# Patient Record
Sex: FEMALE | Race: WHITE
[De-identification: names, ages, dates, MRNs, and addresses within clinical notes are randomized per-mention and may not be internally consistent; named-entity substitution may affect disease eponyms.]

---

## 2017-01-03 NOTE — UC
Dizzy HPI


HPI Summary: 





The patient comes in today for:





1.  Dizziness (vertigo and near syncope):  





Onset:2 hours ago.


Palliative/provocative:  Head movements makes it worse. 


Quality: Vertigo and near-syncope (x 3).


Region/radiation: CNS.


Severity: Present at this time 5/10


Time: Comes and goes.


Associated symptoms:


   Chest pain: None.


   Dyspnea: None.


   Nausea:  Present.


   Numbness:  She had this run down her left arm for about 10 seconds. 


   Fevers: None.


   CAD risk: Just out of ICU due to infection in her heart and lungs.  2 

cardiac arrests and 3 blocked arteries. HTN: (+), DM: (+), smoker: (+).





* 





- History Of Current Complaint


Chief Complaint: UCDizziness


Stated Complaint: DIZZINESS


Time Seen by Provider: 01/03/17 22:01


Hx Obtained From: Patient, Family/Caretaker


Hx Last Menstrual Period: 11/30/16


Pregnant?: No





- Allergies/Home Medications


Allergies/Adverse Reactions: 


 Allergies











Allergy/AdvReac Type Severity Reaction Status Date / Time


 


Adhesive Tape Allergy  Blisters Verified 12/01/16 20:41


 


Codeine Allergy  Hives Verified 12/01/16 20:41


 


Ropinirole [From Requip] Allergy  Hives Verified 12/01/16 20:41


 


Erythromycin AdvReac  "DOESN'T Verified 12/01/16 20:41





   WORK"  


 


Niacin AdvReac  Flushing Verified 12/01/16 20:41














PMH/Surg Hx/FS Hx/Imm Hx


Previously Healthy: No - Peripheral neuropathy. Malignant melanoma.


Endocrine History Of: Reports: Diabetes - TYPE 2 WITH INSULIN CONTROL, Thyroid 

Disease - THYROID NODULE, Dyslipidemia


   Denies: Hyperthyroidism, Hypothyroidism


Cardiovascular History Of: Reports: Cardiac Disorders - 3 BLOCKED ARTERIES, 

Cardiac arrest x 2., Hypertension - CONTROL WITH MED, Myocardial Infarction


   Denies: Pacemaker/ICD, Congestive Heart Failure, Atrial Fibrillation, Deep 

Vein Thrombosis, Bleeding Disorders


Respiratory History Of: Reports: COPD - smoker, Asthma - NO INHALER BUT HAS A 

NEBULIZER


   Denies: Bronchitis, Pneumonia, Pulmonary Embolism


GI/ History Of: Reports: Gastroesophageal Reflux, Renal Disease - ESRD on 

home peritoneal dialysis 


   Denies: Ulcer, Gastrointestinal Bleed


Neurological History Of: 


   Denies: TIA


Psychological History Of: Reports: Anxiety - ON MEDICATION FOR, Depression - ON 

MEDICATION FOR, Bipolar Disorder


Cancer History Of: 


   Denies: Lung Cancer, Colorectal Cancer, Breast Cancer, Prostate Cancer, 

Cervical Cancer


Other History Of: Anticoagulant Therapy - Aspirin.


   Negative For: HIV, Hepatitis B, Hepatitis C





- Surgical History


Surgical History: Yes


Surgery Procedure, Year, and Place: LT EYE REMOVED 2012-HAS PROSTHETIC EYE(

ORBITS DONE 5/2015 OK'D BY DR QUEVEDO);.  CARDIAC CATH 2014-NO STENTS; [facility].  

2014 LT WRIST FISTULA PLACEMENT; [facility].  2014  HEMODIALYSIS CATH RT CHEST WALL;.  

PERITONEAL DIALYSIS CATH 2/2015; REMOVED 7/2016 AT [facility] [place].  2013 MELANOMA REMOVED 

FROM VULVA, X4, 2 AT [facility] AND 2 AT [facility].  4/2016 RT GREAT TOE AMPUTATION, 

[facility].  8/27/2016 PLACEMENT RIGHT JUGULAR TESIO HEMODIALYSIS CATHETER, [facility]





- Family History


Known Family History: Positive: Cardiac Disease - father MI at 41 y/o, 

Hypertension





- Social History


Occupation: Unemployed


Alcohol Use: None


Substance Use Type: None


Substance Use Comment - Amount & Last Used: prescriptions


Smoking Status (MU): Current Every Day Smoker


Type: Cigarettes


Amount Used/How Often: 1/2 PPD


Length of Time of Smoking/Using Tobacco: 17 YEARS


Have You Smoked in the Last Year: Yes


Household Exposure Type: Cigarettes





- Immunization History


Most Recent Influenza Vaccination: Fall 2016


Most Recent Tetanus Shot: 2014


Most Recent Pneumonia Vaccination: Fall 2016





Review of Systems


Constitutional: Negative


Skin: Negative


Eyes: Negative


ENT: Negative


Respiratory: Negative


Cardiovascular: Negative


Gastrointestinal: Negative


Genitourinary: Negative


Motor: Negative


All Other Systems Reviewed And Are Negative: Yes





Physical Exam


Triage Information Reviewed: Yes


Appearance: Well-Appearing, No Pain Distress, Obese


Vital Signs: 


 Initial Vital Signs











Temp  97.9 F   01/03/17 21:49


 


Pulse  106   01/03/17 21:49


 


Resp  16   01/03/17 21:49


 


BP  90/59   01/03/17 21:49


 


Pulse Ox  93   01/03/17 21:49











Vital Signs Reviewed: Yes


Eyes: Positive: Conjunctiva Clear.  Negative: Discharge


ENT: Positive: Hearing grossly normal.  Negative: Pharyngeal erythema, Nasal 

congestion, Nasal drainage, TM bulging, TM dull, TM red, Tonsillar swelling, 

Tonsillar exudate


Dental: Negative: Gross Decay/Caries @, Dental Fracture @


Neck: Positive: Supple, Nontender, No Lymphadenopathy.  Negative: Nuchal 

Rigidity


Respiratory: Positive: Lungs clear, No respiratory distress, No accessory 

muscle use.  Negative: Rhonchi, Wheezing


Cardiovascular: Positive: RRR, No Murmur


Abdomen Description: Positive: Nontender, No Organomegaly, Soft.  Negative: 

Distended, Guarding


Musculoskeletal: Negative: Strength Intact, ROM Intact


Neurological: Positive: Alert.  Negative: Muscle Tone Normal


Psychological: Negative: Normal Response To Family, Age Appropriate Behavior


Skin: Negative: rashes, breakdown





Dizzy Course/Dx





- Course


Course Of Treatment: The patient was told that with her medical history and her 

having three near-syncopal episodes, that my recommendation was for her to go 

to the ER. She agreed, but would not take an ambulance.





- Differential Dx/Diagnosis


Provider Diagnoses: Near syncope.  Vertigo.  Hx of DM, smoker, CAD (2 cardiac 

arrests),





Discharge





- Discharge Plan


Condition: Stable


Disposition: AGAINST MEDICAL ADVICE


Additional Instructions: 


Go directly to the ER. Patient was advised to go to ER via ambulance, but 

declined.

## 2017-01-04 NOTE — ED
Mary RICARDO Erika, scribed for Varghese Bal MD on 01/04/17 at 0040 .





Dizziness





- HPI Summary


HPI Summary: 


Patient is a 36-year-old female presenting to the ED with a CC of dizziness. Pt 

reports that at 20:00 tonight, she became dizzy and lightheaded, and "went cross

-eyed." At 21:00, pt became nauseated, and reports multiple near-syncopal 

episodes. Pt checked her blood sugar, which she found to be 204. She ate food, 

which did not alleviate her symptoms. Pt was seen at [facility], and 

states her BP was 80/50 there. Now, pt denies dizziness and nausea, but states 

she has a frontal headache.





- History Of Current Complaint


Chief Complaint: EDChestPainROMI


Stated Complaint: DIZZY,LOW BLOOD PRESSURE,NAUSEA/CONV CARE


Time Seen by Provider: 01/04/17 00:27


Hx Obtained From: Patient


Timing: Minutes


Severity Initially: Moderate


Severity Currently: Mild


Character: Lightheaded, Dizzy


Associated Signs And Symptoms: Positive: Nausea





- Allergies/Home Medications


Allergies/Adverse Reactions: 


 Allergies











Allergy/AdvReac Type Severity Reaction Status Date / Time


 


Adhesive Tape Allergy  Blisters Verified 12/01/16 20:41


 


Codeine Allergy  Hives Verified 12/01/16 20:41


 


Ropinirole [From Requip] Allergy  Hives Verified 12/01/16 20:41


 


Erythromycin AdvReac  "DOESN'T Verified 12/01/16 20:41





   WORK"  


 


Niacin AdvReac  Flushing Verified 12/01/16 20:41














PMH/Surg Hx/FS Hx/Imm Hx


Endocrine/Hematology History: Reports: Hx Anticoagulant Therapy - Aspirin., Hx 

Blood Transfusions, Hx Diabetes - TYPE 2 WITH INSULIN CONTROL, Hx Thyroid 

Disease - THYROID NODULE, Hx Anemia - HISTORY OF -NO PROBLEMS CURRENTLY- BLOOD 

TRANSFUSION IN 2014


Cardiovascular History: Reports: Hx Angina, Hx Cardiac Arrest - in Dec. 2014 

with CPR, Hx Coronary Artery Disease - STATES HAS 3 BLOCKED ARTERIES, Hx 

Hypercholesterolemia, Hx Hypertension - CONTROL WITH MED, Hx Myocardial 

Infarction, Other Cardiovascular Problems/Disorders - [facility]- CARDIOLOGY AT 

[place]/CARDIAC ARREST 2014-DURING HEMODIALYSIS


   Denies: Hx Congestive Heart Failure, Hx Deep Vein Thrombosis, Hx Pacemaker/

ICD, Hx Valvular Heart Disease


Respiratory History: Reports: Hx Asthma - NO INHALER BUT HAS A NEBULIZER, Hx 

Chronic Obstructive Pulmonary Disease (COPD) - smoker


   Denies: Hx Lung Cancer, Hx Pneumonia, Hx Pulmonary Embolism, Hx Sleep Apnea 

- NOT TESTED


GI History: Reports: Hx Gastroesophageal Reflux Disease, Other GI Disorders - 

GASTROPARESIS - TAKING OMEPRAZOLE


   Denies: Hx Gastrointestinal Bleed, Hx Ulcer


 History: Reports: Hx Chronic Renal Failure, Hx Dialysis - PERITONEAL BUT 

DOES HAVE FISTULA ON LEFT ARM, Hx Renal Disease - ESRD on home peritoneal 

dialysis , Other  Problems/Disorders - ESRD- URINATES A SMALL AMOUNT


Musculoskeletal History: Reports: Hx Arthritis - BACK, HIPS, Hx Back Problems - 

Sciatica and Herniated L3 disk


Sensory History: Reports: Hx Eye Prosthesis - Left eye, Hx Legally Blind - Left 

eye; only 30% vision in right eye, Hx Vision Problem


   Denies: Hx Contacts or Glasses, Hx Hearing Aid


Opthamlomology History: Reports: Hx Eye Prosthesis - Left eye, Hx Legally Blind 

- Left eye; only 30% vision in right eye, Hx Vision Problem


   Denies: Hx Contacts or Glasses


Neurological History: Reports: Other Neuro Impairments/Disorders - Neuropathy 

BOTH LEGS


   Denies: Hx Transient Ischemic Attacks (TIA)


Psychiatric History: Reports: Hx Anxiety - ON MEDICATION FOR, Hx Depression - 

ON MEDICATION FOR, Hx Bipolar Disorder


   Denies: Hx Panic Disorder





- Cancer History


Cancer Type, Location and Year: MALIGNANT MELANOMA- VULVA AND LEFT EYE


Hx Chemotherapy: No


Hx Radiation Therapy: No





- Surgical History


Surgery Procedure, Year, and Place: LT EYE REMOVED 2012-HAS PROSTHETIC EYE(

ORBITS DONE 5/2015 OK'D BY DR QUEVEDO);.  CARDIAC CATH 2014-NO STENTS; [facility].  

2014 LT WRIST FISTULA PLACEMENT; [facility].  2014  HEMODIALYSIS CATH RT CHEST WALL;.  

PERITONEAL DIALYSIS C[place] 2/2015[place] [facility] [place] REMOVED 

FROM [place]ULVA, X4, 2 AT [facility] AND 2 AT [facility].  4/2016 RT GREAT TOE AMPUTATION[place] 

[facility]  8/27/2016 PLACEMENT RIGHT JUGULAR TESIO HEMODIALYSIS CATHETER, [facility] Anesthesia Reactions: Yes - Nausea, WAKES UP VOMITING





- Immunization History


Date of Tetanus Vaccine: UTD


Date of Influenza Vaccine: 8/15/16


Infectious Disease History: Yes


Infectious Disease History: 


   Denies: Hx Hepatitis, Hx Human Immunodeficiency Virus (HIV), Hx Shingles, Hx 

Tuberculosis, History Other Infectious Disease, Traveled Outside the [place] in Last 

30 Days





- Family History


Known Family History: Positive: Cardiac Disease - father MI at 43 y/o, 

Hypertension





- Social History


Alcohol Use: None


Hx Substance Use: No


Substance Use Type: Reports: None


Substance Use Comment - Amount & Last Used: prescriptions


Hx Tobacco Use: Yes


Smoking Status (MU): Current Every Day Smoker


Type: Cigarettes


Amount Used/How Often: 1/2 PPD


Length of Time of Smoking/Using Tobacco: 17 YEARS


Have You Smoked in the Last Year: Yes





Review of Systems


Constitutional: Other - Low BP


Positive: Nausea


Neurological: Other - Dizzy, lightheaded, near-syncope


Positive: Headache


All Other Systems Reviewed And Are Negative: Yes





Physical Exam


Triage Information Reviewed: Yes


Vital Signs On Initial Exam: 


 Initial Vitals











Temp Pulse Resp BP Pulse Ox


 


 97.9 F   96   20   97/53   92 


 


 01/03/17 22:55  01/03/17 22:55  01/03/17 22:55  01/03/17 22:55  01/03/17 22:55











Vital Signs Reviewed: Yes


Appearance: Positive: Well-Appearing, No Pain Distress, Obese


Skin: Positive: Warm


Eyes: Positive: EOMI, VIOLETTA


ENT: Positive: Hearing grossly normal


Neck: Positive: Supple


Respiratory/Lung Sounds: Positive: Clear to Auscultation, Breath Sounds Present


Cardiovascular: Positive: Normal


Abdomen Description: Positive: Nontender, Soft


Bowel Sounds: Positive: Present


Neurological: Positive: Sensory/Motor Intact, Alert, Oriented to Person Place, 

Time, Normal Gait


Psychiatric: Positive: Normal





Diagnostics





- Vital Signs


 Vital Signs











  Temp Pulse Resp BP Pulse Ox


 


 01/03/17 22:55  97.9 F  96  20  97/53  92














- Laboratory


Lab Statement: Any lab studies that have been ordered have been reviewed, and 

results considered in the medical decision making process.





- EKG


  ** 23:00


Cardiac Rate: NL - at 99 bpm


EKG Rhythm: Sinus Rhythm


ST Segment: Normal, Non-Specific


EKG Interpretation: Non-specific T abnormalities





Re-Evaluation





- Re-Evaluation


  ** First Eval


Re-Evaluation Time: 01:05


Change: Improved


Comment: Pt requests discharge, declines bloodwork symptoms resolved





Dizzy Course/Dx





- Course


Assessment/Plan: A 37 y/o F presents to the ED with a CC of dizziness. EKG 

shows NSR with non-specific T changes. Pt declines bloodwork in the ED. Pt 

reports she feels improved and requests discharge, so will be discharged with 

follow up from her PCP.





- Diagnoses


Provider Diagnoses: 


 dizziness, resolved








Discharge





- Discharge Plan


Condition: Improved


Disposition: HOME


Patient Education Materials:  Dizziness (ED)


Referrals: 


Yeni Shanks MD [Primary Care Provider] - 





The documentation as recorded by the Mary cardenas Erika accurately reflects 

the service I personally performed and the decisions made by me, Varghese Bal MD.

## 2017-03-23 NOTE — ED
Lower Extremity





- HPI Summary


HPI Summary: 





The patient is a 36 year old female presenting to the ED for complaint of right 

2nd toe pain x1 mo worse in past 3 days. Started as black spot on toe 

increasing in size. Admits to redness of right foot. Describes constant burning 

with intermittent sharp pain worse with touch, movement, or weight bearing. 

Admits to associated chills, nausea, vomiting, cough with intermittent clear 

sputum, wheezing. Denies fever, nasal symptoms, sore throat, shortness of breath

, chest pain, abdominal pain. History of DM on Lantus 80 U BID and Novalog 

sliding scale, HTN, HLP, CAD with cardiac cath 1 week ago without stent, ESRF 

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 

gastroparesis, malignant melanoma, MRSA osteomyelitis s/p right great toe 

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 FH father HTN, DM, CAD; mother and all siblings with DM. 

PCP [facility]. Nephrologist Dr. Baker. Ortho Dr. Zaragoza. Gen Surg Dr. Hogan. 





SH: Current smoker. No recreational drug use. 





- History of Current Complaint


Chief Complaint: EDExtremityLower


Stated Complaint: BLACK TOE/FOOT PAIN


Time Seen by Provider: 03/23/17 16:37


Hx Last Menstrual Period: 11/30/16


Pain Intensity: 8





- Allergies/Home Medications


Allergies/Adverse Reactions: 


 Allergies











Allergy/AdvReac Type Severity Reaction Status Date / Time


 


Adhesive Tape Allergy  Blisters Verified 12/01/16 20:41


 


Codeine Allergy  Hives Verified 12/01/16 20:41


 


Ropinirole [From Requip] Allergy  Hives Verified 12/01/16 20:41


 


Erythromycin AdvReac  "DOESN'T Verified 12/01/16 20:41





   WORK"  


 


Niacin AdvReac  Flushing Verified 12/01/16 20:41














PMH/Surg Hx/FS Hx/Imm Hx


Endocrine/Hematology History: Reports: Hx Anticoagulant Therapy - Aspirin., Hx 

Blood Transfusions, Hx Diabetes - TYPE 2 WITH INSULIN CONTROL, Hx Thyroid 

Disease - THYROID NODULE, Hx Anemia - HISTORY OF -NO PROBLEMS CURRENTLY- BLOOD 

TRANSFUSION IN 2014


Cardiovascular History: Reports: Hx Angina, Hx Cardiac Arrest - in Dec. 2014 

with CPR, Hx Coronary Artery Disease - STATES HAS 3 BLOCKED ARTERIES, Hx 

Hypercholesterolemia, Hx Hypertension - CONTROL WITH MED, Hx Myocardial 

Infarction, Other Cardiovascular Problems/Disorders - [facility]- CARDIOLOGY AT 

[place]/CARDIAC ARREST 2014-DURING HEMODIALYSIS


   Denies: Hx Congestive Heart Failure, Hx Deep Vein Thrombosis, Hx Pacemaker/

ICD, Hx Valvular Heart Disease


Respiratory History: Reports: Hx Asthma - NO INHALER BUT HAS A NEBULIZER, Hx 

Chronic Obstructive Pulmonary Disease (COPD) - smoker


   Denies: Hx Lung Cancer, Hx Pneumonia, Hx Pulmonary Embolism, Hx Sleep Apnea 

- NOT TESTED


GI History: Reports: Hx Gastroesophageal Reflux Disease, Other GI Disorders - 

GASTROPARESIS - TAKING OMEPRAZOLE


   Denies: Hx Gastrointestinal Bleed, Hx Ulcer


 History: Reports: Hx Chronic Renal Failure, Hx Dialysis - PERITONEAL BUT 

DOES HAVE FISTULA ON LEFT ARM, Hx Renal Disease - ESRD on home peritoneal 

dialysis , Other  Problems/Disorders - ESRD- URINATES A SMALL AMOUNT


Musculoskeletal History: Reports: Hx Arthritis - BACK, HIPS, Hx Back Problems - 

Sciatica and Herniated L3 disk


Sensory History: Reports: Hx Eye Prosthesis - Left eye, Hx Legally Blind - Left 

eye; only 30% vision in right eye, Hx Vision Problem


   Denies: Hx Contacts or Glasses


Opthamlomology History: Reports: Hx Eye Prosthesis - Left eye, Hx Legally Blind 

- Left eye; only 30% vision in right eye, Hx Vision Problem


   Denies: Hx Contacts or Glasses


Neurological History: Reports: Other Neuro Impairments/Disorders - Neuropathy 

BOTH LEGS


   Denies: Hx Transient Ischemic Attacks (TIA)


Psychiatric History: Reports: Hx Anxiety - ON MEDICATION FOR, Hx Depression - 

ON MEDICATION FOR, Hx Bipolar Disorder


   Denies: Hx Panic Disorder





- Cancer History


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


Hx Chemotherapy: No


Hx Radiation Therapy: No





- Surgical History


Surgery Procedure, Year, and Place: LT EYE REMOVED 2012-HAS PROSTHETIC EYE(

ORBITS DONE 5/2015 OK'D BY DR QUEVEDO);.  CARDIAC CATH 2014-NO STENTS; [facility].  

2014 LT WRIST FISTULA PLACEMENT; [facility].  2014  HEMODIALYSIS CATH RT CHEST WALL;.  

PERITONEAL DIALYSIS CATH 2/2015; REMOVED 7/2016 AT [facility] [place].  2013 MELANOMA REMOVED 

FROM VULVA, X4, 2 AT [facility] AND 2 AT [place].  4/2016 RT GREAT TOE AMPUTATION, 

[facility] [place].  8/27/2016 PLACEMENT RIGHT JUGULAR TESIO HEMODIALYSIS CATHETER, [facility] [place]


Hx Anesthesia Reactions: Yes - Nausea, WAKES UP VOMITING





- Immunization History


Date of Tetanus Vaccine: UTD


Date of Influenza Vaccine: 8/15/16


Infectious Disease History: Yes


Infectious Disease History: 


   Denies: Hx Hepatitis, Hx Human Immunodeficiency Virus (HIV), Hx Shingles, Hx 

Tuberculosis, History Other Infectious Disease, Traveled Outside the [place] in Last 

30 Days





- Family History


Known Family History: Positive: Cardiac Disease - father MI at 43 y/o, 

Hypertension





- Social History


Alcohol Use: None


Hx Substance Use: No


Substance Use Type: Reports: None


Substance Use Comment - Amount & Last Used: prescriptions


Hx Tobacco Use: Yes


Smoking Status (MU): Current Every Day Smoker


Type: Cigarettes


Amount Used/How Often: 1/2 PPD


Length of Time of Smoking/Using Tobacco: 17 YEARS


Have You Smoked in the Last Year: Yes





Review of Systems


Positive: Chills.  Negative: Fever


Cardiovascular: Negative


Negative: Palpitations, Chest Pain


Positive: Cough.  Negative: Shortness Of Breath


Positive: Vomiting, Nausea.  Negative: Abdominal Pain


Positive: Arthralgia, Edema


Positive: Rash


Positive: Paresthesia, Numbness


Psychological: Normal


All Other Systems Reviewed And Are Negative: Yes





Physical Exam


Triage Information Reviewed: Yes


Vital Signs On Initial Exam: 


 Initial Vitals











Temp Pulse Resp BP Pulse Ox


 


 97.3 F   97   20   131/92   99 


 


 03/23/17 16:21  03/23/17 16:21  03/23/17 16:21  03/23/17 16:21  03/23/17 16:21











Vital Signs Reviewed: Yes


Appearance: Positive: Well-Appearing, Pain Distress - moderate pain, Obese


Skin: Positive: Warm, Skin Color Reflects Adequate Perfusion, Dry, Lymphangitis 

- right foot, Other - right great toe necrotic ulceration without purulence


Head/Face: Positive: Normal Head/Face Inspection


ENT: Positive: Hearing grossly normal, Other - oral mucosa moist


Neck: Positive: Supple, Nontender, No Lymphadenopathy


Respiratory/Lung Sounds: Positive: Breath Sounds Present, Wheezes - mild 

expiratory bilaterally.  Negative: Rales, Rhonchi


Cardiovascular: Positive: Normal, RRR, S1, S2.  Negative: Murmur, Rub, 

Tachycardia, Leg Edema Left, Leg Edema Right


Abdomen Description: Positive: Nontender, No Organomegaly, Soft.  Negative: 

Distended, Guarding, Peritoneal Signs


Bowel Sounds: Positive: Present


Musculoskeletal: Positive: Pain @ - right 2nd toe and 1-3rd MT


Neurological: Positive: Alert, Oriented to Person Place, Time, Reflexes Intact 

- achilles 2+, Facial Symmetry, Speech Normal, Other - diminished sensation 

bilateral toes and feet


Psychiatric: Positive: Normal


AVPU Assessment: Alert





- Dalia Coma Scale


Coma Scale Total: 15





Diagnostics





- Vital Signs


 Vital Signs











  Temp Pulse Resp BP Pulse Ox


 


 03/23/17 17:00   90   148/94  94


 


 03/23/17 16:38   95    98


 


 03/23/17 16:35     142/98 


 


 03/23/17 16:21  97.3 F  97  20  131/92  99














- Laboratory


Lab Results: 


 Lab Results











  03/23/17 03/23/17 Range/Units





  16:50 16:50 


 


WBC  14.9 H   (3.5-10.8)  10^3/ul


 


RBC  4.64   (4.0-5.4)  10^6/ul


 


Hgb  13.9   (12.0-16.0)  g/dl


 


Hct  43   (35-47)  %


 


MCV  92   (80-97)  fL


 


MCH  30   (27-31)  pg


 


MCHC  33   (31-36)  g/dl


 


RDW  15   (10.5-15)  %


 


Plt Count  266   (150-450)  10^3/ul


 


MPV  10   (7.4-10.4)  um3


 


Neut % (Auto)  84.4 H   (38-83)  %


 


Lymph % (Auto)  9.1 L   (25-47)  %


 


Mono % (Auto)  3.9   (1-9)  %


 


Eos % (Auto)  2.0   (0-6)  %


 


Baso % (Auto)  0.6   (0-2)  %


 


Absolute Neuts (auto)  12.6 H   (1.5-7.7)  10^3/ul


 


Absolute Lymphs (auto)  1.4   (1.0-4.8)  10^3/ul


 


Absolute Monos (auto)  0.6   (0-0.8)  10^3/ul


 


Absolute Eos (auto)  0.3   (0-0.6)  10^3/ul


 


Absolute Basos (auto)  0.1   (0-0.2)  10^3/ul


 


Absolute Nucleated RBC  0.01   10^3/ul


 


Nucleated RBC %  0.1   


 


ESR  Pending   


 


Sodium   129 L  (133-145)  mmol/L


 


Potassium   3.6  (3.5-5.0)  mmol/L


 


Chloride   92 L  (101-111)  mmol/L


 


Carbon Dioxide   25  (22-32)  mmol/L


 


Anion Gap   12 H  (2-11)  mmol/L


 


BUN   31 H  (6-24)  mg/dL


 


Creatinine   3.99 H  (0.51-0.95)  mg/dL


 


Est GFR ( Amer)   16.3  (>60)  


 


Est GFR (Non-Af Amer)   12.7  (>60)  


 


BUN/Creatinine Ratio   7.8 L  (8-20)  


 


Glucose   613 H*  ()  mg/dL


 


Calcium   9.4  (8.6-10.3)  mg/dL


 


C-Reactive Protein   36.53 H  (< 5.00)  mg/L











Result Diagrams: 


 03/23/17 16:50





 03/23/17 16:50


Lab Statement: Any lab studies that have been ordered have been reviewed, and 

results considered in the medical decision making process.





Lower Extremity Course/Dx





- Course


Assessment/Plan: Patient 36 female presenting for right 2nd toe pain. Afebrile 

not meeting septic critera therefore no blood cultures obtained. Labs 

demonstrate WBC 14.9, CRP 36.53,  (likey due to elevated BG), CL 92, BUN 

31, Cr 3.99, glucose 613 AG 12. Compared with prior labs, renal function 

appears improved. X-ray of right foot pending. Suspicion of right 2nd toe 

osteomyelitis with lymphangitis, uncontrolled DM II, ESRF on PD. Initiated 

Vancomycin, Zosyn, Lantus, Regular Insulin. Admission accepted by Dr. Coombs.





- Diagnoses


Provider Diagnoses: 


 Lymphangitis, Osteomyelitis of toe of right foot, Hyperglycemia due to type 2 

diabetes mellitus, Acidosis, ESRD (end stage renal disease) on dialysis








Discharge





- Discharge Plan


Condition: Fair


Disposition: ADMITTED TO [facility]

## 2017-03-23 NOTE — RAD
Indication: Cough.



Single frontal view of the chest performed at 1946 hours was reviewed.



Comparison is made with previous exam dated December 15, 2016.



No mediastinal shift is noted. Heart is of normal size and configuration. Lung fields

appear clear. This is improved since previous exam of December 15, 2016.



IMPRESSION: NO ACTIVE CARDIOPULMONARY DISEASE IS NOTED.

## 2017-03-23 NOTE — RAD
Indication: Gangrenous second toe.



Ankle-brachial index on the right is 0.56. Ankle-brachial index of the right dorsalis

pedis is 0.27. Ankle-brachial index on the left is 0.56. This is from the dorsalis pedis

artery on the left. Waveforms on the right lower extremity in the posterior tibial and

dorsalis pedis artery are monophasic. Waveforms in the left posterior tibial and dorsalis

pedis are biphasic.



IMPRESSION: Bilateral ankle brachial indexes 0.56 although waveforms of the right

posterior tibial and dorsalis pedis arteries is monophasic.

## 2017-03-23 NOTE — RAD
Indication: Second toe osteomyelitis.



3 views of the right foot demonstrates amputation of the great toe. No fracture is noted.

No evidence of lucency is noted to suggest osteomyelitis.



IMPRESSION: Amputation of the first digit. No evidence of endosteal lysis is noted to

suggest osteomyelitis.

## 2017-03-23 NOTE — RAD
Indication: Second toe osteomyelitis.



Image sequences: Coronal T1, STIR, axial T1, STIR, sagittal T1, STIR images of the foot

were obtained.



Special attention was paid to the second digit.



There is no evidence of bone marrow edema to suggest osteomyelitis. The patient is status

post amputation of the first digit. No other bone or joint abnormality is noted.



IMPRESSION: No evidence of bone marrow edema is noted to suggest osteomyelitis.

## 2017-03-23 NOTE — HP
HISTORY AND PHYSICAL:

 

DATE OF ADMISSION:  03/23/17

 

PRIMARY CARE PROVIDER:  Dr. Shanks.

 

ATTENDING PHYSICIAN WHILE IN THE HOSPITAL:  Dr. Ivan Qureshi* (report 
dictated by Joanna Goldberg NP).

 

CONSULTING NEPHROLOGIST:  Dr. Baker.

 

CONSULTING ORTHOPEDIST:  Dr. Bell.

 

CONSULTING ID SPECIALIST:  Dr. Felton.

 

CHIEF COMPLAINT:  Right foot pain.

 

HISTORY OF PRESENT ILLNESS:  Ms. Masters is a 36-year-old female patient with 
multiple medical problems who comes in to the ER today stating that really for 
the last 4 weeks, she has had progressive worsening pain in her right second toe
, much worse over the last 3 days.  She has been having chills as well.  In 
addition to this, she noted that the tip of her toe was becoming black and she 
was worried because the pain was not getting any better.  So, she came in to 
the ED.  She states that the toe had a chronic wound since around January; but 
over the last 4 weeks, has gotten progressively worse and in the last 3 days, 
it was much worse and then she was having chills and she has noticed that tip 
of the toe was becoming black and there was some redness streaking up the foot.
  She was concerned and came in to the hospital and she has noticed that she 
has been having some bloody discharge off and on from this toe as well.  She 
also states that she has had a cough off and on since December, but it is no 
worse, but she denies having any shortness of breath.  She denies any chest 
pain.  She says actually she had a recent cardiac catheterization a couple of 
weeks ago over at [facility] as well.  She tried to get in with her wound specialist
, Dr. Hogan, today, but unfortunately, this was unable to happen.  So, she came 
to the ER, was evaluated, ultimately found to have a high white count, found to 
have a high ESR.  There was concern for an infection in this toe.  Hospitalist 
service was asked to evaluate for admission.

 

PAST MEDICAL HISTORY:  Significant for:

1.  Melanoma.

2.  End-stage renal disease, on peritoneal dialysis.

3.  Diabetes.

4.  Neuropathy.

5.  CAD.

6.  Hypertension.

7.  Hyperlipidemia.

8.  Peripheral vascular disease.

9.  Asthma.

10.  GERD.

11.  Depression.

12.  Anxiety.

 

PAST SURGICAL HISTORY:

1.  She had a heart catheterization on the 9th.  No intervention according to 
the patient.

2.  She has had a PD catheter placement and removal.

3.  She has had skin excisions.

4.  She has had a right great toe amputation.

5.  She had a left eye prosthesis.

 

HOME MEDICATIONS:

1.  Aspirin 81 mg daily.

2.  Lantus 80 units subcu b.i.d.

3.  Insulin aspart sliding scale.

4.  Zebeta 10 mg p.o. daily.

5.  Amlodipine 10 mg daily.

6.  Ventolin 1 to 2 puffs inhaled every 6 hours as needed.

7.  Prilosec 20 mg daily.

8.  Imdur 60 mg daily.

9.  Ventolin 2.5 mg inhaled every 6 hours as needed.

10.  Lipitor 80 mg daily.

11.  Cozaar 100 mg p.o. daily.

12.  Neurontin 300 mg p.o. b.i.d.

 

ALLERGIES TO MEDICATIONS:  Include TAPE, CODEINE, REQUIP, ERYTHROMYCIN, and 
NIACIN.

 

FAMILY HISTORY:  Her mother and father are both diabetics.

 

SOCIAL HISTORY:  She is a pack a day smoker for about 20 years.  She does not 
drink alcohol.  She has a fiance, who is also surrogate decision maker.

 

REVIEW OF SYSTEMS:  There is no documented fever.  She denied any significant 
weight change.  There was no double vision.  There is no ear discharge.  She 
denies having any rhinorrhea or sore throat.  She denied any shortness of 
breath or any orthopnea.  No weight change.  She denies having any abdominal 
pain or any nausea or vomiting.  She did have some dry heaves at times over the 
last couple weeks, but those are now subsided and she denies any abdominal pain 
and denies having any loss of consciousness, skin ulcerations, or any pruritus.
  Review of 14 systems completed, all others negative.

 

                               PHYSICAL EXAMINATION

 

GENERAL:  At this time, Ms. Masters is a 36-year-old female patient.  She is 
sitting in the ER stretcher.  She does not appear to be in any acute distress.

 

VITAL SIGNS:  Blood pressure 151/87, pulse 84, respirations 18, O2 sat 98%, 
temperature 97.3.

 

HEENT:  Head is atraumatic and normocephalic.  Eyes:  EOMs are intact.  Sclerae 
anicteric and not pale.  Throat:  Oral mucosa appeared to be dry.  No 
oropharyngeal erythema.

 

NECK:  Supple.

 

LUNGS:  Clear to auscultation bilaterally.  No wheezes, rales, or rhonchi.  
They were diminished.

 

HEART:  Sounds S1, S2.  Regular rate and rhythm.  No murmurs, rubs, or gallops.

 

ABDOMEN:  Soft, flat, and nontender.  She did have a peritoneal dialysis 
catheter which the site appeared to be clean, dry, and intact.  No erythema.

 

EXTREMITIES:  Pulses were diminished in the lower extremities.  She did have 
sensation intact, but it was decreased.  She does have a slight footdrop to the 
left lower extremity, which is not new for her.  Upper extremities, she has 5/5 
strength.

 

NEUROLOGIC:  She is awake, alert, and oriented x3.  Tongue midline.   were 
equal.  She had no gross focal deficits.

 

SKIN:  Intact with the exception she has got an area on the second right toe 
that does have what appears to be necrotic tissue just to the tip of it.  I do 
not see any obvious ulcer or discharge from this.

 

 LABORATORY DATA AND DIAGNOSTIC STUDIES:  Today revealed a WBC of 14.9, RBC of 
4.64, hemoglobin 13.9, hematocrit of 43, platelet count of 266.  Sodium 129, 
potassium 3.6, chloride of 92, bicarb of 25, BUN 31, creatinine of 3.99, 
glucose 613, CRP of 36.  She had a foot x-ray, which showed amputation of the 
first digit, no evidence of osteolysis is noted to suggest osteomyelitis.  Old 
medical records were reviewed.

 

ASSESSMENT AND PLAN:  Ms. Masters is a 36-year-old female patient with multiple 
medical problems coming in to the ER today with complaints of right foot pain 
and worsening right second toe ulcer.  She was evaluated here in the ER.  We 
were asked to evaluate for admission.  She will be admitted under inpatient 
status for:

 

1.  Right second toe wound with necrosis:  I suspect this is probably 
osteomyelitis here.  Her last A1c was 17.6.  She is a very poorly controlled 
diabetic and she has multiple complications related to her diabetes.  I do 
think though she deserves an MRI, blood cultures.  We will go ahead and put her 
on vanco and cefepime.  I am going to get an EKG and a chest x-ray as she may 
require amputation of this digit or I and D's and we will hydrate her overnight 
with normal saline at 100 an hour and we will continue to follow her closely.

2.  Diabetes:  Multiple complications and hyperglycemia.  Her sugar was 613.  
She received her Lantus today and IV insulin.  We will go ahead and check her 
sugars a.c. and h.s. and we will continue her Lantus and we will titrate the 
Lantus to effect and her sliding scale to effect.  She does not appear to be in 
diabetic ketoacidosis.  I will repeat her BNP later tonight.

3.  Hyponatremia:  This is probably pseudohyponatremia related to the blood 
sugar. We will follow.

4.  Melanoma:  Follow with her primary.

5.  End-stage renal disease:  I did touch base with Dr. Baker, who will be 
evaluating the patient.  We will skip dialysis tonight as she does appear to be 
on the dry side.

6.  Neuropathy:  We will continue her current medical regimen.

7.  Coronary artery disease:  She is on aspirin, statin, and a beta-blocker.  
We will continue.  I am going to try to get the cath report from [facility].

8.  Hypertension:  Continue meds as prescribed.

9.  Hyperlipidemia:  Continue meds as prescribed.

10.  History of peripheral vascular disease:  I will go ahead and get BECKY and 
we will continue the aspirin, statin therapy.

11.  Asthma:  We will continue p.r.n. albuterol.

12.  Gastroesophageal reflux disease:  Continue PPI therapy.

13.  Depression and anxiety:  Continue meds as prescribed.

14.  DVT prophylaxis:  She is high risk.  She will be placed on heparin subcu.

15.  Code status:  Full code.

16.  Fluids, electrolytes, and nutrition:  She can have a consistent 
carbohydrate diet.

 

TIME SPENT:  Time spent on the admission was approximately 80 minutes; greater 
than half the time was spent face-to-face with the patient obtaining my history 
and physical, other half the time spent going over the plan of care with the 
patient and implementing plan of care.  I did discuss the plan of care with my 
attending, Dr. Qureshi; he is in agreement.

 

 ____________________________________ JOANNA GOLDBERG NP



CC:  Dr. Shanks; Dr. Baker; Dr. Bell; Dr. Felton *

 

[id number]/[facility] #: [id number]

[facility]

## 2017-03-24 NOTE — PN
Subjective


Date of Service: 03/24/17


Interval History: 





Seen and examined. Reports pain in right foot but no so much her toe. Appetite 

good. Requesting dilaudid 





Objective


Active Medications: 








Acetaminophen (Tylenol Tab*)  650 mg PO Q4H PRN


   PRN Reason: FEVER/PAIN


Albuterol (Ventolin 2.5 Mg/3 Ml Neb.Sol*)  2.5 mg INH Q6H PRN


   PRN Reason: SHORTNESS OF BREATH


Amlodipine Besylate (Norvasc Tab*)  10 mg PO DAILY [facility]


   Last Admin: 03/24/17 10:46 Dose:  10 mg


Aspirin (Aspirin Low Dose Tab*)  81 mg PO QAM [facility]


   Last Admin: 03/24/17 10:46 Dose:  81 mg


Atorvastatin Calcium (Lipitor*)  80 mg PO 1700 [facility]


Bisoprolol Fumarate (Zebeta Tab*)  10 mg PO DAILY [facility]


   Last Admin: 03/24/17 10:46 Dose:  10 mg


Dextrose (D50w Syringe 50 Ml*)  12.5 gm IV PUSH .FOR FS < 60 - SS PRN


   PRN Reason: FS < 60


Gabapentin (Neurontin Cap(*))  300 mg PO BID [facility]


   Last Admin: 03/24/17 10:45 Dose:  300 mg


Heparin Sodium (Porcine) (Heparin Vial(*))  5,000 units SUBCUT Q8HR [facility]


   Last Admin: 03/24/17 14:07 Dose:  5,000 units


Hydromorphone HCl (Dilaudid Iv*)  1 mg IV SLOW PU Q4H PRN


   PRN Reason: PAIN


   Last Admin: 03/24/17 14:07 Dose:  1 mg


Cefepime HCl 2 gm/ Sodium (Chloride)  50 mls @ 100 mls/hr IVPB Q48H [facility]


   Last Admin: 03/24/17 00:30 Dose:  100 mls/hr


Sodium Chloride (Ns 0.9% 1000 Ml*)  1,000 mls @ 100 mls/hr IV PER RATE [facility]


   Last Admin: 03/24/17 11:02 Dose:  100 mls/hr


Insulin Glargine (Lantus(*))  80 units SUBCUT BID [facility]


   Last Admin: 03/24/17 10:42 Dose:  80 unit


Insulin Human Lispro (Humalog*)  0 units SUBCUT 0730,1130,1630 [facility]


   PRN Reason: Protocol


Insulin Human Lispro (Humalog*)  0 units SUBCUT 0730,1130,1630,2100 [facility]


   PRN Reason: Protocol


Isosorbide Mononitrate (Imdur Er Tab*)  60 mg PO DAILY [facility]


   Last Admin: 03/24/17 10:46 Dose:  60 mg


Losartan Potassium (Cozaar Tab*)  100 mg PO DAILY [facility]


   Last Admin: 03/24/17 10:46 Dose:  100 mg


Mupirocin (Bactroban 2 % Oint*)  1 applic TOPICAL DAILY [facility]


Omeprazole (Prilosec Cap*)  20 mg PO QPM [facility]


   Last Admin: 03/23/17 22:49 Dose:  20 mg


Ondansetron HCl (Zofran Inj*)  4 mg IV Q6H PRN


   PRN Reason: NAUSEA


   Last Admin: 03/24/17 09:33 Dose:  4 mg


Oxycodone/Acetaminophen (Percocet 5/325 Tab*)  2 tab PO Q4H PRN


   PRN Reason: PAIN


   Last Admin: 03/23/17 22:48 Dose:  2 tab


Pharmacy Consult (Vancomycin Per Pharmacy*)  1 note FOLLOW UP . PRN


   PRN Reason: PER PROTOCOL


Pharmacy Consult (Vancomycin Random Level*)  1 note FOLLOW UP ONCE ONE


   Stop: 03/25/17 06:01








 Vital Signs











  03/23/17 03/23/17 03/23/17





  19:48 20:00 20:10


 


Temperature 97.5 F  


 


Pulse Rate 78  


 


Respiratory 16 14 16





Rate   


 


Blood Pressure 134/90  





(mmHg)   


 


O2 Sat by Pulse 98  





Oximetry   














  03/23/17 03/23/17 03/23/17





  20:20 21:10 21:20


 


Temperature   


 


Pulse Rate   


 


Respiratory 14 16 16





Rate   


 


Blood Pressure   





(mmHg)   


 


O2 Sat by Pulse   





Oximetry   














  03/23/17 03/23/17 03/23/17





  22:02 22:10 22:25


 


Temperature 97.5 F  


 


Pulse Rate 78  


 


Respiratory 16 16 16





Rate   


 


Blood Pressure 134/90  





(mmHg)   


 


O2 Sat by Pulse 98  





Oximetry   














  03/23/17 03/24/17 03/24/17





  22:48 00:25 00:32


 


Temperature   


 


Pulse Rate   


 


Respiratory 16 16 16





Rate   


 


Blood Pressure   





(mmHg)   


 


O2 Sat by Pulse   





Oximetry   














  03/24/17 03/24/17 03/24/17





  00:48 01:32 03:14


 


Temperature   97.5 F


 


Pulse Rate   85


 


Respiratory 16 16 14





Rate   


 


Blood Pressure   128/50





(mmHg)   


 


O2 Sat by Pulse   88





Oximetry   














  03/24/17 03/24/17 03/24/17





  03:16 07:25 09:37


 


Temperature  97.4 F 


 


Pulse Rate  91 


 


Respiratory  18 





Rate   


 


Blood Pressure  135/69 





(mmHg)   


 


O2 Sat by Pulse 96 97 92





Oximetry   














  03/24/17 03/24/17 03/24/17





  10:45 11:15 12:27


 


Temperature  97.6 F 98.2 F


 


Pulse Rate   


 


Respiratory 16  





Rate   


 


Blood Pressure   





(mmHg)   


 


O2 Sat by Pulse   





Oximetry   














  03/24/17 03/24/17





  12:45 14:07


 


Temperature  


 


Pulse Rate  


 


Respiratory 16 16





Rate  


 


Blood Pressure  





(mmHg)  


 


O2 Sat by Pulse  





Oximetry  











Oxygen Devices in Use Now: None


Appearance: obese, NAD


Eyes: No Scleral Icterus, PERRLA


Ears/Nose/Mouth/Throat: - - poor dentition


Respiratory: Symmetrical Chest Expansion and Respiratory Effort, Clear to 

Auscultation


Cardiovascular: NL Sounds; No Murmurs; No JVD, RRR


Abdominal: NL Sounds; No Tenderness; No Distention, No Hepatosplenomegaly, - - 

PD cath c/d/i


Lymphatic: No Cervical Adenopathy


Extremities: No Edema


Skin: - - end of right second toe with dry ulcer, small area of erythema 

extending proximal on dorsal surface of foot


Neurological: Alert and Oriented x 3


Result Diagrams: 


 03/24/17 06:30





 03/24/17 06:30


Additional Lab and Data: 


 Lab Results











  03/23/17 03/23/17 Range/Units





  16:50 16:50 


 


WBC  14.9 H   (3.5-10.8)  10^3/ul


 


RBC  4.64   (4.0-5.4)  10^6/ul


 


Hgb  13.9   (12.0-16.0)  g/dl


 


Hct  43   (35-47)  %


 


MCV  92   (80-97)  fL


 


MCH  30   (27-31)  pg


 


MCHC  33   (31-36)  g/dl


 


RDW  15   (10.5-15)  %


 


Plt Count  266   (150-450)  10^3/ul


 


MPV  10   (7.4-10.4)  um3


 


Neut % (Auto)  84.4 H   (38-83)  %


 


Lymph % (Auto)  9.1 L   (25-47)  %


 


Mono % (Auto)  3.9   (1-9)  %


 


Eos % (Auto)  2.0   (0-6)  %


 


Baso % (Auto)  0.6   (0-2)  %


 


Absolute Neuts (auto)  12.6 H   (1.5-7.7)  10^3/ul


 


Absolute Lymphs (auto)  1.4   (1.0-4.8)  10^3/ul


 


Absolute Monos (auto)  0.6   (0-0.8)  10^3/ul


 


Absolute Eos (auto)  0.3   (0-0.6)  10^3/ul


 


Absolute Basos (auto)  0.1   (0-0.2)  10^3/ul


 


Absolute Nucleated RBC  0.01   10^3/ul


 


Nucleated RBC %  0.1   


 


ESR  Pending   


 


Sodium   129 L  (133-145)  mmol/L


 


Potassium   3.6  (3.5-5.0)  mmol/L


 


Chloride   92 L  (101-111)  mmol/L


 


Carbon Dioxide   25  (22-32)  mmol/L


 


Anion Gap   12 H  (2-11)  mmol/L


 


BUN   31 H  (6-24)  mg/dL


 


Creatinine   3.99 H  (0.51-0.95)  mg/dL


 


Est GFR ( Amer)   16.3  (>60)  


 


Est GFR (Non-Af Amer)   12.7  (>60)  


 


BUN/Creatinine Ratio   7.8 L  (8-20)  


 


Glucose   613 H*  ()  mg/dL


 


Calcium   9.4  (8.6-10.3)  mg/dL


 


C-Reactive Protein   36.53 H  (< 5.00)  mg/L











Microbiology and Other Data: 


 Microbiology











 03/23/17 22:37 Nasal Screen MRSA (PCR)(JESSICA) - Final





 Nasal    Mrsa Negative














Assess/Plan/Problems-Billing


Assessment: 





35 yo F h/o ESRD on PD, PVD, uncontrolled DM2, HTN p/w right foot pain found 

with second toe infection





- Patient Problems


(1) Dry gangrene


Comment: Vanco, cefepime although this is more for area of erythema extending 

up foot


May benefit from CTA runoff to  eval for interventions in setting of suspected 

critical ischemia


Appreciate surgery consultation in this case   





(2) Diabetes


Comment: HbA1c 15.5%


Lantus 80 BID


Lispro high dose SS and 1:12 carb counting coverage 


Pt with large [employer] soda in room


Cites budget as contributing to difficulty controlling DM2 with inadequate 

access to healthy food   





(3) HTN (hypertension)


Comment: norvasc 10


losartan 100   





(4) ESRD (end stage renal disease)


Comment: peritoneal dialysis    





(5) DVT prophylaxis


Comment: [facility]

## 2017-03-24 NOTE — PN
Progress Note





- Progress Note


SOAP: 


Subjective:


[35 y/o female admitted with possible osetomyelitis R 2nd toe, uncontrolled DM 

II, ESRD 3/23/2017.  Patient denies pain, resting comfortably, VSS overnight.  ]








Objective:


[General-  Well appearing, NAD, sitting comfortably. 


MSK-   R foot with 1st toe surgically absent, no LAD, lymphatic streaking, 

erythema noted on foot.  + black DIP R 2nd toe with full ROM, minimal erythema 

surrounding eschar with no spread, no tenderness to touch throughout foot, PT 2+

, DP 1+ R foot, + DF/PF.  


 Vital Signs











Temp  98.2 F   03/24/17 12:27


 


Pulse  91   03/24/17 07:25


 


Resp  16   03/24/17 10:45


 


BP  135/69   03/24/17 07:25


 


Pulse Ox  92   03/24/17 09:37








 Intake & Output











 03/23/17 03/24/17 03/24/17





 18:59 06:59 18:59


 


Intake Total 100 555 432


 


Balance 100 555 432


 


Weight 238 lb 216 lb 12.8 oz 


 


Intake:   


 


  IV Fluids 100 505 432


 


    NS (0.9%)  505 432


 


  IVPB  50 


 


    ABX - CEFEPIME  50 


 


  Oral  0 


 


Other:   


 


  # Bowel Movements  0 


 


  # Voids  0 








 Laboratory Results - last 24 hr











  03/23/17 03/23/17 03/23/17





  16:50 16:50 16:50


 


WBC  14.9 H  


 


RBC  4.64  


 


Hgb  13.9  


 


Hct  43  


 


MCV  92  


 


MCH  30  


 


MCHC  33  


 


RDW  15  


 


Plt Count  266  


 


MPV  10  


 


Neut % (Auto)  84.4 H  


 


Lymph % (Auto)  9.1 L  


 


Mono % (Auto)  3.9  


 


Eos % (Auto)  2.0  


 


Baso % (Auto)  0.6  


 


Absolute Neuts (auto)  12.6 H  


 


Absolute Lymphs (auto)  1.4  


 


Absolute Monos (auto)  0.6  


 


Absolute Eos (auto)  0.3  


 


Absolute Basos (auto)  0.1  


 


Absolute Nucleated RBC  0.01  


 


Nucleated RBC %  0.1  


 


ESR  72 H  


 


INR (Anticoag Therapy)   


 


Sodium   129 L 


 


Potassium   3.6 


 


Chloride   92 L 


 


Carbon Dioxide   25 


 


Anion Gap   12 H 


 


BUN   31 H 


 


Creatinine   3.99 H 


 


Est GFR ( Amer)   16.3 


 


Est GFR (Non-Af Amer)   12.7 


 


BUN/Creatinine Ratio   7.8 L 


 


Glucose   613 H* 


 


POC Glucose (mg/dL)   


 


Hemoglobin A1c    15.5 H


 


Calcium   9.4 


 


C-Reactive Protein   36.53 H 


 


Random Vancomycin   














  03/23/17 03/24/17 03/24/17





  22:08 04:07 06:30


 


WBC    14.2 H


 


RBC    4.70


 


Hgb    14.4


 


Hct    44


 


MCV    93


 


MCH    31


 


MCHC    33


 


RDW    16 H


 


Plt Count    278


 


MPV    9


 


Neut % (Auto)    84.8 H


 


Lymph % (Auto)    8.0 L


 


Mono % (Auto)    3.9


 


Eos % (Auto)    2.3


 


Baso % (Auto)    1.0


 


Absolute Neuts (auto)    12.0 H


 


Absolute Lymphs (auto)    1.1


 


Absolute Monos (auto)    0.6


 


Absolute Eos (auto)    0.3


 


Absolute Basos (auto)    0.1


 


Absolute Nucleated RBC    0


 


Nucleated RBC %    0


 


ESR   


 


INR (Anticoag Therapy)   


 


Sodium  131 L  


 


Potassium  3.3 L  


 


Chloride  93 L  


 


Carbon Dioxide  26  


 


Anion Gap  12 H  


 


BUN  31 H  


 


Creatinine  4.14 H  


 


Est GFR ( Amer)  15.7  


 


Est GFR (Non-Af Amer)  12.2  


 


BUN/Creatinine Ratio  7.5 L  


 


Glucose  522 H*  


 


POC Glucose (mg/dL)   292 H 


 


Hemoglobin A1c   


 


Calcium  9.1  


 


C-Reactive Protein   


 


Random Vancomycin   














  03/24/17 03/24/17 03/24/17





  06:30 06:30 08:03


 


WBC   


 


RBC   


 


Hgb   


 


Hct   


 


MCV   


 


MCH   


 


MCHC   


 


RDW   


 


Plt Count   


 


MPV   


 


Neut % (Auto)   


 


Lymph % (Auto)   


 


Mono % (Auto)   


 


Eos % (Auto)   


 


Baso % (Auto)   


 


Absolute Neuts (auto)   


 


Absolute Lymphs (auto)   


 


Absolute Monos (auto)   


 


Absolute Eos (auto)   


 


Absolute Basos (auto)   


 


Absolute Nucleated RBC   


 


Nucleated RBC %   


 


ESR   


 


INR (Anticoag Therapy)  0.90  


 


Sodium   135 


 


Potassium   3.7 


 


Chloride   97 L 


 


Carbon Dioxide   28 


 


Anion Gap   10 


 


BUN   31 H 


 


Creatinine   4.30 H 


 


Est GFR ( Amer)   15.0 


 


Est GFR (Non-Af Amer)   11.7 


 


BUN/Creatinine Ratio   7.2 L 


 


Glucose   267 H 


 


POC Glucose (mg/dL)    263 H


 


Hemoglobin A1c   


 


Calcium   8.9 


 


C-Reactive Protein   


 


Random Vancomycin   28.9 








 ]


MRI- negative for osteo 





Assessment:


[- RIGHT 2nd toe chronic wound 


- Poorly control DM 


- ESRD ]








Plan:


[- ID consult- likely non-operative, continue to follow with ABX - cefepime 


- Follow up with Dr. Zaragoza as outpatient 


- DM management 


- Ortho to contine to follow 





 Active Medications











Generic Name Dose Route Start Last Admin





  Trade Name Freq  PRN Reason Stop Dose Admin


 


Acetaminophen  650 mg  03/23/17 19:08  





  Tylenol Tab*  PO   





  Q4H PRN   





  FEVER/PAIN   


 


Albuterol  2.5 mg  03/23/17 19:13  





  Ventolin 2.5 Mg/3 Ml Neb.Sol*  INH   





  Q6H PRN   





  SHORTNESS OF BREATH   


 


Amlodipine Besylate  10 mg  03/24/17 09:00  03/24/17 10:46





  Norvasc Tab*  PO   10 mg





  DAILY TRISTAN   Administration


 


Aspirin  81 mg  03/24/17 09:00  03/24/17 10:46





  Aspirin Low Dose Tab*  PO   81 mg





  QAM TRISTAN   Administration


 


Atorvastatin Calcium  80 mg  03/24/17 17:00  





  Lipitor*  PO   





  1700 TRISTAN   


 


Bisoprolol Fumarate  10 mg  03/24/17 09:00  03/24/17 10:46





  Zebeta Tab*  PO   10 mg





  DAILY TRISTAN   Administration


 


Dextrose  12.5 gm  03/24/17 09:22  





  D50w Syringe 50 Ml*  IV PUSH   





  .FOR FS < 60 - SS PRN   





  FS < 60   


 


Gabapentin  300 mg  03/23/17 21:00  03/24/17 10:45





  Neurontin Cap(*)  PO   300 mg





  BID TRISTAN   Administration


 


Heparin Sodium (Porcine)  5,000 units  03/23/17 22:00  03/24/17 06:03





  Heparin Vial(*)  SUBCUT   5,000 units





  Q8HR TRISTAN   Administration


 


Hydromorphone HCl  1 mg  03/23/17 19:08  03/24/17 00:32





  Dilaudid Iv*  IV SLOW PU   1 mg





  Q4H PRN   Administration





  PAIN   


 


Cefepime HCl 2 gm/ Sodium  50 mls @ 100 mls/hr  03/23/17 21:30  03/24/17 00:30





  Chloride  IVPB   100 mls/hr





  Q48H TRISTAN   Administration


 


Sodium Chloride  1,000 mls @ 100 mls/hr  03/23/17 19:15  03/24/17 11:02





  Ns 0.9% 1000 Ml*  IV   100 mls/hr





  PER RATE TRISTAN   Administration


 


Insulin Glargine  80 units  03/23/17 21:00  03/24/17 10:42





  Lantus(*)  SUBCUT   80 unit





  BID TRISTAN   Administration


 


Insulin Human Lispro  0 units  03/24/17 07:30  03/24/17 10:43





  Humalog*  SUBCUT   9 units





  AC TRISTAN   Administration





  Protocol   


 


Insulin Human Lispro  0 units  03/24/17 11:30  03/24/17 10:44





  Humalog*  SUBCUT   1 units





  ACHS TRISTAN   Administration





  Protocol   


 


Isosorbide Mononitrate  60 mg  03/24/17 09:00  03/24/17 10:46





  Imdur Er Tab*  PO   60 mg





  DAILY TRISTAN   Administration


 


Losartan Potassium  100 mg  03/24/17 09:00  03/24/17 10:46





  Cozaar Tab*  PO   100 mg





  DAILY TRISTAN   Administration


 


Mupirocin  1 applic  03/24/17 12:30  





  Bactroban 2 % Oint*  TOPICAL   





  DAILY TRISTAN   


 


Omeprazole  20 mg  03/23/17 23:00  03/23/17 22:49





  Prilosec Cap*  PO   20 mg





  QPM TRISTAN   Administration


 


Ondansetron HCl  4 mg  03/23/17 19:08  03/24/17 09:33





  Zofran Inj*  IV   4 mg





  Q6H PRN   Administration





  NAUSEA   


 


Oxycodone/Acetaminophen  2 tab  03/23/17 19:36  03/23/17 22:48





  Percocet 5/325 Tab*  PO   2 tab





  Q4H PRN   Administration





  PAIN   


 


Pharmacy Consult  1 note  03/23/17 19:25  





  Vancomycin Per Pharmacy*  FOLLOW UP   





  . PRN   





  PER PROTOCOL   


 


Pharmacy Consult  1 note  03/25/17 06:00  





  Vancomycin Random Level*  FOLLOW UP  03/25/17 06:01  





  ONCE ONE   








]








<Lizzie Brewer - Last Filed: 03/24/17 12:25>





- Progress Note


SOAP: 


Subjective:


No fevers, sweats, chills.  Patient denies having noticed any erythema that ED 

staff and [facility] noted.





Objective:


R 2nd toe: necrotic tip distal to DIP joint.  No surrounding skin erythema, no 

discharge





No lymphangitic streaking, no tender RLE lymph nodes





Imaging- X-ray no bony changes, MRI no bony edema





Assessment:


1.  Necrotic R 2nd toe.  


2.  Multiple med probs including poorly controlled DM with neuropathy


3.  No current symptom/sign of infection





Plan:


1.  No current indication of infection, so I am okay with the stoppage of 

antibiotics.  Though I did not examine prior to abx start.


2.  ID consult as inpatient or outpatient if [facility] desires


3.  Follow up as outpt with Marquise Zaragoza and/or Samira regarding necrotic toe.  

Or patient could be added to schedule next week for partial toe amputation by 

Dr. Zaragoza if patient interested and is seen by Dr. Zaragoza.  














<uQan Bell - Last Filed: 03/24/17 14:44>

## 2017-03-24 NOTE — CONS
CONSULTATION REPORT:

 

DATE OF CONSULTATION:  03/24/17

 

REQUESTING PROVIDER:  Estuardo Goldberg NP

 

CONSULTING SERVICE:  Infectious Disease.

 

REASON FOR CONSULTATION:

1.  Right second toe infection and necrosis with dry gangrene.

2.  Peripheral vascular disease, BECKY here.  Monophasic waveform to the right lower extremity arterie
s and BECKY 0.5.

3.  End-stage renal disease, on peritoneal dialysis.

4.  Diabetes, uncontrolled.

 

RECOMMENDATIONS:

1.  Continue vancomycin.  Goal trough 10 to 15.  We will stop the gram-negative coverage and if she 
continues to improve, we will change to doxycycline 100 mg by mouth twice daily.

2.  Ongoing vascular evaluation, see about reperfusion of the foot.

 

HISTORY OF PRESENT ILLNESS:  A 36-year-old woman with end-stage renal disease and peripheral vascula
r disease admitted with right foot infection and pain.  The pain has been there about 3 or 4 days th
at is worsening, worse with weightbearing. Then, she noticed over the second toe, there is some area
 of swelling and eschar in the tip with some red streaks.  She came to the hospital yesterday.  MRI 
was done, showed no osteomyelitis.  White count was 15,000.  She was afebrile.  She was on vancomyci
n and cefepime.  The redness has resolved.  Pain is a little bit better. She has had no fevers, chil
ls, or sweats.

 

PAST MEDICAL HISTORY:

1.  End-stage renal disease, on peritoneal dialysis.

2.  Peripheral vascular disease.

3.  Melanoma.

4.  Diabetes.

5.  Neuropathy.

6.  Coronary artery disease.

7.  Hypertension.

8.  Hyperlipidemia.

9.  Asthma.

10.  Gastroesophageal reflux disease.

11.  Depression.

12.  Anxiety.

13.  Status post right great toe amputation.

14.  Status post left eye enucleation.

 

ALLERGIES:  TAPE, CODEINE, REQUIP, ERYTHROMYCIN, NIACIN.

 

MEDICATIONS:

1.  Tylenol.

2.  Albuterol.

3.  Lipitor.

4.  Aspirin.

5.  Bisoprolol.

6.  Dilaudid.

7.  Heparin subcutaneous injection.

8.  Insulin glargine.

9.  Insulin lispro.

10.  Imdur.

11.  Losartan.

12.  Omeprazole.

13.  Vancomycin.

 

SOCIAL HISTORY:  Lives outside [place].  No travel.  No sick contacts.

 

FAMILY HISTORY:  Diabetes.  No recurrent infections.

 

REVIEW OF SYSTEMS:  All negative to 12-point review of systems except as noted above.

 

PHYSICAL EXAM:  Vital Signs:  Temperature 37, heart rate is 90, respiratory rate 16, blood pressure 
135/69, O2 sat 92% on room air.  In general, she is awake not in distress.  Neurologically, oriented
 x3.  Follows all commands.  HEENT:  There is no conjunctival hemorrhage.  Oropharynx without lesion
s.  Neck is supple without nuchal rigidity.  Lymph Nodes:  There is no cervical, supraclavicular, in
guinal, axillary, or epitrochlear lymphadenopathy.  Heart has regular rate and rhythm without murmur
s, rubs, or gallops.  Lungs are clear to auscultation bilaterally. Abdomen:  Soft, nontender, nondis
tended with bowel sounds present.  Skin:  There is no rash or splinter hemorrhages.  Musculoskeletal
:  Right second toe, the tip, there is no erythema.  There is a scar and a small area of dry gangren
e.

 

LABORATORY DATA:  White blood cell count 14, hemoglobin 14, platelets 278, creatinine 4, potassium 3
.7.  CRP 35. Please see impressions and recommendations as outlined above, which I have discussed wi
th Dr. Qureshi.

 

Thanks for asking me to see Ms. Masters in consultation.

 

 [id number]/CPS #: [id number]

## 2017-03-25 NOTE — RAD
INDICATION:  Gangrenous right second toe ulcer on a patient with end-stage renal disease

receiving peritoneal dialysis.



COMPARISON:  BECKY dated March 23, 2017 that demonstrates values consistent with rest pain

bilaterally and grossly deranged arterial waveforms measured in the right worse than left

ankles.



TECHNIQUE: A CT angiogram of the abdomen, pelvis and lower extremities was performed

before and following arterial phase intravenous contrast enhancement with  125 mL

Visipaque 320.  Delayed images of the lower legs were acquired as well. Contiguous axial

sections were obtained and reconstructed in the sagittal, coronal planes and a maximum

intensity projection 3-D was created.



FINDINGS:



Non-arterial findings:



The lung bases appear clear without significant infiltrate.  There is mild groundglass

opacification at the bilateral lung bases. There are no large pleural effusions.



The liver, spleen, pancreas and adrenal glands appear to be within normal limits. The

gallbladder is normal.



The kidneys are atrophic bilaterally and exhibit little to no arterial phase cortical

enhancement.



A peritoneal dialysis catheter is noted in the midline lower abdomen. Trace ascites is

noted.



There are diverticula seen throughout the length of the entire colon but none exhibit

acute inflammatory change.



Multilevel degenerative changes of the lower thoracic and lumbar spine includes loss of

intervertebral disc height.There are no sinister appearing bone lesions. 



Arterial findings:



Abdomen \T\ Pelvis:



The abdominal aorta exhibits increasingly severe mixed attenuation atherosclerosis

inferiorly. The origins of the celiac trunk, superior mesenteric artery and inferior

mesenteric artery appear adequately patent. Bilaterally the renal arteries appear to be

adequately patent. There is a "beaded" appearance to the bilateral renal arteries that can

be seen in the setting of fibromuscular dysplasia.



Bilaterally the iliac arteries are patent.



Right leg:



There is mostly noncalcified eccentric atherosclerosis of the right common femoral artery

providing in-line flow into the proximal femoral profundus and superficial femoral artery.

The right superficial femoral artery exhibits increasingly severe mixed attenuation

atherosclerosis up to its entry point into Tomas's canal where there is likely occlusion

(image 246) that extends approximately 6-7 cm. The distal SFA fills by reconstituted flow,

exhibits another focus of high-grade stenosis before transitioning into the popliteal

artery. There appears to be in-line flow into the tibioperoneal trunk and infrapopliteal

arteries with diminutive 2 vessel runoff provided by the right RENATE and PTA.



Left leg:



The left common femoral artery is patent providing in-line flow into the superficial

femoral artery. There is a short segment of high-grade stenosis versus out right occlusion

of the left femoral profundus before the intramuscular branches fill by reconstituted flow

more distally. The left superficial femoral artery exhibits increasingly severe mixed

attenuation atherosclerosis. At Tomas's canal there is a short segment of high-grade

stenosis versus occlusion (image 281). The distal SFA fills by reconstituted flow, narrows

briefly in its distal portion providing narrowed but in-line flow into the popliteal

artery. The popliteal artery appears patent, but there is coarse calcification the left

tibioperoneal trunk causing high-grade stenosis (image 363). Beyond this the proximal

portions of the infrapopliteal arteries appear to be adequately patent proximally. There

is coarse calcification becoming more severe in the inferior portions of the posterior

tibial artery that may occlude the lumen. Only the anterior tibial artery appears to

provide arterial flow beyond the level of the left ankle. 



IMPRESSION: 



1. Mixed attenuation atherosclerosis advanced for the patient's age. The more severe

and/or clinically relevant findings are as follows:

*  Bilaterally the renal arteries exhibit a "beaded" appearance that can be seen with

fibromuscular dysplasia.

*  Medium length occlusion of the distal right superficial femoral artery measuring

approximately 6 - 7 cm in length.

*  There appears to be diminutive 2 vessel runoff in the right lower extremity provided by

the RENATE and PTA.

*  Focal occlusion versus high-grade stenosis in the proximal left femoral profundus.

*  Focal occlusion versus short segment high-grade stenosis in the left superficial

femoral artery at its entry point into Tomas's canal

*  Likely calcified occlusion of the distal left posterior tibial artery with only the

left RENATE providing single-vessel runoff to the left foot.

2. Additional chronic, degenerative and iatrogenic findings described in the body of the

report.



Vascular findings were discussed with Dr. Qureshi over the telephone on March 25, 2017.

## 2017-03-26 NOTE — DS
LEAVING AGAINST MEDICAL ADVICE DISCHARGE SUMMARY:

 

DATE OF ADMISSION:  03/23/17

 

DATE OF LEAVING AGAINST MEDICAL ADVICE:  03/25/17

 

PRIMARY CARE PROVIDER:  Dr. Yeni Shanks.

 

PRIMARY DIAGNOSIS:  Dry gangrene of right second toe.

 

SECONDARY DIAGNOSES:  Include:

1.  Uncontrolled insulin-dependent diabetes; hemoglobin A1c of 15.5.

2.  End-stage renal disease, on peritoneal dialysis.

3.  Persistent leukocytosis.

4.  Severe peripheral vascular disease.

5.  Neuropathy.

6.  Coronary artery disease.

7.  Hypertension.

8.  Hyperlipidemia.

9.  Depression.

10.  Anxiety.

 

HISTORY OF PRESENT ILLNESS AND HOSPITAL COURSE:  This is a 36-year-old female, 
complicated past medical history, last hospital stay left against medical advice
, returning with increased pain in her right foot, found with suspected 
infection in her right second toe, possibly dry gangrene with areas of erythema 
extending up her foot.  She was placed on vancomycin and did not indicate any 
complaints with her care at [facility]; however, abruptly, this 
afternoon announced she will be leaving the hospital shortly.  I discussed the 
patient's decision with her and her boyfriend.  She indicated she had received 
vancomycin in the past and did not see the reason for staying any longer.  I 
did indicate that revascularization may help in the healing of her foot and she 
was losing her toe, possibly foot, potentially her leg without proper therapy 
to which she responded she had been requesting her entire leg be amputated 
previously.  I believe she is making these reports in responses to the chronic 
pain she has in her lower extremities from her severe neuropathy.  I indicated 
that further risks in detail with the patient including worsening infection, 
loss of limb, loss of life, chest pain, myocardial infarction, seizure, loss of 
consciousness, and death.  She acknowledges understanding and wishes to __accept
___ the risk.  Of note, the patient's CTA with runoff is not formally dictated 
yet; however, I did discuss with Dr. Hinton that she would benefit from 
revascularization.  However, this conversation occurred after the patient has 
already left the building as she returned from imaging and left the hospital.  
The manner which the patient left the hospital including an abrupt change in 
her demeanor and acceptance of plan of care would indicate some underlying 
mental illness and/or concern for alcohol and/or drug abuse.

 

Prescriptions for doxycycline were sent to pharmacy of her choosing; otherwise, 
all home medications have been continued.  However, the patient did not receive 
completed medication reconciliation on discharge.  I did inform her that I 
would be sending doxycycline to the pharmacy of her choosing.

 

TIME SPENT:  Greater than 60 minutes was spent on the discharge of the patient, 
greater than half was spent face-to-face with the patient and her boyfriend.



CC:  Dr. Yeni Shanks* 



 [id number] #: [id number]

[email or web address]

## 2017-05-03 NOTE — ED
Katerine RICARDO Claudia, scribed for Humphrey Zamarripa MD on 04/30/17 at 1431 .





Lower Extremity





- HPI Summary


HPI Summary: 





36 year old female presents to the ED with right leg pain(from the thigh to the 

foot). Pt notes PMHx of DM and a stent was placed due to poor circulation on 

Monday. Pt notes constant sever pain since the surgery. The pt notes that the 

pain is 10/10 and she is unable to ambulate due to the significant pain. She 

states no associated Sx of fever or chills and not alleviating or aggravating 

factors. Pt describes the pain as an ahce/numbness.  





- History of Current Complaint


Chief Complaint: EDExtremityLower


Stated Complaint: PAIN IN RIGHT LEG


Time Seen by Provider: 04/30/17 14:23


Hx Obtained From: Patient


Hx Last Menstrual Period: 11/30/16


Pain Intensity: 10


Pain Scale Used: 0-10 Numeric


Timing: Constant


Character Of Pain: Aching


Aggravating Factor(s): Standing, Ambulation, Movement


Able to Bear Weight: Yes





- Allergies/Home Medications


Allergies/Adverse Reactions: 


 Allergies











Allergy/AdvReac Type Severity Reaction Status Date / Time


 


Adhesive Tape Allergy  Blisters Verified 12/01/16 20:41


 


Codeine Allergy  Hives Verified 12/01/16 20:41


 


Ropinirole [From Requip] Allergy  Hives Verified 12/01/16 20:41


 


Erythromycin AdvReac  "DOESN'T Verified 12/01/16 20:41





   WORK"  


 


Niacin AdvReac  Flushing Verified 12/01/16 20:41














PMH/Surg Hx/FS Hx/Imm Hx


Previously Healthy: Yes


Endocrine/Hematology History: Reports: Hx Anticoagulant Therapy - Aspirin., Hx 

Blood Transfusions, Hx Diabetes - TYPE 2 WITH INSULIN CONTROL, Hx Thyroid 

Disease - THYROID NODULE, Hx Anemia - HISTORY OF -NO PROBLEMS CURRENTLY- BLOOD 

TRANSFUSION IN 2014


Cardiovascular History: Reports: Hx Angina, Hx Cardiac Arrest - in Dec. 2014 

with CPR, Hx Coronary Artery Disease - STATES HAS 3 BLOCKED ARTERIES, Hx 

Hypercholesterolemia, Hx Hypertension - CONTROL WITH MED, Hx Myocardial 

Infarction, Other Cardiovascular Problems/Disorders - [facility]- CARDIOLOGY AT 

[place]/CARDIAC ARREST 2014-DURING HEMODIALYSIS


   Denies: Hx Congestive Heart Failure, Hx Deep Vein Thrombosis, Hx Pacemaker/

ICD, Hx Valvular Heart Disease


Respiratory History: Reports: Hx Asthma - NO INHALER BUT HAS A NEBULIZER, Hx 

Chronic Obstructive Pulmonary Disease (COPD) - smoker


   Denies: Hx Lung Cancer, Hx Pneumonia, Hx Pulmonary Embolism, Hx Sleep Apnea 

- NOT TESTED


GI History: Reports: Hx Gastroesophageal Reflux Disease, Other GI Disorders - 

GASTROPARESIS - TAKING OMEPRAZOLE


   Denies: Hx Gastrointestinal Bleed, Hx Ulcer


 History: Reports: Hx Chronic Renal Failure, Hx Dialysis - PERITONEAL BUT 

DOES HAVE FISTULA ON LEFT ARM, Hx Renal Disease - ESRD on home peritoneal 

dialysis , Other  Problems/Disorders - ESRD- URINATES A SMALL AMOUNT


Musculoskeletal History: Reports: Hx Arthritis - BACK, HIPS, Hx Back Problems - 

Sciatica and Herniated L3 disk


Sensory History: Reports: Hx Eye Prosthesis - left, Hx Legally Blind, Hx Vision 

Problem


   Denies: Hx Contacts or Glasses, Hx Hearing Aid


Opthamlomology History: Reports: Hx Eye Prosthesis - left, Hx Legally Blind, Hx 

Vision Problem


   Denies: Hx Contacts or Glasses


Neurological History: Reports: Other Neuro Impairments/Disorders - Neuropathy 

BOTH LEGS


   Denies: Hx Transient Ischemic Attacks (TIA)


Psychiatric History: Reports: Hx Anxiety - ON MEDICATION FOR, Hx Depression - 

ON MEDICATION FOR, Hx Bipolar Disorder


   Denies: Hx Panic Disorder





- Cancer History


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


Hx Chemotherapy: No


Hx Radiation Therapy: No





- Surgical History


Surgery Procedure, Year, and Place: LEFT EYE REMOVED 2012 - HAS PROSTETHETIC 

EYE (ORBITS DONE 5/2015 OK'D BY DR SAE COLE TO SCAN IN MRI) ;.  MELANOMA 

REMOVED FROM [facility] 2013 (PROCEDURE DONE 4X);.  CARDIAC CATH 2014 - NO STENTS;.  

KEFT WRIST FISTULA PALCEMENT ([facility]) 2014;.  HEMODIALYSIS CATH RIGHT CHEST WALL 

2014;.  PERITONEAL DIALYSIS CATH 2/2015;.  [place] [facility] [place]  [place] 

[place] [facility] [place].  [place]ACEMENT RIGHT JUGULAR TESIO HEMODIALYSIS 

[place] [facility] [place].  CARDIAC CATH - NO STENTS- 2017 ;


Hx Anesthesia Reactions: Yes - Nausea, WAKES UP VOMITING





- Immunization History


Date of Tetanus Vaccine: UTD


Date of Influenza Vaccine: 8/15/16


Infectious Disease History: No


Infectious Disease History: Reports: Hx of Known/Suspected MRSA - MRSA R 1st toe


   Denies: Hx Hepatitis, Hx Human Immunodeficiency Virus (HIV), Hx Shingles, Hx 

Tuberculosis, History Other Infectious Disease, Traveled Outside the [place] in Last 

30 Days





- Family History


Known Family History: Positive: Cardiac Disease - father MI at 43 y/o, 

Hypertension





- Social History


Alcohol Use: None


Hx Substance Use: No


Substance Use Type: Reports: None


Substance Use Comment - Amount & Last Used: prescriptions


Hx Tobacco Use: Yes


Smoking Status (MU): Current Every Day Smoker


Type: Cigarettes


Amount Used/How Often: 1/2 PPD


Length of Time of Smoking/Using Tobacco: 17 YEARS


Have You Smoked in the Last Year: Yes





Review of Systems


Constitutional: Negative


Negative: Fever, Chills


Eyes: Negative


Negative: Erythema


ENT: Negative


Negative: Sore Throat


Cardiovascular: Negative


Negative: Chest Pain


Respiratory: Negative


Negative: Shortness Of Breath, Cough


Gastrointestinal: Negative


Negative: Abdominal Pain, Vomiting, Nausea


Genitourinary: Negative


Negative: dysuria, hematuria


Musculoskeletal: Other - RIGHT LEG PAIN (FROM THIGH TO FOOT) 


Negative: Myalgia


Skin: Negative


Negative: Rash


Neurological: Negative


Psychological: Normal


All Other Systems Reviewed And Are Negative: Yes





Physical Exam





- Summary


Physical Exam Summary: 





Constitutional: Well-developed, Well-nourished, Alert. (-) Distressed


Skin: Warm, Dry


HENT: Normocephalic; Atraumatic


Eyes: Conjunctiva normal


Neck: Musculoskeletal ROM normal neck. (-) JVD, (-) Stridor, (-) Tracheal 

deviation


Cardio: Rhythm regular, rate normal, Heart sounds normal; Intact distal pulses; 

The pedal pulses are 2+ and symmetric. Radial pulses are 2+ and symmetric. (-) 

Murmur


Pulmonary/Chest wall: Effort normal. (-) Respiratory distress, (-) Wheezes, (-) 

Rales


Abd: Soft, (-) Tenderness, (-) Distension, (-) Guarding, (-) Rebound


Musculoskeletal: (-) Edema RIGHT SECOND TOE IS NECROTIC TIP, NML COLORING AND 

TEMPERATURE OF THE FOOT, DORSAL PEDAL PULSE AND POSTERIOR TIBIALIS PULSE NOT 

OBTAINABLE- DOPPLER PULSES PENDING 


Lymph: (-) Cervical adenopathy


Neuro: Alert, Oriented x3


Psych: Mood and affect Normal








Triage Information Reviewed: Yes


Vital Signs On Initial Exam: 


 Initial Vitals











Temp Pulse Resp BP Pulse Ox


 


 97.0 F   130   20   113/68   94 


 


 04/30/17 13:50  04/30/17 13:50  04/30/17 13:50  04/30/17 13:50  04/30/17 13:50











Vital Signs Reviewed: Yes





Diagnostics





- Vital Signs


 Vital Signs











  Temp Pulse Resp BP Pulse Ox


 


 04/30/17 13:54  97.4 F  130  20  113/68  94


 


 04/30/17 13:53  97.4 F  130  20  113/68  94


 


 04/30/17 13:50  97.0 F  130  20  113/68  94














- Laboratory


Lab Results: 


 Lab Results











  04/30/17 04/30/17 04/30/17 Range/Units





  14:35 14:35 14:35 


 


WBC  15.9 H    (3.5-10.8)  10^3/ul


 


RBC  4.26    (4.0-5.4)  10^6/ul


 


Hgb  12.7    (12.0-16.0)  g/dl


 


Hct  40    (35-47)  %


 


MCV  95    (80-97)  fL


 


MCH  30    (27-31)  pg


 


MCHC  32    (31-36)  g/dl


 


RDW  15    (10.5-15)  %


 


Plt Count  373    (150-450)  10^3/ul


 


MPV  9    (7.4-10.4)  um3


 


INR (Anticoag Therapy)   1.07   (0.89-1.11)  


 


Sodium    127 L  (133-145)  mmol/L


 


Potassium    TNP  


 


Chloride    91 L  (101-111)  mmol/L


 


Carbon Dioxide    21 L  (22-32)  mmol/L


 


Anion Gap    TNP  


 


BUN    48 H  (6-24)  mg/dL


 


Creatinine    5.44 H  (0.51-0.95)  mg/dL


 


Est GFR ( Amer)    11.4  (>60)  


 


Est GFR (Non-Af Amer)    8.9  (>60)  


 


BUN/Creatinine Ratio    8.8  (8-20)  


 


Glucose    530 H*  ()  mg/dL


 


Calcium    9.3  (8.6-10.3)  mg/dL











Result Diagrams: 


 04/30/17 14:35





 04/30/17 14:35


Lab Statement: Any lab studies that have been ordered have been reviewed, and 

results considered in the medical decision making process.





- EKG


  ** 1401


Cardiac Rate: NL


EKG Rhythm: Sinus Tachycardia - 130 beats/min





Lower Extremity Course/Dx





- Course


Assessment/Plan: The pt will be transfered to [facility] 

to Dr. Fernandez whom will admit her to the floor. I discussed care of pt with 

Dr. Fernandez at [facility] whom stated that the pt left AMA 

from the hopsital on wednesday after they expressed their plan to hospitalize 

the pt longer due to heparin induced thrombocytopenia. Pt is transferred stable 

with no urgent Rx.





- Diagnoses


Provider Diagnoses: 


 Peripheral vascular disease, Lower extremity pain, Post-operative pain








- Physician Notifications


Discussed Care of Patient With: Discussed care of pt with Dr. Fernandez at 

[facility] whom stated that the pt left AMA from the 

hopsital on wednesday after they expressed their plan to hospitalize the pt 

longer due to heparin induced thrombocytopenia. They recommend tranfer to [place] 

where she will be amditted to the floor for further care.





Discharge





- Discharge Plan


Condition: Fair


Disposition: TRANS HIGHER LVL OF CARE FAC


Referrals: 


Yeni Shanks MD [Primary Care Provider] - 





The documentation as recorded by the Katerine cardenas Claudia accurately 

reflects the service I personally performed and the decisions made by me, Humphrey Zamarripa MD.

## 2017-05-17 NOTE — UC
Cardiac HPI





- HPI Summary


HPI Summary: 





The patient comes in today for:





1.  Hypotension, near-syncope, chest tightness:





Onset: 3 weeks.


Palliative/provocative: Nothing helps her symptoms.


Quality: Tightness


Region: Chest


Severity: 1/10


Time: Comes and goes.


Associated symptoms:


   Event:  The patient states that she was seeing Dr. Zaragoza (orthopedics), 

today who states that she needed to come here for a work up. 


   Blood sugars: running in the 500's--my blood sugars are all over the place.


   Kidney failure:  She does peritoneal solution.


   CAD:  She had cardiac arrest 3 years ago.  She states that she has 3 blocked 

arteries.  Cath done 3 months ago which showed the blocked arteries.  No stents 

were done.





*





- History of Current Complaint


Chief Complaint: UCDizziness


Stated Complaint: DIZZY/LIGHT HEADED/LOW BLOOD PRESSURE


Time Seen by Provider: 05/17/17 15:35


Hx Obtained From: Patient





- Allergy/Home Medications


Allergies/Adverse Reactions: 


 Allergies











Allergy/AdvReac Type Severity Reaction Status Date / Time


 


Adhesive Tape Allergy  Blisters Verified 05/17/17 15:08


 


Heparin Allergy  See Comment Verified 05/17/17 15:08


 


Ropinirole [From Requip] Allergy  Hives Verified 05/17/17 15:08


 


Erythromycin AdvReac  "DOESN'T Verified 05/17/17 15:08





   WORK"  


 


Niacin AdvReac  Flushing Verified 05/17/17 15:08











Home Medications: 


 Home Medications





Apixaban* [Eliquis*] 5 mg PO BID 05/17/17 [History Confirmed 05/17/17]


Varenicline 0.5 mg Tab(Nf) [Chantix 0.5 MG TAB(NF)] 0.5 mg PO DAILY 05/17/17 [

History Confirmed 05/17/17]











PMH/Surg Hx/FS Hx/Imm Hx


Previously Healthy: No - PVD--2 stents in legs, Hx "blood clots" all through 

body--no hx pe, will ne


Endocrine History Of: Reports: Diabetes - TYPE 2 WITH INSULIN CONTROL, Thyroid 

Disease - THYROID NODULE--biopsy "fine.", Dyslipidemia


   Denies: Hyperthyroidism, Hypothyroidism


Cardiovascular History Of: Reports: Cardiac Disorders - 3 BLOCKED ARTERIES, 

Cardiac arrest x 1.  Cath done 3 months ago--no stents, Hypertension - CONTROL 

WITH MED, Myocardial Infarction


   Denies: Pacemaker/ICD, Congestive Heart Failure, Atrial Fibrillation, Deep 

Vein Thrombosis, Bleeding Disorders


Respiratory History Of: Reports: COPD - smoker, Asthma - NO INHALER BUT HAS A 

NEBULIZER


   Denies: Bronchitis, Pneumonia, Pulmonary Embolism


GI/ History Of: Reports: Gastroesophageal Reflux, Renal Disease - ESRD on 

home peritoneal dialysis 


   Denies: Ulcer, Gastrointestinal Bleed, Gall Bladder Disease, Kidney Stones, 

Diverticulitis, Urosepsis


Neurological History Of: 


   Denies: TIA


Psychological History Of: Reports: Anxiety - ON MEDICATION FOR, Depression - ON 

MEDICATION FOR, Bipolar Disorder


   Denies: Schizophrenia, Post Traumatic Stress Disorder


Cancer History Of: 


   Denies: Lung Cancer, Colorectal Cancer, Breast Cancer, Prostate Cancer, 

Cervical Cancer


Other History Of: Anticoagulant Therapy - Aspirin.


   Negative For: HIV, Hepatitis B, Hepatitis C





- Surgical History


Surgical History: Yes


Surgery Procedure, Year, and Place: LEFT EYE REMOVED 2012 - HAS PROSTETHETIC 

EYE (ORBITS DONE 5/2015 OK'D BY DR SAE COLE TO SCAN IN MRI) ;.  MELANOMA 

REMOVED FROM [facility] 2013 (PROCEDURE DONE 4X);.  CARDIAC CATH 2014 - NO STENTS;.  

LEFT WRIST FISTULA PALCEMENT ([facility]) 2014;.  HEMODIALYSIS CATH RIGHT CHEST WALL 

2014;.  PERITONEAL DIALYSIS CATH 2/2015;.  CATH REMOV[place] 7/2016 [facility] [place].  RIGHT 

GREAT TOE AMPUTATION, [facility] [place] 4/2016;.  PLACEMENT RIGHT JUGULAR TESIO HEMODIALYSIS 

CATHETER [facility] [place] 8/27/2016;.  CARDIAC CATH - stentsx2 right leg





- Family History


Known Family History: Positive: Cardiac Disease - father MI at 43 y/o, 

Hypertension





- Social History


Occupation: Unemployed


Alcohol Use: None


Substance Use Type: None


Substance Use Comment - Amount & Last Used: prescriptions


Smoking Status (MU): Current Every Day Smoker


Type: Cigarettes


Amount Used/How Often: 5 ciagrettes


Length of Time of Smoking/Using Tobacco: 17 YEARS


Have You Smoked in the Last Year: Yes


Household Exposure Type: Cigarettes





- Immunization History


Most Recent Influenza Vaccination: Fall 2016


Most Recent Tetanus Shot: 2014


Most Recent Pneumonia Vaccination: per pt: sometime in 2014





Review of Systems


Constitutional: Negative


Skin: Negative


Eyes: Negative


ENT: Negative


Respiratory: Negative, Cough


Cardiovascular: Chest Pain


Gastrointestinal: Negative


Genitourinary: Negative


All Other Systems Reviewed And Are Negative: Yes





Physical Exam


Triage Information Reviewed: Yes


Appearance: Well-Appearing, No Pain Distress, Well-Nourished


Vital Signs: 


 Initial Vital Signs











Temp  97.4 F   05/17/17 15:11


 


Pulse  114   05/17/17 15:11


 


Resp  16   05/17/17 15:11


 


BP  110/55   05/17/17 15:11


 


Pulse Ox  99   05/17/17 15:11











Vital Signs Reviewed: Yes


Eyes: Positive: Conjunctiva Clear.  Negative: Discharge


ENT: Positive: Hearing grossly normal.  Negative: Pharyngeal erythema, Nasal 

congestion, Nasal drainage, TM bulging, TM dull, Tonsillar swelling, Tonsillar 

exudate


Dental: Negative: Gross Decay/Caries @, Dental Fracture @


Neck: Positive: Supple, Nontender, No Lymphadenopathy.  Negative: Nuchal 

Rigidity


Respiratory: Positive: Lungs clear, No respiratory distress, No accessory 

muscle use.  Negative: Rhonchi, Wheezing


Cardiovascular: Positive: RRR, No Murmur


Abdomen Description: Positive: Nontender, No Organomegaly, Soft.  Negative: 

Distended, Guarding


Musculoskeletal: Positive: Strength Intact, ROM Intact


Psychological: Positive: Normal Response To Family, Age Appropriate Behavior, 

Consolable


Skin: Negative: rashes, breakdown





Diagnostics





- Laboratory


Diagnostic Studies Completed/Ordered: EKG:  Rate: 107.  Rhythm: Sinus 

tachycardia.  Ectopy: None.  Acute changes: No ST elevations.





- Assessment/Plan


Course Of Treatment: Patient was told that I recommend that she go to the 

hospital ER for further evaluation and treatment.  However, she declines going 

by ambulance.





- Clinical Impression


Provider Diagnoses: Chest pain.  CAD (hx cardiac arrest).  Renal failure on 

peritoneal dialysis.  DM.  Smoker.  HTN





- Physician Notifications


Discussed Patient Care With: Discussed case with Lynda Ochoa (charge nurse) since 

no provider was available.  Left my cell phone number for them to call me when 

free.





Discharge





- Discharge Plan


Condition: Stable


Disposition: AGAINST MEDICAL ADVICE


Additional Instructions: 


Patient going by private car to [facility] [place] ER by her request.  





She did not take any aspirin today.

## 2017-05-19 NOTE — HP
ADMISSION HISTORY AND PHYSICAL:

 

DATE OF ADMISSION:  05/19/17

 

ADMISSION DIAGNOSES:  Right forefoot osteomyelitis, diabetes.

 

REASON FOR ADMISSION:  The patient with increasing osteomyelitis, right foot 
and multisystem disease.

 

Adri now, age 37, has insulin-dependent diabetes and obesity.  She also has 
peripheral and central vascular disease.  She also is on peritoneal dialysis.  
She previously had a cardiac arrest during dialysis in 2014 and then a year ago
, she underwent cardiac catheterization for some chest pain and was noted to 
have some a constriction of the LAD, no stent was placed.  She is currently on 
Eliquis.  A year ago, we amputated her right great toe and this healed well; 
however, now over the last couple of months, she has been having gangrene and 
wet cellulitis around the base of her second and partial third toe.  She has 
been seen in the wound clinic and recently in the [facility] by Dr. Zaragoza.  The surgical plan for her at this point is transmetatarsal amputation, 
but she will need medical clearance prior to the surgery.

 

She is on peritoneal dialysis, which is every 6 hours, home dialysis unit.

 

PAST MEDICAL HISTORY:  Adri has multisystem disease.  She has poor dentition. 
She has some shortness of breath with exertion.  She has history of cardiac 
ischemic disease.  She is obese.  She has chronic renal failure.  She has 
diabetes and peripheral neuropathy now with some right forefoot osteomyelitis.

 

CURRENT MEDICATIONS:  Include:

1.  NovoLog.

2.  Sertraline 50 mg per day.

3.  Omeprazole 20 mg per day.

4.  Promethazine 6.25 mg as needed.

5.  Lantus 100 units at night.

6.  Amlodipine 10 mg per day.

7.  Bisoprolol 10 mg per day.

8.  Torsemide 20 mg per day.

9.  Atorvastatin 80 mg per day.

10.  Losartan 100 mg per day.

11.  Ciprofloxacin and clindamycin orally.

 

She also takes gabapentin and Percocet as needed for pain.  She is also on 
Eliquis 5 mg twice a day.

 

ALLERGIES:  She claims an allergy to MORPHINE, CODEINE, AUGMENTIN, TAPE, and 
BACTRIM.

 

                               PHYSICAL EXAMINATION

 

GENERAL:  She is 37 years of age, but appears older than her stated age.  She 
has appropriate mood and affect and is in no acute distress.  She has poor 
dentition, missing multiple teeth.

 

VITAL SIGNS:  Show her to be afebrile, 97.8, and blood pressure 114/80.

 

NECK:  Supple.

 

CHEST:  Exam shows clear lung fields to auscultation.  I do not hear any 
wheezing or rales.

 

CARDIAC:  Exam shows a rapid regular rate.  No extra sounds noted.

 

ABDOMEN:  Soft, quite doughy, distended, and nontender.

 

EXTREMITY:  Exam shows her to have thready dorsal pulse and warm right hindfoot 
with gross ischemic changes in the forefoot with a necrotic second toe and 
partial base third toe.  Previous first MTP joint disarticulation site well 
healed.  She has full range of motion of the hindfoot.

 

 IMPRESSION:  The patient with multisystemic diabetes, peripheral vascular 
disease, and right foot osteomyelitis, also chronic renal failure.

 

PLAN:  Plan is to admit to the hospital for some IV antibiotics to control the 
osteomyelitis, cellulitis of the right forefoot.  I have spoken to her renal 
nurse and the admitting hospitalist for medical optimization over the weekend 
prior to the surgery.



[id number]/CPS #: [id number]

TASHAD

## 2017-05-20 NOTE — PN
Progress Note





- Progress Note


SOAP: 


Subjective:


[Pt was seen today laying in bed. Pt states that her right foot is in pain. She 

states that the pain has been ongoing since she arrived. She states she was 

able to sleep last night though.  ]








Objective:


[General: Pt is awake, alert and oriented. NAD with appropriate mood and affect 


RLE: Pts right forefoot is wrapped. Pt is able to df and pf. Pulse is 2+ ]





 Vital Signs











Temp  97.7 F   05/20/17 07:37


 


Pulse  115   05/20/17 07:37


 


Resp  18   05/20/17 07:48


 


BP  148/75   05/20/17 07:37


 


Pulse Ox  100   05/20/17 07:37








 Intake & Output











 05/19/17 05/20/17 05/20/17





 18:59 06:59 18:59


 


Intake Total  864 


 


Output Total  0 


 


Balance  864 


 


Weight  208 lb 


 


Intake:   


 


  IV Fluids  504 


 


    NS (0.9%)  504 


 


  Oral  360 


 


Output:   


 


  Urine  0 











Assessment:


[Right forefoot osteomylitis ]








Plan:


[Continue current care, Pt will have amputation with Dr. Zaragoza on Monday. ]

## 2017-05-20 NOTE — CONSULT
Subjective


Date of Service: 05/20/17


Interval History: 





38 yo F with hx of HTN, HLD, PVD s/p recent RLE stenting, CAD, ESRD on PD, 

neuropathy presents as direct admit from Dr. Zaragoza for amputation due to 

worsening R foot wound. Patient reports the wound has been increasing and there 

has been increasing malodorous discharge. She saw Dr. Zaragoza on 5/18 and came 

to the hospital on 5/19 as direct admit. 


She states she had recent RLE stents placed at [facility] (R popliteal and R SFA) 

within the past month, also reports having a cardiac cath done in the past 2-3 

months at [facility] that showed no significant disease, no stents placed. 


She denies recent fever, chills. 


Family History: Findings - F - DM, M - DM


Social History: Findings - PPD smoker x 20 years, no EtOH


Past Medical History: Findings - HTN, HLD, CAD, ESRD on PD, neuropathy





Review of Systems





- Measurements


Intake and Output: 


Intake and Output Last 24 Hours











 05/18/17 05/19/17 05/20/17 05/21/17





 06:59 06:59 06:59 06:59


 


Intake Total   864 


 


Output Total   0 


 


Balance   864 


 


Weight   94.347 kg 


 


Intake:    


 


  IV Fluids   504 


 


    NS (0.9%)   504 


 


  Oral   360 


 


Output:    


 


  Urine   0 














- Review of Systems


Constitutional Symptoms: 


   Negative: Fever


Dermatology: Positive: Normal


HEENT: Positive: Normal


Eyes: Positive: Normal


Thyroid: Positive: Normal


Pulmonary: 


   Negative: Normal, Shortness of Breath


Cardiology: 


   Negative: Chest Pain, Shortness of Breath


Gastroenterology: Positive: Nausea, Vomiting


Genital - Urinary: Positive: Normal


Endocrinology: Positive: Normal


Neurology: Positive: Normal





Objective


Active Medications: 








Acetaminophen (Tylenol Tab*)  650 mg PO Q4H PRN


Albuterol (Ventolin 2.5 Mg/3 Ml Neb.Sol*)  2.5 mg INH Q6H PRN


Albuterol (Ventolin Hfa Inhaler*)  2 puff INH Q6H PRN


Amlodipine Besylate (Norvasc Tab*)  10 mg PO QPM [facility]


Apixaban (Eliquis*)  5 mg PO BID [facility]STAN


Aspirin (Aspirin Low Dose Tab*)  81 mg PO QAM [facility]


Atorvastatin Calcium (Lipitor*)  80 mg PO QPM [facility]


Diphenhydramine HCl (Benadryl Iv*)  25 mg IV Q6H PRN


Docusate Sodium (Colace Cap*)  100 mg PO BID [facility]


Gabapentin (Neurontin Cap(*))  300 mg PO TID [facility]AN


Sodium Chloride (Ns 0.9% 1000 Ml*)  1,000 mls @ 75 mls/hr IV PER RATE [facility]STAN


Piperacillin Sod/Tazobactam Sod (Zosyn 3.375 Gm In Ns Premix*)  3.375 gm in 100 

mls @ 25 mls/hr IVPB 0300,1500 [facility]


Insulin Glargine (Lantus(*))  40 units SUBCUT Q12H [facility]


Insulin Human Lispro (Humalog*)  0 units SUBCUT ACHS [facility]


Isosorbide Mononitrate (Imdur Er Tab*)  60 mg PO QPM [facility]


Losartan Potassium (Cozaar Tab*)  100 mg PO QPM [facility]


Metoclopramide HCl (Reglan Tab*)  10 mg PO Q6H PRN


Morphine Sulfate (Morphine Inj (Syringe)*)  2 mg IV Q2H PRN


Non-Formulary Medication (Bisoprolol 10(Nf))  10 mg PO QPM [facility]


Omeprazole (Prilosec Cap*)  20 mg PO QPM [facility]


Ondansetron HCl (Zofran Inj*)  4 mg IV Q6H PRN


Oxycodone HCl (Roxycodone Tab*)  5 mg PO Q4H PRN


Oxycodone HCl (Roxycodone Tab*)  10 mg PO Q4H PRN


Trazodone HCl (Desyrel Tab*)  25 mg PO BEDTIME PRN








 Vital Signs











  05/19/17 05/19/17 05/19/17





  19:40 19:56 22:24


 


Temperature 98.0 F 98.0 F 


 


Pulse Rate 103 103 


 


Respiratory 17 17 18





Rate   


 


Blood Pressure 138/81 138/81 





(mmHg)   


 


O2 Sat by Pulse 97 97 





Oximetry   














  05/20/17 05/20/17 05/20/17





  04:13 04:15 05:59


 


Temperature 97.6 F  


 


Pulse Rate 107  


 


Respiratory 18 16 18





Rate   


 


Blood Pressure 122/67  





(mmHg)   


 


O2 Sat by Pulse 97  





Oximetry   














  05/20/17 05/20/17 05/20/17





  06:59 07:37 07:48


 


Temperature  97.7 F 


 


Pulse Rate  115 


 


Respiratory 18 18 18





Rate   


 


Blood Pressure  148/75 





(mmHg)   


 


O2 Sat by Pulse  100 





Oximetry   











Oxygen Devices in Use Now: None


Appearance: Young,  F, laying in bed in NAD


Eyes: No Scleral Icterus


Ears/Nose/Mouth/Throat: Mucous Membranes Moist


Neck: NL Appearance and Movements; NL JVP


Respiratory: Symmetrical Chest Expansion and Respiratory Effort, - - Some mild 

scattered wheezes diffusely, good air movement


Cardiovascular: NL Sounds; No Murmurs; No JVD, RRR


Abdominal: - - Soft, NTND, RLQ PD cath, c/d/i


Lymphatic: No Cervical Adenopathy


Extremities: No Edema


Skin: - - R foot with dressing in place, did not unwrap


Neurological: Alert and Oriented x 3


Result Diagrams: 


 05/19/17 22:41





 05/20/17 04:56





Assessment/Plan - Billing





Worsening RLE wound, cellulitis, osteomyelitis admitted for planned TMA by Dr. Zaragoza in a 38 yo F with hx of HTN, HLD, CAD, PVD s/p recent LE stenting on 

Eliquis, neuropathy





1) RLE cellulitis, osteomyelitis


- continue IV Abx as per Ortho


- plan for TMA on 5/22


- get EKG


- will obtain recent records from [facility] for RLE stenting and cardiac cath, if 

cath results are as she says she shouldn't need any additional testing prior to 

surgery


- analgesia as per Ortho





2) CAD, HTN


- continue ASA, Norvasc, Bisoprolol, Imdur, Losartan, Atorvastatin





3) DM


- started on Lantus 40 units BID with HISS, will continue for now, may need 

additional Lantus


- BGs qACHS





4) PVD s/p recent RLE stenting


- continue ASA and Eliquis for now, obtaining records





5) ESRD on PD


- continue PD while inpatient, monitor lytes





6) Asthma


- continue albuterol





7) DVT ppx


- Eliquis





Thank for you this consult, will continue to follow

## 2017-05-21 NOTE — PN
Subjective


Date of Service: 05/21/17


Interval History: 








Patient seen and examined at bedside. Denies fever/chills, CP, SOB, abd pain, n/

v. Reports persistent pain and some moderate drainage to right foot. 








Family History: Findings - F - DM, M - DM


Social History: Findings - PPD smoker x 20 years, no EtOH


Past Medical History: Findings - HTN, HLD, CAD, ESRD on PD, neuropathy





Objective


Active Medications: 








Acetaminophen (Tylenol Tab*)  650 mg PO Q4H PRN


   PRN Reason: PAIN OR TEMPERATURE


Albuterol (Ventolin 2.5 Mg/3 Ml Neb.Sol*)  2.5 mg INH Q6H PRN


   PRN Reason: SHORTNESS OF BREATH


Albuterol (Ventolin Hfa Inhaler*)  2 puff INH Q6H PRN


   PRN Reason: WHEEZING


Amlodipine Besylate (Norvasc Tab*)  10 mg PO QPM [facility]


   Last Admin: 05/20/17 17:04 Dose:  10 mg


Apixaban (Eliquis*)  5 mg PO BID [facility]


   Last Admin: 05/21/17 09:30 Dose:  5 mg


Aspirin (Aspirin Low Dose Tab*)  81 mg PO QAM [facility]


   Last Admin: 05/21/17 09:29 Dose:  81 mg


Atorvastatin Calcium (Lipitor*)  80 mg PO QPM [facility]


   Last Admin: 05/20/17 17:03 Dose:  80 mg


Bisoprolol Fumarate (Zebeta Tab*)  10 mg PO QAM [facility]


   Last Admin: 05/21/17 09:30 Dose:  10 mg


Dextrose (D50w Syringe 50 Ml*)  12.5 gm IV PUSH .FOR FS < 60 - SS PRN


   PRN Reason: FS < 60


Diphenhydramine HCl (Benadryl Iv*)  25 mg IV Q6H PRN


   PRN Reason: itching


Docusate Sodium (Colace Cap*)  100 mg PO BID [facility]


   Last Admin: 05/21/17 09:30 Dose:  100 mg


Gabapentin (Neurontin Cap(*))  300 mg PO TID [facility]


   Last Admin: 05/21/17 09:30 Dose:  300 mg


Sodium Chloride (Ns 0.9% 1000 Ml*)  1,000 mls @ 75 mls/hr IV PER RATE [facility]


   Last Admin: 05/21/17 02:39 Dose:  75 mls/hr


Piperacillin Sod/Tazobactam Sod (Zosyn 3.375 Gm In Ns Premix*)  3.375 gm in 100 

mls @ 25 mls/hr IVPB 0300,1500 [facility]


   Last Admin: 05/21/17 03:12 Dose:  25 mls/hr


Insulin Glargine (Lantus(*))  60 units SUBCUT Q12H TRISTAN


Insulin Human Lispro (Humalog*)  0 units SUBCUT ACHS [facility]


   PRN Reason: Protocol


   Last Admin: 05/21/17 07:59 Dose:  12 units


Insulin Human Lispro (Humalog*)  0 units SUBCUT AC [facility]


   PRN Reason: Protocol


Isosorbide Mononitrate (Imdur Er Tab*)  60 mg PO QPM [facility]


   Last Admin: 05/20/17 17:04 Dose:  60 mg


Losartan Potassium (Cozaar Tab*)  100 mg PO QPM [facility]


   Last Admin: 05/20/17 17:03 Dose:  100 mg


Metoclopramide HCl (Reglan Tab*)  5 mg PO Q6H PRN


   PRN Reason: NAUSEA


   Last Admin: 05/20/17 18:59 Dose:  5 mg


Morphine Sulfate (Morphine Inj (Syringe)*)  2 mg IV Q2H PRN


   PRN Reason: PAIN


   Last Admin: 05/21/17 09:58 Dose:  2 mg


Mupirocin (Bactroban 2 % Oint*)  1 applic TOPICAL DAILY [facility]


   Last Admin: 05/21/17 10:42 Dose:  Not Given


Omeprazole (Prilosec Cap*)  20 mg PO 1630 [facility]


   Last Admin: 05/20/17 16:48 Dose:  20 mg


Ondansetron HCl (Zofran Inj*)  4 mg IV Q6H PRN


   PRN Reason: nausea


   Last Admin: 05/20/17 16:59 Dose:  4 mg


Oxycodone HCl (Roxycodone Tab*)  5 mg PO Q4H PRN


   PRN Reason: PAIN


Oxycodone HCl (Roxycodone Tab*)  10 mg PO Q4H PRN


   PRN Reason: PAIN


   Last Admin: 05/19/17 22:24 Dose:  10 mg


Trazodone HCl (Desyrel Tab*)  25 mg PO BEDTIME PRN


   PRN Reason: insomnia








 Vital Signs











  05/20/17 05/20/17 05/20/17





  11:55 12:50 13:50


 


Temperature 97.8 F  


 


Pulse Rate 100  


 


Respiratory 18 16 16





Rate   


 


Blood Pressure 150/80  





(mmHg)   


 


O2 Sat by Pulse 98  





Oximetry   














  05/20/17 05/20/17 05/20/17





  14:30 14:35 15:35


 


Temperature   


 


Pulse Rate   


 


Respiratory 16 16 16





Rate   


 


Blood Pressure   





(mmHg)   


 


O2 Sat by Pulse   





Oximetry   














  05/20/17 05/20/17 05/20/17





  16:00 16:10 16:20


 


Temperature  98.6 F 


 


Pulse Rate  101 


 


Respiratory  16 





Rate   


 


Blood Pressure  141/89 





(mmHg)   


 


O2 Sat by Pulse 98 99 98





Oximetry   














  05/20/17 05/20/17 05/20/17





  16:30 16:49 17:49


 


Temperature   


 


Pulse Rate   


 


Respiratory 16 16 16





Rate   


 


Blood Pressure   





(mmHg)   


 


O2 Sat by Pulse   





Oximetry   














  05/20/17 05/20/17 05/20/17





  18:54 19:21 19:53


 


Temperature  98.1 F 


 


Pulse Rate  88 


 


Respiratory 16 18 





Rate   


 


Blood Pressure  120/70 





(mmHg)   


 


O2 Sat by Pulse   94





Oximetry   














  05/20/17 05/20/17 05/20/17





  19:54 20:00 21:26


 


Temperature   


 


Pulse Rate   


 


Respiratory 18 20 20





Rate   


 


Blood Pressure   





(mmHg)   


 


O2 Sat by Pulse   





Oximetry   














  05/20/17 05/20/17 05/20/17





  21:27 22:27 23:26


 


Temperature   


 


Pulse Rate   


 


Respiratory 20 18 18





Rate   


 


Blood Pressure   





(mmHg)   


 


O2 Sat by Pulse   





Oximetry   














  05/21/17 05/21/17 05/21/17





  03:26 03:33 04:33


 


Temperature 98.0 F  


 


Pulse Rate 81  


 


Respiratory 16 20 20





Rate   


 


Blood Pressure 114/52  





(mmHg)   


 


O2 Sat by Pulse 95  





Oximetry   














  05/21/17 05/21/17 05/21/17





  05:34 06:34 07:27


 


Temperature   98.2 F


 


Pulse Rate   83


 


Respiratory 20 20 11





Rate   


 


Blood Pressure   117/56





(mmHg)   


 


O2 Sat by Pulse   





Oximetry   














  05/21/17 05/21/17 05/21/17





  07:41 07:59 08:00


 


Temperature   


 


Pulse Rate   


 


Respiratory 18  18





Rate   


 


Blood Pressure   





(mmHg)   


 


O2 Sat by Pulse  95 95





Oximetry   














  05/21/17 05/21/17 05/21/17





  08:41 09:30 09:58


 


Temperature   


 


Pulse Rate   


 


Respiratory 18 18 18





Rate   


 


Blood Pressure   





(mmHg)   


 


O2 Sat by Pulse   





Oximetry   











Oxygen Devices in Use Now: None


Appearance: Young female, lying in bed, NAD


Eyes: PERRLA


Ears/Nose/Mouth/Throat: Mucous Membranes Moist


Neck: NL Appearance and Movements; NL JVP


Respiratory: Symmetrical Chest Expansion and Respiratory Effort, Clear to 

Auscultation - scattered wheezes


Cardiovascular: NL Sounds; No Murmurs; No JVD, RRR


Abdominal: NL Sounds; No Tenderness; No Distention, - - RLQ peritoneal dialysis 

catheter - c/d/i


Extremities: - - right foot dressing c/d/i


Neurological: Alert and Oriented x 3


Lines/Tubes/Other Access: Clean, Dry and Intact Peripheral IV


Nutrition: Taking PO's


Result Diagrams: 


 05/21/17 05:03





 05/21/17 05:03





Assess/Plan/Problems-Billing





Worsening RLE wound, cellulitis, osteomyelitis admitted for planned TMA by Dr. Zaragoza in a 38 yo F with hx of HTN, HLD, CAD, PVD s/p recent LE stenting on 

Eliquis, neuropathy








- Patient Problems


(1) Cellulitis of right lower extremity


Code(s): L03.115 - CELLULITIS OF RIGHT LOWER LIMB   Comment: 


With osteomyelitis


Continue Zosyn per Ortho.


Plan for transmetatarsal amputation on 5/22


Continue analgesia per Ortho





Cardiac catheterization report from [facility] shows small caliber vessel 

disease of the distal LCA and RCA and no significant LAD disease; no indication 

for bypass surgery. Continue medical management.





EKG shows NSR with no ischemic changes.





Ms. Masters has an RCRI score of 2, carrying a 6.6% risk of major cardiac event 

during surgery. No further cardiac testing is indicated at this time. 








   





(2) Diabetes


Code(s): E11.9 - TYPE 2 DIABETES MELLITUS WITHOUT COMPLICATIONS   Comment: 


HgbA1c 14.3


Continue Lantus, increase to 60 BID


Continue Lispro high dose SS and 1:10 carb counting coverage 


Consistent carbohydrate diet   





(3) HTN (hypertension)


Code(s): I10 - ESSENTIAL (PRIMARY) HYPERTENSION   Comment: 


Mostly normotensive


Continue amlodipine, bisoprolol, losartan.   





(4) CAD (coronary artery disease)


Code(s): I25.10 - ATHSCL HEART DISEASE OF NATIVE CORONARY ARTERY W/O ANG PCTRS 

  Comment: 


Continue ASA, isosorbide, atorvastatin.   





(5) PVD (peripheral vascular disease)


Code(s): I73.9 - PERIPHERAL VASCULAR DISEASE, UNSPECIFIED   Comment: 


S/p recent RLE stenting


Continue ASA and Eliquis but will hold in anticipation of surgery.


Records requested and pending.   





(6) ESRD (end stage renal disease)


Code(s): N18.6 - END STAGE RENAL DISEASE   Comment: 


Continue peritoneal dialysis


Monitor electrolytes   





(7) History of asthma


Code(s): Z87.09 - PERSONAL HISTORY OF OTHER DISEASES OF THE RESPIRATORY SYSTEM 

  Comment: 


Continue albuterol.   





(8) DVT prophylaxis


Code(s): [id number] -    Comment: 


Continue Eliquis


Hold tonight's dose in anticipation of surgery.   


Status and Disposition: 





Inpatient. Dispo per ortho. [facility] medicine following for co-medical 

management.

## 2017-05-22 NOTE — PN
Subjective


Date of Service: 05/22/17


Interval History: 





Patient seen and examined at bedside. No new complaints. Patient continues to 

endorse right foot pain with drainage. Denies fever/chills, CP, SOB. 








Family History: Findings - F - DM, M - DM


Social History: Findings - PPD smoker x 20 years, no EtOH


Past Medical History: Findings - HTN, HLD, CAD, ESRD on PD, neuropathy





Objective


Active Medications: 








Acetaminophen (Tylenol Tab*)  650 mg PO Q4H PRN


   PRN Reason: PAIN OR TEMPERATURE


Albuterol (Ventolin 2.5 Mg/3 Ml Neb.Sol*)  2.5 mg INH Q6H PRN


   PRN Reason: SHORTNESS OF BREATH


Albuterol (Ventolin Hfa Inhaler*)  2 puff INH Q6H PRN


   PRN Reason: WHEEZING


Amlodipine Besylate (Norvasc Tab*)  10 mg PO QPM [facility]


   Last Admin: 05/21/17 18:47 Dose:  10 mg


Atorvastatin Calcium (Lipitor*)  80 mg PO QPM [facility]


   Last Admin: 05/21/17 18:47 Dose:  80 mg


Bisoprolol Fumarate (Zebeta Tab*)  10 mg PO QAM [facility]


   Last Admin: 05/22/17 07:41 Dose:  Not Given


Dextrose (D50w Syringe 50 Ml*)  12.5 gm IV PUSH .FOR FS < 60 - SS PRN


   PRN Reason: FS < 60


Diphenhydramine HCl (Benadryl Iv*)  25 mg IV Q6H PRN


   PRN Reason: itching


Docusate Sodium (Colace Cap*)  100 mg PO BID [facility]


   Last Admin: 05/22/17 07:16 Dose:  Not Given


Gabapentin (Neurontin Cap(*))  300 mg PO TID [facility]


   Last Admin: 05/22/17 07:41 Dose:  300 mg


Sodium Chloride (Ns 0.9% 1000 Ml*)  1,000 mls @ 75 mls/hr IV PER RATE [facility]


   Last Admin: 05/22/17 04:51 Dose:  75 mls/hr


Piperacillin Sod/Tazobactam Sod (Zosyn 3.375 Gm In Ns Premix*)  3.375 gm in 100 

mls @ 25 mls/hr IVPB 0300,1500 [facility]


   Last Admin: 05/22/17 03:30 Dose:  25 mls/hr


Insulin Glargine (Lantus(*))  60 units SUBCUT Q12H [facility]


   Last Admin: 05/22/17 11:51 Dose:  60 units


Insulin Human Lispro (Humalog*)  0 units SUBCUT ACHS [facility]


   PRN Reason: Protocol


   Last Admin: 05/22/17 09:21 Dose:  3 units


Insulin Human Lispro (Humalog*)  0 units SUBCUT AC [facility]


   PRN Reason: Protocol


   Last Admin: 05/22/17 11:51 Dose:  Not Given


Isosorbide Mononitrate (Imdur Er Tab*)  60 mg PO QPM [facility]


   Last Admin: 05/21/17 18:47 Dose:  60 mg


Losartan Potassium (Cozaar Tab*)  100 mg PO QPM [facility]


   Last Admin: 05/21/17 18:47 Dose:  100 mg


Metoclopramide HCl (Reglan Tab*)  5 mg PO Q6H PRN


   PRN Reason: NAUSEA


   Last Admin: 05/20/17 18:59 Dose:  5 mg


Morphine Sulfate (Morphine Inj (Syringe)*)  2 mg IV Q2H PRN


   PRN Reason: PAIN


   Last Admin: 05/22/17 11:46 Dose:  2 mg


Mupirocin (Bactroban 2 % Oint*)  1 applic TOPICAL DAILY [facility]


   Last Admin: 05/22/17 09:21 Dose:  1 applic


Omeprazole (Prilosec Cap*)  20 mg PO 1630 [facility]


   Last Admin: 05/21/17 16:24 Dose:  20 mg


Ondansetron HCl (Zofran Inj*)  4 mg IV Q6H PRN


   PRN Reason: nausea


   Last Admin: 05/22/17 00:19 Dose:  4 mg


Oxycodone HCl (Roxycodone Tab*)  5 mg PO Q4H PRN


   PRN Reason: PAIN


Oxycodone HCl (Roxycodone Tab*)  10 mg PO Q4H PRN


   PRN Reason: PAIN


   Last Admin: 05/19/17 22:24 Dose:  10 mg


Trazodone HCl (Desyrel Tab*)  25 mg PO BEDTIME PRN


   PRN Reason: insomnia








 Vital Signs











  05/21/17 05/21/17 05/21/17





  12:06 13:06 14:18


 


Temperature   


 


Pulse Rate   


 


Respiratory 18 18 18





Rate   


 


Blood Pressure   





(mmHg)   


 


O2 Sat by Pulse   





Oximetry   














  05/21/17 05/21/17 05/21/17





  14:20 15:16 15:20


 


Temperature  99.1 F 


 


Pulse Rate  89 


 


Respiratory 18 16 20





Rate   


 


Blood Pressure  131/68 





(mmHg)   


 


O2 Sat by Pulse  100 





Oximetry   














  05/21/17 05/21/17 05/21/17





  16:00 16:18 16:24


 


Temperature   


 


Pulse Rate   


 


Respiratory  20 18





Rate   


 


Blood Pressure   





(mmHg)   


 


O2 Sat by Pulse 100  





Oximetry   














  05/21/17 05/21/17 05/21/17





  16:31 17:24 19:15


 


Temperature   


 


Pulse Rate   


 


Respiratory  18 18





Rate   


 


Blood Pressure   





(mmHg)   


 


O2 Sat by Pulse 100  





Oximetry   














  05/21/17 05/21/17 05/21/17





  19:57 20:15 21:31


 


Temperature 98.7 F  


 


Pulse Rate 85  


 


Respiratory 20 18 18





Rate   


 


Blood Pressure 110/53  





(mmHg)   


 


O2 Sat by Pulse 99  





Oximetry   














  05/21/17 05/21/17 05/21/17





  21:34 21:35 22:31


 


Temperature   


 


Pulse Rate   


 


Respiratory 18 20 20





Rate   


 


Blood Pressure   





(mmHg)   


 


O2 Sat by Pulse   





Oximetry   














  05/21/17 05/21/17 05/22/17





  23:34 23:53 00:21


 


Temperature   98.2 F


 


Pulse Rate   89


 


Respiratory 18 18 16





Rate   


 


Blood Pressure   105/57





(mmHg)   


 


O2 Sat by Pulse   93





Oximetry   














  05/22/17 05/22/17 05/22/17





  00:53 03:32 03:51


 


Temperature   98.1 F


 


Pulse Rate   86


 


Respiratory 18 18 18





Rate   


 


Blood Pressure   113/52





(mmHg)   


 


O2 Sat by Pulse   99





Oximetry   














  05/22/17 05/22/17 05/22/17





  04:32 05:32 06:32


 


Temperature   


 


Pulse Rate   


 


Respiratory 18 18 18





Rate   


 


Blood Pressure   





(mmHg)   


 


O2 Sat by Pulse   





Oximetry   














  05/22/17 05/22/17 05/22/17





  07:30 07:41 08:41


 


Temperature 98.1 F  


 


Pulse Rate 88  


 


Respiratory 16 18 18





Rate   


 


Blood Pressure 103/65  





(mmHg)   


 


O2 Sat by Pulse 98  





Oximetry   














  05/22/17 05/22/17 05/22/17





  09:41 09:48 10:37


 


Temperature   


 


Pulse Rate   


 


Respiratory 18 18 18





Rate   


 


Blood Pressure   





(mmHg)   


 


O2 Sat by Pulse   





Oximetry   














  05/22/17 05/22/17





  11:38 11:46


 


Temperature 98.1 F 


 


Pulse Rate 84 


 


Respiratory 20 16





Rate  


 


Blood Pressure 102/40 





(mmHg)  


 


O2 Sat by Pulse 96 





Oximetry  











Oxygen Devices in Use Now: None


Appearance: Young female, lying in bed, NAD


Eyes: PERRLA


Ears/Nose/Mouth/Throat: Mucous Membranes Moist


Neck: NL Appearance and Movements; NL JVP


Respiratory: Symmetrical Chest Expansion and Respiratory Effort, Clear to 

Auscultation - occasional wheezes


Cardiovascular: NL Sounds; No Murmurs; No JVD, RRR


Abdominal: NL Sounds; No Tenderness; No Distention, - - RLQ peritoneal dialysis 

cath c/d/i


Extremities: No Edema, - - right foot dressing c/d/i - did not unwrap


Neurological: Alert and Oriented x 3


Lines/Tubes/Other Access: Clean, Dry and Intact Peripheral IV


Result Diagrams: 


 05/21/17 05:03





 05/22/17 05:31





Assess/Plan/Problems-Billing





Worsening RLE wound, cellulitis, osteomyelitis admitted for planned TMA by Dr. Zaragoza in a 38 yo F with hx of HTN, HLD, CAD, PVD s/p recent LE stenting on 

Eliquis, neuropathy








- Patient Problems


(1) Cellulitis of right lower extremity


Code(s): L03.115 - CELLULITIS OF RIGHT LOWER LIMB   Comment: 


With osteomyelitis


Continue Zosyn per Ortho.


Plan for transmetatarsal amputation today


Continue analgesia per Ortho





Cardiac catheterization report from [facility] shows small caliber vessel 

disease of the distal LCA and RCA and no significant LAD disease; no indication 

for bypass surgery. Continue medical management.





EKG shows NSR with no ischemic changes.





Ms. Masters has an RCRI score of 2, carrying a 6.6% risk of major cardiac event 

during surgery. No further cardiac testing is indicated at this time.    





(2) Diabetes


Code(s): E11.9 - TYPE 2 DIABETES MELLITUS WITHOUT COMPLICATIONS   Comment: 


HgbA1c 14.3


Patient uncontrolled diabetic, as evidenced by BG values here and persistently 

high A1c values.


Continue Lantus, increase to 80 BID (which is home dosing)


Continue Lispro high dose SS and 1:10 carb counting coverage 


Consistent carbohydrate diet   





(3) HTN (hypertension)


Code(s): I10 - ESSENTIAL (PRIMARY) HYPERTENSION   Comment: 


Normotensive


Continue amlodipine, bisoprolol, losartan.   





(4) CAD (coronary artery disease)


Code(s): I25.10 - ATHSCL HEART DISEASE OF NATIVE CORONARY ARTERY W/O ANG PCTRS 

  Comment: 


Continue isosorbide, atorvastatin.


Resume ASA when okay with ortho.   





(5) PVD (peripheral vascular disease)


Code(s): I73.9 - PERIPHERAL VASCULAR DISEASE, UNSPECIFIED   Comment: 


S/p recent RLE stenting


Resume ASA and Eliquis when okay with ortho.


Records requested and pending.   





(6) ESRD (end stage renal disease)


Code(s): N18.6 - END STAGE RENAL DISEASE   Comment: 


Continue peritoneal dialysis


Monitor electrolytes   





(7) History of asthma


Code(s): Z87.09 - PERSONAL HISTORY OF OTHER DISEASES OF THE RESPIRATORY SYSTEM 

  Comment: 


Continue albuterol.   





(8) DVT prophylaxis


Code(s): [id number] -    Comment: 


Eliquis on hold. Resume per ortho.   


Status and Disposition: 





Inpatient. Dispo per ortho. Hospital medicine following for co-medical 

management.

## 2017-05-23 NOTE — PN
Subjective


Date of Service: 05/23/17


Interval History: 





Patient seen and examined at bedside. She denies any chest pain, SOB, fever/

chills but does endorse right foot pain. She is very agitated over being in the 

hospital and feels like no one is telling her anything. We discussed her 

concerns. Patient verbalized understanding and stated thank you. No other acute 

concerns.








Family History: Findings - F - DM, M - DM


Social History: Findings - PPD smoker x 20 years, no EtOH


Past Medical History: Findings - HTN, HLD, CAD, ESRD on PD, neuropathy





Objective


Active Medications: 








Acetaminophen (Tylenol Tab*)  975 mg PO Q8H PRN


   PRN Reason: PAIN


Albuterol (Ventolin 2.5 Mg/3 Ml Neb.Sol*)  2.5 mg INH Q6H PRN


   PRN Reason: SHORTNESS OF BREATH


Albuterol (Ventolin Hfa Inhaler*)  2 puff INH Q6H PRN


   PRN Reason: WHEEZING


Amlodipine Besylate (Norvasc Tab*)  10 mg PO QPM [facility]


   Last Admin: 05/22/17 17:51 Dose:  Not Given


Atorvastatin Calcium (Lipitor*)  80 mg PO QPM [facility]


   Last Admin: 05/22/17 17:51 Dose:  80 mg


Bisoprolol Fumarate (Zebeta Tab*)  10 mg PO QAM [facility]


   Last Admin: 05/23/17 09:30 Dose:  10 mg


Dextrose (D50w Syringe 50 Ml*)  12.5 gm IV PUSH .FOR FS < 60 - SS PRN


   PRN Reason: FS < 60


Diphenhydramine HCl (Benadryl Iv*)  25 mg IV Q6H PRN


   PRN Reason: itching


Docusate Sodium (Colace Cap*)  100 mg PO BID [facility]


   Last Admin: 05/23/17 09:19 Dose:  Not Given


Gabapentin (Neurontin Cap(*))  300 mg PO TID [facility]


   Last Admin: 05/23/17 09:21 Dose:  300 mg


Hydromorphone HCl (Dilaudid Iv*)  0.5 mg IV Q2H PRN


   PRN Reason: PAIN


   Last Admin: 05/23/17 11:51 Dose:  0.5 mg


Sodium Chloride (Ns 0.9% 1000 Ml*)  1,000 mls @ 75 mls/hr IV PER RATE [facility]


   Last Admin: 05/22/17 22:56 Dose:  75 mls/hr


Piperacillin Sod/Tazobactam Sod (Zosyn 3.375 Gm In Ns Premix*)  3.375 gm in 100 

mls @ 25 mls/hr IVPB 0500,1700 [facility]


   Last Admin: 05/23/17 05:12 Dose:  25 mls/hr


Insulin Glargine (Lantus(*))  60 units SUBCUT Q12H [facility]


   Last Admin: 05/23/17 11:52 Dose:  60 units


Insulin Human Lispro (Humalog*)  0 units SUBCUT ACHS [facility]


   PRN Reason: Protocol


   Last Admin: 05/23/17 11:57 Dose:  Not Given


Insulin Human Lispro (Humalog*)  0 units SUBCUT AC [facility]


   PRN Reason: Protocol


   Last Admin: 05/23/17 09:34 Dose:  2 units


Isosorbide Mononitrate (Imdur Er Tab*)  60 mg PO QPM [facility]


   Last Admin: 05/22/17 17:52 Dose:  60 mg


Losartan Potassium (Cozaar Tab*)  100 mg PO QPM [facility]


   Last Admin: 05/22/17 17:52 Dose:  Not Given


Metoclopramide HCl (Reglan Tab*)  5 mg PO Q6H PRN


   PRN Reason: NAUSEA


   Last Admin: 05/20/17 18:59 Dose:  5 mg


Mupirocin (Bactroban 2 % Oint*)  1 applic TOPICAL DAILY [facility]


   Last Admin: 05/22/17 09:21 Dose:  1 applic


Omeprazole (Prilosec Cap*)  20 mg PO 1630 [facility]


   Last Admin: 05/22/17 17:51 Dose:  20 mg


Ondansetron HCl (Zofran Inj*)  4 mg IV Q6H PRN


   PRN Reason: nausea


   Last Admin: 05/23/17 12:24 Dose:  4 mg


Oxycodone HCl (Roxycodone Tab*)  5 mg PO Q3H PRN


   PRN Reason: PAIN - MILD


Oxycodone HCl (Roxycodone Tab*)  10 mg PO Q3H PRN


   PRN Reason: PAIN - MODERATE


Oxycodone HCl (Roxycodone Tab*)  15 mg PO Q3H PRN


   PRN Reason: PAIN - SEVERE


   Last Admin: 05/23/17 00:21 Dose:  15 mg


Trazodone HCl (Desyrel Tab*)  25 mg PO BEDTIME PRN


   PRN Reason: insomnia


   Last Admin: 05/22/17 23:24 Dose:  25 mg








 Vital Signs











  05/22/17 05/22/17 05/22/17





  15:02 15:14 15:15


 


Temperature 98.1 F  


 


Pulse Rate 88  79


 


Respiratory 16 16 16





Rate   


 


Blood Pressure 172/98  164/88





(mmHg)   


 


O2 Sat by Pulse 99  98





Oximetry   














  05/22/17 05/22/17 05/22/17





  15:23 15:30 15:37


 


Temperature   


 


Pulse Rate  79 


 


Respiratory 16 16 14





Rate   


 


Blood Pressure  132/85 





(mmHg)   


 


O2 Sat by Pulse  96 





Oximetry   














  05/22/17 05/22/17 05/22/17





  15:45 16:00 16:05


 


Temperature   97.9 F


 


Pulse Rate 74  83


 


Respiratory 16  18





Rate   


 


Blood Pressure 126/93  132/63





(mmHg)   


 


O2 Sat by Pulse 97 100 99





Oximetry   














  05/22/17 05/22/17 05/22/17





  16:14 16:21 16:22


 


Temperature   97.9 F


 


Pulse Rate   83


 


Respiratory 16 16 18





Rate   


 


Blood Pressure   132/63





(mmHg)   


 


O2 Sat by Pulse   99





Oximetry   














  05/22/17 05/22/17 05/22/17





  16:30 16:37 16:59


 


Temperature   


 


Pulse Rate   


 


Respiratory 16 18 18





Rate   


 


Blood Pressure   





(mmHg)   


 


O2 Sat by Pulse   





Oximetry   














  05/22/17 05/22/17 05/22/17





  17:05 17:59 18:20


 


Temperature 97.9 F  98.0 F


 


Pulse Rate 81  88


 


Respiratory 16 18 16





Rate   


 


Blood Pressure 112/63  133/102





(mmHg)   


 


O2 Sat by Pulse 100  100





Oximetry   














  05/22/17 05/22/17 05/22/17





  19:45 19:55 20:31


 


Temperature  98.1 F 


 


Pulse Rate  89 


 


Respiratory 16 16 16





Rate   


 


Blood Pressure  109/72 





(mmHg)   


 


O2 Sat by Pulse  100 





Oximetry   














  05/22/17 05/22/17 05/22/17





  21:14 21:31 22:03


 


Temperature   97.8 F


 


Pulse Rate   87


 


Respiratory 16 16 16





Rate   


 


Blood Pressure   116/43





(mmHg)   


 


O2 Sat by Pulse   99





Oximetry   














  05/22/17 05/22/17 05/22/17





  22:42 23:14 23:19


 


Temperature   


 


Pulse Rate   


 


Respiratory 20 16 16





Rate   


 


Blood Pressure   





(mmHg)   


 


O2 Sat by Pulse   





Oximetry   














  05/22/17 05/22/17 05/23/17





  23:26 23:42 00:19


 


Temperature 98.0 F  


 


Pulse Rate 91  


 


Respiratory 16 16 16





Rate   


 


Blood Pressure 118/61  





(mmHg)   


 


O2 Sat by Pulse 95  





Oximetry   














  05/23/17 05/23/17 05/23/17





  00:21 01:45 02:21


 


Temperature   


 


Pulse Rate   


 


Respiratory 16 16 16





Rate   


 


Blood Pressure   





(mmHg)   


 


O2 Sat by Pulse   





Oximetry   














  05/23/17 05/23/17 05/23/17





  02:45 03:51 04:05


 


Temperature  98.1 F 


 


Pulse Rate  92 


 


Respiratory 16 16 16





Rate   


 


Blood Pressure  110/51 





(mmHg)   


 


O2 Sat by Pulse  92 





Oximetry   














  05/23/17 05/23/17 05/23/17





  05:05 06:21 07:21


 


Temperature   


 


Pulse Rate   


 


Respiratory 16 16 15





Rate   


 


Blood Pressure   





(mmHg)   


 


O2 Sat by Pulse   





Oximetry   














  05/23/17 05/23/17 05/23/17





  07:45 09:14 09:21


 


Temperature 98.0 F  


 


Pulse Rate 104  


 


Respiratory 16 16 16





Rate   


 


Blood Pressure 117/73  





(mmHg)   


 


O2 Sat by Pulse 98 98 





Oximetry   














  05/23/17 05/23/17 05/23/17





  09:30 10:30 11:21


 


Temperature   99.7 F


 


Pulse Rate   96


 


Respiratory 16 18 16





Rate   


 


Blood Pressure   138/31





(mmHg)   


 


O2 Sat by Pulse   98





Oximetry   














  05/23/17





  11:51


 


Temperature 


 


Pulse Rate 


 


Respiratory 16





Rate 


 


Blood Pressure 





(mmHg) 


 


O2 Sat by Pulse 





Oximetry 











Oxygen Devices in Use Now: None


Appearance: Female patient, lying in bed, in NAD


Eyes: PERRLA


Ears/Nose/Mouth/Throat: Mucous Membranes Moist


Neck: NL Appearance and Movements; NL JVP


Respiratory: Symmetrical Chest Expansion and Respiratory Effort, Clear to 

Auscultation - scattered wheezes


Cardiovascular: NL Sounds; No Murmurs; No JVD, RRR


Abdominal: NL Sounds; No Tenderness; No Distention, - - RLQ PD cath, c/d/i


Extremities: - - right foot cast/dressing - did not take down, is c/d/i


Neurological: Alert and Oriented x 3


Lines/Tubes/Other Access: Clean, Dry and Intact Peripheral IV


Nutrition: Taking PO's


Result Diagrams: 


 05/23/17 10:00





 05/23/17 10:00


Microbiology and Other Data: 


 Microbiology











 05/22/17 14:30 Anaerobic Culture - Preliminary





 Wound - Right    No Growth Day 1


 


 05/22/17 14:30 Gram Stain - Final





 Foot Right Wound Culture - Preliminary





    No Growth Day 1














Assess/Plan/Problems-Billing





Worsening RLE wound, cellulitis, osteomyelitis admitted for planned TMA by Dr. Zaragoza in a 38 yo F with hx of HTN, HLD, CAD, PVD s/p recent LE stenting on 

Eliquis, neuropathy








- Patient Problems


(1) Cellulitis of right lower extremity


Code(s): L03.115 - CELLULITIS OF RIGHT LOWER LIMB   Comment: 


With osteomyelitis


POD #1 s/p right TMA


Continue Zosyn per Ortho.


Appreciate ID consult.


Continue analgesia per Ortho





Cardiac catheterization report from [facility] shows small caliber vessel 

disease of the distal LCA and RCA and no significant LAD disease; no indication 

for bypass surgery. Continue medical management.





EKG shows NSR with no ischemic changes.





Ms. Masters has an RCRI score of 2, carrying a 6.6% risk of major cardiac event 

during surgery. No further cardiac testing is indicated at this time.    





(2) Diabetes


Code(s): E11.9 - TYPE 2 DIABETES MELLITUS WITHOUT COMPLICATIONS   Comment: 


HgbA1c 14.3


Patient uncontrolled diabetic, as evidenced by BG values here and persistently 

high A1c values.


Continue Lantus.


Continue Lispro high dose SS and 1:10 carb counting coverage 


Consistent carbohydrate diet   





(3) HTN (hypertension)


Code(s): I10 - ESSENTIAL (PRIMARY) HYPERTENSION   Comment: 


Normotensive


Continue amlodipine, bisoprolol, losartan.   





(4) CAD (coronary artery disease)


Code(s): I25.10 - ATHSCL HEART DISEASE OF NATIVE CORONARY ARTERY W/O ANG PCTRS 

  Comment: 


Continue isosorbide, atorvastatin.


Resume ASA when okay with ortho.   





(5) PVD (peripheral vascular disease)


Code(s): I73.9 - PERIPHERAL VASCULAR DISEASE, UNSPECIFIED   Comment: 


S/p recent RLE stenting


Resume ASA and Eliquis when okay with ortho.


Records requested and pending.   





(6) ESRD (end stage renal disease)


Code(s): N18.6 - END STAGE RENAL DISEASE   Comment: 


Continue peritoneal dialysis


Monitor electrolytes   





(7) History of asthma


Code(s): Z87.09 - PERSONAL HISTORY OF OTHER DISEASES OF THE RESPIRATORY SYSTEM 

  Comment: 


Continue albuterol.   





(8) DVT prophylaxis


Code(s): QGS2719 -    Comment: 


Eliquis on hold. Resume per ortho.   


Status and Disposition: 





Inpatient. Dispo per ortho. Hospital medicine following for co-medical 

management.

## 2017-05-23 NOTE — OP
OPERATIVE REPORT:

 

DATE OF OPERATION:  05/22/17 - SDS

 

DATE OF BIRTH:  [birth year]

 

SURGEON:  Ivan Zaragoza MD.

 

ASSISTANT:  Aubree Ferrell PA-C.



ANESTHESIOLOGIST:  Dr. Varghese Heaton.



ANESTHESIA:  Local MAC.

 

PRE-OP DIAGNOSIS:  Necrotic ulcerated and infected right forefoot.

 

POST-OP DIAGNOSIS:  Necrotic ulcerated and infected right forefoot.

 

OPERATIVE PROCEDURE:  Right transmetatarsal amputation and tendo-Achilles 
release.

 

DESCRIPTION OF PROCEDURE:  The patient was taken to the operating room where an 
ankle Esmarch was applied.  We made a fish-mouth incision over the distal 
metatarsal arch reflecting proximally to the midmetatarsal dorsally, which were 
divided one through five metatarsals with a microsagittal saw.  We took care to 
prevent any spurring on the plantar aspect.  We then divided the plantar flap 
and sent the forefoot to pathology.

 

We dropped the tourniquet, took local cultures and irrigated this area 
thoroughly. We then closed dorsal to plantar with 0 Vicryl sutures, 2-0 
subcutaneous and 2-0 Surgipro.  At the end of the case, we also through 0.5 cm 
incision, did a percutaneous release of the Achilles at the distal portion, 
closing this with a Surgipro as well.  Compression dressing and plaster splint 
applied.

 

 [id number]/CPS #: [id number]

MTDD

## 2017-05-23 NOTE — PN
Progress Note





- Progress Note


SOAP: 


Subjective:


[]Patient seen sitting up in bed with eyes closed this am.  Initially would not 

answer my questions, reportedly being "difficult" with staff and non-compliant 

with her weight bearing status with PT.  Pain appears to be in adequate 

control.  








Objective:


[]


 Vital Signs











Temp  98.0 F   05/23/17 07:45


 


Pulse  104   05/23/17 07:45


 


Resp  16   05/23/17 09:14


 


BP  117/73   05/23/17 07:45


 


Pulse Ox  98   05/23/17 09:14








 Intake & Output











 05/22/17 05/23/17 05/23/17





 18:59 06:59 18:59


 


Intake Total 1235 2769 


 


Output Total 150 0 


 


Balance 1085 2769 


 


Weight  224 lb 9.6 oz 


 


Intake:   


 


  IV Fluids 350 1029 


 


    0.45   


 


    D5W 200  


 


    NS (0.9%)  909 


 


    Zosyn  120 


 


  IVPB 645  


 


    NS (0.9%) 512  


 


    Zosyn 133  


 


  Oral 240 1740 


 


Output:   


 


  Urine 150 0 


 


Other:   


 


  Estimated Void Small Small 


 


  Date of Last Bowel 5/22/17  





  Movement   


 


  # Bowel Movements 1 1 


 


  Estimated Stool Amount Small Small 


 


  # Voids 1 2 








 Laboratory Results - last 24 hr











  05/22/17 05/22/17 05/22/17





  05:31 12:20 12:21


 


POC Glucose (mg/dL)   80  79


 


Beta HCG, Quant  0.76  














  05/22/17 05/22/17 05/22/17





  15:03 15:38 17:30


 


POC Glucose (mg/dL)  56 L  78  119 H


 


Beta HCG, Quant   














  05/22/17





  21:18


 


POC Glucose (mg/dL)  138 H


 


Beta HCG, Quant 








 Microbiology











 05/22/17 14:30 Gram Stain - Preliminary





 Foot Right 








Right foot splint is dry and intact








Assessment:


[]s/p transmetatarsal amputation for osteo/ cellulitis right foot POD #1


   multiple medical co morbildities








Plan:


[]PT/OT NWB right LE-  I stressed the importance of remaining NWB to avoid 

breakdown of splint and the possibility of causing wound dehiscence.


  Continue IV Zosyn and current pain medications

## 2017-05-23 NOTE — CONS
CONSULTATION REPORT:

 

DATE OF CONSULT:  05/23/17

 

REQUESTING PROVIDER:  Emily Diaz NP.

 

CONSULTING SERVICE:  Infectious Disease.

 

REASON FOR CONSULTATION:  Right foot infection.

 

IMPRESSION:

1.  Right foot chronic osteomyelitis complicated by cellulitis and gangrene, now status post transme
tatarsal amputation.  Surgical margins are pending.  A year ago, culture of the right foot grew grou
p B strep and staph aureus; however, recent cultures have been negative.  I suspect this is polymicr
obial including strep, staph, gram negative.  She has not grown MRSA before.  PCR's have been negati
ve. Anaerobes are a consideration as well.

2.  Diarrhea since starting Zosyn.  I think most likely antibiotic associated.

3.  End-stage renal disease, on peritoneal dialysis.

4.  Peripheral vascular disease.

5.  Peripheral neuropathy.

 

RECOMMENDATION:  Stop Zosyn.  We will follow her diarrhea symptoms after stopping that.  We will jazlyn
nge it to cefepime 1 g every 48 hours dose for peritoneal dialysis, as well as Flagyl 500 mg by mout
h 3 times a day.  We should plan on probably 2 weeks more of IV antibiotics to sterilize remaining b
one.  She can have a PICC line but because cefepime can be given every other day, maybe we can come 
up with an outpatient infusion scenario for her.

 

HISTORY OF PRESENT ILLNESS:  This is a 37-year-old woman with end-stage renal disease, peritoneal di
alysis, and chronic right foot infection including admission in March for gangrene of the right dist
al foot and toes, treated with antibiotics, is admitted now with return of cellulitis and had plan f
or transmetatarsal amputation which was done yesterday.  Gram stain from that procedure showed no or
ganisms, no polys.  Cultures negative at 24 hours as is an anaerobic culture. She has been on Zosyn 
since the 20th.  Yesterday, she started to have 2 or 3 loose stools a day without abdominal pain, oc
casional cramps.  No fevers, chills, or sweats.  Her appetite has been okay.  She does not have sign
ificant urgency with the bowel movement.

 

PAST MEDICAL HISTORY:

1.  End-stage renal disease, on peritoneal dialysis.

2.  Peripheral vascular disease.

3.  Obesity.

4.  Peripheral neuropathy.

5.  History of right great toe amputation.

6.  Status post right lower extremity angioplasty and stent.

7.  Coronary artery disease.

 

MEDICATIONS:

1.  Tylenol.

2.  Albuterol.

3.  Bisoprolol.

4.  Gabapentin.

5.  Insulin glargine.

6.  Insulin lispro.

7.  Losartan.

8.  Reglan.

9.  Amlodipine.

10.  Oxycodone.

11.  Trazodone.

12.  Zosyn.

 

ALLERGIES:  HEPARIN, ROPINIROLE, ERYTHROMYCIN, NIACIN.

 

FAMILY HISTORY:  Diabetes, no recurrent infections.

 

SOCIAL HISTORY:  She lives with her boyfriend outside of [place].  She is a smoker. No travel or sick
 contacts.

 

REVIEW OF SYSTEMS:  All negative except as noted above.

 

PHYSICAL EXAM:  Vital Signs:  Temperature is 37, heart rate is 96, respiratory rate 16, blood pressu
re 130/31, and O2 sat 98% on room air.  General:  She is awake, not in distress, appears comfortable
.  Neurologic:  She is oriented x3.  Follows all commands.  She has no sensation to light touch in t
he bilateral lower extremities. HEENT:  There is no conjunctival hemorrhage.  Oropharynx without les
ions.  Poor dentition.  Neck is supple without nuchal rigidity.  Lymph Nodes:  There is no cervical,
 supraclavicular, inguinal, axillary or epitrochlear lymphadenopathy. Heart is regular and tachycard
ic without murmurs.  Lungs are clear to auscultation bilaterally.  Abdomen:  Soft, nontender, nondis
tended.  There is a peritoneal dialysis catheter without surrounding tenderness or erythema.  Skin: 
 There is no rash or splinter hemorrhages.  Musculoskeletal:  The right foot is casted.

 

DIAGNOSTIC STUDIES/LAB DATA:  White blood cell count 8 down from 11, hemoglobin 10, platelets 235.  
Creatinine 4, potassium 3.6, CRP was 26 on admission.

 

Please see impressions and recommendations outlined above.

 

Thank you for asking me to see Ms. Masters in consultation.

 

 [id number]/CPS #: [id number]

## 2017-05-24 NOTE — PN
Progress Note





- Progress Note


SOAP: 


Subjective:


[]Patient seen at bedside, refusing further IV antibiotic therapy today and 

states she will be going home regardless.  She has been non compliant with her 

non weight bearing status and continues to place full weight on the hindfoot 

section of her splint despite repeated cues and reminders.  Dr. Felton has 

instituted a PO antibiotic regimen as she is refusing every other day IV 

antibiotic infusion as she is unable to have PICC line placement due to 

possible need for dialysis fistula in the arm in the future.  Failed fistula is 

noted X1. 








Objective:


[]


 Vital Signs











Temp  99.1 F   05/24/17 07:15


 


Pulse  99   05/24/17 07:15


 


Resp  18   05/24/17 11:08


 


BP  145/51   05/24/17 07:15


 


Pulse Ox  100   05/24/17 07:48








 Intake & Output











 05/23/17 05/24/17 05/24/17





 18:59 06:59 18:59


 


Intake Total 860 2359 740


 


Output Total  500 0


 


Balance 860 1859 740


 


Intake:   


 


  IV Fluids  1519 


 


    NS (0.9%)  1464 


 


    zofran  55 


 


  Oral 860 840 740


 


Output:   


 


  Urine  0 0


 


  Emesis  500 


 


Other:   


 


  Estimated Void  Medium 


 


  # Bowel Movements 1  


 


  Estimated Stool Amount Medium  


 


  # Voids  2 








 Laboratory Results - last 24 hr











  05/23/17 05/23/17 05/23/17





  11:54 17:19 21:16


 


POC Glucose (mg/dL)  111 H  71 L  150 H














  05/24/17





  07:42


 


POC Glucose (mg/dL)  119 H








 Microbiology











 05/22/17 14:30 Anaerobic Culture - Preliminary





 Wound - Right    No Growth Day 2


 


 05/22/17 14:30 Gram Stain - Final





 Foot Right Wound Culture - Final





    No Growth Day 2








splint remains intact at this point despite her non compliance in weight bearing








Assessment:


[]s/p TMA Right foot for gangrene/osteo POD #2








Plan:


[]Levaquin 500mg every other day and Flagyl TID for 21 days


  Follow up with Dr. Zaragoza next week in office

## 2017-05-24 NOTE — PN
Subjective


Date of Service: 05/24/17


Interval History: 





Patient states she is leaving today.


Does not want anyone else examining her but states "I'm fine." 


Seen earlier by ortho and ID. 


Patient reportedly non-compliant with weight bearing status.





Family History: Findings - F - DM, M - DM


Social History: Findings - PPD smoker x 20 years, no EtOH


Past Medical History: Findings - HTN, HLD, CAD, ESRD on PD, neuropathy





Objective


Active Medications: 








Acetaminophen (Tylenol Tab*)  975 mg PO Q8H PRN


   PRN Reason: PAIN


Albuterol (Ventolin 2.5 Mg/3 Ml Neb.Sol*)  2.5 mg INH Q6H PRN


   PRN Reason: SHORTNESS OF BREATH


Albuterol (Ventolin Hfa Inhaler*)  2 puff INH Q6H PRN


   PRN Reason: WHEEZING


Amlodipine Besylate (Norvasc Tab*)  10 mg PO QPM [facility]


   Last Admin: 05/23/17 17:07 Dose:  Not Given


Apixaban (Eliquis)  2.5 mg PO BID [facility]


   Last Admin: 05/24/17 10:09 Dose:  2.5 mg


Aspirin (Aspirin Low Dose Tab*)  81 mg PO DAILY [facility]


   Last Admin: 05/24/17 10:10 Dose:  81 mg


Atorvastatin Calcium (Lipitor*)  80 mg PO QPM [facility]


   Last Admin: 05/23/17 17:08 Dose:  80 mg


Bisoprolol Fumarate (Zebeta Tab*)  10 mg PO QAM [facility]


   Last Admin: 05/24/17 10:10 Dose:  10 mg


Dextrose (D50w Syringe 50 Ml*)  12.5 gm IV PUSH .FOR FS < 60 - SS PRN


   PRN Reason: FS < 60


Diphenhydramine HCl (Benadryl Iv*)  25 mg IV Q6H PRN


   PRN Reason: itching


Docusate Sodium (Colace Cap*)  100 mg PO BID [facility]


   Last Admin: 05/24/17 10:05 Dose:  Not Given


Gabapentin (Neurontin Cap(*))  300 mg PO TID [facility]


   Last Admin: 05/24/17 10:09 Dose:  300 mg


Hydromorphone HCl (Dilaudid Iv*)  0.5 mg IV Q2H PRN


   PRN Reason: PAIN


   Last Admin: 05/24/17 11:08 Dose:  0.5 mg


Sodium Chloride (Ns 0.9% 1000 Ml*)  1,000 mls @ 75 mls/hr IV PER RATE [facility]


   Last Admin: 05/24/17 01:56 Dose:  75 mls/hr


Insulin Glargine (Lantus(*))  60 units SUBCUT Q12H [facility]


   Last Admin: 05/24/17 11:55 Dose:  Not Given


Insulin Human Lispro (Humalog*)  0 units SUBCUT ACHS [facility]


   PRN Reason: Protocol


   Last Admin: 05/24/17 11:52 Dose:  Not Given


Insulin Human Lispro (Humalog*)  0 units SUBCUT AC TRISTAN


   PRN Reason: Protocol


   Last Admin: 05/24/17 11:52 Dose:  Not Given


Isosorbide Mononitrate (Imdur Er Tab*)  60 mg PO QPM [facility]


   Last Admin: 05/23/17 17:08 Dose:  60 mg


Levofloxacin (Levaquin Tab*)  500 mg PO Q48H [facility]


   Last Admin: 05/24/17 11:47 Dose:  500 mg


Losartan Potassium (Cozaar Tab*)  100 mg PO QPM [facility]


   Last Admin: 05/23/17 17:07 Dose:  Not Given


Metoclopramide HCl (Reglan Tab*)  5 mg PO Q6H PRN


   PRN Reason: NAUSEA


   Last Admin: 05/20/17 18:59 Dose:  5 mg


Metronidazole (Flagyl Tab*)  500 mg PO TID [facility]


   Last Admin: 05/24/17 10:09 Dose:  500 mg


Mupirocin (Bactroban 2 % Oint*)  1 applic TOPICAL DAILY [facility]


   Last Admin: 05/24/17 10:05 Dose:  Not Given


Omeprazole (Prilosec Cap*)  20 mg PO 1630 [facility]


   Last Admin: 05/23/17 17:14 Dose:  20 mg


Ondansetron HCl (Zofran Inj*)  4 mg IV Q6H PRN


   PRN Reason: nausea


   Last Admin: 05/24/17 00:58 Dose:  4 mg


Oxycodone HCl (Roxycodone Tab*)  5 mg PO Q3H PRN


   PRN Reason: PAIN - MILD


Oxycodone HCl (Roxycodone Tab*)  10 mg PO Q3H PRN


   PRN Reason: PAIN - MODERATE


Oxycodone HCl (Roxycodone Tab*)  15 mg PO Q3H PRN


   PRN Reason: PAIN - SEVERE


   Last Admin: 05/23/17 17:08 Dose:  15 mg


Prochlorperazine Edisylate (Compazine Inj*)  10 mg IV Q6H PRN


   PRN Reason: NAUSEA


   Last Admin: 05/24/17 03:27 Dose:  10 mg








 Vital Signs











  05/23/17 05/23/17 05/23/17





  12:51 13:02 14:01


 


Temperature   


 


Pulse Rate   


 


Respiratory 16 16 16





Rate   


 


Blood Pressure   





(mmHg)   


 


O2 Sat by Pulse   





Oximetry   














  05/23/17 05/23/17 05/23/17





  15:00 15:01 15:33


 


Temperature   


 


Pulse Rate   


 


Respiratory 16 16 





Rate   


 


Blood Pressure   





(mmHg)   


 


O2 Sat by Pulse   98





Oximetry   














  05/23/17 05/23/17 05/23/17





  15:59 16:01 16:21


 


Temperature 98.7 F  


 


Pulse Rate 103  


 


Respiratory 16 16 16





Rate   


 


Blood Pressure 133/64  





(mmHg)   


 


O2 Sat by Pulse 94  





Oximetry   














  05/23/17 05/23/17 05/23/17





  17:08 17:21 18:37


 


Temperature   


 


Pulse Rate   


 


Respiratory 18 16 16





Rate   


 


Blood Pressure   





(mmHg)   


 


O2 Sat by Pulse   





Oximetry   














  05/23/17 05/23/17 05/23/17





  19:08 19:34 19:37


 


Temperature  98.2 F 


 


Pulse Rate  97 


 


Respiratory 16 16 16





Rate   


 


Blood Pressure  98/42 





(mmHg)   


 


O2 Sat by Pulse  94 





Oximetry   














  05/23/17 05/23/17 05/23/17





  19:40 21:26 21:31


 


Temperature   


 


Pulse Rate   


 


Respiratory 16 16 16





Rate   


 


Blood Pressure   





(mmHg)   


 


O2 Sat by Pulse   





Oximetry   














  05/23/17 05/23/17 05/24/17





  22:31 23:26 00:00


 


Temperature   


 


Pulse Rate   


 


Respiratory 16 16 





Rate   


 


Blood Pressure   





(mmHg)   


 


O2 Sat by Pulse   100





Oximetry   














  05/24/17 05/24/17 05/24/17





  00:11 00:20 01:20


 


Temperature 98.2 F  


 


Pulse Rate 110  


 


Respiratory 16 16 16





Rate   


 


Blood Pressure 128/83  





(mmHg)   


 


O2 Sat by Pulse 100  





Oximetry   














  05/24/17 05/24/17 05/24/17





  07:15 07:48 10:09


 


Temperature 99.1 F  


 


Pulse Rate 99  


 


Respiratory 17 16 16





Rate   


 


Blood Pressure 145/51  





(mmHg)   


 


O2 Sat by Pulse 94 100 





Oximetry   














  05/24/17 05/24/17 05/24/17





  11:08 11:15 12:04


 


Temperature  98.3 F 


 


Pulse Rate  94 


 


Respiratory 18 16 16





Rate   


 


Blood Pressure  142/40 





(mmHg)   


 


O2 Sat by Pulse  94 





Oximetry   











Oxygen Devices in Use Now: None


Appearance: Limited assessment, patient ready for discharge, declines full 

examination


Ears/Nose/Mouth/Throat: Mucous Membranes Moist


Respiratory: Symmetrical Chest Expansion and Respiratory Effort


Extremities: - - RLE splint c/d/i


Neurological: Alert and Oriented x 3


Nutrition: Taking PO's


Result Diagrams: 


 05/23/17 10:00





 05/23/17 10:00


Microbiology and Other Data: 


 Microbiology











 05/22/17 14:30 Anaerobic Culture - Preliminary





 Wound - Right    No Growth Day 1


 


 05/22/17 14:30 Gram Stain - Final





 Foot Right Wound Culture - Preliminary





    No Growth Day 1














Assess/Plan/Problems-Billing





Worsening RLE wound, cellulitis, osteomyelitis admitted for planned TMA by Dr. Zaragoza in a 36 yo F with hx of HTN, HLD, CAD, PVD s/p recent LE stenting on 

Eliquis, neuropathy








- Patient Problems


(1) Cellulitis of right lower extremity


Code(s): L03.115 - CELLULITIS OF RIGHT LOWER LIMB   Comment: 


With osteomyelitis


POD #2 s/p right TMA


Patient declines any further IV antibiotics and refuses to come as outpatient. 


Continue Levaquin and Flagyl PO per ID.


Appreciate ID consult.


Continue analgesia per Ortho





Cardiac catheterization report from [facility] shows small caliber vessel 

disease of the distal LCA and RCA and no significant LAD disease; no indication 

for bypass surgery. Continue medical management.





EKG shows NSR with no ischemic changes.





Ms. Masters has an RCRI score of 2, carrying a 6.6% risk of major cardiac event 

during surgery. No further cardiac testing is indicated at this time.    





(2) Diabetes


Code(s): E11.9 - TYPE 2 DIABETES MELLITUS WITHOUT COMPLICATIONS   Comment: 


HgbA1c 14.3


Patient uncontrolled diabetic, as evidenced by BG values here and persistently 

high A1c values.


Resume home Lantus and Novolog.


Referral to [facility] for diabetes and nutritonal counseling.   





(3) HTN (hypertension)


Code(s): I10 - ESSENTIAL (PRIMARY) HYPERTENSION   Comment: 


Normotensive


Continue amlodipine, bisoprolol, losartan.   





(4) CAD (coronary artery disease)


Code(s): I25.10 - ATHSCL HEART DISEASE OF NATIVE CORONARY ARTERY W/O ANG PCTRS 

  Comment: 


Continue ASA, isosorbide, atorvastatin.   





(5) PVD (peripheral vascular disease)


Code(s): I73.9 - PERIPHERAL VASCULAR DISEASE, UNSPECIFIED   Comment: 


S/p recent RLE stenting


Continue ASA and Eliquis


Eliquis renally dosed per pharmacy. Continue reduced dose, given ESRD.   





(6) ESRD (end stage renal disease)


Code(s): N18.6 - END STAGE RENAL DISEASE   Comment: 


Continue peritoneal dialysis


Monitor electrolytes   





(7) History of asthma


Code(s): Z87.09 - PERSONAL HISTORY OF OTHER DISEASES OF THE RESPIRATORY SYSTEM 

  Comment: 


Continue albuterol.   





(8) DVT prophylaxis


Code(s): ICN3275 -    Comment: 


Eliquis   


Status and Disposition: 





Inpatient. Dispo per ortho. Hospital medicine following for co-medical 

management.

## 2017-05-24 NOTE — PN
Progress Note





- Progress Note


SOAP: 


Subjective:


DOS: 5/24/17


CC: osteomyelitis


HPI: 37 year old diabetic with right forefoot gangrene and cellulitis s/p TMA.  

No pain, fever, rash, diarrhea.  She is sick of being in hospital and notes she 

is leaving today.  








Objective:


[]


 Vital Signs











Temp  37.3 C   05/24/17 07:15


 


Pulse  99   05/24/17 07:15


 


Resp  18   05/24/17 11:08


 


BP  145/51   05/24/17 07:15


 


Pulse Ox  100   05/24/17 07:48








 Intake & Output











 05/23/17 05/24/17 05/24/17





 18:59 06:59 18:59


 


Intake Total 860 2359 740


 


Output Total  500 0


 


Balance 860 1859 740


 


Intake:   


 


  IV Fluids  1519 


 


    NS (0.9%)  1464 


 


    zofran  55 


 


  Oral 860 840 740


 


Output:   


 


  Urine  0 0


 


  Emesis  500 


 


Other:   


 


  Estimated Void  Medium 


 


  # Bowel Movements 1  


 


  Estimated Stool Amount Medium  


 


  # Voids  2 








Gen:awake, no distress


HEENT:PERRL, MMM


Neck:Supple


Heart:RRR no murmur


Abd:non tender non distended


Skin: no rash


MSK: R foot casted


 Laboratory Results - last 24 hr











  05/23/17 05/23/17 05/23/17





  11:54 17:19 21:16


 


POC Glucose (mg/dL)  111 H  71 L  150 H














  05/24/17





  07:42


 


POC Glucose (mg/dL)  119 H








 Microbiology











 05/22/17 14:30 Anaerobic Culture - Preliminary





 Wound - Right    No Growth Day 2


 


 05/22/17 14:30 Gram Stain - Final





 Foot Right Wound Culture - Final





    No Growth Day 2

















Assessment:


1. right forefoot chronic osteomyelitis, likely polymicrobial s/p TMA


2. diabetes with neuropathy


3. ESRD/PD


4. PAD











Plan:


1. I recommended she stay for longer course of IV antibiotics to prevent 

further loss of limb or life, she understands that concern but is adamant that 

she will leave today as she is sick of being here.  No good outpatient IV 

options for her as she can't have a PICC due to ESRD, she declines every other 

day peripheral IV placement at infusion center.  I do not feel a central 

catheter in IJ or SC is safe for outpatient treatment.  Will use levaquin 500 

mg po every other day and flagyl 500 mg po tid x21 days.

## 2017-05-25 NOTE — DS
DISCHARGE SUMMARY:

 

DATE OF ADMISSION:  05/19/17

 

DATE OF DISCHARGE:  05/24/17

 

ATTENDING PHYSICIAN:  Dr. Ivan Zaragoza.

 

ADMISSION DIAGNOSIS:  Right forefoot osteomyelitis.

 

DISCHARGE DIAGNOSIS:  Right forefoot osteomyelitis.

 

SURGERY PERFORMED:  Transmetatarsal amputation of the right foot.

 

HOSPITAL COURSE:  The patient is a 37-year-old female known to Dr. Zaragoza with 
a complicated past medical history of hypertension, uncontrolled diabetes 
mellitus, peripheral vascular disease, end-stage renal disease, asthma, 
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  
The patient was seen by Dr. Zaragoza in the office on the 17th of May, it was 
felt that she would need direct admit to the hospital with planning of further 
amputation at the transmetatarsal level once she was medically optimized for 
surgery.  She was admitted on the 19th of May, and started on Zosyn IV.  She 
was unable to tolerate this medication secondary to diarrhea.  She was seen and 
evaluated by Dr. Felton, who changed her IV regimen to cefepime and Flagyl.  
The patient was fairly non-compliant during her hospital stay with her 
weightbearing status despite a non-weightbearing order by Dr. Zaragoza.  The 
patient continued to ambulate on the plantar aspect of her right lower 
extremity splint despite cues and reminders of the non-weightbearing status. 
The patient was told that she could jeopardize the operative incision site by 
breaking down the splint and possibly causing a wound dehiscence.  The patient 
did not heed this warning.  She also stated that she would sign out AMA, on 05/
24/17, regardless of having antibiotic regimen in place.  She was seen by Dr. Felton again today, and due to the fact that she is refusing every other day 
cefepime IV antibiotic therapy and cannot have a PICC line due to her end-stage 
renal disease and possibility of needing peripheral access on the other arm, IV 
antibiotics were not an option.  Dr. Felton felt that p.o. Levaquin 500 mg 
every other day for 3 weeks and Flagyl 500 mg 3 times daily for 3 weeks could 
be sufficient, although IV antibiotic therapy is the first recommendation.  The 
patient wants to be discharged on p.o. antibiotic regimen.

 

CONDITION ON DISCHARGE:  Her white count came down to 8.5 on 21st.  H and H 
stable. Her vital signs are stable.  She is afebrile.  Her splint remains in 
good repair despite her bearing weight through the plantar aspect.  There is no 
noted drainage on her right lower extremity splint.  Her pain is satisfactorily 
controlled.

 

PLAN:  Discharge to home, 05/24/17.  Recommend to follow up with Dr. Zaragoza in 
7 to 10 days.  Again, I reiterated the importance of remaining non-
weightbearing on the right lower extremity.  Prescriptions of Flagyl 500 mg 
t.i.d. x21 days, Levaquin 500 mg p.o. every other day x21 days, and Dilaudid 2 
mg p.o. q.4 hours p.r.n. severe pain have been sent into the [facility] 
for her to  today.

 

____________________________________ 

SHELLEY LOUIS

 

[id number]/[facility] #: [id number]

MTDD

## 2017-05-25 NOTE — DS
DISCHARGE SUMMARY:

 

DATE OF ADMISSION:  05/19/17

 

DATE OF DISCHARGE:  05/24/17

 

ADMISSION DIAGNOSIS:  Right forefoot osteomyelitis.

 

DISCHARGE DIAGNOSIS:  Right forefoot osteomyelitis.

 

SURGERY PERFORMED:  Right transmetatarsal amputation.

 

HISTORY OF PRESENT ILLNESS:  The patient is a 37-year-old female, who has been followed by Dr. Hyacinth oglesby.  She is an insulin-dependent obese diabetic with significant peripheral and central vascular dise
ase, peritoneal dialysis, hypertension, recent right lower extremity stenting, and neuropathy that s
aw Dr. Zaragoza in followup on 05/18/17 and was found to have full-thickness ulceration of the central
 toes of the right forefoot with mummification and wet gangrene close to the MTP joint.  It was felt
 that she would benefit from a transmetatarsal amputation and [place] [facility] [place].

 

DICTATION ENDS ABRUPTLY HERE

 

____________________________________ SHELLEY LOUIS

 

[id number]/[facility] #: [id number]

## 2017-05-26 NOTE — ED
Lower Extremity





- HPI Summary


HPI Summary: 





37 female presents with complaints of her cast on her right lower leg being too 

tight and painful. Patient had a partial amputation of her right toes/foot due 

to gangrene from uncontrolled diabetes on Monday 5/22/17 here at [facility] [place] by [place] Zaragoza. She was seen here yesterday 5/25/17 for similar complaints of burning, 

pain, swelling and thought it was infected. She had a full work up, imaging and 

labs obtained with everything coming back in normal limits. Patient was d/c. 

Returns today with complaints that the swelling and pain has increased and 

worsened and she would like the cast off. Denies any fever/chills. Admits to 

some nausea however relates that to the dilaudid that she is taking for pain. 

Last dose was at 6pm PTA. No other complaints at this time. Does have some 

numbness of her right lower extremity that is her norm.





- History of Current Complaint


Chief Complaint: EDExtremityLower


Stated Complaint: RT FOOT SWOLLEN


Time Seen by Provider: 05/26/17 20:20


Hx Obtained From: Patient


Hx Last Menstrual Period: 5/12/17


Onset of Pain: Days


Onset/Duration: Days


Severity Initially: Mild


Severity Currently: Moderate


Pain Intensity: 7


Pain Scale Used: 0-10 Numeric


Timing: Constant


Location: Is Discrete @ - lower right leg


Character Of Pain: Aching, Throbbing, Burning


Associated Signs And Symptoms: Positive: Swelling


Aggravating Factor(s): Movement


Alleviating Factor(s): Rest, Nothing


Able to Bear Weight: No - is restricted to due recent surgery 





- Allergies/Home Medications


Allergies/Adverse Reactions: 


 Allergies











Allergy/AdvReac Type Severity Reaction Status Date / Time


 


Heparin Allergy  See Comment Verified 05/17/17 15:08


 


Ropinirole [From Requip] Allergy  Hives Verified 05/17/17 15:08


 


Erythromycin AdvReac  "DOESN'T Verified 05/17/17 15:08





   WORK"  


 


Niacin AdvReac  Hives Verified 05/19/17 09:34


 


plastic tape Allergy  Blisters Uncoded 05/19/17 09:34














PMH/Surg Hx/FS Hx/Imm Hx


Endocrine/Hematology History: Reports: Hx Anticoagulant Therapy - Aspirin., Hx 

Blood Transfusions, Hx Diabetes - insulin control, Hx Thyroid Disease - nodule 

biopsied, normal, Hx Anemia - hx of - reports had tranfusion in 2014 after mi


Cardiovascular History: Reports: Hx Angina, Hx Cardiac Arrest - in Dec. 2014 

with CPR, Hx Coronary Artery Disease, Hx Hypercholesterolemia, Hx Hypertension 

- on med, Hx Myocardial Infarction, Other Cardiovascular Problems/Disorders - 

hyperlipidemia


   Denies: Hx Congestive Heart Failure, Hx Deep Vein Thrombosis, Hx Pacemaker/

ICD, Hx Valvular Heart Disease


Respiratory History: Reports: Hx Asthma - inhaler, Hx Chronic Obstructive 

Pulmonary Disease (COPD) - smoker, Hx Sleep Apnea - pt reports md thinks, but 

no official testing done yet, Other Respiratory Problems/Disorders - acute 

respipatory failure with hypoxia - dec 2016


   Denies: Hx Lung Cancer, Hx Pneumonia, Hx Pulmonary Embolism


GI History: Reports: Hx Gastroesophageal Reflux Disease - on med, Other GI 

Disorders - GASTROPARESIS - TAKING OMEPRAZOLE


   Denies: Hx Gall Bladder Disease, Hx Gastrointestinal Bleed, Hx Ulcer, Hx 

Urosepsis


 History: Reports: Hx Chronic Renal Failure, Hx Dialysis - PERITONEAL BUT 

DOES HAVE FISTULA ON LEFT ARM, Hx Kidney Stones - in past, Hx Renal Disease - 

ESRD on home peritoneal dialysis , Other  Problems/Disorders - ESRD- URINATES 

A SMALL AMOUNT


Musculoskeletal History: Reports: Hx Arthritis - back, hips, Hx Back Problems - 

Sciatica and Herniated L3 disk


Sensory History: Reports: Hx Eye Prosthesis - left, Hx Legally Blind, Hx Vision 

Problem


   Denies: Hx Contacts or Glasses, Hx Hearing Aid


Opthamlomology History: Reports: Hx Eye Prosthesis - left, Hx Legally Blind, Hx 

Vision Problem


   Denies: Hx Contacts or Glasses


Neurological History: Reports: Other Neuro Impairments/Disorders - neuropathy


   Denies: Hx Transient Ischemic Attacks (TIA)


Psychiatric History: Reports: Hx Anxiety - on med, Hx Depression - on med, Hx 

Bipolar Disorder


   Denies: Hx Panic Disorder, Hx Schizophrenia





- Cancer History


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


Hx Chemotherapy: No


Hx Radiation Therapy: No





- Surgical History


Surgery Procedure, Year, and Place: LEFT EYE REMOVED 2012 - HAS PROSTHETIC EYE (

ORBITS DONE 5/2015 OK'D BY DR SAE COLE TO SCAN IN MRI) ;.  MELANOMA REMOVED 

FROM [facility] 2013 (PROCEDURE DONE 4X);.  CARDIAC CATH 2014 - NO STENTS;.  LEFT 

WRIST FISTULA PLACEMENT ([facility]) 2014;.  HEMODIALYSIS CATH RIGHT CHEST WALL 2014;.

  PERITONEAL DIALYSIS CATH 2/2015;.  CHEST WALL CATH REMOVAL 7/2016 [facility] [place];.  

RIGHT GREAT TOE AMPUTATION, [facility] [place] 4/2016;.  PLACEMENT RIGHT JUGULAR TESIO 

HEMODIALYSIS CATHETER [facility] [place] 8/27/2016;.  REMOVAL OF JUGULAR CATH - SEPT 2016.  

CARDIAC CATH - stentsx2 right leg


Hx Anesthesia Reactions: No





- Immunization History


Date of Tetanus Vaccine: UTD


Date of Influenza Vaccine: 8/15/16


Infectious Disease History: No


Infectious Disease History: Reports: Hx of Known/Suspected MRSA - MRSA R 1st toe


   Denies: Hx Hepatitis, Hx Human Immunodeficiency Virus (HIV), Hx Shingles, Hx 

Tuberculosis, History Other Infectious Disease, Traveled Outside the [place] in Last 

30 Days





- Family History


Known Family History: Positive: Cardiac Disease - father MI at 43 y/o, 

Hypertension





- Social History


Alcohol Use: None


Hx Substance Use: No


Substance Use Type: Reports: None


Substance Use Comment - Amount & Last Used: using chantix to quit smoking


Hx Tobacco Use: Yes


Smoking Status (MU): Current Every Day Smoker


Type: Cigarettes


Amount Used/How Often: 5 ciagrettes/day


Length of Time of Smoking/Using Tobacco: 17 YEARS


Have You Smoked in the Last Year: Yes





Review of Systems


Constitutional: Negative


Cardiovascular: Negative


Respiratory: Negative


Positive: Arthralgia, Myalgia - right lower extremity


Neurological: Negative


All Other Systems Reviewed And Are Negative: Yes





Physical Exam


Triage Information Reviewed: Yes


Vital Signs On Initial Exam: 


 Initial Vitals











Temp Pulse Resp BP Pulse Ox


 


 97.8 F   98   20   109/95   97 


 


 05/26/17 18:41  05/26/17 18:41  05/26/17 18:41  05/26/17 18:41  05/26/17 18:41








afebrile


Vital Signs Reviewed: Yes


Appearance: Positive: Well-Appearing, Pain Distress - mild tossing and turning 

in bed, trying to remove post op cast


Skin: Positive: Warm, Skin Color Reflects Adequate Perfusion, Dry, Other - 

unable to view right lower extremity due to cast


Head/Face: Positive: Normal Head/Face Inspection


Eyes: Positive: Normal, Conjunctiva Clear


ENT: Positive: Normal ENT inspection, Hearing grossly normal


Neck: Positive: Supple, Nontender


Respiratory/Lung Sounds: Positive: Clear to Auscultation.  Negative: Rales, 

Rhonchi, Wheezes


Cardiovascular: Positive: Normal, RRR, Pulses are Symmetrical in both Upper and 

Lower Extremities - 2+ unable to palpate right lower pedal due to cast placement


Musculoskeletal: Positive: Normal, Limited @ - right lower extremity, foot, 

Pain @ - right lower ankle and distal, entire foot, Edema Right


Neurological: Positive: Normal, Sensory/Motor Intact - her norm, Alert, 

Oriented to Person Place, Time, Reflexes Intact


Psychiatric: Positive: Normal





Procedures





- Splinting


Location: right lower extremity/foot


Hand-Made Type: plaster


Splint: posterior walking


Pre-Proc Neuro Vasc Exam: normal


Post-Proc Neuro Vasc Exam: normal, unchanged from pre-exam





Diagnostics





- Vital Signs


 Vital Signs











  Temp Pulse Resp BP Pulse Ox


 


 05/26/17 18:45  97.8 F  96  20  109/95  97


 


 05/26/17 18:41  97.8 F  98  20  109/95  97














- Laboratory


Lab Statement: Any lab studies that have been ordered have been reviewed, and 

results considered in the medical decision making process.





Lower Extremity Course/Dx





- Course


Course Of Treatment: Given zofran for nausea. Spoke with Dr Kwong who also 

spoke with Dr Zaragoza at 9:54pm states no further imaging or work up needed. 

Remove cast and apply posterior walking splint with lots of padding. New splint 

was applied. Patient's pain reduced some. No concern of any post-op infection 

or compartment syndrome at this time. Appears to be experiencing post-op 

phantom limb and pain due to surgery. Continue pain medication at home. Aware 

of worsening signs and symptoms. Follow up with Dr Zaragoza Tuesday 5/30.





- Diagnoses


Differential Diagnosis/HQI/PQRI: Positive: Cellulitis, Compartment Syndrome, 

Infection, Osteomyelitis, Other - post-op complication, infection


Provider Diagnoses: 


 Lower extremity pain, right, Post-operative complication








- Physician Notifications


Discussed Care of Patient With: Dr Kwong


Time Discussed With Above Provider: 21:45


Instructed by Provider To: Have Pt Call For Appt. - remove post-op cast and 

apply posterior walking splint, do not bear weight





Discharge





- Discharge Plan


Condition: Stable


Disposition: HOME


Patient Education Materials:  Leg Pain (ED)


Referrals: 


Ivan Zaragoza MD [Medical Doctor] - 


Yeni Shanks MD [Primary Care Provider] - 


Additional Instructions: 


Follow up with Dr Zaragoza on Tuesday 5/30/17. 


Continue taking pain mediation.


Rest and elevate leg.


Do not get splint wet. 


If symptoms worsen or do not improve such as increasing pain or fever/chills 

please return.

## 2017-05-26 NOTE — RAD
HISTORY: Postop fever, pain after foot amputation



COMPARISONS: May 19, 2017



VIEWS: 3, Frontal, lateral, and oblique views of the right foot. Evaluation is limited by

the presence of a cast sutures fine bone detail.



FINDINGS:



BONE DENSITY: Normal.

BONES: There is been interval amputation of the forefoot at the level of the metatarsal

diaphyses.    

JOINTS: There is no arthropathy.    

ALIGNMENT: There is no dislocation. 

SOFT TISSUES: Unremarkable.



OTHER FINDINGS: None.



IMPRESSION: 

STATUS POST AMPUTATION OF THE FOREFOOT

## 2017-05-26 NOTE — ED
Chu RICARDO Benjamin, scribed for Js Milian MD on 05/25/17 at 2357 .





HPI Febrile Illness





- HPI Summary


HPI Summary: 





38yo female with s/p right foot amputation on Monday. Pt states having fever, 

chills, cold sweats, increasing swelling and pain in right ankle. Pt also 

reports cough and congestion.  





- History of Current Complaint


Chief Complaint: EDFever


Time Seen by Provider: 05/25/17 23:48


Hx Obtained From: Patient


Onset/Duration: Started Days Ago, Still Present


Timing: Constant


Initial Severity: Mild


Current Severity: Moderate


Pain Intensity: 7


Pain Scale Used: 0-10 Numeric


Aggravating Factors: Nothing


Alleviating Factors: Nothing


Associated Signs and Symptoms: Chills, Cough, Leg Swelling - right, Night Sweats





- Additional Pertinent History


Primary Care Physician: LINDA





- Allergy/Home Medications


Allergies/Adverse Reactions: 


 Allergies











Allergy/AdvReac Type Severity Reaction Status Date / Time


 


Heparin Allergy  See Comment Verified 05/17/17 15:08


 


Ropinirole [From Requip] Allergy  Hives Verified 05/17/17 15:08


 


Erythromycin AdvReac  "DOESN'T Verified 05/17/17 15:08





   WORK"  


 


Niacin AdvReac  Hives Verified 05/19/17 09:34


 


plastic tape Allergy  Blisters Uncoded 05/19/17 09:34














PMH/Surg Hx/FS Hx/Imm Hx


Endocrine/Hematology History: Reports: Hx Anticoagulant Therapy - Aspirin., Hx 

Blood Transfusions, Hx Diabetes - insulin control, Hx Thyroid Disease - nodule 

biopsied, normal, Hx Anemia - hx of - reports had tranfusion in 2014 after mi


Cardiovascular History: Reports: Hx Angina, Hx Cardiac Arrest - in Dec. 2014 

with CPR, Hx Coronary Artery Disease, Hx Hypercholesterolemia, Hx Hypertension 

- on med, Hx Myocardial Infarction, Other Cardiovascular Problems/Disorders - 

hyperlipidemia


   Denies: Hx Congestive Heart Failure, Hx Deep Vein Thrombosis, Hx Pacemaker/

ICD, Hx Valvular Heart Disease


Respiratory History: Reports: Hx Asthma - inhaler, Hx Chronic Obstructive 

Pulmonary Disease (COPD) - smoker, Hx Sleep Apnea - pt reports md thinks, but 

no official testing done yet, Other Respiratory Problems/Disorders - acute 

respipatory failure with hypoxia - dec 2016


   Denies: Hx Lung Cancer, Hx Pneumonia, Hx Pulmonary Embolism


GI History: Reports: Hx Gastroesophageal Reflux Disease - on med, Other GI 

Disorders - GASTROPARESIS - TAKING OMEPRAZOLE


   Denies: Hx Gall Bladder Disease, Hx Gastrointestinal Bleed, Hx Ulcer, Hx 

Urosepsis


 History: Reports: Hx Chronic Renal Failure, Hx Dialysis - PERITONEAL BUT 

DOES HAVE FISTULA ON LEFT ARM, Hx Kidney Stones - in past, Hx Renal Disease - 

ESRD on home peritoneal dialysis , Other  Problems/Disorders - ESRD- URINATES 

A SMALL AMOUNT


Musculoskeletal History: Reports: Hx Arthritis - back, hips, Hx Back Problems - 

Sciatica and Herniated L3 disk


Sensory History: Reports: Hx Eye Prosthesis - left, Hx Legally Blind, Hx Vision 

Problem


   Denies: Hx Contacts or Glasses, Hx Hearing Aid


Opthamlomology History: Reports: Hx Eye Prosthesis - left, Hx Legally Blind, Hx 

Vision Problem


   Denies: Hx Contacts or Glasses


Neurological History: Reports: Other Neuro Impairments/Disorders - neuropathy


   Denies: Hx Transient Ischemic Attacks (TIA)


Psychiatric History: Reports: Hx Anxiety - on med, Hx Depression - on med, Hx 

Bipolar Disorder


   Denies: Hx Panic Disorder, Hx Schizophrenia





- Cancer History


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


Hx Chemotherapy: No


Hx Radiation Therapy: No





- Surgical History


Surgery Procedure, Year, and Place: LEFT EYE REMOVED 2012 - HAS PROSTHETIC EYE (

ORBITS DONE 5/2015 OK'D BY DR SAE COLE TO SCAN IN MRI) ;.  MELANOMA REMOVED 

FROM [facility] 2013 (PROCEDURE DONE 4X);.  CARDIAC CATH 2014 - NO STENTS;.  LEFT 

WRIST FISTULA PLACEMENT ([facility]) 2014;.  HEMODIALYSIS CATH RIGHT CHEST WALL 2014;.

  PERITONEAL DIALYSIS CATH 2/2015;.  CHEST WALL CATH REMOVAL 7/2016 [facility] [place];.  

RIGHT GREAT TOE AMPUTATION, [facility] [place] 4/2016;.  PLACEMENT RIGHT JUGULAR TESIO 

HEMODIALYSIS CATHETER [facility] [place] 8/27/2016;.  REMOVAL OF JUGULAR CATH - SEPT 2016.  

CARDIAC CATH - stentsx2 right leg


Hx Anesthesia Reactions: No





- Immunization History


Date of Tetanus Vaccine: UTD


Date of Influenza Vaccine: 8/15/16


Infectious Disease History: Reports: Hx of Known/Suspected MRSA - MRSA R 1st toe


   Denies: Hx Hepatitis, Hx Human Immunodeficiency Virus (HIV), Hx Shingles, Hx 

Tuberculosis, History Other Infectious Disease, Traveled Outside the [place] in Last 

30 Days





- Family History


Known Family History: Positive: Cardiac Disease - father MI at 41 y/o, 

Hypertension





- Social History


Lives: With Family


Alcohol Use: None


Hx Substance Use: No


Substance Use Type: Reports: None


Substance Use Comment - Amount & Last Used: using chantix to quit smoking


Hx Tobacco Use: Yes


Smoking Status (MU): Current Every Day Smoker


Type: Cigarettes


Amount Used/How Often: 5 ciagrettes/day


Length of Time of Smoking/Using Tobacco: 17 YEARS


Have You Smoked in the Last Year: Yes





Review of Systems


Positive: Fever, Chills, Skin Diaphoresis


Eyes: Negative


ENT: Negative


Cardiovascular: Negative


Positive: Cough


Gastrointestinal: Negative


Genitourinary: Negative


Positive: Edema - RLE


Skin: Negative


Neurological: Negative


Psychological: Normal


All Other Systems Reviewed And Are Negative: Yes





Physical Exam


Triage Information Reviewed: Yes


Vital Signs On Initial Exam: 


 Initial Vitals











Temp Pulse Resp BP Pulse Ox


 


 98.1 F   99   16   167/84   99 


 


 05/25/17 22:55  05/25/17 22:55  05/25/17 22:55  05/25/17 22:55  05/25/17 22:55











Vital Signs Reviewed: Yes


Appearance: Positive: Well-Appearing, Pain Distress - mild


Skin: Positive: Warm, Skin Color Reflects Adequate Perfusion, Dry


Head/Face: Positive: Normal Head/Face Inspection


Eyes: Positive: EOMI, VIOLETTA


ENT: Positive: Normal ENT inspection


Neck: Positive: Supple, Nontender


Respiratory/Lung Sounds: Positive: Clear to Auscultation, Breath Sounds Present


Cardiovascular: Positive: RRR


Abdomen Description: Positive: Nontender, No Organomegaly, Soft


Bowel Sounds: Positive: Present


Musculoskeletal: Positive: Normal, Strength/ROM Intact, Other - RLE in cast; No 

tenderness in the popliteal area.


Neurological: Positive: Normal, Sensory/Motor Intact, Alert, Oriented to Person 

Place, Time


Psychiatric: Positive: Affect/Mood Appropriate





Diagnostics





- Vital Signs


 Vital Signs











  Temp Pulse Resp BP Pulse Ox


 


 05/25/17 22:55  98.1 F  99  16  167/84  99














- Laboratory


Lab Results: 


 Lab Results











  05/26/17 05/26/17 05/26/17 Range/Units





  00:06 00:06 00:06 


 


WBC  10.7    (3.5-10.8)  10^3/ul


 


RBC  3.71 L    (4.0-5.4)  10^6/ul


 


Hgb  11.0 L    (12.0-16.0)  g/dl


 


Hct  34 L    (35-47)  %


 


MCV  92    (80-97)  fL


 


MCH  30    (27-31)  pg


 


MCHC  32    (31-36)  g/dl


 


RDW  15    (10.5-15)  %


 


Plt Count  342    (150-450)  10^3/ul


 


MPV  8    (7.4-10.4)  um3


 


Neut % (Auto)  77.1    (38-83)  %


 


Lymph % (Auto)  13.5 L    (25-47)  %


 


Mono % (Auto)  6.0    (1-9)  %


 


Eos % (Auto)  2.8    (0-6)  %


 


Baso % (Auto)  0.6    (0-2)  %


 


Absolute Neuts (auto)  8.3 H    (1.5-7.7)  10^3/ul


 


Absolute Lymphs (auto)  1.4    (1.0-4.8)  10^3/ul


 


Absolute Monos (auto)  0.6    (0-0.8)  10^3/ul


 


Absolute Eos (auto)  0.3    (0-0.6)  10^3/ul


 


Absolute Basos (auto)  0.1    (0-0.2)  10^3/ul


 


Absolute Nucleated RBC  0.01    10^3/ul


 


Nucleated RBC %  0.1    


 


INR (Anticoag Therapy)   1.21 H   (0.89-1.11)  


 


APTT   30.5   (26.0-36.3)  seconds


 


Sodium    138  (133-145)  mmol/L


 


Potassium    3.0 L  (3.5-5.0)  mmol/L


 


Chloride    104  (101-111)  mmol/L


 


Carbon Dioxide    22  (22-32)  mmol/L


 


Anion Gap    12 H  (2-11)  mmol/L


 


BUN    22  (6-24)  mg/dL


 


Creatinine    3.14 H  (0.51-0.95)  mg/dL


 


Est GFR ( Amer)    21.4  (>60)  


 


Est GFR (Non-Af Amer)    16.7  (>60)  


 


BUN/Creatinine Ratio    7.0 L  (8-20)  


 


Glucose    311 H  ()  mg/dL


 


Lactic Acid     (0.5-2.0)  mmol/L


 


Calcium    8.6  (8.6-10.3)  mg/dL


 


Total Bilirubin    0.20  (0.2-1.0)  mg/dL


 


AST    7 L  (13-39)  U/L


 


ALT    5 L  (7-52)  U/L


 


Alkaline Phosphatase    68  ()  U/L


 


C-Reactive Protein    85.75 H  (< 5.00)  mg/L


 


Total Protein    5.9 L  (6.4-8.9)  g/dL


 


Albumin    2.6 L  (3.2-5.2)  g/dL


 


Globulin    3.3  (2-4)  g/dL


 


Albumin/Globulin Ratio    0.8 L  (1-3)  


 


Lipase    24  (11.0-82.0)  U/L














  05/26/17 Range/Units





  00:06 


 


WBC   (3.5-10.8)  10^3/ul


 


RBC   (4.0-5.4)  10^6/ul


 


Hgb   (12.0-16.0)  g/dl


 


Hct   (35-47)  %


 


MCV   (80-97)  fL


 


MCH   (27-31)  pg


 


MCHC   (31-36)  g/dl


 


RDW   (10.5-15)  %


 


Plt Count   (150-450)  10^3/ul


 


MPV   (7.4-10.4)  um3


 


Neut % (Auto)   (38-83)  %


 


Lymph % (Auto)   (25-47)  %


 


Mono % (Auto)   (1-9)  %


 


Eos % (Auto)   (0-6)  %


 


Baso % (Auto)   (0-2)  %


 


Absolute Neuts (auto)   (1.5-7.7)  10^3/ul


 


Absolute Lymphs (auto)   (1.0-4.8)  10^3/ul


 


Absolute Monos (auto)   (0-0.8)  10^3/ul


 


Absolute Eos (auto)   (0-0.6)  10^3/ul


 


Absolute Basos (auto)   (0-0.2)  10^3/ul


 


Absolute Nucleated RBC   10^3/ul


 


Nucleated RBC %   


 


INR (Anticoag Therapy)   (0.89-1.11)  


 


APTT   (26.0-36.3)  seconds


 


Sodium   (133-145)  mmol/L


 


Potassium   (3.5-5.0)  mmol/L


 


Chloride   (101-111)  mmol/L


 


Carbon Dioxide   (22-32)  mmol/L


 


Anion Gap   (2-11)  mmol/L


 


BUN   (6-24)  mg/dL


 


Creatinine   (0.51-0.95)  mg/dL


 


Est GFR ( Amer)   (>60)  


 


Est GFR (Non-Af Amer)   (>60)  


 


BUN/Creatinine Ratio   (8-20)  


 


Glucose   ()  mg/dL


 


Lactic Acid  2.1 H*  (0.5-2.0)  mmol/L


 


Calcium   (8.6-10.3)  mg/dL


 


Total Bilirubin   (0.2-1.0)  mg/dL


 


AST   (13-39)  U/L


 


ALT   (7-52)  U/L


 


Alkaline Phosphatase   ()  U/L


 


C-Reactive Protein   (< 5.00)  mg/L


 


Total Protein   (6.4-8.9)  g/dL


 


Albumin   (3.2-5.2)  g/dL


 


Globulin   (2-4)  g/dL


 


Albumin/Globulin Ratio   (1-3)  


 


Lipase   (11.0-82.0)  U/L











Result Diagrams: 


 05/26/17 00:06





 05/26/17 00:06


Lab Statement: Any lab studies that have been ordered have been reviewed, and 

results considered in the medical decision making process.





- Radiology


  ** CXR


Xray Interpretation: No Acute Changes


Radiology Interpretation Completed By: ED Physician





  ** Foot XR


Xray Interpretation: No Acute Changes


Radiology Interpretation Completed By: ED Physician





Course/Dx





- Course


Course Of Treatment: NO CRITICAL CARE TIME


Assessment/Plan: DISCUSSED RESULTS WITH PATIENT/FAMILY. AT THIS TIME, NO FEVER 

DOCUMENTED IN ED. WBC WNL. THE PLAN IS TO HAVE PATIENT F/U WITH ORTHOPEDICS 

TODAY. WE DISCUSSED RETURNING TO THE ED FOR A FEVER, IF SHE FEELS ILL, PAIN OR 

ANY CONCERNS. DISCHARGE HOME STABLE.





- Diagnoses


Provider Diagnoses: 


 Foot pain








- Provider Notifications


Discussed Care Of Patient With: Dr. Frankenberg (Hospitalist) @[id number].





Discharge





- Discharge Plan


Condition: Stable


Disposition: HOME


Referrals: 


Yeni Shanks MD [Primary Care Provider] - 


Additional Instructions: 


FOLLOW UP WITH ORTHOPEDICS TODAY FOR YOUR FOOT PAIN.


RETURN TO THE EMERGENCY DEPARTMENT FOR ANY WORSENING OF YOUR CONDITION; FEVER, 

YOU FEEL ILL, PAIN OR QUESTIONS OR CONCERNS.





The documentation as recorded by the Chu cardenas Benjamin accurately reflects 

the service I personally performed and the decisions made by me, Js Milian MD.

## 2017-05-26 NOTE — RAD
HISTORY: Fever



COMPARISONS: December 15, 2016



VIEWS:1: Single frontal portable view of the chest at 12:35 AM



FINDINGS:

LINES AND TUBES: None.

CARDIOMEDIASTINAL SILHOUETTE: The cardiomediastinal silhouette is normal for portable

technique.

PLEURA: The costophrenic angles are sharp. No pleural abnormalities are noted.

LUNG PARENCHYMA: There is minimal patchy alveolar opacification of the right lower lung

near the costophrenic angle.

ABDOMEN: The upper abdomen is clear. There is no subphrenic gas.

BONES AND SOFT TISSUES: No bone or soft tissue abnormalities are noted.



IMPRESSION: MINIMAL RIGHT BASILAR ATELECTASIS VERSUS CONSOLIDATION. RECOMMEND FOLLOW-UP

UNTIL RESOLUTION TO EXCLUDE UNDERLYING PULMONARY PARENCHYMAL PATHOLOGY

## 2017-06-05 NOTE — UC
Lower Extremity/Ankle HPI





- HPI Summary


HPI Summary: 





The patient comes in today for:





1.   Right leg pain and swelling:





Onset: Swelling started today.  The pain has been constant since the 22nd but 

it is worse today.  


Palliative/provocative: Nothing makes her symptoms better or worse.  


Quality: Ache


Region: Right ankle pressure


Severity: 7/10 at ankle but 5/10 back of the leg


Time: Constant.


Associated symptoms:


   Fevers: None


   Nausea and vomiting: Present.


   Dyspnea: None.


   Blood clotting problems: None.








*





- History of Current Complaint


Chief Complaint: UCLowerExtremity


Stated Complaint: LEG PAIN,SWELLING,VOMITING


Time Seen by Provider: 06/05/17 21:25


Hx Obtained From: Patient


Hx Last Menstrual Period: 5/12/17





- Allergies/Home Medications


Allergies/Adverse Reactions: 


 Allergies











Allergy/AdvReac Type Severity Reaction Status Date / Time


 


Heparin Allergy  See Comment Verified 06/05/17 20:30


 


Ropinirole [From Requip] Allergy  Hives Verified 06/05/17 20:30


 


Erythromycin AdvReac  "DOESN'T Verified 06/05/17 20:30





   WORK"  


 


Niacin AdvReac  Hives Verified 06/05/17 20:30


 


plastic tape Allergy  Blisters Uncoded 05/19/17 09:34














PMH/Surg Hx/FS Hx/Imm Hx


Previously Healthy: No - Peripheral vascular disease, peripheral neuropathy.  


Endocrine History: Diabetes, Dyslipidemia


Cardiovascular History: Cardiac Disease, Hypertension, Congestive Heart Failure


Respiratory History: Asthma


GI/ History: Gastroesophageal Reflux, Renal Disease - She is on peritoneal 

diaylsis.


Other History Of: Anticoagulant Therapy - Aspirin.


   Negative For: HIV, Hepatitis B, Hepatitis C





- Surgical History


Surgical History: Yes


Surgery Procedure, Year, and Place: LEFT EYE REMOVED 2012 - HAS PROSTHETIC EYE (

ORBITS DONE 5/2015 OK'D BY DR SAE COLE TO SCAN IN MRI) ;.  MELANOMA REMOVED 

FROM VULVA 2013 (PROCEDURE DONE 4X);.  CARDIAC CATH 2014 - NO STENTS;.  LEFT 

WRIST FISTULA PLACEMENT ([facility]) 2014;.  HEMODIALYSIS CATH RIGHT CHEST WALL 2014;.

  PERITONEAL DIALYSIS CATH 2/2015;.  CHEST WALL CATH REMOVAL 7/2016 [facility] [place];.  

RIGHT GREAT TOE AMPUTATION, [facility] 4/2016;.  PLACEMENT RIGHT JUGULAR TESIO 

HEMODIALYSIS CATHETER [facility] 8/27/2016;.  REMOVAL OF JUGULAR CATH - SEPT 2016.  

CARDIAC CATH - stentsx2 right leg





- Family History


Known Family History: Positive: Cardiac Disease - father MI at 43 y/o, 

Hypertension





- Social History


Occupation: Unemployed


Alcohol Use: None


Substance Use Type: None


Substance Use Comment - Amount & Last Used: using chantix to quit smoking


Smoking Status (MU): Current Every Day Smoker


Type: Cigarettes


Amount Used/How Often: 5 ciagrettes/day


Length of Time of Smoking/Using Tobacco: 17 YEARS


Have You Smoked in the Last Year: Yes


Household Exposure Type: Cigarettes





- Immunization History


Most Recent Influenza Vaccination: Fall 2016


Most Recent Tetanus Shot: 2014


Most Recent Pneumonia Vaccination: per pt: sometime in 2014





Review of Systems


Constitutional: Negative


Skin: Negative


Eyes: Negative


ENT: Negative


Respiratory: Negative, Cough


Cardiovascular: Negative


Gastrointestinal: Vomiting


Genitourinary: Negative


All Other Systems Reviewed And Are Negative: Yes





Physical Exam


Triage Information Reviewed: Yes


Appearance: No Pain Distress, Obese


Vital Signs: 


 Initial Vital Signs











Temp  98.3 F   06/05/17 20:29


 


Pulse  119   06/05/17 20:29


 


Resp  20   06/05/17 20:29


 


BP  146/77   06/05/17 20:29


 


Pulse Ox  93   06/05/17 20:29











Vital Signs Reviewed: Yes


Eyes: Positive: Conjunctiva Clear.  Negative: Discharge


ENT: Negative: Pharyngeal erythema, Nasal congestion, Nasal drainage, TM bulging

, TM dull, TM red, Tonsillar swelling, Tonsillar exudate


Dental: Negative: Gross Decay/Caries @, Dental Fracture @


Neck: Positive: Supple, Nontender, No Lymphadenopathy.  Negative: Nuchal 

Rigidity


Respiratory: Positive: Chest non-tender, Lungs clear, No respiratory distress, 

No accessory muscle use.  Negative: Crackles, Wheezing


Cardiovascular: Positive: RRR, No Murmur


Abdomen Description: Positive: Nontender, No Organomegaly, Soft.  Negative: 

Distended, Guarding


Musculoskeletal: Positive: Edema @, Other: - Right lower leg: from below the 

knee to the distal end of the foot, there is a cast. There is some overhand of 

the soft


Neurological: Positive: Alert


Psychological: Positive: Normal Response To Family, Age Appropriate Behavior, 

Consolable


Skin: Positive: Other - The patient's case was cut off.  The patient stated 

that her ankle and forefoot was swollen.  There was some discomfort of the calf 

area.  She stated that the case coming off helped the pain, but no ulcers were 

seen.  Color appeared good with no marked erythema or discharge.  The incision 

site was dry.





Lower Extremity Course/Dx





- Course


Course Of Treatment: Dr. Turner was called about the patient who told me that it 

was OK to cut off the cast.  She said to put on a splint, but the patient 

refused stating that the splint was more painful than the cast.  She only 

agreed to a dressing and ACE wrapping.  The patient was told to go to the ER to 

rule out DVT, but she refused. She was told that I want to put on the splint, 

but she refused.





- Differential Dx/Diagnosis


Provider Diagnoses: Right lower leg pain (rule out DVT)





Discharge





- Discharge Plan


Condition: Stable


Disposition: AGAINST MEDICAL ADVICE


Additional Instructions: 


If you are not going to the ER now, please at least reconsider if you get 

worse.  If you don't go to the ER later tonight, please call DR. Zaragoza's 

office in the AM tomorrow for an appointment.

## 2017-06-08 NOTE — ED
Stevie RICARDO Salem, scribed for Js Milian MD on 06/07/17 at 2124 .





Complex/Multi-Sys Presentation





- HPI Summary


HPI Summary: 





Patient is a 36 y/o F who presents to the ED with nausea and vomiting for the 

last few days. Pt has a hx of DM and states that her blood sugar level has been 

elevated for the last 2 days (above what her meter can read). She c/o CP, abd 

pain, chest congestion, cough, and shaking, but denies diarrhea or dysuria. 

PMHx of asthma. 





- History Of Current Complaint


Chief Complaint: EDNauseaVomitDiarrh


Time Seen by Provider: 06/07/17 21:01


Hx Obtained From: Patient


Onset/Duration: Gradual Onset, Lasting Days, Still Present


Timing: Constant


Severity Currently: Moderate


Severity Initially: Moderate


Location: Pain At: - Abd, chest, and foot.


Aggravating Factor(s): Nothing.


Alleviating Factor(s): Nothing.


Associated Signs And Symptoms: Positive: Nausea, Vomiting, Abdominal Pain.  

Negative: Diarrhea, Dysuria





- Allergies/Home Medications


Allergies/Adverse Reactions: 


 Allergies











Allergy/AdvReac Type Severity Reaction Status Date / Time


 


Heparin Allergy  See Comment Verified 06/05/17 20:30


 


Ropinirole [From Requip] Allergy  Hives Verified 06/05/17 20:30


 


Erythromycin AdvReac  "DOESN'T Verified 06/05/17 20:30





   WORK"  


 


Niacin AdvReac  Hives Verified 06/05/17 20:30


 


plastic tape Allergy  Blisters Uncoded 05/19/17 09:34














PMH/Surg Hx/FS Hx/Imm Hx


Endocrine/Hematology History: Reports: Hx Anticoagulant Therapy - Aspirin., Hx 

Blood Transfusions, Hx Diabetes - insulin control, Hx Thyroid Disease - nodule 

biopsied, normal, Hx Anemia - hx of - reports had tranfusion in 2014 after mi


Cardiovascular History: Reports: Hx Angina, Hx Cardiac Arrest - in Dec. 2014 

with CPR, Hx Coronary Artery Disease, Hx Hypercholesterolemia, Hx Hypertension 

- on med, Hx Myocardial Infarction, Other Cardiovascular Problems/Disorders - 

hyperlipidemia


   Denies: Hx Congestive Heart Failure, Hx Deep Vein Thrombosis, Hx Pacemaker/

ICD, Hx Valvular Heart Disease


Respiratory History: Reports: Hx Asthma - inhaler, Hx Chronic Obstructive 

Pulmonary Disease (COPD) - smoker, Hx Sleep Apnea - pt reports md thinks, but 

no official testing done yet, Other Respiratory Problems/Disorders - acute 

respipatory failure with hypoxia - dec 2016


   Denies: Hx Lung Cancer, Hx Pneumonia, Hx Pulmonary Embolism


GI History: Reports: Hx Gastroesophageal Reflux Disease - on med, Other GI 

Disorders - GASTROPARESIS - TAKING OMEPRAZOLE


   Denies: Hx Gall Bladder Disease, Hx Gastrointestinal Bleed, Hx Ulcer, Hx 

Urosepsis


 History: Reports: Hx Chronic Renal Failure, Hx Dialysis - PERITONEAL BUT 

DOES HAVE FISTULA ON LEFT ARM, Hx Kidney Stones - in past, Hx Renal Disease - 

ESRD on home peritoneal dialysis , Other  Problems/Disorders - ESRD- URINATES 

A SMALL AMOUNT


Musculoskeletal History: Reports: Hx Arthritis - back, hips, Hx Back Problems - 

Sciatica and Herniated L3 disk


Sensory History: Reports: Hx Eye Prosthesis - left, Hx Legally Blind, Hx Vision 

Problem


   Denies: Hx Contacts or Glasses, Hx Hearing Aid


Opthamlomology History: Reports: Hx Eye Prosthesis - left, Hx Legally Blind, Hx 

Vision Problem


   Denies: Hx Contacts or Glasses


Neurological History: Reports: Other Neuro Impairments/Disorders - neuropathy


   Denies: Hx Transient Ischemic Attacks (TIA)


Psychiatric History: Reports: Hx Anxiety - on med, Hx Depression - on med, Hx 

Bipolar Disorder


   Denies: Hx Panic Disorder, Hx Schizophrenia





- Cancer History


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


Hx Chemotherapy: No


Hx Radiation Therapy: No





- Surgical History


Surgery Procedure, Year, and Place: LEFT EYE REMOVED 2012 - HAS PROSTHETIC EYE (

ORBITS DONE 5/2015 OK'D BY DR SAE COLE TO SCAN IN MRI) ;.  MELANOMA REMOVED 

FROM V[facility]VA 2013 (PROCEDURE DONE 4X);.  CARDIAC CATH 2014 - NO STENTS;.  LEFT 

WRIST FISTULA PLACEMENT ([facility]) 2014;.  HEMODIALYSIS CATH RIGHT CHEST WALL 2014;.

  PERITONEAL DIALYSIS CATH 2/2015;.  [place] [facility] [place]  

[place] [facility] [place].  PLACEMENT RIGHT JUGULAR TESIO 

[place]IS [place] [facility] [place];.  REMOVAL OF JUGULAR CATH - SEPT 2016.  

CARDIAC CATH - stentsx2 right leg


Hx Anesthesia Reactions: No





- Immunization History


Date of Tetanus Vaccine: UTD


Date of Influenza Vaccine: 8/15/16


Infectious Disease History: No


Infectious Disease History: Reports: Hx of Known/Suspected MRSA - MRSA R 1st toe


   Denies: Hx Clostridium Difficile, Hx Hepatitis, Hx Human Immunodeficiency 

Virus (HIV), Hx Shingles, Hx Tuberculosis, History Other Infectious Disease, 

Traveled Outside the [place] in Last 30 Days





- Family History


Known Family History: Positive: Cardiac Disease - father MI at 43 y/o, 

Hypertension





- Social History


Alcohol Use: None


Hx Substance Use: No


Substance Use Type: Reports: None


Substance Use Comment - Amount & Last Used: using chantix to quit smoking


Hx Tobacco Use: Yes


Smoking Status (MU): Current Every Day Smoker


Type: Cigarettes


Amount Used/How Often: 5 ciagrettes/day


Length of Time of Smoking/Using Tobacco: 17 YEARS


Have You Smoked in the Last Year: Yes





Review of Systems


Positive: Other - Shaking. 


Positive: Chest Pain, Other - Elevated blood sugar. 


Positive: Cough, Other - Chest congestion. 


Positive: Abdominal Pain, Vomiting, Nausea.  Negative: Diarrhea


Negative: dysuria


Positive: Other - Foot pain, right - s/p partial amputation. 


All Other Systems Reviewed And Are Negative: Yes





Physical Exam


Triage Information Reviewed: Yes


Vital Signs On Initial Exam: 


 Initial Vitals











Temp Pulse Resp BP Pulse Ox


 


 98.4 F   115   20   149/82   97 


 


 06/07/17 20:22  06/07/17 20:22  06/07/17 20:22  06/07/17 20:22  06/07/17 20:22











Vital Signs Reviewed: Yes


Appearance: Positive: No Pain Distress, Ill-Appearing - Mild., Obese


Skin: Positive: Warm, Skin Color Reflects Adequate Perfusion, Dry


Head/Face: Positive: Normal Head/Face Inspection


Eyes: Positive: EOMI, VIOLETTA


ENT: Positive: Other - DMM.


Neck: Positive: Supple, Nontender


Respiratory/Lung Sounds: Positive: Breath Sounds Present, Rhonchi - Bilat., 

Wheezes - Bilat.


Cardiovascular: Positive: RRR


Abdomen Description: Positive: Nontender, Soft


Bowel Sounds: Positive: Present


Musculoskeletal: Positive: Strength/ROM Intact, Other - Partial right foot 

amputation in dressing.


Neurological: Positive: Normal, Sensory/Motor Intact, Alert, Oriented to Person 

Place, Time


Psychiatric: Positive: Affect/Mood Appropriate





- [place] Coma Scale


Coma Scale Total: 15





Diagnostics





- Vital Signs


 Vital Signs











  Temp Pulse Resp BP Pulse Ox


 


 06/07/17 20:51  98.1 F  107  16  168/92  92


 


 06/07/17 20:22  98.4 F  115  20  149/82  97














- Laboratory


Lab Results: 


 Lab Results











  06/07/17 06/07/17 06/07/17 Range/Units





  22:00 22:00 22:00 


 


WBC   11.6 H   (3.5-10.8)  10^3/ul


 


RBC   4.18   (4.0-5.4)  10^6/ul


 


Hgb   12.0   (12.0-16.0)  g/dl


 


Hct   37   (35-47)  %


 


MCV   89   (80-97)  fL


 


MCH   29   (27-31)  pg


 


MCHC   32   (31-36)  g/dl


 


RDW   15   (10.5-15)  %


 


Plt Count   340   (150-450)  10^3/ul


 


MPV   9   (7.4-10.4)  um3


 


Neut % (Auto)   80.4   (38-83)  %


 


Lymph % (Auto)   12.4 L   (25-47)  %


 


Mono % (Auto)   4.7   (1-9)  %


 


Eos % (Auto)   1.8   (0-6)  %


 


Baso % (Auto)   0.7   (0-2)  %


 


Absolute Neuts (auto)   9.4 H   (1.5-7.7)  10^3/ul


 


Absolute Lymphs (auto)   1.4   (1.0-4.8)  10^3/ul


 


Absolute Monos (auto)   0.5   (0-0.8)  10^3/ul


 


Absolute Eos (auto)   0.2   (0-0.6)  10^3/ul


 


Absolute Basos (auto)   0.1   (0-0.2)  10^3/ul


 


Absolute Nucleated RBC   0   10^3/ul


 


Nucleated RBC %   0   


 


INR (Anticoag Therapy)  1.11    (0.89-1.11)  


 


APTT  36.5 H    (26.0-36.3)  seconds


 


Sodium    129 L  (133-145)  mmol/L


 


Potassium    3.6  (3.5-5.0)  mmol/L


 


Chloride    91 L  (101-111)  mmol/L


 


Carbon Dioxide    26  (22-32)  mmol/L


 


Anion Gap    12 H  (2-11)  mmol/L


 


BUN    26 H  (6-24)  mg/dL


 


Creatinine    4.66 H  (0.51-0.95)  mg/dL


 


Est GFR ( Amer)    13.6  (>60)  


 


Est GFR (Non-Af Amer)    10.6  (>60)  


 


BUN/Creatinine Ratio    5.6 L  (8-20)  


 


Glucose    566 H*  ()  mg/dL


 


Lactic Acid     (0.5-2.0)  mmol/L


 


Calcium    9.5  (8.6-10.3)  mg/dL


 


Magnesium    1.4 L  (1.9-2.7)  mg/dL


 


Total Bilirubin    0.30  (0.2-1.0)  mg/dL


 


AST    7 L  (13-39)  U/L


 


ALT    9  (7-52)  U/L


 


Alkaline Phosphatase    90  ()  U/L


 


Total Creatine Kinase    148  ()  U/L


 


CK-MB (CK-2)    7.7 H  (0.6-6.3)  ng/mL


 


Troponin I    0.03  (<0.04)  ng/mL


 


C-Reactive Protein    27.88 H  (< 5.00)  mg/L


 


Total Protein    6.7  (6.4-8.9)  g/dL


 


Albumin    3.3  (3.2-5.2)  g/dL


 


Globulin    3.4  (2-4)  g/dL


 


Albumin/Globulin Ratio    1.0  (1-3)  


 


Lipase    38  (11.0-82.0)  U/L


 


TSH    0.68  (0.34-5.60)  mcIU/mL


 


Beta HCG, Quant    0.77  mIU/mL














  06/07/17 Range/Units





  22:00 


 


WBC   (3.5-10.8)  10^3/ul


 


RBC   (4.0-5.4)  10^6/ul


 


Hgb   (12.0-16.0)  g/dl


 


Hct   (35-47)  %


 


MCV   (80-97)  fL


 


MCH   (27-31)  pg


 


MCHC   (31-36)  g/dl


 


RDW   (10.5-15)  %


 


Plt Count   (150-450)  10^3/ul


 


MPV   (7.4-10.4)  um3


 


Neut % (Auto)   (38-83)  %


 


Lymph % (Auto)   (25-47)  %


 


Mono % (Auto)   (1-9)  %


 


Eos % (Auto)   (0-6)  %


 


Baso % (Auto)   (0-2)  %


 


Absolute Neuts (auto)   (1.5-7.7)  10^3/ul


 


Absolute Lymphs (auto)   (1.0-4.8)  10^3/ul


 


Absolute Monos (auto)   (0-0.8)  10^3/ul


 


Absolute Eos (auto)   (0-0.6)  10^3/ul


 


Absolute Basos (auto)   (0-0.2)  10^3/ul


 


Absolute Nucleated RBC   10^3/ul


 


Nucleated RBC %   


 


INR (Anticoag Therapy)   (0.89-1.11)  


 


APTT   (26.0-36.3)  seconds


 


Sodium   (133-145)  mmol/L


 


Potassium   (3.5-5.0)  mmol/L


 


Chloride   (101-111)  mmol/L


 


Carbon Dioxide   (22-32)  mmol/L


 


Anion Gap   (2-11)  mmol/L


 


BUN   (6-24)  mg/dL


 


Creatinine   (0.51-0.95)  mg/dL


 


Est GFR ( Amer)   (>60)  


 


Est GFR (Non-Af Amer)   (>60)  


 


BUN/Creatinine Ratio   (8-20)  


 


Glucose   ()  mg/dL


 


Lactic Acid  2.6 H*  (0.5-2.0)  mmol/L


 


Calcium   (8.6-10.3)  mg/dL


 


Magnesium   (1.9-2.7)  mg/dL


 


Total Bilirubin   (0.2-1.0)  mg/dL


 


AST   (13-39)  U/L


 


ALT   (7-52)  U/L


 


Alkaline Phosphatase   ()  U/L


 


Total Creatine Kinase   ()  U/L


 


CK-MB (CK-2)   (0.6-6.3)  ng/mL


 


Troponin I   (<0.04)  ng/mL


 


C-Reactive Protein   (< 5.00)  mg/L


 


Total Protein   (6.4-8.9)  g/dL


 


Albumin   (3.2-5.2)  g/dL


 


Globulin   (2-4)  g/dL


 


Albumin/Globulin Ratio   (1-3)  


 


Lipase   (11.0-82.0)  U/L


 


TSH   (0.34-5.60)  mcIU/mL


 


Beta HCG, Quant   mIU/mL











Result Diagrams: 


 06/07/17 22:00





 06/07/17 22:00


Lab Statement: Any lab studies that have been ordered have been reviewed, and 

results considered in the medical decision making process.





- Radiology


  ** CXR


Radiology Interpretation Completed By: Radiologist - IMPRESSION:  NO EVIDENCE 

FOR ACUTE DISEASE.





- EKG


  ** 2151


EKG Rhythm: Sinus Tachycardia - @ 106bpm


Ectopy: None


EKG Interpretation: Early repolarization. No significant changes from EKG on 05/ 12/17.





- Additional Comments


Diagnostic Additional Comments: 





Glucose: 566


Lactic acid: 2.6





Complex Multi-Symp Course/Dx


Course Of Treatment: NO CRITICAL CARE TIME.  ADMIT HOSPITALIST STABLE.





- Diagnoses


Provider Diagnoses: 


 Intractable nausea and vomiting








- Physician Notifications


Discussed Care Of Patient With: Eugene Faye


Time Discussed With Above Provider: 23:07


Instructed by Provider To: Admit As Inpatient


Admit/Transition Orders Completed By ED Provider: Yes





Discharge





- Discharge Plan


Condition: Stable


Disposition: ADMITTED TO [facility] documentation as recorded by the Stevie cardenas [place] accurately reflects 

the service I personally performed and the decisions made by me, Js Milian MD.

## 2017-06-08 NOTE — HP
CC:  Dr. Shanks *

 

HISTORY AND PHYSICAL:

 

DATE OF ADMISSION:  06/08/17

 

PRIMARY CARE PHYSICIAN:  Yeni Shanks MD

 

CHIEF COMPLAINT:  Intractable nausea and vomiting.

 

HISTORY OF PRESENT ILLNESS:  The patient is a 37-year-old woman who says for 
the last couple of days she started having nausea.  It came out of nowhere.  
She started vomiting as well.  She could not keep anything down.  She then 
decided to hold her pain meds recently because we thought it might be related 
to that, but it did not get any better.  She notes that a couple of weeks ago, 
she just had part of right foot amputated and was placed on 2 antibiotics and 
is almost finished with them.  Yesterday she had her foot checked and some 
sutures removed and there was no evidence of infection at that time.  She has 
had abdominal pain, but she says this is related to the nausea and vomiting.  
The abdominal pain is diffuse and is a 6/10 in severity.  She has had no 
diarrhea.  She had chills, but no fever.

 

PAST MEDICAL HISTORY:  Significant for end-stage renal disease requiring 
peritoneal dialysis, melanoma, diabetes, neuropathy, coronary artery disease, 
hypertension, hyperlipidemia,  peripheral vascular disease, asthma, GERD, 
depression, and anxiety.

 

PAST SURGICAL HISTORY:  Significant for heart catheterization, peritoneal 
dialysis catheter placement, skin excisions, right great toe amputation, right 
partial foot amputation, and left eye prosthesis.

 

CURRENT MEDICATIONS:  Are as follows:

1.  Levaquin 500 mg every other day.

2.  ______ mg every 6 hours as needed.

3.  Gabapentin 300 mg 3 times a day.

4.  Bisoprolol 10 mg in the evening.

5.  Lipitor 80 mg in the evening.

6.  Aspirin 81 mg daily.

7.  Eliquis 5 mg twice daily.

8.  Albuterol inhaler 2 puffs every 6 hours as needed.

9.  Omeprazole 20 mg in the evening.

10.  Metoclopramide 10 mg every 6 hours as needed.

11.  Losartan 100 mg in the evening.

12.  Levaquin 500 mg every 48 hours.

13.  Imdur 60 mg in the evening.

14.  Lantus insulin 80 units subcu twice daily.

15.  Aspartate insulin as directed.

16.  Metronidazole 500 mg 3 times a day.

17.  Amlodipine 10 mg in the evening.

 

ALLERGIES:  Allergies to medications include TAPE, CODEINE, REQUIP, ERYTHROMYCIN
, and NIACIN.

 

FAMILY HISTORY:  Mother and father both diabetic.

 

SOCIAL HISTORY:  One pack a day smoker for 20 years.  No alcohol.  She has a 
fiance, who is also her surrogate decision maker.

 

REVIEW OF SYSTEMS:  A 14-point review of systems was completed with the 
patient. All pertinent positives and negatives are in the history of present 
illness, otherwise it is negative.

 

                               PHYSICAL EXAMINATION

 

GENERAL:  A pleasant woman lying in bed, in no acute distress.

 

VITAL SIGNS:  Temperature 98.1 degrees, heart rate 107 beats per minute, 
respiratory rate 16 breaths per minute, pulse ox 92%, and blood pressure 168/92.

 

HEENT:  Normocephalic and atraumatic.  Pupils are equal, round and reactive to 
light.  Dry mucous membranes.

 

NECK:  Supple.  No JVD, bruits, palpable thyroid, or lymphadenopathy.

 

CHEST:  Clear to auscultation and percussion.

 

CARDIOVASCULAR:  S1, S2 appreciated.

 

ABDOMEN:  Positive bowel sounds in all 4 quadrants.  Soft.

 

EXTREMITIES:  No cyanosis, clubbing, or edema; +2 peripheral pulses 
bilaterally. She has got a dressing on her right foot that looks clean.

 

NEUROLOGIC:  Alert and oriented x3.  Moves all extremities.

 

SKIN:  No distinct rashes or abnormalities.

 

 DIAGNOSTIC STUDIES/LAB DATA:  White count 11.6, hemoglobin 12.0, hematocrit 37
, and platelets 340.  Sodium 129, potassium 3.6, chloride 91, CO2 of 26, BUN 26
, creatinine 4.6, and glucose is 566.  Lactic acid is 2.6.  INR is 1.11.

 

Chest x-ray, no evidence for acute disease.

 

EKG shows sinus tachycardia 106 beats per minute, normal axis, LVH, and no 
acute ST- T wave changes.

 

ASSESSMENT AND PLAN:

1. Intractable nausea and vomiting:  I will carefully rehydrate the patient 
with 75 cc of normal saline an hour, especially she is on peritoneal dialysis.  
I will give her Zofran p.r.n. for nausea.  I will give her Dilaudid for her 
pain.  I am not quite clear why she has nausea and vomiting, but I suspect it 
could be from her antibiotics.  I held them for now, she has almost finished 
with her treatment and the wound appears clean, dry, and intact.

2.  Diabetes mellitus:  Continue Lantus, placed on fingersticks with sliding 
scale insulin.

3.  Deep vein thrombosis:  Continue Eliquis.

4.  Hypertension.  I will hold her losartan with her acute kidney injury and 
rehydrating her as above.  Check basic metabolic profile in the morning.

5.  Gastroesophageal reflux disease:  Continue PPI.

6.  Hyperlipidemia:  Stable.  Continue Lipitor.

7.  FEN:  Consistent carb diet.

8.  DVT prophylaxis:  She is on Eliquis.

9.  Code status:  The patient is a full code.

 

TIME SPENT:  Over 80 minutes was spent on this H and P; more than 45 minutes of 
which was spent in direct face-to-face contact with the patient, evaluation, 
physical exam, counseling, and coordination of care.

 

[id number]/CPS #: [id number]

MTDD

## 2017-06-09 NOTE — DS
CC:  Yeni Shanks MD; Dr. Zaragoza *

 

DISCHARGE SUMMARY:

 

DATE OF ADMISSION:  17

 

DATE OF DISCHARGE:  17

 

PRIMARY CARE PROVIDER:  Yeni Shanks MD

 

ORTHOPEDIC SURGEON:  Dr. Zaragoza.

 

CONSULTING INFECTIOUS DISEASE SPECIALIST:  Dr. Felton.

 

DISCHARGING PROVIDER:  SHELLEY Bertrand

 

SUPERVISING PHYSICIAN:  Karsten Tan MD * (DICTATED BY SHELLEY BERTRAND)

 

PRIMARY DISCHARGE DIAGNOSES:

1.  Intractable nausea and vomiting likely secondary to Flagyl use.

2.  Hyperglycemia in an insulin-dependent diabetic with a hyperosmolar and 
hypovolemic state, but without acidosis.

 

SECONDARY DISCHARGE DIAGNOSES:

1.  Poorly controlled insulin-dependent diabetes.

2.  History of deep vein thrombosis, anticoagulated with Eliquis.

3.  End-stage renal disease, on peritoneal dialysis.

4.  Peripheral neuropathy.

5.  Coronary artery disease.

6.  Hypertension.

7.  Hyperlipidemia.

8.  Peripheral vascular disease.

9.  Asthma.

10.  Gastroesophageal reflux disease.

11.  Depression and anxiety.

 

DISCHARGE MEDICATIONS:

1.  Dilaudid 2 mg p.o. q.6 hours as needed for pain.

2.  Levaquin 500 mg p.o. q.48 hours for 1 additional week with instructions to 
continue through  as previously prescribed.

3.  Eliquis 5 mg p.o. twice daily.

4.  Albuterol inhaler 2 puffs inhaled q.6 hours as needed for shortness of 
breath.

5.  Aspirin 81 mg p.o. daily.

6.  Atorvastatin 80 mg p.o. at bedtime.

7.  Bisoprolol 10 mg p.o. at bedtime.

8.  Gabapentin 300 mg p.o. t.i.d.

9.  NovoLog on a sliding scale at meal time.

10.  Lantus 80 units twice daily.

11.  Isosorbide 60 mg p.o. daily.

12.  Losartan 100 mg p.o. daily.

13.  Reglan 10 mg p.o. q.6 hours before meals.

14.  Omeprazole 20 mg p.o. daily.

15.  Zofran 4 mg p.o. q.6 hours as needed.

16.  Amlodipine 10 mg p.o. at bedtime.

 

MEDICATION CHANGES:

1.  Stop Flagyl.

2.  Start p.r.n. Zofran.

 

HOSPITAL IMAGIN.  Chest x-ray shows no acute process.

2.  EKG shows normal sinus rhythm.

 

HOSPITAL COURSE:  This is a 37-year-old female with poorly controlled insulin- 
dependent diabetes and multiple complications as a result of this including end
- stage renal disease requiring peritoneal dialysis and recent transmetatarsal 
amputation by Dr. Zaragoza.  The patient was discharged from the hospital just 
about 2 weeks ago on the 24th of May following her amputation with instructions 
to take 21 days of Flagyl and Levaquin.  The patient was initially casted and 
seen by Dr. Zaragoza on Tuesday of this week who took off the cast and thought 
that the incision sites looked well and applied a dry dressing at that time.  
The patient is now using a boot for pressure release.

 

The patient has been intermittently nauseated since leaving the hospital, but 
symptoms have been manageable up until yesterday when she had intractable 
nausea and vomiting, which required her to be seen in the emergency department 
and was subsequently admitted for this.  When she reached the emergency 
department, glucose was 566, sodium of 129, lactic acid of 2.6.  CRP was 
moderately elevated at 27. She had a white blood cell count of 11,600.  The 
patient states that she has been weary of taking her NovoLog as she has been 
feeling too nauseated to eat a significant amount and because of this she has 
been seeing frequent high glucose numbers.  The patient was not acidotic; 
however, serum bicarb at the time of admission was 26.

 

The patient was subsequently hydrated with normal saline and supportive 
measures given for her GI complaints.  She had no tenderness to palpation on 
abdominal exam and no associated diarrhea.  Her antibiotics were initially held.

 

Discussed the antibiotic use with orthopaedic surgeon, Dr. Zaragoza as well as 
infectious disease specialist, Dr. Felton.  The incision site was examined, 
which looks clean and free of infection.  Dr. Felton agreed to discontinue 
the Flagyl and recommended continuing the Levaquin for an additional week as 
previously prescribed.  The patient was agreeable with this plan.

 

At the time of discharge, she was having some occasional abdominal cramping, 
but tolerating a full diet well and her nausea and vomiting had subsided.

 

DISPOSITION AND FOLLOWUP PLAN:  The patient has been discharged to home.

 

RECOMMENDATIONS:  Recommendations from Dr. Zaragoza are to apply a clean dry 
dressing on a daily basis.  She is to keep her next follow up appointment with 
Dr. Zaragoza as previously scheduled.  Her diabetes continues to be poorly 
controlled, recommend that continue to use her NovoLog at meal times and her 
full dose of Lantus as prescribed and follow up closely with her primary care 
provider.  As mentioned above, Flagyl has been discontinued and Levaquin 
continued for an additional week.

 

____________________________________ SHELLEY BERTRAND

 

[id number] #: [id number]

SAEED

## 2017-07-06 NOTE — ED
Gabriele RICARDO Rebecca, scribed for Varghese Bal MD on 07/06/17 at 2200 .





Lower Extremity





- HPI Summary


HPI Summary: 


Pt is a 38 y/o F who presents to ED c/o R foot pain. Pain began yesterday s/p 

Dr. Zaragoza cleaning wound from partial amputation on 5/22 and has been constant 

since onset. Pain is currently severe, ranked 9/10 and characterized as burning 

and stabbing. Sx unchanged by Tramadol and Percocet. PMHx DM. 





- History of Current Complaint


Chief Complaint: EDGeneral


Stated Complaint: RIGHT FOOT PAIN/ CHEST CONGESTION


Time Seen by Provider: 07/06/17 21:54


Hx Obtained From: Patient


Hx Last Menstrual Period: 5/12/17


Onset of Pain: Days - Yesterday


Onset/Duration: Still Present


Severity Currently: Severe


Pain Intensity: 9


Pain Scale Used: 0-10 Numeric


Timing: Constant


Location: Is Discrete @ - R foot


Character Of Pain: Sharp, Burning


Associated Signs And Symptoms: Positive: Negative





- Allergies/Home Medications


Allergies/Adverse Reactions: 


 Allergies











Allergy/AdvReac Type Severity Reaction Status Date / Time


 


Heparin Allergy  See Comment Verified 07/06/17 20:28


 


Ropinirole [From Requip] Allergy  Hives Verified 07/06/17 20:28


 


Erythromycin AdvReac  "DOESN'T Verified 07/06/17 20:28





   WORK"  


 


Niacin AdvReac  Hives Verified 07/06/17 20:28


 


plastic tape Allergy  Blisters Uncoded 07/06/17 20:28














PMH/Surg Hx/FS Hx/Imm Hx


Endocrine/Hematology History: Reports: Hx Anticoagulant Therapy - Aspirin., Hx 

Blood Transfusions, Hx Diabetes - insulin control, Hx Thyroid Disease - nodule 

biopsied, normal, Hx Anemia - hx of - reports had tranfusion in 2014 after mi


Cardiovascular History: Reports: Hx Angina, Hx Cardiac Arrest - in Dec. 2014 

with CPR, Hx Cardiomegaly, Hx Coronary Artery Disease, Hx Hypercholesterolemia, 

Hx Hypertension - on med, Hx Myocardial Infarction, Other Cardiovascular 

Problems/Disorders - hyperlipidemia


   Denies: Hx Congestive Heart Failure, Hx Deep Vein Thrombosis, Hx Pacemaker/

ICD, Hx Valvular Heart Disease


Respiratory History: Reports: Hx Asthma - inhaler, Hx Chronic Obstructive 

Pulmonary Disease (COPD) - smoker, Hx Sleep Apnea - pt reports md thinks, but 

no official testing done yet, Other Respiratory Problems/Disorders - acute 

respipatory failure with hypoxia - dec 2016


   Denies: Hx Lung Cancer, Hx Pneumonia, Hx Pulmonary Embolism


GI History: Reports: Hx Gastroesophageal Reflux Disease - on med, Other GI 

Disorders - GASTROPARESIS - TAKING OMEPRAZOLE


   Denies: Hx Gall Bladder Disease, Hx Gastrointestinal Bleed, Hx Ulcer, Hx 

Urosepsis


 History: Reports: Hx Acute Renal Failure, Hx Chronic Renal Failure, Hx 

Dialysis - PERITONEAL BUT DOES HAVE FISTULA ON LEFT ARM, Hx Kidney Stones - in 

past, Hx Renal Disease - ESRD on home peritoneal dialysis , Other  Problems/

Disorders - ESRD- URINATES A SMALL AMOUNT


Musculoskeletal History: Reports: Hx Arthritis - back, hips, Hx Back Problems - 

Sciatica and Herniated L3 disk


Sensory History: Reports: Hx Eye Prosthesis - left, Hx Legally Blind, Hx Vision 

Problem


   Denies: Hx Contacts or Glasses, Hx Hearing Aid


Opthamlomology History: Reports: Hx Eye Prosthesis - left, Hx Legally Blind, Hx 

Vision Problem


   Denies: Hx Contacts or Glasses


Neurological History: Reports: Other Neuro Impairments/Disorders - neuropathy


   Denies: Hx Transient Ischemic Attacks (TIA)


Psychiatric History: Reports: Hx Anxiety - on med, Hx Depression - on med, Hx 

Bipolar Disorder


   Denies: Hx Panic Disorder, Hx Schizophrenia





- Cancer History


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


Hx Chemotherapy: No


Hx Radiation Therapy: No





- Surgical History


Surgery Procedure, Year, and Place: LEFT EYE REMOVED 2012 - HAS PROSTHETIC EYE (

ORBITS DONE 5/2015 OK'D BY DR SAE COLE TO SCAN IN MRI) ;.  MELANOMA REMOVED 

FROM [facility] 2013 (PROCEDURE DONE 4X);.  CARDIAC CATH 2014 - NO STENTS;.  LEFT 

WRIST FISTULA PLACEMENT ([facility]) 2014;.  HEMODIALYSIS CATH RIGHT CHEST WALL 2014;.

  PERITONEAL DIALYSIS CATH 2/2015;.  CHEST WALL CATH REMOVAL 7/2016 [facility] [place];.  

RIGHT GREAT TOE AMPUTATION, [facility] [place] 4/2016;.  PLACEMENT RIGHT JUGULAR TESIO 

HEMODIALYSIS CATHETER [facility] [place] 8/27/2016;.  REMOVAL OF JUGULAR CATH - SEPT 2016.  

CARDIAC CATH - stentsx2 right leg


Hx Anesthesia Reactions: No





- Immunization History


Date of Tetanus Vaccine: UTD


Date of Influenza Vaccine: 8/15/16


Infectious Disease History: No


Infectious Disease History: Reports: Hx of Known/Suspected MRSA - MRSA R 1st toe


   Denies: Hx Clostridium Difficile, Hx Hepatitis, Hx Human Immunodeficiency 

Virus (HIV), Hx Shingles, Hx Tuberculosis, History Other Infectious Disease, 

Traveled Outside the [place] in Last 30 Days





- Family History


Known Family History: Positive: Cardiac Disease - father MI at 41 y/o, 

Hypertension





- Social History


Alcohol Use: None


Hx Substance Use: No


Substance Use Type: Reports: None


Substance Use Comment - Amount & Last Used: using chantix to quit smoking


Hx Tobacco Use: Yes


Smoking Status (MU): Current Every Day Smoker


Type: Cigarettes


Amount Used/How Often: 5 ciagrettes/day


Length of Time of Smoking/Using Tobacco: 17 YEARS


Have You Smoked in the Last Year: Yes





Review of Systems


Negative: Fever


Positive: Arthralgia - R foot pain


All Other Systems Reviewed And Are Negative: Yes





Physical Exam


Triage Information Reviewed: Yes


Vital Signs On Initial Exam: 


 Initial Vitals











Temp Pulse Resp BP Pulse Ox


 


 97.5 F   104   20   162/106   98 


 


 07/06/17 20:28  07/06/17 20:28  07/06/17 20:28  07/06/17 20:28  07/06/17 20:28











Vital Signs Reviewed: Yes


Appearance: Positive: Well-Appearing, Pain Distress - mild discomfort


Skin: Positive: Warm


Head/Face: Positive: Normal Head/Face Inspection


Eyes: Positive: VIOLETTA


ENT: Positive: Hearing grossly normal


Neck: Positive: Supple


Respiratory/Lung Sounds: Positive: Breath Sounds Present


Cardiovascular: Positive: RRR


Psychiatric: Positive: Anxious





- Dalia Coma Scale


Coma Scale Total: 15





Diagnostics





- Vital Signs


 Vital Signs











  Temp Pulse Resp BP Pulse Ox


 


 07/06/17 20:28  97.5 F  104  20  162/106  98














- Laboratory


Lab Results: 


 Lab Results











  07/06/17 07/06/17 Range/Units





  22:12 22:12 


 


WBC  10.3   (3.5-10.8)  10^3/ul


 


RBC  4.70   (4.0-5.4)  10^6/ul


 


Hgb  14.4   (12.0-16.0)  g/dl


 


Hct  44   (35-47)  %


 


MCV  94   (80-97)  fL


 


MCH  31   (27-31)  pg


 


MCHC  33   (31-36)  g/dl


 


RDW  16 H   (10.5-15)  %


 


Plt Count  282   (150-450)  10^3/ul


 


MPV  9   (7.4-10.4)  um3


 


Neut % (Auto)  79.2   (38-83)  %


 


Lymph % (Auto)  13.3 L   (25-47)  %


 


Mono % (Auto)  4.6   (1-9)  %


 


Eos % (Auto)  2.2   (0-6)  %


 


Baso % (Auto)  0.7   (0-2)  %


 


Absolute Neuts (auto)  8.2 H   (1.5-7.7)  10^3/ul


 


Absolute Lymphs (auto)  1.4   (1.0-4.8)  10^3/ul


 


Absolute Monos (auto)  0.5   (0-0.8)  10^3/ul


 


Absolute Eos (auto)  0.2   (0-0.6)  10^3/ul


 


Absolute Basos (auto)  0.1   (0-0.2)  10^3/ul


 


Absolute Nucleated RBC  0.01   10^3/ul


 


Nucleated RBC %  0.1   


 


Sodium   126 L  (133-145)  mmol/L


 


Potassium   4.8  (3.5-5.0)  mmol/L


 


Chloride   92 L  (101-111)  mmol/L


 


Carbon Dioxide   26  (22-32)  mmol/L


 


Anion Gap   8  (2-11)  mmol/L


 


BUN   22  (6-24)  mg/dL


 


Creatinine   3.36 H  (0.51-0.95)  mg/dL


 


Est GFR ( Amer)   19.8  (>60)  


 


Est GFR (Non-Af Amer)   15.4  (>60)  


 


BUN/Creatinine Ratio   6.5 L  (8-20)  


 


Glucose   631 H*  ()  mg/dL


 


Calcium   8.9  (8.6-10.3)  mg/dL


 


Total Bilirubin   0.30  (0.2-1.0)  mg/dL


 


AST   13  (13-39)  U/L


 


ALT   7  (7-52)  U/L


 


Alkaline Phosphatase   192 H  ()  U/L


 


Total Protein   6.4  (6.4-8.9)  g/dL


 


Albumin   3.2  (3.2-5.2)  g/dL


 


Globulin   3.2  (2-4)  g/dL


 


Albumin/Globulin Ratio   1.0  (1-3)  











Result Diagrams: 


 07/06/17 22:12





 07/06/17 22:12


Lab Statement: Any lab studies that have been ordered have been reviewed, and 

results considered in the medical decision making process.





- Radiology


  ** R Foot XR


Xray Interpretation: No Acute Changes - Status post amputation. No obvious 

acute abnormalities.


Radiology Interpretation Completed By: ED Physician





Re-Evaluation





- Re-Evaluation


  ** First Eval


Comment: gown found on bed,  pt presumerd to have walked out of ed prior to 

completion of evaluation





Lower Extremity Course/Dx





- Course


Assessment/Plan: Pt is a 38 y/o F who presents to ED c/o severe burning, 

stabbing R foot pain since yesterday. Pain began yesterday s/p Dr. Zaragoza 

cleaning wound from partial amputation on 5/22. Sx unchanged by Tramadol and 

Percocet. R foot XR reveals s/p amputation with no acute abnormalities. Pt left 

AMA.





- Diagnoses


Provider Diagnoses: 


 Diabetes








Discharge





- Discharge Plan


Condition: Fair


Disposition: AGAINST MEDICAL ADVICE


Referrals: 


Yeni Shanks MD [Primary Care Provider] - 





The documentation as recorded by the Gabriele cardenas Rebecca accurately 

reflects the service I personally performed and the decisions made by me, 

Varghese Bal MD.

## 2017-07-07 NOTE — RAD
INDICATION:  Right foot pain.



COMPARISON: Comparison is made with a prior x-ray study of the right foot from May 25,

2017.



TECHNIQUE: 2 views of the right foot were obtained.



FINDINGS:  The patient is status post transmetatarsal amputation at the level of the mid

metatarsals. There is diffuse soft tissue swelling which is most prominent adjacent to the

amputation site. There is a soft tissue defect also in that region. No erosive change or

periosteal reaction is seen.



Note is made of vascular calcifications.



IMPRESSION:  POSTSURGICAL CHANGES, SOFT TISSUE ULCER AND SWELLING, NO SPECIFIC EVIDENCE

FOR OSTEOMYELITIS.

## 2017-07-12 NOTE — ED
Lower Extremity





- HPI Summary


HPI Summary: 





Patient presents with request for pain medication.  She is s/p surgery to her 

right foot for osteomyelitis, surgery by Dr. Zaragoza.  This is one of many 

visits to the ED with requests for pain medication.  She is requesting IV 

dilaudid and wants to be admitted for pain control.  She is combative and upset 

on arrival during triage and stating she wants to "slit their throats" when 

talking about the providers.  She was dispensed lyrica last week and dilauded 3 

weeks ago. She has been encouraged many times to return to Dr. Fairbanks office 

for further evaluation and/or pain control if needed.  She stated last evening 

she had an appt today, and today stated she forgot her appt which is why she is 

seen in the ED.  Family states she is not attempting to go see her doctor and 

instead will continue to come back to ED in attempt to receive pain control 

through IV. It was explained to patient she would not be staying in the ED as 

she seems comfortable and the foot is without erythema, swelling or other signs 

of complications from the surgery.  There is gauze wrapped around the foot and 

scant amount of white discharge from the area.  She states she will see Dr. Zaragoza tomorrow.  Provider has given her 1 dose of Tramadol during this visit.  

She is upset upon discharge.  Denies other complaints but it not cooperative on 

examination.





- History of Current Complaint


Chief Complaint: EDExtremityLower


Stated Complaint: RIGHT FOOT PAIN


Time Seen by Provider: 07/12/17 22:40


Hx Last Menstrual Period: 5/12/17


Pain Intensity: 9





- Risk Factors


Gout Risk Factors: Age Over 40, Diabetes, Hypertension, Renal Disease, 

Hyperlipidemia, Obesity, Peripherial Vascular Disease


DVT Risk Factors: Smoking, Recent Surgery


Septic Arthritis Risk Factor: Negative





- Allergies/Home Medications


Allergies/Adverse Reactions: 


 Allergies











Allergy/AdvReac Type Severity Reaction Status Date / Time


 


Heparin Allergy  See Comment Verified 07/10/17 22:56


 


Ropinirole [From Requip] Allergy  Hives Verified 07/10/17 22:56


 


Erythromycin AdvReac  "DOESN'T Verified 07/10/17 22:56





   WORK"  


 


Niacin AdvReac  Hives Verified 07/10/17 22:56


 


plastic tape Allergy  Blisters Uncoded 07/10/17 22:56














PMH/Surg Hx/FS Hx/Imm Hx


Previously Healthy: No - see below


Endocrine/Hematology History: Reports: Hx Anticoagulant Therapy - Aspirin., Hx 

Blood Transfusions, Hx Diabetes - insulin control, Hx Thyroid Disease - nodule 

biopsied, normal, Hx Anemia - hx of - reports had tranfusion in 2014 after mi


Cardiovascular History: Reports: Hx Angina, Hx Cardiac Arrest - in Dec. 2014 

with CPR, Hx Cardiomegaly, Hx Coronary Artery Disease, Hx Hypercholesterolemia, 

Hx Hypertension - on med, Hx Myocardial Infarction, Other Cardiovascular 

Problems/Disorders - hyperlipidemia


   Denies: Hx Congestive Heart Failure, Hx Deep Vein Thrombosis, Hx Pacemaker/

ICD, Hx Valvular Heart Disease


Respiratory History: Reports: Hx Asthma - inhaler, Hx Chronic Obstructive 

Pulmonary Disease (COPD) - smoker, Hx Sleep Apnea - pt reports md thinks, but 

no official testing done yet, Other Respiratory Problems/Disorders - acute 

respipatory failure with hypoxia - dec 2016


   Denies: Hx Lung Cancer, Hx Pneumonia, Hx Pulmonary Embolism


GI History: Reports: Hx Gastroesophageal Reflux Disease - on med, Other GI 

Disorders - GASTROPARESIS - TAKING OMEPRAZOLE


   Denies: Hx Gall Bladder Disease, Hx Gastrointestinal Bleed, Hx Ulcer, Hx 

Urosepsis


 History: Reports: Hx Acute Renal Failure, Hx Chronic Renal Failure, Hx 

Dialysis - PERITONEAL BUT DOES HAVE FISTULA ON LEFT ARM, Hx Kidney Stones - in 

past, Hx Renal Disease - ESRD on home peritoneal dialysis , Other  Problems/

Disorders - ESRD- URINATES A SMALL AMOUNT


Musculoskeletal History: Reports: Hx Arthritis - back, hips, Hx Back Problems - 

Sciatica and Herniated L3 disk


Sensory History: Reports: Hx Eye Prosthesis - left, Hx Legally Blind, Hx Vision 

Problem


   Denies: Hx Contacts or Glasses, Hx Hearing Aid


Opthamlomology History: Reports: Hx Eye Prosthesis - left, Hx Legally Blind, Hx 

Vision Problem


   Denies: Hx Contacts or Glasses


Neurological History: Reports: Other Neuro Impairments/Disorders - neuropathy


   Denies: Hx Transient Ischemic Attacks (TIA)


Psychiatric History: Reports: Hx Anxiety - on med, Hx Depression - on med, Hx 

Bipolar Disorder


   Denies: Hx Panic Disorder, Hx Schizophrenia





- Cancer History


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


Hx Chemotherapy: No


Hx Radiation Therapy: No





- Surgical History


Surgery Procedure, Year, and Place: LEFT EYE REMOVED 2012 - HAS PROSTHETIC EYE (

ORBITS DONE 5/2015 OK'D BY DR SAE COLE TO SCAN IN MRI) ;.  MELANOMA REMOVED 

FROM [facility] 2013 (PROCEDURE DONE 4X);.  CARDIAC CATH 2014 - NO STENTS;.  LEFT 

WRIST FISTULA PLACEMENT ([facility]) 2014;.  HEMODIALYSIS CATH RIGHT CHEST WALL 2014;.

  PERITONEAL DIALYSIS CATH 2/2015;.  CHEST WALL CATH REMOVAL 7/2016 [facility] [place];.  

RIGHT GREAT TOE AMPUTATION, [facility] [place] 4/2016;.  PLACEMENT RIGHT JUGULAR TESIO 

HEMODIALYSIS CATHETER [facility] [place] 8/27/2016;.  REMOVAL OF JUGULAR CATH - SEPT 2016.  

CARDIAC CATH - stentsx2 right leg


Hx Anesthesia Reactions: No





- Immunization History


Date of Tetanus Vaccine: UTD


Date of Influenza Vaccine: 8/15/16


Hx Pertussis Vaccination: No


Immunizations Up to Date: Unable to Obtain/Confirm


Infectious Disease History: No


Infectious Disease History: Reports: Hx of Known/Suspected MRSA - MRSA R 1st toe


   Denies: Hx Clostridium Difficile, Hx Hepatitis, Hx Human Immunodeficiency 

Virus (HIV), Hx Shingles, Hx Tuberculosis, History Other Infectious Disease, 

Traveled Outside the  in Last 30 Days





- Family History


Known Family History: Positive: Cardiac Disease - father MI at 41 y/o, 

Hypertension





- Social History


Occupation: Disabled


Lives: With Family


Alcohol Use: None


Hx Substance Use: No


Substance Use Type: Reports: None


Substance Use Comment - Amount & Last Used: using chantix to quit smoking


Hx Tobacco Use: Yes


Smoking Status (MU): Current Every Day Smoker


Type: Cigarettes


Amount Used/How Often: 5 ciagrettes/day


Length of Time of Smoking/Using Tobacco: 17 YEARS


Have You Smoked in the Last Year: Yes





Review of Systems


Constitutional: Negative


Positive: Fatigue


Eyes: Negative


Cardiovascular: Negative


Positive: Shortness Of Breath


Positive: no symptoms reported, see HPI


Positive: Arthralgia, Myalgia


Positive: Other


Neurological: Negative


Psychological: Normal


All Other Systems Reviewed And Are Negative: Yes





Physical Exam


Triage Information Reviewed: Yes


Vital Signs On Initial Exam: 


 Initial Vitals











Temp Pulse Resp BP Pulse Ox


 


 98.0 F   108   20   128/72   95 


 


 07/12/17 22:30  07/12/17 22:30  07/12/17 22:30  07/12/17 22:30  07/12/17 22:30











Vital Signs Reviewed: Yes


Appearance: Positive: Well-Appearing, Well-Nourished


Skin: Positive: Warm, Skin Color Reflects Adequate Perfusion, Other - open 

wound with scant amount of white discharge from the area, packed with gauze  -

without erythema, warmth


Head/Face: Positive: Normal Head/Face Inspection


Eyes: Positive: VIOLETTA


Neck: Positive: Supple, No Lymphadenopathy


Respiratory/Lung Sounds: Positive: Clear to Auscultation, Breath Sounds Present


Cardiovascular: Positive: Normal, RRR, Pulses are Symmetrical in both Upper and 

Lower Extremities


Musculoskeletal: Positive: Strength/ROM Intact, Pain @ - right foot


Neurological: Positive: Speech Normal


Psychiatric: Positive: Normal


AVPU Assessment: Alert





- Arthur Coma Scale


Best Eye Response: 4 - Spontaneous


Best Motor Response: 6 - Obeys Commands


Best Verbal Response: 5 - Oriented


Coma Scale Total: 15





Diagnostics





- Vital Signs


 Vital Signs











  Temp Pulse Resp BP Pulse Ox


 


 07/12/17 23:01  98.2 F  102  18  150/85  94


 


 07/12/17 22:30  98.0 F  108  20  128/72  95














- Laboratory


Lab Statement: Any lab studies that have been ordered have been reviewed, and 

results considered in the medical decision making process.





Lower Extremity Course/Dx





- Course


Course Of Treatment: see HPI.  Patient discharged home with Tramadol.  Rx 

cancelled after patient stated the medication did not work for her. This was 

communicated to her upon discharge.  She is to follow up with Dr. Zaragoza 

tomorrow.





- Diagnoses


Differential Diagnosis/HQI/PQRI: Positive: Infection, Osteomyelitis, Septic 

Arthritis


Provider Diagnoses: 


 Post-op pain








Discharge





- Discharge Plan


Condition: Stable


Disposition: HOME


Prescriptions: 


traMADol TAB* [Ultram*] 50 mg PO Q12H PRN #6 tab MDD 2


 PRN Reason: Pain


Referrals: 


Yeni Shanks MD [Primary Care Provider] - 


Additional Instructions: 


Follow up with Dr. Wilson


Call tomorrow for appt


Ibuprofen 600mg three times daily for inflammation

## 2017-07-12 NOTE — ED
gregg RICARDO Timothy, scribed for Varghese Bal MD on 07/12/17 at 0328 .





Lower Extremity





- HPI Summary


HPI Summary: 





Adri Masters is a 36 yo female presenting to [facility] with 10/10 RLE foot pain 

for the past 2 days. She has an appointment with ortho later today which she 

plans on attending, but is presenting now due to the pain. Her MHx includes 

left eye prosthesis, CAD, MI, CABG, HLD, HTN, neuropathy, COPD, asthma, cough, 

sleep apnea, acute respiratoryy failure, anorexia nervosa, GERD, obesity, renal 

disease and failure, kidney stones, ESRD, arthritis, DM, anemia, bipolar 

disorder, depression, anxiety, tobacco use.








- History of Current Complaint


Stated Complaint: RT FOOT PAIN


Time Seen by Provider: 07/12/17 03:28


Hx Obtained From: Patient


Hx Last Menstrual Period: 5/12/17


Mechanism Of Injury: Unknown


Onset/Duration: Hours


Severity Initially: Moderate


Severity Currently: Moderate


Pain Intensity: 10


Pain Scale Used: 0-10 Numeric


Timing: Constant


Location: Is Discrete @ - RLE foot





- Allergies/Home Medications


Allergies/Adverse Reactions: 


 Allergies











Allergy/AdvReac Type Severity Reaction Status Date / Time


 


Heparin Allergy  See Comment Verified 07/10/17 22:56


 


Ropinirole [From Requip] Allergy  Hives Verified 07/10/17 22:56


 


Erythromycin AdvReac  "DOESN'T Verified 07/10/17 22:56





   WORK"  


 


Niacin AdvReac  Hives Verified 07/10/17 22:56


 


plastic tape Allergy  Blisters Uncoded 07/10/17 22:56














PMH/Surg Hx/FS Hx/Imm Hx


Endocrine/Hematology History: Reports: Hx Anticoagulant Therapy - Aspirin., Hx 

Blood Transfusions, Hx Diabetes - insulin control, Hx Thyroid Disease - nodule 

biopsied, normal, Hx Anemia - hx of - reports had tranfusion in 2014 after mi


Cardiovascular History: Reports: Hx Angina, Hx Cardiac Arrest - in Dec. 2014 

with CPR, Hx Cardiomegaly, Hx Coronary Artery Disease, Hx Hypercholesterolemia, 

Hx Hypertension - on med, Hx Myocardial Infarction, Other Cardiovascular 

Problems/Disorders - hyperlipidemia


   Denies: Hx Congestive Heart Failure, Hx Deep Vein Thrombosis, Hx Pacemaker/

ICD, Hx Valvular Heart Disease


Respiratory History: Reports: Hx Asthma - inhaler, Hx Chronic Obstructive 

Pulmonary Disease (COPD) - smoker, Hx Sleep Apnea - pt reports md thinks, but 

no official testing done yet, Other Respiratory Problems/Disorders - acute 

respipatory failure with hypoxia - dec 2016


   Denies: Hx Lung Cancer, Hx Pneumonia, Hx Pulmonary Embolism


GI History: Reports: Hx Gastroesophageal Reflux Disease - on med, Other GI 

Disorders - GASTROPARESIS - TAKING OMEPRAZOLE


   Denies: Hx Gall Bladder Disease, Hx Gastrointestinal Bleed, Hx Ulcer, Hx 

Urosepsis


 History: Reports: Hx Acute Renal Failure, Hx Chronic Renal Failure, Hx 

Dialysis - PERITONEAL BUT DOES HAVE FISTULA ON LEFT ARM, Hx Kidney Stones - in 

past, Hx Renal Disease - ESRD on home peritoneal dialysis , Other  Problems/

Disorders - ESRD- URINATES A SMALL AMOUNT


Musculoskeletal History: Reports: Hx Arthritis - back, hips, Hx Back Problems - 

Sciatica and Herniated L3 disk


Sensory History: Reports: Hx Eye Prosthesis - left, Hx Legally Blind, Hx Vision 

Problem


   Denies: Hx Contacts or Glasses, Hx Hearing Aid


Opthamlomology History: Reports: Hx Eye Prosthesis - left, Hx Legally Blind, Hx 

Vision Problem


   Denies: Hx Contacts or Glasses


Neurological History: Reports: Other Neuro Impairments/Disorders - neuropathy


   Denies: Hx Transient Ischemic Attacks (TIA)


Psychiatric History: Reports: Hx Anxiety - on med, Hx Depression - on med, Hx 

Bipolar Disorder


   Denies: Hx Panic Disorder, Hx Schizophrenia





- Cancer History


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


Hx Chemotherapy: No


Hx Radiation Therapy: No





- Surgical History


Surgery Procedure, Year, and Place: LEFT EYE REMOVED 2012 - HAS PROSTHETIC EYE (

ORBITS DONE 5/2015 OK'D BY DR SAE COLE TO SCAN IN MRI) ;.  MELANOMA REMOVED 

FROM [facility] 2013 (PROCEDURE DONE 4X);.  CARDIAC CATH 2014 - NO STENTS;.  LEFT 

WRIST FISTULA PLACEMENT (R[facility]) 2014;.  HEMODIALYSIS CATH RIGHT CHEST WALL 2014;.

  PERITONEAL DIALYSIS CATH 2/2015;.  CHEST WALL CATH REMOVAL 7/2016 [facility] [place];.  

RIGHT GREAT TOE AMPUTATION, [facility] [place] 4/2016;.  PLACEMENT RIGHT JUGULAR TESIO 

HEMODIALYSIS CATHETER [facility] [place] 8/27/2016;.  REMOVAL OF JUGULAR CATH - SEPT 2016.  

CARDIAC CATH - stentsx2 right leg


Hx Anesthesia Reactions: No





- Immunization History


Date of Tetanus Vaccine: UTD


Date of Influenza Vaccine: 8/15/16


Infectious Disease History: Reports: Hx of Known/Suspected MRSA - MRSA R 1st toe


   Denies: Hx Clostridium Difficile, Hx Hepatitis, Hx Human Immunodeficiency 

Virus (HIV), Hx Shingles, Hx Tuberculosis, History Other Infectious Disease, 

Traveled Outside the [place] in Last 30 Days





- Family History


Known Family History: Positive: Cardiac Disease - father MI at 43 y/o, 

Hypertension





- Social History


Alcohol Use: None


Hx Substance Use: No


Substance Use Type: Reports: None


Substance Use Comment - Amount & Last Used: using chantix to quit smoking


Hx Tobacco Use: Yes


Smoking Status (MU): Current Every Day Smoker


Type: Cigarettes


Amount Used/How Often: 5 ciagrettes/day


Length of Time of Smoking/Using Tobacco: 17 YEARS


Have You Smoked in the Last Year: Yes





Review of Systems


Constitutional: Negative


Eyes: Negative


ENT: Negative


Cardiovascular: Negative


Respiratory: Negative


Gastrointestinal: Negative


Genitourinary: Negative


Musculoskeletal: Other - RLE foot pain


Skin: Negative


Neurological: Negative


Psychological: Normal


All Other Systems Reviewed And Are Negative: Yes





Physical Exam


Triage Information Reviewed: Yes


Vital Signs On Initial Exam: 


 Initial Vitals











Temp Pulse Resp BP Pulse Ox


 


 97.3 F   110   18   140/80   97 


 


 07/12/17 03:20  07/12/17 03:20  07/12/17 03:20  07/12/17 03:20  07/12/17 03:20











Vital Signs Reviewed: Yes


Appearance: Positive: Well-Appearing, Pain Distress - mild discomfort


Skin: Positive: Warm


Head/Face: Positive: Normal Head/Face Inspection


Eyes: Positive: VIOLETTA


ENT: Positive: Hearing grossly normal


Neck: Positive: Supple


Respiratory/Lung Sounds: Positive: Breath Sounds Present


Cardiovascular: Positive: RRR


Abdomen Description: Positive: Nontender, Soft


Bowel Sounds: Positive: Present


Musculoskeletal: Positive: Other - s/p transmetatarsal amputation,  no 

purulence noted





Diagnostics





- Vital Signs


 Vital Signs











  Temp Pulse Resp BP Pulse Ox


 


 07/12/17 03:20  97.3 F  110  18  140/80  97














- Laboratory


Lab Results: 


 Lab Results











  07/12/17 07/12/17 Range/Units





  03:40 03:40 


 


WBC  10.1   (3.5-10.8)  10^3/ul


 


RBC  4.59   (4.0-5.4)  10^6/ul


 


Hgb  14.0   (12.0-16.0)  g/dl


 


Hct  44   (35-47)  %


 


MCV  96   (80-97)  fL


 


MCH  31   (27-31)  pg


 


MCHC  32   (31-36)  g/dl


 


RDW  17 H   (10.5-15)  %


 


Plt Count  274   (150-450)  10^3/ul


 


MPV  9   (7.4-10.4)  um3


 


Neut % (Auto)  84.0 H   (38-83)  %


 


Lymph % (Auto)  8.5 L   (25-47)  %


 


Mono % (Auto)  3.1   (1-9)  %


 


Eos % (Auto)  2.6   (0-6)  %


 


Baso % (Auto)  1.8   (0-2)  %


 


Absolute Neuts (auto)  8.5 H   (1.5-7.7)  10^3/ul


 


Absolute Lymphs (auto)  0.9 L   (1.0-4.8)  10^3/ul


 


Absolute Monos (auto)  0.3   (0-0.8)  10^3/ul


 


Absolute Eos (auto)  0.3   (0-0.6)  10^3/ul


 


Absolute Basos (auto)  0.2   (0-0.2)  10^3/ul


 


Absolute Nucleated RBC  0.02   10^3/ul


 


Nucleated RBC %  0.1   


 


Sodium   128 L  (133-145)  mmol/L


 


Potassium   4.0  (3.5-5.0)  mmol/L


 


Chloride   98 L  (101-111)  mmol/L


 


Carbon Dioxide   23  (22-32)  mmol/L


 


Anion Gap   7  (2-11)  mmol/L


 


BUN   27 H  (6-24)  mg/dL


 


Creatinine   3.56 H  (0.51-0.95)  mg/dL


 


Est GFR ( Amer)   18.5  (>60)  


 


Est GFR (Non-Af Amer)   14.4  (>60)  


 


BUN/Creatinine Ratio   7.6 L  (8-20)  


 


Glucose   612 H*  ()  mg/dL


 


Calcium   8.6  (8.6-10.3)  mg/dL











Result Diagrams: 


 07/12/17 03:40





 07/12/17 03:40


Lab Statement: Any lab studies that have been ordered have been reviewed, and 

results considered in the medical decision making process.





Re-Evaluation





- Re-Evaluation


  ** First Eval


Comment: pt not found in room,  pt seen walking out of ed prior to completion 

of evaluation





Lower Extremity Course/Dx





- Course


Assessment/Plan: Adri Masters is a 36 yo female presenting to [facility] with 10/

10 pain in her RLE for the past 2 days. Pt medication list reviewed this visit. 

In the ED course she received toradol for pain management. Pt declined offered 

toradol. Pt left [facility] without completion of evaluation, lab tests, or signing 

AMA paperwork.





- Diagnoses


Provider Diagnoses: 


 Diabetes








Discharge





- Discharge Plan


Condition: Stable


Disposition: OTHER


Discharge Disposition Comment: Pt left [facility] without signing AMA paperwork


Referrals: 


Yeni Shanks MD [Primary Care Provider] - 





The documentation as recorded by the gregg cardenas Timothy accurately 

reflects the service I personally performed and the decisions made by me, 

Varghese Bal MD.

## 2017-07-13 NOTE — HP
HISTORY AND PHYSICAL:

 

DATE OF ADMISSION:  07/13/17

 

HISTORY OF PRESENT ILLNESS:  Adri is a 37-year-old obese female with diabetes
, some peripheral vascular disease who is 7 weeks out from transmetatarsal 
amputation, right foot, this was performed for chronic osteomyelitis.  She has 
had poor wound healing laterally, failure to stop smoking and with some wound 
dehiscence currently and increasing pain pattern.  She is admitted for IV 
antibiotics, wound care, possible revision of amputation.

 

CURRENT MEDICATIONS:  Include:

1.  Gabapentin 300 mg 3 times a day.

2.  Albuterol 2.5 mg/3 mL 0.83% 4 times a day as needed.

3.  NovoLog 70/30 at 100 units/mL sliding scale.

4.  Omeprazole 20 mg per day.

5.  Lantus 100 units at night.

6.  Amlodipine 10 mg every day.

7.  Bisoprolol fumarate 10 mg per day.

8.  Torsemide 20 mg twice a day.

9.  Atorvastatin 80 mg per day.

10.  Losartan 100 mg per day.

11.  Baby aspirin per day.

12.  Occasional Percocet for pain.

 

ALLERGIES:

1.  MORPHINE.

2.  CODEINE.

3.  AUGMENTIN.

4.  BACTRIM.

5.  HEPARIN.

6.  FLAGYL.

 

PROBLEM LIST:  Includes morbid obesity, chronic tobacco abuse, some sleep apnea
, some asthma symptoms, insulin dependent diabetes with polyneuropathy and 
lumbosacral spondylosis.

 

                               PHYSICAL EXAMINATION

 

GENERAL:  Adri is obese, no acute distress.  Appropriate mood and affect.  
The right foot shows, warm foot with a thready dorsal pulse.  She has a 
transverse fishmouth incision at the transmetatarsal level, lateral 50% of the 
wound dehisced, tracking deep down to bone, mild serious drainage.  No foul 
smelling.

 

HEENT:  Her oropharynx shows poor dentition, edentulous.

 

NECK:  Supple.

 

CHEST EXAM:  Shows some wheezing in all lung fields.

 

CARDIAC EXAM:  Shows prominent heart sounds, regular rate.  No extra sounds 
noted.

 

ABDOMEN:  Large, distended, soft and nontender.

 

 ADMISSION RADIOGRAPHS:  Three views of the right foot shows transmetatarsal 
amputation.  No evidence of osteomyelitis.

 

ASSESSMENT AND PLAN:  The patient with obesity, chronic smoking, and diabetes 
with poor wound healing, right transmetatarsal amputation.  The patient will be 
admitted for IV antibiotic, pain control, local wound care, and perfusion 
evaluation as well.  She will need medical consultation.

 

[id number]/CPS #: [id number]

SAEED

## 2017-07-13 NOTE — HP
CC:  Dr. Shanks; Dr. Felton; Dr. Zaragoza *

 

HISTORY AND PHYSICAL:

 

DATE OF ADMISSION:  07/13/17

 

PRIMARY CARE PROVIDER:  Dr. Shanks.

 

CONSULTING ID SPECIALIST:  Dr. Felton.

 

CONSULTING ORTHOPEDIST:  Dr. Zaragoza.

 

ATTENDING PHYSICIAN:  Eugene Faye MD * (DICTATED BY JOANNA ANN NP)



CHIEF COMPLAINT:

1.  Right lower extremity wound at transmetatarsal site.

2.  Not feeling well.

 

HISTORY OF PRESENT ILLNESS:  Ms. Masters is a 37-year-old female patient with 
multiple medical problems.  She has a history of melanoma; end-stage renal 
disease, on peritoneal dialysis; she has diabetic neuropathy; CAD; she has 
peripheral vascular disease; she is asthmatic; she has history of GERD; 
hypertension; hyperlipidemia; depression; anxiety, in addition to this, is also 
still smoking. She comes in today after she was in the ER last night.  She says 
last few she just has not been feeling well.  She has been aching all over.  
She has been feeling chills.  She thinks she may have had a fever, she is not 
sure.  She states she has been coughing, not feeling short of breath, and she 
has noticed that she has been wheezing.  She also stated that she noticed that 
at her transmetatarsal amputation site, she started having a small wound a 
couple of weeks ago, then in the last few days, it has gotten bigger.  There 
has been some purulent discharge and she said that there is some redness 
surrounding it.  She was concerned, she went to the ER last night.  She was 
then discharged last night from the emergency department and today she had 
followup already scheduled with Dr. Zaragoza, who evaluated the wound. There was 
concern for an underlying infection that might require IV antibiotics, so he 
sent her to the hospital to be evaluated for IV antibiotics.  The patient now 
says that she is not having any chest pain, no shortness of breath.  She denies 
abdominal pain.  She states she has been feeling nauseated at times and she did 
have an episode of diarrhea, but her biggest complaint is that the wound on her 
foot is very painful, _____ red.  She has also been having significant 
discharge and some chills off and on.  She was evaluated by Dr. Zaragoza, who has 
felt she needs IV antibiotics, and because of her medical complexity, we were 
asked to evaluate for the admission.

 

PAST MEDICAL HISTORY:  Significant for:

1.  Melanoma.

2.  End-stage renal disease, on peritoneal dialysis.

3.  Diabetes.

4.  Neuropathy.

5.  Coronary artery disease.

6.  Hypertension.

7.  Hyperlipidemia.

8.  Peripheral vascular disease.

9.  Asthma.

10.  GERD.

11.  Depression.

12.  Anxiety.

 

PAST SURGICAL HISTORY:  She has had:

1.  Heart catheterization.

2.  PD catheter insertion and removal.

3.  Skin excision.

4.  Right great toe amputation.

5.  Right transmetatarsal amputation which was done in May.

6.  Right lower extremity stent.

7.  Left eye prosthesis.

 

MEDICATIONS:  Home meds according to her recall include:

1.  Zebeta 10 mg p.o. q.p.m.

2.  Aspirin 81 mg daily.

3.  Albuterol 2 puffs inhaled every 6 hours as needed.

4.  Lantus 80 units subcu b.i.d.

5.  Insulin sliding scale.

6.  Neurontin 300 mg p.o. t.i.d.

7.  Lipitor 80 mg daily.

8.  Amlodipine 10 mg daily.

9.  Zofran 4 mg every 6 hours as needed.

10.  Prilosec 20 mg daily.

11.  Reglan 10 mg every 6 hours as needed.

12.  Losartan 100 p.o. q.p.m.

13.  Levaquin 500 mg p.o. every other day.

14.  Imdur 60 mg p.o. q.p.m.

 

ALLERGIES TO MEDICATIONS:  Include AUGMENTIN, CODEINE, HEPARIN, FLAGYL, MORPHINE
, REQUIP, BACTRIM, ERYTHROMYCIN, NIACIN, and PLASTIC TAPE.

 

FAMILY HISTORY:  Both her parents were diabetics.

 

SOCIAL HISTORY:  She is a pack-a-day smoker.  She has been smoking for about 20 
years.  She does not drink alcohol.  Surrogate decision maker is her fiance.

 

REVIEW OF SYSTEMS:  There is no documented fever here, but she does admit to 
having chills.  She denied having any significant weight change.  There was no 
ear discharge.  There was no rhinorrhea.  No sore throat.  No thyroid 
enlargement.  She denied having any chest pain.  There was no orthopnea, no 
nocturnal dyspnea.  There was no abdominal pain.  There was episode of nausea, 
but no dysuria, no frequency. No seizure and no loss of consciousness.  No 
pruritus and there is skin ulceration from my HPI.  Review of 14 systems 
completed, all others negative.

 

                               PHYSICAL EXAMINATION

 

GENERAL:  At this time, Ms. Masters is a 37-year-old female patient.  She is 
chronically ill appearing.  She is sitting in the hospital bed.  She does not 
appear to be in any acute distress.

 

VITAL SIGNS:  Reveal blood pressure 152/84 with a pulse of 105, respirations 18
, O2 sat 95%, temperature 97.7.

 

HEENT:  Head atraumatic.  Eyes:  EOMs are intact.  Sclerae anicteric and not 
pale. Throat:  Oral mucosa appears to be dry.  No oropharyngeal erythema.

 

NECK:  Supple.

 

LUNGS:  She had wheezing throughout.  No rhonchi or rales.

 

HEART:  Sounds S1 and S2.  She is tachycardic.  Regular rate and rhythm.  No 
murmurs, rubs, or gallops.

 

ABDOMEN:  Soft, flat, nontender.  Bowel sounds present.

 

EXTREMITIES:  She is able to move her extremities with 5/5 strength and pulses 
were diminished in the lower extremities.  They are 2+ in the upper 
extremities.  She has 5/5 strength.

 

NEUROLOGIC:  She is awake, alert, and oriented x3.  Speech clear.  Tongue 
midline. No gross focal deficits.

 

SKIN:  Intact with the exception she does have a wound noted in the right lower 
extremity to the transmetatarsal amputation site.  She is covered with a 
dressing currently.

 

 DIAGNOSTIC STUDIES/LAB DATA:  Pending for today.  I do have the labs from 
yesterday which revealed, WBC 10.1, RBC of 4.59, hemoglobin 14.0, hematocrit of 
44.  Her sodium was 128, potassium of 4.0, chloride 98, bicarb 23, BUN 27, 
creatinine of 3.56, her glucose was 612, a repeat glucose is pending.

 

She had a foot x-ray done today.  She is status post transmetatarsal amputation 
in position without erosion or periosteal reaction suggestive of osteomyelitis.
  Old medical records were reviewed.

 

ASSESSMENT AND PLAN:  Ms. Masters is a 37-year-old female patient coming from 
Dr. Zaragoza's office today for concern of transmetatarsal wound infection.  We 
are asked to evaluate for admission.  She will be admitted under inpatient 
status for:

 

1.  Right transmetatarsal wound infection.  At this point, we will go ahead and 
put her on vanco and Zosyn.  I will touch base with Dr. Felton and Dr. Zaragoza.  I am going to try to get a culture of that wound base.  In addition to 
this, I am going to ESR and CRP.  If those are elevated, we probably will have 
a low threshold of doing an MRI of her foot to make sure she does not have an 
osteo and we will again continue to follow and will do localize wound care.

2.  Melanoma.  Follow with primary.

3.  End-stage renal disease, on peritoneal dialysis.  I did touch base with the 
dialysis nurse and we will go ahead and continue with this.

4.  Diabetes.  Continue lispro sliding scale and Lantus.

5.  Neuropathy.  Continue gabapentin.

6.  Coronary artery disease.  She is on aspirin, statin, beta blocker.  We will 
continue.

7.  Hypertension.  Continue meds as prescribed.  She is a little tachycardic 
over 105, but she did tell the nurse that she has not been taking her Zebeta, 
which I suspect the tachycardia is probably from rebound tachycardia, not 
because she was not taking the Zebeta.  So, we will monitor this.

8.  Hyperlipidemia.  Continue statin therapy.

9.  Peripheral vascular disease.  She is on aspirin and statin.  We will 
continue.

10.  Asthma.  She is wheezing pretty significantly on exam.  I am going to put 
her on standing nebs and Dulera and put her on incentive spirometry and check a 
chest x- ray and also incentive spirometry as well.

11.  Depression and anxiety.  Continue supportive care.

12.  Gastroesophageal reflux disease.  Continue PPI therapy.

13.  DVT prophylaxis.  She is allergic to HEPARIN and Lovenox is not an option 
because of her kidney disease.  So, we will put her on SCDs.

14.  Code status.  Full code.

15.  Fluids, electrolytes, nutrition.  She can have a consistent carb diet.

 

TIME SPENT:  On the admission was approximately 60 minutes, greater than half 
the time was spent face-to-face with the patient obtaining my history and 
physical, other half of the time was spent going over the plan of care with the 
patient and implementing the plan of care.

 

I did discuss the plan of care with my attending, Dr. Faye; he is in 
agreement.

 

 ____________________________________ JOANNA ANN, KISHA

 

[id number] #: [id number]

SAEED

## 2017-07-13 NOTE — RAD
HISTORY: Wheezing



COMPARISONS: June 07, 2017



VIEWS:1: Single frontal portable view of the chest at 3:10 PM



FINDINGS:

LINES AND TUBES: None.

CARDIOMEDIASTINAL SILHOUETTE: The cardiomediastinal silhouette is normal for portable

technique.

PLEURA: The costophrenic angles are sharp. No pleural abnormalities are noted.

LUNG PARENCHYMA: The lungs are clear.

ABDOMEN: The upper abdomen is clear. There is no subphrenic gas.

BONES AND SOFT TISSUES: No bone or soft tissue abnormalities are noted.



IMPRESSION: NO ACTIVE CARDIOPULMONARY DISEASE.

## 2017-07-14 NOTE — PN
Progress Note





- Progress Note


Date of Service: 07/14/17


SOAP: 


Subjective:


38 y/o female s/p transmeta amuptation 5/24/2017 by Dr. Zaragoza, admitted 7/13 

for wound dehiscence, poor healing;  was found to have increase glucose to >500

, transferred to ICU for tight management.   Patient BG levels improving.  

Patient   








Objective:


General- resting in bed comfortably, NAD 


MSK- dressing removed, area of dehiscence at lateral aspect of incision, 

approximately 2cm, bone visible, no drainage noted, + eschar.  minimal erythema 

at incision, no lymphangtic spread.   redressed with xeroform gauze, kerlex, 

ace.   PT 1+, foot warm.  





 Vital Signs











Temp  98.8 F   07/14/17 07:32


 


Pulse  83   07/14/17 11:00


 


Resp  16   07/14/17 11:00


 


BP  100/45   07/14/17 10:49


 


Pulse Ox  90   07/14/17 11:00








 Intake & Output











 07/13/17 07/14/17 07/14/17





 18:59 06:59 18:59


 


Intake Total 720 250 240


 


Output Total 600 100 


 


Balance 120 150 240


 


Weight 213 lb 213 lb 13.574 oz 


 


Intake:   


 


  IVPB  100 


 


    NS to Maintain IV Patency  100 


 


  Oral 720 150 240


 


Output:   


 


  Urine 600 100 


 


Other:   


 


  Date of Last Bowel  07/14/2017 





  Movement   


 


  # Bowel Movements  1 


 


  Estimated Stool Amount  Medium 

















Assessment:


Stable 38 y/o female s/p transmeta amuptation 5/24/2017 by Dr. Zaragoza, admitted 

7/13 for wound dehiscence, poor healing








Plan:


- vascular consult placed 


- Franky Matute currently for IV ABX consult placed for ricardo C&S pending 


- Dressing changed- wound care consulted 


- Current plan to evaluate vasc prior to any surgical intervention 








 Active Medications











Generic Name Dose Route Start Last Admin





  Trade Name Freq  PRN Reason Stop Dose Admin


 


Acetaminophen  650 mg  07/13/17 14:58  





  Tylenol Tab*  PO   





  Q4H PRN   





  FEVER/PAIN   


 


Albuterol  2.5 mg  07/13/17 15:03  





  Ventolin 2.5 Mg/3 Ml Neb.Sol*  INH   





  Q2H PRN   





  SOB/WHEEZING   


 


Albuterol/Ipratropium  1 neb  07/13/17 19:00  07/14/17 07:36





  Duoneb (Albuterol 2.5 Mg/Ipratropium 0.5 Mg)  INH   1 neb





  [id number] AWAKE [facility]STAN   Administration


 


Amlodipine Besylate  10 mg  07/13/17 18:00  07/13/17 17:57





  Norvasc Tab*  PO   10 mg





  QPM TRI[facility]   Administration


 


Aspirin  81 mg  07/14/17 09:00  07/14/17 09:01





  Aspirin Low Dose Tab*  PO   81 mg





  DAILY TRISTAN   Administration


 


Atorvastatin Calcium  80 mg  07/13/17 18:00  07/13/17 17:58





  Lipitor*  PO   80 mg





  QPM [facility]   Administration


 


Bisoprolol Fumarate  10 mg  07/13/17 18:00  07/13/17 17:58





  Zebeta Tab*  PO   10 mg





  QPM [facility]   Administration


 


Dextrose  12.5 gm  07/14/17 01:24  





  D50w Syringe 50 Ml*  IV PUSH   





  .FOR FS < 60 - SS PRN   





  FS < 60   


 


Gabapentin  300 mg  07/13/17 21:00  07/14/17 09:01





  Neurontin Cap(*)  PO   300 mg





  TID [facility]ST[facility]   Administration


 


Hydromorphone HCl  2 mg  07/13/17 15:13  07/14/17 10:27





  Dilaudid Iv*  IV   2 mg





  Q4H PRN   Administration





  PAIN MODERATE   


 


Piperacillin Sod/Tazobactam  100 mls @ 25 mls/hr  07/13/17 17:30  07/14/17 05:54





  Sod 3.375 gm/ Sodium Chloride  IVPB   25 mls/hr





  Q12H TRISTAN   Administration


 


Insulin Glargine  80 units  07/14/17 09:00  07/14/17 09:01





  Lantus(*)  SUBCUT   80 units





  BID [facility]   Administration


 


Insulin Human Lispro  0 units  07/14/17 02:00  07/14/17 10:43





  Humalog*  SUBCUT   3 units





  FS Q2 ICU [facility]   Administration





  Protocol   


 


Isosorbide Mononitrate  60 mg  07/13/17 18:00  07/13/17 17:58





  Imdur Er Tab*  PO   60 mg





  QPM [facility]   Administration


 


Losartan Potassium  100 mg  07/13/17 18:00  07/13/17 17:58





  Cozaar Tab*  PO   100 mg





  QPM [facility]   Administration


 


Mometasone Furoate/Formoterol Fumar  2 puff  07/13/17 21:00  07/14/17 07:36





  Dulera 200/5 Mdi*  INH   2 puff





  BID TRSITAN   Administration


 


Mupirocin  1 applic  07/14/17 09:00  07/14/17 08:05





  Bactroban 2 % Oint*  TOPICAL   1 apply





  DAILY [facility]ST[facility]   Administration


 


Nicotine  1 patch  07/13/17 16:00  07/14/17 09:01





  Nicotine Patch 21 Mg/24 Hr*  TRANSDERM   1 patch





  DAILY TRISTAN   Administration


 


Omeprazole  20 mg  07/13/17 18:00  07/13/17 17:58





  Prilosec Cap*  PO   20 mg





  QPM [facility]   Administration


 


Ondansetron HCl  4 mg  07/13/17 15:00  07/14/17 10:27





  Zofran Inj*  IV   4 mg





  Q4H TRIST[facility]   Administration


 


Oxycodone HCl  5 mg  07/13/17 10:46  07/14/17 09:01





  Roxycodone Tab*  PO   5 mg





  Q4H PRN   Administration





  PAIN MILD   


 


Pharmacy Consult  1 note  07/13/17 15:00  





  Zosyn Per Pharmacy*  FOLLOW UP   





  .ZOSYN PER PHARMACY [facility]   


 


Pharmacy Consult  1 note  07/13/17 15:15  





  Vancomycin Per Pharmacy*  FOLLOW UP   





  . PRN   





  PER PROTOCOL   


 


Pharmacy Profile Note  1 note  07/13/17 21:00  07/13/17 22:53





  Nicotine Patch Removal Note*  FOLLOW UP   1 note





  2100 [facility]STAN   Administration

## 2017-07-14 NOTE — PN
Progress Note





- Progress Note


Date of Service: 07/14/17


SOAP: 


Subjective:


[]








Objective:


[]








Assessment:


[]








Plan:


[]

## 2017-07-14 NOTE — PN
Hospitalist Progress Note





Questioned patient regarding her listed heparin allergy and status of 

anticoagulation.  She states that when she underwent revascularization at 

[facility] in May she was told that heparin was causing her to clot and she was 

placed on Eliquis after the stenting procedure.  ?Etiology, maybe HIT or 

arterial clot discovered? She states she should still be on Eliquis but her 

insurance stopped covering it, so she was started Coumadin 3 days ago.  She 

states that she was started on a dose of 2.5 mg.  INR was 0.8 at admission.  

Will plan to restart Coumadin at 5mg daily and monitor Coumadin daily.

## 2017-07-14 NOTE — PN
Subjective


Date of Service: 07/14/17


Interval History: 





This is a 38 yo female with poorly controlled IDDM, ESRD on PD and h/o multiple 

amputations due to osteomyelitis who was referred to the hospital for treatment 

of a wound infection at the site of her rather recent transmetarsal amputation.

  Hospitalist group was consulted for management of her glucose as she was 

severely hyperglycemic upon arrival.  She was transferred to the ICU for closer 

monitoring and more frequent glucose checks.





Overnight, glucose remained significantly elevated without evidence of 

acidosis.  She received her usual 80U Lantus in the evening, plus an additional 

30U given throughout the night.  She received 80U of SQ Lispro and an 

additional 20U IV regular insulin.  Despite these large doses there was no 

improvement in glucose until this am.  This timing of glucose improvement 

corresponds with her completing her PD treatment.  Patient has noted a 

significant decline in her glycemic control since initiating PD.





Additionally, patient had at least one episode of liquid stool overnight.  No c/

o abd pain, n/v.  Her foot feels slightly better, but still sore.  Afebrile 

overnight.  She has been started on Zosyn.





Objective


Active Medications: 








Acetaminophen (Tylenol Tab*)  650 mg PO Q4H PRN


   PRN Reason: FEVER/PAIN


Albuterol (Ventolin 2.5 Mg/3 Ml Neb.Sol*)  2.5 mg INH Q2H PRN


   PRN Reason: SOB/WHEEZING


Albuterol/Ipratropium (Duoneb (Albuterol 2.5 Mg/Ipratropium 0.5 Mg))  1 neb INH 

[id number] AWAKE [facility]


   Last Admin: 07/14/17 07:36 Dose:  1 neb


Amlodipine Besylate (Norvasc Tab*)  10 mg PO QPM [facility]


   Last Admin: 07/13/17 17:57 Dose:  10 mg


Aspirin (Aspirin Low Dose Tab*)  81 mg PO DAILY [facility]


Atorvastatin Calcium (Lipitor*)  80 mg PO QPM [facility]


   Last Admin: 07/13/17 17:58 Dose:  80 mg


Bisoprolol Fumarate (Zebeta Tab*)  10 mg PO QPM [facility]


   Last Admin: 07/13/17 17:58 Dose:  10 mg


Dextrose (D50w Syringe 50 Ml*)  12.5 gm IV PUSH .FOR FS < 60 - SS PRN


   PRN Reason: FS < 60


Gabapentin (Neurontin Cap(*))  300 mg PO TID [facility]


   Last Admin: 07/13/17 21:00 Dose:  Not Given


Hydromorphone HCl (Dilaudid Iv*)  2 mg IV Q4H PRN


   PRN Reason: PAIN MODERATE


   Last Admin: 07/14/17 05:52 Dose:  2 mg


Piperacillin Sod/Tazobactam (Sod 3.375 gm/ Sodium Chloride)  100 mls @ 25 mls/

hr IVPB Q12H [facility]


   Last Admin: 07/14/17 05:54 Dose:  25 mls/hr


Insulin Glargine (Lantus(*))  80 units SUBCUT BID [facility]


Insulin Human Lispro (Humalog*)  0 units SUBCUT FS Q2 ICU [facility]


   PRN Reason: Protocol


   Last Admin: 07/14/17 08:03 Dose:  Not Given


Isosorbide Mononitrate (Imdur Er Tab*)  60 mg PO QPM [facility]


   Last Admin: 07/13/17 17:58 Dose:  60 mg


Losartan Potassium (Cozaar Tab*)  100 mg PO QPM [facility]


   Last Admin: 07/13/17 17:58 Dose:  100 mg


Mometasone Furoate/Formoterol Fumar (Dulera 200/5 Mdi*)  2 puff INH BID [facility]


   Last Admin: 07/14/17 07:36 Dose:  2 puff


Mupirocin (Bactroban 2 % Oint*)  1 applic TOPICAL DAILY [facility]


   Last Admin: 07/14/17 08:05 Dose:  1 apply


Nicotine (Nicotine Patch 21 Mg/24 Hr*)  1 patch TRANSDERM DAILY [facility]


   Last Admin: 07/13/17 15:43 Dose:  1 patch


Omeprazole (Prilosec Cap*)  20 mg PO QPM [facility]


   Last Admin: 07/13/17 17:58 Dose:  20 mg


Ondansetron HCl (Zofran Inj*)  4 mg IV Q4H [facility]


   Last Admin: 07/14/17 06:42 Dose:  4 mg


Oxycodone HCl (Roxycodone Tab*)  5 mg PO Q4H PRN


   PRN Reason: PAIN MILD


   Last Admin: 07/13/17 12:23 Dose:  5 mg


Pharmacy Consult (Zosyn Per Pharmacy*)  1 note FOLLOW UP .ZOSYN PER PHARMACY [facility]


Pharmacy Consult (Vancomycin Per Pharmacy*)  1 note FOLLOW UP . PRN


   PRN Reason: PER PROTOCOL


Pharmacy Profile Note (Nicotine Patch Removal Note*)  1 note FOLLOW UP 2100 [facility]


   Last Admin: 07/13/17 22:53 Dose:  1 note











Vital Signs:











Temp Pulse Resp BP Pulse Ox


 


 98.8 F   86   11   122/54   90 


 


 07/14/17 07:32  07/14/17 08:00  07/14/17 08:00  07/14/17 08:00  07/14/17 08:00











Appearance: Obese female who appears younger than stated age in NAD


Respiratory: Symmetrical Chest Expansion and Respiratory Effort, Clear to 

Auscultation


Cardiovascular: NL Sounds; No Murmurs; No JVD, RRR


Abdominal: NL Sounds; No Tenderness; No Distention


Extremities: No Edema, - - limited exam of her R foot shows no erythema or 

significant edema, wound was not examined.


Neurological: Alert and Oriented x 3


Result Diagrams: 


 07/14/17 06:07





 07/14/17 06:07


Additional Lab and Data: 





 Laboratory Tests











  07/13/17 07/13/17





  18:15 18:15


 


ESR   48 H


 


C-Reactive Protein  8.61 H 











Microbiology and Other Data: 


 Microbiology











 07/13/17 15:34 Skin and Soft Tissue MRSA/MSSA (PCR - Final





 Foot Right    Mrsa Negative





    S.aureus Negative





 Gram Stain - Final














Assess/Plan/Problems-Billing


Assessment: 





This is a 38 yo female with poorly controlled IDDM, ESRD on PD followed by Dr Baker, CAD, PVD, asthma, GERD, HTN, HLD, depression and anxiety and prior 

amputations related to osteomyelitis who was admitted for treatment of a wound 

infection on her R foot by Dr Zaragoza.  Hospitalist group has been asked to 

consult for glucose management.





- Patient Problems


(1) Wound infection


Comment: 


Cont Zosyn


Improved foot edema and erythema


CRP and ESR only minimally elevated


Requested wound care consultation


Further management per Dr Zaragoza   





(2) IDDM (insulin dependent diabetes mellitus)


Comment: 


Poorly controlled, last HgbA1c 14.3%


Patient reports compliance with insulin administration and dietary 

recommendations


Glucose monitoring overnight demonstrated severe hyperglycemia, resistant to 

the nearly ~130 units of additional insulin given until PD was completed, 

drawing in to question whether her PD solution is contributing to her poor 

glucose control


Contacted her nephrologist, Dr Baker, to discuss further


Will continue close monitoring with home Lantus doses and SS Humalog meal time 

boluses   





(3) Diarrhea


Comment: 


Liquid stool overnight


No additional GI complaints


Screen for Cdiff colitis


   





(4) ESRD (end stage renal disease)


Comment: 


Continue peritoneal dialysis


Followed by Dr Baker


Will follow electrolytes   





(5) HTN (hypertension)


Comment: 


Normotensive


Continue amlodipine, bisoprolol, losartan   





(6) PVD (peripheral vascular disease)


Comment: 


Relatively recent RLE stenting


Cont ASA


Consider repeat vascular studies if there is trouble with wound healing   





(7) Hx of coronary artery disease


Comment: 


Asx


Cont med management   





(8) DVT prophylaxis


Comment: 


SCDs   


Status and Disposition: 





Inpatient.  Dispo per ortho.  Hospitalists will continue to follow along

## 2017-07-14 NOTE — CONS
CONSULTATION REPORT:

 

DATE OF CONSULT:  07/14/17

 

REQUESTING PROVIDER:  Estuardo Goldberg NP

 

CONSULTING SERVICE:  Infectious Disease.

 

REASON FOR CONSULT:  Right foot transmetatarsal amputation, slight dehiscence, 
and wound infection.

 

IMPRESSION:

1.  Status post right foot transmetatarsal amputation on 05/22/17.  Culture at 
that time grew Propionibacterium acnes, had been on antibiotics proceeding the 
surgery, has had dehiscence particularly in the lateral part of the wound with 
increasing foot pain, no cellulitis.  There is wound infection, potentially 
small abscess.  A Gram stain was taken that shows 2+ neutrophils, 3+ gram 
positive bacilli, 1+ gram positive cocci.  She does not have a history of 
methicillin-resistant Staphylococcus aureus infection.

2.  Diabetes with peripheral neuropathy.

3.  Peripheral vascular disease.

4.  End-stage renal disease, on peritoneal dialysis.

 

RECOMMENDATIONS:  Agree with broad-spectrum antibiotics.  I would start the 
vancomycin for now, should be therapeutic in another 2 or 3 days with the dose 
that she has had while awaiting the culture.  She will have close attention to 
glucose control, keeping the leg elevated here.  If she is not making progress, 
a revision would be a consideration which Dr. Zaragoza is following her for.

 

HISTORY OF PRESENT ILLNESS:  This is a 37-year-old diabetic woman, who in May 
had a right foot transmetatarsal amputation for chronic osteomyelitis, had been 
doing fairly well, but recently has had increasing pain in the right lateral 
foot and dehiscence of the wound, was seen by Dr. Zaragoza and recommended she 
come to the hospital.  A culture was taken and the results are above.  She had 
a white count of 10,000 on admission, a C-reactive protein of 9, started on 
vanco and Zosyn.  Her pain is about the same today, maybe a little bit better.  
She has no pain elsewhere.

 

PAST MEDICAL HISTORY:

1.  Insulin-dependent diabetes complicated by neuropathy and end-stage renal 
disease, on peritoneal dialysis.

2.  Melanoma.

3.  Neuropathy.

4.  Coronary artery disease.

5.  Hypertension.

6.  Hyperlipidemia.

7.  Peripheral vascular disease.

8.  Asthma.

9.  Gastroesophageal reflux disease.

10.  Depression.

11.  Anxiety.

12.  Status post PD catheter placement.

13.  Status post right great toe amputation and right transmetatarsal 
amputation.

14.  Right lower extremity stent.

15.  Left eye prosthesis.

 

MEDICATIONS:

1.  Tylenol.

2.  Aspirin.

3.  Lipitor.

4.  Bisoprolol.

5.  Gabapentin.

6.  Insulin glargine.

7.  Imdur.

8.  Losartan.

9.  Nicotine patch.

10.  Omeprazole.

11.  Zosyn 3.375 mg every 12 hours by extended infusion.

 

ALLERGIES:  AUGMENTIN, CODEINE, HEPARIN, FLAGYL, MORPHINE, REQUIP, BACTRIM, 
ERYTHROMYCIN, NIACIN, and PLASTIC TAPE.

 

FAMILY HISTORY:  Diabetes.  No tuberculosis.

 

SOCIAL HISTORY:  She lives outside of [place].  No travel.  No injection drugs.

 

REVIEW OF SYSTEMS:  All negative to full review of systems except as noted 
above.

 

PHYSICAL EXAM: Vital Signs: Temperature is 37, heart rate 70, respiratory rate 
15, blood pressure 103/42, O2 sat 96% on room air.  General:  She is awake, not 
in distress.  Neurological:  She is oriented x3.  Follows all commands.  Moves 
all extremities.  Sensation is decreased to light touch in the feet 
bilaterally. HEENT:  There is no conjunctival hemorrhage.  Oropharynx without 
lesions.  Neck is supple without nuchal rigidity.  Lymph Nodes:  There are no 
palpable inguinal lymph nodes.  Heart:  Regular rate and rhythm without rubs, 
murmurs, or gallops.  Lungs: Clear to auscultation bilaterally.  Abdomen:  Soft
, nontender, nondistended.  There is a peritoneal dialysis catheter.  Skin:  
There is no rash or splinter hemorrhages.  Musculoskeletal:  There is no spine 
tenderness to palpation.  In the right foot transmetatarsal amputation site, 
there is scant erythema.  There is no fluctuance or crepitus.  Lateral aspect 
of the wound open about 1 cm with underlying dry eschar.

 

LABORATORY DATA:  Potassium 3.8, creatinine 3.7.  White blood cell count 12, 
hemoglobin 12, and platelets 300.

 

Please see impressions and recommendations as outlined above, which I have 
discussed with _____.

 

Thank you for asking me to see Ms. Masters in consultation.

 

 [id number]/[id number] #: [id number]

SAEED

## 2017-07-15 NOTE — PN
Progress Note





- Progress Note


Date of Service: 07/15/17


SOAP: 


Subjective:


Pt states she is doing well. Has some pain in her foot. controlled with 

medications.  Denies CP, SOB, F/C.








Objective:


WDWN F, NAD, A&Ox3


RLE- dressing c/d/i, able f/e toes, able PF/DF ankle, calf soft NT, NVI





 Vital Signs











Temp Pulse Resp BP Pulse Ox


 


 97.7 F   84   16   115/53   90 


 


 07/15/17 07:50  07/15/17 07:50  07/15/17 09:55  07/15/17 07:50  07/15/17 07:50








 Laboratory Results - last 24 hr











  07/14/17 07/14/17 07/14/17





  12:02 15:59 20:32


 


INR (Anticoag Therapy)   


 


POC Glucose (mg/dL)  128 H  204 H  431 H*


 


Random Vancomycin   














  07/15/17 07/15/17 07/15/17





  00:13 04:04 07:02


 


INR (Anticoag Therapy)    0.87 L


 


POC Glucose (mg/dL)  > 444 H*  249 H 


 


Random Vancomycin   














  07/15/17 07/15/17





  07:03 08:21


 


INR (Anticoag Therapy)  


 


POC Glucose (mg/dL)   131 H


 


Random Vancomycin  19.9 

















Assessment:


Stable 36 y/o female s/p right transmeta amuptation 5/24/2017 by Dr. Zaragoza, 

admitted 7/13 for wound dehiscence, poor healing








Plan:


- vascular consult placed 


- IV Vanco per Dr. Tapia, C&S pending 


- cont wet to dry betadine dressings bid


- awaiting vascular evaluation prior to considering revision


- Pt is on coumadin, if revision is required medicine will be contacted in 

advance to manage anticoagulation

## 2017-07-15 NOTE — PN
Subjective


Date of Service: 07/15/17


Interval History: 





Patient was quite hyperglycemic overnight again last night during her PD 

infusion.  She is anxious to get home.  Reports that her pain is not adequately 

managed and requesting more frequent Dilaudid.  She denies cough, SOB, abd pain

, n/v.





Objective


Active Medications: 








Acetaminophen (Tylenol Tab*)  650 mg PO Q4H PRN


   PRN Reason: FEVER/PAIN


Albuterol (Ventolin 2.5 Mg/3 Ml Neb.Sol*)  2.5 mg INH Q2H PRN


   PRN Reason: SOB/WHEEZING


Albuterol/Ipratropium (Duoneb (Albuterol 2.5 Mg/Ipratropium 0.5 Mg))  1 neb INH 

[id number] AWAKE [facility]


   Last Admin: 07/15/17 07:50 Dose:  Not Given


Amlodipine Besylate (Norvasc Tab*)  10 mg PO QPM [facility]


   Last Admin: 07/14/17 17:26 Dose:  10 mg


Aspirin (Aspirin Low Dose Tab*)  81 mg PO DAILY [facility]


   Last Admin: 07/15/17 08:13 Dose:  81 mg


Atorvastatin Calcium (Lipitor*)  80 mg PO QPM [facility]


   Last Admin: 07/14/17 17:26 Dose:  80 mg


Bisoprolol Fumarate (Zebeta Tab*)  10 mg PO QPM [facility]


   Last Admin: 07/14/17 17:26 Dose:  10 mg


Dextrose (D50w Syringe 50 Ml*)  12.5 gm IV PUSH .FOR FS < 60 - SS PRN


   PRN Reason: FS < 60


Gabapentin (Neurontin Cap(*))  300 mg PO TID [facility]


   Last Admin: 07/15/17 08:13 Dose:  300 mg


Hydromorphone HCl (Dilaudid Iv*)  1 mg IV Q4H PRN


   PRN Reason: PAIN MODERATE


   Last Admin: 07/15/17 09:55 Dose:  1 mg


Piperacillin Sod/Tazobactam (Sod 3.375 gm/ Sodium Chloride)  100 mls @ 25 mls/

hr IVPB Q12H [facility]


   Last Admin: 07/15/17 05:16 Dose:  25 mls/hr


Insulin Glargine (Lantus(*))  80 units SUBCUT DAILY [facility]


   Last Admin: 07/15/17 09:56 Dose:  80 unit


Insulin Glargine (Lantus(*))  100 units SUBCUT 2100 [facility]


Insulin Human Lispro (Humalog*)  0 units SUBCUT Q4H [facility]


   PRN Reason: Protocol


   Last Admin: 07/15/17 09:56 Dose:  2 units


Isosorbide Mononitrate (Imdur Er Tab*)  60 mg PO QPM [facility]


   Last Admin: 07/14/17 17:30 Dose:  60 mg


Losartan Potassium (Cozaar Tab*)  100 mg PO QPM [facility]


   Last Admin: 07/14/17 17:26 Dose:  100 mg


Mometasone Furoate/Formoterol Fumar (Dulera 200/5 Mdi*)  2 puff INH BID [facility]


   Last Admin: 07/15/17 07:50 Dose:  Not Given


Mupirocin (Bactroban 2 % Oint*)  1 applic TOPICAL DAILY [facility]


   Last Admin: 07/15/17 10:00 Dose:  1 apply


Nicotine (Nicotine Patch 21 Mg/24 Hr*)  1 patch TRANSDERM DAILY [facility]


   Last Admin: 07/15/17 08:26 Dose:  1 patch


Omeprazole (Prilosec Cap*)  20 mg PO QPM [facility]


   Last Admin: 07/14/17 17:26 Dose:  20 mg


Ondansetron HCl (Zofran Inj*)  4 mg IV Q4H [facility]


   Last Admin: 07/15/17 08:17 Dose:  4 mg


Oxycodone HCl (Roxycodone Tab*)  10 mg PO Q4H PRN


   PRN Reason: PAIN


Pharmacy Consult (Zosyn Per Pharmacy*)  1 note FOLLOW UP .ZOSYN PER PHARMACY [facility]


Pharmacy Profile Note (Nicotine Patch Removal Note*)  1 note FOLLOW UP 2100 [facility]


   Last Admin: 07/14/17 22:53 Dose:  Not Given


Pharmacy Profile Note (Coumadin Daily Reminder*)  0 note FOLLOW UP 1700 [facility]


   Last Admin: 07/14/17 17:06 Dose:  1 note


Warfarin Sodium (Coumadin Tab(*))  5 mg PO DAILY@1700 [facility]


   PRN Reason: Protocol


   Last Admin: 07/14/17 17:26 Dose:  5 mg











Vital Signs:











Temp Pulse Resp BP Pulse Ox


 


 97.7 F   84   16   115/53   90 


 


 07/15/17 07:50  07/15/17 07:50  07/15/17 09:55  07/15/17 07:50  07/15/17 07:50











Appearance: Middle aged female who appears older than stated age in NAD


Respiratory: Symmetrical Chest Expansion and Respiratory Effort, - - few 

wheezes appreciated on lung exam


Cardiovascular: NL Sounds; No Murmurs; No JVD, RRR


Extremities: No Edema, - - R foot in clean dressing, wound not examined


Neurological: Alert and Oriented x 3


Result Diagrams: 


 07/14/17 06:07





 07/14/17 06:07


Additional Lab and Data: 





 Laboratory Tests











  07/13/17 07/13/17





  18:15 18:15


 


ESR   48 H


 


C-Reactive Protein  8.61 H 











Microbiology and Other Data: 


 Microbiology











 07/13/17 15:34 Skin and Soft Tissue MRSA/MSSA (PCR - Final





 Foot Right    Mrsa Negative





    S.aureus Negative





 Gram Stain - Final














Assess/Plan/Problems-Billing


Assessment: 





This is a 36 yo female with poorly controlled IDDM, ESRD on PD followed by Dr Baker, CAD, PVD, asthma, GERD, HTN, HLD, depression and anxiety and prior 

amputations related to osteomyelitis who was admitted for treatment of a wound 

infection on her R foot by Dr Zaragoza.  Hospitalist group has been asked to 

consult for glucose management.





- Patient Problems


(1) Wound infection


Comment: 


Cont Zosyn, Vanco d/c'd by ID


Improved foot edema and erythema


CRP and ESR only minimally elevated


Vascular consultation requested by ortho to help plan surgical revision if 

necessary


Further management per Dr Zaragoza   





(2) IDDM (insulin dependent diabetes mellitus)


Comment: 


Poorly controlled, last HgbA1c 14.3%


Patient reports compliance with insulin administration and dietary 

recommendations


Patient demonstrates a pattern of severe hyperglycemia overnight, during PD 

infusion and good control during daytime hours.  Lower dextrose solution will 

be trialed this evening


Will also increase evening Lantus to 100U and continue mealtime boluses and q4h 

glucose checks


Patient would likely benefit from outpatient endocrinology support and consider 

a pump that could accomodate higher nighttime insulin needs   





(3) Diarrhea


Comment: 


Liquid stools


No additional GI complaints


Cdiff neg


Likely antibiotic related


Start probiotic


   





(4) ESRD (end stage renal disease)


Comment: 


Continue peritoneal dialysis


Followed by Dr Baker


Will follow electrolytes   





(5) HTN (hypertension)


Comment: 


Normotensive


Continue amlodipine, bisoprolol, losartan   





(6) PVD (peripheral vascular disease)


Comment: 


Relatively recent RLE stenting and Cruz


Cont ASA and Coumadin


Patient reports heparin allergy per interventionalist at [facility] per patient 

report and anticoagulation was recommended after vascular intervention in May 

2017


Vascular consult pending   





(7) Hx of coronary artery disease


Comment: 


Asx


Cont med management   





(8) DVT prophylaxis


Comment: 


SCDs


Coumadin, currently subtherapeutic.  Reported heparin allergy   


Status and Disposition: 





Inpatient.  Dispo per ortho.  Hospitalists will continue to follow along

## 2017-07-16 NOTE — PN
Progress Note





- Progress Note


Date of Service: 17


SOAP: 


Subjective:


Pt states that she has pain in her foot.  Dilaudid helps  the pain.  She 

denies CP, SOB, F/C or calf pain.








Objective:


38 y/o WDWN F NAD A&O x 3


RLE- dressing changed,  wound dehiscence over lateral aspect of incision, 

minimal erythema, +DF/PF, calf soft NT, +1 DP pulse, sensation intact





 Vital Signs











Temp Pulse Resp BP Pulse Ox


 


 100.0 F   93   18   121/49   93 


 


 17 08:10  17 08:10  17 09:49  17 08:10  17 08:10








 Laboratory Results - last 24 hr











  07/15/17 07/15/17 07/15/17





  11:52 17:23 20:58


 


WBC   


 


RBC   


 


Hgb   


 


Hct   


 


MCV   


 


MCH   


 


MCHC   


 


RDW   


 


Plt Count   


 


MPV   


 


Neut % (Auto)   


 


Lymph % (Auto)   


 


Mono % (Auto)   


 


Eos % (Auto)   


 


Baso % (Auto)   


 


Absolute Neuts (auto)   


 


Absolute Lymphs (auto)   


 


Absolute Monos (auto)   


 


Absolute Eos (auto)   


 


Absolute Basos (auto)   


 


Absolute Nucleated RBC   


 


Nucleated RBC %   


 


INR (Anticoag Therapy)   


 


Sodium   


 


Potassium   


 


Chloride   


 


Carbon Dioxide   


 


Anion Gap   


 


BUN   


 


Creatinine   


 


Est GFR ( Amer)   


 


Est GFR (Non-Af Amer)   


 


BUN/Creatinine Ratio   


 


Glucose   


 


POC Glucose (mg/dL)  143 H  205 H  321 H


 


Calcium   


 


C-Reactive Protein   


 


Random Vancomycin   














  17





  00:56 04:55 07:22


 


WBC   


 


RBC   


 


Hgb   


 


Hct   


 


MCV   


 


MCH   


 


MCHC   


 


RDW   


 


Plt Count   


 


MPV   


 


Neut % (Auto)   


 


Lymph % (Auto)   


 


Mono % (Auto)   


 


Eos % (Auto)   


 


Baso % (Auto)   


 


Absolute Neuts (auto)   


 


Absolute Lymphs (auto)   


 


Absolute Monos (auto)   


 


Absolute Eos (auto)   


 


Absolute Basos (auto)   


 


Absolute Nucleated RBC   


 


Nucleated RBC %   


 


INR (Anticoag Therapy)    0.91


 


Sodium   


 


Potassium   


 


Chloride   


 


Carbon Dioxide   


 


Anion Gap   


 


BUN   


 


Creatinine   


 


Est GFR ( Amer)   


 


Est GFR (Non-Af Amer)   


 


BUN/Creatinine Ratio   


 


Glucose   


 


POC Glucose (mg/dL)  404 H*  438 H* 


 


Calcium   


 


C-Reactive Protein   


 


Random Vancomycin   














  17





  07:22 07:22


 


WBC   10.3


 


RBC   3.96 L


 


Hgb   11.9 L


 


Hct   38


 


MCV   96


 


MCH   30


 


MCHC   31


 


RDW   17 H


 


Plt Count   272


 


MPV   9


 


Neut % (Auto)   80.4


 


Lymph % (Auto)   12.0 L


 


Mono % (Auto)   4.2


 


Eos % (Auto)   2.4


 


Baso % (Auto)   1.0


 


Absolute Neuts (auto)   8.3 H


 


Absolute Lymphs (auto)   1.2


 


Absolute Monos (auto)   0.4


 


Absolute Eos (auto)   0.2


 


Absolute Basos (auto)   0.1


 


Absolute Nucleated RBC   0.03


 


Nucleated RBC %   0.3


 


INR (Anticoag Therapy)  


 


Sodium  136 


 


Potassium  4.3 


 


Chloride  103 


 


Carbon Dioxide  25 


 


Anion Gap  8 


 


BUN  44 H 


 


Creatinine  5.18 H 


 


Est GFR ( Amer)  12.0 


 


Est GFR (Non-Af Amer)  9.3 


 


BUN/Creatinine Ratio  8.5 


 


Glucose  412 H 


 


POC Glucose (mg/dL)  


 


Calcium  8.2 L 


 


C-Reactive Protein  20.06 H 


 


Random Vancomycin  16.5 

















Assessment:


38 y/o female s/p right transmetatarsal amuptation 2017 by Dr. Zaragoza, 

admitted  for wound dehiscence, poor healing








Plan:


- NWB RLE


- IV Zosyn per ID, C&S pending 


- cont wet to dry betadine dressings bid


- Dr. Nguyen saw patient and is [facility] 

[facility] and NPO after midnight for possible TMA revision 

on  with Dr. Zaragoza

## 2017-07-16 NOTE — CONS
CC:  [facility] in [place] *

 

INTERVENTIONAL CARDIOLOGY CONSULT:

 

DATE OF CONSULT:  07/16/17

 

PRIMARY CARE PHYSICIAN:  Dr. Shanks.

 

FOOT SURGEON:  Dr. Zaragoza.

 

HISTORY OF PRESENT ILLNESS:  A 37-year-old type 1 diabetic with 2 vessel 
coronary disease by prior cath being treated medically, and PAD.  She had 
vascular studies in March with a right BECKY of 0.56 with monophasic signals at 
the ankle, left of 0.56.  She had a remote right big toe amputation, 2016.  She 
has had CLI of the right forefoot.  In March of this year, she had a CTA, which 
reported possible fibromuscular dysplasia of both renal arteries, a right SFA 6 
to 7 cm  followed by a stenosis and 2-vessel runoff, a left profunda 
occlusion and distal SFA stenosis, and occlusion of the posterior tibial.  She 
developed gangrene of the second right and third toes, on 05/22/17, underwent 
transmetatarsal amputation by Dr. Zaragoza for right forefoot osteomyelitis.  She 
is now readmitted because of failure to heal of the incision site.

 

She reports nighttime pain as well as neuropathic pain.  Vascular risk factors 
include diabetes type 1, continued smoking, hypertension and hyperlipidemia.

 

By history, she had an angiogram at [facility] in May, and had a left common 
femoral retrograde access crossover procedure with stent placement in the right 
lower extremity, by description one stent in the popliteal, one in the SFA.  We 
do not have records of that procedure.

 

PAST MEDICAL HISTORY:

1.  CKD stage 5, on peritoneal dialysis.

2.  Diabetes type 2 with gastroparesis.

3.  Morbid obesity.

4.  Hypertension.

5.  Hyperlipidemia.

6.  Coronary artery disease.  She had a catheterization in [place], 03/09/17, by 
report in system revealed codominant RCA, which was occluded and collateralized
, and distal circumflex, which was diseased, but too small for intervention. 
Coronary artery disease is being treated medically.

 

SOCIAL HISTORY:  She is a smoker.

 

FAMILY HISTORY:  Positive for diabetes.

 

MEDICATIONS:  Home meds:

1.  Zebeta.

2.  Aspirin 81 daily.

3.  Bronchodilators.

4.  Insulin.

5.  Neurontin.

6.  Lipitor 80 daily.

7.  Norvasc 10 daily.

8.  Zofran.

9.  Prilosec.

10.  Reglan.

11.  Losartan.

12.  Levaquin.

13.  Imdur.

 

ALLERGIES:  AUGMENTIN, CODEINE, HEPARIN, FLAGYL, MORPHINE, REQUIP, BACTRIM, 
ERYTHROMYCIN, NIACIN, and PLASTIC TAPE.

 

PHYSICAL EXAM:  She is morbidly obese, currently having peritoneal dialysis.  
Her most recent /49, heart rate in the 90s, she is obese.  She has no 
abdominal bruit, her femoral pulses are palpable, but very faint.  I cannot 
feel popliteal or pedal pulses.  Her right foot is dressed, I did not unwrap 
it.  She does not have edema.

 

DIAGNOSTIC STUDIES/LAB DATA: Her white count yesterday was normal, hemoglobin 
was 11.9. Sed rate is high at 48.  Chemistry panel on the 16th included 
creatinine of 5.18, BUN 44, blood sugar of 412.  Her CRP is 20.  Her Accu-Cheks 
have ranged from 100 to 400.

 

IMPRESSION: 

 1.  Critical limb ischemia, right transmetatarsal amputation site.  She is 
failing to heal.  Per history, she has had revascularization of the right SFA 
and popliteal, which were described occluded on the CTA in in March.  That CTA 
suggested 2-vessel runoff.  I will request CD of her angiogram from [facility] and 
reports to sort out what her below knee anatomy is and whether there is any 
need for further below-knee revascularization to facilitate healing of the 
transmetatarsal amputation site.  She is already on high dose statin, aspirin, 
antibiotics.  I note she is also on Coumadin.

2.  "Heparin allergy", is a potential complicating issue if true, evaluation by 
Hematology would be help[ful.

 

I will follow with you as needed.  Thank you for the consultation.

 

 [id number]/CPS #: [id number]

SAEED

## 2017-07-16 NOTE — PN
Hospitalist Progress Note





Glycemic control somewhat improved overnight last night.  Reviewed overnight 

vitals and labs, no significant changes noted.  IV access was lost earlier 

today and despite multiple attempts to gain access, none were successful. This 

prevented the patient from receiving IV opiates.  Despite strategies to utilize 

oral equivalents, patient was became quite inflamed and eventually left the 

unit AMA.





Vital Signs:











Temp Pulse Resp BP Pulse Ox


 


 98.0 F   95   16   135/74   95 


 


 07/16/17 11:43  07/16/17 11:43  07/16/17 13:22  07/16/17 11:43  07/16/17 11:43








 Laboratory Results - last 24 hr











  07/15/17 07/15/17 07/16/17





  17:23 20:58 00:56


 


WBC   


 


RBC   


 


Hgb   


 


Hct   


 


MCV   


 


MCH   


 


MCHC   


 


RDW   


 


Plt Count   


 


MPV   


 


Neut % (Auto)   


 


Lymph % (Auto)   


 


Mono % (Auto)   


 


Eos % (Auto)   


 


Baso % (Auto)   


 


Absolute Neuts (auto)   


 


Absolute Lymphs (auto)   


 


Absolute Monos (auto)   


 


Absolute Eos (auto)   


 


Absolute Basos (auto)   


 


Absolute Nucleated RBC   


 


Nucleated RBC %   


 


INR (Anticoag Therapy)   


 


Sodium   


 


Potassium   


 


Chloride   


 


Carbon Dioxide   


 


Anion Gap   


 


BUN   


 


Creatinine   


 


Est GFR ( Amer)   


 


Est GFR (Non-Af Amer)   


 


BUN/Creatinine Ratio   


 


Glucose   


 


POC Glucose (mg/dL)  205 H  321 H  404 H*


 


Calcium   


 


C-Reactive Protein   


 


Random Vancomycin   














  07/16/17 07/16/17 07/16/17





  04:55 07:22 07:22


 


WBC   


 


RBC   


 


Hgb   


 


Hct   


 


MCV   


 


MCH   


 


MCHC   


 


RDW   


 


Plt Count   


 


MPV   


 


Neut % (Auto)   


 


Lymph % (Auto)   


 


Mono % (Auto)   


 


Eos % (Auto)   


 


Baso % (Auto)   


 


Absolute Neuts (auto)   


 


Absolute Lymphs (auto)   


 


Absolute Monos (auto)   


 


Absolute Eos (auto)   


 


Absolute Basos (auto)   


 


Absolute Nucleated RBC   


 


Nucleated RBC %   


 


INR (Anticoag Therapy)   0.91 


 


Sodium    136


 


Potassium    4.3


 


Chloride    103


 


Carbon Dioxide    25


 


Anion Gap    8


 


BUN    44 H


 


Creatinine    5.18 H


 


Est GFR ( Amer)    12.0


 


Est GFR (Non-Af Amer)    9.3


 


BUN/Creatinine Ratio    8.5


 


Glucose    412 H


 


POC Glucose (mg/dL)  438 H*  


 


Calcium    8.2 L


 


C-Reactive Protein    20.06 H


 


Random Vancomycin    16.5














  07/16/17 07/16/17





  07:22 11:56


 


WBC  10.3 


 


RBC  3.96 L 


 


Hgb  11.9 L 


 


Hct  38 


 


MCV  96 


 


MCH  30 


 


MCHC  31 


 


RDW  17 H 


 


Plt Count  272 


 


MPV  9 


 


Neut % (Auto)  80.4 


 


Lymph % (Auto)  12.0 L 


 


Mono % (Auto)  4.2 


 


Eos % (Auto)  2.4 


 


Baso % (Auto)  1.0 


 


Absolute Neuts (auto)  8.3 H 


 


Absolute Lymphs (auto)  1.2 


 


Absolute Monos (auto)  0.4 


 


Absolute Eos (auto)  0.2 


 


Absolute Basos (auto)  0.1 


 


Absolute Nucleated RBC  0.03 


 


Nucleated RBC %  0.3 


 


INR (Anticoag Therapy)  


 


Sodium  


 


Potassium  


 


Chloride  


 


Carbon Dioxide  


 


Anion Gap  


 


BUN  


 


Creatinine  


 


Est GFR ( Amer)  


 


Est GFR (Non-Af Amer)  


 


BUN/Creatinine Ratio  


 


Glucose  


 


POC Glucose (mg/dL)   275 H


 


Calcium  


 


C-Reactive Protein  


 


Random Vancomycin

## 2017-07-18 NOTE — HP
Amended report to enter co-signer on report.



HISTORY AND PHYSICAL:

 

DATE OF ADMISSION:  07/16/17

 

ATTENDING SURGEON:  Dr. Ivan Zaragoza* (dictated by SHELLEY Cruz).

 

Date the patient left AMA was 07/16/17.

 

HISTORY OF PRESENT ILLNESS:  Ms. Masters is a 37-year-old female who presented 
to the ER for right foot transmetatarsal amputation wound dehiscence.  She was 
admitted to the floor on 07/13/17.  On 07/16/17, she left AMA due to lack of IV 
access.  She was readmitted on 07/16/17 and then 2 hours later after 
readmission she left AMA.

 

Again, there are no changes from the prior history and physical performed on 07/
13/17.

 

Medications, allergies, past medical history, review of systems and physical 
exam remained the same.

 

ASSESSMENT AND PLAN:  Ms. Rush is a 37-year-old female who presents to the 
clinical for a right transmetatarsal amputation wound dehiscence.  She left 
AMA. Therefore no further care was able to be provided.

 

 ____________________________________ 

SHELLEY CRUZ

 

[id number]/CPS #: [id number]

MTDALIE

## 2017-07-23 NOTE — ED
I, Oh,Soohbrianun, scribed for Reynaldo Nunez MD on 07/23/17 at 1754 .





Psychiatric Complaint





- HPI Summary


HPI Summary: 


This 38 y/o female presents to ED for acute on chronic depression and SI since 

today. Pt is also reported to have requested IV Dilaudid and threatened to kill 

ED staffs when refused. Pt does not report any plan. PMHx includes known DM and 

depression that is not controlled with any medication or counseling. Positive 

sleep disturbance and decreased appetite. Negative auditory hallucination. FHx 

is positive for bipolar and depression. Completed suicide to sister. 








- History Of Current Complaint


Chief Complaint: EDMentalHealth


Time Seen by Provider: 07/23/17 17:42


Hx Obtained From: Patient, Medical Records


Hx Last Menstrual Period: NOW


Onset/Duration: Still Present


Timing: Constant


Character: Depressed


Aggravating Factor(s): Nothing


Alleviating Factor(s): Nothing


Associated Signs And Symptoms: Positive: Hostile, Sleep Disturbance, Appetite 

Change.  Negative: Hallucinating


Related History: Positive For: Prior Psychiatric Issues


Has Suicidal: Reports: Thoughts





- Allergies/Home Medications


Allergies/Adverse Reactions: 


 Allergies











Allergy/AdvReac Type Severity Reaction Status Date / Time


 


Amoxicillin [From Augmentin] Allergy  See Comment Verified 07/23/17 16:13


 


Clavulanic Acid Allergy  See Comment Verified 07/23/17 16:13





[From Augmentin]     


 


Heparin Allergy  See Comment Verified 07/23/17 16:13


 


Metronidazole [From Flagyl] Allergy  Nausea And Verified 07/23/17 16:13





   Vomiting  


 


Morphine Allergy  See Comment Verified 07/23/17 16:13


 


Ropinirole [From Requip] Allergy  Hives Verified 07/23/17 16:13


 


Sulfamethoxazole Allergy  Nausea And Verified 07/23/17 16:13





w/Trimethoprim   Vomiting  





[From Bactrim]     


 


Codeine AdvReac  Nausea And Verified 07/23/17 16:13





   Vomiting  


 


Erythromycin AdvReac  "DOESN'T Verified 07/23/17 16:13





   WORK"  


 


Niacin AdvReac  Hives Verified 07/23/17 16:13


 


plastic tape Allergy  Blisters Uncoded 07/16/17 17:10














PMH/Surg Hx/FS Hx/Imm Hx


Endocrine/Hematology History: Reports: Hx Anticoagulant Therapy - Aspirin., Hx 

Blood Transfusions, Hx Diabetes, Hx Thyroid Disease - nodule biopsied, normal, 

Hx Anemia - hx of - reports had tranfusion in 2014 after mi


Cardiovascular History: Reports: Hx Angina, Hx Cardiac Arrest - in Dec. 2014 

with CPR, Hx Cardiomegaly, Hx Coronary Artery Disease, Hx Hypercholesterolemia, 

Hx Hypertension, Hx Myocardial Infarction, Other Cardiovascular Problems/

Disorders - hyperlipidemia, CAD


   Denies: Hx Congestive Heart Failure, Hx Deep Vein Thrombosis, Hx Pacemaker/

ICD, Hx Valvular Heart Disease


Respiratory History: Reports: Hx Asthma, Hx Chronic Obstructive Pulmonary 

Disease (COPD) - smoker, Hx Sleep Apnea - pt reports md thinks, but no official 

testing done yet, Other Respiratory Problems/Disorders - acute respipatory 

failure with hypoxia - dec 2016


   Denies: Hx Lung Cancer, Hx Pneumonia, Hx Pulmonary Embolism


GI History: Reports: Hx Gastroesophageal Reflux Disease, Other GI Disorders - 

GASTROPARESIS - TAKING OMEPRAZOLE


   Denies: Hx Gall Bladder Disease, Hx Gastrointestinal Bleed, Hx Ulcer, Hx 

Urosepsis


 History: Reports: Hx Acute Renal Failure, Hx Chronic Renal Failure, Hx 

Dialysis - PERITONEAL, Hx Kidney Stones - in past, Hx Renal Disease - ESRD on 

home peritoneal dialysis , Other  Problems/Disorders - ESRD- URINATES A SMALL 

AMOUNT


Musculoskeletal History: Reports: Hx Arthritis - back, hips, Hx Back Problems - 

Sciatica and Herniated L3 disk


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 

of R eye, Hx Vision Problem


   Denies: Hx Contacts or Glasses, Hx Hearing Aid


Opthamlomology History: Reports: Hx Eye Prosthesis - left, Hx Legally Blind - 30

% use of R eye, Hx Vision Problem


   Denies: Hx Contacts or Glasses


Neurological History: Reports: Other Neuro Impairments/Disorders - neuropathy


   Denies: Hx Transient Ischemic Attacks (TIA)


Psychiatric History: Reports: Hx Anxiety, Hx Depression, Hx Bipolar Disorder


   Denies: Hx Panic Disorder, Hx Schizophrenia





- Cancer History


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


Hx Chemotherapy: No


Hx Radiation Therapy: No





- Surgical History


Surgery Procedure, Year, and Place: LEFT EYE REMOVED 2012 - HAS PROSTHETIC EYE (

ORBITS DONE 5/2015 OK'D BY DR SAE COLE TO SCAN IN MRI) ;.  MELANOMA REMOVED 

FROM [facility] 2013 (PROCEDURE DONE 4X);.  CARDIAC CATH 2014 - NO STENTS;.  LEFT 

WRIST FISTULA PLACEMENT ([facility]) 2014;.  HEMODIALYSIS CATH RIGHT CHEST WALL 2014;.

  PERITONEAL DIALYSIS CATH 2/2015;.  CHEST WALL CATH REMOVAL 7/2016 [facility] [place];.  

RIGHT GREAT TOE AMPUTATION, [facility] [place] 4/2016;.  PLACEMENT RIGHT JUGULAR TESIO 

HEMODIALYSIS CATHETER [facility] [place] 8/27/2016;.  REMOVAL OF JUGULAR CATH - SEPT 2016.  

CARDIAC CATH - stentsx2 right leg.  PARTIAL AMPUTATION RIGHT TOES 5/2017


Hx Anesthesia Reactions: No





- Immunization History


Date of Tetanus Vaccine: UTD


Date of Influenza Vaccine: 8/15/16


Infectious Disease History: No


Infectious Disease History: Reports: Hx of Known/Suspected MRSA - MRSA R 1st toe


   Denies: Hx Clostridium Difficile, Hx Hepatitis, Hx Human Immunodeficiency 

Virus (HIV), Hx Shingles, Hx Tuberculosis, History Other Infectious Disease, 

Traveled Outside the [place] in Last 30 Days





- Family History


Known Family History: Positive: Cardiac Disease - father MI at 43 y/o, 

Hypertension, Other - Positive for bipolar and depression. Completed suicide to 

sister.  





- Social History


Alcohol Use: None


Hx Substance Use: No


Substance Use Type: Reports: None


Substance Use Comment - Amount & Last Used: using chantix to quit smoking


Hx Tobacco Use: Yes


Smoking Status (MU): Current Every Day Smoker


Type: Cigarettes


Amount Used/How Often: 1 PPD


Length of Time of Smoking/Using Tobacco: 20 YEARS


Have You Smoked in the Last Year: Yes





Review of Systems


Negative: Fever


Positive: Depressed, Other - Positive for SI


All Other Systems Reviewed And Are Negative: Yes





Physical Exam





- Summary


Physical Exam Summary: 








The patient is well-nourished in no acute distress and in no acute pain.





The skin is warm and dry and skin color reflects adequate perfusion.





HEENT:  The head is normocephalic and atraumatic. Right pupil reactive. Left 

eye is artificial and not reactive to light. The conjunctivae are clear and 

without drainage.  Nares are patent and without drainage.  Mouth reveals moist 

mucous membranes and the throat is without erythema and exudate.  The external 

ears are intact. The ear canals are patent and without drainage. The tympanic 

membranes are intact.





Neck is supple with full range of motion and non-tender. There are no carotid 

bruits.  There is no neck vein distension.





Respiratory: Chest is non-tender.  Lungs are clear to auscultation and breath 

sounds are symmetrical and equal.





Cardiovascular: Hear is regular rate and rhythm.  There is no murmur or rub 

auscultated.   There is no peripheral edema and pulses are symmetrical and 

equal.





Abdomen: The abdomen is soft and non-tender.  There are normal bowel sounds 

heard in all four quadrants and there is no organomegaly palpated.





Musculoskeletal: There is no back pain noted.  Extremities are non-tender with 

full range of motion.  There is good capillary refill.  There is no peripheral 

edema or calf tenderness elicited.





Neurological: Patient is alert and oriented to person, place and time.  The 

patient has symmetrical motor strength in all four extremities.  Cranial nerves 

are grossly intact. Deep tendon reflexes are symmetrical and equal in all four 

extremities.





Psychiatric: The patient is tearful 


Triage Information Reviewed: Yes


Vital Signs On Initial Exam: 


 Initial Vitals











Temp Pulse Resp BP Pulse Ox


 


 98.1 F   95   16   176/106   96 


 


 07/23/17 17:39  07/23/17 17:39  07/23/17 17:39  07/23/17 17:39  07/23/17 17:39











Vital Signs Reviewed: Yes





Diagnostics





- Vital Signs


 Vital Signs











  Temp Pulse Resp BP Pulse Ox


 


 07/23/17 17:39  98.1 F  95  16  176/106  96














- Laboratory


Lab Results: 


 Lab Results











  07/23/17 07/23/17 07/23/17 Range/Units





  18:30 18:30 18:30 


 


WBC  8.6    (3.5-10.8)  10^3/ul


 


RBC  4.51    (4.0-5.4)  10^6/ul


 


Hgb  13.7    (12.0-16.0)  g/dl


 


Hct  42    (35-47)  %


 


MCV  93    (80-97)  fL


 


MCH  30    (27-31)  pg


 


MCHC  33    (31-36)  g/dl


 


RDW  17 H    (10.5-15)  %


 


Plt Count  280    (150-450)  10^3/ul


 


MPV  9    (7.4-10.4)  um3


 


Neut % (Auto)  79.0    (38-83)  %


 


Lymph % (Auto)  12.2 L    (25-47)  %


 


Mono % (Auto)  4.9    (1-9)  %


 


Eos % (Auto)  2.6    (0-6)  %


 


Baso % (Auto)  1.3    (0-2)  %


 


Absolute Neuts (auto)  6.8    (1.5-7.7)  10^3/ul


 


Absolute Lymphs (auto)  1.0    (1.0-4.8)  10^3/ul


 


Absolute Monos (auto)  0.4    (0-0.8)  10^3/ul


 


Absolute Eos (auto)  0.2    (0-0.6)  10^3/ul


 


Absolute Basos (auto)  0.1    (0-0.2)  10^3/ul


 


Absolute Nucleated RBC  0.01    10^3/ul


 


Nucleated RBC %  0.1    


 


INR (Anticoag Therapy)    0.97  (0.89-1.11)  


 


Sodium   130 L   (133-145)  mmol/L


 


Potassium   5.3 H   (3.5-5.0)  mmol/L


 


Chloride   95 L   (101-111)  mmol/L


 


Carbon Dioxide   24   (22-32)  mmol/L


 


Anion Gap   11   (2-11)  mmol/L


 


BUN   46 H   (6-24)  mg/dL


 


Creatinine   4.80 H   (0.51-0.95)  mg/dL


 


Est GFR ( Amer)   13.1   (>60)  


 


Est GFR (Non-Af Amer)   10.2   (>60)  


 


BUN/Creatinine Ratio   9.6   (8-20)  


 


Glucose   433 H   ()  mg/dL


 


POC Glucose (mg/dL)     ()  mg/dL


 


Calcium   9.0   (8.6-10.3)  mg/dL


 


Total Bilirubin   0.30   (0.2-1.0)  mg/dL


 


AST   7 L   (13-39)  U/L


 


ALT   8   (7-52)  U/L


 


Alkaline Phosphatase   95   ()  U/L


 


Total Creatine Kinase   168   ()  U/L


 


Total Protein   6.5   (6.4-8.9)  g/dL


 


Albumin   3.2   (3.2-5.2)  g/dL


 


Globulin   3.3   (2-4)  g/dL


 


Albumin/Globulin Ratio   1.0   (1-3)  


 


TSH   1.25   (0.34-5.60)  mcIU/mL


 


Beta HCG, Quant   1.07   mIU/mL


 


Urine Color     


 


Urine Appearance     


 


Urine pH     (5-9)  


 


Ur Specific Gravity     (1.010-1.030)  


 


Urine Protein     (Negative)  


 


Urine Ketones     (Negative)  


 


Urine Blood     (Negative)  


 


Urine Nitrate     (Negative)  


 


Urine Bilirubin     (Negative)  


 


Urine Urobilinogen     (Negative)  


 


Ur Leukocyte Esterase     (Negative)  


 


Urine WBC (Auto)     (Absent)  


 


Urine RBC (Auto)     (Absent)  


 


Ur Squamous Epith Cells     (Absent)  


 


Urine Bacteria     (Absent)  


 


Urine Glucose     (Negative)  


 


Salicylates   < 2.50   (<30)  mg/dL


 


Urine Opiates Screen     (None Detect)  


 


Acetaminophen   < 15   mcg/mL


 


Ur Barbiturates Screen     (None Detect)  


 


Ur Phencyclidine Scrn     (None Detect)  


 


Ur Amphetamines Screen     (None Detect)  


 


U Benzodiazepines Scrn     (None Detect)  


 


Urine Cocaine Screen     (None Detect)  


 


U Cannabinoids Screen     (None Detect)  


 


Serum Alcohol   < 10   (<10)  mg/dL














  07/23/17 07/23/17 07/23/17 Range/Units





  20:34 20:34 21:19 


 


WBC     (3.5-10.8)  10^3/ul


 


RBC     (4.0-5.4)  10^6/ul


 


Hgb     (12.0-16.0)  g/dl


 


Hct     (35-47)  %


 


MCV     (80-97)  fL


 


MCH     (27-31)  pg


 


MCHC     (31-36)  g/dl


 


RDW     (10.5-15)  %


 


Plt Count     (150-450)  10^3/ul


 


MPV     (7.4-10.4)  um3


 


Neut % (Auto)     (38-83)  %


 


Lymph % (Auto)     (25-47)  %


 


Mono % (Auto)     (1-9)  %


 


Eos % (Auto)     (0-6)  %


 


Baso % (Auto)     (0-2)  %


 


Absolute Neuts (auto)     (1.5-7.7)  10^3/ul


 


Absolute Lymphs (auto)     (1.0-4.8)  10^3/ul


 


Absolute Monos (auto)     (0-0.8)  10^3/ul


 


Absolute Eos (auto)     (0-0.6)  10^3/ul


 


Absolute Basos (auto)     (0-0.2)  10^3/ul


 


Absolute Nucleated RBC     10^3/ul


 


Nucleated RBC %     


 


INR (Anticoag Therapy)     (0.89-1.11)  


 


Sodium     (133-145)  mmol/L


 


Potassium     (3.5-5.0)  mmol/L


 


Chloride     (101-111)  mmol/L


 


Carbon Dioxide     (22-32)  mmol/L


 


Anion Gap     (2-11)  mmol/L


 


BUN     (6-24)  mg/dL


 


Creatinine     (0.51-0.95)  mg/dL


 


Est GFR ( Amer)     (>60)  


 


Est GFR (Non-Af Amer)     (>60)  


 


BUN/Creatinine Ratio     (8-20)  


 


Glucose     ()  mg/dL


 


POC Glucose (mg/dL)    245 H  ()  mg/dL


 


Calcium     (8.6-10.3)  mg/dL


 


Total Bilirubin     (0.2-1.0)  mg/dL


 


AST     (13-39)  U/L


 


ALT     (7-52)  U/L


 


Alkaline Phosphatase     ()  U/L


 


Total Creatine Kinase     ()  U/L


 


Total Protein     (6.4-8.9)  g/dL


 


Albumin     (3.2-5.2)  g/dL


 


Globulin     (2-4)  g/dL


 


Albumin/Globulin Ratio     (1-3)  


 


TSH     (0.34-5.60)  mcIU/mL


 


Beta HCG, Quant     mIU/mL


 


Urine Color  Yellow    


 


Urine Appearance  Cloudy    


 


Urine pH  6.0    (5-9)  


 


Ur Specific Gravity  1.018    (1.010-1.030)  


 


Urine Protein  2+(100 mg/dl) H    (Negative)  


 


Urine Ketones  Negative    (Negative)  


 


Urine Blood  1+ H    (Negative)  


 


Urine Nitrate  Negative    (Negative)  


 


Urine Bilirubin  Negative    (Negative)  


 


Urine Urobilinogen  Negative    (Negative)  


 


Ur Leukocyte Esterase  Negative    (Negative)  


 


Urine WBC (Auto)  Trace(0-5/hpf)    (Absent)  


 


Urine RBC (Auto)  2+(6-10/hpf) H    (Absent)  


 


Ur Squamous Epith Cells  Present H    (Absent)  


 


Urine Bacteria  Absent    (Absent)  


 


Urine Glucose  3+(>=500 mg/dl) H    (Negative)  


 


Salicylates     (<30)  mg/dL


 


Urine Opiates Screen   Presumptive positive H   (None Detect)  


 


Acetaminophen     mcg/mL


 


Ur Barbiturates Screen   None detected   (None Detect)  


 


Ur Phencyclidine Scrn   None detected   (None Detect)  


 


Ur Amphetamines Screen   None detected   (None Detect)  


 


U Benzodiazepines Scrn   None detected   (None Detect)  


 


Urine Cocaine Screen   None detected   (None Detect)  


 


U Cannabinoids Screen   None detected   (None Detect)  


 


Serum Alcohol     (<10)  mg/dL











Result Diagrams: 


 07/23/17 18:30





 07/23/17 18:30


Lab Statement: Any lab studies that have been ordered have been reviewed, and 

results considered in the medical decision making process.





Re-Evaluation





- Re-Evaluation


  ** First Eval


Re-Evaluation Time: 19:42


Comment: MD in room to re-evaluate pt. Her right foot is re-checked. Whole toes 

have been amputated. Wound with 3 cm length and 1 cm deep open wound over #2-4 

metatarsal is noted without any sign of infection. Another wound with black 

edge is noted over #4-5, without any purulent discharge or malodorous smell.





Course/Dx





- Course


Assessment/Plan: This 38 y/o female presents to ED for depression and SI. Pt 

denies any plan. PMHx includes depression, HLD, DM, and renal failure. She is 

reported to have threatened to kill ED staffs when she requested IV Dilaudid 

and got refused. Tearful at time of initial evaluation. Pt refused EKG. Blood 

work indicates elevated blood glucose of 433, creatinine of 4.8, and BUN of 46. 

However, with known history of renal failure, hyperglycemia, and previous lab 

results, these lab results are deemed at her baseline. Pt is medically cleared 

and was given 10 units of insulin to symptomatically treated elevated blood 

glucose. Currently pending [facility]HE.





- Differential Dx/Clinical Impression


Differential Diagnosis/HQI/PQRI: Positive: Depression, Suicidal Ideation, Other 

- infected right foot, renal failure, hyperglycemia


Provider Diagnosis: 


 Diabetes mellitus, Chronic renal failure, chronic wound of right foot, 

Depression, Suicidal ideation








- Physician Notifications


Patient Is Medically Stable For: Psych Evaluation - 1924 PM





Discharge





- Discharge Plan


Condition: Stable


Disposition: OTHER


Discharge Disposition Comment: Pending [facility] at this moment. Signed out at shift 

change. 


Referrals: 


Yeni Shanks MD [Primary Care Provider] - 





The documentation as recorded by the Jamie cardenas Soohyun accurately reflects the 

service I personally performed and the decisions made by me, Reynaldo Nunez MD.
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
## 2017-07-25 NOTE — ED
Lower Extremity





- HPI Summary


HPI Summary: 





Patient presents to ED with CC of right foot pain which has been present x 6 

months and is not improving.  She is sent here by Dr. Thompson's office for admit 

to Dr. Zaragoza's service.  She has had surgery by DR. zaragoza and now complains 

of 10/10 pain is c/o nobody giving her pain medicine.  Denies other pain or 

symptoms.  She has had pain medications at home and states they are not helping 

her sxs improve. 





- History of Current Complaint


Chief Complaint: EDExtremityLower


Stated Complaint: RT FOOT PAIN


Time Seen by Provider: 07/25/17 10:44


Hx Obtained From: Patient


Hx Last Menstrual Period: NOW


Onset of Pain: Immediate


Onset/Duration: Still Present


Severity Initially: Severe


Severity Currently: Severe


Pain Intensity: 10


Pain Scale Used: 0-10 Numeric


Timing: Constant


Location: Is Discrete @ - right foot


Character Of Pain: Aching, Throbbing


Aggravating Factor(s): Standing, Ambulation


Alleviating Factor(s): Nothing


Able to Bear Weight: No





- Risk Factors


Gout Risk Factors: Negative


DVT Risk Factors: Smoking





- Allergies/Home Medications


Allergies/Adverse Reactions: 


 Allergies











Allergy/AdvReac Type Severity Reaction Status Date / Time


 


Amoxicillin [From Augmentin] Allergy  See Comment Verified 07/23/17 16:13


 


Clavulanic Acid Allergy  See Comment Verified 07/23/17 16:13





[From Augmentin]     


 


Heparin Allergy  See Comment Verified 07/23/17 16:13


 


Metronidazole [From Flagyl] Allergy  Nausea And Verified 07/23/17 16:13





   Vomiting  


 


Morphine Allergy  See Comment Verified 07/23/17 16:13


 


Ropinirole [From Requip] Allergy  Hives Verified 07/23/17 16:13


 


Sulfamethoxazole Allergy  Nausea And Verified 07/23/17 16:13





w/Trimethoprim   Vomiting  





[From Bactrim]     


 


Codeine AdvReac  Nausea And Verified 07/23/17 16:13





   Vomiting  


 


Erythromycin AdvReac  "DOESN'T Verified 07/23/17 16:13





   WORK"  


 


Niacin AdvReac  Hives Verified 07/23/17 16:13


 


plastic tape Allergy  Blisters Uncoded 07/16/17 17:10














PMH/Surg Hx/FS Hx/Imm Hx


Previously Healthy: No


Endocrine/Hematology History: Reports: Hx Anticoagulant Therapy - Aspirin., Hx 

Blood Transfusions, Hx Diabetes, Hx Thyroid Disease - nodule biopsied, normal, 

Hx Anemia - hx of - reports had tranfusion in 2014 after mi


Cardiovascular History: Reports: Hx Angina, Hx Cardiac Arrest - in Dec. 2014 

with CPR, Hx Cardiomegaly, Hx Coronary Artery Disease, Hx Hypercholesterolemia, 

Hx Hypertension, Hx Myocardial Infarction, Other Cardiovascular Problems/

Disorders - hyperlipidemia, CAD


   Denies: Hx Congestive Heart Failure, Hx Deep Vein Thrombosis, Hx Pacemaker/

ICD, Hx Valvular Heart Disease


Respiratory History: Reports: Hx Asthma, Hx Chronic Obstructive Pulmonary 

Disease (COPD) - smoker, Hx Sleep Apnea - pt reports md thinks, but no official 

testing done yet, Other Respiratory Problems/Disorders - acute respipatory 

failure with hypoxia - dec 2016


   Denies: Hx Lung Cancer, Hx Pneumonia, Hx Pulmonary Embolism


GI History: Reports: Hx Gastroesophageal Reflux Disease, Other GI Disorders - 

GASTROPARESIS - TAKING OMEPRAZOLE


   Denies: Hx Gall Bladder Disease, Hx Gastrointestinal Bleed, Hx Ulcer, Hx 

Urosepsis


 History: Reports: Hx Acute Renal Failure, Hx Chronic Renal Failure, Hx 

Dialysis - PERITONEAL, Hx Kidney Stones - in past, Hx Renal Disease - ESRD on 

home peritoneal dialysis , Other  Problems/Disorders - ESRD- URINATES A SMALL 

AMOUNT


Musculoskeletal History: Reports: Hx Arthritis - back, hips, Hx Back Problems - 

Sciatica and Herniated L3 disk


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 

of R eye, Hx Vision Problem


   Denies: Hx Contacts or Glasses, Hx Hearing Aid


Opthamlomology History: Reports: Hx Eye Prosthesis - left, Hx Legally Blind - 30

% use of R eye, Hx Vision Problem


   Denies: Hx Contacts or Glasses


Neurological History: Reports: Other Neuro Impairments/Disorders - neuropathy


   Denies: Hx Transient Ischemic Attacks (TIA)


Psychiatric History: Reports: Hx Anxiety, Hx Depression, Hx Bipolar Disorder


   Denies: Hx Eating Disorder, Hx Panic Disorder, Hx Schizophrenia, Hx of 

Violent Episodes Against Others





- Cancer History


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


Hx Chemotherapy: No


Hx Radiation Therapy: No





- Surgical History


Surgery Procedure, Year, and Place: LEFT EYE REMOVED 2012 - HAS PROSTHETIC EYE (

ORBITS DONE 5/2015 OK'D BY DR SAE COLE TO SCAN IN MRI) ;.  MELANOMA REMOVED 

FROM [facility] 2013 (PROCEDURE DONE 4X);.  CARDIAC CATH 2014 - NO STENTS;.  LEFT 

WRIST FISTULA PLACEMENT ([facility]) 2014;.  HEMODIALYSIS CATH RIGHT CHEST WALL 2014;.

  PERITONEAL DIALYSIS CATH 2/2015;.  CHEST WALL CATH REMOVAL 7/2016 [facility] [place];.  

RIGHT GREAT TOE AMPUTATION, [facility] 4/2016;.  PLACEMENT RIGHT JUGULAR TESIO 

HEMODIALYSIS CATHETER [facility] 8/27/2016;.  REMOVAL OF JUGULAR CATH - SEPT 2016.  

CARDIAC CATH - stentsx2 right leg.  PARTIAL AMPUTATION RIGHT TOES 5/2017


Hx Anesthesia Reactions: No





- Immunization History


Date of Tetanus Vaccine: UTD


Date of Influenza Vaccine: 8/15/16


Hx Pertussis Vaccination: No


Immunizations Up to Date: Unable to Obtain/Confirm


Infectious Disease History: Reports: Hx of Known/Suspected MRSA - MRSA R 1st toe


   Denies: Hx Clostridium Difficile, Hx Hepatitis, Hx Human Immunodeficiency 

Virus (HIV), Hx Shingles, Hx Tuberculosis, History Other Infectious Disease, 

Traveled Outside the [place] in Last 30 Days





- Family History


Known Family History: Positive: Cardiac Disease - father MI at 43 y/o, 

Hypertension, Other - Positive for bipolar and depression. Completed suicide to 

sister.  





- Social History


Occupation: Employed Full-time


Alcohol Use: None


Hx Substance Use: No


Substance Use Type: Reports: None


Substance Use Comment - Amount & Last Used: using chantix to quit smoking


Hx Tobacco Use: Yes


Smoking Status (MU): Current Every Day Smoker


Type: Cigarettes


Amount Used/How Often: 1 PPD


Length of Time of Smoking/Using Tobacco: 20 YEARS


Have You Smoked in the Last Year: Yes





Review of Systems


Constitutional: Negative


Eyes: Negative


Cardiovascular: Negative


Positive: Shortness Of Breath, Cough


Gastrointestinal: Negative


Positive: no symptoms reported, see HPI


Positive: Arthralgia, Myalgia


Skin: Negative


Neurological: Negative


All Other Systems Reviewed And Are Negative: Yes





Physical Exam


Triage Information Reviewed: Yes


Vital Signs On Initial Exam: 


 Initial Vitals











Temp Pulse Resp BP Pulse Ox


 


 97.9 F   110   18   162/104   93 


 


 07/25/17 10:35  07/25/17 10:35  07/25/17 10:35  07/25/17 10:35  07/25/17 10:35











Vital Signs Reviewed: Yes


Appearance: Positive: Well-Appearing, Well-Nourished


Skin: Positive: Warm, Skin Color Reflects Adequate Perfusion


Neck: Positive: Supple, No Lymphadenopathy


Respiratory/Lung Sounds: Positive: Clear to Auscultation, Breath Sounds Present


Cardiovascular: Positive: Normal


Musculoskeletal: Positive: Pain @ - right foot


Psychiatric: Positive: Normal


AVPU Assessment: Alert





Diagnostics





- Vital Signs


 Vital Signs











  Temp Pulse Resp BP Pulse Ox


 


 07/25/17 10:35  97.9 F  110  18  162/104  93














- Laboratory


Lab Statement: Any lab studies that have been ordered have been reviewed, and 

results considered in the medical decision making process.





Lower Extremity Course/Dx





- Course


Course Of Treatment: Patient sent to ED by Dr. Thompson for admit to Dr. Zaragoza's 

service. Called Dr. Zaragoza.  Will not admit for pain control or angiogram d/t 

many AMA.  Will schedule as an out patient.  She is to call Dr. Zaragoza's office 

to schedule follow up ppt.  Dr. Zaragoza will call Dr. Thompson.  Patient has 

continuing pain despite pain medicine.  Will give Dilaudid PO per Dr. Zaragoza's 

orders.  Patient upset upon dicharge.  It was explained to her that she will 

not be admitted d/t her AMA and non-compliance, but she denies every doing 

those.





- Diagnoses


Differential Diagnosis/HQI/PQRI: Positive: Other - pain, neuropathy, vascular 

compromise


Provider Diagnoses: 


 Neuropathy








Discharge





- Discharge Plan


Condition: Stable


Disposition: HOME


Prescriptions: 


HYDROmorphone TAB* [Dilaudid TAB*] 4 mg PO Q6H PRN #20 tab MDD 4


 PRN Reason: Pain


Referrals: 


Yeni Shanks MD [Primary Care Provider] - 


Additional Instructions: 


Follow up with DR. Zaragoza


Take ibuprofen for pain control


For breakthrough pain, you may take the dilaudid


Call Dr. Zaragoza's office today.

## 2017-07-25 NOTE — PN
Hospitalist Progress Note





Records from [facility] arrived and were reviewed.  It appears that patient 

underwent RLE angiogram 4/24/17 and stenting to the SFA, AK-popliteal and 

balloon angioplasty of the SFA, popliteal and peroneal with tPA thrombolysis to 

the R peroneal artery.  Shortly after the procedure patient lost her pulse in 

the RLE, she had occluded her SFA stent and another was deployed.  HIT was 

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  Initial screening was positive for HIT, but 

confirmatory testing was negative.  These test results were not available until 

after discharge.





Based on this information, it appears that HIT was not an accurate diagnosis 

and there is no reason to continue to avoid heparin products, but test results 

should be reviewed with hematology before decision is made to use heparin based 

products.





Also of note it appears that patient has been admitted on multiple occasions to 

[facility] with the same complaint of R foot pain which have resulted in patient 

leaving A and specifically requesting Dilaudid.

## 2017-07-25 NOTE — RAD
Indication: Post amputation RIGHT great toe. Poor RIGHT lower extremity wound healing.

Post stent placement in May 2017.



Comparison: March 23, 2017



Technique: Ankle and brachial blood pressure measurement. Calculated ankle-brachial

indices.

 

REPORT:  



The right ankle brachial index could not be calculated due to noncompressible vessels. Low

echo to biphasic posterior tibial and dorsalis pedis waveforms documented. Ankle pulse

volume recordings are are mostly devoid of reflected waves. 



The left ankle brachial index is 0.58 consistent with multisegment disease or long segment

occlusion without significant interval change. Biphasic posterior tibial and dorsalis

pedis waveforms. Arterial flow documented at the LEFT great toe. Grossly unremarkable LEFT

ankle pulse volume recording.



IMPRESSION: 

1. The RIGHT ankle brachial index could not be calculated due to noncompressible vessels.

Interval improvement in dorsalis pedis and posterior tibial waveforms compared with the

March 23, 2017 exam.

2. Claudication range LEFT ankle brachial index without significant interval change.

## 2017-07-25 NOTE — CONS
CONSULTATION NOTE:

 

DATE OF CONSULT:  07/25/17

 

HISTORY OF PRESENT ILLNESS:  Adri Moreno is seen in the emergency room.  
She is referred in by Dr. Baker, her nephrologist.  She is a 37-year-old 
diabetic lady who has had chronic wound problems of the right mid foot.

 

She had a transmetatarsal amputation previously with poor wound healing.  The 
wound healing problem has become chronic.  Recent radiographs in my office have 
displayed clean transmetatarsal amputation level without osteomyelitis, but her 
wounds in the lateral 4th metatarsal area does probe down to bone.  Adri has 
continued to smoke heavily throughout this recovery process.  Two weeks ago, I 
admitted her to my service for medical consultation and vascular consultation 
and infectious disease consultation with the hope of possibly revising the 
amputation.  During that hospital stay in a course of the 72-hour, she went AMA 
twice and so we were unable to accomplish any of the above.

 

Today, Dr. Baker saw her in his office with an overwhelming request and 
complaint of ongoing pain and poor pain management.  He has referred her to the 
hospitalist service today for medical evaluation, pain management evaluation, 
and Orthopedics consultation.

 

Adri appears to be in no acute distress, although she still complains of her 
baseline high level pain.  She did see a vascular doctor at [place] last week and 
we do not have records from that doctor, but evidently that doctor was 
interested in scheduling her for angiography or CT angiography of the limb.  
This was scheduled a month out.

 

Adri's wound at this time is chronically stable and probably 3 to 4 cm wide 
and a couple of cm deep down to bone with some clear serous drainage, not 
particularly foul smelling.  Overall wound margin is somewhat ischemic.  She 
does have a warm foot, but I am not able to palpate pulses.

 

The patient with multi-system problems:

1.  Pain management will be an issue.  I think the methadone may be a good idea.

2.  She has a nonhealing wound in the phase of smoking and poor compliance with 
medical regimen including 2 AMA departures from her recent hospitalization.  
She may have poor vascular supply of the area and it would be helpful if this 
could be evaluated by her vascular team, although the patient may or may not 
stay in the hospital to allow this to occur.  Also, the patient has poor 
progression to healing and may be at risk for osteomyelitis and spread of 
infection.

 

At this point, I would recommend Betadine packing to the right forefoot, not 
iodoform gauze, nonweightbearing status, ID consult, and vascular consult.

 

 [id number]/CPS #: [id number]

[facility]

## 2017-07-25 NOTE — HP
CC:  Yeni Shanks MD; Dr. Baker; Dr. Hinton; Dr. Felton; Dr. Zaragoza *

 

ADMISSION HISTORY AND PHYSICAL:

 

DATE OF ADMISSION:  07/25/17

 

PRIMARY CARE PROVIDER:  Yeni Shanks MD

 

NEPHROLOGIST:  Dr. Baker.

 

ORTHOPEDIC SURGEON:  Dr. Zaragoza.

 

CONSULTING INFECTIOUS DISEASE SPECIALIST:  Dr. Felton.

 

CONSULTING INTERVENTIONAL RADIOLOGIST:  Dr. Hinton.

 

ADMITTING PROVIDER:  SHELLEY Bertrand

 

SUPERVISING PHYSICIAN:  Sandoval Odom MD * (DICTATED BY SHELLEY BERTRAND)

 

CHIEF COMPLAINT:  Right foot pain.

 

HISTORY OF PRESENT ILLNESS:  This is a 37-year-old female with multiple medical 
problems including poorly controlled insulin-dependent diabetes as well as end- 
stage renal disease, managed by Dr. Baker with peritoneal dialysis as well as 
peripheral vascular disease, status post arterial stenting procedure completed 
at [facility] in May of 2017 as well as a relatively recent 
transmetatarsal right foot amputation completed by Dr. Zaragoza.  The patient was 
admitted to our hospital on 07/13/17 through 07/16/17, which resulted in her 
leaving against medical advice actually on 2 occasions within a 24-hour period 
due to her frustration with pain control and smoking policies.

 

The patient reports that she is continuing to have severe right foot pain and 
presented to her nephrologist, Dr. Baker's office earlier today with these 
complaints.  Dr. Baker recommended that she return to the emergency department 
for readmission.  The patient states that she has been afebrile at home.  She 
has been using mostly naproxen for pain control.  She has not been on any 
recent antibiotic therapy.  She denies any other recent illness including cough
, shortness of breath, abdominal pain, and nausea or vomiting.  Her only 
complaint at this time is right foot pain.  She describes the pain is a sharp, 
needle prick sensation even to light touch.

 

During the patient's last hospital stay, she had been started on broad-spectrum 
antibiotic therapy and focus was on improving glycemic control and vascular 
consultation and considering revision of the prior amputation site.  The 
patient did undergo a vascular stenting procedure at [facility] in May, 
which proceeded the transmetatarsal amputation that Dr. Zaragoza performed.  
Following her hospital stay at [facility], she was instructed not to continue to 
use any heparin products including heparin flushes for her peritoneal dialysis 
catheter and was told that she was allergic to this product.  She was also 
started on anticoagulation in the form of Eliquis at that time.  She was unable 
to afford her Eliquis co-pays and had subsequently been switched to Coumadin 
just prior to her last admission, but was subtherapeutic at that time.  Records 
were requested from her [facility] stay but not received prior to her leaving the 
hospital.

 

The patient has been largely noncompliant with recommendations for 
nonweightbearing or limited weightbearing on her right foot and continues to 
smoke quite heavily. Her glucose was extremely difficult to control and 
complicated by her peritoneal dialysis therapy, which requires a dextrose based 
solution.

 

The patient has been referred back to the hospital with the goals of achieving 
improved glycemic control and obtaining vascular consultation with plans to 
likely revise her prior amputation site.  Dr. Baker would like to trial 
methadone for pain control rather than alternate shorter-acting opioid.

 

PAST MEDICAL HISTORY:

1.  Poorly controlled insulin-dependent diabetes.

2.  End-stage renal disease, managed on peritoneal dialysis with Dr. Baker.

3.  Peripheral neuropathy.

4.  Coronary artery disease.

5.  Hypertension.

6.  Hyperlipidemia.

7.  Peripheral vascular disease, status post vascular stenting procedure at 
[facility] in May of 2017.

8.  Asthma.

9.  GERD.

10.  Depression.

11.  Anxiety.

 

PAST SURGICAL HISTORY:

1.  Coronary catheterization.

2.  PD catheter insertion.

3.  Great toe amputation.

4.  Right transmetatarsal amputation.

5.  Right lower extremity arterial stent.

6.  Left eye prosthesis.

 

HOME MEDICATIONS:

1.  Albuterol inhaler 2 puffs inhaled q.6 hours p.r.n. shortness of breath.

2.  Aspirin 81 mg p.o. daily.

3.  Atorvastatin 80 mg p.o. daily.

4.  Neurontin 100 mg p.o. t.i.d.

5.  NovoLog on a sliding scale with mealtime.

6.  Lantus 80 units b.i.d.

7.  Isosorbide mononitrate 60 mg p.o. each evening.

8.  Losartan 100 mg p.o. each evening.

9.  Reglan 10 mg p.o. q.6 hours as needed for nausea.

10.  Naproxen 220 mg p.o. q.6 hours as needed for pain.

11.  Omeprazole 20 mg p.o. daily.

12.  Coumadin 2.5 mg p.o. daily.

13.  Amlodipine 10 mg p.o. daily.

 

SOCIAL HISTORY:  The patient lives at home with her fiance.  She has about a 20
- pack year smoking history and continues to smoke approximately a pack of 
cigarettes daily.  She does not consume alcohol on a regular basis.

 

REVIEW OF SYSTEMS:  As noted above in the HPI.  All other systems reviewed and 
considered negative.

 

                               PHYSICAL EXAMINATION

 

GENERAL:  This is a 37-year-old female, who appears much older than stated age 
and is quite lethargic.  She opens her eyes for interaction and states that she 
is tired because she has not been sleeping well at night due to her right foot 
pain.

 

VITAL SIGNS:  Initial vitals, temperature 98.9 degrees Fahrenheit, pulse 100 
beats per minute, respiratory rate 17 per minute, oxygen saturation 94% on room 
air, and blood pressure 168/97 mmHg.

 

HEENT:  Head is normocephalic, atraumatic.  The patient is blind and has 
multiple dental caries.

 

NECK:  Supple and free of lymphadenopathy.

 

RESPIRATORY:  Lungs are clear to auscultation without wheezes, crackles, or 
rhonchi.

 

CARDIOVASCULAR:  Heart has a regular rate and rhythm without murmurs, rubs, or 
gallops.

 

ABDOMEN:  Soft and nontender to palpation.  The patient is currently hooked to 
a catheter via her peritoneal dialysis port.

 

EXTREMITIES:  Right lower extremity demonstrates a transmetatarsal amputation 
site. There are 2 open areas.  Both open areas are along prior incision site.  
The more medial is fairly shallow without surrounding erythema.  The more 
lateral site is approximately 1.5 x 1 cm and approximately 7 to 10 mm in depth.
  There is no active drainage or significant odor associated with it.  No 
significant surrounding erythema.  The patient is extremely tender to palpation 
around her entire foot reporting a pins and needle sensation and sharp stabbing 
pain with any palpation.

 

PSYCH:  The patient is alert and appropriately oriented.

 

 LABORATORY EVALUATION:  CBC shows a white blood cell count of 12,000, 
hemoglobin of 13.3 g/dL, and a platelet count of 267,000.  INR of 0.9.  PTT of 
30.  Comprehensive metabolic panel shows a sodium of 128, potassium 5.1, carbon 
dioxide 22, BUN 42, creatinine 4.69, random glucose of 432.  Transaminases and 
total bilirubin within normal limits.  CRP of 6.1.

 

IMAGING:  Most recent x-ray of her right foot completed 07/13/17 demonstrates 
transmetatarsal amputation without evidence of erosion or periosteal reaction 
to suggest osteomyelitis.

 

ASSESSMENT AND PLAN:  This is a 37-year-old female with multiple medical 
problems including poorly controlled insulin-dependent diabetes and end-stage 
renal disease as well as peripheral vascular disease and continued tobacco 
dependence who presents with a nonhealing right foot ulcer for further 
management.

 

1.  Nonhealing right foot ulcer - there are multiple factors contributing to 
poor wound healing including her poorly controlled diabetes, her smoking status
, and likely microvascular compromise.  Dr. Zaragoza has been involved in her 
care.  We will plan at this time to consult Dr. Felton from an infectious 
disease standpoint in regards to advice for antibiotics and empirically start 
Zosyn at this time.  We will also request services of Dr. Hinton to assess 
vascular status in that right lower leg to properly assess probability for 
wound healing to aid in further surgical planning.  Dr. Hinton has requested 
that we obtain records from her prior vascular procedure at [facility] as well as 
ABIs.  She had an BECKY completed prior to her stent completed in May that would 
be helpful to compare now that she has had vascular stenting procedure.  In 
terms of dressing changes, Dr. Zaragoza has recommended wet-to-dry dressings with 
Betadine changed daily.

2.  Right foot pain.  The patient reports that her pain has been worse since 
the time of her transmetatarsal amputation.  She describes the pain as a sharp 
stabbing needle prick sensation even with light palpation.  She is currently 
managed with gabapentin and has been using naproxen at home.  Dilaudid has been 
used in the past for more acute pain management.  Dr. Baker has suggested the 
use of methadone, which will be initiated at a dose of 5 to 10 mg p.o. q.6 
hours as needed for pain. Dr. Baker reports that she can continue p.r.n. 
naproxen despite her renal status.

3.  Insulin-dependent diabetes - her glucose control is extremely poor.  Last 
hemoglobin A1c from May of this year was 14.1%.  She is currently managed with 
Lantus 80 units twice daily at home with additional NovoLog at mealtime per 
sliding scale.  She has clear spikes in glucose with her peritoneal infusion, 
which is dextrose based.  Discussed this with Dr. Baker who stated that there 
is not an alternative for her dialysate and compensatory measures with her 
insulin will be necessary.  At this time, we will plan to increase her Lantus 
from 80 b.i.d. to 80 in the morning and 100 in the evening and give an 
additional bolus of regular insulin in the evenings at the time of her 
dialysate infusion.  We would continue with q.4 fingersticks to assess glycemic 
control especially in the overnight hours.

4.  End-stage renal disease - managed by Dr. Baker and we will continue with 
peritoneal dialysis.

5.  Tobacco dependence - the patient is unable or unwilling to consider 
cessation of tobacco products.  She will be prescribed a nicotine patch while 
she is here and use of a nicotine inhaler on an as needed basis.

6.  Peripheral vascular disease - please see discussion above.  She has 
undergone vascular stenting procedure at [facility] in May of this year.  Records 
from this are pending at this time.  She does give a history of being told that 
she was allergic to heparin products at that time and we will start her on the 
anticoagulation.  She is currently on Coumadin but with an INR of 0.9, which 
begs to question her compliance with anticoagulation.  Requested consultation 
from Dr. Viet Hinton, interventional radiologist to help assess vascular 
status of the right lower extremity.

7.  Asthma - this is more likely COPD.  No evidence of acute exacerbation at 
this time, but we will plan to continue her p.r.n. albuterol during her 
hospital stay.

8.  Code status:  The patient is full code.

9.  DVT prophylaxis:  The patient will be continued on Coumadin.

10.  Healthcare proxy is her fiance.

 

DISPOSITION:  The patient is being admitted to inpatient status with 
anticipated length of stay to be greater than 2 midnights.

 

 ____________________________________ SHELLEY BERTRAND

 

[id number]/CPS #: [id number]

SAEED

## 2017-07-26 NOTE — PN
Progress Note





- Progress Note


Date of Service: 07/26/17


SOAP: 


Subjective:


[]Patient seen at bedside, sleeping upon entering room.  She had consultation 

with Dr. Hinton who is scheduling her for a CTA later today.  She states her 

pain is better managed with the methadone.  She had her betadine wet -dry 

dressing change done this am.  She is hoping she may be able to go home 

tomorrow.








Objective:


[]


 Vital Signs











Temp  97.6 F   07/26/17 07:33


 


Pulse  93   07/26/17 07:33


 


Resp  17   07/26/17 07:46


 


BP  131/92   07/26/17 07:33


 


Pulse Ox  95   07/26/17 07:33








 Intake & Output











 07/25/17 07/26/17 07/26/17





 18:59 06:59 18:59


 


Intake Total  560 420


 


Balance  560 420


 


Weight 225 lb 220 lb 


 


Intake:   


 


  IV Fluids  20 


 


    NS (0.9%)  20 


 


  IVPB  100 


 


    ABX - ZOSYN  100 


 


  Oral  440 420


 


Other:   


 


  # Voids  1 








 Laboratory Results - last 24 hr











  07/25/17 07/25/17 07/25/17





  13:30 13:30 13:30


 


WBC  12.0 H  


 


RBC  4.44  


 


Hgb  13.3  


 


Hct  41  


 


MCV  92  


 


MCH  30  


 


MCHC  32  


 


RDW  17 H  


 


Plt Count  267  


 


MPV  9  


 


Neut % (Auto)  80.4  


 


Lymph % (Auto)  10.0 L  


 


Mono % (Auto)  5.3  


 


Eos % (Auto)  3.1  


 


Baso % (Auto)  1.2  


 


Absolute Neuts (auto)  9.7 H  


 


Absolute Lymphs (auto)  1.2  


 


Absolute Monos (auto)  0.6  


 


Absolute Eos (auto)  0.4  


 


Absolute Basos (auto)  0.1  


 


Absolute Nucleated RBC  0  


 


Nucleated RBC %  0  


 


INR (Anticoag Therapy)   0.90 


 


APTT   30.6 


 


Sodium    128 L


 


Potassium    5.1 H


 


Chloride    97 L


 


Carbon Dioxide    22


 


Anion Gap    9


 


BUN    42 H


 


Creatinine    4.69 H


 


Est GFR ( Amer)    13.5


 


Est GFR (Non-Af Amer)    10.5


 


BUN/Creatinine Ratio    9.0


 


Glucose    432 H


 


POC Glucose (mg/dL)   


 


Lactic Acid   


 


Calcium    8.8


 


Total Bilirubin    0.40


 


AST    9 L


 


ALT    9


 


Alkaline Phosphatase    102


 


C-Reactive Protein    6.19 H


 


Total Protein    6.4


 


Albumin    3.2


 


Globulin    3.2


 


Albumin/Globulin Ratio    1.0














  07/25/17 07/25/17 07/25/17





  15:15 15:58 16:12


 


WBC   


 


RBC   


 


Hgb   


 


Hct   


 


MCV   


 


MCH   


 


MCHC   


 


RDW   


 


Plt Count   


 


MPV   


 


Neut % (Auto)   


 


Lymph % (Auto)   


 


Mono % (Auto)   


 


Eos % (Auto)   


 


Baso % (Auto)   


 


Absolute Neuts (auto)   


 


Absolute Lymphs (auto)   


 


Absolute Monos (auto)   


 


Absolute Eos (auto)   


 


Absolute Basos (auto)   


 


Absolute Nucleated RBC   


 


Nucleated RBC %   


 


INR (Anticoag Therapy)   


 


APTT   


 


Sodium   


 


Potassium   


 


Chloride   


 


Carbon Dioxide   


 


Anion Gap   


 


BUN   


 


Creatinine   


 


Est GFR ( Amer)   


 


Est GFR (Non-Af Amer)   


 


BUN/Creatinine Ratio   


 


Glucose    399 H


 


POC Glucose (mg/dL)   404 H* 


 


Lactic Acid  1.5  


 


Calcium   


 


Total Bilirubin   


 


AST   


 


ALT   


 


Alkaline Phosphatase   


 


C-Reactive Protein   


 


Total Protein   


 


Albumin   


 


Globulin   


 


Albumin/Globulin Ratio   














  07/25/17 07/26/17 07/26/17





  19:50 00:23 04:43


 


WBC   


 


RBC   


 


Hgb   


 


Hct   


 


MCV   


 


MCH   


 


MCHC   


 


RDW   


 


Plt Count   


 


MPV   


 


Neut % (Auto)   


 


Lymph % (Auto)   


 


Mono % (Auto)   


 


Eos % (Auto)   


 


Baso % (Auto)   


 


Absolute Neuts (auto)   


 


Absolute Lymphs (auto)   


 


Absolute Monos (auto)   


 


Absolute Eos (auto)   


 


Absolute Basos (auto)   


 


Absolute Nucleated RBC   


 


Nucleated RBC %   


 


INR (Anticoag Therapy)   


 


APTT   


 


Sodium   


 


Potassium   


 


Chloride   


 


Carbon Dioxide   


 


Anion Gap   


 


BUN   


 


Creatinine   


 


Est GFR ( Amer)   


 


Est GFR (Non-Af Amer)   


 


BUN/Creatinine Ratio   


 


Glucose   


 


POC Glucose (mg/dL)  75  181 H  157 H


 


Lactic Acid   


 


Calcium   


 


Total Bilirubin   


 


AST   


 


ALT   


 


Alkaline Phosphatase   


 


C-Reactive Protein   


 


Total Protein   


 


Albumin   


 


Globulin   


 


Albumin/Globulin Ratio   














  07/26/17 07/26/17 07/26/17





  05:30 05:30 07:51


 


WBC  9.3  


 


RBC  4.17  


 


Hgb  12.7  


 


Hct  39  


 


MCV  93  


 


MCH  30  


 


MCHC  33  


 


RDW  17 H  


 


Plt Count  270  


 


MPV  9  


 


Neut % (Auto)  69.7  


 


Lymph % (Auto)  17.6 L  


 


Mono % (Auto)  8.0  


 


Eos % (Auto)  3.6  


 


Baso % (Auto)  1.1  


 


Absolute Neuts (auto)  6.5  


 


Absolute Lymphs (auto)  1.6  


 


Absolute Monos (auto)  0.7  


 


Absolute Eos (auto)  0.3  


 


Absolute Basos (auto)  0.1  


 


Absolute Nucleated RBC  0.01  


 


Nucleated RBC %  0.1  


 


INR (Anticoag Therapy)   


 


APTT   


 


Sodium   134 


 


Potassium   4.9 


 


Chloride   100 L 


 


Carbon Dioxide   23 


 


Anion Gap   11 


 


BUN   47 H 


 


Creatinine   5.03 H 


 


Est GFR ( Amer)   12.4 


 


Est GFR (Non-Af Amer)   9.7 


 


BUN/Creatinine Ratio   9.3 


 


Glucose   155 H 


 


POC Glucose (mg/dL)    233 H


 


Lactic Acid   


 


Calcium   8.6 


 


Total Bilirubin   


 


AST   


 


ALT   


 


Alkaline Phosphatase   


 


C-Reactive Protein   


 


Total Protein   


 


Albumin   


 


Globulin   


 


Albumin/Globulin Ratio   








Left foot dressings were not taken down as just changed








Assessment:


[]Poorly healing TMA incision left foot


  Pain management








Plan:


[]Currently on Zosyn and Doxycycline per Dr. Felton


   Continue BID betadine dressing changes


   Await Dr. Hinton's final recommendations with upcoming vascular study

## 2017-07-26 NOTE — CONS
MEDICAL ONCOLOGY/HEMATOLOGY CONSULTATION NOTE:

 

DATE OF CONSULTATION:  07/26/17

 

REASON FOR CONSULTATION:  Question of heparin-induced thrombocytopenia syndrome
, earlier this year at [facility].

 

HISTORY OF PRESENT ILLNESS:  This is a focused consult and history related to 
her previous vascular issues and question of prior HIT.  Adri Masters is a 37-
year- old female with a longstanding history of poorly controlled insulin-
dependent diabetes mellitus along with end-stage renal disease.  She is on 
peritoneal dialysis for approximately last 3 to 4 years.  She is status post 
arterial stenting procedure at [facility] in April 2017.

 

She is also status post recent transmetatarsal right foot amputation by Dr. Zaragoza. She has had multiple hospitalizations both here and at [facility] with multiple times of signing out AMA due to concern about pain 
control.

 

She is admitted at this time with right foot pain.  She had previously been 
receiving broad spectrum antibiotics and there is a question of revision of the 
prior amputation site.  On April 24, 2017, the patient was taken to operating 
room for right lower extremity angiogram and received a right superficial 
femoral artery bare metal stent and right AK popliteal bare metal stent.  Right 
superficial femoral artery and right popliteal and right peroneal balloon 
angioplasty with TPA thrombolysis into the right peroneal artery.  The patient 
returned to the operating room after she was in the recovery unit when she lost 
the pulse in her leg, which was confirmed with ultrasound studies.  Another 
stent was placed in the right superior femoral superficial femoral artery 
followed by balloon angioplasty of the right superficial femoral artery and 
right peroneal artery.  She was found to have an occluded right superficial 
femoral artery stent.  She received clopidogrel 300 mg between the two 
operations and heparin during the operation.

 

She developed a quick thrombosis in the newly placed stent in the right 
superficial femoral artery and heparin-induced thrombocytopenia syndrome was 
suspected.  The screening test was positive, but confirmatory serotonin test 
came back negative after the patient left the hospital AMA.  The patient had 
received bivalirudin and had been started on apixaban for suspected HIT.  The 
patient was not approved for apixaban due to insurance issues and subsequently 
approved for rivaroxaban and Coumadin. She did take Eliquis for a period of time
, but then had insurance not approve this medication.  She reports being off 
any of these anticoagulants for a period of several weeks and she reports being 
only started by Dr. Shanks on Coumadin approximately 2 to 3 days ago.  She has 
continued to use peritoneal dialysis since the time of this episode in April, 
but has not used any heparin or any other anticoagulants with her peritoneal 
dialysis and reports having had significant problems with fibrin clogging the 
peritoneal dialysis on multiple occasions.

 

Laboratory studies on arrival here had included an INR of 0.9 on 07/25/17, but 
she reports this was only 2 or 3 days after starting Coumadin at a dose of 2.5 
mg daily.

 

At the present time, we do not have the records from the prior hospitalization 
in terms of labs to know if she truly had HIT or not.  This will be extremely 
important as, if the patient is going to have any further stenting procedures, 
a decision will need to be made on what anticoagulants could be used at that 
time. If it truly looks as though she did have HIT, then recommendation would 
be to use argatroban assuming we have the proper laboratory studies available 
to monitor in that situation.  As opposed to the typical use of activated PTT 
to monitor dose of argatroban in the setting of acute stenting, recommendations 
are to use activated clotting times.  We will have to check with our lab here 
to see if this lab is available or if we need to find out a more definitive way 
of deciding on the proper dose of argatroban for her if this were to be a 
decision going forward.

 

It is unclear to me whether she truly did have HIT or not, she had received 
heparin intravenously for several days approximately 1 month prior to the 
episode of reported HIT per her recollection in an attempt to save vascular 
supply to the leg. In addition, she has been using heparin on a daily basis 
with her peritoneal dialysis for several years.  It is reported that she had an 
initial positive test for HIT and then a negative confirmatory test with 
serotonin.  While serotonin has often been considered to be the gold standard 
test, it in and of itself is also not 100% accurate.

 

It reportedly has a sensitivity and specificity of over 95% when used in an 
experienced lab, but in other labs has much less accuracy than that.  Given this
, it may be necessary to consider any positive test to be a true positive if we 
actually get those reports back.  At the present time, we will need to await 
laboratory results arriving from [facility] before making any 
further recommendations.  The patient's understanding of the above and further 
recommendations will follow once this laboratory test is available.

 

 [id number]/[facility] #: [id number]

[facility], [place]

## 2017-07-26 NOTE — CONS
CONSULTATION REPORT:

 

DATE OF CONSULT:  07/26/17

 

PROVIDER:  SHELLEY Shaffer.

 

CONSULTING SERVICE:  Infectious Disease.

 

REASON FOR CONSULT:  Right foot pain.

 

IMPRESSION:

1.  Status post right transmetatarsal amputation with recent wound breakdown 
but significant improvement in healing since last admission and without soft 
tissue evidence of infection and no systemic symptoms of infection.

2.  Right foot mild edema and pain.  The pain is worse at night as the edema of 
the foot is worse.  I wonder if edema is at least a component of her symptoms.

3.  Endstage renal disease, on peritoneal dialysis.

4.  Diabetes, insulin dependent.

5.  Peripheral vascular disease, history of right lower extremity intervention.

 

RECOMMENDATION:

1.  We will stop the Zosyn and we will change her over to doxycycline to cover 
these gram-positives and some gram-negatives associated, if there is a 
component of soft tissue infection still.

2.  Ace wrap or mild compression device to prevent fluid accumulation of right 
foot.

 

HISTORY OF PRESENT ILLNESS:  A 37-year-old with end-stage renal disease 
admitted with right foot pain.  She has had a right transmetatarsal amputation 
for osteomyelitis and ischemic digits with gangrene in May 2017, that had been 
healing okay, she had developed some pain and breakdown of the wound and was 
admitted to the hospital in mid July, she left AMA, came back, left again.  She 
was on Zosyn while she was here.  A culture was taken.  No organisms detected.  
An x-ray done July 13th showed no changes related to osteomyelitis.  Pathology 
from the surgical procedure showed an ulcer in ulcer bed, soft tissue margins 
are viable, chronic osteomyelitis.

 

She has had off and on severe right foot pain, it got much worse the last 
couple days, she does note that it swells throughout the day and is worse at 
night as is the pain.  In the morning, it is a little bit improved.  She has 
not tried any compression devices.  She has had no trouble with peritoneal 
dialysis recently.

 

PAST MEDICAL HISTORY:

1.  Endstage renal disease, on peritoneal dialysis.

2.  Diabetes, complicated by neuropathy and nephropathy.

3.  Coronary artery disease.

4.  Hypertension.

5.  Hyperlipidemia.

6.  Peripheral vascular disease, status post right lower extremity stent, May 
2017.

7.  Asthma.

8.  GERD.

9.  Depression.

10.  Anxiety.

11.  Diabetic retinopathy.

12.  Cardiac catheterization.

13.  Status post right great toe amputation and right transmetatarsal 
amputation.

14.  Status post left eye enucleation and prosthesis placement.

 

ALLERGIES:  AMOXICILLIN/CLAVULANIC ACID, HEPARIN, FLAGYL, MORPHINE.

 

MEDICATIONS:

1.  Tylenol.

2.  Albuterol.

3.  Lipitor.

4.  Insulin glargine.

5.  Losartan.

6.  Methadone.

7.  Nicotine inhaler.

8.  Nicotine patch.

9.  Zosyn 3.375 g every 12 hours.

10.  Warfarin 2.5 mg a day.

11.  Amlodipine.

 

SOCIAL HISTORY:  She is a smoker.  She lives outside [place] with her fiancee.  
No injection drugs.

 

FAMILY HISTORY:  No tuberculosis.

 

REVIEW OF SYSTEMS:  All negative to full review of systems except as noted 
above.

 

PHYSICAL EXAM:  Vital Signs:  Temperature is 36, heart rate 70, respiratory 
rate 16, blood pressure 114/52, O2 sat 92% on room air.  General:  She is awake
, not in distress.  HEENT:  There is no conjunctival hemorrhage.  Oropharynx 
without lesions.  Neck:  Neck is supple.  Lymph Nodes:  There is no palpable or 
visible regional lymphadenopathy.  Heart has regular rate and rhythm without 
murmurs, rubs, or gallops.  Lungs are clear to auscultation bilaterally.  
Abdomen:  Soft, obese, nontender.  There is a peritoneal dialysis catheter in 
the right lower quadrant which is without surrounding erythema.  She is hooked 
up to the PD bag.  Skin: There is no rashes or splinter hemorrhage.  
Musculoskeletal:  Right transmetatarsal amputation site is about half intact.  
There are a couple of other areas of 2 or 3 mm opening along the incision 
without fluctuance or erythema or serous drainage.

 

LABORATORY DATA:  Creatinine 5, potassium 4.9, white blood cell 9, hemoglobin 12
, platelets 270.

 

Please see impressions and recommendations outlined above, which I have 
discussed with Dr. Kurtz.

 

Thanks for asking me to see Ms. Masters in consultation.

 

 [id number]/[facility] #: [id number]

SAEED

## 2017-07-26 NOTE — PN
Subjective


Date of Service: 07/26/17


Interval History: 


HOSPITALIST PROGRESS NOTE


Patient seen and examined at bedside.


She feels better today. Her pain is better controlled with Methadone. Denies CP

, palpitations, or dyspnea.


Family History: Unchanged from Admission


Social History: Unchanged from Admission


Past Medical History: Unchanged from Admission





Objective


Active Medications: 








Acetaminophen (Tylenol Tab*)  650 mg PO Q4H PRN


   PRN Reason: FEVER/PAIN


Albuterol (Ventolin Hfa Inhaler*)  2 puff INH Q6H PRN


   PRN Reason: WHEEZING


Albuterol (Ventolin 2.5 Mg/3 Ml Neb.Sol*)  2.5 mg INH Q6H PRN


   PRN Reason: SOB/WHEEZING


Amlodipine Besylate (Norvasc Tab*)  10 mg PO QPM [facility]


   Last Admin: 07/25/17 17:12 Dose:  10 mg


Aspirin (Aspirin Low Dose Tab*)  81 mg PO DAILY [facility]


   Last Admin: 07/26/17 07:46 Dose:  81 mg


Atorvastatin Calcium (Lipitor*)  80 mg PO QPM [facility]


   Last Admin: 07/25/17 17:12 Dose:  80 mg


Device (Nicotine Mouth Piece*)  1 each INH .USE WITH NICOTROL PRN


   PRN Reason: CRAVING


   Last Admin: 07/25/17 19:26 Dose:  1 each


Dextrose (D50w Syringe 50 Ml*)  12.5 gm IV PUSH .FOR FS < 60 - SS PRN


   PRN Reason: FS < 60


Doxycycline Hyclate (Vibramycin Cap(*))  100 mg PO BID [facility]


Gabapentin (Neurontin Cap(*))  100 mg PO TID [facility]


   Last Admin: 07/26/17 07:46 Dose:  100 mg


Piperacillin Sod/Tazobactam (Sod 3.375 gm/ Sodium Chloride)  100 mls @ 25 mls/

hr IVPB Q12H [facility]


   Last Admin: 07/26/17 06:04 Dose:  25 mls/hr


Insulin Glargine (Lantus(*))  80 units SUBCUT DAILY [facility]


   Last Admin: 07/26/17 08:03 Dose:  80 units


Insulin Glargine (Lantus(*))  100 units SUBCUT 2100 [facility]


   Last Admin: 07/25/17 21:29 Dose:  100 units


Insulin Human Lispro (Humalog*)  0 - 15 units SUBCUT Q4H [facility]


   PRN Reason: Protocol


   Last Admin: 07/26/17 12:33 Dose:  Not Given


Insulin Human Regular (Insulin Regular(*))  20 units SUBCUT 1930 [facility]


   Last Admin: 07/26/17 09:03 Dose:  20 units


Isosorbide Mononitrate (Imdur Er Tab*)  60 mg PO QPM [facility]


   Last Admin: 07/25/17 17:12 Dose:  60 mg


Losartan Potassium (Cozaar Tab*)  100 mg PO QPM [facility]


   Last Admin: 07/25/17 17:11 Dose:  100 mg


Methadone HCl (Dolophine Tab*)  5 mg PO Q6H PRN


   PRN Reason: PAIN


Methadone HCl (Dolophine Tab*)  10 mg PO Q6H PRN


   PRN Reason: PAIN


   Last Admin: 07/26/17 07:46 Dose:  10 mg


Metoclopramide HCl (Reglan Tab*)  10 mg PO Q6H PRN


   PRN Reason: NAUSEA


   Last Admin: 07/26/17 06:09 Dose:  10 mg


Mupirocin (Bactroban 2 % Oint*)  1 applic TOPICAL DAILY [facility]


   Last Admin: 07/26/17 09:53 Dose:  Not Given


Naproxen (Naprosyn Tab*)  250 mg PO Q6H PRN


   PRN Reason: PAIN


Nicotine (Nicotine Inhaler*)  10 mg INH Q2H PRN


   PRN Reason: CRAVING


   Last Admin: 07/26/17 08:02 Dose:  10 mg


Nicotine (Nicotine Patch 21 Mg/24 Hr*)  1 patch TRANSDERM DAILY@0800 [facility]


   Last Admin: 07/26/17 07:47 Dose:  Not Given


Omeprazole (Prilosec Cap*)  20 mg PO QPM [facility]


   Last Admin: 07/25/17 17:12 Dose:  20 mg


Pharmacy Consult (Zosyn Per Pharmacy*)  1 note FOLLOW UP .ZOSYN PER PHARMACY [facility]


Pharmacy Profile Note (Nicotine Patch Removal Note*)  1 note FOLLOW UP 2100 [facility]


   Last Admin: 07/25/17 21:40 Dose:  1 note


Pharmacy Profile Note (Coumadin Daily Reminder*)  1 note FOLLOW UP 1700 [facility]


   Last Admin: 07/25/17 17:10 Dose:  1 note


Pregabalin (Lyrica Cap(*))  75 mg PO DAILY [facility]


   Last Admin: 07/26/17 07:46 Dose:  75 mg


Warfarin Sodium (Coumadin Tab(*))  2.5 mg PO DAILY@1700 [facility]


   PRN Reason: Protocol








Vital Signs











  07/26/17 07/26/17 07/26/17





  03:46 07:33 07:46


 


Temperature 97.6 F 97.6 F 


 


Pulse Rate 77 93 


 


Respiratory 16 16 17





Rate   


 


Blood Pressure 114/52 131/92 





(mmHg)   


 


O2 Sat by Pulse 92 95 





Oximetry   











Oxygen Devices in Use Now: None


Appearance: Overweight lady, appears older than stated age, lying in bed in 

NAD. Has an open back of potato chips at her bedside.


Eyes: No Scleral Icterus


Ears/Nose/Mouth/Throat: Mucous Membranes Moist


Neck: Trachea Midline


Respiratory: Symmetrical Chest Expansion and Respiratory Effort, Clear to 

Auscultation


Cardiovascular: RRR - Normal S1 and S2


Extremities: - - CDI to right foot


Neurological: Alert and Oriented x 3, NL Muscle Strength and Tone


Lines/Tubes/Other Access: Clean, Dry and Intact Peripheral IV


Nutrition: Taking PO's


Result Diagrams: 


 07/26/17 05:30





 07/26/17 05:30





Assess/Plan/Problems-Billing


Assessment: 


Mrs. Masters is a 38yo F with PMH of poorly controlled insulin dependent DM, 

ESRD on PD, diabetic neuropathy, CAD,  HTN, HLD, PVD s/p, asthma, GERD, 

depression, anxiety, non compliance, who presented to ED with c/o right foot 

pain, admitted for pain control and further wound management.





- Patient Problems


(1) Diabetic foot ulcer


Comment: - Multiple factors precluding healing at this time, but non compliance 

is the most important one.


- Ortho input appreciated - recommended vascular team evaluation.


- Seen by Dr. Hinton - recommended RLE arterial US to determine patency and 

exact location of stents.


- Will consult Hematology re: heparin use.


- ID input appreciated - continue Zosyn and doxycycline.   





(2) Foot pain, right


Comment: - Reports significant pain relief with Methadone.


- Will request Pain Management consult.   





(3) Diabetes


Comment: - Last HgbA1c was 14.3 in May.


- Despite repeated education patient remains non compliant - as per RN, patient 

had mac and cheese last night. Today she has a large bag of potato chips by her 

bed.


- Continue Lantus and lispro SS.   





(4) ESRD (end stage renal disease)


Comment: - Continue peritoneal dialysis.   





(5) PVD (peripheral vascular disease)


Comment: - S/p right SFA/pop stenting and Cruz.


- Not compliant with Warfarin as evidenced by her INR.


- Continue ASA and Warfarin.


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 

requested.   





(6) Tobacco abuse


Comment: - Advised to quit smoking.


- Continue Nicotine supplementation.    





(7) DVT prophylaxis


Comment: - Heparin contraindicated until h/o possible HIT is clarified. 

Fondaparinux contraindicated due to ESRD.


- SCDs.


- Continue Warfarin, but not yet therapeutic.   





(8) Full code status





Status and Disposition: 


Inpatient.

## 2017-07-26 NOTE — CONSULT
Consult


Consult: 





Date of Service: 17





Reason for Consultation: Evaluation for potential endovascular 

revascularization of the RLE





HPI: 37 YOF with advanced vasculopathy in the presence of chronic renal failure 

due to diabetes. The patient underwent stenting of the right SFA/pop at [place] 

in 2017. Abrupt post procedural occlusion of the stent and infrapopliteal 

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 

negative. Since then the patient has undergone TMA of the right foot and is 

showing signs of poor wound healing. 





PE: 


 Selected Entries











  17





  03:46 07:46


 


Temperature 97.6 F 


 


Temperature Oral 





Source  


 


Pulse Rate 77 


 


Respiratory  17





Rate  


 


Blood Pressure 114/52 





(mmHg)  


 


Blood Pressure 62 





Mean  


 


O2 Sat by Pulse 92 





Oximetry  


 


Patient on Room Yes 





Air  











NAD, AAO x 3


RRR, S1/S2


CTAB


Abdomen soft and nontender. +BS


Peritoneal dialysis catheter site is covered with C/D/I dressing


1+ B/L CFA pulses


Questionably palpable left PTA pulse


Cannot palpate either pop or left DPA


Dressing to distal right foot intact with small amount of dried blood (dressing 

was not removed to examine wound)








Objective Data:





 Laboratory Tests











  17





  13:30 05:30 05:30


 


WBC   9.3 


 


RBC   4.17 


 


Hgb   12.7 


 


Hct   39 


 


INR (Anticoag Therapy)  0.90  


 


APTT  30.6  


 


Potassium    4.9


 


BUN    47 H


 


Creatinine    5.03 H


 


Est GFR (Non-Af Amer)    9.7


 


POC Glucose (mg/dL)   














  17





  07:51


 


WBC 


 


RBC 


 


Hgb 


 


Hct 


 


INR (Anticoag Therapy) 


 


APTT 


 


Potassium 


 


BUN 


 


Creatinine 


 


Est GFR (Non-Af Amer) 


 


POC Glucose (mg/dL)  233 H








BECKY, 17: Improved waveforms from the pre-endovascular surgery procedure in 

2017, but persistently attenuated with noncompressibility of the IP 

arteries. 





Patient Name:                    MAKAYLA RUBIN                              

                                                                            

Medical Record#: [id number]


Ordering Physician: Ruben ROSA                                          

                                                                            

Acct.#: [id number]


:         1980                    Age: 37   Sex: F                    

                                                                            

Location: [address] [place] [address] MEDICAL


Exam Date: 17 1348                                                       

                                                                            ADM 

Status: ADM IN


Order Information:                                               VL ANK/

BRACHIAL INDICES


Accession Number:                                                [id number]


CPT:                                                             31029








Indication: Post amputation RIGHT great toe. Poor RIGHT lower extremity wound 

healing.


Post stent placement in May 2017.





Comparison: 2017





Technique: Ankle and brachial blood pressure measurement. Calculated ankle-

brachial


indices.


 


REPORT:  





The right ankle brachial index could not be calculated due to noncompressible 

vessels. Low


echo to biphasic posterior tibial and dorsalis pedis waveforms documented. 

Ankle pulse


volume recordings are are mostly devoid of reflected waves. 





The left ankle brachial index is 0.58 consistent with multisegment disease or 

long segment


occlusion without significant interval change. Biphasic posterior tibial and 

dorsalis


pedis waveforms. Arterial flow documented at the LEFT great toe. Grossly 

unremarkable LEFT


ankle pulse volume recording.





IMPRESSION: 


1. The RIGHT ankle brachial index could not be calculated due to 

noncompressible vessels.


Interval improvement in dorsalis pedis and posterior tibial waveforms compared 

with the


2017 exam.


2. Claudication range LEFT ankle brachial index without significant interval 

change.





____________________________________________________________


<Electronically signed by Kunal Proctor MD in OV>  17 1514


Dictated By: Kunal Proctor MD


Dictated Date/Time: 17 1514


Transcribed Date/Time: 17 1501











Summary: 





37 YOF, poorly controlled diabetic with vasculopathy advanced for her age, 

presenting with persistent RLE arterial insufficiency and subsequent poor TMA 

wound healing. 





I suspect either her right SFA stents have occluded or she has persistent/

worsening infrapopliteal arterial stenosis/occlusion which would render SFA 

stenting of minimal benefit to the foot. 





Plan & Recommendations:


1. Concur with plan to consult hematology to determine for certain if the 

patient has an allergy to Heparin as Heparin is a crucial component of safe 

endovascular technique. 


2. RLE arterial ultrasound specifically to determine patency and exact location 

of stents.


3. TBD if patient will benefit from further endovascular therapy or will 

require vascular surgical consultation.

## 2017-07-27 NOTE — PN
Progress Note





- Progress Note


Date of Service: 07/27/17


SOAP: 


Subjective:


CC: foot pain


HPI: 38 yo woman with R foot pain after TMA; pain now resolved with methadone 

and elevation.  No drainage from incision.  No fever, rash, or diarrhea.  








Objective:


[]


 Vital Signs











Temp  36.7 C   07/27/17 07:23


 


Pulse  104   07/27/17 07:23


 


Resp  18   07/27/17 08:58


 


BP  130/111   07/27/17 07:23


 


Pulse Ox  94   07/27/17 07:23








 Intake & Output











 07/26/17 07/27/17 07/27/17





 18:59 06:59 18:59


 


Intake Total 760 690 700


 


Output Total  200 


 


Balance 760 490 700


 


Weight  222 lb 


 


Intake:   


 


  IV Fluids 20 30 


 


    NS (0.9%) 20 30 


 


  IVPB 110 100 


 


    ABX - ZOSYN 110 100 


 


  Oral 630 560 700


 


Output:   


 


  Urine  200 


 


Other:   


 


  Estimated Void   Small


 


  # Bowel Movements  3 2


 


  Estimated Stool Amount  Small Medium


 


  # Voids 0 1 1








Gen:awake, no distress


HEENT:PERRL, MMM


Neck:Supple


Heart:RRR no murmur


Lungs:CTA BL


Abd:+BS NTND soft


Skin: no rash


MSK: R TMA site intact no erythema or drainage; trace edema


 Laboratory Results - last 24 hr











  07/26/17 07/26/17 07/26/17





  12:11 16:00 20:28


 


INR (Anticoag Therapy)   


 


POC Glucose (mg/dL)  98  70 L  408 H*














  07/26/17 07/27/17 07/27/17





  23:58 04:20 05:22


 


INR (Anticoag Therapy)    0.89


 


POC Glucose (mg/dL)  141 H  134 H 














  07/27/17





  07:42


 


INR (Anticoag Therapy) 


 


POC Glucose (mg/dL)  142 H

















Assessment:


1. R foot pain; due to recent infection, suspect partially due to edema as well


2. R foot osteomyelitis s/p TMA


3. ESRD/PD


4. DM with neuropathy








Plan:


1. continue doxycycline 100 mg po bid for 2 weeks


Discussed with Dr Jolley

## 2017-07-27 NOTE — PN
Progress Note





- Progress Note


Date of Service: 07/27/17


SOAP: 


Subjective:


Patient seen at bedside, she is sitting up eating breakfast. She states her 

pain is gone today and is better managed with the methadone. Dressing was 

changed today.  She states that she is feeling "antsy" today and is thinking of 

going home AMA. 








Objective:


Left foot dressing changed today. 2 wound are apparent. Lateral wound appears 

dry and crusted, No discharge is apparent from lateral wound. Medial wound has 

packing removed. Edges of lateral wound appear slightly necrotic. Yellowish 

discharge is apparent within the wound. 





Assessment:


[]Poorly healing TMA incision left foot


  Pain management








Plan:


[]Currently on Zosyn and Doxycycline per Dr. Felton


   Continue BID betadine dressing changes


   Awaiting for vascular consultation results.

## 2017-07-27 NOTE — RAD
INDICATION: Poorly healing right foot transmetatarsal amputation site.



COMPARISON: BECKY dated July 25, 2017



TECHNIQUE:  Gray scale, color Doppler, and spectral analysis utilized to image the right

lower extremity arteries. Flow velocities were determined at each visualized artery.



REPORT: 



The right common femoral artery, proximal femoral profundus, superficial femoral artery

and popliteal artery exhibited patency. There are stents in the proximal SFA and more

distally in the distal SFA extending into the superior portion of the popliteal artery

that exhibit patency.



There is patency in the distal popliteal artery and tibioperoneal trunk. The proximal

infrapopliteal arteries exhibit adequate patency with increased velocity measured at the

proximal peroneal artery of 149 cm/s. There is increasingly diminutive and low velocity

flow at the mid-level and more inferior infrapopliteal arteries.



IMPRESSION:





1. There are 2 right lower extremity stents, one in the proximal SFA and a second stent in

the distal SFA extending into the popliteal artery that appear to be patent.

2. There is increasingly diminutive and low velocity flow in the mid-level and distal

infrapopliteal arteries consistent with infrapopliteal/small vessel arterial

insufficiency.

## 2017-07-28 NOTE — DS
DISCHARGE SUMMARY:

 

DATE OF ADMISSION:  07/25/17

 

DATE OF DISCHARGE:  07/27/17

 

NOTE:  Please note the patient was not officially discharged.  She left against 
medical advice.

 

CONSULTANTS INVOLVED IN PATIENT'S CARE:  Included:

1.  Dr. Felton, Infectious Diseases.

2.  Dr. Zaragoza, Orthopedic Surgery.

3.  Dr. Akbar, Oncology.

4.  Dr. Hinton, Interventional Radiology.

 

Consults pending at the time of the patient leaving against medical advice 
included Pain Management consult from Dr. Rivera.

 

MEDICATIONS AT DISCHARGE:  Unchanged from admission.  The patient refused to 
have doxycycline prescribed at discharge as recommended by Dr. Felton.  She 
is planning to continue her previous medications as prescribed at home 
previously.

 

HOSPITALIZATION COURSE:  Ms. Masters is a 37-year-old female with history of 
peripheral vascular disease with PTCA and stenting performed at [facility] earlier this year on her right lower extremity.  Subsequently, the 
patient had right transmetatarsal full amputation by Dr. Zaragoza.  She has poor 
wound healing and she came in to the hospital complaining of right foot pain.  
Dr. Zaragoza saw the patient in surgical consultation and noted that the patient 
has a non-healing right foot ulcer with multiple factors contributing, 
including uncontrolled diabetes with hemoglobin A1c of 14 as well as vascular 
insufficiency.

 

Dr. Hinton was consulted from Interventional Radiology.  It was noted by Dr. Hinton that the patient has history of peripheral vascular disease that was 
intervened on the right foot by vascular specialist at [facility].  
At some point, the patient was diagnosed with questionable heparin-induced 
thrombocytopenia.  It was recommended for the patient to follow up with her 
vascular surgeons in [facility] for further management of her 
vascular insufficiency, it was deemed not to be emergent by Dr. Hinton.

 

In regards to a possibility of foot infection, Dr. Felton saw the patient 
from Infectious Diseases and noted chronic wound infection and recommended 2 
weeks of outpatient doxycycline treatment, which the patient refused to have 
prescribed at discharge.

 

In regards to the patient's possibility of heparin-induced thrombocytopenia, we 
are still in the process of obtaining the values from [facility], which 
included antiplatelet antibodies and a serotonin assay.  At this point, Dr. Akbar saw the patient in consultation and I discussed the case with Dr. Aguilar today.  The recommendation is to continue Coumadin and consideration 
for other anticoagulants may be necessary once the patient undergoes another 
revascularization.  At this point, the patient is signing out against medical 
advice and she is going to be continuing Coumadin and plans to see Dr. Shanks 
tomorrow in the office.

 

In regards to pain management, the patient was placed on methadone as per 
recommendation of Dr. Baker and did very well.  She stated that the pain was 
controlled.  The patient was awaiting Dr. Rivera's consult today, but she got 
too impatient and she cannot wait anymore.  I discussed with the patient that 
as per my discussion with Dr. Rivera, the patient will need to wait for 3 
months to be evaluated as outpatient in the pain clinic.  The patient is aware 
of that, but she is not willing to wait another couple of hours for Dr. Rivera
's consult and she is leaving against medical advice.

 

The patient stated that she is planning to follow up with Dr. Shanks tomorrow 
for a followup.

 

Please note that this is a short summary of the patient's hospital stay.  The 
patient is leaving against medical advice right now.

 

TIME SPENT:  Approximately 32 minutes was spent on discharge of this patient.

 

 [id number]/[facility] #: [id number]

[facility]

## 2017-08-08 NOTE — RAD
INDICATION:  Hypotension and poor wound healing status post right transmetatarsal

amputation



COMPARISON: Similar radiograph dated July 06, 2017



TECHNIQUE: 2 views of the right foot were obtained.



FINDINGS: The patient is status post right foot transmetatarsal amputation. The visualized

bones are otherwise intact and appropriately aligned. Advanced calcified atherosclerosis

is seen of the distal infrapopliteal and pedal arteries.



IMPRESSION:  POSTOPERATIVE FINDINGS AND ADVANCED CALCIFIED VASCULOPATHY HAS DESCRIBED

ABOVE.

## 2017-08-08 NOTE — RAD
INDICATION: Cough and hypotension



COMPARISON: Most recent comparison chest x-rays July 13, 2017

 

TECHNIQUE: Single AP portable view of the chest was obtained.



FINDINGS: 



Image quality is compromised due to the relative inferiority of a portable chest x-ray.



The heart and mediastinum exhibit normal size and contour.



The lungs are grossly clear. There is no evidence of a large pleural effusion.



Visualized bones are normal for the patient's age.



IMPRESSION:  No radiographic evidence for acute cardiopulmonary abnormality on this

portable chest x-ray.

## 2017-08-09 NOTE — ED
Akila RICARDO Alfonso, scribed for Kunal Canchola MD on 08/08/17 at 1628 .





Complex/Multi-Sys Presentation





- HPI Summary


HPI Summary: 


This patient is a 37 year old F presenting to [facility] accompanied by boyfriend 

with a chief complaint of weakness since earlier today. She states while at 

rest my blood pressure was really low and I passed out. The patient rates the 

aching pain 2/10 in severity. Symptoms aggravated and alleviated by nothing. 

Patient reports CP (for a few minutes when her blood pressure was low), syncope

, headache, tiredness, dizziness, lightheadedness, nausea, diarrhea (soft stools

), and low blood pressure (60/33 per what her boyfriend checked at home). 

Patient denies fever, chills, vomiting, and SOB. She reports blood pressure 

medication noncompliance for two months. She had a partial foot amputation in 

May. PSHx of partial amputation of right toes in 5/2017. PMHx DM, CAD, HLD, HTN

, COPD, GERD, sleep apnea, renal failure, arthritis, vision loss, and anxiety.





- History Of Current Complaint


Chief Complaint: EDWeakness


Time Seen by Provider: 08/08/17 16:21


Hx Obtained From: Patient, Family/Caretaker - Boyfriend


Onset/Duration: Sudden Onset, Lasting Hours - Earlier today, Still Present


Timing: Constant


Severity Currently: Moderate


Severity Initially: Mild


Character: Dull - Aching


Aggravating Factor(s): Nothing.


Alleviating Factor(s): Nothing.


Associated Signs And Symptoms: Positive: Other - Patient reports CP (for a few 

minutes when her blood pressure was low), syncope, headache, tiredness, 

dizziness, lightheadedness, nausea, diarrhea (soft stools), and low blood 

pressure (60/33 per what her boyfriend checked at home). Patient denies fever, 

chills, vomiting, and SOB.





- Allergies/Home Medications


Allergies/Adverse Reactions: 


 Allergies











Allergy/AdvReac Type Severity Reaction Status Date / Time


 


Amoxicillin [From Augmentin] Allergy  See Comment Verified 08/08/17 15:39


 


Clavulanic Acid Allergy  See Comment Verified 08/08/17 15:39





[From Augmentin]     


 


Heparin Allergy  See Comment Verified 08/08/17 15:39


 


Metronidazole [From Flagyl] Allergy  Nausea And Verified 08/08/17 15:39





   Vomiting  


 


Morphine Allergy  See Comment Verified 08/08/17 15:39


 


Ropinirole [From Requip] Allergy  Hives Verified 08/08/17 15:39


 


Sulfamethoxazole Allergy  Nausea And Verified 08/08/17 15:39





w/Trimethoprim   Vomiting  





[From Bactrim]     


 


Codeine AdvReac  Nausea And Verified 08/08/17 15:39





   Vomiting  


 


Erythromycin AdvReac  "DOESN'T Verified 08/08/17 15:39





   WORK"  


 


Niacin AdvReac  Hives Verified 08/08/17 15:39


 


plastic tape Allergy  Blisters Uncoded 08/08/17 15:39











Home Medications: 


 Home Medications





Methadone TAB* [Dolophine TAB*] 10 mg PO Q6H PRN 08/08/17 [History Confirmed 08/ 08/17]











PMH/Surg Hx/FS Hx/Imm Hx


Endocrine/Hematology History: Reports: Hx Anticoagulant Therapy - Aspirin., Hx 

Blood Transfusions, Hx Diabetes, Hx Thyroid Disease - nodule biopsied, normal, 

Hx Anemia - hx of - reports had tranfusion in 2014 after mi


Cardiovascular History: Reports: Hx Angina, Hx Cardiac Arrest - in Dec. 2014 

with CPR, Hx Cardiomegaly, Hx Coronary Artery Disease, Hx Hypercholesterolemia, 

Hx Hypertension, Hx Myocardial Infarction, Other Cardiovascular Problems/

Disorders - hyperlipidemia, CAD


   Denies: Hx Congestive Heart Failure, Hx Deep Vein Thrombosis, Hx Pacemaker/

ICD, Hx Valvular Heart Disease


Respiratory History: Reports: Hx Asthma, Hx Chronic Obstructive Pulmonary 

Disease (COPD) - smoker, Hx Sleep Apnea - pt reports md thinks, but no official 

testing done yet, Other Respiratory Problems/Disorders - acute respipatory 

failure with hypoxia - dec 2016


   Denies: Hx Lung Cancer, Hx Pneumonia, Hx Pulmonary Embolism


GI History: Reports: Hx Gastroesophageal Reflux Disease, Other GI Disorders - 

GASTROPARESIS - TAKING OMEPRAZOLE


   Denies: Hx Gall Bladder Disease, Hx Gastrointestinal Bleed, Hx Ulcer, Hx 

Urosepsis


 History: Reports: Hx Acute Renal Failure, Hx Chronic Renal Failure, Hx 

Dialysis - PERITONEAL, Hx Kidney Stones - in past, Hx Renal Disease - ESRD on 

home peritoneal dialysis , Other  Problems/Disorders - ESRD- URINATES A SMALL 

AMOUNT


Musculoskeletal History: Reports: Hx Arthritis - back, hips, Hx Back Problems - 

Sciatica and Herniated L3 disk


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 

of R eye, Hx Vision Problem


   Denies: Hx Contacts or Glasses, Hx Hearing Aid


Opthamlomology History: Reports: Hx Eye Prosthesis - left, Hx Legally Blind - 30

% use of R eye, Hx Vision Problem


   Denies: Hx Contacts or Glasses


Neurological History: Reports: Other Neuro Impairments/Disorders - neuropathy


   Denies: Hx Transient Ischemic Attacks (TIA)


Psychiatric History: Reports: Hx Anxiety, Hx Depression, Hx Bipolar Disorder


   Denies: Hx Eating Disorder, Hx Panic Disorder, Hx Schizophrenia, Hx of 

Violent Episodes Against Others





- Cancer History


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


Hx Chemotherapy: No


Hx Radiation Therapy: No





- Surgical History


Surgery Procedure, Year, and Place: LEFT EYE REMOVED 2012 - HAS PROSTHETIC EYE (

ORBITS DONE 5/2015 OK'D BY DR SAE COLE TO SCAN IN MRI) ;.  MELANOMA REMOVED 

FROM [facility] 2013 (PROCEDURE DONE 4X);.  CARDIAC CATH 2014 - NO STENTS;.  LEFT 

WRIST FISTULA PLACEMENT ([facility]) 2014;.  HEMODIALYSIS CATH RIGHT CHEST WALL 2014;.

  PERITONEAL DIALYSIS CATH 2/2015;.  [place] W[place] [facility]  

RIGHT GREAT [place]E [place] [facility] [place];.  PLACEMENT RIGHT JUGULAR TESIO 

HEMODIALYSIS CATHETER [facility] [place];.  REMOVAL OF JUGULAR CATH - SEPT 2016.  

CARDIAC CATH - stentsx2 right leg.  PARTIAL AMPUTATION RIGHT TOES 5/2017


Hx Anesthesia Reactions: No





- Immunization History


Date of Tetanus Vaccine: UTD


Date of Influenza Vaccine: 8/15/16


Infectious Disease History: No


Infectious Disease History: Reports: Hx of Known/Suspected MRSA - MRSA R 1st toe


   Denies: Hx Clostridium Difficile, Hx Hepatitis, Hx Human Immunodeficiency 

Virus (HIV), Hx Shingles, Hx Tuberculosis, History Other Infectious Disease, 

Traveled Outside the [place] in Last 30 Days





- Family History


Known Family History: Positive: Cardiac Disease - father MI at 41 y/o, 

Hypertension, Other - Positive for bipolar and depression. Completed suicide to 

sister.  





- Social History


Alcohol Use: None


Hx Substance Use: No


Substance Use Type: Reports: None


Substance Use Comment - Amount & Last Used: using chantix to quit smoking


Hx Tobacco Use: Yes


Smoking Status (MU): Current Every Day Smoker


Type: Cigarettes


Amount Used/How Often: 1 PPD


Length of Time of Smoking/Using Tobacco: 20 YEARS


Have You Smoked in the Last Year: Yes





Review of Systems


Positive: Other - Positive low blood pressure (60/33 per what her boyfriend 

checked at home).  Negative: Fever, Chills


Positive: Chest Pain - for a few minutes when her blood pressure was low


Negative: Shortness Of Breath


Positive: Diarrhea - Soft stools, Nausea.  Negative: Vomiting


Neurological: Other - Positive weakness, syncope, headache, tiredness, dizziness

, lightheadedness.


All Other Systems Reviewed And Are Negative: Yes





Physical Exam





- Summary


Physical Exam Summary: 





VITAL SIGNS: Reviewed.


GENERAL:  Patient is an obese female who is lying comfortable in the stretcher.

  Patient is not in any acute respiratory distress.


HEAD AND FACE: No signs of trauma.  No ecchymosis, hematomas or skull 

depressions. No sinus tenderness.


EYES: PERRLA, EOMI x 2, No injected conjunctiva, no nystagmus.


EARS: Hearing grossly intact. Ear canals and tympanic membranes are within 

normal limits.


MOUTH: Oropharynx within normal limits.


NECK: Supple, trachea is midline, no adenopathy, no JVD, no carotid bruit, no c-

spine tenderness, neck with full ROM.


CHEST: Symmetric, no tenderness at palpation


LUNGS: Clear to auscultation bilaterally. No wheezing or crackles.


CVS: Regular rate and rhythm, S1 and S2 present, no murmurs or gallops 

appreciated.


ABDOMEN: Soft, non-tender. No signs of distention. No rebound no guarding, and 

no masses palpated. Bowel sounds are normal.


EXTREMITIES: Right foot toe amputations with packing in the wound. There is no 

discharge. However, there is increase warmth in the right foot.


NEURO: Alert and oriented x 3. No acute neurological deficits. Speech is normal 

and follows commands.


SKIN: Dry and warm





Triage Information Reviewed: Yes


Vital Signs On Initial Exam: 


 Initial Vitals











Temp Pulse Resp BP Pulse Ox


 


 97.8 F   102   20   106/55   92 


 


 08/08/17 15:39  08/08/17 15:39  08/08/17 15:39  08/08/17 15:39  08/08/17 15:39











Vital Signs Reviewed: Yes





- Dalia Coma Scale


Coma Scale Total: 15





Diagnostics





- Vital Signs


 Vital Signs











  Temp Pulse Resp BP Pulse Ox


 


 08/08/17 16:02  97.4 F  94  14  108/58  91


 


 08/08/17 15:39  97.8 F  102  20  106/55  92














- Laboratory


Lab Results: 


 Lab Results











  08/08/17 08/08/17 08/08/17 Range/Units





  16:55 16:55 16:55 


 


WBC  11.0 H    (3.5-10.8)  10^3/ul


 


RBC  4.56    (4.0-5.4)  10^6/ul


 


Hgb  14.2    (12.0-16.0)  g/dl


 


Hct  43    (35-47)  %


 


MCV  93    (80-97)  fL


 


MCH  31    (27-31)  pg


 


MCHC  33    (31-36)  g/dl


 


RDW  17 H    (10.5-15)  %


 


Plt Count  277    (150-450)  10^3/ul


 


MPV  9    (7.4-10.4)  um3


 


Neut % (Auto)  79.2    (38-83)  %


 


Lymph % (Auto)  12.9 L    (25-47)  %


 


Mono % (Auto)  4.9    (1-9)  %


 


Eos % (Auto)  2.1    (0-6)  %


 


Baso % (Auto)  0.9    (0-2)  %


 


Absolute Neuts (auto)  8.7 H    (1.5-7.7)  10^3/ul


 


Absolute Lymphs (auto)  1.4    (1.0-4.8)  10^3/ul


 


Absolute Monos (auto)  0.5    (0-0.8)  10^3/ul


 


Absolute Eos (auto)  0.2    (0-0.6)  10^3/ul


 


Absolute Basos (auto)  0.1    (0-0.2)  10^3/ul


 


Absolute Nucleated RBC  0.01    10^3/ul


 


Nucleated RBC %  0    


 


ESR  35 H    (0-14)  mm/Hr


 


INR (Anticoag Therapy)   0.90   (0.89-1.11)  


 


APTT   31.6   (26.0-36.3)  seconds


 


Sodium    129 L  (133-145)  mmol/L


 


Potassium    3.9  (3.5-5.0)  mmol/L


 


Chloride    91 L  (101-111)  mmol/L


 


Carbon Dioxide    28  (22-32)  mmol/L


 


Anion Gap    10  (2-11)  mmol/L


 


BUN    36 H  (6-24)  mg/dL


 


Creatinine    6.12 H  (0.51-0.95)  mg/dL


 


Est GFR ( Amer)    9.9  (>60)  


 


Est GFR (Non-Af Amer)    7.7  (>60)  


 


BUN/Creatinine Ratio    5.9 L  (8-20)  


 


Glucose    433 H  ()  mg/dL


 


Lactic Acid     (0.5-2.0)  mmol/L


 


Calcium    8.8  (8.6-10.3)  mg/dL


 


Total Bilirubin    0.40  (0.2-1.0)  mg/dL


 


AST    5 L  (13-39)  U/L


 


ALT    6 L  (7-52)  U/L


 


Alkaline Phosphatase    107 H  ()  U/L


 


Total Creatine Kinase    77  ()  U/L


 


Troponin I    0.08 H*  (<0.04)  ng/mL


 


C-Reactive Protein    22.07 H  (< 5.00)  mg/L


 


B-Natriuretic Peptide    ( - 100) pg/mL


 


Total Protein    6.4  (6.4-8.9)  g/dL


 


Albumin    3.3  (3.2-5.2)  g/dL


 


Globulin    3.1  (2-4)  g/dL


 


Albumin/Globulin Ratio    1.1  (1-3)  














  08/08/17 08/08/17 Range/Units





  16:55 16:55 


 


WBC    (3.5-10.8)  10^3/ul


 


RBC    (4.0-5.4)  10^6/ul


 


Hgb    (12.0-16.0)  g/dl


 


Hct    (35-47)  %


 


MCV    (80-97)  fL


 


MCH    (27-31)  pg


 


MCHC    (31-36)  g/dl


 


RDW    (10.5-15)  %


 


Plt Count    (150-450)  10^3/ul


 


MPV    (7.4-10.4)  um3


 


Neut % (Auto)    (38-83)  %


 


Lymph % (Auto)    (25-47)  %


 


Mono % (Auto)    (1-9)  %


 


Eos % (Auto)    (0-6)  %


 


Baso % (Auto)    (0-2)  %


 


Absolute Neuts (auto)    (1.5-7.7)  10^3/ul


 


Absolute Lymphs (auto)    (1.0-4.8)  10^3/ul


 


Absolute Monos (auto)    (0-0.8)  10^3/ul


 


Absolute Eos (auto)    (0-0.6)  10^3/ul


 


Absolute Basos (auto)    (0-0.2)  10^3/ul


 


Absolute Nucleated RBC    10^3/ul


 


Nucleated RBC %    


 


ESR    (0-14)  mm/Hr


 


INR (Anticoag Therapy)    (0.89-1.11)  


 


APTT    (26.0-36.3)  seconds


 


Sodium    (133-145)  mmol/L


 


Potassium    (3.5-5.0)  mmol/L


 


Chloride    (101-111)  mmol/L


 


Carbon Dioxide    (22-32)  mmol/L


 


Anion Gap    (2-11)  mmol/L


 


BUN    (6-24)  mg/dL


 


Creatinine    (0.51-0.95)  mg/dL


 


Est GFR ( Amer)    (>60)  


 


Est GFR (Non-Af Amer)    (>60)  


 


BUN/Creatinine Ratio    (8-20)  


 


Glucose    ()  mg/dL


 


Lactic Acid  2.1 H*   (0.5-2.0)  mmol/L


 


Calcium    (8.6-10.3)  mg/dL


 


Total Bilirubin    (0.2-1.0)  mg/dL


 


AST    (13-39)  U/L


 


ALT    (7-52)  U/L


 


Alkaline Phosphatase    ()  U/L


 


Total Creatine Kinase    ()  U/L


 


Troponin I    (<0.04)  ng/mL


 


C-Reactive Protein    (< 5.00)  mg/L


 


B-Natriuretic Peptide   153 H ( - 100) pg/mL


 


Total Protein    (6.4-8.9)  g/dL


 


Albumin    (3.2-5.2)  g/dL


 


Globulin    (2-4)  g/dL


 


Albumin/Globulin Ratio    (1-3)  











Result Diagrams: 


 08/08/17 16:55





 08/08/17 16:55


Lab Statement: Any lab studies that have been ordered have been reviewed, and 

results considered in the medical decision making process.





- Radiology


  ** CXR


Radiology Interpretation Completed By: Radiologist - No radiographic evidence 

for acute cardiopulmonary abnormality on this portable chest x-ray.





  ** Foot X-Ray


Radiology Interpretation Completed By: Radiologist - Pending offical 

interpretation at this time.





- EKG


  ** 1550


Cardiac Rate: NL - BPM 95


EKG Rhythm: Sinus Rhythm


ST Segment: Normal





Re-Evaluation





- Re-Evaluation


  ** First Eval


Re-Evaluation Time: 17:40


Comment: Patient is refusing IV access.





Complex Multi-Symp Course/Dx


Course Of Treatment: This patient is a 37 year old F presenting to [facility] 

accompanied by boyfriend with a chief complaint of weakness since earlier 

today. She states while at rest my blood pressure was really low and I passed 

out. The patient rates the aching pain 2/10 in severity. Symptoms aggravated 

and alleviated by nothing. Patient reports CP (for a few minutes when her blood 

pressure was low), syncope, headache, tiredness, dizziness, lightheadedness, 

nausea, diarrhea (soft stools), and low blood pressure (60/33 per what her 

boyfriend checked at home). Patient denies fever, chills, vomiting, and SOB. 

She reports blood pressure medication noncompliance for two months. She had a 

partial foot amputation in May. PSHx of partial amputation of right toes in 5/ 2017. PMHx DM, CAD, HLD, HTN, COPD, GERD, sleep apnea, renal failure, arthritis

, vision loss, and anxiety.  In the ED course an IV access was obtained. 

Patient was placed in a cardiac monitor.  Patient was started with IV fluids 

since patient is hypotensive and slightly tachycardic. She seems to be 

dehydrated and since she reports fevers at home she was started in broad 

spectrum antibiotics. She was started in Cefepime.  Labs within normal limits 

except for WBC 11, Na 129, BUN 36, creatinine 6.12, glucose 433, lactic acid 

2.1 troponin 0.8 and CRP 22.  Troponin #1:      and Troponin # 2 (4 hours later)

:  EKG shows a NSR at    w/o ST elevations.  CXR  IMPRESSION: No radiographic 

evidence for acute cardiopulmonary abnormality on this portable chest x-ray.  

Foot x-ray IMPRESSION: POSTOPERATIVE FINDINGS AND ADVANCED CALCIFIC 

VASCULOPATHY HAS DESCRIBED ABOVE.  She was also given Insulin for her 

hyperglycemia.  I discussed the case with Dr. Rose who accepted the patient 

for admission.  Patient is A+OX 3.





- Diagnoses


Provider Diagnoses: 


 Hypotension, Hyperglycemia, Dehydration, Increase troponin r/o ACS, Acute on 

chronic renal failure








- Physician Notifications


Discussed Care Of Patient With: David Baker


Time Discussed With Above Provider: 17:48


Instructed by Provider To: Other - Consulted Dr. Baker (nephrologist) who 

recommends that since the patient refuses IV acess, she should follow up with a 

peritoneal nurse tomorrow and drink chicken broth. Consulted Dr. Coombs (

hospitalist) who agrees to admit.





Discharge





- Discharge Plan


Condition: Stable


Disposition: ADMITTED TO [facility]


Referrals: 


Yeni Shanks MD [Primary Care Provider] - 3 Days





The documentation as recorded by the Akila cardenas Alfonso accurately reflects 

the service I personally performed and the decisions made by me, Kunal Canchola MD.

## 2017-08-26 NOTE — RAD
HISTORY: Arterial occlusion, left foot pain



COMPARISONS: None relevant



TECHNIQUE: Multiple transverse and longitudinal ultrasound images were obtained of the

arterial system of the left lower extremity using grayscale, color Doppler, and spectral

Doppler imaging.





FINDINGS: 

COMMON FEMORAL ARTERY: There is monophasic flow of the left common femoral artery. The

peak systolic velocity ranges from 52 to 120 cm/s. Atheromatous plaque is noted

SUPERFICIAL FEMORAL ARTERY: The proximal portion of the superficial femoral artery is

occluded. The peak systolic velocities of the distal and mid portions ranges from 23-28

cm/s. There is monophasic flow.

POPLITEAL ARTERY: There is monophasic flow. There is atheromatous plaque. The peak

systolic velocity ranges from 22-28 cm/s.

POSTERIOR TIBIAL ARTERY: The flow is monophasic. The peak systolic velocity is 14 cm/s.

PERONEAL ARTERY: No flow is noted within the peroneal artery.

ANTERIOR TIBIAL ARTERY: The flow is nonphasic. The peak systolic velocity is 4 cm/s.



IMPRESSION: 

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 THERE IS

DAMPENING OF THE DISTAL WAVEFORMS WITH DECREASED PEAK SYSTOLIC VELOCITIES DISTALLY.

## 2017-08-26 NOTE — ED
Rani RICARDO Edward, scribed for Mateo Horta on 08/26/17 at 1212 .





Lower Extremity





- HPI Summary


HPI Summary: 





36 y/o female presents to the ED c/o a pain in the back of the L calf radiating 

to her toes and an ulcer at the base of her big toe. The pain started 4-5 days 

ago. Pt states the pain is a "freezing, aching" pain. PMHx DM, blood clots. 

Associated sx: chills, N/V. 





- History of Current Complaint


Chief Complaint: EDExtremityLower


Stated Complaint: LT LEG/FOOT PAIN


Time Seen by Provider: 08/26/17 12:11


Hx Obtained From: Patient


Hx Last Menstrual Period: May 2017


Onset of Pain: Days


Onset/Duration: Still Present


Severity Currently: Severe


Pain Intensity: 8


Pain Scale Used: 0-10 Numeric


Associated Signs And Symptoms: Positive: Other - N/V, chills





- Allergies/Home Medications


Allergies/Adverse Reactions: 


 Allergies











Allergy/AdvReac Type Severity Reaction Status Date / Time


 


Amoxicillin [From Augmentin] Allergy  See Comment Verified 08/25/17 15:16


 


Clavulanic Acid Allergy  See Comment Verified 08/25/17 15:16





[From Augmentin]     


 


Heparin Allergy  See Comment Verified 08/25/17 15:16


 


Metronidazole [From Flagyl] Allergy  Nausea And Verified 08/25/17 15:16





   Vomiting  


 


Morphine Allergy  See Comment Verified 08/25/17 15:16


 


Ropinirole [From Requip] Allergy  Hives Verified 08/25/17 15:16


 


Sulfamethoxazole Allergy  Nausea And Verified 08/25/17 15:16





w/Trimethoprim   Vomiting  





[From Bactrim]     


 


Codeine AdvReac  Nausea And Verified 08/25/17 15:16





   Vomiting  


 


Erythromycin AdvReac  "DOESN'T Verified 08/25/17 15:16





   WORK"  


 


Niacin AdvReac  Hives Verified 08/25/17 15:16


 


plastic tape Allergy  Blisters Uncoded 08/25/17 15:16














PMH/Surg Hx/FS Hx/Imm Hx


Previously Healthy: No


Endocrine/Hematology History: Reports: Hx Anticoagulant Therapy - Aspirin., Hx 

Blood Transfusions, Hx Diabetes, Hx Thyroid Disease - nodule biopsied, normal, 

Hx Anemia - hx of - reports had tranfusion in 2014 after mi


Cardiovascular History: Reports: Hx Angina, Hx Cardiac Arrest - in Dec. 2014 

with CPR, Hx Cardiomegaly, Hx Coronary Artery Disease, Hx Hypercholesterolemia, 

Hx Hypertension, Hx Myocardial Infarction, Other Cardiovascular Problems/

Disorders - hyperlipidemia, CAD


   Denies: Hx Congestive Heart Failure, Hx Deep Vein Thrombosis, Hx Pacemaker/

ICD, Hx Valvular Heart Disease


Respiratory History: Reports: Hx Asthma, Hx Chronic Obstructive Pulmonary 

Disease (COPD) - smoker, Hx Sleep Apnea - pt reports md thinks, but no official 

testing done yet, Other Respiratory Problems/Disorders - acute respipatory 

failure with hypoxia - dec 2016


   Denies: Hx Lung Cancer, Hx Pneumonia, Hx Pulmonary Embolism


GI History: Reports: Hx Gastroesophageal Reflux Disease, Other GI Disorders - 

GASTROPARESIS - TAKING OMEPRAZOLE


   Denies: Hx Gall Bladder Disease, Hx Gastrointestinal Bleed, Hx Ulcer, Hx 

Urosepsis


 History: Reports: Hx Acute Renal Failure, Hx Chronic Renal Failure, Hx 

Dialysis - PERITONEAL, Hx Kidney Stones - in past, Hx Renal Disease - ESRD on 

home peritoneal dialysis , Other  Problems/Disorders - ESRD- URINATES A SMALL 

AMOUNT


Musculoskeletal History: Reports: Hx Arthritis - back, hips, Hx Back Problems - 

Sciatica and Herniated L3 disk


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 

of R eye, Hx Vision Problem


   Denies: Hx Contacts or Glasses, Hx Hearing Aid


Opthamlomology History: Reports: Hx Eye Prosthesis - left, Hx Legally Blind - 30

% use of R eye, Hx Vision Problem


   Denies: Hx Contacts or Glasses


Neurological History: Reports: Other Neuro Impairments/Disorders - neuropathy


   Denies: Hx Transient Ischemic Attacks (TIA)


Psychiatric History: Reports: Hx Anxiety, Hx Depression, Hx Bipolar Disorder


   Denies: Hx Eating Disorder, Hx Panic Disorder, Hx Schizophrenia, Hx of 

Violent Episodes Against Others





- Cancer History


Cancer Type, Location and Year: MALIGNANT MELANOMA- [facility].  Stage 5 Renal 

failure/dialysis


Hx Chemotherapy: No


Hx Radiation Therapy: No





- Surgical History


Surgery Procedure, Year, and Place: LEFT EYE REMOVED 2012 - HAS PROSTHETIC EYE (

ORBITS DONE 5/2015 OK'D BY DR SAE COLE TO SCAN IN MRI) ;.  MELANOMA REMOVED 

FROM [facility] 2013 (PROCEDURE DONE 4X);.  CARDIAC CATH 2014 - NO STENTS;.  LEFT 

WRIST FISTULA PLACEMENT ([facility]) 2014;.  HEMODIALYSIS CATH RIGHT CHEST WALL 2014;.

  PERITONEAL DIALYSIS CATH 2/2015;.  [place] [facility]  

[place] [facility] [place];.  PLACEMENT RIGHT JUGULAR TESIO 

HEMODIALYSIS CATHE[place] [facility] [place];.  REMOVAL OF JUGULAR CATH - SEPT 2016.  

CARDIAC CATH - stentsx2 right leg.  PARTIAL AMPUTATION RIGHT TOES 5/2017


Hx Anesthesia Reactions: No





- Immunization History


Date of Tetanus Vaccine: UTD


Date of Influenza Vaccine: 8/15/16


Infectious Disease History: No


Infectious Disease History: Reports: Hx of Known/Suspected MRSA - MRSA R 1st toe


   Denies: Hx Clostridium Difficile, Hx Hepatitis, Hx Human Immunodeficiency 

Virus (HIV), Hx Shingles, Hx Tuberculosis, History Other Infectious Disease, 

Traveled Outside the [place] in Last 30 Days





- Family History


Known Family History: Positive: Cardiac Disease - father MI at 43 y/o, 

Hypertension, Other - Positive for bipolar and depression. Completed suicide to 

sister.  





- Social History


Alcohol Use: None


Hx Substance Use: No


Substance Use Type: Reports: Prescribed


Substance Use Comment - Amount & Last Used: METHADONE


Hx Tobacco Use: Yes


Smoking Status (MU): Current Every Day Smoker


Type: Cigarettes


Amount Used/How Often: 1 PPD


Length of Time of Smoking/Using Tobacco: 20 YEARS


Have You Smoked in the Last Year: Yes





Review of Systems


Positive: Chills


Eyes: Negative


ENT: Negative


Cardiovascular: Negative


Respiratory: Negative


Positive: Vomiting, Nausea


Genitourinary: Negative


Positive: Myalgia - Pain @ back of L calf


Skin: Negative


Neurological: Negative


Psychological: Normal


All Other Systems Reviewed And Are Negative: Yes





Physical Exam


Triage Information Reviewed: Yes


Vital Signs On Initial Exam: 


 Initial Vitals











Temp Pulse Resp BP Pulse Ox


 


 98.7 F   130   16   137/79   95 


 


 08/26/17 10:40  08/26/17 10:40  08/26/17 10:40  08/26/17 10:40  08/26/17 10:40











Vital Signs Reviewed: Yes


Appearance: Positive: Well-Appearing, No Pain Distress


Skin: Positive: Warm, Skin Color Reflects Adequate Perfusion, Dry


Head/Face: Positive: Normal Head/Face Inspection


Eyes: Positive: EOMI, VIOLETTA


ENT: Positive: Normal ENT inspection


Neck: Positive: Supple, Nontender


Respiratory/Lung Sounds: Positive: Clear to Auscultation, Breath Sounds Present


Cardiovascular: Positive: Pulses are Symmetrical in both Upper and Lower 

Extremities, IRR


Abdomen Description: Positive: Nontender, Soft, Other: - Healing scar wounds 

over the ABD


Bowel Sounds: Positive: Present


Musculoskeletal: Positive: Normal, Strength/ROM Intact, Other - Dressing @ R 

foot. L foot - ulcers, cold to touch. no pulse present,Tenderness over calf 

muscles.


Neurological: Positive: Normal, Sensory/Motor Intact, Alert, Oriented to Person 

Place, Time





Diagnostics





- Vital Signs


 Vital Signs











  Temp Pulse Resp BP Pulse Ox


 


 08/26/17 12:00   109   134/72  93


 


 08/26/17 11:49   111    90


 


 08/26/17 11:48  98.8 F  106  18  146/76  90


 


 08/26/17 11:47     146/76 


 


 08/26/17 10:40  98.7 F  130  16  137/79  95














- Laboratory


Lab Results: 


 Lab Results











  08/26/17 08/26/17 08/26/17 Range/Units





  13:15 13:15 13:15 


 


WBC  11.5 H    (3.5-10.8)  10^3/ul


 


RBC  5.42 H    (4.0-5.4)  10^6/ul


 


Hgb  16.4 H    (12.0-16.0)  g/dl


 


Hct  49 H    (35-47)  %


 


MCV  91    (80-97)  fL


 


MCH  30    (27-31)  pg


 


MCHC  33    (31-36)  g/dl


 


RDW  16 H    (10.5-15)  %


 


Plt Count  256    (150-450)  10^3/ul


 


MPV  9    (7.4-10.4)  um3


 


Neut % (Auto)  80.5    (38-83)  %


 


Lymph % (Auto)  10.9 L    (25-47)  %


 


Mono % (Auto)  5.2    (1-9)  %


 


Eos % (Auto)  2.6    (0-6)  %


 


Baso % (Auto)  0.8    (0-2)  %


 


Absolute Neuts (auto)  9.3 H    (1.5-7.7)  10^3/ul


 


Absolute Lymphs (auto)  1.3    (1.0-4.8)  10^3/ul


 


Absolute Monos (auto)  0.6    (0-0.8)  10^3/ul


 


Absolute Eos (auto)  0.3    (0-0.6)  10^3/ul


 


Absolute Basos (auto)  0.1    (0-0.2)  10^3/ul


 


Absolute Nucleated RBC  0.01    10^3/ul


 


Nucleated RBC %  0.1    


 


INR (Anticoag Therapy)   0.91   (0.89-1.11)  


 


APTT   30.4   (26.0-36.3)  seconds


 


Sodium    128 L  (133-145)  mmol/L


 


Potassium    3.5  (3.5-5.0)  mmol/L


 


Chloride    90 L  (101-111)  mmol/L


 


Carbon Dioxide    29  (22-32)  mmol/L


 


Anion Gap    9  (2-11)  mmol/L


 


BUN    21  (6-24)  mg/dL


 


Creatinine    4.34 H  (0.51-0.95)  mg/dL


 


Est GFR ( Amer)    14.7  (>60)  


 


Est GFR (Non-Af Amer)    11.5  (>60)  


 


BUN/Creatinine Ratio    4.8 L  (8-20)  


 


Glucose    527 H*  ()  mg/dL


 


Lactic Acid     (0.5-2.0)  mmol/L


 


Calcium    9.5  (8.6-10.3)  mg/dL


 


Total Bilirubin    0.40  (0.2-1.0)  mg/dL


 


AST    5 L  (13-39)  U/L


 


ALT    6 L  (7-52)  U/L


 


Alkaline Phosphatase    121 H  ()  U/L


 


Troponin I    0.03  (<0.04)  ng/mL


 


Total Protein    6.6  (6.4-8.9)  g/dL


 


Albumin    3.4  (3.2-5.2)  g/dL


 


Globulin    3.2  (2-4)  g/dL


 


Albumin/Globulin Ratio    1.1  (1-3)  














  08/26/17 Range/Units





  13:15 


 


WBC   (3.5-10.8)  10^3/ul


 


RBC   (4.0-5.4)  10^6/ul


 


Hgb   (12.0-16.0)  g/dl


 


Hct   (35-47)  %


 


MCV   (80-97)  fL


 


MCH   (27-31)  pg


 


MCHC   (31-36)  g/dl


 


RDW   (10.5-15)  %


 


Plt Count   (150-450)  10^3/ul


 


MPV   (7.4-10.4)  um3


 


Neut % (Auto)   (38-83)  %


 


Lymph % (Auto)   (25-47)  %


 


Mono % (Auto)   (1-9)  %


 


Eos % (Auto)   (0-6)  %


 


Baso % (Auto)   (0-2)  %


 


Absolute Neuts (auto)   (1.5-7.7)  10^3/ul


 


Absolute Lymphs (auto)   (1.0-4.8)  10^3/ul


 


Absolute Monos (auto)   (0-0.8)  10^3/ul


 


Absolute Eos (auto)   (0-0.6)  10^3/ul


 


Absolute Basos (auto)   (0-0.2)  10^3/ul


 


Absolute Nucleated RBC   10^3/ul


 


Nucleated RBC %   


 


INR (Anticoag Therapy)   (0.89-1.11)  


 


APTT   (26.0-36.3)  seconds


 


Sodium   (133-145)  mmol/L


 


Potassium   (3.5-5.0)  mmol/L


 


Chloride   (101-111)  mmol/L


 


Carbon Dioxide   (22-32)  mmol/L


 


Anion Gap   (2-11)  mmol/L


 


BUN   (6-24)  mg/dL


 


Creatinine   (0.51-0.95)  mg/dL


 


Est GFR ( Amer)   (>60)  


 


Est GFR (Non-Af Amer)   (>60)  


 


BUN/Creatinine Ratio   (8-20)  


 


Glucose   ()  mg/dL


 


Lactic Acid  2.1 H*  (0.5-2.0)  mmol/L


 


Calcium   (8.6-10.3)  mg/dL


 


Total Bilirubin   (0.2-1.0)  mg/dL


 


AST   (13-39)  U/L


 


ALT   (7-52)  U/L


 


Alkaline Phosphatase   ()  U/L


 


Troponin I   (<0.04)  ng/mL


 


Total Protein   (6.4-8.9)  g/dL


 


Albumin   (3.2-5.2)  g/dL


 


Globulin   (2-4)  g/dL


 


Albumin/Globulin Ratio   (1-3)  











Result Diagrams: 


 08/26/17 13:15





 08/26/17 13:15


Lab Statement: Any lab studies that have been ordered have been reviewed, and 

results considered in the medical decision making process.





- Radiology


  ** CXR


Xray Interpretation: No Acute Changes - NO ACTIVE CARDIOPULMONARY DISEASE


Radiology Interpretation Completed By: Radiologist





- Ultrasound


  ** No standard instances


Ultrasound Interpretation: Positive (See Comments) - DUPLEX SCAN LOWER 

EXTREMITY ARTERY - PERIPHERAL ARTERIAL DISEASE WITH OCCLUSION OF THE 

SUPERFICIAL FEMORAL ARTERY. THERE IS DAMPENING OF THE DISTAL WAVEFORMS WITH 

DECREASED PEAK SYSTOLIC VELOCITIES DISTALLY.


Ultrasound Interpretation Completed By: Radiologist





- EKG


  ** 1


EKG Interpretation: 12:56 - SINUS TACHYCARDIA @ 106 BPM.





Re-Evaluation





- Re-Evaluation


  ** 1


Re-Evaluation Time: 15:34


Change: Unchanged





Lower Extremity Course/Dx





- Course


Assessment/Plan: 36 y/o female presents to the ED c/o a pain in the back of the 

L calf radiating to her toes and an ulcer at the base of her big toe. The pain 

started 4-5 days ago. Pt states the pain is a "freezing, aching" pain. PMHx DM, 

blood clots. Associated sx: chills, N/V. EKG 12:56 - SINUS TACHYCARDIA @ 106 

BPM. CXR SHOWS NO ACTIVE CARDIOPULMONARY DISEASE. LOWER EXTREMITY DUPLEX SCAN 

SHOWS DUPLEX SCAN LOWER EXTREMITY ARTERY - PERIPHERAL ARTERIAL DISEASE WITH 

OCCLUSION OF THE SUPERFICIAL FEMORAL ARTERY. THERE IS DAMPENING OF THE DISTAL 

WAVEFORMS WITH DECREASED PEAK SYSTOLIC VELOCITIES DISTALLY. Pt has an arterial 

occlusion of the L leg. Recommended transfer to [facility], but the pt 

refused to go by ambulance. Pt will sign out AMA. Pt is advised to got [facility] immediately to the ER for further workup and management.





- Diagnoses


Provider Diagnoses: 


 Arterial occlusion of the left leg








Discharge





- Discharge Plan


Condition: Stable


Disposition: AGAINST MEDICAL ADVICE


Referrals: 


Yeni Shanks MD [Primary Care Provider] - 





The documentation as recorded by the Rani cardenas Edward accurately reflects the 

service I personally performed and the decisions made by Rula cornejo Emmanuel.

## 2017-08-26 NOTE — RAD
HISTORY: Peripheral vascular disease, atrial fibrillation



COMPARISONS: August 08, 2017



VIEWS:1: Single frontal portable view of the chest at 1:10 PM



FINDINGS:

LINES AND TUBES: None.

CARDIOMEDIASTINAL SILHOUETTE: The cardiomediastinal silhouette is normal for portable

technique.

PLEURA: The costophrenic angles are sharp. No pleural abnormalities are noted.

LUNG PARENCHYMA: The lungs are clear.

ABDOMEN: The upper abdomen is clear. There is no subphrenic gas.

BONES AND SOFT TISSUES: No bone or soft tissue abnormalities are noted.



IMPRESSION: NO ACTIVE CARDIOPULMONARY DISEASE.

## 2017-09-05 NOTE — ED
Gabriele RICARDO Rebecca, scribed for Varghese Bal MD on 09/05/17 at 0121 .





Complex/Multi-Sys Presentation





- HPI Summary


HPI Summary: 


Pt is a 36 y/o F who presents to ED c/o abd pain, N/V, decreased PO intake and 

chills. Sx have been present for the last 3 days, worsening today. Pt reports 

she has an "unsettling feeling" in her abdomen. She tried eating tonight and 

reports she was unable to. Additionally c/o insomnia. Reports she was seen in 

[place] for the same sx 3 days ago, but they are now worse. 





- History Of Current Complaint


Chief Complaint: EDGeneral


Time Seen by Provider: 09/05/17 01:13


Hx Obtained From: Patient


Onset/Duration: Lasting Days - 3 days, Still Present, Worse Since - Today


Severity Currently: Severe


Location: Pain At: - Abdomen


Aggravating Factor(s): Nothing


Alleviating Factor(s): Nothing


Associated Signs And Symptoms: Positive: Nausea, Vomiting, Abdominal Pain, 

Decreased Oral Intake, Other - Chills, insomnia


Related History: Similar Episode/Diagnosed As: - Was seen in [place] for 

similar sx 3 days ago





- Allergies/Home Medications


Allergies/Adverse Reactions: 


 Allergies











Allergy/AdvReac Type Severity Reaction Status Date / Time


 


Amoxicillin [From Augmentin] Allergy  See Comment Verified 09/05/17 01:05


 


Clavulanic Acid Allergy  See Comment Verified 09/05/17 01:05





[From Augmentin]     


 


Heparin Allergy  See Comment Verified 09/05/17 01:05


 


Metronidazole [From Flagyl] Allergy  Nausea And Verified 09/05/17 01:05





   Vomiting  


 


Morphine Allergy  See Comment Verified 09/05/17 01:05


 


Ropinirole [From Requip] Allergy  Hives Verified 09/05/17 01:05


 


Sulfamethoxazole Allergy  Nausea And Verified 09/05/17 01:05





w/Trimethoprim   Vomiting  





[From Bactrim]     


 


Codeine AdvReac  Nausea And Verified 09/05/17 01:05





   Vomiting  


 


Erythromycin AdvReac  "DOESN'T Verified 09/05/17 01:05





   WORK"  


 


Niacin AdvReac  Hives Verified 09/05/17 01:05


 


plastic tape Allergy  Blisters Uncoded 09/05/17 01:05














PMH/Surg Hx/FS Hx/Imm Hx


Endocrine/Hematology History: Reports: Hx Anticoagulant Therapy - Aspirin., Hx 

Blood Transfusions, Hx Diabetes, Hx Thyroid Disease - nodule biopsied, normal, 

Hx Anemia - hx of - reports had tranfusion in 2014 after mi


Cardiovascular History: Reports: Hx Angina, Hx Cardiac Arrest - in Dec. 2014 

with CPR, Hx Cardiomegaly, Hx Coronary Artery Disease, Hx Hypercholesterolemia, 

Hx Hypertension, Hx Myocardial Infarction, Other Cardiovascular Problems/

Disorders - hyperlipidemia, CAD


   Denies: Hx Congestive Heart Failure, Hx Deep Vein Thrombosis, Hx Pacemaker/

ICD, Hx Valvular Heart Disease


Respiratory History: Reports: Hx Asthma, Hx Chronic Obstructive Pulmonary 

Disease (COPD) - smoker, Hx Sleep Apnea - pt reports md thinks, but no official 

testing done yet, Other Respiratory Problems/Disorders - acute respipatory 

failure with hypoxia - dec 2016


   Denies: Hx Lung Cancer, Hx Pneumonia, Hx Pulmonary Embolism


GI History: Reports: Hx Gastroesophageal Reflux Disease, Other GI Disorders - 

GASTROPARESIS - TAKING OMEPRAZOLE


   Denies: Hx Gall Bladder Disease, Hx Gastrointestinal Bleed, Hx Ulcer, Hx 

Urosepsis


 History: Reports: Hx Acute Renal Failure, Hx Chronic Renal Failure, Hx 

Dialysis - PERITONEAL, Hx Kidney Stones - in past, Hx Renal Disease - ESRD on 

home peritoneal dialysis , Other  Problems/Disorders - ESRD- URINATES A SMALL 

AMOUNT


Musculoskeletal History: Reports: Hx Arthritis - back, hips, Hx Back Problems - 

Sciatica and Herniated L3 disk


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 

of R eye, Hx Vision Problem


   Denies: Hx Contacts or Glasses, Hx Hearing Aid


Opthamlomology History: Reports: Hx Eye Prosthesis - left, Hx Legally Blind - 30

% use of R eye, Hx Vision Problem


   Denies: Hx Contacts or Glasses


Neurological History: Reports: Other Neuro Impairments/Disorders - neuropathy


   Denies: Hx Transient Ischemic Attacks (TIA)


Psychiatric History: Reports: Hx Anxiety, Hx Depression, Hx Bipolar Disorder


   Denies: Hx Eating Disorder, Hx Panic Disorder, Hx Schizophrenia, Hx of 

Violent Episodes Against Others





- Cancer History


Cancer Type, Location and Year: MALIGNANT MELANOMA- [facility].  Stage 5 Renal 

failure/dialysis


Hx Chemotherapy: No


Hx Radiation Therapy: No





- Surgical History


Surgery Procedure, Year, and Place: LEFT EYE REMOVED 2012 - HAS PROSTHETIC EYE (

ORBITS DONE 5/2015 OK'D BY DR SAE COLE TO SCAN IN MRI) ;.  MELANOMA REMOVED 

FROM [facility] 2013 (PROCEDURE DONE 4X);.  CARDIAC CATH 2014 - NO STENTS;.  LEFT 

WRIST FISTULA PLACEMENT ([facility]) 2014;.  HEMODIALYSIS CATH RIGHT CHEST WALL 2014;.

  PERITONEAL DIALYSIS CATH 2/2015;.  CHEST WALL CATH REMOVAL 7/2016 [facility] [place].  

RIGHT GREAT TOE AMPUTATION, [facility] 4/2016;.  PLACEMENT RIGHT JUGULAR TESIO 

HEMODIALYSIS CATHETER [facility] 8/27/2016;.  REMOVAL OF JUGULAR CATH - SEPT 2016.  

CARDIAC CATH - stentsx2 right leg.  PARTIAL AMPUTATION RIGHT TOES 5/2017


Hx Anesthesia Reactions: No





- Immunization History


Date of Tetanus Vaccine: UTD


Date of Influenza Vaccine: 8/15/16


Infectious Disease History: No


Infectious Disease History: Reports: Hx of Known/Suspected MRSA - MRSA R 1st toe


   Denies: Hx Clostridium Difficile, Hx Hepatitis, Hx Human Immunodeficiency 

Virus (HIV), Hx Shingles, Hx Tuberculosis, History Other Infectious Disease, 

Traveled Outside the  in Last 30 Days





- Family History


Known Family History: Positive: Cardiac Disease - father MI at 43 y/o, 

Hypertension, Other - Positive for bipolar and depression. Completed suicide to 

sister.  





- Social History


Alcohol Use: None


Hx Substance Use: No


Substance Use Type: Reports: Prescribed


Substance Use Comment - Amount & Last Used: METHADONE


Hx Tobacco Use: Yes


Smoking Status (MU): Current Every Day Smoker


Type: Cigarettes


Amount Used/How Often: 1 PPD


Length of Time of Smoking/Using Tobacco: 20 YEARS


Have You Smoked in the Last Year: Yes





Review of Systems


Positive: Chills


Positive: Abdominal Pain, Vomiting, Nausea, Other - Decreased PO intake


Neurological: Other - Insomnia


All Other Systems Reviewed And Are Negative: Yes





Physical Exam


Triage Information Reviewed: Yes


Vital Signs On Initial Exam: 


 Initial Vitals











Temp Pulse Resp BP Pulse Ox


 


 97.6 F   112   18   177/105   95 


 


 09/05/17 01:08  09/05/17 01:08  09/05/17 01:08  09/05/17 01:08  09/05/17 01:08











Vital Signs Reviewed: Yes


Appearance: Positive: Well-Appearing, No Pain Distress


Skin: Positive: Warm


Head/Face: Positive: Normal Head/Face Inspection


Eyes: Positive: VIOLETTA


ENT: Positive: Hearing grossly normal


Neck: Positive: Supple


Respiratory/Lung Sounds: Positive: Clear to Auscultation, Breath Sounds Present


Cardiovascular: Positive: RRR


Abdomen Description: Positive: Nontender, Soft


Bowel Sounds: Positive: Present


Musculoskeletal: Positive: Strength/ROM Intact


Neurological: Positive: Alert, Oriented to Person Place, Time


Psychiatric: Positive: Affect/Mood Appropriate





Diagnostics





- Vital Signs


 Vital Signs











  Temp Pulse Resp BP Pulse Ox


 


 09/05/17 01:08  97.6 F  112  18  177/105  95














- Laboratory


Result Diagrams: 


 09/05/17 01:35





 09/05/17 02:50


Lab Statement: Any lab studies that have been ordered have been reviewed, and 

results considered in the medical decision making process.





- EKG


  ** 0120


Cardiac Rate: Tachycardia - 102 bpm


EKG Rhythm: Sinus Tachycardia





Complex Multi-Symp Course/Dx


Assessment/Plan: Pt is a 36 y/o F who presents to ED c/o abd pain, N/V, 

decreased PO intake and chills. Sx have been present for the last 3 days, 

worsening today. Pt reports she has an "unsettling feeling" in her abdomen. She 

tried eating tonight and reports she was unable to. Additionally c/o insomnia. 

Reports she was seen in [place] for the same sx 3 days ago, but they are now 

worse. EKG is sinus tachy with LVH. In the ED course, pt was given potassium 

chloride, reglan and fluids. She will be D/C to home with Dx of abodminal pain. 

She understands and agrees. Elevated BP noted and advised to f/u with PCP.





- Diagnoses


Provider Diagnoses: 


 Abdominal pain








Discharge





- Discharge Plan


Condition: Stable


Disposition: HOME


Patient Education Materials:  Acute Abdominal Pain (ED)


Referrals: 


Yeni Shanks MD [Primary Care Provider] - 3 Days





The documentation as recorded by the Gabriele cardenas Rebecca accurately 

reflects the service I personally performed and the decisions made by me, 

Varghese Bal MD.

## 2017-09-08 NOTE — ED
Gabriele RICARDO Rebecca, scribed for Reynaldo Nunez MD on 09/08/17 at 0141 .





Abdominal Pain/Female





- HPI Summary


HPI Summary: 


Pt is a 38 y/o F BIBA who presents to ED c/o diffuse abdominal pain 

characterized as cramping. Pain has been present for the last 6 days and is 

currently moderate, ranked 7/10, particularly worse on the left side. 

Additionally c/o N/V, chills, dizziness and "tacky"/"sticky" BM. Sx aggravated 

by PO intake, alleviated by nothing. Reports that she is unable to tolerate any 

PO intake. Has Zofran Rx which she cannot keep down. Is not currently on any 

blood thinners. Pt was seen in [place] in the ED and by Dr. Shanks 2 days ago and 

Dr. Thompson yesterday. 





- History of Current Complaint


Chief Complaint: EDAbdPain


Stated Complaint: CHEST PAIN


Time Seen by Provider: 09/08/17 01:18


Hx Obtained From: Patient


Hx Last Menstrual Period: May 2017


Onset/Duration: Lasting Days - 6 days, Still Present


Severity Currently: Moderate


Pain Intensity: 7


Pain Scale Used: 0-10 Numeric


Location: Diffuse


Character: Cramping


Aggravating Factor(s): Other: - PO intake


Alleviating Factor(s): Nothing


Associated Signs and Symptoms: Positive: Dizzy, Nausea, Vomiting


Allergies/Adverse Reactions: 


 Allergies











Allergy/AdvReac Type Severity Reaction Status Date / Time


 


Amoxicillin [From Augmentin] Allergy  See Comment Verified 09/06/17 00:59


 


Clavulanic Acid Allergy  See Comment Verified 09/06/17 00:59





[From Augmentin]     


 


Heparin Allergy  See Comment Verified 09/06/17 00:59


 


Metronidazole [From Flagyl] Allergy  Nausea And Verified 09/06/17 00:59





   Vomiting  


 


Morphine Allergy  See Comment Verified 09/06/17 00:59


 


Ropinirole [From Requip] Allergy  Hives Verified 09/06/17 00:59


 


Sulfamethoxazole Allergy  Nausea And Verified 09/06/17 00:59





w/Trimethoprim   Vomiting  





[From Bactrim]     


 


Codeine AdvReac  Nausea And Verified 09/06/17 00:59





   Vomiting  


 


Erythromycin AdvReac  "DOESN'T Verified 09/06/17 00:59





   WORK"  


 


Niacin AdvReac  Hives Verified 09/06/17 00:59


 


plastic tape Allergy  Blisters Uncoded 09/06/17 00:59














PMH/Surg Hx/FS Hx/Imm Hx


Endocrine/Hematology History: Reports: Hx Anticoagulant Therapy - Aspirin., Hx 

Blood Transfusions, Hx Diabetes, Hx Thyroid Disease - nodule biopsied, normal, 

Hx Anemia - hx of - reports had tranfusion in 2014 after mi


Cardiovascular History: Reports: Hx Angina, Hx Cardiac Arrest - in Dec. 2014 

with CPR, Hx Cardiomegaly, Hx Coronary Artery Disease, Hx Hypercholesterolemia, 

Hx Hypertension, Hx Myocardial Infarction, Other Cardiovascular Problems/

Disorders - hyperlipidemia, CAD


   Denies: Hx Congestive Heart Failure, Hx Deep Vein Thrombosis, Hx Pacemaker/

ICD, Hx Valvular Heart Disease


Respiratory History: Reports: Hx Asthma, Hx Chronic Obstructive Pulmonary 

Disease (COPD) - smoker, Hx Sleep Apnea - pt reports md thinks, but no official 

testing done yet, Other Respiratory Problems/Disorders - acute respipatory 

failure with hypoxia - dec 2016


   Denies: Hx Lung Cancer, Hx Pneumonia, Hx Pulmonary Embolism


GI History: Reports: Hx Gastroesophageal Reflux Disease, Other GI Disorders - 

GASTROPARESIS - TAKING OMEPRAZOLE


   Denies: Hx Gall Bladder Disease, Hx Gastrointestinal Bleed, Hx Ulcer, Hx 

Urosepsis


 History: Reports: Hx Acute Renal Failure, Hx Chronic Renal Failure, Hx 

Dialysis - PERITONEAL, Hx Kidney Stones - in past, Hx Renal Disease - ESRD on 

home peritoneal dialysis , Other  Problems/Disorders - ESRD- URINATES A SMALL 

AMOUNT


Musculoskeletal History: Reports: Hx Arthritis - back, hips, Hx Back Problems - 

Sciatica and Herniated L3 disk


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 

of R eye, Hx Vision Problem


   Denies: Hx Contacts or Glasses, Hx Hearing Aid


Opthamlomology History: Reports: Hx Eye Prosthesis - left, Hx Legally Blind - 30

% use of R eye, Hx Vision Problem


   Denies: Hx Contacts or Glasses


Neurological History: Reports: Other Neuro Impairments/Disorders - neuropathy


   Denies: Hx Transient Ischemic Attacks (TIA)


Psychiatric History: Reports: Hx Anxiety, Hx Depression, Hx Bipolar Disorder


   Denies: Hx Eating Disorder, Hx Panic Disorder, Hx Schizophrenia, Hx of 

Violent Episodes Against Others





- Cancer History


Cancer Type, Location and Year: MALIGNANT MELANOMA- [facility].  Stage 5 Renal 

failure/dialysis


Hx Chemotherapy: No


Hx Radiation Therapy: No





- Surgical History


Surgery Procedure, Year, and Place: LEFT EYE REMOVED 2012 - HAS PROSTHETIC EYE (

ORBITS DONE 5/2015 OK'D BY DR SAE COLE TO SCAN IN MRI) ;.  MELANOMA REMOVED 

FROM [facility] 2013 (PROCEDURE DONE 4X);.  CARDIAC CATH 2014 - NO STENTS;.  LEFT 

WRIST FISTULA PLACEMENT ([facility]) 2014;.  HEMODIALYSIS CATH RIGHT CHEST WALL 2014;.

  PERITONEAL DIALYSIS CATH 2/2015;.  CHEST WALL CATH REMOVAL 7/2016 [facility] [place];.  

RIGHT GREAT TOE AMPUTATION, [facility] [place] 4/2016;.  PLACEMENT RIGHT JUGULAR TESIO 

HEMODIALYSIS CATHETER [facility] [place] 8/27/2016;.  REMOVAL OF JUGULAR CATH - SEPT 2016.  

CARDIAC CATH - stentsx2 right leg.  PARTIAL AMPUTATION RIGHT TOES 5/2017


Hx Anesthesia Reactions: No





- Immunization History


Date of Tetanus Vaccine: UTD


Date of Influenza Vaccine: 8/15/16


Infectious Disease History: No


Infectious Disease History: Reports: Hx of Known/Suspected MRSA - MRSA R 1st toe


   Denies: Hx Clostridium Difficile, Hx Hepatitis, Hx Human Immunodeficiency 

Virus (HIV), Hx Shingles, Hx Tuberculosis, History Other Infectious Disease, 

Traveled Outside the [place] in Last 30 Days





- Family History


Known Family History: Positive: Cardiac Disease - father MI at 43 y/o, 

Hypertension, Other - Positive for bipolar and depression. Completed suicide to 

sister.  





- Social History


Alcohol Use: None


Hx Substance Use: No


Substance Use Type: Reports: Prescribed


Substance Use Comment - Amount & Last Used: METHADONE


Hx Tobacco Use: Yes


Smoking Status (MU): Current Every Day Smoker


Type: Cigarettes


Amount Used/How Often: 1 PPD


Length of Time of Smoking/Using Tobacco: 20 YEARS


Have You Smoked in the Last Year: Yes





Review of Systems


Positive: Chills


Positive: Abdominal Pain - Diffuse cramping, Vomiting, Nausea


Positive: other - "tacky"/"sticky" BM


Neurological: Other - Dizziness


All Other Systems Reviewed And Are Negative: Yes





Physical Exam





- Summary


Physical Exam Summary: 


The patient is disheveled. 





The skin is warm and dry and skin color reflects adequate perfusion. Decreased 

skin turgor. 





HEENT:  The head is normocephalic and atraumatic. The pupils are equal and 

reactive. The conjunctivae are clear and without drainage.  Nares are patent 

and without drainage.  Mouth reveals dry mucous membranes and the throat is 

without erythema and exudate.  The external ears are intact. The ear canals are 

patent and without drainage. The tympanic membranes are intact.





Neck is supple with full range of motion and non-tender. 





Respiratory: Chest is non-tender.  Breath sounds are symmetrical and equal with 

wheezing diffusely throughout. She meza snot appear to be short of breath. 





Cardiovascular: Hear is regular rate and rhythm.  There is no murmur or rub 

auscultated.   There is no peripheral edema and pulses are symmetrical and 

equal.





Abdomen: The abdomen is soft with epigastric, RUQ and LLQ pain. Dialysis 

catheter is in place in the RLQ. There is no guarding and no rebound.   There 

are normal bowel sounds heard in all four quadrants and there is no 

organomegaly palpated.





Musculoskeletal: There is no back pain noted.  Extremities are non-tender with 

full range of motion.  There is good capillary refill. Dorsal pedis pulses 

intact 





Neurological: Patient is alert and oriented to person, place and time.  The 

patient has symmetrical motor strength in all four extremities.  





Psychiatric: The patient has an appropriate affect and does not exhibit any 

anxiety or depression. 


Triage Information Reviewed: Yes


Vital Signs On Initial Exam: 


 Initial Vitals











Temp Pulse Resp BP Pulse Ox


 


 97.5 F   88   18   150/83   96 


 


 09/08/17 00:02  09/08/17 00:02  09/08/17 00:02  09/08/17 00:02  09/08/17 00:02











Vital Signs Reviewed: Yes





- Dalia Coma Scale


Coma Scale Total: 15





Diagnostics





- Vital Signs


 Vital Signs











  Temp Pulse Resp BP Pulse Ox


 


 09/08/17 00:02  97.5 F  88  18  150/83  96














- Laboratory


Lab Results: 


 Lab Results











  09/08/17 09/08/17 09/08/17 Range/Units





  03:10 03:10 03:10 


 


WBC   12.2 H   (3.5-10.8)  10^3/ul


 


RBC   4.61   (4.0-5.4)  10^6/ul


 


Hgb   13.2   (12.0-16.0)  g/dl


 


Hct   41   (35-47)  %


 


MCV   88   (80-97)  fL


 


MCH   29   (27-31)  pg


 


MCHC   33   (31-36)  g/dl


 


RDW   16 H   (10.5-15)  %


 


Plt Count   487 H D   (150-450)  10^3/ul


 


MPV   9   (7.4-10.4)  um3


 


Neut % (Auto)   72.4   (38-83)  %


 


Lymph % (Auto)   18.7 L   (25-47)  %


 


Mono % (Auto)   5.5   (1-9)  %


 


Eos % (Auto)   2.9   (0-6)  %


 


Baso % (Auto)   0.5   (0-2)  %


 


Absolute Neuts (auto)   8.9 H   (1.5-7.7)  10^3/ul


 


Absolute Lymphs (auto)   2.3   (1.0-4.8)  10^3/ul


 


Absolute Monos (auto)   0.7   (0-0.8)  10^3/ul


 


Absolute Eos (auto)   0.3   (0-0.6)  10^3/ul


 


Absolute Basos (auto)   0.1   (0-0.2)  10^3/ul


 


Absolute Nucleated RBC   0.01   10^3/ul


 


Nucleated RBC %   0.1   


 


INR (Anticoag Therapy)    0.92  (0.89-1.11)  


 


Sodium  135    (133-145)  mmol/L


 


Potassium  3.2 L    (3.5-5.0)  mmol/L


 


Chloride  100 L    (101-111)  mmol/L


 


Carbon Dioxide  28    (22-32)  mmol/L


 


Anion Gap  7    (2-11)  mmol/L


 


BUN  24    (6-24)  mg/dL


 


Creatinine  3.82 H    (0.51-0.95)  mg/dL


 


Est GFR ( Amer)  17.1    (>60)  


 


Est GFR (Non-Af Amer)  13.3    (>60)  


 


BUN/Creatinine Ratio  6.3 L    (8-20)  


 


Glucose  174 H    ()  mg/dL


 


Lactic Acid     (0.5-2.0)  mmol/L


 


Calcium  8.8    (8.6-10.3)  mg/dL


 


Total Bilirubin  0.30    (0.2-1.0)  mg/dL


 


AST  8 L    (13-39)  U/L


 


ALT  3 L    (7-52)  U/L


 


Alkaline Phosphatase  99    ()  U/L


 


C-Reactive Protein  12.37 H    (< 5.00)  mg/L


 


Total Protein  6.1 L    (6.4-8.9)  g/dL


 


Albumin  2.9 L    (3.2-5.2)  g/dL


 


Globulin  3.2    (2-4)  g/dL


 


Albumin/Globulin Ratio  0.9 L    (1-3)  


 


Amylase  < 10 L    ()  U/L


 


Lipase  26    (11.0-82.0)  U/L














  09/08/17 Range/Units





  03:10 


 


WBC   (3.5-10.8)  10^3/ul


 


RBC   (4.0-5.4)  10^6/ul


 


Hgb   (12.0-16.0)  g/dl


 


Hct   (35-47)  %


 


MCV   (80-97)  fL


 


MCH   (27-31)  pg


 


MCHC   (31-36)  g/dl


 


RDW   (10.5-15)  %


 


Plt Count   (150-450)  10^3/ul


 


MPV   (7.4-10.4)  um3


 


Neut % (Auto)   (38-83)  %


 


Lymph % (Auto)   (25-47)  %


 


Mono % (Auto)   (1-9)  %


 


Eos % (Auto)   (0-6)  %


 


Baso % (Auto)   (0-2)  %


 


Absolute Neuts (auto)   (1.5-7.7)  10^3/ul


 


Absolute Lymphs (auto)   (1.0-4.8)  10^3/ul


 


Absolute Monos (auto)   (0-0.8)  10^3/ul


 


Absolute Eos (auto)   (0-0.6)  10^3/ul


 


Absolute Basos (auto)   (0-0.2)  10^3/ul


 


Absolute Nucleated RBC   10^3/ul


 


Nucleated RBC %   


 


INR (Anticoag Therapy)   (0.89-1.11)  


 


Sodium   (133-145)  mmol/L


 


Potassium   (3.5-5.0)  mmol/L


 


Chloride   (101-111)  mmol/L


 


Carbon Dioxide   (22-32)  mmol/L


 


Anion Gap   (2-11)  mmol/L


 


BUN   (6-24)  mg/dL


 


Creatinine   (0.51-0.95)  mg/dL


 


Est GFR ( Amer)   (>60)  


 


Est GFR (Non-Af Amer)   (>60)  


 


BUN/Creatinine Ratio   (8-20)  


 


Glucose   ()  mg/dL


 


Lactic Acid  1.2  (0.5-2.0)  mmol/L


 


Calcium   (8.6-10.3)  mg/dL


 


Total Bilirubin   (0.2-1.0)  mg/dL


 


AST   (13-39)  U/L


 


ALT   (7-52)  U/L


 


Alkaline Phosphatase   ()  U/L


 


C-Reactive Protein   (< 5.00)  mg/L


 


Total Protein   (6.4-8.9)  g/dL


 


Albumin   (3.2-5.2)  g/dL


 


Globulin   (2-4)  g/dL


 


Albumin/Globulin Ratio   (1-3)  


 


Amylase   ()  U/L


 


Lipase   (11.0-82.0)  U/L











Result Diagrams: 


 09/08/17 03:10





 09/08/17 03:10


Lab Statement: Any lab studies that have been ordered have been reviewed, and 

results considered in the medical decision making process.





- CT


  ** CT Abd/Pel


CT Interpretation Completed By: Radiologist - No nephrolithiasis, 

ureterolithiasis or obstructive uropathy. No bladder calculi. Unremarkable 

pancreas and gallbladder. Borderline hepatomegaly, similar to 7/17/2016. No 

bowel obstruction, colitis or free air. Diverticulosis without acute 

diverticulitis. Appendix not seen. Right lower quadrant dialysis catheter and 

small to moderate amount of dialysate fluid. Small amount of fluid in abdominal 

wall near catheter entry site, seroma more likely than abscess. ED physician 

reviewed radiology report.





Re-Evaluation





- Re-Evaluation


  ** First Eval


Re-Evaluation Time: 06:04


Change: Unchanged


Comment: Discussed CT reuslts with the pt. She continues to be unable to 

tolerate PO intake.





Abdominal Pain Fem Course/Dx





- Course


Course Of Treatment: Pt is a 38 y/o F BIBA who presents to ED c/o diffuse 

abdominal pain characterized as cramping. Pain has been present for the last 6 

days and is currently moderate, ranked 7/10, particularly worse on the left 

side. Additionally c/o N/V, chills, dizziness and "tacky"/"sticky" BM. Sx 

aggravated by PO intake, alleviated by nothing. Reports that she is unable to 

tolerate any PO intake. Has Zofran Rx which she cannot keep down. Is not 

currently on any blood thinners. Pt was seen in [place] in the ED and by Dr. Shanks 2 days ago and Dr. Thompson yesterday. CT Abd/pel radiologist 

interpretation above. WBC of 12.2, amylase is <10, CRP of 12.37, creatinine of 

3.82. In the ED course, pt was given Dilaudid, Reglan and fluids. Discussed 

care of pt with Dr. Frankenberg who will give a hospitalist consult in the ED. 

Pt will be signed out, pending dispoition, awaiting consultation. She 

understands and agrees. Elevated BP noted.





- Diagnoses


Differential Diagnosis: Positive: Bowel Obstruction, Diverticulitis, 

Pancreatitis, Other - peritonitis, chronic renal failure, colitis, dehydration


Provider Diagnoses: 


 Abdominal pain, Vomiting, Chronic renal failure








- Provider Notifications


Discussed Care Of Patient With: Fred Frankenberg


Time Discussed With Above Provider: 06:21


Instructed by Provider To: Other - Will give a hospitalist consult in the ED.





Discharge





- Discharge Plan


Condition: Stable


Disposition: OTHER


Discharge Disposition Comment: Pt will be signed out, pending disposition, 

awaiting hospitalist consult


Referrals: 


Yeni Shanks MD [Primary Care Provider] - 





The documentation as recorded by the Gabriele cardenas Rebecca accurately 

reflects the service I personally performed and the decisions made by me, Reynaldo Nunez MD.

## 2017-09-08 NOTE — HP
CC:  Dr. Shanks; Dr. Baker *

 

HISTORY AND PHYSICAL:

 

DATE OF ADMISSION:  09/08/17

 

PRIMARY CARE PROVIDER:  Dr. Shanks.

 

NEPHROLOGIST:  Dr. Baker.

 

CHIEF COMPLAINT:  Abdominal pain, nausea, and vomiting.

 

HISTORY OF PRESENT ILLNESS:  Ms. Masters is a 37-year-old female, well known to 
the hospitalist service from multiple previous hospitalizations, who has a 
history of poorly controlled insulin dependent diabetes, end-stage renal 
disease on peritoneal dialysis, peripheral neuropathy, peripheral vascular 
disease, coronary artery disease, hypertension, hyperlipidemia, depression and 
anxiety, who presents to the emergency room with complaints of abdominal pain, 
nausea, and vomiting.  The patient was recently [place] [facility] [place]
17 through 07/27/17.  At that time, she ended up leaving the hospital against 
medical advice.  There were concerns for foot infection at that point.  The 
patient ultimately ended up presenting to the emergency room several more times 
since then.  She states, however, that she was admitted to [facility] 2 to 3 weeks ago and left just this past Thursday after having stents 
placed in her left leg.  The patient states that during that hospitalization in 
addition to having stenting procedure done to her left leg,  she had fairly 
persistent nausea and vomiting as well as abdominal discomfort.  The patient 
does not really recall what happened during the hospitalization.  She states 
that since she got home; however, she has had chills. She has had pressure in 
her abdomen, vomiting, constant nausea, as well as numerous other complaints.  
The patient states that in terms of her peritoneal dialysis, it has been going 
okay.  She denies seeing any cloudiness or fibrin in her dialysate. The patient 
denies any recent sick contacts.

 

PAST MEDICAL HISTORY:

1.  Poorly controlled insulin dependent diabetes.

2.  End-stage renal disease, on peritoneal dialysis.

3.  Peripheral neuropathy.

4.  Coronary artery disease.

5.  Hypertension.

6.  Hyperlipidemia.

7.  Peripheral vascular disease.

8.  Asthma.

9.  GERD.

10.  Depression.

11.  Anxiety.

 

PAST SURGICAL HISTORY:

1.  Peritoneal dialysis catheter insertion.

2.  Right transmetatarsal amputation.

3.  Right lower extremity arterial stenting.

4.  Left lower extremity arterial stenting.

5.  Left eye prosthesis.

 

MEDICATIONS:  The patient does not know her medications.  This is from prior 
hospitalization:

1.  Aspirin 81 mg p.o. daily.

2.  Albuterol 2 puffs inhaled q.6 hours p.r.n. shortness of breath.

3.  Amlodipine 10 mg p.o. daily.

4.  Omeprazole 20 mg p.o. q.h.s.

5.  Reglan 10 mg p.o. q.6 hours p.r.n. nausea.

6.  Methadone 10 mg p.o. q.6 hours p.r.n. pain.

7.  Cozaar 100 mg p.o. q.h.s.

8.  Imdur 60 mg p.o. q.h.s.

9.  Lantus 80 units subcutaneous twice daily.

10.  NovoLog sliding scale subcutaneous a.c.

11.  Gabapentin 100 mg p.o. t.i.d.

12.  Lipitor 80 mg p.o. q.h.s.

 

ALLERGIES:  AUGMENTIN, HEPARIN, FLAGYL, MORPHINE, REQUIP, BACTRIM, CODEINE, 
ERYTHROMYCIN, NIACIN, and PLASTIC TAPE.

 

FAMILY HISTORY:  The patient's parents are both diabetic.

 

SOCIAL HISTORY:  The patient is a pack per day smoker for at least 18 years.  
She does not drink alcohol.  She denies any recreational drug use.  She is 
disabled. She has a boyfriend.  She has no children.  The patient indicates 
that her boyfriend would be her healthcare proxy.

 

REVIEW OF SYSTEMS:  The patient admits to fevers as high as 100, chills, 
anorexia, chest pressure, this has been going on for the last couple of days 
that comes and goes.  Lower extremity edema that developed a couple of weeks ago
, she states this is now getting a little bit better.  She admits to cough, 
which is chronic, but shortness of breath, it has been associated with the 
chest pain.  She admits to nausea, vomiting, diarrhea that started on the day 
of presentation to the emergency room.  She thinks she has gone 6 times today.  
She admits to abdominal pain.  She does make urine.  She denies any dysuria.  
She complains of left-sided weakness that has been chronic for the last 6 
months.  No sudden changes in his visio.  No dysphagia.  No joint pains or 
muscle pains out of ordinary.  No rashes.  She does admit to anxiety and 
depression.

 

                               PHYSICAL EXAMINATION

 

GENERAL:  The patient is a well-developed, young, chronically ill appearing 
female, who also appears much older than her stated age, lying in a stretcher 
in no acute distress.

 

VITAL SIGNS:  Blood pressure 144/68, pulse 81, respirations 15, temp 97.5, O2 
sat 82% on room air.

 

HEENT:  The right pupil is round and reactive to light.  The right extraocular 
muscles are intact.  Left eye has a prosthesis.  The oropharynx is clear.  
There is no submandibular, cervical, or supraclavicular adenopathy.  Thyroid is 
not enlarged.  No thyroid nodules are noted.

 

PULMONARY:  Lungs are clear to auscultation bilaterally, though breath sounds 
are diminished in all lung fields.

 

CARDIAC:  Normal S1 and S2.  Regular rate and rhythm.  I do not appreciate any 
murmurs.  There is trace left lower extremity edema.

 

ABDOMEN:  Bowel sounds are present, but hypoactive.  There are number of scars 
on the patient's abdomen.  She does not complain of any tenderness upon 
palpation of the abdomen.

 

MUSCULOSKELETAL:  The patient is status post right transmetatarsal amputation. 
There is otherwise no clubbing or cyanosis of the digits.  There is full active 
range of motion.

 

SKIN:  Warm, it is dry.  I do not appreciate any rashes.  The patient does have 
almost quarter-sized wound to the incision of the right transmetatarsal 
amputation on the lateral aspect of the incision.  There is no surrounding 
erythema.  There is a mild amount of slough within the wound base.

 

NEURO:  Cranial nerves II through XII are grossly intact.  Sensation is intact 
to light touch.  Strength is 5/5 and symmetric in both upper and lower 
extremities bilaterally.

 

PSYCH:  The patient is alert.  She is oriented x3.  Affect appears appropriate.

 

 LABORATORY DATA/DIAGNOSTIC STUDIES:  WBC 12.2, hemoglobin 13.2, hematocrit 41, 
and platelets 487.  INR 0.92.  Sodium 135, potassium 4.2, chloride 100, CO2 28, 
BUN 24, creatinine 3.82, glucose 174, lactic acid 1.2, calcium 8.8, bilirubin 
0.3, AST 8, ALT 3, alk phos 99.  Troponin 0.03, repeated 0.02.  CRP 12.37.  
Albumin 2.9, amylase less than 10, lipase is 26.

 

EKG reveals normal sinus rhythm without any acute ST-T wave abnormalities.  CT 
abdomen and pelvis reveals no urolithiasis or acute findings.  Peritoneal 
dialysis fluid and catheter are noted.

 

ASSESSMENT AND PLAN:  Ms. Masters is a 37-year-old chronically ill female, who 
presents to the emergency room with complaints of abdominal pain, nausea, 
vomiting, chest pain and shortness of breath.

 

1.  Abdominal pain, nausea, and vomiting.  The etiology of this is not 
completely clear.  I am suspicious that she likely has gastroparesis with all 
of her other diabetic complications.  A gastric emptying study has been 
ordered.  The patient claims that she is unable to keep anything down; however, 
she does not look volume deplete on exam nor is her lactic acid level elevated.
  The patient will be given a clear liquid diet once her gastric emptying study 
is complete.  She does have Reglan already at home, but perhaps another agent 
may be necessary.

2.  Chest pain.  The patient states that this has been going on for the last 
couple of days.  Her first 2 troponins are negative.  The third is pending.  
Her EKG does not reveal any acute findings.  I do not feel that any further 
workup for this is needed at this point.  The patient did undergo stress 
testing in July of 2016, which revealed a small fixed defect of the distal 
septum and lateral wall.

3.  Shortness of breath.  The patient is requiring supplemental oxygen.  It is 
unclear if this has been necessary in her previous hospitalizations as well.  
Her lungs were clear.  I will go ahead and get a portal chest x-ray to evaluate 
for infiltrate or pulmonary edema.

4.  Peripheral vascular disease.  The patient states that she recently 
underwent stenting to the left lower extremity.  She previously had the right 
lower extremity stented.  I will try to get these records from [facility].

5.  End-stage renal disease.  The dialysis unit has been notified of the patient
's admission.  In addition to continuing on her peritoneal dialysis, I have 
asked that a sample of the peritoneal dialysis fluid be sent to the lab for 
testing for cell count, Gram stain and culture.

6.  Hypertension.  The patient's blood pressure at this point is moderately 
elevated.  She will be maintained on her usual home medication regimen.

7.  Insulin-dependent diabetes.  The patient states that she does not know if 
she is a type 1 or type 2 diabetic.  She states that she was at one point on 
pills for her diabetes.  She is essentially going to be n.p.o. or on clear 
liquid diet for now; therefore, I will cut her Lantus to 40 units subcutaneous 
twice daily for now and if her sugars become markedly elevated, this will be 
increased back up to her home dose.  Additionally, she will be placed on a 
lispro sliding scale.  The patient's last hemoglobin A1c in our system was 
obtained in May 2017 and is markedly elevated at 14.3%.  I will check a new 
hemoglobin A1c to see if she has had any improved control in her blood sugars.

8.  Hyperlipidemia.  The patient will be maintained on her usual dose of 
Lipitor.

9.  DVT prophylaxis.  According to the Adult Thrombosis Prophylaxis Risk Factor 
Assessment Guide, the patient has a total risk factor score of 1 making her low 
risk.  She states that she is allergic to HEPARIN and she is no longer on any 
anticoagulants.  SCDs will be utilized as DVT prophylaxis.

10.  Code status is full.

 

TIME SPENT:  65 minutes was spent admitting this patient.

 

[id number]/[facility] #: [id number]

SAEED

## 2017-09-09 NOTE — ED
Dnio RICARDO Nikita, scribed for Salomon Jackson MD on 09/09/17 at 1629 .





Complex/Multi-Sys Presentation





- HPI Summary


HPI Summary: 





This patient is a 37 year old F BIBA to ED with a chief complaint of N/V/D 

since 1 week ago. Pt was in the hospital yesterday where CT scans were done. Pt 

left AMA. The CC is described as constant and aching. The patient rates the 

pain 8/10 in severity. Symptoms aggravated by nothing. Symptoms alleviated by 

nothing. Patient reports intermittent fever, abdominal pain, and appetite 

changes (vomits all intake). PMHx of HTN, malignant melanoma, vulva CA, asthma, 

Type 2 DM, renal insufficiency, bipolar, anxiety, Hemodialysis started Oct 2014

, cardiac arrest Dec 2014 from complications of hemodialysis, depression, HLD, 

gastroparesis (takes Reglan, Zofran, Triptyline), neuropathy, and CAD (

unstentable).





- History Of Current Complaint


Chief Complaint: EDNauseaVomitDiarrh


Time Seen by Provider: 09/09/17 16:03


Hx Obtained From: Patient


Onset/Duration: Sudden Onset, Lasting Weeks - 1 week ago, Still Present


Timing: Constant


Severity Initially: Severe - 8/10


Location: Pain At: - Abdominal area


Character: Unable To Describe - aching


Aggravating Factor(s): nothing


Alleviating Factor(s): nothing


Associated Signs And Symptoms: Positive: Other - Patient reports intermittent 

fever, abdominal pain, and appetite changes (vomits all intake).





- Allergies/Home Medications


Allergies/Adverse Reactions: 


 Allergies











Allergy/AdvReac Type Severity Reaction Status Date / Time


 


Amoxicillin [From Augmentin] Allergy  See Comment Verified 09/06/17 00:59


 


Clavulanic Acid Allergy  See Comment Verified 09/06/17 00:59





[From Augmentin]     


 


Heparin Allergy  See Comment Verified 09/06/17 00:59


 


Metronidazole [From Flagyl] Allergy  Nausea And Verified 09/06/17 00:59





   Vomiting  


 


Morphine Allergy  See Comment Verified 09/06/17 00:59


 


Ropinirole [From Requip] Allergy  Hives Verified 09/06/17 00:59


 


Sulfamethoxazole Allergy  Nausea And Verified 09/06/17 00:59





w/Trimethoprim   Vomiting  





[From Bactrim]     


 


Codeine AdvReac  Nausea And Verified 09/06/17 00:59





   Vomiting  


 


Erythromycin AdvReac  "DOESN'T Verified 09/06/17 00:59





   WORK"  


 


Niacin AdvReac  Hives Verified 09/06/17 00:59


 


plastic tape Allergy  Blisters Uncoded 09/06/17 00:59














PMH/Surg Hx/FS Hx/Imm Hx


Endocrine/Hematology History: Reports: Hx Anticoagulant Therapy - Aspirin., Hx 

Blood Transfusions, Hx Diabetes, Hx Thyroid Disease - nodule biopsied, normal, 

Hx Anemia - hx of - reports had tranfusion in 2014 after mi


Cardiovascular History: Reports: Hx Angina, Hx Cardiac Arrest - in Dec. 2014 

with CPR, Hx Cardiomegaly, Hx Coronary Artery Disease, Hx Hypercholesterolemia, 

Hx Hypertension, Hx Myocardial Infarction, Other Cardiovascular Problems/

Disorders - hyperlipidemia, CAD


   Denies: Hx Congestive Heart Failure, Hx Deep Vein Thrombosis, Hx Pacemaker/

ICD, Hx Valvular Heart Disease


Respiratory History: Reports: Hx Asthma, Hx Chronic Obstructive Pulmonary 

Disease (COPD) - smoker, Hx Sleep Apnea - pt reports md thinks, but no official 

testing done yet, Other Respiratory Problems/Disorders - acute respipatory 

failure with hypoxia - dec 2016


   Denies: Hx Lung Cancer, Hx Pneumonia, Hx Pulmonary Embolism


GI History: Reports: Hx Gastroesophageal Reflux Disease, Other GI Disorders - 

GASTROPARESIS - TAKING OMEPRAZOLE


   Denies: Hx Gall Bladder Disease, Hx Gastrointestinal Bleed, Hx Ulcer, Hx 

Urosepsis


 History: Reports: Hx Acute Renal Failure, Hx Chronic Renal Failure, Hx 

Dialysis - PERITONEAL, Hx Kidney Stones - in past, Hx Renal Disease - ESRD on 

home peritoneal dialysis , Other  Problems/Disorders - ESRD- URINATES A SMALL 

AMOUNT


Musculoskeletal History: Reports: Hx Arthritis - back, hips, Hx Back Problems - 

Sciatica and Herniated L3 disk


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 

of R eye, Hx Vision Problem


   Denies: Hx Contacts or Glasses, Hx Hearing Aid


Opthamlomology History: Reports: Hx Eye Prosthesis - left, Hx Legally Blind - 30

% use of R eye, Hx Vision Problem


   Denies: Hx Contacts or Glasses


Neurological History: Reports: Other Neuro Impairments/Disorders - neuropathy


   Denies: Hx Transient Ischemic Attacks (TIA)


Psychiatric History: Reports: Hx Anxiety, Hx Depression, Hx Bipolar Disorder


   Denies: Hx Eating Disorder, Hx Panic Disorder, Hx Schizophrenia, Hx of 

Violent Episodes Against Others





- Cancer History


Cancer Type, Location and Year: MALIGNANT MELANOMA- VULVA.  Stage 5 Renal 

failure/dialysis


Hx Chemotherapy: No


Hx Radiation Therapy: No





- Surgical History


Surgery Procedure, Year, and Place: LEFT EYE REMOVED 2012 - HAS PROSTHETIC EYE (

ORBITS DONE 5/2015 OK'D BY DR SAE COLE TO SCAN IN MRI) ;.  MELANOMA REMOVED 

FROM VULVA 2013 (PROCEDURE DONE 4X);.  CARDIAC CATH 2014 - NO STENTS;.  LEFT 

WRIST FISTULA PLACEMENT ([facility]) 2014;.  HEMODIALYSIS CATH RIGHT CHEST WALL 2014;.

  PERITONEAL DIALYSIS CATH 2/2015;.  CHEST WALL CATH REMOVAL 7/2016 [facility] [place] [place];.  

RIGHT GREAT TOE AMPUTATION, [facility] [place] [place] 4/2016;.  PLACEMENT RIGHT JUGULAR TESIO 

HEMODIALYSIS CATHETER [facility] [place] [place] 8/27/2016;.  REMOVAL OF JUGULAR CATH - SEPT 2016.  

CARDIAC CATH - stentsx2 right leg.  PARTIAL AMPUTATION RIGHT TOES 5/2017


Hx Anesthesia Reactions: No





- Immunization History


Date of Tetanus Vaccine: UTD


Date of Influenza Vaccine: 8/15/16


Infectious Disease History: No


Infectious Disease History: Reports: Hx of Known/Suspected MRSA - MRSA R 1st toe


   Denies: Hx Clostridium Difficile, Hx Hepatitis, Hx Human Immunodeficiency 

Virus (HIV), Hx Shingles, Hx Tuberculosis, History Other Infectious Disease, 

Traveled Outside the  in Last 30 Days





- Family History


Known Family History: Positive: Cardiac Disease - father MI at 41 y/o, 

Hypertension, Other - Positive for bipolar and depression. Completed suicide to 

sister.  





- Social History


Alcohol Use: None


Hx Substance Use: No


Substance Use Type: Reports: Prescribed


Substance Use Comment - Amount & Last Used: METHADONE


Hx Tobacco Use: Yes


Smoking Status (MU): Heavy Every Day Tobacco Smoker


Type: Cigarettes


Amount Used/How Often: 1 PPD


Length of Time of Smoking/Using Tobacco: 20 YEARS


Have You Smoked in the Last Year: Yes





Review of Systems


Positive: Fever


Positive: Abdominal Pain - constant and aching, Vomiting, Diarrhea, Nausea, 

Other - appetite changes (vomits all intake)


All Other Systems Reviewed And Are Negative: Yes





Physical Exam


Triage Information Reviewed: Yes


Vital Signs On Initial Exam: 


 Initial Vitals











Temp Pulse Resp BP Pulse Ox


 


 97.9 F   102   18   170/100   97 


 


 09/09/17 15:31  09/09/17 15:31  09/09/17 15:31  09/09/17 15:31  09/09/17 15:31











Vital Signs Reviewed: Yes


Appearance: Positive: Well-Appearing, No Pain Distress


Skin: Positive: Warm, Skin Color Reflects Adequate Perfusion, Dry


Head/Face: Positive: Normal Head/Face Inspection


Eyes: Positive: Normal


ENT: Positive: Normal ENT inspection


Neck: Positive: Supple, Nontender


Respiratory/Lung Sounds: Positive: Clear to Auscultation, Breath Sounds Present


Cardiovascular: Positive: RRR


Abdomen Description: Positive: Soft, Other: - Tender to palpation


Bowel Sounds: Positive: Present


Musculoskeletal: Positive: Normal


Neurological: Positive: Normal, Sensory/Motor Intact, Alert, Oriented to Person 

Place, Time, CN Intact II-III


Psychiatric: Positive: Affect/Mood Appropriate





- [place] Coma Scale


Coma Scale Total: 15





Diagnostics





- Vital Signs


 Vital Signs











  Temp Pulse Resp BP Pulse Ox


 


 09/09/17 15:32    23  170/100 


 


 09/09/17 15:31  97.9 F  104  17  170/100  96














- Laboratory


Lab Results: 


 Lab Results











  09/09/17 09/09/17 09/09/17 Range/Units





  16:35 16:35 16:35 


 


WBC   12.1 H   (3.5-10.8)  10^3/ul


 


RBC   4.51   (4.0-5.4)  10^6/ul


 


Hgb   13.3   (12.0-16.0)  g/dl


 


Hct   40   (35-47)  %


 


MCV   88   (80-97)  fL


 


MCH   30   (27-31)  pg


 


MCHC   34   (31-36)  g/dl


 


RDW   16 H   (10.5-15)  %


 


Plt Count   466 H   (150-450)  10^3/ul


 


MPV   9   (7.4-10.4)  um3


 


Neut % (Auto)   81.9   (38-83)  %


 


Lymph % (Auto)   10.9 L   (25-47)  %


 


Mono % (Auto)   4.6   (1-9)  %


 


Eos % (Auto)   1.5   (0-6)  %


 


Baso % (Auto)   1.1   (0-2)  %


 


Absolute Neuts (auto)   9.9 H   (1.5-7.7)  10^3/ul


 


Absolute Lymphs (auto)   1.3   (1.0-4.8)  10^3/ul


 


Absolute Monos (auto)   0.6   (0-0.8)  10^3/ul


 


Absolute Eos (auto)   0.2   (0-0.6)  10^3/ul


 


Absolute Basos (auto)   0.1   (0-0.2)  10^3/ul


 


Absolute Nucleated RBC   0   10^3/ul


 


Nucleated RBC %   0   


 


Sodium  137    (133-145)  mmol/L


 


Potassium  TNP    


 


Chloride  102    (101-111)  mmol/L


 


Carbon Dioxide  29    (22-32)  mmol/L


 


Anion Gap  6    (2-11)  mmol/L


 


BUN  19    (6-24)  mg/dL


 


Creatinine  3.54 H    (0.51-0.95)  mg/dL


 


Est GFR ( Amer)  18.7    (>60)  


 


Est GFR (Non-Af Amer)  14.5    (>60)  


 


BUN/Creatinine Ratio  5.4 L    (8-20)  


 


Glucose  144 H    ()  mg/dL


 


Lactic Acid    0.8  (0.5-2.0)  mmol/L


 


Calcium  8.8    (8.6-10.3)  mg/dL


 


Total Bilirubin  0.40    (0.2-1.0)  mg/dL


 


AST  TNP    


 


ALT  3 L    (7-52)  U/L


 


Alkaline Phosphatase  95    ()  U/L


 


C-Reactive Protein  14.61 H    (< 5.00)  mg/L


 


Total Protein  6.0 L    (6.4-8.9)  g/dL


 


Albumin  2.7 L    (3.2-5.2)  g/dL


 


Globulin  3.3    (2-4)  g/dL


 


Albumin/Globulin Ratio  0.8 L    (1-3)  


 


Lipase  14    (11.0-82.0)  U/L


 


Beta HCG, Quant  < 0.60    mIU/mL














  09/09/17 Range/Units





  17:50 


 


WBC   (3.5-10.8)  10^3/ul


 


RBC   (4.0-5.4)  10^6/ul


 


Hgb   (12.0-16.0)  g/dl


 


Hct   (35-47)  %


 


MCV   (80-97)  fL


 


MCH   (27-31)  pg


 


MCHC   (31-36)  g/dl


 


RDW   (10.5-15)  %


 


Plt Count   (150-450)  10^3/ul


 


MPV   (7.4-10.4)  um3


 


Neut % (Auto)   (38-83)  %


 


Lymph % (Auto)   (25-47)  %


 


Mono % (Auto)   (1-9)  %


 


Eos % (Auto)   (0-6)  %


 


Baso % (Auto)   (0-2)  %


 


Absolute Neuts (auto)   (1.5-7.7)  10^3/ul


 


Absolute Lymphs (auto)   (1.0-4.8)  10^3/ul


 


Absolute Monos (auto)   (0-0.8)  10^3/ul


 


Absolute Eos (auto)   (0-0.6)  10^3/ul


 


Absolute Basos (auto)   (0-0.2)  10^3/ul


 


Absolute Nucleated RBC   10^3/ul


 


Nucleated RBC %   


 


Sodium   (133-145)  mmol/L


 


Potassium  TNP  


 


Chloride   (101-111)  mmol/L


 


Carbon Dioxide   (22-32)  mmol/L


 


Anion Gap   (2-11)  mmol/L


 


BUN   (6-24)  mg/dL


 


Creatinine   (0.51-0.95)  mg/dL


 


Est GFR ( Amer)   (>60)  


 


Est GFR (Non-Af Amer)   (>60)  


 


BUN/Creatinine Ratio   (8-20)  


 


Glucose   ()  mg/dL


 


Lactic Acid   (0.5-2.0)  mmol/L


 


Calcium   (8.6-10.3)  mg/dL


 


Total Bilirubin   (0.2-1.0)  mg/dL


 


AST  TNP  


 


ALT   (7-52)  U/L


 


Alkaline Phosphatase   ()  U/L


 


C-Reactive Protein   (< 5.00)  mg/L


 


Total Protein   (6.4-8.9)  g/dL


 


Albumin   (3.2-5.2)  g/dL


 


Globulin   (2-4)  g/dL


 


Albumin/Globulin Ratio   (1-3)  


 


Lipase   (11.0-82.0)  U/L


 


Beta HCG, Quant   mIU/mL











Result Diagrams: 


 09/09/17 16:35





 09/09/17 17:50


Lab Statement: Any lab studies that have been ordered have been reviewed, and 

results considered in the medical decision making process.





Complex Multi-Symp Course/Dx


Course Of Treatment: Ms. Masters returned today after signing out of the 

hospital 30 minutes after admission yesterday.  She C/O'd the same epigastric 

pain and vomiting.  Her labs were unchanged and she was D/C'd to F/U with her 

PMD. This seems like gastroparesis to me.





- Diagnoses


Provider Diagnoses: 


 Abdominal pain








- Physician Notifications


Discussed Care Of Patient With: Virginie Barbour


Time Discussed With Above Provider: 16:23


Instructed by Provider To: Other - Consulted Dr. Barbour about the pt who 

recommends to recheck the labs.





Discharge





- Discharge Plan


Condition: Stable


Disposition: HOME


Patient Education Materials:  Abdominal Pain (ED)


Referrals: 


Yeni Shanks MD [Primary Care Provider] - 3 Days





The documentation as recorded by the Dino cardenas Nikita accurately reflects the 

service I personally performed and the decisions made by me, Salomon Jackson MD.

## 2017-09-09 NOTE — DS
CC:  Dr. Shanks; Dr. Baker *

 

DISCHARGE SUMMARY:

 

DATE OF ADMISSION:  09/08/17

 

DATE OF DISCHARGE:  09/08/17

 

PRIMARY CARE PROVIDER:  Dr. Shanks.

 

NEPHROLOGIST:  Dr. Baker.

 

PRINCIPAL DIAGNOSES:

1.  Abdominal pain.

2.  Nausea.

3.  Vomiting.

 

DISCHARGE MEDICATIONS:  The patient is to resume home medication list, which 
was not clear on admission, but currently appears to be:

 

1.  Aspirin 81 mg p.o. daily.

2.  Albuterol 2 puffs inhaled q.6 hours p.r.n. shortness of breath.

3.  Amlodipine 10 mg p.o. q.h.s.

4.  Omeprazole 20 mg p.o. q.h.s.

5.  Reglan 10 mg p.o. q.6 hours p.r.n. nausea.

6.  Methadone 10 mg p.o. q.6 hours p.r.n. nausea.

7.  Losartan 100 mg p.o. q.h.s.

8.  Imdur 60 mg p.o. q.h.s.

9.  Lantus 80 units subcutaneous b.i.d.

10.  NovoLog sliding scale q.a.c.

11.  Gabapentin 100 mg p.o. t.i.d.

12.  Lipitor 80 mg p.o. q.h.s.

 

HOSPITAL COURSE:  Ms. Masters was admitted on the morning of 09/08/17 and 
approximately 30 minutes after she arrived to the floor, she decided that she 
wanted to leave against medical advice.  The patient states that she was able 
to take methadone with water and keep that down and, therefore, she wished to 
leave the hospital.  The patient left the hospital with uncontrolled blood 
pressure.  She would not wait to try to get this under control.  She did not 
wait for me to reevaluate her and to discuss leaving against medical advice and 
the issues that this may cause.  The patient refused a chest x-ray and was 
noncompliant with all care.  Again, the patient left without being seen by 
myself.

 

FOLLOWUP CONCERNS:  The patient should follow up with her primary care provider
, though again this was unable to be relayed to her.

 

ACTIVITY LEVEL:  As tolerated.

 

DIET:  As tolerated.

 

CONDITION ON DISCHARGE:  Stable, but essentially unevaluated for her complaints 
that she presented to the emergency room with earlier on the morning of 
admission.

 

TIME SPENT:  Ten minutes was spent on this discharge.

 

 [id number] #: [id number]

SAEED

## 2017-09-11 NOTE — ED
Abdominal Pain/Female





- HPI Summary


HPI Summary: 





Pt here w/ ab pain x 1 week - "all over". She's been seen multiple times w/o 

known cause of her pain, N/V other than gastroparesis. Her labs have been 

comparable each time and CT w/o acute findings. She has h/o yeast infection and 

has had d/c w/o itching. Would like pelvic exam to see if this is cause of pain/

discharge. She has known gastroparesis and takes reglan and zofran at home - 

hsa not been helping lately but does not want any through an IV here tonight 

either as she reports it doesn't help. Has not been using diabetic insulin 

medications as she reports N/V and no appetite from ab pain - hs not checked 

glucose level at home. Her previous visits do not indicate dehydration as a 

result of these complaints nor DKA. Glucose has been mildly elevated in 140's 

which is good for her as she lives on the 200's. She has ESRD and is on 

dialysis via peritoneal catheter - no new sx to report at this time since past 

few visit - feels same as she has all week. Reports PCP suggested endoscope. H/

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 H/o vulvar melanoma - 

routine f/u for this as well w/o concerns. Does still have her gallbladder. 





- History of Current Complaint


Chief Complaint: Temitope


Stated Complaint: ABD PAIN


Time Seen by Provider: 09/11/17 19:42


Hx Obtained From: Patient


Hx Last Menstrual Period: May 2017


Pain Intensity: 8


Allergies/Adverse Reactions: 


 Allergies











Allergy/AdvReac Type Severity Reaction Status Date / Time


 


Heparin Allergy Intermediate pt states Verified 09/12/17 20:58





   it gave  





   her a  





   blood clot  


 


Niacin Allergy Mild Hives Verified 09/12/17 20:58


 


Ropinirole [From Requip] Allergy Mild Hives Verified 09/12/17 20:58


 


Amoxicillin [From Augmentin] AdvReac Mild "Doesn't Verified 09/12/17 20:58





   work"  


 


Clavulanic Acid AdvReac Mild "Doesn't Verified 09/12/17 20:58





[From Augmentin]   work"  


 


Codeine AdvReac Mild Nausea And Verified 09/12/17 20:58





   Vomiting  


 


Erythromycin AdvReac Mild "DOESN'T Verified 09/12/17 20:58





   WORK"  


 


Metronidazole [From Flagyl] AdvReac Mild Nausea And Verified 09/12/17 20:58





   Vomiting  


 


Morphine AdvReac Mild "Doesn't Verified 09/12/17 20:58





   work"  


 


Sulfamethoxazole AdvReac Mild Nausea And Verified 09/12/17 20:58





w/Trimethoprim   Vomiting  





[From Bactrim]     


 


plastic tape Allergy Mild Blisters Uncoded 09/12/17 20:59














PMH/Surg Hx/FS Hx/Imm Hx


Previously Healthy: No - ESRD, chronic vomiting


Endocrine/Hematology History: Reports: Hx Anticoagulant Therapy - Aspirin., Hx 

Blood Transfusions, Hx Diabetes - insulin dependent, Hx Thyroid Disease - 

nodule biopsied, normal, Hx Anemia - hx of - reports had tranfusion in 2014 

after mi


Cardiovascular History: Reports: Hx Angina, Hx Cardiac Arrest - in Dec. 2014 

with CPR, Hx Cardiomegaly, Hx Coronary Artery Disease, Hx Hypercholesterolemia, 

Hx Hypertension, Hx Myocardial Infarction, Other Cardiovascular Problems/

Disorders - hyperlipidemia, CAD


   Denies: Hx Congestive Heart Failure, Hx Deep Vein Thrombosis, Hx Pacemaker/

ICD, Hx Valvular Heart Disease


Respiratory History: Reports: Hx Asthma, Hx Chronic Obstructive Pulmonary 

Disease (COPD) - smoker, Hx Sleep Apnea - pt reports md thinks, but no official 

testing done yet, Other Respiratory Problems/Disorders - acute respipatory 

failure with hypoxia - dec 2016


   Denies: Hx Lung Cancer, Hx Pneumonia, Hx Pulmonary Embolism


GI History: Reports: Hx Gastroesophageal Reflux Disease, Other GI Disorders - 

GASTROPARESIS - TAKING OMEPRAZOLE, reglan and zofran


   Denies: Hx Gall Bladder Disease, Hx Gastrointestinal Bleed, Hx Ulcer, Hx 

Urosepsis


 History: Reports: Hx Acute Renal Failure, Hx Chronic Renal Failure, Hx 

Dialysis - PERITONEAL, Hx Kidney Stones - in past, Hx Renal Disease - ESRD on 

home peritoneal dialysis , Other  Problems/Disorders - ESRD- URINATES A SMALL 

AMOUNT


Musculoskeletal History: Reports: Hx Arthritis - back, hips, Hx Back Problems - 

Sciatica and Herniated L3 disk, Other Musculoskeletal History - partial 

amputation of toes/foot


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 

of R eye, Hx Vision Problem


   Denies: Hx Contacts or Glasses, Hx Hearing Aid


Opthamlomology History: Reports: Hx Eye Prosthesis - left, Hx Legally Blind - 30

% use of R eye, Hx Vision Problem


   Denies: Hx Contacts or Glasses


Neurological History: Reports: Other Neuro Impairments/Disorders - neuropathy


   Denies: Hx Transient Ischemic Attacks (TIA)


Psychiatric History: Reports: Hx Anxiety, Hx Depression, Hx Bipolar Disorder


   Denies: Hx Eating Disorder, Hx Panic Disorder, Hx Schizophrenia, Hx of 

Violent Episodes Against Others





- Cancer History


Cancer Type, Location and Year: MALIGNANT MELANOMA- V[facility].  Stage 5 Renal 

failure/dialysis


Hx Chemotherapy: No


Hx Radiation Therapy: No





- Surgical History


Surgery Procedure, Year, and Place: LEFT EYE REMOVED 2012 - HAS PROSTHETIC EYE (

ORBITS DONE 5/2015 OK'D BY DR SAE COLE TO SCAN IN MRI) ;.  MELANOMA REMOVED 

FROM [facility]VA 2013 (PROCEDURE DONE 4X);.  CARDIAC CATH 2014 - NO STENTS;.  LEFT 

WRIST FISTULA PLACEMENT ([facility]) 2014;.  HEMODIALYSIS CATH RIGHT CHEST WALL 2014;.

  PERITONEAL DIALYSIS CATH 2/2015;.  [place] [facility] [place]  

[place]AT [place] [facility] [place].  PLACEMENT RIGHT JUGULAR TESIO 

[place] [facility] [place].  REMOVAL OF JUGULAR CATH - SEPT 2016.  

CARDIAC CATH - stentsx2 right leg.  PARTIAL AMPUTATION RIGHT TOES 5/2017


Hx Anesthesia Reactions: No





- Immunization History


Date of Tetanus Vaccine: UTD


Date of Influenza Vaccine: 8/15/16


Infectious Disease History: No


Infectious Disease History: Reports: Hx of Known/Suspected MRSA - MRSA R 1st toe


   Denies: Hx Clostridium Difficile, Hx Hepatitis, Hx Human Immunodeficiency 

Virus (HIV), Hx Shingles, Hx Tuberculosis, History Other Infectious Disease, 

Traveled Outside the [place] in Last 30 Days





- Family History


Known Family History: Positive: Cardiac Disease - father MI at 41 y/o, 

Hypertension, Other - Positive for bipolar and depression. Completed suicide to 

sister.  





- Social History


Lives: With Family - boyfriend


Alcohol Use: None


Hx Substance Use: No


Substance Use Type: Reports: Prescribed


Substance Use Comment - Amount & Last Used: METHADONE


Hx Tobacco Use: Yes


Smoking Status (MU): Heavy Every Day Tobacco Smoker


Type: Cigarettes


Amount Used/How Often: 1 PPD


Length of Time of Smoking/Using Tobacco: 20 YEARS


Have You Smoked in the Last Year: Yes





Review of Systems


Constitutional: Negative


Negative: Fever, Chills


Negative: Sore Throat


Cardiovascular: Negative


Negative: Chest Pain


Respiratory: Negative


Negative: Shortness Of Breath


Gastrointestinal: Other - see HPI


Positive: no symptoms reported


Musculoskeletal: Negative


Skin: Negative


Neurological: Negative


Psychological: Other - frustrated


All Other Systems Reviewed And Are Negative: Yes





Physical Exam


Triage Information Reviewed: Yes


Vital Signs On Initial Exam: 


 Initial Vitals











Temp Pulse Resp BP Pulse Ox


 


 98 F   93   16   177/91   96 


 


 09/11/17 19:55  09/11/17 19:55  09/11/17 19:55  09/11/17 19:55  09/11/17 19:55











Vital Signs Reviewed: Yes


Appearance: Positive: Obese - lying on side most of visit, does become tearful 

at end of vist


Skin: Positive: Warm, Dry


Eyes: Positive: Conjunctiva Clear - anicteric sclera, Other: - amblyopia


ENT: Positive: Hearing grossly normal


Dental: Positive: Other - edentulous


Respiratory/Lung Sounds: Positive: Breath Sounds Present


Cardiovascular: Positive: Normal, RRR


Abdomen Description: Positive: Nontender, No Organomegaly, Soft


Bowel Sounds: Positive: Present


Pelvic Exam: Positive: speculum exam normal - milky white d/c -no jason curdy d/

c, no lesions, no blood - cervix not identified as pt in poor position and 

reports pain w/ speculum exam.  Negative: external exam normal - missing 

pigmentation along inner mucosal border external labia - no jason lesions


Musculoskeletal: Positive: Normal, Strength/ROM Intact


Neurological: Positive: Alert, Oriented to Person Place, Time


Psychiatric: Positive: Other - upset, tearful but calm and coopertative





- Dalia Coma Scale


Coma Scale Total: 15





Diagnostics





- Vital Signs


 Vital Signs











  Temp Pulse Resp BP Pulse Ox


 


 09/11/17 19:55  98 F  93  16  177/91  96














- Laboratory


Lab Statement: Any lab studies that have been ordered have been reviewed, and 

results considered in the medical decision making process.





Abdominal Pain Fem Course/Dx





- Diagnoses


Provider Diagnoses: 


 Abdominal pain, Nausea and/or vomiting








Discharge





- Discharge Plan


Condition: Stable


Disposition: HOME


Patient Education Materials:  Chronic Abdominal Pain (ED), Acute Nausea and 

Vomiting (ED)


Referrals: 


Yeni Shanks MD [Primary Care Provider] - 


Additional Instructions: 


You have declined ED testing for abdominal symptoms tonight to recheck labs. We 

discussed possible need for an endoscope. A rectal exam was performed and will 

be resulted to your PCP if there is a possible bleed in your gastrointestinal 

tract. Your labs and vital signs are not indicating acute blood loss at this 

time. You were offered protonix however you reported this does not work well - 

you may continue your omeprazole at home as well as your nausea medication - 

again, you declined further nausea medications here tonight. It is suggested 

that you have your gallbladder and its function assessed as you do have 

diabetes which can place you at risk for pathology of this organ. Due to lack 

of active gallbladder symptoms here tonight and normal liver labs past few days

, this may be assessed through your PCP.





Also, if you vaginal cultures return with infection, we will call you to start 

medication which we can send to your pharmacy of choice.





Please follow-up with your PCP tomorrow to schedule tests discussed.


*If you have persistent vomiting, diarrhea, fever, chills, chest pain, 

shortness of breath, bloody cough, syncope, return to ED

## 2017-09-12 NOTE — HP
H&P (Free Text)


History and Physical: 





PCP: LESA Shanks MD





Date/Time of Evaluation: 2017





CC: N/V, abdominal cramps





Note: This is an interval H&P as Mrs Masters was admitted  and signed out 

AMA 30 minutes after arrival to the floor from ED. ODETTE Barbour DO's H&P of that 

date was reviewed, accurate, & is copied at the end.





HPI: Mrs Masters is a 38YO insulin requiring DM with extensive complications 

including gastroparesis, ESRD-PD, R transmetatarsal amputation, PAOD s/p LLE 

stent, peripheral polyneuropathy, & CAD who relates developing intractable N/V 

associated with abdominal cramping after the stenting of her LLE at [facility] 3 weeks 

ago. She reports subjective F/C 1 week ago, but none since. She claims to have 

had "dry heaves" ~10 times today as well as several episodes of diarrhea. She 

denies black or bloody content to the emesis or diarrhea. Additionally, she 

reports her L 5th toe has turned black and smells bad, but is not painful. She 

states she does not known when this happened. 





She has [place] [facility] [place] 7 times over the past 9 days. She was 

admitted  only to sign out AMA 30 minutes after arriving to the floor. She 

states this is not true, that she was discharged. However, documentation 

clearly contradicts. She states that she hasn't taken any of her medications 

for 2 days due to her N/V.





Ambulatory Orders


Nursing to reconcile.





Gabapentin CAP(*) [Neurontin 300 CAP(*)] 100 mg PO TID 16 


Insulin Aspart [Novolog] 0 - 100 units SUBCUT AC PRN 16 


Omeprazole CAP* [Prilosec CAP* 20 MG] 20 mg PO QPM 16 


Albuterol HFA INHALER* [Ventolin HFA Inhaler*] 2 puff INH Q6H PRN 17 


Insulin GLARGINE(*) [Lantus(*)] 80 units SUBCUT BID 17 


Isosorbide Mononitrate ER TAB* [Imdur ER TAB*] 60 mg PO QPM 17 


Losartan TAB* [Cozaar TAB*] 100 mg PO QPM 17 


amLODIPine TAB* [Norvasc 5 mg TAB*] 10 mg PO QPM 17 


Metoclopramide TAB* [Reglan TAB*] 10 mg PO Q6H PRN 17 


Aspirin Low Dose CHEW TAB* [Aspirin Low Dose TAB*] 81 mg PO DAILY  tab.chew  


Atorvastatin* [Lipitor 80 MG*] 80 mg PO QPM  tab 17 


Methadone TAB* [Dolophine TAB*] 10 mg PO Q6H PRN 17 





Allergies


Heparin Allergy (Intermediate, Verified 17 20:58)


 pt states it gave her a blood clot


Niacin Allergy (Mild, Verified 17 20:58)


 Hives


Ropinirole [From Requip] Allergy (Mild, Verified 17 20:58)


 Hives


Amoxicillin [From Augmentin] Adverse Reaction (Mild, Verified 17 20:58)


 "Doesn't work"


Clavulanic Acid [From Augmentin] Adverse Reaction (Mild, Verified 17 20:58

)


 "Doesn't work"


Codeine Adverse Reaction (Mild, Verified 17 20:58)


 Nausea And Vomiting


Erythromycin Adverse Reaction (Mild, Verified 17 20:58)


 "DOESN'T WORK"


Metronidazole [From Flagyl] Adverse Reaction (Mild, Verified 17 20:58)


 Nausea And Vomiting


Morphine Adverse Reaction (Mild, Verified 17 20:58)


 "Doesn't work"


Sulfamethoxazole w/Trimethoprim [From Bactrim] Adverse Reaction (Mild, Verified 

17 20:58)


 Nausea And Vomiting


plastic tape Allergy (Mild, Uncoded 17 20:59)


 Blisters





ROS: as above, otherwise reviewed and all were negative





vitals: 


 Vital Signs











Temp  36.6 C   17 19:15


 


Pulse  96   17 19:15


 


Resp  18   17 19:15


 


BP  169/109   17 19:15


 


Pulse Ox  94   17 19:15








 Intake & Output











 17





 23:59 11:59 23:59


 


Weight   92.986 kg








Constitutional: NAD, normally developed, obese white female


HEENM: atraumatic; hearing: clinically intact; oropharynx: clear, mucosa moist


Neck: soft tissue: non-tender; thyroid: normal


Pulmonary: clear to auscultation bilaterally, good aeration, no accessory 

muscle use


CV: RR/RR, normal S1S2, no carotid bruit, no jugular venous distention, 2+ B DP/

PT, no edema


Abdominal: soft, non-distended, non-tender, no rebound/guarding/rigidity, 

normoactive bowel sounds, no hepatosplenomegaly or masses, no costovertebral 

angle tenderness


Lymph: neck: ; axilla: ; groin: 


Musculoskeletal: general: R transmetatarsal amputation


Integumental: L 5th distal phalanx black/necrotic, remainder of the foot is warm

/pink


Psychiatric 


orientation: AA&O to PPS


affect: anxious/tearful


mood: cooperative


eye contact: poor


content: unreliable


responses: reflect poor understanding of her serious medical issues


insight: poor





Testing: 


 Lab Results











  17 Range/Units





  19:40 19:40 19:40 


 


WBC    10.8  (3.5-10.8)  10^3/ul


 


RBC    4.80  (4.0-5.4)  10^6/ul


 


Hgb    14.1  (12.0-16.0)  g/dl


 


Hct    43  (35-47)  %


 


MCV    89  (80-97)  fL


 


MCH    29  (27-31)  pg


 


MCHC    33  (31-36)  g/dl


 


RDW    16 H  (10.5-15)  %


 


Plt Count    418  (150-450)  10^3/ul


 


MPV    9  (7.4-10.4)  um3


 


Neut % (Auto)    76.6  (38-83)  %


 


Lymph % (Auto)    12.7 L  (25-47)  %


 


Mono % (Auto)    6.0  (1-9)  %


 


Eos % (Auto)    3.7  (0-6)  %


 


Baso % (Auto)    1.0  (0-2)  %


 


Absolute Neuts (auto)    8.2 H  (1.5-7.7)  10^3/ul


 


Absolute Lymphs (auto)    1.4  (1.0-4.8)  10^3/ul


 


Absolute Monos (auto)    0.7  (0-0.8)  10^3/ul


 


Absolute Eos (auto)    0.4  (0-0.6)  10^3/ul


 


Absolute Basos (auto)    0.1  (0-0.2)  10^3/ul


 


Absolute Nucleated RBC    0  10^3/ul


 


Nucleated RBC %    0  


 


INR (Anticoag Therapy)  0.95    (0.89-1.11)  


 


APTT  31.0    (26.0-36.3)  seconds


 


Sodium   138   (133-145)  mmol/L


 


Potassium   TNP   


 


Chloride   99 L   (101-111)  mmol/L


 


Carbon Dioxide   31   (22-32)  mmol/L


 


Anion Gap   8   (2-11)  mmol/L


 


BUN   21   (6-24)  mg/dL


 


Creatinine   5.04 H   (0.51-0.95)  mg/dL


 


Est GFR ( Amer)   12.4   (>60)  


 


Est GFR (Non-Af Amer)   9.6   (>60)  


 


BUN/Creatinine Ratio   4.2 L   (8-20)  


 


Glucose   151 H   ()  mg/dL


 


Lactic Acid     (0.5-2.0)  mmol/L


 


Calcium   8.7   (8.6-10.3)  mg/dL


 


Magnesium   1.5 L   (1.9-2.7)  mg/dL


 


Total Bilirubin   0.40   (0.2-1.0)  mg/dL


 


AST   TNP   


 


ALT   4 L   (7-52)  U/L


 


Alkaline Phosphatase   89   ()  U/L


 


C-Reactive Protein   7.79 H   (< 5.00)  mg/L


 


Total Protein   5.8 L   (6.4-8.9)  g/dL


 


Albumin   2.7 L   (3.2-5.2)  g/dL


 


Globulin   3.1   (2-4)  g/dL


 


Albumin/Globulin Ratio   0.9 L   (1-3)  


 


Lipase   < 10 L   (11.0-82.0)  U/L


 


Beta HCG, Quant   < 0.60   mIU/mL














  17 Range/Units





  19:40 20:38 


 


WBC    (3.5-10.8)  10^3/ul


 


RBC    (4.0-5.4)  10^6/ul


 


Hgb    (12.0-16.0)  g/dl


 


Hct    (35-47)  %


 


MCV    (80-97)  fL


 


MCH    (27-31)  pg


 


MCHC    (31-36)  g/dl


 


RDW    (10.5-15)  %


 


Plt Count    (150-450)  10^3/ul


 


MPV    (7.4-10.4)  um3


 


Neut % (Auto)    (38-83)  %


 


Lymph % (Auto)    (25-47)  %


 


Mono % (Auto)    (1-9)  %


 


Eos % (Auto)    (0-6)  %


 


Baso % (Auto)    (0-2)  %


 


Absolute Neuts (auto)    (1.5-7.7)  10^3/ul


 


Absolute Lymphs (auto)    (1.0-4.8)  10^3/ul


 


Absolute Monos (auto)    (0-0.8)  10^3/ul


 


Absolute Eos (auto)    (0-0.6)  10^3/ul


 


Absolute Basos (auto)    (0-0.2)  10^3/ul


 


Absolute Nucleated RBC    10^3/ul


 


Nucleated RBC %    


 


INR (Anticoag Therapy)    (0.89-1.11)  


 


APTT    (26.0-36.3)  seconds


 


Sodium    (133-145)  mmol/L


 


Potassium   3.1 L  


 


Chloride    (101-111)  mmol/L


 


Carbon Dioxide    (22-32)  mmol/L


 


Anion Gap    (2-11)  mmol/L


 


BUN    (6-24)  mg/dL


 


Creatinine    (0.51-0.95)  mg/dL


 


Est GFR ( Amer)    (>60)  


 


Est GFR (Non-Af Amer)    (>60)  


 


BUN/Creatinine Ratio    (8-20)  


 


Glucose    ()  mg/dL


 


Lactic Acid  1.0   (0.5-2.0)  mmol/L


 


Calcium    (8.6-10.3)  mg/dL


 


Magnesium    (1.9-2.7)  mg/dL


 


Total Bilirubin    (0.2-1.0)  mg/dL


 


AST   8 L  


 


ALT    (7-52)  U/L


 


Alkaline Phosphatase    ()  U/L


 


C-Reactive Protein    (< 5.00)  mg/L


 


Total Protein    (6.4-8.9)  g/dL


 


Albumin    (3.2-5.2)  g/dL


 


Globulin    (2-4)  g/dL


 


Albumin/Globulin Ratio    (1-3)  


 


Lipase    (11.0-82.0)  U/L


 


Beta HCG, Quant    mIU/mL








CT abd/pel (2017): IMPRESSION:  NO CT EVIDENCE OF UROLITHIASIS. PERONEAL 

DIALYSIS FLUID/DIALYSIS CATHETER. NO


ACUTE CT FINDINGS.





Impression: 37F presenting with a necrotic L 5th toe & exacerbation of her 

diabetic gastroparesis





DIAGNOSIS & PLAN


Primary 


necrotic L 5th toe, limb threatening


: HX PAOD s/p LLE stenting at [facility] ~3 weeks ago, records requested


: LLE arterial US


: IV meropenem & vancomycin


: IVFs


: wound care consult


: consider ID consult in AM


: supportive care





diabetic gastroparesis, exacerbation


: clinically euvolemic: baseline labs, HTN-mart vitals, moist mucous membranes, 

normal lactic, & no tachycardia


: gastric emptying study in AM, NPO until completed


: doubt SBP, but will send dialysate sample for gram stain & CX


: PO dicyclomine Q8H


: IV metoclopramide Q6H


: consider GI consult in AM pending above result


: A1c 2017 is 12.4


: basal/correctional insulin while NPO, will need bolus added when returning to 

PO


: diabetic education consult





Secondary 


diabetic nephropathy ESRD-PD


: continue Q6H peritoneal dialysis


: consider nephrology consult in AM





CAD


: review meds once reconciled





HTN


: review meds once reconciled





HLD


: review meds once reconciled





asthma


: albuterol nebs PRN





GERD


: omeprazole





depression/anxiety


: review meds once reconciled





Admission Rational: observation for exacerbation of diabetic gastroparesis & 

initiation of ABX for diabetic foot infection


DVTp: would avoid compressive devices 2nd PAOD w/ recent stenting, no heparin 

2nd HX adverse event


Code Status: full





________________________________________________________________________________

_____________________________





History & Physical


Patient:   MAKAYLA MASTERS                                                    

                                                      Account Number:  

[id number]


/Age: 1980   37                                                       

                                                              Medical Record#:  

[id number]


Admission Date:    17


Provider: Virginie Barbour DO


CC:  Dr. Shanks; Dr. Baker *


HISTORY AND PHYSICAL:


DATE OF ADMISSION:  17


 


PRIMARY CARE PROVIDER:  Dr. Shanks.


NEPHROLOGIST:  Dr. Baker.


 


CHIEF COMPLAINT:  Abdominal pain, nausea, and vomiting.


HISTORY OF PRESENT ILLNESS:  Ms. Masters is a 37-year-old female, well known to 

the hospitalist service from 


multiple previous hospitalizations, who has a history of poorly controlled 

insulin dependent diabetes, end-stage renal 


disease on peritoneal dialysis, peripheral neuropathy, peripheral vascular 

disease, coronary artery disease, 


hypertension, hyperlipidemia, depression and anxiety, who presents to the 

emergency room with complaints of 


abdominal pain, nausea, and vomiting.  The patient was [place] 

[facility] [place] .  At that time, she ended up leaving the hospital against medical advice.  

There were concerns for foot infection at 


that point.  The patient ultimately ended up presenting to the emergency room 

several more times since then.  She 


states, however, that she was admitted to [facility] 2 to 3 weeks 

ago and left just this past Thursday 


after having stents placed in her left leg.  The patient states that during 

that hospitalization in addition to having 


stenting procedure done to her left leg,  she had fairly persistent nausea and 

vomiting as well as abdominal 


discomfort.  The patient does not really recall what happened during the 

hospitalization.  She states that since she got 


home; however, she has had chills. She has had pressure in her abdomen, vomiting

, constant nausea, as well as 


numerous other complaints.  The patient states that in terms of her peritoneal 

dialysis, it has been going okay.  She 


denies seeing any cloudiness or fibrin in her dialysate. The patient denies any 

recent sick contacts.


PAST MEDICAL HISTORY:


1.  Poorly controlled insulin dependent diabetes.


2.  End-stage renal disease, on peritoneal dialysis.


3.  Peripheral neuropathy.


4.  Coronary artery disease.


5.  Hypertension.


6.  Hyperlipidemia.


7.  Peripheral vascular disease.


8.  Asthma.


9.  GERD.


10.  Depression.


11.  Anxiety.


PAST SURGICAL HISTORY:


1.  Peritoneal dialysis catheter insertion.


2.  Right transmetatarsal amputation.


3.  Right lower extremity arterial stenting.History & Physical


4.  Left lower extremity arterial stenting.


5.  Left eye prosthesis.


MEDICATIONS:  The patient does not know her medications.  This is from prior 

hospitalization:


1.  Aspirin 81 mg p.o. daily.


2.  Albuterol 2 puffs inhaled q.6 hours p.r.n. shortness of breath.


3.  Amlodipine 10 mg p.o. daily.


4.  Omeprazole 20 mg p.o. q.h.s.


5.  Reglan 10 mg p.o. q.6 hours p.r.n. nausea.


6.  Methadone 10 mg p.o. q.6 hours p.r.n. pain.


7.  Cozaar 100 mg p.o. q.h.s.


8.  Imdur 60 mg p.o. q.h.s.


9.  Lantus 80 units subcutaneous twice daily.


10.  NovoLog sliding scale subcutaneous a.c.


11.  Gabapentin 100 mg p.o. t.i.d.


12.  Lipitor 80 mg p.o. q.h.s.


ALLERGIES:  AUGMENTIN, HEPARIN, FLAGYL, MORPHINE, REQUIP, BACTRIM, CODEINE, 

ERYTHROMYCIN, 


NIACIN, and PLASTIC TAPE.


 


FAMILY HISTORY:  The patient's parents are both diabetic.


SOCIAL HISTORY:  The patient is a pack per day smoker for at least 18 years.  

She does not drink alcohol.  She 


denies any recreational drug use.  She is disabled. She has a boyfriend.  She 

has no children.  The patient indicates 


that her boyfriend would be her healthcare proxy.


REVIEW OF SYSTEMS:  The patient admits to fevers as high as 100, chills, 

anorexia, chest pressure, this has been 


going on for the last couple of days that comes and goes.  Lower extremity 

edema that developed a couple of weeks 


ago, she states this is now getting a little bit better.  She admits to cough, 

which is chronic, but shortness of breath, it 


has been associated with the chest pain.  She admits to nausea, vomiting, 

diarrhea that started on the day of 


presentation to the emergency room.  She thinks she has gone 6 times today.  

She admits to abdominal pain.  She 


does make urine.  She denies any dysuria.  She complains of left-sided weakness 

that has been chronic for the last 6 


months.  No sudden changes in his visio.  No dysphagia.  No joint pains or 

muscle pains out of ordinary.  No rashes.  


She does admit to anxiety and depression.


                               PHYSICAL EXAMINATION


GENERAL:  The patient is a well-developed, young, chronically ill appearing 

female, who also appears much older 


than her stated age, lying in a stretcher in no acute distress.


VITAL SIGNS:  Blood pressure 144/68, pulse 81, respirations 15, temp 97.5, O2 

sat 82% on room air.


HEENT:  The right pupil is round and reactive to light.  The right extraocular 

muscles are intact.  Left eye has a 


prosthesis.  The oropharynx is clear.  There is no submandibular, cervical, or 

supraclavicular adenopathy.  Thyroid is 


not enlarged.  No thyroid nodules are noted.


PULMONARY:  Lungs are clear to auscultation bilaterally, though breath sounds 

are diminished in all lung fields.


 


CARDIAC:  Normal S1 and S2.  Regular rate and rhythm.  I do not appreciate any 

murmurs.  There is trace left lower 


extremity edema.


ABDOMEN:  Bowel sounds are present, but hypoactive.  There are number of scars 

on the patient's abdomen.  She 


does not complain of any tenderness upon palpation of the abdomen.


MUSCULOSKELETAL:  The patient is status post right transmetatarsal amputation. 

There is otherwise no clubbing or 


cyanosis of the digits.  There is full active range of motion.History & Physical


SKIN:  Warm, it is dry.  I do not appreciate any rashes.  The patient does have 

almost quarter-sized wound to the 


incision of the right transmetatarsal amputation on the lateral aspect of the 

incision.  There is no surrounding 


erythema.  There is a mild amount of slough within the wound base.


NEURO:  Cranial nerves II through XII are grossly intact.  Sensation is intact 

to light touch.  Strength is 5/5 and 


symmetric in both upper and lower extremities bilaterally.


PSYCH:  The patient is alert.  She is oriented x3.  Affect appears appropriate.


 


 LABORATORY DATA/DIAGNOSTIC STUDIES:  WBC 12.2, hemoglobin 13.2, hematocrit 41, 

and platelets 487.  


INR 0.92.  Sodium 135, potassium 4.2, chloride 100, CO2 28, BUN 24, creatinine 

3.82, glucose 174, lactic acid 1.2, 


calcium 8.8, bilirubin 0.3, AST 8, ALT 3, alk phos 99.  Troponin 0.03, repeated 

0.02.  CRP 12.37.  Albumin 2.9, 


amylase less than 10, lipase is 26.


EKG reveals normal sinus rhythm without any acute ST-T wave abnormalities.  CT 

abdomen and pelvis reveals no 


urolithiasis or acute findings.  Peritoneal dialysis fluid and catheter are 

noted.


ASSESSMENT AND PLAN:  Ms. Masters is a 37-year-old chronically ill female, who 

presents to the emergency 


room with complaints of abdominal pain, nausea, vomiting, chest pain and 

shortness of breath.


 


1.  Abdominal pain, nausea, and vomiting.  The etiology of this is not 

completely clear.  I am suspicious that she 


likely has gastroparesis with all of her other diabetic complications.  A 

gastric emptying study has been ordered.  The 


patient claims that she is unable to keep anything down; however, she does not 

look volume deplete on exam nor is 


her lactic acid level elevated.  The patient will be given a clear liquid diet 

once her gastric emptying study is 


complete.  She does have Reglan already at home, but perhaps another agent may 

be necessary.


2.  Chest pain.  The patient states that this has been going on for the last 

couple of days.  Her first 2 troponins are 


negative.  The third is pending.  Her EKG does not reveal any acute findings.  

I do not feel that any further workup for 


this is needed at this point.  The patient did undergo stress testing in 2016, which revealed a small fixed 


defect of the distal septum and lateral wall.


3.  Shortness of breath.  The patient is requiring supplemental oxygen.  It is 

unclear if this has been necessary in her 


previous hospitalizations as well.  Her lungs were clear.  I will go ahead and 

get a portal chest x-ray to evaluate for 


infiltrate or pulmonary edema.


4.  Peripheral vascular disease.  The patient states that she recently 

underwent stenting to the left lower extremity.  


She previously had the right lower extremity stented.  I will try to get these 

records from [facility].


5.  End-stage renal disease.  The dialysis unit has been notified of the patient

's admission.  In addition to continuing 


on her peritoneal dialysis, I have asked that a sample of the peritoneal 

dialysis fluid be sent to the lab for testing for 


cell count, Gram stain and culture.


6.  Hypertension.  The patient's blood pressure at this point is moderately 

elevated.  She will be maintained on her 


usual home medication regimen.


7.  Insulin-dependent diabetes.  The patient states that she does not know if 

she is a type 1 or type 2 diabetic.  She 


states that she was at one point on pills for her diabetes.  She is essentially 

going to be n.p.o. or on clear liquid diet 


for now; therefore, I will cut her Lantus to 40 units subcutaneous twice daily 

for now and if her sugars become 


markedly elevated, this will be increased back up to her home dose.  

Additionally, she will be placed on a lispro 


sliding scale.  The patient's last hemoglobin A1c in our system was obtained in 

May 2017 and is markedly elevated at 


14.3%.  I will check a new hemoglobin A1c to see if she has had any improved 

control in her blood sugars.


8.  Hyperlipidemia.  The patient will be maintained on her usual dose of 

Lipitor.


9.  DVT prophylaxis.  According to the Adult Thrombosis Prophylaxis Risk Factor 

Assessment Guide, the patient has 


a total risk factor score of 1 making her low risk.  She states that she is 

allergic to HEPARIN and she is no longer on 


any anticoagulants.  SCDs will be utilized as DVT prophylaxis.


10.  Code status is full.


TIME SPENT:  65 minutes was spent admitting this patient.History & Physical


Virginie Barbour DO


Dictated Date/Time: 17 0957


Transcribed Date/Time 17 3755





Copy to: 





CC: Virginie Barbour DO

## 2017-09-12 NOTE — ED
Gabriele RICARDO Rebecca, scribed for Js Milian MD on 09/12/17 at 1949 .





Abdominal Pain/Female





- HPI Summary


HPI Summary: 


Pt is a 38 y/o F BIBA who presents to ED c/o diffuse abdominal pain with N/V. 

Sx began 3 weeks ago after abdominal surgery during which a stent was placed in 

her LLE. Reports that she has been experiencing symptoms since waking up from 

anesthesia. Pain is currently moderate, ranked 5/10. Sx aggravated by PO intake

, alleviated by nothing. Surgery was done at [facility]. She is on dialysis. 








- History of Current Complaint


Chief Complaint: EDAbdPain


Stated Complaint: ABD PAIN/V/N/D


Time Seen by Provider: 09/12/17 19:35


Hx Obtained From: Patient


Hx Last Menstrual Period: May 2017


Onset/Duration: Lasting Days, Still Present


Severity Currently: Moderate


Pain Intensity: 5


Pain Scale Used: 0-10 Numeric


Location: Diffuse


Aggravating Factor(s): Food, Other: - Water


Alleviating Factor(s): Nothing


Associated Signs and Symptoms: Positive: Nausea, Vomiting


Allergies/Adverse Reactions: 


 Allergies











Allergy/AdvReac Type Severity Reaction Status Date / Time


 


Amoxicillin [From Augmentin] Allergy  See Comment Verified 09/11/17 19:45


 


Clavulanic Acid Allergy  See Comment Verified 09/11/17 19:45





[From Augmentin]     


 


Heparin Allergy  See Comment Verified 09/11/17 19:45


 


Metronidazole [From Flagyl] Allergy  Nausea And Verified 09/11/17 19:45





   Vomiting  


 


Morphine Allergy  See Comment Verified 09/11/17 19:45


 


Ropinirole [From Requip] Allergy  Hives Verified 09/11/17 19:45


 


Sulfamethoxazole Allergy  Nausea And Verified 09/11/17 19:45





w/Trimethoprim   Vomiting  





[From Bactrim]     


 


Codeine AdvReac  Nausea And Verified 09/11/17 19:45





   Vomiting  


 


Erythromycin AdvReac  "DOESN'T Verified 09/11/17 19:45





   WORK"  


 


Niacin AdvReac  Hives Verified 09/11/17 19:45


 


plastic tape Allergy  Blisters Uncoded 09/11/17 19:45














PMH/Surg Hx/FS Hx/Imm Hx


Endocrine/Hematology History: Reports: Hx Anticoagulant Therapy - Aspirin., Hx 

Blood Transfusions, Hx Diabetes - insulin dependent, Hx Thyroid Disease - 

nodule biopsied, normal, Hx Anemia - hx of - reports had tranfusion in 2014 

after mi


Cardiovascular History: Reports: Hx Angina, Hx Cardiac Arrest - in Dec. 2014 

with CPR, Hx Cardiomegaly, Hx Coronary Artery Disease, Hx Hypercholesterolemia, 

Hx Hypertension, Hx Myocardial Infarction, Other Cardiovascular Problems/

Disorders - hyperlipidemia, CAD


   Denies: Hx Congestive Heart Failure, Hx Deep Vein Thrombosis, Hx Pacemaker/

ICD, Hx Valvular Heart Disease


Respiratory History: Reports: Hx Asthma, Hx Chronic Obstructive Pulmonary 

Disease (COPD) - smoker, Hx Sleep Apnea - pt reports md thinks, but no official 

testing done yet, Other Respiratory Problems/Disorders - acute respipatory 

failure with hypoxia - dec 2016


   Denies: Hx Lung Cancer, Hx Pneumonia, Hx Pulmonary Embolism


GI History: Reports: Hx Gastroesophageal Reflux Disease, Other GI Disorders - 

GASTROPARESIS - TAKING OMEPRAZOLE, reglan and zofran


   Denies: Hx Gall Bladder Disease, Hx Gastrointestinal Bleed, Hx Ulcer, Hx 

Urosepsis


 History: Reports: Hx Acute Renal Failure, Hx Chronic Renal Failure, Hx 

Dialysis - PERITONEAL, Hx Kidney Stones - in past, Hx Renal Disease - ESRD on 

home peritoneal dialysis , Other  Problems/Disorders - ESRD- URINATES A SMALL 

AMOUNT


Musculoskeletal History: Reports: Hx Arthritis - back, hips, Hx Back Problems - 

Sciatica and Herniated L3 disk, Other Musculoskeletal History - partial 

amputation of toes/foot


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 

of R eye, Hx Vision Problem


   Denies: Hx Contacts or Glasses, Hx Hearing Aid


Opthamlomology History: Reports: Hx Eye Prosthesis - left, Hx Legally Blind - 30

% use of R eye, Hx Vision Problem


   Denies: Hx Contacts or Glasses


Neurological History: Reports: Other Neuro Impairments/Disorders - neuropathy


   Denies: Hx Transient Ischemic Attacks (TIA)


Psychiatric History: Reports: Hx Anxiety, Hx Depression, Hx Bipolar Disorder


   Denies: Hx Eating Disorder, Hx Panic Disorder, Hx Schizophrenia, Hx of 

Violent Episodes Against Others





- Cancer History


Cancer Type, Location and Year: MALIGNANT MELANOMA- V[facility].  Stage 5 Renal 

failure/dialysis


Hx Chemotherapy: No


Hx Radiation Therapy: No





- Surgical History


Surgery Procedure, Year, and Place: LEFT EYE REMOVED 2012 - HAS PROSTHETIC EYE (

ORBITS DONE 5/2015 OK'D BY DR SAE COLE TO SCAN IN MRI) ;.  MELANOMA REMOVED 

FROM VULVA 2013 (PROCEDURE DONE 4X);.  CARDIAC CATH 2014 - NO STENTS;.  LEFT 

WRIST FISTULA PLACEMENT ([facility]) 2014;.  HEMODIALYSIS CATH RIGHT CHEST WALL 2014;.

  PERITONEAL DIALYSIS CATH 2/2015;.  CHEST WALL C[place] REMOV[place] 7[place]2016 [facility].  

RIGHT GREAT TOE AMPUTATION, [facility] [place];.  PLACEMENT RIGHT JUGULAR TESIO 

HEMODIALYSIS CATHETER [facility] 8/27/2016;.  REMOVAL OF JUGULAR CATH - SEPT 2016.  

CARDIAC CATH - stentsx2 right leg.  PARTIAL AMPUTATION RIGHT TOES 5/2017


Hx Anesthesia Reactions: No





- Immunization History


Date of Tetanus Vaccine: UTD


Date of Influenza Vaccine: 8/15/16


Infectious Disease History: Reports: Hx of Known/Suspected MRSA - MRSA R 1st toe


   Denies: Hx Clostridium Difficile, Hx Hepatitis, Hx Human Immunodeficiency 

Virus (HIV), Hx Shingles, Hx Tuberculosis, History Other Infectious Disease, 

Traveled Outside the [place] in Last 30 Days





- Family History


Known Family History: Positive: Cardiac Disease - father MI at 43 y/o, 

Hypertension, Other - Positive for bipolar and depression. Completed suicide to 

sister.  





- Social History


Alcohol Use: None


Hx Substance Use: No


Substance Use Type: Reports: Prescribed


Substance Use Comment - Amount & Last Used: METHADONE


Hx Tobacco Use: Yes


Smoking Status (MU): Heavy Every Day Tobacco Smoker


Type: Cigarettes


Amount Used/How Often: 1 PPD


Length of Time of Smoking/Using Tobacco: 20 YEARS


Have You Smoked in the Last Year: Yes





Review of Systems


Negative: Fever


Positive: Abdominal Pain, Vomiting, Nausea


All Other Systems Reviewed And Are Negative: Yes





Physical Exam





- Summary


Physical Exam Summary: 


General: the pt is tearful


Skin: warm, dry


Head: normal


Eyes: EOMI, VIOLETTA


ENT: normal


Neck: supple, nontender


Respiratory: CTA, breath sounds present


Cardiovascular: RRR


Abdomen: soft, nontender


Bowel: present


Musculoskeletal:strength/ROM intact, she is missing the distal portion of her 

right foot and the left fifth toe has necrosis.


Neurological: normal, sensory/motor intact, A&O x3


Psychological: affect/mood appropriate





Triage Information Reviewed: Yes


Vital Signs On Initial Exam: 


 Initial Vitals











Temp Pulse Resp BP Pulse Ox


 


 97.9 F   96   18   169/109   94 


 


 09/12/17 19:10  09/12/17 19:10  09/12/17 19:10  09/12/17 19:10  09/12/17 19:10











Vital Signs Reviewed: Yes





- [place] Coma Scale


Coma Scale Total: 15





Diagnostics





- Vital Signs


 Vital Signs











  Temp Pulse Resp BP Pulse Ox


 


 09/12/17 19:15  97.9 F  96  18  169/109  94


 


 09/12/17 19:10  97.9 F  96  18  169/109  94














- Laboratory


Lab Results: 


 Lab Results











  09/12/17 09/12/17 09/12/17 Range/Units





  19:40 19:40 19:40 


 


WBC    10.8  (3.5-10.8)  10^3/ul


 


RBC    4.80  (4.0-5.4)  10^6/ul


 


Hgb    14.1  (12.0-16.0)  g/dl


 


Hct    43  (35-47)  %


 


MCV    89  (80-97)  fL


 


MCH    29  (27-31)  pg


 


MCHC    33  (31-36)  g/dl


 


RDW    16 H  (10.5-15)  %


 


Plt Count    418  (150-450)  10^3/ul


 


MPV    9  (7.4-10.4)  um3


 


Neut % (Auto)    76.6  (38-83)  %


 


Lymph % (Auto)    12.7 L  (25-47)  %


 


Mono % (Auto)    6.0  (1-9)  %


 


Eos % (Auto)    3.7  (0-6)  %


 


Baso % (Auto)    1.0  (0-2)  %


 


Absolute Neuts (auto)    8.2 H  (1.5-7.7)  10^3/ul


 


Absolute Lymphs (auto)    1.4  (1.0-4.8)  10^3/ul


 


Absolute Monos (auto)    0.7  (0-0.8)  10^3/ul


 


Absolute Eos (auto)    0.4  (0-0.6)  10^3/ul


 


Absolute Basos (auto)    0.1  (0-0.2)  10^3/ul


 


Absolute Nucleated RBC    0  10^3/ul


 


Nucleated RBC %    0  


 


INR (Anticoag Therapy)  0.95    (0.89-1.11)  


 


APTT  31.0    (26.0-36.3)  seconds


 


Sodium   138   (133-145)  mmol/L


 


Potassium   TNP   


 


Chloride   99 L   (101-111)  mmol/L


 


Carbon Dioxide   31   (22-32)  mmol/L


 


Anion Gap   8   (2-11)  mmol/L


 


BUN   21   (6-24)  mg/dL


 


Creatinine   5.04 H   (0.51-0.95)  mg/dL


 


Est GFR ( Amer)   12.4   (>60)  


 


Est GFR (Non-Af Amer)   9.6   (>60)  


 


BUN/Creatinine Ratio   4.2 L   (8-20)  


 


Glucose   151 H   ()  mg/dL


 


Lactic Acid     (0.5-2.0)  mmol/L


 


Calcium   8.7   (8.6-10.3)  mg/dL


 


Magnesium   1.5 L   (1.9-2.7)  mg/dL


 


Total Bilirubin   0.40   (0.2-1.0)  mg/dL


 


AST   TNP   


 


ALT   4 L   (7-52)  U/L


 


Alkaline Phosphatase   89   ()  U/L


 


C-Reactive Protein   7.79 H   (< 5.00)  mg/L


 


Total Protein   5.8 L   (6.4-8.9)  g/dL


 


Albumin   2.7 L   (3.2-5.2)  g/dL


 


Globulin   3.1   (2-4)  g/dL


 


Albumin/Globulin Ratio   0.9 L   (1-3)  


 


Lipase   < 10 L   (11.0-82.0)  U/L


 


Beta HCG, Quant   < 0.60   mIU/mL














  09/12/17 Range/Units





  19:40 


 


WBC   (3.5-10.8)  10^3/ul


 


RBC   (4.0-5.4)  10^6/ul


 


Hgb   (12.0-16.0)  g/dl


 


Hct   (35-47)  %


 


MCV   (80-97)  fL


 


MCH   (27-31)  pg


 


MCHC   (31-36)  g/dl


 


RDW   (10.5-15)  %


 


Plt Count   (150-450)  10^3/ul


 


MPV   (7.4-10.4)  um3


 


Neut % (Auto)   (38-83)  %


 


Lymph % (Auto)   (25-47)  %


 


Mono % (Auto)   (1-9)  %


 


Eos % (Auto)   (0-6)  %


 


Baso % (Auto)   (0-2)  %


 


Absolute Neuts (auto)   (1.5-7.7)  10^3/ul


 


Absolute Lymphs (auto)   (1.0-4.8)  10^3/ul


 


Absolute Monos (auto)   (0-0.8)  10^3/ul


 


Absolute Eos (auto)   (0-0.6)  10^3/ul


 


Absolute Basos (auto)   (0-0.2)  10^3/ul


 


Absolute Nucleated RBC   10^3/ul


 


Nucleated RBC %   


 


INR (Anticoag Therapy)   (0.89-1.11)  


 


APTT   (26.0-36.3)  seconds


 


Sodium   (133-145)  mmol/L


 


Potassium   


 


Chloride   (101-111)  mmol/L


 


Carbon Dioxide   (22-32)  mmol/L


 


Anion Gap   (2-11)  mmol/L


 


BUN   (6-24)  mg/dL


 


Creatinine   (0.51-0.95)  mg/dL


 


Est GFR ( Amer)   (>60)  


 


Est GFR (Non-Af Amer)   (>60)  


 


BUN/Creatinine Ratio   (8-20)  


 


Glucose   ()  mg/dL


 


Lactic Acid  1.0  (0.5-2.0)  mmol/L


 


Calcium   (8.6-10.3)  mg/dL


 


Magnesium   (1.9-2.7)  mg/dL


 


Total Bilirubin   (0.2-1.0)  mg/dL


 


AST   


 


ALT   (7-52)  U/L


 


Alkaline Phosphatase   ()  U/L


 


C-Reactive Protein   (< 5.00)  mg/L


 


Total Protein   (6.4-8.9)  g/dL


 


Albumin   (3.2-5.2)  g/dL


 


Globulin   (2-4)  g/dL


 


Albumin/Globulin Ratio   (1-3)  


 


Lipase   (11.0-82.0)  U/L


 


Beta HCG, Quant   mIU/mL











Result Diagrams: 


 09/12/17 19:40





 09/12/17 19:40


Lab Statement: Any lab studies that have been ordered have been reviewed, and 

results considered in the medical decision making process.





Abdominal Pain Fem Course/Dx





- Course


Course Of Treatment: BP noted and advised to follow up with PCP. Medications 

reviewed this visit.  ADMIT HOSPITALIST.  NO CRITICAL CARE TIME.





- Diagnoses


Provider Diagnoses: 


 Intractable abdominal pain








- Provider Notifications


Discussed Care Of Patient With: Fred Frankenberg


Time Discussed With Above Provider: 20:22


Instructed by Provider To: Other - Accepts pt for admission





Discharge





- Discharge Plan


Condition: Stable


Disposition: ADMITTED TO [facility]


Referrals: 


Yeni Shanks MD [Primary Care Provider] - 





The documentation as recorded by the Gabriele cardenas Rebecca accurately 

reflects the service I personally performed and the decisions made by me, 

Js Milian MD.

## 2017-09-13 NOTE — RAD
INDICATION:  Status post left common femoral artery popliteal artery bypass graft, left

fifth toe necrosis.



COMPARISON:  Comparison is made with a prior study from August 26, 2017.



TECHNIQUE:  Multiple real-time, color flow and Doppler tracings of left lower extremity

were obtained.



FINDINGS: There appears to be a left common femoral to popliteal artery bypass graft

present. The peak systolic velocity in the common femoral artery was 134 cm/s and profunda

femoral artery 42 cm/s.



The peak systolic velocity in the graft at the level of the proximal superficial femoral

artery was 153 cm/s, graft at the mid superficial femoral artery 380 cm/s, graft at the

distal superficial femoral artery 132 cm/s, graft at the knee 79 cm/s. Popliteal artery 80

cm/s, posterior tibial artery 99 cm/s distal posterior tibial artery 139 cm/s, peroneal

artery 32 cm/s, distal peroneal artery 22 cm/s, anterior tibial artery 47 cm/s.  



IMPRESSION: FOCAL ELEVATED VELOCITY IN THE GRAFT AT THE LEVEL OF THE MID SUPERFICIAL

FEMORAL ARTERY SUGGESTIVE OF A STENOSIS. CONSIDER FURTHER EVALUATED WITH A CT ANGIOGRAM OF

THE AORTA AND LOWER EXTREMITIES.

## 2017-09-13 NOTE — PN
Progress Note





- Progress Note


Date of Service: 09/13/17


Note: 


Upon start of shift this evening, review of last night's admissions shows Ms Betts signing out against medical advice this AM prior to evaluation by the 

day physician. As such, she was not able to be informed of the abnormal 

arterial US finding suggestive of LLE in-stent stenosis. She was noted to have 

a necrotic L 5th toe, but could not say whether this occurred prior to or after 

placement of the LLE stent ~3-4 weeks ago. I contacted her PCP, LESA Shanks MD, 

informing her of the finding and need for follow up. Additionally, I contacted 

her vascular surgeon at [facility], Dr Wallace (sp?) who was able to 

inform me that her L 5th toe necrosis was present prior to stenting. He was 

made aware of her ongoing diabetic non-adherence, tobacco use, and abnormal 

arterial US finding. Fortunately, she is not yet complaining of LLE pain, but 

she is at high risk of progression to a limb threatening total occlusion which 

is of great concern to all.

## 2017-09-14 NOTE — DS
CC:  Primary Care Provider, Yeni Sahnks MD *

 

SHORT DISCHARGE NOTE:

 

DATE OF ADMISSION:  09/12/17

 

DATE OF DISCHARGE:  The patient signing against medical advice, 09/13/17.

 

Please note that Ms. Masters signed out against medical advice on 09/13/17 
prior to being able to be seen by a physician.  She refused to wait to see a 
physician to explain against medical advice.  Those were explained to her by a 
registered nurse. She was informed about uncontrolled hypertension, a toe 
infection, and ongoing problems with gastroparesis and dehydration that could 
all cause severe medical problems and possibly death if advanced illness or 
sepsis develops.  The patient wished to go against medical advice and she left.
  This is consistent with the patient's pattern of admissions within the past 
couple of months.

 

 [id number]/[facility] #: [id number]

MTDD

## 2017-10-10 NOTE — ED
Rod RICARDO SooYoung, scribed for Kunal Canchola MD on 10/10/17 at 0739 .





Abdominal Pain/Female





- HPI Summary


HPI Summary: 





A 38 y/o F presents to ED with c/o acute on chronic abd pain for past month. 

Radiates to R flank. Associated sx: n/v; constipation onset 3 days ago; chills; 

tremors. Denies fever, diarrhea. Pt saw a GI specialist at [place], [facility]

, and is scheduled for endoscopy in Dec; she states they have not found 

anything. PMHx: controlled DM; gastroparesis. She's had bloodwork and CT scan 

done a month ago, and last visit to [facility] was yesterday. 








- History of Current Complaint


Chief Complaint: EDAbdPain


Stated Complaint: N & V


Time Seen by Provider: 10/10/17 07:27


Hx Obtained From: Patient, Medical Records


Hx Last Menstrual Period: May 2017


Onset/Duration: Lasting Weeks, Still Present


Timing: Constant


Severity Currently: Severe


Pain Intensity: 9


Pain Scale Used: 0-10 Numeric


Associated Signs and Symptoms: Positive: Constipation - onset 3 days ago, Nausea

, Vomiting.  Negative: Fever, Diarrhea


Allergies/Adverse Reactions: 


 Allergies











Allergy/AdvReac Type Severity Reaction Status Date / Time


 


Heparin Allergy Intermediate pt states Verified 10/10/17 06:16





   it gave  





   her a  





   blood clot  


 


Niacin Allergy Mild Hives Verified 10/10/17 06:16


 


Ropinirole [From Requip] Allergy Mild Hives Verified 10/10/17 06:16


 


Amoxicillin [From Augmentin] AdvReac Mild "Doesn't Verified 10/10/17 06:16





   work"  


 


Clavulanic Acid AdvReac Mild "Doesn't Verified 10/10/17 06:16





[From Augmentin]   work"  


 


Codeine AdvReac Mild Nausea And Verified 10/10/17 06:16





   Vomiting  


 


Erythromycin AdvReac Mild "DOESN'T Verified 10/10/17 06:16





   WORK"  


 


Metronidazole [From Flagyl] AdvReac Mild Nausea And Verified 10/10/17 06:16





   Vomiting  


 


Morphine AdvReac Mild "Doesn't Verified 10/10/17 06:16





   work"  


 


Sulfamethoxazole AdvReac Mild Nausea And Verified 10/10/17 06:16





w/Trimethoprim   Vomiting  





[From Bactrim]     


 


plastic tape Allergy Mild Blisters Uncoded 10/10/17 06:16














PMH/Surg Hx/FS Hx/Imm Hx


Previously Healthy: No


Endocrine/Hematology History: Reports: Hx Anticoagulant Therapy - Aspirin., Hx 

Blood Transfusions, Hx Diabetes - insulin dependent, Hx Thyroid Disease - 

nodule biopsied, normal, Hx Anemia - hx of - reports had tranfusion in 2014 

after mi


Cardiovascular History: Reports: Hx Angina, Hx Cardiac Arrest - in Dec. 2014 

with CPR, Hx Cardiomegaly, Hx Coronary Artery Disease, Hx Hypercholesterolemia, 

Hx Hypertension, Hx Myocardial Infarction, Other Cardiovascular Problems/

Disorders - hyperlipidemia, CAD


   Denies: Hx Congestive Heart Failure, Hx Deep Vein Thrombosis, Hx Pacemaker/

ICD, Hx Valvular Heart Disease


Respiratory History: Reports: Hx Asthma, Hx Chronic Obstructive Pulmonary 

Disease (COPD) - smoker, Hx Sleep Apnea - pt reports md thinks, but no official 

testing done yet, Other Respiratory Problems/Disorders - acute respipatory 

failure with hypoxia - dec 2016


   Denies: Hx Lung Cancer, Hx Pneumonia, Hx Pulmonary Embolism


GI History: Reports: Hx Gastroesophageal Reflux Disease, Other GI Disorders - 

GASTROPARESIS - TAKING OMEPRAZOLE, reglan and zofran


   Denies: Hx Gall Bladder Disease, Hx Gastrointestinal Bleed, Hx Ulcer, Hx 

Urosepsis


 History: Reports: Hx Acute Renal Failure, Hx Chronic Renal Failure, Hx 

Dialysis - PERITONEAL, Hx Kidney Stones - in past, Hx Renal Disease - ESRD on 

home peritoneal dialysis , Other  Problems/Disorders - ESRD- URINATES A SMALL 

AMOUNT


Musculoskeletal History: Reports: Hx Arthritis - back, hips, Hx Back Problems - 

Sciatica and Herniated L3 disk, Other Musculoskeletal History - partial 

amputation of toes/foot


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 

of R eye, Hx Vision Problem


   Denies: Hx Contacts or Glasses, Hx Hearing Aid


Opthamlomology History: Reports: Hx Eye Prosthesis - left, Hx Legally Blind - 30

% use of R eye, Hx Vision Problem


   Denies: Hx Contacts or Glasses


Neurological History: Reports: Other Neuro Impairments/Disorders - neuropathy


   Denies: Hx Transient Ischemic Attacks (TIA)


Psychiatric History: Reports: Hx Anxiety, Hx Depression, Hx Bipolar Disorder


   Denies: Hx Eating Disorder, Hx Panic Disorder, Hx Schizophrenia, Hx of 

Violent Episodes Against Others





- Cancer History


Cancer Type, Location and Year: MALIGNANT MELANOMA- [facility].  Stage 5 Renal 

failure/dialysis


Hx Chemotherapy: No


Hx Radiation Therapy: No





- Surgical History


Surgery Procedure, Year, and Place: LEFT EYE REMOVED 2012 - HAS PROSTHETIC EYE (

ORBITS DONE 5/2015 OK'D BY DR SAE COLE TO SCAN IN MRI) ;.  MELANOMA REMOVED 

FROM [facility] 2013 (PROCEDURE DONE 4X);.  CARDIAC CATH 2014 - NO STENTS;.  LEFT 

WRIST FISTULA PLACEMENT ([facility]) 2014;.  HEMODIALYSIS CATH RIGHT CHEST WALL 2014;.

  PERITONEAL DIALYSIS CATH 2/2015;.  CHEST WALL CATH REMOVAL 7/2016 [facility] [place];.  

RIGHT GREAT TOE AMPUTATION, [facility] [place] 4/2016;.  PLACEMENT RIGHT JUGULAR TESIO 

HEMODIALYSIS CATHETER [facility] [place] 8/27/2016;.  REMOVAL OF JUGULAR CATH - SEPT 2016.  

CARDIAC CATH - stentsx2 right leg.  PARTIAL AMPUTATION RIGHT TOES 5/2017


Hx Anesthesia Reactions: No





- Immunization History


Date of Tetanus Vaccine: UTD


Date of Influenza Vaccine: 10/2017


Infectious Disease History: No


Infectious Disease History: Reports: Hx of Known/Suspected MRSA - MRSA R 1st toe


   Denies: Hx Clostridium Difficile, Hx Hepatitis, Hx Human Immunodeficiency 

Virus (HIV), Hx Shingles, Hx Tuberculosis, History Other Infectious Disease, 

Traveled Outside the [place] in Last 30 Days





- Family History


Known Family History: Positive: Cardiac Disease - father MI at 43 y/o, 

Hypertension, Other - Positive for bipolar and depression. Completed suicide to 

sister.  





- Social History


Occupation: Unemployed


Lives: Alone


Alcohol Use: None


Hx Substance Use: No


Substance Use Type: Reports: None


Substance Use Comment - Amount & Last Used: METHADONE


Hx Tobacco Use: Yes


Smoking Status (MU): Heavy Every Day Tobacco Smoker


Type: Cigarettes


Amount Used/How Often: 1 PPD


Length of Time of Smoking/Using Tobacco: 20 YEARS


Have You Smoked in the Last Year: Yes





Review of Systems


Negative: Fever


Positive: Abdominal Pain, Vomiting, Nausea, Other - pos: constipation.  Negative

: Diarrhea


All Other Systems Reviewed And Are Negative: Yes





Physical Exam





- Summary


Physical Exam Summary: 





VITAL SIGNS: Reviewed. 


GENERAL: Patient is a well-developed and nourished female who is lying 

comfortable in the stretcher.  Patient is not in any acute respiratory 

distress. 


HEAD AND FACE: Normocephalic and atraumatic. 


EYES: PERRLA, EOMI x 2, No injected conjunctiva.


EARS: Hearing grossly intact. Ear canals and tympanic membranes are WNL.


MOUTH: Oropharynx within normal limits. 


NECK: Supple, trachea is midline, no adenopathy, no JVD.


CHEST: Symmetric, no tenderness at palpation 


LUNGS: Slight wheezing.


CVS: RRR, S1 and S2 present, no murmurs or gallops appreciated. 


ABDOMEN: Soft, tender in LLQ. Peritoneal access on RLQ. No signs of distention. 

Positive bowel sounds. No rebound, no guarding, and no masses palpated. No 

abdominal bruit or pulsations. 


EXTREMITIES: FROM in all major joints, no edema, no cyanosis or clubbing. RLE 

has toe ampuations.


NEURO: Alert and oriented x 3. No acute neurological deficits. Speech is normal.


SKIN: Dry and warm








Triage Information Reviewed: Yes


Vital Signs On Initial Exam: 


 Initial Vitals











Temp Pulse Resp BP Pulse Ox


 


 97.7 F   115   20   137/82   92 


 


 10/10/17 05:46  10/10/17 05:46  10/10/17 05:46  10/10/17 05:46  10/10/17 05:46











Vital Signs Reviewed: Yes





Diagnostics





- Vital Signs


 Vital Signs











  Temp Pulse Resp BP Pulse Ox


 


 10/10/17 06:30   102   117/66  89


 


 10/10/17 06:13     114/63 


 


 10/10/17 06:12   103  12  112/73  90


 


 10/10/17 05:46  97.7 F  115  20  137/82  92














- Laboratory


Lab Results: 


 Lab Results











  10/10/17 10/10/17 10/10/17 Range/Units





  08:14 08:14 08:14 


 


WBC   12.0 H   (3.5-10.8)  10^3/ul


 


RBC   5.38   (4.0-5.4)  10^6/ul


 


Hgb   15.5   (12.0-16.0)  g/dl


 


Hct   46   (35-47)  %


 


MCV   86   (80-97)  fL


 


MCH   29   (27-31)  pg


 


MCHC   33   (31-36)  g/dl


 


RDW   17 H   (10.5-15)  %


 


Plt Count   344   (150-450)  10^3/ul


 


MPV   8   (7.4-10.4)  um3


 


Neut % (Auto)   80.3   (38-83)  %


 


Lymph % (Auto)   12.0 L   (25-47)  %


 


Mono % (Auto)   4.8   (1-9)  %


 


Eos % (Auto)   1.2   (0-6)  %


 


Baso % (Auto)   1.7   (0-2)  %


 


Absolute Neuts (auto)   9.6 H   (1.5-7.7)  10^3/ul


 


Absolute Lymphs (auto)   1.4   (1.0-4.8)  10^3/ul


 


Absolute Monos (auto)   0.6   (0-0.8)  10^3/ul


 


Absolute Eos (auto)   0.1   (0-0.6)  10^3/ul


 


Absolute Basos (auto)   0.2   (0-0.2)  10^3/ul


 


Absolute Nucleated RBC   0   10^3/ul


 


Nucleated RBC %   0   


 


Sodium  134    (133-145)  mmol/L


 


Potassium  3.8    (3.5-5.0)  mmol/L


 


Chloride  98 L    (101-111)  mmol/L


 


Carbon Dioxide  28    (22-32)  mmol/L


 


Anion Gap  8    (2-11)  mmol/L


 


BUN  25 H    (6-24)  mg/dL


 


Creatinine  4.67 H    (0.51-0.95)  mg/dL


 


Est GFR ( Amer)  13.6    (>60)  


 


Est GFR (Non-Af Amer)  10.5    (>60)  


 


BUN/Creatinine Ratio  5.4 L    (8-20)  


 


Glucose  258 H    ()  mg/dL


 


Lactic Acid    1.2  (0.5-2.0)  mmol/L


 


Calcium  9.0    (8.6-10.3)  mg/dL


 


Magnesium  1.7 L    (1.9-2.7)  mg/dL


 


Total Bilirubin  0.40    (0.2-1.0)  mg/dL


 


AST  6 L    (13-39)  U/L


 


ALT  5 L    (7-52)  U/L


 


Alkaline Phosphatase  104    ()  U/L


 


Troponin I  0.03    (<0.04)  ng/mL


 


C-Reactive Protein  6.88 H    (< 5.00)  mg/L


 


Total Protein  6.0 L    (6.4-8.9)  g/dL


 


Albumin  2.9 L    (3.2-5.2)  g/dL


 


Globulin  3.1    (2-4)  g/dL


 


Albumin/Globulin Ratio  0.9 L    (1-3)  


 


Amylase  < 10 L    ()  U/L


 


Lipase  20    (11.0-82.0)  U/L


 


Beta HCG, Quant  0.62    mIU/mL











Result Diagrams: 


 10/10/17 08:14





 10/10/17 08:14


Lab Statement: Any lab studies that have been ordered have been reviewed, and 

results considered in the medical decision making process.





Abdominal Pain Fem Course/Dx





- Course


Course Of Treatment: A 38 y/o F presents to ED with c/o acute on chronic abd 

pain for past month. Radiates to R flank. Associated sx: n/v; constipation 

onset 3 days ago; chills; tremors. Denies fever, diarrhea. Pt saw a GI 

specialist at [facility], and is scheduled for endoscopy in Dec; she 

states they have not found anything. PMHx: controlled DM; gastroparesis. She's 

had bloodwork and CT scan done a month ago, and last visit to [facility] was 

yesterday.  Pt has a BUN/C ratio 5.4 and creatinine 4.67 consistent with end 

stage renal dz. Glucose of 258. I ordered IV fluids, Zofran and Protonix, for 

which I think pt has gastroparesis. I also ordered A/P CT because pt is c/o LLQ 

pain.  A/P CT without contrast from 09/08/17 shows "IMPRESSION:  NO CT EVIDENCE 

OF UROLITHIASIS. PERONEAL DIALYSIS FLUID/DIALYSIS CATHETER. NO ACUTE CT 

FINDINGS.".  I was informed by nurse at approx 0850, pt walked out without our 

knowledge. Nurse called pt's cell phone and left a message to return to ED.





- Diagnoses


Differential Diagnosis: Positive: Bowel Obstruction, Constipation, 

Diverticulitis, Irritable Bowel Syndrome, Pancreatitis, Renal Colic, Urinary 

Tract Infection


Provider Diagnoses: 


 Abdominal pain








Discharge





- Discharge Plan


Condition: Stable


Disposition: AGAINST MEDICAL ADVICE


Referrals: 


Yeni Shanks MD [Primary Care Provider] - 





The documentation as recorded by the Rod cardenas SooYoung accurately 

reflects the service I personally performed and the decisions made by me, Kunal Canchola MD.

## 2017-10-10 NOTE — ED
Rani RICARDO Edward, scribed for Patricia Casillas MD on 10/09/17 at 1921 .





Abdominal Pain/Female





- HPI Summary


HPI Summary: 





38 y/o female presents to the ED c/o severe, diffuse ABD pain starting around 1 

month ago that became worse yesterday. The pain is rated 9/10. Pt states the 

pain feels like her "stomach is bruised inside". The pain radiates to the sides 

of the ABD. Associated sx: chills, diaphoresis, N/V - pt states "everything 

comes up" when she eats and drinks, constipation for the past 3 days. Vomit 

described as "phlegmy". Denies blood in vomit, blood in stool, SOB and CP. Pt's 

nephrologist is Dr. Thompson. Pt is on dialysis and states her exchanges are 

clear. Two weeks ago her exchanges were tested and showed no infection. No FHx 

kidney disease. PMHx kidney failure.





- History of Current Complaint


Chief Complaint: EDAbdPain


Stated Complaint: ABD PAIN/N/V


Time Seen by Provider: 10/09/17 19:17


Hx Obtained From: Patient


Hx Last Menstrual Period: May 2017


Onset/Duration: Lasting Weeks, Still Present, Worse Since - yesterday


Severity Currently: Severe


Pain Intensity: 9


Pain Scale Used: 0-10 Numeric


Location: Diffuse


Radiates: Yes


Radiates to: Other - sides of the ABD


Character: Other: - "stomach feels like it is bruised inside"


Aggravating Factor(s): Nothing


Alleviating Factor(s): Nothing


Associated Signs and Symptoms: Positive: Diaphoresis, Constipation, Nausea, 

Vomiting, Other: - chills


Allergies/Adverse Reactions: 


 Allergies











Allergy/AdvReac Type Severity Reaction Status Date / Time


 


Heparin Allergy Intermediate pt states Verified 10/09/17 18:48





   it gave  





   her a  





   blood clot  


 


Niacin Allergy Mild Hives Verified 10/09/17 18:48


 


Ropinirole [From Requip] Allergy Mild Hives Verified 10/09/17 18:48


 


Amoxicillin [From Augmentin] AdvReac Mild "Doesn't Verified 10/09/17 18:48





   work"  


 


Clavulanic Acid AdvReac Mild "Doesn't Verified 10/09/17 18:48





[From Augmentin]   work"  


 


Codeine AdvReac Mild Nausea And Verified 10/09/17 18:48





   Vomiting  


 


Erythromycin AdvReac Mild "DOESN'T Verified 10/09/17 18:48





   WORK"  


 


Metronidazole [From Flagyl] AdvReac Mild Nausea And Verified 10/09/17 18:48





   Vomiting  


 


Morphine AdvReac Mild "Doesn't Verified 10/09/17 18:48





   work"  


 


Sulfamethoxazole AdvReac Mild Nausea And Verified 10/09/17 18:48





w/Trimethoprim   Vomiting  





[From Bactrim]     


 


plastic tape Allergy Mild Blisters Uncoded 10/09/17 18:48














PMH/Surg Hx/FS Hx/Imm Hx


Previously Healthy: No


Endocrine/Hematology History: Reports: Hx Anticoagulant Therapy - Aspirin., Hx 

Blood Transfusions, Hx Diabetes - insulin dependent, Hx Thyroid Disease - 

nodule biopsied, normal, Hx Anemia - hx of - reports had tranfusion in 2014 

after mi


Cardiovascular History: Reports: Hx Angina, Hx Cardiac Arrest - in Dec. 2014 

with CPR, Hx Cardiomegaly, Hx Coronary Artery Disease, Hx Hypercholesterolemia, 

Hx Hypertension, Hx Myocardial Infarction, Other Cardiovascular Problems/

Disorders - hyperlipidemia, CAD


   Denies: Hx Congestive Heart Failure, Hx Deep Vein Thrombosis, Hx Pacemaker/

ICD, Hx Valvular Heart Disease


Respiratory History: Reports: Hx Asthma, Hx Chronic Obstructive Pulmonary 

Disease (COPD) - smoker, Hx Sleep Apnea - pt reports md thinks, but no official 

testing done yet, Other Respiratory Problems/Disorders - acute respipatory 

failure with hypoxia - dec 2016


   Denies: Hx Lung Cancer, Hx Pneumonia, Hx Pulmonary Embolism


GI History: Reports: Hx Gastroesophageal Reflux Disease, Other GI Disorders - 

GASTROPARESIS - TAKING OMEPRAZOLE, reglan and zofran


   Denies: Hx Gall Bladder Disease, Hx Gastrointestinal Bleed, Hx Ulcer, Hx 

Urosepsis


 History: Reports: Hx Acute Renal Failure, Hx Chronic Renal Failure, Hx 

Dialysis - PERITONEAL, Hx Kidney Stones - in past, Hx Renal Disease - ESRD on 

home peritoneal dialysis , Other  Problems/Disorders - ESRD- URINATES A SMALL 

AMOUNT


Musculoskeletal History: Reports: Hx Arthritis - back, hips, Hx Back Problems - 

Sciatica and Herniated L3 disk, Other Musculoskeletal History - partial 

amputation of toes/foot


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 

of R eye, Hx Vision Problem


   Denies: Hx Contacts or Glasses, Hx Hearing Aid


Opthamlomology History: Reports: Hx Eye Prosthesis - left, Hx Legally Blind - 30

% use of R eye, Hx Vision Problem


   Denies: Hx Contacts or Glasses


Neurological History: Reports: Other Neuro Impairments/Disorders - neuropathy


   Denies: Hx Transient Ischemic Attacks (TIA)


Psychiatric History: Reports: Hx Anxiety, Hx Depression, Hx Bipolar Disorder


   Denies: Hx Eating Disorder, Hx Panic Disorder, Hx Schizophrenia, Hx of 

Violent Episodes Against Others





- Cancer History


Cancer Type, Location and Year: MALIGNANT MELANOMA- [facility].  Stage 5 Renal 

failure/dialysis


Hx Chemotherapy: No


Hx Radiation Therapy: No





- Surgical History


Surgery Procedure, Year, and Place: LEFT EYE REMOVED 2012 - HAS PROSTHETIC EYE (

ORBITS DONE 5/2015 OK'D BY DR SAE COLE TO SCAN IN MRI) ;.  MELANOMA REMOVED 

FROM VULVA 2013 (PROCEDURE DONE 4X);.  CARDIAC CATH 2014 - NO STENTS;.  LEFT 

WRIST FISTULA PLACEMENT ([facility]) 2014;.  HEMODIALYSIS CATH RIGHT CHEST WALL 2014;.

  PERITONEAL DIALYSIS CATH 2/2015;.  CHEST WALL CATH REMOVAL 7/2016 [facility] [place];.  

RIGHT GREAT TOE AMPUTATION, [facility] [place] 4/2016;.  PLACEMENT RIGHT JUGULAR TESIO 

HEMODIALYSIS CATHETER [facility] [place] 8/27/2016;.  REMOVAL OF JUGULAR CATH - SEPT 2016.  

CARDIAC CATH - stentsx2 right leg.  PARTIAL AMPUTATION RIGHT TOES 5/2017


Hx Anesthesia Reactions: No





- Immunization History


Date of Tetanus Vaccine: UTD


Date of Influenza Vaccine: 10/2017


Infectious Disease History: No


Infectious Disease History: Reports: Hx of Known/Suspected MRSA - MRSA R 1st toe


   Denies: Hx Clostridium Difficile, Hx Hepatitis, Hx Human Immunodeficiency 

Virus (HIV), Hx Shingles, Hx Tuberculosis, History Other Infectious Disease, 

Traveled Outside the [place] in Last 30 Days





- Family History


Known Family History: Positive: Cardiac Disease - father MI at 41 y/o, 

Hypertension, Other - Positive for bipolar and depression. Completed suicide to 

sister.  





- Social History


Alcohol Use: None


Hx Substance Use: No


Substance Use Type: Reports: None


Substance Use Comment - Amount & Last Used: METHADONE


Hx Tobacco Use: Yes


Smoking Status (MU): Heavy Every Day Tobacco Smoker


Type: Cigarettes


Amount Used/How Often: 1 PPD


Length of Time of Smoking/Using Tobacco: 20 YEARS


Have You Smoked in the Last Year: Yes





Review of Systems


Positive: Chills, Skin Diaphoresis


Eyes: Negative


ENT: Negative


Cardiovascular: Negative


Respiratory: Negative


Positive: Abdominal Pain, Vomiting, Nausea, Other - constipation


Genitourinary: Negative


Musculoskeletal: Negative


Skin: Negative


Neurological: Negative


Psychological: Normal


All Other Systems Reviewed And Are Negative: Yes





Physical Exam





- Summary


Physical Exam Summary: 





Appearance: Tearful, Well-nourished


Skin: Warm, color reflects adequate perfusion


Head: Normal Head/Face


Eyes: Conjunctiva clear


ENT: Normal 


Neck: Supple


Respiratory: Lungs clear, Normal breath sounds, no respiratory distress


Cardio: RRR, No murmur, pulses normal, brisk capillary refill


Abdomen: soft and tender. Catheter insertion site at the RLQ is clean and dry 

with no drainage. Dressing changed at the time of the exam. 


Bowel sounds: present 


Musculoskeletal: Strength Intact/ ROM intact


Neuro: Alert, muscle tone normal, facial symmetry, speech normal, sensory/motor 

intact 


Triage Information Reviewed: Yes


Vital Signs On Initial Exam: 


 Initial Vitals











Temp Pulse Resp BP Pulse Ox


 


 97.4 F   106   18   134/84   89 


 


 10/09/17 18:46  10/09/17 18:46  10/09/17 18:46  10/09/17 18:46  10/09/17 18:46











Vital Signs Reviewed: Yes





- [place] Coma Scale


Coma Scale Total: 15





Diagnostics





- Vital Signs


 Vital Signs











  Temp Pulse Resp BP Pulse Ox


 


 10/09/17 19:00   110   147/83  93


 


 10/09/17 18:52   106    90


 


 10/09/17 18:51     137/91 


 


 10/09/17 18:46  97.4 F  106  18  134/84  89














- Laboratory


Lab Statement: Any lab studies that have been ordered have been reviewed, and 

results considered in the medical decision making process.





Re-Evaluation





- Re-Evaluation


  ** 1


Re-Evaluation Time: 19:45


Comment: Discuss plan of care. Pt will sign out AMA. Pt states "they're not 

gonna find anything".





Abdominal Pain Fem Course/Dx





- Course


Course Of Treatment: 38 y/o female presents to the ED c/o severe, diffuse ABD 

pain starting around 1 month ago that became worse yesterday. The pain is rated 

9/10. Pt states the pain feels like her "stomach is bruised inside". The pain 

radiates to the sides of the ABD. Associated sx: chills, diaphoresis, N/V - pt 

states "everything comes up" when she eats and drinks, constipation for the 

past 3 days. Vomit described as "phlegmy". Denies blood in vomit, blood in stool

, SOB and CP. Pt's nephrologist is Dr. Thompson. Pt is on dialysis and states her 

exchanges are clear. Two weeks ago her exchanges were tested and showed no 

infection. No FHx kidney disease. PMHx kidney failure.





- Diagnoses


Provider Diagnoses: 


 Left against medical advice








Discharge





- Discharge Plan


Condition: Stable


Disposition: AGAINST MEDICAL ADVICE


Forms:  *Work Release


Referrals: 


Yeni Shanks MD [Primary Care Provider] - 





The documentation as recorded by the Rani cardenas Edward accurately reflects the 

service I personally performed and the decisions made by Vinny cornejo Barbara J, MD.

## 2017-10-27 NOTE — ED
Gabriele RICARDO Rebecca, scribed for Abel Matta MD on 10/26/17 at 2229 .





Complex/Multi-Sys Presentation





- HPI Summary


HPI Summary: 


Pt is a 38 y/o F BIBA who presents to ED c/o diffuse abdominal pain with N/V. 

Pt has been experiencing similar episodes intermittently since the beginning of 

last month that typically last about 1 to 1.5 weeks. The most recent bout began 

yesterday and is currently severe, ranked 10/10. Additionally c/o myalgias, 

productive cough. Denies dysuria and diarrhea. Last BM yesterday. Is on Warfarin

, which she is compliant with. No PMHx DKA. Pt reports that prior doctors have 

suspected gastroparesis and was put on a new medication in September which does 

not seem to be improving symptoms. Last saw her GI 2 weeks ago who scheduled a 

scope for Dec. 1st. 








- History Of Current Complaint


Chief Complaint: EDGeneral


Hx Obtained From: Patient


Onset/Duration: Lasting Days - Started yesterday, Still Present


Severity Currently: Severe - 10/10


Location: Pain At: - Diffuse abdominal pain


Aggravating Factor(s): Nothing


Alleviating Factor(s): Nothing


Associated Signs And Symptoms: Positive: Cough, Nausea, Vomiting





- Allergies/Home Medications


Allergies/Adverse Reactions: 


 Allergies











Allergy/AdvReac Type Severity Reaction Status Date / Time


 


Heparin Allergy Intermediate pt states Verified 10/10/17 06:16





   it gave  





   her a  





   blood clot  


 


Niacin Allergy Mild Hives Verified 10/10/17 06:16


 


Ropinirole [From Requip] Allergy Mild Hives Verified 10/10/17 06:16


 


Amoxicillin [From Augmentin] AdvReac Mild "Doesn't Verified 10/10/17 06:16





   work"  


 


Clavulanic Acid AdvReac Mild "Doesn't Verified 10/10/17 06:16





[From Augmentin]   work"  


 


Codeine AdvReac Mild Nausea And Verified 10/10/17 06:16





   Vomiting  


 


Erythromycin AdvReac Mild "DOESN'T Verified 10/10/17 06:16





   WORK"  


 


Metronidazole [From Flagyl] AdvReac Mild Nausea And Verified 10/10/17 06:16





   Vomiting  


 


Morphine AdvReac Mild "Doesn't Verified 10/10/17 06:16





   work"  


 


Sulfamethoxazole AdvReac Mild Nausea And Verified 10/10/17 06:16





w/Trimethoprim   Vomiting  





[From Bactrim]     


 


plastic tape Allergy Mild Blisters Uncoded 10/10/17 06:16














PMH/Surg Hx/FS Hx/Imm Hx


Endocrine/Hematology History: Reports: Hx Anticoagulant Therapy - Aspirin., Hx 

Blood Transfusions, Hx Diabetes - insulin dependent, Hx Thyroid Disease - 

nodule biopsied, normal, Hx Anemia - hx of - reports had tranfusion in 2014 

after mi


Cardiovascular History: Reports: Hx Angina, Hx Cardiac Arrest - in Dec. 2014 

with CPR, Hx Cardiomegaly, Hx Coronary Artery Disease, Hx Hypercholesterolemia, 

Hx Hypertension, Hx Myocardial Infarction, Other Cardiovascular Problems/

Disorders - hyperlipidemia, CAD


   Denies: Hx Congestive Heart Failure, Hx Deep Vein Thrombosis, Hx Pacemaker/

ICD, Hx Valvular Heart Disease


Respiratory History: Reports: Hx Asthma, Hx Chronic Obstructive Pulmonary 

Disease (COPD) - smoker, Hx Sleep Apnea - pt reports md thinks, but no official 

testing done yet, Other Respiratory Problems/Disorders - acute respipatory 

failure with hypoxia - dec 2016


   Denies: Hx Lung Cancer, Hx Pneumonia, Hx Pulmonary Embolism


GI History: Reports: Hx Gastroesophageal Reflux Disease, Other GI Disorders - 

GASTROPARESIS - TAKING OMEPRAZOLE, reglan and zofran


   Denies: Hx Gall Bladder Disease, Hx Gastrointestinal Bleed, Hx Ulcer, Hx 

Urosepsis


 History: Reports: Hx Acute Renal Failure, Hx Chronic Renal Failure, Hx 

Dialysis - PERITONEAL, Hx Kidney Stones - in past, Hx Renal Disease - ESRD on 

home peritoneal dialysis , Other  Problems/Disorders - ESRD- URINATES A SMALL 

AMOUNT


Musculoskeletal History: Reports: Hx Arthritis - back, hips, Hx Back Problems - 

Sciatica and Herniated L3 disk, Other Musculoskeletal History - partial 

amputation of toes/foot


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 

of R eye, Hx Vision Problem


   Denies: Hx Contacts or Glasses, Hx Hearing Aid


Opthamlomology History: Reports: Hx Eye Prosthesis - left, Hx Legally Blind - 30

% use of R eye, Hx Vision Problem


   Denies: Hx Contacts or Glasses


Neurological History: Reports: Other Neuro Impairments/Disorders - neuropathy


   Denies: Hx Transient Ischemic Attacks (TIA)


Psychiatric History: Reports: Hx Anxiety, Hx Depression, Hx Bipolar Disorder


   Denies: Hx Eating Disorder, Hx Panic Disorder, Hx Schizophrenia, Hx of 

Violent Episodes Against Others





- Cancer History


Cancer Type, Location and Year: MALIGNANT MELANOMA- [facility].  Stage 5 Renal 

failure/dialysis


Hx Chemotherapy: No


Hx Radiation Therapy: No





- Surgical History


Surgery Procedure, Year, and Place: LEFT EYE REMOVED 2012 - HAS PROSTHETIC EYE (

ORBITS DONE 5/2015 OK'D BY DR SAE COLE TO SCAN IN MRI) ;.  MELANOMA REMOVED 

FROM [facility] 2013 (PROCEDURE DONE 4X);.  CARDIAC CATH 2014 - NO STENTS;.  LEFT 

WRIST FISTULA PLACEMENT ([facility]) 2014;.  HEMODIALYSIS CATH RIGHT CHEST WALL 2014;.

  PERITONEAL DIALYSIS CATH 2/2015;.  CHEST WALL CATH REMOVAL 7/2016 [facility] [place];.  

RIGHT GREAT TOE AMPUTATION, [facility] [place] 4/2016;.  PLACEMENT RIGHT JUGULAR TESIO 

HEMODIALYSIS CATHETER [facility] [place] 8/27/2016;.  REMOVAL OF JUGULAR CATH - SEPT 2016.  

CARDIAC CATH - stentsx2 right leg.  PARTIAL AMPUTATION RIGHT TOES 5/2017


Hx Anesthesia Reactions: No





- Immunization History


Date of Tetanus Vaccine: UTD


Date of Influenza Vaccine: 10/2017


Infectious Disease History: No


Infectious Disease History: Reports: Hx of Known/Suspected MRSA - MRSA R 1st toe


   Denies: Hx Clostridium Difficile, Hx Hepatitis, Hx Human Immunodeficiency 

Virus (HIV), Hx Shingles, Hx Tuberculosis, History Other Infectious Disease, 

Traveled Outside the  in Last 30 Days





- Family History


Known Family History: Positive: Cardiac Disease - father MI at 41 y/o, 

Hypertension, Other - Positive for bipolar and depression. Completed suicide to 

sister.  





- Social History


Alcohol Use: None


Hx Substance Use: No


Substance Use Type: Reports: None


Substance Use Comment - Amount & Last Used: METHADONE


Hx Tobacco Use: Yes


Smoking Status (MU): Heavy Every Day Tobacco Smoker


Type: Cigarettes


Amount Used/How Often: 1 PPD


Length of Time of Smoking/Using Tobacco: 20 YEARS


Have You Smoked in the Last Year: Yes





Review of Systems


Positive: Cough


Positive: Abdominal Pain, Vomiting, Nausea.  Negative: Diarrhea


Negative: dysuria


Positive: Myalgia


All Other Systems Reviewed And Are Negative: Yes





Physical Exam





- Summary


Physical Exam Summary: 





Appearance: Well-appearing, Well-nourished


Skin: Warm, dry


Eyes: Normal


ENT: Dry mucous membranes


Neck: Supple, nontender


Respiratory: Clear to auscultation


Cardiovascular: Normal


Abdomen: Soft, tenderness in the RUQ and RLQ without rebound or guarding


Bowel: Present


Musculoskeletal: Strength/ROM Intact, toes amputated on the left side which is 

bandaged


Neurological: Normal, A&Ox3


Psychiatric: Normal


Triage Information Reviewed: Yes


Vital Signs On Initial Exam: 


 Initial Vitals











Temp Pulse Resp BP Pulse Ox


 


 97.8 F   103   16   180/96   100 


 


 10/26/17 21:52  10/26/17 21:52  10/26/17 21:52  10/26/17 21:52  10/26/17 21:52











Vital Signs Reviewed: Yes





- [place] Coma Scale


Coma Scale Total: 15





Diagnostics





- Vital Signs


 Vital Signs











  Temp Pulse Resp BP Pulse Ox


 


 10/26/17 21:52  97.8 F  103  16  180/96  100














- Laboratory


Lab Results: 


 Lab Results











  10/26/17 10/26/17 10/26/17 Range/Units





  23:15 23:15 23:15 


 


WBC    11.2 H  (3.5-10.8)  10^3/ul


 


RBC    5.17  (4.0-5.4)  10^6/ul


 


Hgb    14.4  (12.0-16.0)  g/dl


 


Hct    44  (35-47)  %


 


MCV    85  (80-97)  fL


 


MCH    28  (27-31)  pg


 


MCHC    33  (31-36)  g/dl


 


RDW    16 H  (10.5-15)  %


 


Plt Count    295  (150-450)  10^3/ul


 


MPV    8  (7.4-10.4)  um3


 


Neut % (Auto)    80.8  (38-83)  %


 


Lymph % (Auto)    13.0 L  (25-47)  %


 


Mono % (Auto)    3.8  (1-9)  %


 


Eos % (Auto)    1.5  (0-6)  %


 


Baso % (Auto)    0.9  (0-2)  %


 


Absolute Neuts (auto)    9.0 H  (1.5-7.7)  10^3/ul


 


Absolute Lymphs (auto)    1.5  (1.0-4.8)  10^3/ul


 


Absolute Monos (auto)    0.4  (0-0.8)  10^3/ul


 


Absolute Eos (auto)    0.2  (0-0.6)  10^3/ul


 


Absolute Basos (auto)    0.1  (0-0.2)  10^3/ul


 


Absolute Nucleated RBC    0.01  10^3/ul


 


Nucleated RBC %    0.1  


 


INR (Anticoag Therapy)  2.61 H    (0.89-1.11)  


 


APTT  45.7 H    (26.0-36.3)  seconds


 


Sodium   133   (133-145)  mmol/L


 


Potassium   3.4 L   (3.5-5.0)  mmol/L


 


Chloride   97 L   (101-111)  mmol/L


 


Carbon Dioxide   28   (22-32)  mmol/L


 


Anion Gap   8   (2-11)  mmol/L


 


BUN   25 H   (6-24)  mg/dL


 


Creatinine   3.15 H   (0.51-0.95)  mg/dL


 


Est GFR ( Amer)   21.3   (>60)  


 


Est GFR (Non-Af Amer)   16.6   (>60)  


 


BUN/Creatinine Ratio   7.9 L   (8-20)  


 


Glucose   345 H   ()  mg/dL


 


Calcium   9.0   (8.6-10.3)  mg/dL


 


Total Bilirubin   0.30   (0.2-1.0)  mg/dL


 


AST   5 L   (13-39)  U/L


 


ALT   6 L   (7-52)  U/L


 


Alkaline Phosphatase   103   ()  U/L


 


C-Reactive Protein   18.07 H   (< 5.00)  mg/L


 


Total Protein   6.1 L   (6.4-8.9)  g/dL


 


Albumin   2.9 L   (3.2-5.2)  g/dL


 


Globulin   3.2   (2-4)  g/dL


 


Albumin/Globulin Ratio   0.9 L   (1-3)  


 


Amylase   < 10 L   ()  U/L


 


Lipase   27   (11.0-82.0)  U/L


 


Blood Type     


 


Antibody Screen     














  10/26/17 Range/Units





  23:15 


 


WBC   (3.5-10.8)  10^3/ul


 


RBC   (4.0-5.4)  10^6/ul


 


Hgb   (12.0-16.0)  g/dl


 


Hct   (35-47)  %


 


MCV   (80-97)  fL


 


MCH   (27-31)  pg


 


MCHC   (31-36)  g/dl


 


RDW   (10.5-15)  %


 


Plt Count   (150-450)  10^3/ul


 


MPV   (7.4-10.4)  um3


 


Neut % (Auto)   (38-83)  %


 


Lymph % (Auto)   (25-47)  %


 


Mono % (Auto)   (1-9)  %


 


Eos % (Auto)   (0-6)  %


 


Baso % (Auto)   (0-2)  %


 


Absolute Neuts (auto)   (1.5-7.7)  10^3/ul


 


Absolute Lymphs (auto)   (1.0-4.8)  10^3/ul


 


Absolute Monos (auto)   (0-0.8)  10^3/ul


 


Absolute Eos (auto)   (0-0.6)  10^3/ul


 


Absolute Basos (auto)   (0-0.2)  10^3/ul


 


Absolute Nucleated RBC   10^3/ul


 


Nucleated RBC %   


 


INR (Anticoag Therapy)   (0.89-1.11)  


 


APTT   (26.0-36.3)  seconds


 


Sodium   (133-145)  mmol/L


 


Potassium   (3.5-5.0)  mmol/L


 


Chloride   (101-111)  mmol/L


 


Carbon Dioxide   (22-32)  mmol/L


 


Anion Gap   (2-11)  mmol/L


 


BUN   (6-24)  mg/dL


 


Creatinine   (0.51-0.95)  mg/dL


 


Est GFR ( Amer)   (>60)  


 


Est GFR (Non-Af Amer)   (>60)  


 


BUN/Creatinine Ratio   (8-20)  


 


Glucose   ()  mg/dL


 


Calcium   (8.6-10.3)  mg/dL


 


Total Bilirubin   (0.2-1.0)  mg/dL


 


AST   (13-39)  U/L


 


ALT   (7-52)  U/L


 


Alkaline Phosphatase   ()  U/L


 


C-Reactive Protein   (< 5.00)  mg/L


 


Total Protein   (6.4-8.9)  g/dL


 


Albumin   (3.2-5.2)  g/dL


 


Globulin   (2-4)  g/dL


 


Albumin/Globulin Ratio   (1-3)  


 


Amylase   ()  U/L


 


Lipase   (11.0-82.0)  U/L


 


Blood Type  O Positive  


 


Antibody Screen  Negative  











Result Diagrams: 


 10/26/17 23:15





 10/26/17 23:15


Lab Statement: Any lab studies that have been ordered have been reviewed, and 

results considered in the medical decision making process.





Complex Multi-Symp Course/Dx


Course Of Treatment: pt eloped without notifying staff and before i was able to 

reevaluate





- Diagnoses


Provider Diagnoses: 


 Abdominal pain








Discharge





- Discharge Plan


Condition: Stable


Disposition: OTHER


Discharge Disposition Comment: pt eloped from ED


Referrals: 


Yeni Shanks MD [Primary Care Provider] - 





The documentation as recorded by the Gabriele cardenas Rebecca accurately 

reflects the service I personally performed and the decisions made by me, Abel Matta MD.

## 2017-10-27 NOTE — ED
Juan C RICARDO Abhishek, scribed for Js Milian MD on 10/27/17 at 2124 .





Abdominal Pain/Female





- HPI Summary


HPI Summary: 


This patient is a 37 [facility] with a 

chief complaint of abd pain since 2 months ago. The CC is described as worse 

since yesterday and diffusive. The patient rates the pain 8/10 in severity. 

Symptoms aggravated by nothing. Symptoms alleviated by nothing. Patient reports 

N/V, decreased appetite, intermittent confusion, and generalized body aches, SOB

, HTN and productive cough (described as foamy). Patient denies diarrhea. 

PMHx includes kidney disease and neuropathy.





- History of Current Complaint


Chief Complaint: EDNauseaVomitDiarrh


Stated Complaint: N/V


Time Seen by Provider: 10/27/17 20:27


Hx Obtained From: Patient, Family/Caretaker


Onset/Duration: Gradual Onset, Lasting Weeks - since two months, Worse Since - 

yesterday


Timing: Weeks - for 2 months


Severity Currently: Severe


Pain Intensity: 8


Pain Scale Used: 0-10 Numeric


Location: Diffuse


Aggravating Factor(s): Nothing


Alleviating Factor(s): Nothing


Associated Signs and Symptoms: Positive: Cough - productive cough ("foamy"), 

Decreased Appetite, Nausea, Vomiting, Other: - intermittent confusion, SOB, HTN 

and generalized body aches..  Negative: Diarrhea


Allergies/Adverse Reactions: 


 Allergies











Allergy/AdvReac Type Severity Reaction Status Date / Time


 


Heparin Allergy Intermediate pt states Verified 10/10/17 06:16





   it gave  





   her a  





   blood clot  


 


Niacin Allergy Mild Hives Verified 10/10/17 06:16


 


Ropinirole [From Requip] Allergy Mild Hives Verified 10/10/17 06:16


 


Amoxicillin [From Augmentin] AdvReac Mild "Doesn't Verified 10/10/17 06:16





   work"  


 


Clavulanic Acid AdvReac Mild "Doesn't Verified 10/10/17 06:16





[From Augmentin]   work"  


 


Codeine AdvReac Mild Nausea And Verified 10/10/17 06:16





   Vomiting  


 


Erythromycin AdvReac Mild "DOESN'T Verified 10/10/17 06:16





   WORK"  


 


Metronidazole [From Flagyl] AdvReac Mild Nausea And Verified 10/10/17 06:16





   Vomiting  


 


Morphine AdvReac Mild "Doesn't Verified 10/10/17 06:16





   work"  


 


Sulfamethoxazole AdvReac Mild Nausea And Verified 10/10/17 06:16





w/Trimethoprim   Vomiting  





[From Bactrim]     


 


plastic tape Allergy Mild Blisters Uncoded 10/10/17 06:16














PMH/Surg Hx/FS Hx/Imm Hx


Endocrine/Hematology History: Reports: Hx Anticoagulant Therapy - Aspirin., Hx 

Blood Transfusions, Hx Diabetes - insulin dependent, Hx Thyroid Disease - 

nodule biopsied, normal, Hx Anemia - hx of - reports had tranfusion in 2014 

after mi


Cardiovascular History: Reports: Hx Angina, Hx Cardiac Arrest - in Dec. 2014 

with CPR, Hx Cardiomegaly, Hx Coronary Artery Disease, Hx Hypercholesterolemia, 

Hx Hypertension, Hx Myocardial Infarction, Other Cardiovascular Problems/

Disorders - hyperlipidemia, CAD


   Denies: Hx Congestive Heart Failure, Hx Deep Vein Thrombosis, Hx Pacemaker/

ICD, Hx Valvular Heart Disease


Respiratory History: Reports: Hx Asthma, Hx Chronic Obstructive Pulmonary 

Disease (COPD) - smoker, Hx Sleep Apnea - pt reports md thinks, but no official 

testing done yet, Other Respiratory Problems/Disorders - acute respipatory 

failure with hypoxia - dec 2016


   Denies: Hx Lung Cancer, Hx Pneumonia, Hx Pulmonary Embolism


GI History: Reports: Hx Gastroesophageal Reflux Disease, Other GI Disorders - 

GASTROPARESIS - TAKING OMEPRAZOLE, reglan and zofran


   Denies: Hx Gall Bladder Disease, Hx Gastrointestinal Bleed, Hx Ulcer, Hx 

Urosepsis


 History: Reports: Hx Acute Renal Failure, Hx Chronic Renal Failure, Hx 

Dialysis - PERITONEAL, Hx Kidney Stones - in past, Hx Renal Disease - ESRD on 

home peritoneal dialysis , Other  Problems/Disorders - ESRD- URINATES A SMALL 

AMOUNT


Musculoskeletal History: Reports: Hx Arthritis - back, hips, Hx Back Problems - 

Sciatica and Herniated L3 disk, Other Musculoskeletal History - partial 

amputation of toes/foot


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 

of R eye, Hx Vision Problem


   Denies: Hx Contacts or Glasses, Hx Hearing Aid


Opthamlomology History: Reports: Hx Eye Prosthesis - left, Hx Legally Blind - 30

% use of R eye, Hx Vision Problem


   Denies: Hx Contacts or Glasses


Neurological History: Reports: Other Neuro Impairments/Disorders - neuropathy


   Denies: Hx Transient Ischemic Attacks (TIA)


Psychiatric History: Reports: Hx Anxiety, Hx Depression, Hx Bipolar Disorder


   Denies: Hx Eating Disorder, Hx Panic Disorder, Hx Schizophrenia, Hx of 

Violent Episodes Against Others





- Cancer History


Cancer Type, Location and Year: MALIGNANT MELANOMA- [facility].  Stage 5 Renal 

failure/dialysis


Hx Chemotherapy: No


Hx Radiation Therapy: No





- Surgical History


Surgery Procedure, Year, and Place: LEFT EYE REMOVED 2012 - HAS PROSTHETIC EYE (

ORBITS DONE 5/2015 OK'D BY DR SAE COLE TO SCAN IN MRI) ;.  MELANOMA REMOVED 

FROM [facility] 2013 (PROCEDURE DONE 4X);.  CARDIAC CATH 2014 - NO STENTS;.  LEFT 

WRIST FISTULA PLACEMENT ([facility]) 2014;.  HEMODIALYSIS CATH RIGHT CHEST WALL 2014;.

  PERITONEAL DIALYSIS CATH 2/2015;.  CHEST WALL C[place] REMOVAL 7[place]2016 [facility] [place].  

RIGHT GREAT TOE AMPUTATION, [facility] [place] 4[place]2016;.  PLACEMENT RIGHT JUGULAR TESIO 

HEMODIALYSIS CATHETER [facility] [place] 8/27/2016;.  REMOVAL OF JUGULAR CATH - SEPT 2016.  

CARDIAC CATH - stentsx2 right leg.  PARTIAL AMPUTATION RIGHT TOES 5/2017


Hx Anesthesia Reactions: No





- Immunization History


Date of Tetanus Vaccine: UTD


Date of Influenza Vaccine: 10/2017


Infectious Disease History: No


Infectious Disease History: Reports: Hx of Known/Suspected MRSA - MRSA R 1st toe


   Denies: Hx Clostridium Difficile, Hx Hepatitis, Hx Human Immunodeficiency 

Virus (HIV), Hx Shingles, Hx Tuberculosis, History Other Infectious Disease, 

Traveled Outside the [place] in Last 30 Days





- Family History


Known Family History: Positive: Cardiac Disease - father MI at 43 y/o, 

Hypertension, Other - Positive for bipolar and depression. Completed suicide to 

sister.  





- Social History


Alcohol Use: None


Hx Substance Use: No


Substance Use Type: Reports: None


Substance Use Comment - Amount & Last Used: METHADONE


Hx Tobacco Use: Yes


Smoking Status (MU): Heavy Every Day Tobacco Smoker


Type: Cigarettes


Amount Used/How Often: 1 PPD


Length of Time of Smoking/Using Tobacco: 20 YEARS


Have You Smoked in the Last Year: Yes





Review of Systems


Constitutional: Negative


Eyes: Negative


ENT: Negative


Positive: Other - HTN


Positive: Shortness Of Breath, Cough - productive ("foamy")


Positive: Abdominal Pain, Vomiting, Nausea - decreased appetite.  Negative: 

Diarrhea


Genitourinary: Negative


Positive: Other - generalized body aches


Skin: Negative


Neurological: Other - intermittent confusion


Psychological: Normal


All Other Systems Reviewed And Are Negative: Yes





Physical Exam





- Summary


Physical Exam Summary: 





General: well-appearing, no pain distress


Skin: warm, color reflects adequate perfusion, dry


Head: normal


Eyes: EOMI, VIOLETTA


ENT: normal


Neck: supple, nontender


Respiratory: CTA, breath sounds present, Lungs clear


Cardiovascular: RRR,  regular heart sounds


Abdomen: soft, non-tender to palpation,


Bowel:  hypoactive bowel sounds 


Musculoskeletal: normal, strength/ROM intact, 


 missing distal part of right foot


Neurological:  generalized weakness


Psychological: affect/mood appropriate





Triage Information Reviewed: Yes


Vital Signs On Initial Exam: 


 Initial Vitals











Temp Pulse Resp BP Pulse Ox


 


 97.5 F   93   18   173/119   93 


 


 10/27/17 19:59  10/27/17 19:59  10/27/17 19:59  10/27/17 19:59  10/27/17 19:59











Vital Signs Reviewed: Yes





- [place] Coma Scale


Coma Scale Total: 15





Diagnostics





- Vital Signs


 Vital Signs











  Temp Pulse Resp BP Pulse Ox


 


 10/27/17 19:59  97.5 F  93  18  173/119  93














- Laboratory


Lab Statement: Any lab studies that have been ordered have been reviewed, and 

results considered in the medical decision making process.





- CT


  ** CT A/P


CT Interpretation Completed By: Radiologist - CT A/P reveals NONCONTRAST 

IMAGING WAS SHOWN NO ACUTE CT FINDINGS. THERE IS NO MASS OR INFLAMMATORY 

CHANGE. THERE ARE SCATTERED DIVERTICULA THROUGHOUT THE COLON. THERE IS FREE 

FLUID CONSISTENT WITH PERITONEAL DIALYSIS.  ED physician has reviewed this 

radiology report and agrees.





Abdominal Pain Fem Course/Dx





- Course


Course Of Treatment: PATIENT LEFT AMA





- Diagnoses


Provider Diagnoses: 


 Left against medical advice








Discharge





- Discharge Plan


Condition: Stable


Disposition: AGAINST MEDICAL ADVICE


Referrals: 


Yeni Shanks MD [Primary Care Provider] - 





The documentation as recorded by the Juan C cardenas Abhishek accurately reflects 

the service I personally performed and the decisions made by me, Js Milian MD.

## 2017-10-27 NOTE — RAD
INDICATION: Abdominal pain     



COMPARISON: CTA aortogram and runoff March 25, 2017

 

TECHNIQUE: Noncontrast axial source images were obtained from the hemidiaphragms to the

symphysis pubis. This examination was ordered using a renal stone protocol which is

performed without oral or intravenous contrast and therefore has inherent limitations when

used to evaluate other intra-abdominal or intrapelvic pathology. Consider conventional

contrast enhanced imaging if clinically .



Lung bases: There are mild chronic interstitial changes in the lung bases.



Liver: The liver is normal in size. Noncontrast imaging shows no evidence of a hepatic

mass or ductal dilatation.



Gallbladder: There are no calcified gallstones. There is no evidence of wall thickening or

pericholecystic fluid..



Spleen: The spleen is normal in size. The noncontrast CT appearance is normal.



Pancreas: Noncontrast imaging shows no pancreatic mass or ductal dilitation.



Adrenal glands: No masses are identified.



Kidneys/Bladder: There is no evidence of nephrolithiasis or CT evidence of hydronephrosis.

There are renal vascular calcifications. Noncontrast imaging shows no evidence of a renal

mass.   The bladder is unremarkable..



Adenopathy: There is no evidence of intraperitoneal or retroperitoneal adenopathy.

Evaluation is limited without oral contrast.



Fluid collections: Moderate free fluid consistent with peritoneal dialysis..



Vessels: There are atherosclerotic changes of the aorta and iliac vessels. There is no

focal aneurysm. The IVC appears normal 



Pelvic organs: The uterus and adnexa appear normal



GI tract: Evaluation of the bowel is limited without oral contrast. The noncontrast CT

appearance the upper GI tract is unremarkable. There are scattered diverticula throughout

the colon. There is no obstruction.



Soft tissues: No soft tissue abnormalities of the extraperitoneal abdomen or pelvis are

identified.



Osseous structures: There are no acute osseous findings.



IMPRESSION:   NONCONTRAST IMAGING WAS SHOWN NO ACUTE CT FINDINGS. THERE IS NO MASS OR

INFLAMMATORY CHANGE. THERE ARE SCATTERED DIVERTICULA THROUGHOUT THE COLON. THERE IS FREE

FLUID CONSISTENT WITH PERITONEAL DIALYSIS.

## 2017-10-27 NOTE — RAD
INDICATION: Hypertension and abdominal pain



COMPARISON: None.

 

TECHNIQUE: Single AP portable view of the chest and 3 views of the abdomen was obtained.



FINDINGS: 



Image quality is compromised due to the relative inferiority of a portable chest x-ray.



The heart and mediastinum exhibit normal size and contour.



The lungs are grossly clear. There is no evidence of a large pleural effusion.



Visualized bones are normal for the patient's age.



The gas and stool pattern is unremarkable. A peritoneal dialysis catheter is noted

overlying the right lower quadrant. There is no evidence of free air.



IMPRESSION:  There is no radiographic evidence of acute cardiopulmonary or abdominal

abnormality.

## 2017-11-01 NOTE — ED
Chu RICARDO Benjamin, scribed for Patricia Casillas MD on 10/31/17 at 2317 .





Abdominal Pain/Female





- HPI Summary


HPI Summary: 





36yo female peritoneal dialysis pt c/o diffuse sharp abdominal pain that is 

also burning at times. Pt was seen at [facility] at 10/27/17 where she had upper 

endoscopy done. Pt had a biopsy done because her stomach lining looked red per 

pt. Pt was d/c from [facility] ED. Pt states they did not test her peritoneal 

fluid. Pt was also seen at [facility] 10/30 and states she had a CT abd/

pelvis that showed an ovarian cyst. Per pt she had an US of the cyst was 

unremarkable. Pt also reports N/V and temp of 100F last pm. Pt states the 

[facility] tested her peritoneal fluid last pm and told her she had 

peritonitis, but per pt "they weren't sure if they trusted it". Pt arrives by 

ambulance and is accompanied by her male SO.  States she did not bring fluid or 

supplies to do a dialysis exchange in the ED. Pt states that she still makes 

some urine and has been having decreased urine frequency and is unable to void 

on demand. Pt states she has not had peritonitis before. Pt has a chronic wound 

on right foot that is bandaged. 


In addition to pt's ED visit to [facility] on 10/27/17 and [facility] on 10/3017, pt has been seen at [facility] ED in just Oct 2017 on the 

following dates and left AMA each [facility] visit.


10/9/17 Vinny AMA


10/10/17 Sushant AMA CT abd/pelvis done


10/26/17 Sigrid AMA 


10/27/17 Maicol AMA CT abd/pelvis done.  


Pt's nephrologist for the PD per pt is Dr. Baker. 





- History of Current Complaint


Chief Complaint: EDAbdPain


Stated Complaint: ABD PAIN


Time Seen by Provider: 10/31/17 22:41


Hx Obtained From: Patient, Family/Caretaker - male SO


Pregnant?: No


Onset/Duration: Gradual Onset, Lasting Days, Still Present


Timing: Constant


Severity Initially: Moderate


Severity Currently: Moderate


Pain Intensity: 8


Pain Scale Used: 0-10 Numeric


Location: Diffuse


Radiates: No


Character: Sharp, Burning


Aggravating Factor(s): Nothing


Alleviating Factor(s): Nothing


Associated Signs and Symptoms: Positive: Fever, Urinary Symptoms - decreased 

urination, Nausea, Vomiting, Other: - states peritoneal dialysis exchanges are 

clear, that "you could read the newspaper through the fluid". Denies drainage 

at the PD catheter site..  Negative: Vaginal Bleeding


Allergies/Adverse Reactions: 


 Allergies











Allergy/AdvReac Type Severity Reaction Status Date / Time


 


Heparin Allergy Intermediate pt states Verified 10/10/17 06:16





   it gave  





   her a  





   blood clot  


 


Niacin Allergy Mild Hives Verified 10/10/17 06:16


 


Ropinirole [From Requip] Allergy Mild Hives Verified 10/10/17 06:16


 


Amoxicillin [From Augmentin] AdvReac Mild "Doesn't Verified 10/10/17 06:16





   work"  


 


Clavulanic Acid AdvReac Mild "Doesn't Verified 10/10/17 06:16





[From Augmentin]   work"  


 


Codeine AdvReac Mild Nausea And Verified 10/10/17 06:16





   Vomiting  


 


Erythromycin AdvReac Mild "DOESN'T Verified 10/10/17 06:16





   WORK"  


 


Metronidazole [From Flagyl] AdvReac Mild Nausea And Verified 10/10/17 06:16





   Vomiting  


 


Morphine AdvReac Mild "Doesn't Verified 10/10/17 06:16





   work"  


 


Sulfamethoxazole AdvReac Mild Nausea And Verified 10/10/17 06:16





w/Trimethoprim   Vomiting  





[From Bactrim]     


 


plastic tape Allergy Mild Blisters Uncoded 10/10/17 06:16














PMH/Surg Hx/FS Hx/Imm Hx


Previously Healthy: No - CKD on PD


Endocrine/Hematology History: Reports: Hx Anticoagulant Therapy - Aspirin., Hx 

Blood Transfusions, Hx Diabetes - insulin dependent, Hx Thyroid Disease - 

nodule biopsied, normal, Hx Anemia - hx of - reports had tranfusion in 2014 

after mi


Cardiovascular History: Reports: Hx Angina, Hx Cardiac Arrest - in Dec. 2014 

with CPR, Hx Cardiomegaly, Hx Coronary Artery Disease, Hx Hypercholesterolemia, 

Hx Hypertension, Hx Myocardial Infarction


   Denies: Hx Congestive Heart Failure, Hx Deep Vein Thrombosis, Hx Pacemaker/

ICD, Hx Valvular Heart Disease


Respiratory History: Reports: Hx Asthma, Hx Chronic Obstructive Pulmonary 

Disease (COPD) - smoker, Hx Sleep Apnea - pt reports md thinks, but no official 

testing done yet, Other Respiratory Problems/Disorders - acute respipatory 

failure with hypoxia - dec 2016


   Denies: Hx Lung Cancer, Hx Pneumonia, Hx Pulmonary Embolism


GI History: Reports: Hx Gastroesophageal Reflux Disease, Other GI Disorders - 

GASTROPARESIS - TAKING OMEPRAZOLE, reglan and zofran


   Denies: Hx Gall Bladder Disease, Hx Gastrointestinal Bleed, Hx Ulcer, Hx 

Urosepsis


 History: Reports: Hx Chronic Renal Failure - ESRD on PD, Hx Dialysis - 

PERITONEAL, Hx Kidney Stones, Other  Problems/Disorders


Musculoskeletal History: Reports: Hx Arthritis - back, hips, Hx Back Problems - 

Sciatica and Herniated L3 disk, Other Musculoskeletal History - partial 

amputation of toes/foot


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 

of R eye, Hx Vision Problem


   Denies: Hx Contacts or Glasses, Hx Hearing Aid


Opthamlomology History: Reports: Hx Eye Prosthesis - left, Hx Legally Blind - 30

% use of R eye, Hx Vision Problem


   Denies: Hx Contacts or Glasses


Neurological History: Reports: Other Neuro Impairments/Disorders - neuropathy


   Denies: Hx Transient Ischemic Attacks (TIA)


Psychiatric History: Reports: Hx Anxiety, Hx Depression, Hx Bipolar Disorder


   Denies: Hx Eating Disorder, Hx Panic Disorder, Hx Schizophrenia, Hx of 

Violent Episodes Against Others





- Cancer History


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


Hx Chemotherapy: No


Hx Radiation Therapy: No





- Surgical History


Surgery Procedure, Year, and Place: LEFT EYE REMOVED 2012 - HAS PROSTHETIC EYE (

ORBITS DONE 5/2015 OK'D BY DR SAE COLE TO SCAN IN MRI) ;.  MELANOMA REMOVED 

FROM V[facility]VA 2013 (PROCEDURE DONE 4X);.  CARDIAC CATH 2014 - NO STENTS;.  LEFT 

WRIST FISTULA PLACEMENT (RPH) 2014;.  HEMODIALYSIS CATH RIGHT CHEST WALL 2014;.

  PERITONEAL DIALYSIS CATH 2/2015;.  CHEST WALL CATH REMOVAL 7/2016 [facility];.  

RIGHT GREAT TOE AMPUTATION, [facility] 4/2016;.  PLACEMENT RIGHT JUGULAR TESIO 

HEMODIALYSIS CATHETER [facility] 8/27/2016;.  REMOVAL OF JUGULAR CATH - SEPT 2016.  

CARDIAC CATH - stentsx2 right leg.  PARTIAL AMPUTATION RIGHT TOES 5/2017


Hx Anesthesia Reactions: No





- Immunization History


Date of Tetanus Vaccine: UTD


Date of Influenza Vaccine: 10/2017


Infectious Disease History: Yes


Infectious Disease History: Reports: Hx of Known/Suspected MRSA - MRSA R 1st toe


   Denies: Hx Clostridium Difficile, Hx Hepatitis, Hx Human Immunodeficiency 

Virus (HIV), Hx Shingles, Hx Tuberculosis, History Other Infectious Disease, 

Traveled Outside the [place] in Last 30 Days





- Family History


Known Family History: Positive: Cardiac Disease - father MI at 41 y/o, 

Hypertension, Other - Positive for bipolar and depression. Completed suicide to 

sister.  





- Social History


Occupation: Disabled


Lives: With Family


Alcohol Use: None


Hx Substance Use: No


Substance Use Type: Reports: Other


Substance Use Comment - Amount & Last Used: METHADONE (noted in chart, not 

confirmed still prescribed in 10/2017)


Hx Tobacco Use: Yes


Smoking Status (MU): Heavy Every Day Tobacco Smoker


Type: Cigarettes


Amount Used/How Often: 1 PPD


Length of Time of Smoking/Using Tobacco: 20 YEARS


Have You Smoked in the Last Year: Yes





Review of Systems


Positive: Fever


Eyes: Negative


ENT: Negative


Cardiovascular: Negative


Respiratory: Negative


Positive: Abdominal Pain, Vomiting, Nausea.  Negative: Diarrhea


Positive: frequency - decreased 


Musculoskeletal: Negative


Skin: Negative


Neurological: Negative


Psychological: Normal


All Other Systems Reviewed And Are Negative: Yes





Physical Exam


Triage Information Reviewed: Yes


Vital Signs On Initial Exam: 


 Initial Vitals











Temp Pulse Resp BP Pulse Ox


 


 97.2 F   103   18   142/94   93 


 


 10/31/17 22:10  10/31/17 22:10  10/31/17 22:10  10/31/17 22:10  10/31/17 22:10











Vital Signs Reviewed: Yes


Appearance: Positive: Well-Nourished, Ill-Appearing - chronically, Pain Distress


Skin: Positive: Warm, Skin Color Reflects Adequate Perfusion, Dry


Head/Face: Positive: Normal Head/Face Inspection


Eyes: Positive: Other: - prosthetic eye


ENT: Positive: Normal ENT inspection


Neck: Positive: Supple


Respiratory/Lung Sounds: Positive: Clear to Auscultation, Breath Sounds Present


Cardiovascular: Positive: RRR, Pulses are Symmetrical in both Upper and Lower 

Extremities.  Negative: Murmur


Abdomen Description: Positive: Nontender, Soft, Other: - PD catheter in RLQ. PD 

catheter site is not tender. Site is bandaged. Bandage is dry and intact. No 

peritoneal signs, no guarding, no rebound, no CVA tenderness, no masses.  Abd 

distended with PD fluid.  Pt unable to do a dialysis exchange to test fluid, 

since she did not bring supplies to perform an exchange.


Bowel Sounds: Positive: Present


Musculoskeletal: Positive: Strength/ROM Intact, Other -  chronic wound on right 

foot with bandage. Bandage is dry and intact.


Neurological: Positive: Sensory/Motor Intact, Alert, Oriented to Person Place, 

Time, Facial Symmetry, Speech Normal


Psychiatric: Positive: Affect/Mood Appropriate





- [place] Coma Scale


Coma Scale Total: 15





Diagnostics





- Vital Signs


 Vital Signs











  Temp Pulse Resp BP Pulse Ox


 


 10/31/17 22:10  97.2 F  103  18  142/94  93














- Laboratory


Lab Results: 


 Lab Results











  10/31/17 10/31/17 10/31/17 Range/Units





  23:20 23:40 23:40 


 


WBC   17.7 H   (3.5-10.8)  10^3/ul


 


RBC   5.12   (4.0-5.4)  10^6/ul


 


Hgb   14.5   (12.0-16.0)  g/dl


 


Hct   44   (35-47)  %


 


MCV   85   (80-97)  fL


 


MCH   28   (27-31)  pg


 


MCHC   33   (31-36)  g/dl


 


RDW   17 H   (10.5-15)  %


 


Plt Count   340   (150-450)  10^3/ul


 


MPV   8   (7.4-10.4)  um3


 


Neut % (Auto)   85.4 H   (38-83)  %


 


Lymph % (Auto)   8.4 L   (25-47)  %


 


Mono % (Auto)   4.0   (1-9)  %


 


Eos % (Auto)   1.5   (0-6)  %


 


Baso % (Auto)   0.7   (0-2)  %


 


Absolute Neuts (auto)   15.1 H   (1.5-7.7)  10^3/ul


 


Absolute Lymphs (auto)   1.5   (1.0-4.8)  10^3/ul


 


Absolute Monos (auto)   0.7   (0-0.8)  10^3/ul


 


Absolute Eos (auto)   0.3   (0-0.6)  10^3/ul


 


Absolute Basos (auto)   0.1   (0-0.2)  10^3/ul


 


Absolute Nucleated RBC   0.03   10^3/ul


 


Nucleated RBC %   0.2   


 


INR (Anticoag Therapy)    1.02  (0.89-1.11)  


 


Sodium  Cancelled    


 


Potassium  Cancelled    


 


Chloride  Cancelled    


 


Carbon Dioxide  Cancelled    


 


Anion Gap  Cancelled    


 


BUN  Cancelled    


 


Creatinine  Cancelled    


 


Est GFR ( Amer)  Cancelled    


 


Est GFR (Non-Af Amer)  Cancelled    


 


BUN/Creatinine Ratio  Cancelled    


 


Glucose  Cancelled    


 


Lactic Acid     (0.5-2.0)  mmol/L


 


Calcium  Cancelled    


 


Magnesium  Cancelled    


 


Total Bilirubin  Cancelled    


 


AST  Cancelled    


 


ALT  Cancelled    


 


Alkaline Phosphatase  Cancelled    


 


Total Creatine Kinase  Cancelled    


 


Troponin I  0.03    (<0.04)  ng/mL


 


C-Reactive Protein  Cancelled    


 


Total Protein  Cancelled    


 


Albumin  Cancelled    


 


Globulin  Cancelled    


 


Albumin/Globulin Ratio  Cancelled    


 


Amylase  Cancelled    


 


Lipase  Cancelled    














  10/31/17 Range/Units





  23:40 


 


WBC   (3.5-10.8)  10^3/ul


 


RBC   (4.0-5.4)  10^6/ul


 


Hgb   (12.0-16.0)  g/dl


 


Hct   (35-47)  %


 


MCV   (80-97)  fL


 


MCH   (27-31)  pg


 


MCHC   (31-36)  g/dl


 


RDW   (10.5-15)  %


 


Plt Count   (150-450)  10^3/ul


 


MPV   (7.4-10.4)  um3


 


Neut % (Auto)   (38-83)  %


 


Lymph % (Auto)   (25-47)  %


 


Mono % (Auto)   (1-9)  %


 


Eos % (Auto)   (0-6)  %


 


Baso % (Auto)   (0-2)  %


 


Absolute Neuts (auto)   (1.5-7.7)  10^3/ul


 


Absolute Lymphs (auto)   (1.0-4.8)  10^3/ul


 


Absolute Monos (auto)   (0-0.8)  10^3/ul


 


Absolute Eos (auto)   (0-0.6)  10^3/ul


 


Absolute Basos (auto)   (0-0.2)  10^3/ul


 


Absolute Nucleated RBC   10^3/ul


 


Nucleated RBC %   


 


INR (Anticoag Therapy)   (0.89-1.11)  


 


Sodium   


 


Potassium   


 


Chloride   


 


Carbon Dioxide   


 


Anion Gap   


 


BUN   


 


Creatinine   


 


Est GFR ( Amer)   


 


Est GFR (Non-Af Amer)   


 


BUN/Creatinine Ratio   


 


Glucose   


 


Lactic Acid  0.8  (0.5-2.0)  mmol/L


 


Calcium   


 


Magnesium   


 


Total Bilirubin   


 


AST   


 


ALT   


 


Alkaline Phosphatase   


 


Total Creatine Kinase   


 


Troponin I   (<0.04)  ng/mL


 


C-Reactive Protein   


 


Total Protein   


 


Albumin   


 


Globulin   


 


Albumin/Globulin Ratio   


 


Amylase   


 


Lipase   











Result Diagrams: 


 10/31/17 23:40





 10/31/17 23:20


Lab Statement: Any lab studies that have been ordered have been reviewed, and 

results considered in the medical decision making process.





Abdominal Pain Fem Course/Dx





- Course


Course Of Treatment: Reviewed pts medication and allergy lists. High Blood 

pressure noted.  Pt left AMA, eloped without notifyig staff.





- Diagnoses


Provider Diagnoses: 


 Left against medical advice








Discharge





- Discharge Plan


Condition: Stable


Disposition: AGAINST MEDICAL ADVICE





The documentation as recorded by the Chu cardenas Benjamin accurately reflects 

the service I personally performed and the decisions made by Vinny cornejo Barbara J, MD.

## 2017-11-17 NOTE — RAD
INDICATION: Chest pain radiating to the back. Lower abdominal pain. Peritoneal dialysis.



COMPARISON: Chest radiograph of the same date and October 27, 2017 CT abdomen pelvis.

December 14, 2016 CT chest.



TECHNIQUE: Multidetector CT images were obtained from the lung apices to the acetabular

level with 100 mL Visipaque 320 IV contrast.  No oral contrast administered. Multiplanar

reformation including with maximum intensity projection and 3-D arterial volume rendering.



CHEST REPORT: Patchy regions of relative lucency within both lungs suspicious for air

trapping. Mild pleural parenchymal scarring at the LEFT apex. Mild subsegmental

atelectasis at the anteromedial basal segment of the LEFT lower lobe and inferior lingula.

No suspicious focal pulmonary lesions. Negative for pleural effusion or pneumothorax.



Negative for thoracic lymphadenopathy by short axis size criteria. Negative for

cardiomegaly or pericardial effusion. Coronary artery calcifications. The thoracic aorta

is normal in diameter without appreciable atherosclerotic plaque or dissection.



Small accessory ossicle at the tip of the RIGHT transverse process of T8 noted without

concern. No thoracic fracture or suspicious focal osseous lesion evident.



CHEST IMPRESSION: 

1. Suggestion of bilateral regions of air trapping; correlate for obstructive lung

disease.

2. Coronary artery calcifications.

3. Negative for aneurysm or dissection of the thoracic aorta.

4. Resolution of mediastinal lymphadenopathy compared with the December 14, 2016 CT chest

exam.



ABDOMEN PELVIS REPORT: Assessment of the abdominal and pelvic visceral limited due to

arterial phase only series.



Unremarkable liver, gallbladder, pancreas, spleen.



No CT abnormality of the unopacified upper GI, small bowel, medially extending appendix,

or incompletely visualized colon. The peritoneal dialysis catheter is coiled at the RIGHT

iliac fossa/RIGHT paracolic gutter. No suggestion of a loculated peritoneal fluid

collection. Negative for free air or hernias.



Normal adrenal glands. No focal renal lesions, conspicuous stones, or hydronephrosis.

Unremarkable partially visualized ureters. Unremarkable partially visualized anteverted

uterus and adnexal regions.



Negative for lymphadenopathy.



Mild atherosclerotic plaque of normal diameter abdominal aorta and iliac arteries.

Negative for aneurysm or arterial dissection. 



No hemodynamic significant aortoiliac stenosis evident. No evidence for stenosis of the

celiac axis, superior mesenteric artery, or solitary LEFT renal artery. Reference the

coronal reformatted series there is suggestion of approximate 60% short segment stenosis

at the proximal segment of the solitary RIGHT renal artery. The RIGHT greater than LEFT

renal artery demonstrates a mildly beaded appearance suspicious for potential

fibromuscular dysplasia. Normal opacification of the inferior mesenteric artery.



Negative for suspicious osseous lesions.



ABDOMEN PELVIS IMPRESSION: 

1. Negative for aneurysm or dissection of the abdominal aorta.

2. Suggestion of approximate 60% short segment stenosis at the proximal segment of the

solitary RIGHT renal artery. Additionally the RIGHT greater than LEFT renal artery

demonstrates a mildly beaded appearance suspicious for potential fibromuscular dysplasia.

## 2017-11-17 NOTE — RAD
HISTORY: Chest pain



COMPARISONS: October 26, 2017



VIEWS: 1: frontal portable view of the chest at 11:50 AM



FINDINGS:

LINES AND TUBES: None.

CARDIOMEDIASTINAL SILHOUETTE: The cardiomediastinal silhouette is normal for portable

technique.

PLEURA: The costophrenic angles are sharp. No pleural abnormalities are noted.

LUNG PARENCHYMA: The lungs are clear.

ABDOMEN: The upper abdomen is clear. There is no subphrenic gas.

BONES AND SOFT TISSUES: No bone or soft tissue abnormalities are noted.



IMPRESSION: NO ACTIVE CARDIOPULMONARY DISEASE.

## 2017-11-17 NOTE — ED
Scout RICARDO Angela, scribed for Humphrey Zamarripa MD on 11/17/17 at 1239 .





Abdominal Pain/Female





- HPI Summary


HPI Summary: 





This pt is a 38 y[facility] left sided 

abdominal pain that began since the end of August. Pt states associated 

symptoms of nausea, vomiting, dizziness, and light-headedness. She describes 

vomiting as "bright white." She denies any diarrhea. Additionally pt reports 

she has chest pain and SOB that began today at about 0800. Pt notes her pain 

goes across her chest from left to right. Her chest pain radiates to between 

her shoulder blades, which has been constant today. Pt denies back pain, arm 

pain. She notes she makes urine but denies any history of urine infection. Pt 

states she actually has a yeast infection now. Pt does the PD catheter every 

day. 


PCP is Dr. Shanks. Pt has had stents in her legs. 


PMHx includes diabetes and 2 cardiac catheterizations. The most recent cardiac 

cath was in February and notes she had 3 blocked arteries. She also reports she 

had an infection in her lungs and heart in December and was in the ICU for 1 

week. 





- History of Current Complaint


Chief Complaint: EDChestPainROMI


Stated Complaint: VOMITING, NAUSEA


Time Seen by Provider: 11/17/17 11:21


Hx Obtained From: Patient


Hx Last Menstrual Period: May 2017


Onset/Duration: Lasting Weeks, Still Present


Timing: Weeks


Severity Currently: Severe


Pain Intensity: 8


Pain Scale Used: 0-10 Numeric


Location: Other - left sided abd


Associated Signs and Symptoms: Positive: Chest Pain, Nausea, Vomiting, Other: - 

pain between shoulder blades, SOB..  Negative: Diarrhea


Allergies/Adverse Reactions: 


 Allergies











Allergy/AdvReac Type Severity Reaction Status Date / Time


 


Heparin Allergy Intermediate pt states Verified 10/10/17 06:16





   it gave  





   her a  





   blood clot  


 


Niacin Allergy Mild Hives Verified 10/10/17 06:16


 


Ropinirole [From Requip] Allergy Mild Hives Verified 10/10/17 06:16


 


Amoxicillin [From Augmentin] AdvReac Mild "Doesn't Verified 10/10/17 06:16





   work"  


 


Clavulanic Acid AdvReac Mild "Doesn't Verified 10/10/17 06:16





[From Augmentin]   work"  


 


Codeine AdvReac Mild Nausea And Verified 10/10/17 06:16





   Vomiting  


 


Erythromycin AdvReac Mild "DOESN'T Verified 10/10/17 06:16





   WORK"  


 


Metronidazole [From Flagyl] AdvReac Mild Nausea And Verified 10/10/17 06:16





   Vomiting  


 


Morphine AdvReac Mild "Doesn't Verified 10/10/17 06:16





   work"  


 


Sulfamethoxazole AdvReac Mild Nausea And Verified 10/10/17 06:16





w/Trimethoprim   Vomiting  





[From Bactrim]     


 


plastic tape Allergy Mild Blisters Uncoded 10/10/17 06:16











Home Medications: 


 Home Medications





Gabapentin CAP(*) [Neurontin 100 mg CAP(*)] 200 mg PO TID 11/17/17 [History 

Confirmed 11/17/17]


Omega-3 Fatty Acids (Nf) [Fish Oil (NF)] 4,000 mg PO DAILY 11/17/17 [History 

Confirmed 11/17/17]


Ondansetron ODT TAB* [Zofran 4 MG Odt TAB*] 4 mg PO ONCE PRN 11/17/17 [History 

Confirmed 11/17/17]


Promethazine TAB* [Phenergan TAB*] 25 mg PO Q8H PRN 11/17/17 [History Confirmed 

11/17/17]


Sertraline* [Zoloft*] 25 mg PO DAILY 11/17/17 [History Confirmed 11/17/17]


Warfarin TAB(*) [Coumadin TAB(*)] 7.5 mg PO .[id number] 11/17/17 [History 

Confirmed 11/17/17]


Warfarin TAB(*) [Coumadin TAB(*)] 10 mg PO .MOFR 11/17/17 [History Confirmed 11/ 17/17]


traZODone TAB* [Desyrel TAB*] 50 mg PO BEDTIME 11/17/17 [History Confirmed 11/17 /17]











PMH/Surg Hx/FS Hx/Imm Hx


Endocrine/Hematology History: Reports: Hx Anticoagulant Therapy - Aspirin., Hx 

Blood Transfusions, Hx Diabetes - insulin dependent, Hx Thyroid Disease - 

nodule biopsied, normal, Hx Anemia - hx of - reports had tranfusion in 2014 

after mi


Cardiovascular History: Reports: Hx Angina, Hx Cardiac Arrest - in Dec. 2014 

with CPR, Hx Cardiomegaly, Hx Coronary Artery Disease, Hx Hypercholesterolemia, 

Hx Hypertension, Hx Myocardial Infarction, Other Cardiovascular Problems/

Disorders - hyperlipidemia, CAD


   Denies: Hx Congestive Heart Failure, Hx Deep Vein Thrombosis, Hx Pacemaker/

ICD, Hx Valvular Heart Disease


Respiratory History: Reports: Hx Asthma, Hx Chronic Obstructive Pulmonary 

Disease (COPD) - smoker, Hx Sleep Apnea - pt reports md thinks, but no official 

testing done yet, Other Respiratory Problems/Disorders - acute respipatory 

failure with hypoxia - dec 2016


   Denies: Hx Lung Cancer, Hx Pneumonia, Hx Pulmonary Embolism


GI History: Reports: Hx Gastroesophageal Reflux Disease, Other GI Disorders - 

GASTROPARESIS - TAKING OMEPRAZOLE, reglan and zofran


   Denies: Hx Gall Bladder Disease, Hx Gastrointestinal Bleed, Hx Ulcer, Hx 

Urosepsis


 History: Reports: Hx Acute Renal Failure, Hx Chronic Renal Failure - ESRD on 

PD, Hx Dialysis - PERITONEAL, Hx Kidney Stones, Hx Renal Disease - ESRD on home 

peritoneal dialysis , Other  Problems/Disorders


Musculoskeletal History: Reports: Hx Arthritis - back, hips, Hx Back Problems - 

Sciatica and Herniated L3 disk, Other Musculoskeletal History - partial 

amputation of toes/foot


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 

of R eye, Hx Vision Problem


   Denies: Hx Contacts or Glasses, Hx Hearing Aid


Opthamlomology History: Reports: Hx Eye Prosthesis - left, Hx Legally Blind - 30

% use of R eye, Hx Vision Problem


   Denies: Hx Contacts or Glasses


Neurological History: Reports: Other Neuro Impairments/Disorders - neuropathy


   Denies: Hx Transient Ischemic Attacks (TIA)


Psychiatric History: Reports: Hx Anxiety, Hx Depression, Hx Bipolar Disorder


   Denies: Hx Eating Disorder, Hx Panic Disorder, Hx Schizophrenia, Hx of 

Violent Episodes Against Others





- Cancer History


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


Hx Chemotherapy: No


Hx Radiation Therapy: No





- Surgical History


Surgery Procedure, Year, and Place: LEFT EYE REMOVED 2012 - HAS PROSTHETIC EYE (

ORBITS DONE 5/2015 OK'D BY DR SAE COLE TO SCAN IN MRI) ;.  MELANOMA REMOVED 

FROM V[facility]VA 2013 (PROCEDURE DONE 4X);.  CARDIAC CATH 2014 - NO STENTS;.  LEFT 

WRIST FISTULA PLACEMENT ([facility]) 2014;.  HEMODIALYSIS CATH RIGHT CHEST WALL 2014;.

  PERITONEAL DIALYSIS CATH 2/2015;.  CHEST WALL CATH REMOVAL 7/2016 [facility];.  

RIGHT GREAT TOE AMPUTATION, [facility] 4/2016;.  PLACEMENT RIGHT JUGULAR TESIO 

HEMODIALYSIS CATHETER [facility] 8/27/2016;.  REMOVAL OF JUGULAR CATH - SEPT 2016.  

CARDIAC CATH - stentsx2 right leg.  PARTIAL AMPUTATION RIGHT TOES 5/2017


Hx Anesthesia Reactions: No





- Immunization History


Date of Tetanus Vaccine: UTD


Date of Influenza Vaccine: 10/2017


Infectious Disease History: No


Infectious Disease History: Reports: Hx of Known/Suspected MRSA - MRSA R 1st toe


   Denies: Hx Clostridium Difficile, Hx Hepatitis, Hx Human Immunodeficiency 

Virus (HIV), Hx Shingles, Hx Tuberculosis, History Other Infectious Disease, 

Traveled Outside the [place] in Last 30 Days





- Family History


Known Family History: Positive: Cardiac Disease - father MI at 43 y/o, 

Hypertension, Other - Positive for bipolar and depression. Completed suicide to 

sister.  





- Social History


Alcohol Use: None


Hx Substance Use: No


Substance Use Type: Reports: Other


Substance Use Comment - Amount & Last Used: METHADONE (noted in chart, not 

confirmed still prescribed in 10/2017)


Hx Tobacco Use: Yes


Smoking Status (MU): Heavy Every Day Tobacco Smoker


Type: Cigarettes


Amount Used/How Often: 1 PPD


Length of Time of Smoking/Using Tobacco: 20 YEARS


Have You Smoked in the Last Year: Yes





Review of Systems


Negative: Fever, Chills


Negative: Erythema


Negative: Sore Throat


Positive: Chest Pain


Positive: Shortness Of Breath.  Negative: Cough


Positive: Abdominal Pain, Vomiting, Nausea.  Negative: Diarrhea


Genitourinary: Other - yeast infection


Negative: dysuria, hematuria, other - urinary infection


Musculoskeletal: Other - pain between shoulder blades


Negative: Myalgia, Edema - leg swelling


Negative: Rash


Neurological: Other - dizziness, light-headedness


All Other Systems Reviewed And Are Negative: Yes





Physical Exam





- Summary


Physical Exam Summary: 





Constitutional: Well-developed, Well-nourished, Alert. (-) Distressed


Skin: Warm, Dry


HENT: Normocephalic; Atraumatic 


Eyes: Conjunctiva normal


Neck: Musculoskeletal ROM normal neck. (-) JVD, (-) Stridor, (-) Tracheal 

deviation


Cardio: Rhythm regular, rate normal, Heart sounds normal; Intact distal pulses; 

The pedal pulses are 2+ and symmetric. Radial pulses are 2+ and symmetric. (-) 

Murmur


Pulmonary/Chest wall: Effort normal. (-) Respiratory distress, (-) Wheezes, (-) 

Rales


Abd: Soft, (-) Distension, (-) Guarding, (-) Rebound. There is tenderness on 

the left side of the umbilicus. No palpable hernia. Obese abdomen. 


Musculoskeletal: (-) Edema


Lymph: (-) Cervical adenopathy


Neuro: Alert, Oriented x3


Psych: Mood and affect Normal


Triage Information Reviewed: Yes


Vital Signs On Initial Exam: 


 Initial Vitals











Temp Pulse Resp BP Pulse Ox


 


 97 F   112   17   150/112   94 


 


 11/17/17 10:17  11/17/17 10:17  11/17/17 10:17  11/17/17 10:17  11/17/17 10:17











Vital Signs Reviewed: Yes





- Dalia Coma Scale


Coma Scale Total: 15





Diagnostics





- Vital Signs


 Vital Signs











  Temp Pulse Resp BP Pulse Ox


 


 11/17/17 12:27    20  


 


 11/17/17 12:00   103    93


 


 11/17/17 11:40   103    91


 


 11/17/17 11:39     195/112 


 


 11/17/17 10:17  97 F  112  17  150/112  94














- Laboratory


Lab Results: 


 Lab Results











  11/17/17 11/17/17 11/17/17 Range/Units





  11:25 11:44 11:44 


 


WBC   14.4 H   (3.5-10.8)  10^3/ul


 


RBC   5.54 H   (4.0-5.4)  10^6/ul


 


Hgb   15.4   (12.0-16.0)  g/dl


 


Hct   46   (35-47)  %


 


MCV   84   (80-97)  fL


 


MCH   28   (27-31)  pg


 


MCHC   33   (31-36)  g/dl


 


RDW   17 H   (10.5-15)  %


 


Plt Count   316   (150-450)  10^3/ul


 


MPV   8   (7.4-10.4)  um3


 


Neut % (Auto)   86.2 H   (38-83)  %


 


Lymph % (Auto)   7.5 L   (25-47)  %


 


Mono % (Auto)   4.2   (1-9)  %


 


Eos % (Auto)   1.5   (0-6)  %


 


Baso % (Auto)   0.6   (0-2)  %


 


Absolute Neuts (auto)   12.4 H   (1.5-7.7)  10^3/ul


 


Absolute Lymphs (auto)   1.1   (1.0-4.8)  10^3/ul


 


Absolute Monos (auto)   0.6   (0-0.8)  10^3/ul


 


Absolute Eos (auto)   0.2   (0-0.6)  10^3/ul


 


Absolute Basos (auto)   0.1   (0-0.2)  10^3/ul


 


Absolute Nucleated RBC   0.04   10^3/ul


 


Nucleated RBC %   0.3   


 


D-Dimer, Quantitative     (Less Than 230)  ng/mL


 


Sodium    130 L  (133-145)  mmol/L


 


Potassium    Pending  


 


Chloride    93 L  (101-111)  mmol/L


 


Carbon Dioxide    28  (22-32)  mmol/L


 


Anion Gap    Pending  


 


BUN    21  (6-24)  mg/dL


 


Creatinine    3.90 H  (0.51-0.95)  mg/dL


 


Est GFR ( Amer)    16.7  (>60)  


 


Est GFR (Non-Af Amer)    13.0  (>60)  


 


BUN/Creatinine Ratio    5.4 L  (8-20)  


 


Glucose    383 H  ()  mg/dL


 


Lactic Acid     (0.5-2.0)  mmol/L


 


Calcium    8.9  (8.6-10.3)  mg/dL


 


Total Bilirubin    0.40  (0.2-1.0)  mg/dL


 


AST    8 L  (13-39)  U/L


 


ALT    7  (7-52)  U/L


 


Alkaline Phosphatase    106 H  ()  U/L


 


Troponin I    0.03  (<0.04)  ng/mL


 


Total Protein    6.5  (6.4-8.9)  g/dL


 


Albumin    3.3  (3.2-5.2)  g/dL


 


Globulin    3.2  (2-4)  g/dL


 


Albumin/Globulin Ratio    1.0  (1-3)  


 


Fluid Source  Peritonial  fluid    


 


Fluid Volume  Pending    


 


Fluid Color  Pending    


 


Fluid Appearance  Pending    


 


Fluid WBC  Pending    


 


Fluid RBC  Pending    


 


Fluid Tot Cell Count  Pending    


 


Fluid Neutrophils  Pending    


 


Fluid Cell Count Rvw By  Pending    














  11/17/17 11/17/17 Range/Units





  11:44 11:44 


 


WBC    (3.5-10.8)  10^3/ul


 


RBC    (4.0-5.4)  10^6/ul


 


Hgb    (12.0-16.0)  g/dl


 


Hct    (35-47)  %


 


MCV    (80-97)  fL


 


MCH    (27-31)  pg


 


MCHC    (31-36)  g/dl


 


RDW    (10.5-15)  %


 


Plt Count    (150-450)  10^3/ul


 


MPV    (7.4-10.4)  um3


 


Neut % (Auto)    (38-83)  %


 


Lymph % (Auto)    (25-47)  %


 


Mono % (Auto)    (1-9)  %


 


Eos % (Auto)    (0-6)  %


 


Baso % (Auto)    (0-2)  %


 


Absolute Neuts (auto)    (1.5-7.7)  10^3/ul


 


Absolute Lymphs (auto)    (1.0-4.8)  10^3/ul


 


Absolute Monos (auto)    (0-0.8)  10^3/ul


 


Absolute Eos (auto)    (0-0.6)  10^3/ul


 


Absolute Basos (auto)    (0-0.2)  10^3/ul


 


Absolute Nucleated RBC    10^3/ul


 


Nucleated RBC %    


 


D-Dimer, Quantitative   < 200  (Less Than 230)  ng/mL


 


Sodium    (133-145)  mmol/L


 


Potassium    


 


Chloride    (101-111)  mmol/L


 


Carbon Dioxide    (22-32)  mmol/L


 


Anion Gap    


 


BUN    (6-24)  mg/dL


 


Creatinine    (0.51-0.95)  mg/dL


 


Est GFR ( Amer)    (>60)  


 


Est GFR (Non-Af Amer)    (>60)  


 


BUN/Creatinine Ratio    (8-20)  


 


Glucose    ()  mg/dL


 


Lactic Acid  1.1   (0.5-2.0)  mmol/L


 


Calcium    (8.6-10.3)  mg/dL


 


Total Bilirubin    (0.2-1.0)  mg/dL


 


AST    (13-39)  U/L


 


ALT    (7-52)  U/L


 


Alkaline Phosphatase    ()  U/L


 


Troponin I    (<0.04)  ng/mL


 


Total Protein    (6.4-8.9)  g/dL


 


Albumin    (3.2-5.2)  g/dL


 


Globulin    (2-4)  g/dL


 


Albumin/Globulin Ratio    (1-3)  


 


Fluid Source    


 


Fluid Volume    


 


Fluid Color    


 


Fluid Appearance    


 


Fluid WBC    


 


Fluid RBC    


 


Fluid Tot Cell Count    


 


Fluid Neutrophils    


 


Fluid Cell Count Rvw By    











Result Diagrams: 


 11/17/17 11:44





 11/17/17 11:44


Lab Statement: Any lab studies that have been ordered have been reviewed, and 

results considered in the medical decision making process.





- Radiology


  ** Chest XR


Xray Interpretation: No Acute Changes - IMPRESSION: No active cardiopulmonary 

disease. ED physician has reviewed this radiology report and agrees.


Radiology Interpretation Completed By: Radiologist





- CT


  ** CTA chest/abdomen/pelvis


CT Interpretation: Positive (See Comments) - IMPRESSION: 1. Negative for 

aneurysm or dissection of the abdominal aorta. 2. Suggestion of approximate 60% 

short segment stenosis at the proximal segment of the solitary RIGHT renal 

artery. Additionally the RIGHT greater than LEFT renal artery demonstrates a 

mildly beaded appearance suspicious for potential fibromuscular dysplasia. ED 

physician has reviewed this radiology report and agrees.


CT Interpretation Completed By: Radiologist





- EKG


  ** 1202


Cardiac Rate: NL


EKG Rhythm: Sinus Rhythm - at 96 bpm


EKG Interpretation: No STEMI.





  ** 1353


Cardiac Rate: NL


EKG Rhythm: Sinus Rhythm - at 91 bpm


EKG Interpretation: No STEMI. Improved from prior EKG done today at 12:02





Re-Evaluation





- Re-Evaluation


  ** First Eval


Re-Evaluation Time: 15:36


Comment: Pt agrees with the plan, Dr. Odom will consult with the pt.





Abdominal Pain Fem Course/Dx





- Course


Course Of Treatment: This pt is a 38 y[facility] 

intermittent left sided abdominal pain that began since the end of August. Pt 

states associated symptoms of nausea, vomiting, dizziness, and light-

headedness. She describes vomiting as "bright white." She denies any diarrhea. 

Additionally pt reports she has chest pain and SOB that began today at about 

0800. Pt notes her pain goes across her chest from left to right. Her chest 

pain radiates to between her shoulder blades, which has been constant today. Pt 

denies back pain, arm pain. She notes she makes urine but denies any history of 

urine infection. Pt states she actually has a yeast infection now. Pt does the 

PD catheter every day.  PMHx includes diabetes.  Lab work, EKG, and CTA chest/

abdomen/pelvis were obtained. Labs show WBC of 14.4, sodium of 130, potassium 

of 2.5, creatinine of 3.90, glucose of 383, AST of 8. First troponin is 0.03 

and second troponin is 0.03. Chest XR is negative. First EKG shows NSR at 96 

bpm. Second EKG reveals an improved EKG from prior. CTA chest/abd/pelvis shows 

1. Negative for aneurysm or dissection of the abdominal aorta. 2. Suggestion of 

approximate 60% short segment stenosis at the proximal segment of the solitary 

RIGHT renal artery. Additionally the RIGHT greater than LEFT renal artery 

demonstrates a mildly beaded appearance suspicious for potential fibromuscular 

dysplasia.  In the ED course, the pt was given Aspirin, Dilaudid, nitroglycerin

, Zofran.  I spoke with Dr. Baker about the pts hypokalemia. He recommends to 

replace with oral potassium. Pt was given potassium chloride. I discussed the pt

's case with Dr. Odom, who will consult with the pt regarding her chest pain 

in the setting of coronary artery disease, diabetes, renal failure, dialysis.  

Per nurse's note, pt was requesting pain medication. Pt was offered methadone 

per orders. Pt stated she wants dilaudid and that she "can take methadone at 

home." Pt was made aware that this is the chosen pain medication by Dr Odom. 

Pt then stated "I'm signing out." Pt signed out AMA.





- Diagnoses


Provider Diagnoses: 


 Chest pain, unspecified, Left against medical advice








- Provider Notifications


Discussed Care Of Patient With: David Baker


Time Discussed With Above Provider: 13:14


Instructed by Provider To: Other - I discussed pt care and pt's hypokalemia 

with Dr. Baker. He recommends replacing with oral potassium. [15:25] I 

discussed pt care with Dr. Odom, who will see the pt in the ED.





Discharge





- Discharge Plan


Condition: Stable


Disposition: AGAINST MEDICAL ADVICE


Referrals: 


Yeni Shanks MD [Primary Care Provider] - 





The documentation as recorded by the Scout cardenas Angela accurately reflects 

the service I personally performed and the decisions made by me, Humphrey Zamarripa MD.

## 2017-12-07 NOTE — RAD
Indication: Cough, hypoxia.



2 views of the chest including dual energy PA views demonstrates no mediastinal shift.

Atelectasis in the right midlung field. There may BE early infiltrate in the left base.

When compared to previous exam of November 17, 2017 the findings are new.



IMPRESSION: Atelectasis right midlung field. Early pneumonia in the left lung base.

## 2017-12-07 NOTE — UC
Respiratory Complaint HPI





- HPI Summary


HPI Summary: 





PRESENTS WITH TWO DAYS OF FEVER, WHEEZING, COUGH, CONGESTION. PAST MEDICAL 

HISTORY SIGNIFICANT FOR: ESRD, DM, MI, OBESITY. PRESENTS TO URGENT CARE WITH 

TACHYPNEA, HYPOXIA (87 ON ROOM AIR), FEVER (99.6F) TACYCARDIA (116). 1PPD 

SMOKER. NO PULMONOGIST OR KNOWN RESPIRATORY DISEASE. 





- History of Current Complaint


Chief Complaint: UCRespiratory


Stated Complaint: RESP


Time Seen by Provider: 12/07/17 20:02


Hx Obtained From: Patient, Family/Caretaker


Hx Last Menstrual Period: NOW


Onset/Duration: Gradual Onset, Lasting Days, Still Present, Worse Since - TODAY


Timing: Intermittent Episodes


Severity Initially: Moderate


Severity Currently: Moderate


Character: Cough: Productive


Aggravating Factors: Exertion, Deep Breaths


Alleviating Factors: Bronchodilator


Associated Signs And Symptoms: Positive: Dyspnea, Fever, Wheezing, Nasal 

Congestion, Hoarseness, Sinus Discomfort





- Risk Factors


Pulmonary Embolism Risk Factors: Negative


Cardiac Risk Factors: Negative


Pseudomonas Risk Factors: Negative


Tuberculosis Risk Factors: Negative





- Allergies/Home Medications


Allergies/Adverse Reactions: 


 Allergies











Allergy/AdvReac Type Severity Reaction Status Date / Time


 


Heparin Allergy Intermediate pt states Verified 12/07/17 19:56





   it gave  





   her a  





   blood clot  


 


Niacin Allergy Mild Hives Verified 12/07/17 19:56


 


Ropinirole [From Requip] Allergy Mild Hives Verified 12/07/17 19:56


 


Amoxicillin [From Augmentin] AdvReac Mild "Doesn't Verified 12/07/17 19:56





   work"  


 


Clavulanic Acid AdvReac Mild "Doesn't Verified 12/07/17 19:56





[From Augmentin]   work"  


 


Codeine AdvReac Mild Nausea And Verified 12/07/17 19:56





   Vomiting  


 


Erythromycin AdvReac Mild "DOESN'T Verified 12/07/17 19:56





   WORK"  


 


Metronidazole [From Flagyl] AdvReac Mild Nausea And Verified 12/07/17 19:56





   Vomiting  


 


Morphine AdvReac Mild "Doesn't Verified 12/07/17 19:56





   work"  


 


Sulfamethoxazole AdvReac Mild Nausea And Verified 12/07/17 19:56





w/Trimethoprim   Vomiting  





[From Bactrim]     


 


plastic tape Allergy Mild Blisters Uncoded 10/10/17 06:16











Home Medications: 


 Home Medications





Acetaminophen [Tylenol] 650 mg PO PRN 12/07/17 [History]


Albuterol 2.5MG/3ML (0.083%)* [Ventolin 2.5 MG/3 ML NEB.SOL*] PRN 12/07/17 [

History Confirmed 12/07/17]


guaiFENesin ER TAB [Mucinex*] 600 mg PO BID 12/07/17 [History Confirmed 12/07/17

]











PMH/Surg Hx/FS Hx/Imm Hx


Previously Healthy: No - DM, ESRD, MI WITH CARDIAC CATH


Cardiovascular History: Cardiac Disease


Respiratory History: Asthma


Cancer History: Other - MELANOMA VULVA


Other Cancer History: VULVA MELANOMA


Other History Of: Anticoagulant Therapy - Aspirin.


   Negative For: HIV, Hepatitis B, Hepatitis C





- Surgical History


Surgical History: Yes


Surgery Procedure, Year, and Place: LEFT EYE REMOVED 2012 - HAS PROSTHETIC EYE (

ORBITS DONE 5/2015 OK'D BY DR SAE COLE TO SCAN IN MRI) ;.  MELANOMA REMOVED 

FROM VULVA 2013 (PROCEDURE DONE 4X);.  CARDIAC CATH 2014 - NO STENTS;.  LEFT 

WRIST FISTULA PLACEMENT ([facility]) 2014;.  HEMODIALYSIS CATH RIGHT CHEST WALL 2014;.

  PERITONEAL DIALYSIS CATH 2/2015;.  [place] [facility] [place]  

[place]AT [place] [facility] [place].  PLACEMENT RIGHT JUGULAR TESIO 

[place] CATHE[place] [facility] [place];.  REMOVAL OF JUGULAR CATH - SEPT 2016.  

CARDIAC CATH - stentsx2 right leg.  PARTIAL AMPUTATION RIGHT TOES 5/2017.  

RIGHT TOES ALL REMOVED





- Family History


Known Family History: Positive: Cardiac Disease - father MI at 41 y/o, 

Hypertension, Other - Positive for bipolar and depression. Completed suicide to 

sister.  





- Social History


Occupation: Disabled


Lives: With Family


Alcohol Use: None


Substance Use Type: None, Other


Substance Use Comment - Amount & Last Used: METHADONE (noted in chart, not 

confirmed still prescribed in 10/2017)


Smoking Status (MU): Current Every Day Smoker


Type: Cigarettes


Amount Used/How Often: 1 PPD


Length of Time of Smoking/Using Tobacco: 20 YEARS


Have You Smoked in the Last Year: Yes


Household Exposure Type: Cigarettes


Cessation Counseling: Patient Advised to Stop





- Immunization History


Most Recent Influenza Vaccination: Fall 2016


Most Recent Tetanus Shot: 2014


Most Recent Pneumonia Vaccination: per pt: sometime in 2014





Review of Systems


Constitutional: Fever, Chills, Fatigue


Skin: Negative


Eyes: Negative


ENT: Sinus Congestion


Respiratory: Cough


Cardiovascular: Negative


Gastrointestinal: Negative


Genitourinary: Negative


Motor: Negative


Neurovascular: Negative


Musculoskeletal: Negative


Neurological: Negative


Psychological: Negative


Is Patient Immunocompromised?: No


All Other Systems Reviewed And Are Negative: Yes





Physical Exam


Triage Information Reviewed: Yes


Appearance: No Pain Distress, Well-Nourished, Ill-Appearing, Obese


Vital Signs: 


 Initial Vital Signs











Temp  99.6 F   12/07/17 19:50


 


Pulse  116   12/07/17 19:50


 


Resp  18   12/07/17 19:50


 


BP  174/93   12/07/17 19:50


 


Pulse Ox  89   12/07/17 19:50











Vital Signs Reviewed: Yes


Eye Exam: Normal


ENT: Positive: Normal ENT inspection, Hearing grossly normal, Pharynx normal, 

Hoarse voice


Dental Exam: Normal


Neck exam: Normal


Neck: Positive: Supple, Nontender, No Lymphadenopathy


Respiratory Exam: Other - COUGH


Respiratory: Positive: Chest non-tender, Lungs clear, Normal breath sounds, No 

respiratory distress, No accessory muscle use, Rhonchi, Wheezing


Cardiovascular Exam: Normal


Cardiovascular: Positive: RRR, No Murmur, Pulses Normal, Brisk Capillary Refill


Abdominal Exam: Normal


Abdomen Description: Positive: Nontender, No Organomegaly


Musculoskeletal Exam: Normal


Musculoskeletal: Positive: Strength Intact, ROM Intact


Neurological Exam: Normal


Psychological Exam: Normal


Skin Exam: Normal





UC Diagnostic Evaluation





- Laboratory


O2 Sat by Pulse Oximetry: 87





- Radiology


Xray Interpretation: Positive (See Comments) - Interpreted by radiologist, 

reviewed by SIRI. Interpretation  : atelectasis right midlung; early left lower 

lobe pneumonia


Radiology Interpretation Completed By: Radiologist





Respiratory Course/Dx





- Course


Course Of Treatment: PATIENT ADVISED TO SEEK HIGHER LEVEL OF CARE AT EMERGENCY 

DEPARTMENT. PATIENT REFUSED THE OFFER OF AMBULANCE TRANSPORT AND ELECTED TO GO 

TO THE EMERGENCY DEPARTMENT BY PRIVATE CAR.





- Differential Dx/Diagnosis


Differential Diagnosis/HQI/PQRI: Exacerbation Of COPD


Provider Diagnoses: LEFT LOWER LOBE PNEUMONIA; HYPOXIA; TACHYPNEA; TACHYCARDIA; 

FEVER; RIGHT MID LUNG ATALECTASIS; ESRD; DIABETES; HISTORY OF MI; TOBACCO ABUSE

; ASTHMA; OBESITY





Discharge





- Discharge Plan


Condition: Stable


Disposition: OTHER


Discharge Disposition Comment: ADVISED TO GO TO ED, REFUSED AMBULANCE


Patient Education Materials:  Type 2 Diabetes in Adults (ED), Bacterial 

Pneumonia (ED), Hypoxia (ED), Tachycardia  (ED)


Referrals: 


Yeni Shanks MD [Primary Care Provider] - 


Additional Instructions: 


 YOU HAVE REFUSED THE OFFER OF AMBULANCE TRANSPORT AND HAVE ELECTED TO GO TO 

THE EMERGENCY DEPARTMENT BY PRIVATE CAR.


YOU ARE ADVISED TO PROCEED SAFELY, BUT DIRECTLY TO THE EMERGENCY DEPARTMENT FOR 

CONTINUED EVALUATION.

## 2017-12-08 NOTE — PN
Subjective


Date of Service: 12/08/17


Interval History: 





Pt is feeling poorly. She c/o pain in her chest and back and states the 

fentanyl does not help her pain much. She states the pain is when she is 

coughing. She is brining up a scant amount of sputum that is brownish in color. 

She has not had a BM since Wednesday. 





Objective


Active Medications: 








Acetaminophen (Tylenol Tab*)  650 mg PO Q6H PRN


   PRN Reason: FEVER/PAIN


Albuterol (Ventolin 2.5 Mg/3 Ml Neb.Sol*)  2.5 mg INH Q2H PRN


   PRN Reason: SOB/WHEEZING


Amlodipine Besylate (Norvasc Tab*)  10 mg PO QPM [facility]


   Last Admin: 12/08/17 02:43 Dose:  10 mg


Aspirin (Aspirin Low Dose Tab*)  81 mg PO DAILY [facility]


Docusate Sodium (Colace Cap*)  200 mg PO BID PRN


   PRN Reason: CONSTIPATION


Gabapentin (Neurontin Cap(*))  200 mg PO QPM [facility]


   Last Admin: 12/08/17 02:43 Dose:  200 mg


Guaifenesin (Mucinex*)  1,200 mg PO BID [facility]


Insulin Human Regular 100 (units/ Sodium Chloride)  100 mls @ 6 mls/hr IVPB 

.PER RATE [facility]


   PRN Reason: 6 UNITS/HR


   Last Admin: 12/08/17 00:43 Dose:  6 mls/hr


Levofloxacin/Dextrose (Levaquin 750 Mg Ivpremix(*))  750 mg in 150 mls @ 100 mls

/hr IVPB Q48H [facility]


Sodium Chloride (Ns 0.9% 1000 Ml*)  1,000 mls @ 125 mls/hr IV PER RATE [facility]


   Last Admin: 12/08/17 03:25 Dose:  125 mls/hr


Insulin Glargine (Lantus(*))  60 units SUBCUT BEDTIME [facility]


Melatonin (Melatonin (Nf))  3 mg PO BEDTIME PRN; Protocol


   PRN Reason: Sleep


Methadone HCl (Dolophine Tab*)  5 mg PO Q6H PRN


   PRN Reason: PAIN


   Last Admin: 12/08/17 02:43 Dose:  5 mg


Morphine Sulfate (Morphine Inj (Syringe)*)  4 mg IV Q4H PRN


   PRN Reason: PAIN


Nicotine (Nicotine Inhaler*)  10 mg INH Q2H PRN


   PRN Reason: CRAVING


Omeprazole (Prilosec Cap*)  40 mg PO QPM [facility]


Promethazine HCl (Phenergan Tab*)  25 mg PO Q8H PRN


   PRN Reason: NAUSEA


   Last Admin: 12/08/17 08:10 Dose:  25 mg


Sertraline HCl (Zoloft*)  25 mg PO DAILY [facility]


Warfarin Sodium (Coumadin Tab(*))  7.5 mg PO SuTuWeThSa@1700 TRISTAN


   PRN Reason: Protocol


Warfarin Sodium (Coumadin Tab(*))  10 mg PO MoFr@1700 TRISTAN


   PRN Reason: Protocol








 Vital Signs - 8 hr











  12/08/17 12/08/17 12/08/17





  01:00 01:01 01:10


 


Temperature   99.0 F


 


Pulse Rate  118 115


 


Respiratory   16





Rate   


 


Blood Pressure 170/87  170/87





(mmHg)   


 


O2 Sat by Pulse  90 95





Oximetry   














  12/08/17 12/08/17 12/08/17





  01:15 01:32 01:42


 


Temperature  98.9 F 


 


Pulse Rate 115 122 114


 


Respiratory 28 14 6





Rate   


 


Blood Pressure  174/94 174/94





(mmHg)   


 


O2 Sat by Pulse 87 88 89





Oximetry   














  12/08/17 12/08/17 12/08/17





  01:45 01:49 01:50


 


Temperature   


 


Pulse Rate 115  115


 


Respiratory 29  22





Rate   


 


Blood Pressure 188/93  





(mmHg)   


 


O2 Sat by Pulse 90 90 90





Oximetry   














  12/08/17 12/08/17 12/08/17





  02:00 02:01 02:16


 


Temperature   


 


Pulse Rate 111 112 114


 


Respiratory 30 30 29





Rate   


 


Blood Pressure 180/89  195/126





(mmHg)   


 


O2 Sat by Pulse 89 89 92





Oximetry   














  12/08/17 12/08/17 12/08/17





  02:17 02:43 02:48


 


Temperature   


 


Pulse Rate 113  111


 


Respiratory 31 29 27





Rate   


 


Blood Pressure 195/112  





(mmHg)   


 


O2 Sat by Pulse 93  91





Oximetry   














  12/08/17 12/08/17 12/08/17





  03:00 03:01 03:02


 


Temperature   


 


Pulse Rate 108 107 108


 


Respiratory 29 31 32





Rate   


 


Blood Pressure 177/106  





(mmHg)   


 


O2 Sat by Pulse 90 91 90





Oximetry   














  12/08/17 12/08/17 12/08/17





  03:03 03:04 03:22


 


Temperature   


 


Pulse Rate   


 


Respiratory 30 30 28





Rate   


 


Blood Pressure   





(mmHg)   


 


O2 Sat by Pulse   





Oximetry   














  12/08/17 12/08/17 12/08/17





  03:29 03:30 04:00


 


Temperature   97.5 F


 


Pulse Rate   109


 


Respiratory 28 30 24





Rate   


 


Blood Pressure   150/88





(mmHg)   


 


O2 Sat by Pulse   90





Oximetry   














  12/08/17 12/08/17 12/08/17





  04:01 04:35 05:00


 


Temperature   


 


Pulse Rate 109 109 109


 


Respiratory 24 21 22





Rate   


 


Blood Pressure   160/99





(mmHg)   


 


O2 Sat by Pulse 90 96 93





Oximetry   














  12/08/17 12/08/17 12/08/17





  05:01 05:19 06:00


 


Temperature   


 


Pulse Rate 109 107 103


 


Respiratory 23 21 22





Rate   


 


Blood Pressure   139/82





(mmHg)   


 


O2 Sat by Pulse 91 90 93





Oximetry   














  12/08/17 12/08/17 12/08/17





  06:01 06:39 06:40


 


Temperature   


 


Pulse Rate 103 107 


 


Respiratory 21 29 29





Rate   


 


Blood Pressure   





(mmHg)   


 


O2 Sat by Pulse 93 96 





Oximetry   














  12/08/17 12/08/17 12/08/17





  07:00 07:01 08:00


 


Temperature   97.5 F


 


Pulse Rate 104 103 111


 


Respiratory 20 26 17





Rate   


 


Blood Pressure 145/93  





(mmHg)   


 


O2 Sat by Pulse 95 95 93





Oximetry   














  12/08/17 12/08/17





  08:01 08:37


 


Temperature  


 


Pulse Rate 113 104


 


Respiratory 13 28





Rate  


 


Blood Pressure 182/104 





(mmHg)  


 


O2 Sat by Pulse 94 95





Oximetry  











Oxygen Devices in Use Now: High Flow Nasal Cannula - 10L


Appearance: Young female lying in bed, NAD


Eyes: No Scleral Icterus


Ears/Nose/Mouth/Throat: Mucous Membranes Moist


Respiratory: - - L lung clear, no crackles or other abnormal sounds heard, R 

lung markedly diminished in all lung fields


Cardiovascular: NL Sounds; No Murmurs; No JVD, No Edema, - - tachycardic but 

regular


Abdominal: NL Sounds; No Tenderness; No Distention


Extremities: No Clubbing, Cyanosis, - - s/p L transmetatarsal amputation, no 

drainage or surrounding erythema, wound looks dry


Skin: No Nodules or Sclerosis


Neurological: Alert and Oriented x 3


Result Diagrams: 


 12/08/17 06:38





 12/08/17 06:38


Microbiology and Other Data: 


 Microbiology











 12/08/17 02:00 Nasal Screen MRSA (PCR)(JESSICA) - Final





 Nasal    Mrsa Negative


 


 12/08/17 02:15 Streptococcus pneumoniae Ag Screen - Final





 Urine    Negative S. pneumo Antigen


 


 12/08/17 02:00 Influenza Types A,B Antigen (JESSICA) - Final





 Nasal    Specimen received for Influenza A/B Molecular testing














Assess/Plan/Problems-Billing


Ms Masters is a 36 yo F who has a h/o poorly controlled type II DM (insulin 

dependent) with diabetic neuropathy, nephropathy and retinopathy, ESRD on PD, 

CAD, HTN and HLD who presented to the ER with c/o SOB and was admitted for 

severe sepsis by sepsis 2 criteria. 





- Patient Problems


(1) Severe sepsis


Current Visit: Yes   Status: Acute   Code(s): A41.9 - SEPSIS, UNSPECIFIED 

ORGANISM; R65.20 - SEVERE SEPSIS WITHOUT SEPTIC SHOCK   SNOMED Code(s): 64703787


   Comment: The patient by sepsis 2 criteria is severely septic with tachycardia

, leukocytosis, pneumonia and end organ dysfunction (elevated troponin and 

acute hypoxic respiratory failure). By sepsis 3 criteria the patient's SOFA 

score is at most 1. The patient has received about 1.5L NS since admission 

which falls below the 30ml/kg bolus however given her ESRD the IVF needed to be 

given cautiously so as not to fluid overload the patient. She has been started 

on levaquin for a presumed LLL pneumonia. Will continue to give IVF hydration 

as she remains tachycardic. Check sputum culture, legionella and s.pneumoniae 

urinary antigens.    





(2) Acute respiratory failure with hypoxia


Current Visit: Yes   Status: Acute   Code(s): J96.01 - ACUTE RESPIRATORY 

FAILURE WITH HYPOXIA   SNOMED Code(s): 73213197


   Comment: The patient is in acute hypoxic respiratory failure secondary to 

LLL pneumonia. Will check repeat CXR as now her R lung sounds are markedly 

diminished. Titrate O2 as able.    





(3) Type II diabetes mellitus with complication, uncontrolled


Current Visit: Yes   Status: Acute   Code(s): E11.8 - TYPE 2 DIABETES MELLITUS 

WITH UNSPECIFIED COMPLICATIONS; E11.65 - TYPE 2 DIABETES MELLITUS WITH 

HYPERGLYCEMIA   SNOMED Code(s): 424488929


   Comment: The patient has uncontrolled type II DM. She is insulin dependent. 

Her most recent A1c in 9/2017 was 12.4%. Will recheck now. The patient 

presented with severe hyperglycemia and possibly HHS. She has been started on 

an insulin drip which is being titrated per protocol. Will check next glucose 

level and make further adjustments on the drip. THe patient has associated 

retinopathy, neuropathy and nephropathy (ESRD).    





(4) CAD (coronary artery disease)


Current Visit: Yes   Status: Chronic   Code(s): I25.10 - ATHSCL HEART DISEASE 

OF NATIVE CORONARY ARTERY W/O ANG PCTRS   SNOMED Code(s): 31117157


   Comment: The patient's troponin is more elevated now than it has been 

previously. Likely demand ischemia secondary to severe sepsis and hypoxic 

respiratory failure. She would likely benefit from a stress test in the near 

future however she states last time it was tried she could not tolerate the 

stress test. Continue ASA.    





(5) ESRD (end stage renal disease)


Current Visit: Yes   Status: Chronic   Code(s): N18.6 - END STAGE RENAL DISEASE

   SNOMED Code(s): 90958190


   Comment: Continue peritoneal dialysis.   





(6) HTN (hypertension)


Current Visit: Yes   Status: Chronic   Code(s): I10 - ESSENTIAL (PRIMARY) 

HYPERTENSION   SNOMED Code(s): 80993478


   Comment: BP is markedly elevated at this time. She received her amlodipine 

early today. Will start losartan. She has been on this in the past. Monitor BP.

    





(7) PAD (peripheral artery disease)


Current Visit: Yes   Status: Acute   Code(s): I73.9 - PERIPHERAL VASCULAR 

DISEASE, UNSPECIFIED   SNOMED Code(s): 514422428


   Comment: No acute issues at this time. Continue coumadin.    





(8) Gastroparesis


Current Visit: Yes   Status: Chronic   Code(s): K31.84 - GASTROPARESIS   SNOMED 

Code(s): 826006945


   Comment: Continue phenergan prn nausea.   





(9) DVT prophylaxis


Current Visit: Yes   Status: Acute   Onset Date: 05/03/15   Code(s): UWB4052 - 

   SNOMED Code(s): 939879992


   Comment: Therapeutic INR   





(10) Full code status


Current Visit: Yes   Status: Acute   Onset Date: 05/03/15   Code(s): Z78.9 - 

OTHER SPECIFIED HEALTH STATUS   SNOMED Code(s): 205331790

## 2017-12-08 NOTE — HP
H&P (Free Text)


History and Physical: 


PCP: LESA Shanks MD





Date/Time of Evaluation: 12/08/2017 0100





CC: SOB





HPI: Mrs Masters is a 38YO insulin requiring diabetic with gastroparesis, PAOD, 

CKD stg 4 well known to the Hospitalist service who is highly aggressive and 

hostile during this H&P likely 2nd AMS 2nd hyperglycemia/HONK and early septic 

encephalopathy. She reports 1 week of progressive SOB, cough, chest pain with 

cough, subjective F/C, and malaise. She presented to urgent care today where a 

CXR revealed an early LLL pneumonia and [place] [facility] [facility] [place]. After 

admission while getting settled into the ICU as she requires an insulin GTT, 

she becomes agitated at the H&P questioning, starts yelling that I am calling 

her a liar as I have to rephrase questions due to her answers being 

incongruent. She then insists on leaving AMA and was informed that given her 

sepsis, pneumonia, and severe co-morbidities that without urgent treatment she 

could rapidly decompensate and die. With this information, she calms down to a 

degree, agrees to stay, and acquiesces to an exam.





She has a male friend present who despite Adri's claim of taking her insulin 

as prescribed states that this is not true. He states the last time she had any 

insulin was Wednesday evening.





PMedHx


DM2, insulin requiring, poorly controlled 2nd non-adherence with: 


   : gastroparesis


   : peripheral microvasculopathy & R transmetatarsal amputation


   : diabetic ESRD-PD


   : peripheral neuropathy


CAD


PAOD s/p stent RLE


HTN


HLD


asthma


tobacco use disorder


GERD


depression


anxiety





Ambulatory Orders


Nursing to reconcile.





Insulin Aspart [Novolog] 0 - 100 units SUBCUT AC PRN 08/25/16 


Omeprazole CAP* [Prilosec CAP* 20 MG] 40 mg PO QPM 08/25/16 


Albuterol HFA INHALER* [Ventolin HFA Inhaler*] 2 puff INH Q6H PRN 03/23/17 


Insulin GLARGINE(*) [Lantus(*)] 60 units SUBCUT BEDTIME 03/23/17 


amLODIPine TAB* [Norvasc 5 mg TAB*] 10 mg PO QPM 03/23/17 


Aspirin Low Dose CHEW TAB* [Aspirin Low Dose TAB*] 81 mg PO DAILY  tab.chew 05/ 24/17 


Methadone TAB* [Dolophine TAB*] 5 mg PO Q6H PRN MDD 20mg 08/08/17 


Gabapentin CAP(*) [Neurontin 100 mg CAP(*)] 200 mg PO QPM 11/17/17 


Promethazine TAB* [Phenergan TAB*] 25 mg PO Q8H PRN 11/17/17 


Sertraline* [Zoloft*] 25 mg PO DAILY 11/17/17 


Warfarin TAB(*) [Coumadin TAB(*)] 7.5 mg PO .[id number] 11/17/17 


Warfarin TAB(*) [Coumadin TAB(*)] 10 mg PO .MOFR 11/17/17 


Acetaminophen [Tylenol] 650 mg PO Q6HR PRN 12/07/17 


guaiFENesin ER TAB [Mucinex*] 600 mg PO BID 12/07/17 





Allergies


Heparin Allergy (Intermediate, Verified 12/07/17 19:56)


 pt states it gave her a blood clot


Niacin Allergy (Mild, Verified 12/07/17 19:56)


 Hives


Ropinirole [From Requip] Allergy (Mild, Verified 12/07/17 19:56)


 Hives


Amoxicillin [From Augmentin] Adverse Reaction (Mild, Verified 12/07/17 19:56)


 "Doesn't work"


Clavulanic Acid [From Augmentin] Adverse Reaction (Mild, Verified 12/07/17 19:56

)


 "Doesn't work"


Codeine Adverse Reaction (Mild, Verified 12/07/17 19:56)


 Nausea And Vomiting


Erythromycin Adverse Reaction (Mild, Verified 12/07/17 19:56)


 "DOESN'T WORK"


Metronidazole [From Flagyl] Adverse Reaction (Mild, Verified 12/07/17 19:56)


 Nausea And Vomiting


Morphine Adverse Reaction (Mild, Verified 12/07/17 19:56)


 "Doesn't work"


Sulfamethoxazole w/Trimethoprim [From Bactrim] Adverse Reaction (Mild, Verified 

12/07/17 19:56)


 Nausea And Vomiting


plastic tape Allergy (Mild, Uncoded 10/10/17 06:16)


 Blisters





PSurgHx


peritoneal dialysis catheter insertion


R transmetatarsal amputation


BLE arterial stent


OS prosthesis





SocHx: 1PPD cigarettes w/ ~20PYHX, denies alcohol & recreational drugs; single, 

no children, lives with her boyfriend; disabled; full code status





FamHx: strongly positive for DM





ROS: as above, otherwise reviewed and all were negative





vitals: 


 Vital Signs











Temp  37.2 C   12/08/17 01:10


 


Pulse  115   12/08/17 01:50


 


Resp  22   12/08/17 01:50


 


BP  170/87   12/08/17 01:10


 


Pulse Ox  90   12/08/17 01:50








 Intake & Output











 12/07/17 12/07/17 12/08/17





 11:59 23:59 11:59


 


Intake Total   100


 


Balance   100


 


Weight  86.183 kg 


 


Intake:   


 


  IV Fluids   100








Constitutional: NAD, normally developed, obese white female


HEENM: atraumatic; hearing: clinically intact; oropharynx: clear, mucosa moist


Neck: soft tissue: non-tender; thyroid: normal


Pulmonary: scant L paraspinal end-expiratory wheeze, otherwise diminished but 

clear, fair aeration, no accessory muscle use


CV: TR/RR, normal S1S2, no carotid bruit, no jugular venous distention, 1+ B DP/

PT, no edema


Abdominal: soft, non-distended, non-tender, no rebound/guarding/rigidity, 

normoactive bowel sounds, no hepatosplenomegaly or masses, no costovertebral 

angle tenderness


Musculoskeletal: general: R transmetatarsal amputation, no palpable tenderness


Integumental: normal appearance and texture of exposed skin


Psychiatric 


orientation: AA&O to PPS


affect: hostile/abusive


mood: angry


eye contact: absent


content: unreliable


responses: reflect poor understanding of her serious medical issues


insight: poor





Testing: 


 Lab Results











  12/07/17 12/07/17 12/07/17 Range/Units





  23:05 23:09 23:09 


 


WBC   16.9 H   (3.5-10.8)  10^3/ul


 


RBC   4.41   (4.0-5.4)  10^6/ul


 


Hgb   12.4   (12.0-16.0)  g/dl


 


Hct   40   (35-47)  %


 


MCV   90   (80-97)  fL


 


MCH   28   (27-31)  pg


 


MCHC   31   (31-36)  g/dl


 


RDW   20 H   (10.5-15)  %


 


Plt Count   305   (150-450)  10^3/ul


 


MPV   9   (7.4-10.4)  um3


 


Neut % (Auto)   94.4 H   (38-83)  %


 


Lymph % (Auto)   3.2 L   (25-47)  %


 


Mono % (Auto)   2.3   (1-9)  %


 


Eos % (Auto)   0   (0-6)  %


 


Baso % (Auto)   0.1   (0-2)  %


 


Absolute Neuts (auto)   16.0 H   (1.5-7.7)  10^3/ul


 


Absolute Lymphs (auto)   0.5 L   (1.0-4.8)  10^3/ul


 


Absolute Monos (auto)   0.4   (0-0.8)  10^3/ul


 


Absolute Eos (auto)   0   (0-0.6)  10^3/ul


 


Absolute Basos (auto)   0   (0-0.2)  10^3/ul


 


Absolute Nucleated RBC   0.01   10^3/ul


 


Nucleated RBC %   0.1   


 


INR (Anticoag Therapy)  2.19 H    (0.77-1.02)  


 


APTT  41.8 H    (26.0-36.3)  seconds


 


Sodium    120 L  (133-145)  mmol/L


 


Potassium    4.2  (3.5-5.0)  mmol/L


 


Chloride    88 L  (101-111)  mmol/L


 


Carbon Dioxide    19 L  (22-32)  mmol/L


 


Anion Gap    13 H  (2-11)  mmol/L


 


BUN    34 H  (6-24)  mg/dL


 


Creatinine    4.11 H  (0.51-0.95)  mg/dL


 


Est GFR ( Amer)    15.7  (>60)  


 


Est GFR (Non-Af Amer)    12.2  (>60)  


 


BUN/Creatinine Ratio    8.3  (8-20)  


 


Glucose    937 H*  ()  mg/dL


 


Calcium    8.0 L  (8.6-10.3)  mg/dL


 


Total Bilirubin    0.20  (0.2-1.0)  mg/dL


 


Direct Bilirubin    0.10  (0.03-0.18)  mg/dL


 


Indirect Bilirubin    0.1 L  (0.3-1.0)  mg/dL


 


AST    9 L  (13-39)  U/L


 


ALT    9  (7-52)  U/L


 


Alkaline Phosphatase    124 H  ()  U/L


 


Troponin I    0.19 H*  (<0.04)  ng/mL


 


Total Protein    6.2 L  (6.4-8.9)  g/dL


 


Albumin    2.9 L  (3.2-5.2)  g/dL


 


Globulin    3.3  (2-4)  g/dL


 


Albumin/Globulin Ratio    0.9 L  (1-3)  








ECG, personally reviewed: sinus tachycardia rate 108, no ischemia





2vCXR, personally reviewed: IMPRESSION: Atelectasis right midlung field. Early 

pneumonia in the left lung base.





Impression: 37F presenting with sepsis 2nd LLL pneumonia and severe 

hyperglycemia/HONK





DIAGNOSIS & PLAN


Primary 


sepsis 2nd LLL pneumonia


: IVFs


: IV levofloxacin


: blood & sputum CXs


: check urine S pneumo antigen


: check rapid influenza


: supplemental oxygen


: supportive care





severe hyperglycemia/HONK


: NPO x/ meds w/ sips H2O


: ICU monitoring


: insulin drip, ICU titration


: close monitoring of BNP


: check osmolality


: pseudo-hypoNatremia of 120 corrects to 140





elevated troponin


: suspect demand ischemia


: telemetry


: trend


: aspirin


: no beta blocker in setting of sepsis





Secondary 


diabetic nephropathy ESRD-PD


: continue Q6H peritoneal dialysis


: consider nephrology consult in AM





CAD


: review meds once reconciled





HTN


: review meds once reconciled





HLD


: review meds once reconciled





asthma


: albuterol nebs PRN





GERD


: omeprazole





depression/anxiety


: review meds once reconciled





Admission Rational: inpatient for sepsis 2nd LLL pneumonia and HONK requiring 

ICU admission for insulin GTT; inappropriate for outpatient setting


DVTp: continue warfarin


Code Status: full

## 2017-12-08 NOTE — RAD
HISTORY: Sepsis, left lower lobe pneumonia



COMPARISONS: December 07, 2017



VIEWS: 1: frontal portable view of the chest at 9:18 AM



FINDINGS:

LINES AND TUBES: None.

CARDIOMEDIASTINAL SILHOUETTE: The cardiomediastinal silhouette is normal for portable

technique.

PLEURA: The costophrenic angles are sharp. No pleural abnormalities are noted.

LUNG PARENCHYMA: There is persistent linear opacification of the right midlung. There has

been improved aeration of left lung base

ABDOMEN: The upper abdomen is clear. There is no subphrenic gas.

BONES AND SOFT TISSUES: No bone or soft tissue abnormalities are noted.



IMPRESSION: PERSISTENT LINEAR ATELECTASIS VERSUS PLEUROPARENCHYMAL SCARRING OF THE RIGHT

MIDLUNG

## 2017-12-08 NOTE — PN
Progress Note





- Progress Note


Date of Service: 12/08/17


Note: 


Nursing called reporting tachypnea in the 30s w/ desaturation into the mid-80s. 

Increasing her O2 to 15L [place], repositioning, & albuterol neb seems to have 

stablilized her as she is able to speak in complete sentences and saO2 is up to 

the low 90s. Given her tenuous state, will make her NPO in case of further 

deterioration requiring intubation. Change glucometry to Q4H, check lactic. CXR 

appears improved from previous. Lung sounds are rhonchorous on expiration L>R w

/ mild accessory muscle use.

## 2017-12-09 NOTE — RAD
Indication: Respiratory distress. Cardiac disease. Former tobacco use.



Comparison: December 08, 2017



Technique: Upright AP 2206 hours



Report: Obese body habitus limits image quality. Mild bilateral linear subsegmental

atelectasis at the mid to upper lung zones. No alveolar consolidation, suspicious focal

pulmonary lesion, pleural effusion, or pneumothorax evident. The heart, pulmonary

vasculature, and mediastinal contours are unremarkable.



IMPRESSION: Mild bilateral subsegmental atelectasis.

## 2017-12-09 NOTE — PN
Subjective


Date of Service: 12/09/17


Interval History: 





Pt states she feels she is breathing faster today but overall she states she 

feels a little bit better. She denies any pain at this time. She is coughing 

some but she is not able to get up any sputum. 





Objective


Active Medications: 








Acetaminophen (Tylenol Tab*)  650 mg PO Q4H PRN


   PRN Reason: PAIN


Albuterol (Ventolin 2.5 Mg/3 Ml Neb.Sol*)  2.5 mg INH Q2H PRN


   PRN Reason: SOB/WHEEZING


   Last Admin: 12/09/17 09:20 Dose:  2.5 mg


Amlodipine Besylate (Norvasc Tab*)  10 mg PO QPM [facility]


   Last Admin: 12/08/17 16:41 Dose:  10 mg


Aspirin (Aspirin Low Dose Tab*)  81 mg PO DAILY [facility]


   Last Admin: 12/09/17 09:53 Dose:  81 mg


Dextrose (D50w Syringe 50 Ml*)  12.5 gm IV PUSH .FOR FS < 60 - SS PRN


   PRN Reason: FS < 60


Docusate Sodium (Colace Cap*)  200 mg PO BID PRN


   PRN Reason: CONSTIPATION


Gabapentin (Neurontin Cap(*))  200 mg PO QPM [facility]


   Last Admin: 12/08/17 16:41 Dose:  200 mg


Guaifenesin (Mucinex*)  1,200 mg PO BID [facility]


   Last Admin: 12/09/17 09:53 Dose:  1,200 mg


Levofloxacin/Dextrose (Levaquin 750 Mg Ivpremix(*))  750 mg in 150 mls @ 100 mls

/hr IVPB Q48H [facility]


Insulin Glargine (Lantus(*))  40 units SUBCUT Q24H [facility]


   Last Admin: 12/08/17 12:49 Dose:  40 unit


Insulin Human Lispro (Humalog*)  0 units SUBCUT ACHS [facility]


   PRN Reason: Protocol


Losartan Potassium (Cozaar Tab*)  50 mg PO DAILY [facility]


   Last Admin: 12/09/17 09:53 Dose:  50 mg


Melatonin (Melatonin (Nf))  3 mg PO BEDTIME PRN; Protocol


   PRN Reason: Sleep


Methadone HCl (Dolophine Tab*)  5 mg PO Q6H PRN


   PRN Reason: PAIN


   Last Admin: 12/08/17 02:43 Dose:  5 mg


Morphine Sulfate (Morphine Inj (Syringe)*)  4 mg IV Q4H PRN


   PRN Reason: PAIN


   Last Admin: 12/09/17 09:52 Dose:  4 mg


Mupirocin (Bactroban 2 % Oint*)  1 applic TOPICAL DAILY@1300 [facility]


Nicotine (Nicotine Inhaler*)  10 mg INH Q2H PRN


   PRN Reason: CRAVING


   Last Admin: 12/08/17 12:07 Dose:  10 mg


Omeprazole (Prilosec Cap*)  40 mg PO QPM [facility]


   Last Admin: 12/08/17 16:41 Dose:  40 mg


Promethazine HCl (Phenergan Tab*)  25 mg PO Q8H PRN


   PRN Reason: NAUSEA


   Last Admin: 12/08/17 08:10 Dose:  25 mg


Sertraline HCl (Zoloft*)  25 mg PO DAILY [facility]


   Last Admin: 12/09/17 09:53 Dose:  25 mg


Warfarin Sodium (Coumadin Tab(*))  7.5 mg PO SuTuWeThSa@1700 [facility]


   PRN Reason: Protocol


Warfarin Sodium (Coumadin Tab(*))  10 mg PO MoFr@1700 [facility]


   PRN Reason: Protocol


   Last Admin: 12/08/17 16:41 Dose:  10 mg








 Vital Signs - 8 hr











  12/09/17 12/09/17 12/09/17





  05:00 05:01 05:40


 


Temperature   


 


Pulse Rate 105 103 


 


Respiratory 19 18 24





Rate   


 


Blood Pressure 126/96  





(mmHg)   


 


O2 Sat by Pulse 88 89 





Oximetry   














  12/09/17 12/09/17 12/09/17





  06:00 06:01 06:25


 


Temperature   


 


Pulse Rate 105 104 


 


Respiratory 13 31 





Rate   


 


Blood Pressure 148/92  





(mmHg)   


 


O2 Sat by Pulse 96 97 92





Oximetry   














  12/09/17 12/09/17 12/09/17





  07:00 07:01 07:31


 


Temperature   98 F


 


Pulse Rate 101 100 


 


Respiratory 17 19 





Rate   


 


Blood Pressure 123/94  





(mmHg)   


 


O2 Sat by Pulse 96 97 





Oximetry   














  12/09/17 12/09/17 12/09/17





  08:00 08:01 09:00


 


Temperature   


 


Pulse Rate 105 105 118


 


Respiratory 42 20 22





Rate   


 


Blood Pressure  176/104 142/101





(mmHg)   


 


O2 Sat by Pulse 92 92 94





Oximetry   














  12/09/17 12/09/17 12/09/17





  09:01 09:15 09:23


 


Temperature   


 


Pulse Rate 117  112


 


Respiratory 26  23





Rate   


 


Blood Pressure   





(mmHg)   


 


O2 Sat by Pulse 92 96 96





Oximetry   














  12/09/17 12/09/17 12/09/17





  09:52 10:00 10:01


 


Temperature   


 


Pulse Rate  112 113


 


Respiratory 23 20 26





Rate   


 


Blood Pressure  149/95 





(mmHg)   


 


O2 Sat by Pulse  98 98





Oximetry   














  12/09/17 12/09/17 12/09/17





  11:00 11:07 11:28


 


Temperature   98.3 F


 


Pulse Rate 124 109 


 


Respiratory 25 24 





Rate   


 


Blood Pressure  155/83 





(mmHg)   


 


O2 Sat by Pulse 96 98 





Oximetry   











Oxygen Devices in Use Now: High Flow Heated Nasal Cannula - 30L 70% FiO2


Appearance: Middle aged female sleeping in bed, easily awakened to voice, NAD


Eyes: No Scleral Icterus


Ears/Nose/Mouth/Throat: Mucous Membranes Moist


Respiratory: Symmetrical Chest Expansion and Respiratory Effort, - - coarse 

breath sounds in all lung fields with few scattered wheezes


Cardiovascular: NL Sounds; No Murmurs; No JVD, - - tachycardic but regular, 1+ 

LE edema


Abdominal: NL Sounds; No Tenderness; No Distention


Extremities: No Clubbing, Cyanosis, - - s/p R TMA


Skin: No Rash or Ulcers, No Nodules or Sclerosis


Neurological: Alert and Oriented x 3


Result Diagrams: 


 12/09/17 07:45





 12/09/17 10:15


Microbiology and Other Data: 


 Microbiology











 12/08/17 02:00 Nasal Screen MRSA (PCR)(JESSICA) - Final





 Nasal    Mrsa Negative


 


 12/08/17 02:15 Streptococcus pneumoniae Ag Screen - Final





 Urine    Negative S. pneumo Antigen


 


 12/08/17 02:00 Influenza Types A,B Antigen (JESSICA) - Final





 Nasal    Specimen received for Influenza A/B Molecular testing














Assess/Plan/Problems-Billing


Ms Masters is a 38 yo F who has a h/o poorly controlled type II DM (insulin 

dependent) with diabetic neuropathy, nephropathy and retinopathy, ESRD on PD, 

CAD, HTN and HLD who presented to the ER with c/o SOB and was admitted for 

severe sepsis by sepsis 2 criteria. 





- Patient Problems


(1) Severe sepsis


Current Visit: Yes   Status: Acute   Code(s): A41.9 - SEPSIS, UNSPECIFIED 

ORGANISM; R65.20 - SEVERE SEPSIS WITHOUT SEPTIC SHOCK   SNOMED Code(s): 82762415


   Comment: The patient's WBC count has normalized. She remains tachycardic and 

with acute hypoxic respiratory failure. Will continue levaquin 750mg IV q48hr. 

Sputum culture is pending. Legionella and S.pneumoniae urinary antigens are 

negative. Continue to monitor VS and volume status.    





(2) Acute respiratory failure with hypoxia


Current Visit: Yes   Status: Acute   Code(s): J96.01 - ACUTE RESPIRATORY 

FAILURE WITH HYPOXIA   SNOMED Code(s): 77876749


   Comment: The patient is in acute hypoxic respiratory failure secondary to 

possible LLL pneumonia. I question if the increased O2 requirements may be 

secondary to fluid overload. Will give lasix 60mg IV x1 now. Titrate O2 as 

able.    





(3) Type II diabetes mellitus with complication, uncontrolled


Current Visit: Yes   Status: Acute   Code(s): E11.8 - TYPE 2 DIABETES MELLITUS 

WITH UNSPECIFIED COMPLICATIONS; E11.65 - TYPE 2 DIABETES MELLITUS WITH 

HYPERGLYCEMIA   SNOMED Code(s): 079863334


   Comment: The patient has uncontrolled type II DM. She is insulin dependent. 

A1c is elevated at 12.6%. Her sugars since yesterday afternoon have been under 

good control. Will continue lantus 40units SQ daily and lispro sliding scale.  

  





(4) CAD (coronary artery disease)


Current Visit: Yes   Status: Chronic   Code(s): I25.10 - ATHSCL HEART DISEASE 

OF NATIVE CORONARY ARTERY W/O ANG PCTRS   SNOMED Code(s): 61055158


   Comment: The patient's troponin is more elevated now than it has been 

previously. Likely demand ischemia secondary to severe sepsis and hypoxic 

respiratory failure. She would likely benefit from a stress test in the near 

future however she states last time it was tried she could not tolerate the 

stress test. Continue ASA.    





(5) ESRD (end stage renal disease)


Current Visit: Yes   Status: Chronic   Code(s): N18.6 - END STAGE RENAL DISEASE

   SNOMED Code(s): 05089264


   Comment: Continue peritoneal dialysis.   





(6) HTN (hypertension)


Current Visit: Yes   Status: Chronic   Code(s): I10 - ESSENTIAL (PRIMARY) 

HYPERTENSION   SNOMED Code(s): 58414117


   Comment: BP is improved with addition of losartan to amlodipine. Will 

monitor and if BP still high will increase losartan.    





(7) PAD (peripheral artery disease)


Current Visit: Yes   Status: Acute   Code(s): I73.9 - PERIPHERAL VASCULAR 

DISEASE, UNSPECIFIED   SNOMED Code(s): 390050375


   Comment: No acute issues at this time. Continue coumadin.    





(8) Gastroparesis


Current Visit: Yes   Status: Chronic   Code(s): K31.84 - GASTROPARESIS   SNOMED 

Code(s): 445803781


   Comment: Continue phenergan prn nausea.   





(9) DVT prophylaxis


Current Visit: Yes   Status: Acute   Onset Date: 05/03/15   Code(s): FBB7899 - 

   SNOMED Code(s): 507674401


   Comment: Therapeutic INR   





(10) Full code status


Current Visit: Yes   Status: Acute   Onset Date: 05/03/15   Code(s): Z78.9 - 

OTHER SPECIFIED HEALTH STATUS   SNOMED Code(s): 720471243

## 2017-12-10 NOTE — CONSULT
Consult


Consult: 





CRITICAL CARE MEDICINE


DATE:  12/10/17      TIME:  1215


REFERRING PROVIDER: Km





REASON/CHIEF COMPLAINT: sob





HISTORY OF PRESENT ILLNESS: 37 F with uncontrolled DM & petad on PD presenting 

with DKA and h/o poor adherence. Had sob prior to presentation and concern for 

pna. Wbc up but not dramatic on admission and has improved some. 1 isolated 

fever during stay. Has been on levaquin. pt states she coughs occasionally with 

some sputum expelled. She is otherwise relatively non forthcoming and not 

cooperative. 





REVIEW OF SYSTEMS: As per HPI.


PAST MEDICAL HISTORY: As per HPI & CAD, htn, gerd


MEDICATIONS: Reviewed.


ALLERGIES: Reviewed.  multiple


SOCIAL HISTORY: Reviewed. + tob


FAMILY HISTORY: Noncontributory at present.





PHYSICAL EXAM:


Vital Signs: Reviewed.


Neurologic: awake, communicates fine; nonfocal


HEENT: anicteric, non injected, mmm


Cardiovascular: tachy, S1 S2


Respiratory: fine rales bl and dec bases, no wheeze. tachypnea


Abdomen: obese, soft


Extremities: warm





LABS: Reviewed.


IMAGING: Reviewed.


MEDICATIONS: Reviewed.





ASSESSMENT/PLAN: 37 F with multiple medical ailments na dacute hypoxic resp 

failure- multiple factors. 


No overt infiltrate on cxr and on levaquin nonetheless with neg cx. Has 

interstial fluid and atelectasis prevalent as well, progressed post fluid here 

and she is unable to mobilize well given PD needs but can push that route as 

able; trial another dose lasix today. check phos as she may actually be deplete 

post dka and need replace. Already on anticoag. Not acting like an active viral 

ailment but could have started there. Hopefully can wean off O2. If not repeat 

CXR as she may be hiding an infiltrate retrocardiac and we may need to convince 

her to use some positive pressure with metaneb to alleviate basilar atelectasis 

and lingering ailment vs compliance with using flutter valve and is. small 

effusions will remain. Needs to help her own health.





Supportive and preventative care reviewed.


Care per primary





Disposition: Needs ICU today


Code Status: Full


Critical Care Time: 30min


VEENA Jean Baptiste DO

## 2017-12-10 NOTE — PN
Subjective


Date of Service: 12/10/17


Interval History: 





Pt is feeling ok but very anxious to leave. She now states for the last 2 days 

she has been coughing up a significant amount of sputum. 





Objective


Active Medications: 








Acetaminophen (Tylenol Tab*)  650 mg PO Q4H PRN


   PRN Reason: PAIN


Albuterol (Ventolin 2.5 Mg/3 Ml Neb.Sol*)  2.5 mg INH Q2H PRN


   PRN Reason: SOB/WHEEZING


   Last Admin: 12/10/17 00:22 Dose:  2.5 mg


Amlodipine Besylate (Norvasc Tab*)  10 mg PO QPM [facility]


   Last Admin: 12/09/17 18:37 Dose:  10 mg


Aspirin (Aspirin Low Dose Tab*)  81 mg PO DAILY [facility]


   Last Admin: 12/10/17 09:59 Dose:  81 mg


Dextrose (D50w Syringe 50 Ml*)  12.5 gm IV PUSH .FOR FS < 60 - SS PRN


   PRN Reason: FS < 60


Docusate Sodium (Colace Cap*)  200 mg PO BID PRN


   PRN Reason: CONSTIPATION


Gabapentin (Neurontin Cap(*))  200 mg PO QPM [facility]


   Last Admin: 12/09/17 18:36 Dose:  200 mg


Guaifenesin (Mucinex*)  1,200 mg PO BID [facility]


   Last Admin: 12/10/17 09:59 Dose:  1,200 mg


Levofloxacin/Dextrose (Levaquin 750 Mg Ivpremix(*))  750 mg in 150 mls @ 100 mls

/hr IVPB Q48H [facility]


   Last Admin: 12/10/17 00:01 Dose:  100 mls/hr


Insulin Glargine (Lantus(*))  40 units SUBCUT Q24H [facility]


   Last Admin: 12/09/17 13:42 Dose:  40 unit


Insulin Human Lispro (Humalog*)  0 units SUBCUT ACHS [facility]


   PRN Reason: Protocol


   Last Admin: 12/10/17 08:14 Dose:  Not Given


Lorazepam (Ativan Tab(*))  1 mg PO Q4H PRN


   PRN Reason: ANXIETY


   Last Admin: 12/09/17 19:45 Dose:  1 mg


Losartan Potassium (Cozaar Tab*)  50 mg PO DAILY [facility]


   Last Admin: 12/10/17 09:59 Dose:  50 mg


Melatonin (Melatonin (Nf))  3 mg PO BEDTIME PRN; Protocol


   PRN Reason: Sleep


Methadone HCl (Dolophine Tab*)  5 mg PO Q6H PRN


   PRN Reason: PAIN


   Last Admin: 12/08/17 02:43 Dose:  5 mg


Morphine Sulfate (Morphine Inj (Syringe)*)  4 mg IV Q4H PRN


   PRN Reason: PAIN


   Last Admin: 12/10/17 09:59 Dose:  4 mg


Mupirocin (Bactroban 2 % Oint*)  1 applic TOPICAL DAILY@1300 [facility]


   Last Admin: 12/09/17 13:50 Dose:  Not Given


Nicotine (Nicotine Inhaler*)  10 mg INH Q2H PRN


   PRN Reason: CRAVING


   Last Admin: 12/09/17 12:19 Dose:  10 mg


Omeprazole (Prilosec Cap*)  40 mg PO QPM [facility]


   Last Admin: 12/09/17 18:37 Dose:  40 mg


Promethazine HCl (Phenergan Tab*)  25 mg PO Q8H PRN


   PRN Reason: NAUSEA


   Last Admin: 12/08/17 08:10 Dose:  25 mg


Sertraline HCl (Zoloft*)  25 mg PO DAILY [facility]


   Last Admin: 12/10/17 09:59 Dose:  25 mg


Warfarin Sodium (Coumadin Tab(*))  6 mg PO ONCE@1700 [facility]


   PRN Reason: Protocol


   Stop: 12/10/17 17:01








 Vital Signs - 8 hr











  12/10/17 12/10/17 12/10/17





  03:00 03:01 03:06


 


Temperature   


 


Pulse Rate 109 111 108


 


Respiratory 26 24 22





Rate   


 


Blood Pressure  130/86 140/81





(mmHg)   


 


O2 Sat by Pulse 90 91 90





Oximetry   














  12/10/17 12/10/17 12/10/17





  03:14 04:00 04:01


 


Temperature   


 


Pulse Rate  109 108


 


Respiratory 25 21 22





Rate   


 


Blood Pressure  134/86 





(mmHg)   


 


O2 Sat by Pulse  91 94





Oximetry   














  12/10/17 12/10/17 12/10/17





  04:49 04:51 05:00


 


Temperature  98.5 F 


 


Pulse Rate   105


 


Respiratory 27  22





Rate   


 


Blood Pressure   





(mmHg)   


 


O2 Sat by Pulse   93





Oximetry   














  12/10/17 12/10/17 12/10/17





  05:01 05:04 05:55


 


Temperature   


 


Pulse Rate 104  


 


Respiratory 24 25 25





Rate   


 


Blood Pressure 127/80  





(mmHg)   


 


O2 Sat by Pulse 95  





Oximetry   














  12/10/17 12/10/17 12/10/17





  06:00 06:01 07:00


 


Temperature   


 


Pulse Rate 117 123 112


 


Respiratory 20 16 43





Rate   


 


Blood Pressure 148/107  127/102





(mmHg)   


 


O2 Sat by Pulse 95 98 91





Oximetry   














  12/10/17 12/10/17 12/10/17





  07:01 07:32 08:00


 


Temperature  99.2 F 


 


Pulse Rate 112  109


 


Respiratory 36  23





Rate   


 


Blood Pressure   





(mmHg)   


 


O2 Sat by Pulse 92  91





Oximetry   














  12/10/17 12/10/17 12/10/17





  08:01 08:08 09:00


 


Temperature   


 


Pulse Rate 109 108 107


 


Respiratory 22 21 19





Rate   


 


Blood Pressure 131/81  





(mmHg)   


 


O2 Sat by Pulse 92 92 90





Oximetry   














  12/10/17 12/10/17 12/10/17





  09:01 09:59 10:00


 


Temperature   


 


Pulse Rate 108  114


 


Respiratory 22 19 28





Rate   


 


Blood Pressure 129/89  





(mmHg)   


 


O2 Sat by Pulse 91  94





Oximetry   














  12/10/17





  10:08


 


Temperature 


 


Pulse Rate 116


 


Respiratory 29





Rate 


 


Blood Pressure 130/86





(mmHg) 


 


O2 Sat by Pulse 93





Oximetry 











Oxygen Devices in Use Now: High Flow Heated Nasal Cannula


Appearance: Young female sitting up in bed, NAD


Eyes: No Scleral Icterus


Ears/Nose/Mouth/Throat: Mucous Membranes Moist


Respiratory: - - improved breath sounds but crackles heard at the bases 

bilaterally, few expiratory wheezes


Cardiovascular: NL Sounds; No Murmurs; No JVD, - - tachycardic but regular, 

trace LE edema


Abdominal: NL Sounds; No Tenderness; No Distention


Extremities: No Clubbing, Cyanosis, - - s/p R TMA


Skin: No Rash or Ulcers, No Nodules or Sclerosis


Neurological: Alert and Oriented x 3


Result Diagrams: 


 12/10/17 06:24





 12/10/17 06:24


Microbiology and Other Data: 


 Microbiology











 12/08/17 02:00 Nasal Screen MRSA (PCR)(JESSICA) - Final





 Nasal    Mrsa Negative


 


 12/08/17 02:15 Streptococcus pneumoniae Ag Screen - Final





 Urine    Negative S. pneumo Antigen


 


 12/08/17 02:00 Influenza Types A,B Antigen (JESSICA) - Final





 Nasal    Specimen received for Influenza A/B Molecular testing














Assess/Plan/Problems-Billing


Ms Masters is a 38 yo F who has a h/o poorly controlled type II DM (insulin 

dependent) with diabetic neuropathy, nephropathy and retinopathy, ESRD on PD, 

CAD, HTN and HLD who presented to the ER with c/o SOB and was admitted for 

severe sepsis by sepsis 2 criteria. 





- Patient Problems


(1) Severe sepsis


Current Visit: Yes   Status: Acute   Code(s): A41.9 - SEPSIS, UNSPECIFIED 

ORGANISM; R65.20 - SEVERE SEPSIS WITHOUT SEPTIC SHOCK   SNOMED Code(s): 75818582


   Comment: The patient's WBC count has increased but no new symptoms, no 

fever. Continue levaquin 750mg IV q48hr. Sputum culture still pending. Dr. Jean Baptiste will see the patient today to evaluate the cause of her hypoxic 

respiratory failure.    





(2) Acute respiratory failure with hypoxia


Current Visit: Yes   Status: Acute   Code(s): J96.01 - ACUTE RESPIRATORY 

FAILURE WITH HYPOXIA   SNOMED Code(s): 31073117


   Comment: The patient is in acute hypoxic respiratory failure secondary to 

possible LLL pneumonia and likely fluid overload. Yesterday her weight was 202, 

today she is down to 199.5lb. Her dry weight is around 193lb. Will have Dr. Jean Baptiste see the patient to evaluate her fluid status. ? another dose of IV 

lasix today in addition to her PD. Titrate vapotherm agressively.    





(3) Type II diabetes mellitus with complication, uncontrolled


Current Visit: Yes   Status: Acute   Code(s): E11.8 - TYPE 2 DIABETES MELLITUS 

WITH UNSPECIFIED COMPLICATIONS; E11.65 - TYPE 2 DIABETES MELLITUS WITH 

HYPERGLYCEMIA   SNOMED Code(s): 676148156


   Comment: The patient has uncontrolled type II DM. She is insulin dependent. 

A1c is elevated at 12.6%. Her am sugars are under good control but she 

increases throughout the day. Will add lispro 5 units with meals standing in 

addition to sliding scale.    





(4) CAD (coronary artery disease)


Current Visit: Yes   Status: Chronic   Code(s): I25.10 - ATHSCL HEART DISEASE 

OF NATIVE CORONARY ARTERY W/O ANG PCTRS   SNOMED Code(s): 49155737


   Comment: Elevated troponin on admission is likely secondary to demand 

ischemia. She would benefit from a stress test once she recovers from this 

acute illness.    





(5) ESRD (end stage renal disease)


Current Visit: Yes   Status: Chronic   Code(s): N18.6 - END STAGE RENAL DISEASE

   SNOMED Code(s): 14521379


   Comment: Continue peritoneal dialysis.   





(6) HTN (hypertension)


Current Visit: Yes   Status: Chronic   Code(s): I10 - ESSENTIAL (PRIMARY) 

HYPERTENSION   SNOMED Code(s): 15066298


   Comment: Increase losartan to 75mg daily to achieve better BP control.    





(7) PAD (peripheral artery disease)


Current Visit: Yes   Status: Acute   Code(s): I73.9 - PERIPHERAL VASCULAR 

DISEASE, UNSPECIFIED   SNOMED Code(s): 642408512


   Comment: No acute issues at this time. INR has climbed significantly. Will 

decrease coumadin to 6mg tonight and follow up the INR tomorrow.    





(8) Gastroparesis


Current Visit: Yes   Status: Chronic   Code(s): K31.84 - GASTROPARESIS   SNOMED 

Code(s): 914567347


   Comment: Continue phenergan prn nausea.   





(9) DVT prophylaxis


Current Visit: Yes   Status: Acute   Onset Date: 05/03/15   Code(s): AON6491 - 

   SNOMED Code(s): 816846881


   Comment: supratherapeutic INR   





(10) Full code status


Current Visit: Yes   Status: Acute   Onset Date: 05/03/15   Code(s): Z78.9 - 

OTHER SPECIFIED HEALTH STATUS   SNOMED Code(s): 175396264

## 2017-12-11 NOTE — PN
Progress Note





- Progress Note


Date of Service: 12/11/17


Note: 





Time spent on discharge 50 minutes.

## 2017-12-11 NOTE — DS
CC:  Dr. Shanks

 

DISCHARGE SUMMARY:

 

DATE OF ADMISSION:  12/08/17

 

DATE OF DISCHARGE:  12/11/17

 

HISTORY OF PRESENT ILLNESS:  This 37-year-old woman presented with shortness of 
breath.  She was found to have diabetic ketoacidosis on admission.  She only 
had mild acidosis.  She may have had a combination of DKA and hyperosmolar 
nonketotic hypoglycemia.  In any event, she was treated with an insulin drip 
and did well. She was felt to have sepsis.  She was given antibiotics.  She had 
a febrile illness.  Chest x-ray was negative and cultures were negative.  She 
may have had bronchitis.  She had respiratory failure.  She was on Vapotherm 
for a while.  On the day of discharge, her O2 saturation on 4 L was about 89% 
to 90% and she was quite comfortable.  She has agreed to stop smoking, seemed 
to be motivated.  At this time, she did ask to have the nicotine inhaler 
prescribed for her at home, which I did.

 

I note that on admission, although she said she had taken insulin as prescribed
, her boyfriend said she had not taken it for a day or so before admission.

 

The patient's blood sugar was 85 on the day of discharge even on the lower dose 
of Lantus; however, I am fairly certain that her diet is not as strict at home.
  I have told her to go back to her old regimen of insulin and she will monitor 
her blood sugar.  She last got Lantus at 1 p.m. the day before discharge and 
will not get any before discharge.



She was maintained on her PD during her hospital stay.

 

She had a mild elevation in troponin, highest being 0.23.  She did not have any 
chest pain or signs of active cardiac ischemia through her hospital stay.

 

Losartan was added for control of her hypertension.  Budesonide and DuoNeb were 
prescribed for her home use nebulizer.

 

FINAL DIAGNOSES:

1.  Sepsis, possible pneumonia or other respiratory infection.

2.  Respiratory failure.

3.  Diabetic ketoacidosis and severe hypoglycemia.

4.  Demand ischemia.

5.  ESRD due to diabetic nephropathy, on peritoneal dialysis.

6.  Coronary artery disease, stable.

7.  Hypertension.

8.  Asthma.  

 

DISCHARGE MEDICATIONS:

1.  Losartan 75 mg daily.

2.  Nicotine inhaler every 2 hours p.r.n.

3.  Levofloxacin 750 mg once on 12/12/17.

4.  Budesonide 1 mg b.i.d.

5.  DuoNeb 1 neb every 4 hours p.r.n.

6.  Omeprazole 40 mg h.s.

7.  NovoLog insulin as prescribed.

8.  Amlodipine 10 mg daily.

9.  Albuterol inhaler 2 puffs every 6 hours p.r.n.

10.  Glargine insulin 60 units at bedtime.

11.  Aspirin 81 mg daily.

12.  Methadone 5 mg every 6 hours p.r.n.

13.  Promethazine 25 mg every 8 hours p.r.n.

14.  Gabapentin 200 mg h.s.

15.  Sertraline 25 mg daily.

16.  Guaifenesin  mg b.i.d.

17.  Acetaminophen 650 mg every 6 hours p.r.n.

18.  Warfarin 7.5 mg daily.

 

The patient will have an INR in 3 days.  I note her INR on the day of discharge 
was 2.73.

 

 [id number]/[facility] #: [id number]

MTDALIE

## 2017-12-14 NOTE — RAD
HISTORY: Shortness of breath



COMPARISONS: December 08, 2017



VIEWS: 1: frontal portable view of the chest at 8:26 PM



FINDINGS:

LINES AND TUBES: None.

CARDIOMEDIASTINAL SILHOUETTE: The cardiomediastinal silhouette is normal for portable

technique.

PLEURA: The costophrenic angles are sharp. No pleural abnormalities are noted.

LUNG PARENCHYMA: There is a mild diffuse reticular pattern with indistinct pulmonary

vessels.

ABDOMEN: The upper abdomen is clear. There is no subphrenic gas.

BONES AND SOFT TISSUES: No bone or soft tissue abnormalities are noted.



IMPRESSION: MILD PULMONARY INTERSTITIAL EDEMA

## 2017-12-15 NOTE — ED
Scout RICARDO Angela, scribed for Mateo Horta on 12/14/17 at 1940 .





Shortness of Breath





- HPI Summary


HPI Summary: 





This pt is a 36 y/o [facility]/o increased SOB and chest 

pain. Pt reports she was admitted to [facility] 1 week ago (12/07/17) for pneumonia. 

She was discharged home on Monday (12/11/17). Pt states that since her discharge

, she has felt increased chest pain and SOB. She notes taking one dose of 

Levofloxacin 750 mg on Tuesday, 12/12/17. Pt is currently using oxygen at home 

now (since 12/11/17). She is a current every day smoker (a couple of cigarettes 

a day).


PMHx includes diabetes, kidney failure, peritoneal dialysis (everyday). 





- History of Current Complaint


Chief Complaint: EDShortnessOfBreath


Time Seen by Provider: 12/14/17 19:22


Hx Obtained From: Patient


Onset/Duration: Lasting Days, Still Present


Timing: Constant


Associated Signs & Symptoms: Chest Pain Unrelated to Cough - and SOB





- Allergy/Home Medications


Allergies/Adverse Reactions: 


 Allergies











Allergy/AdvReac Type Severity Reaction Status Date / Time


 


Heparin Allergy Intermediate pt states Verified 12/07/17 19:56





   it gave  





   her a  





   blood clot  


 


Niacin Allergy Mild Hives Verified 12/07/17 19:56


 


Ropinirole [From Requip] Allergy Mild Hives Verified 12/07/17 19:56


 


Amoxicillin [From Augmentin] AdvReac Mild "Doesn't Verified 12/07/17 19:56





   work"  


 


Clavulanic Acid AdvReac Mild "Doesn't Verified 12/07/17 19:56





[From Augmentin]   work"  


 


Codeine AdvReac Mild Nausea And Verified 12/07/17 19:56





   Vomiting  


 


Erythromycin AdvReac Mild "DOESN'T Verified 12/07/17 19:56





   WORK"  


 


Metronidazole [From Flagyl] AdvReac Mild Nausea And Verified 12/07/17 19:56





   Vomiting  


 


Morphine AdvReac Mild "Doesn't Verified 12/07/17 19:56





   work"  


 


Sulfamethoxazole AdvReac Mild Nausea And Verified 12/07/17 19:56





w/Trimethoprim   Vomiting  





[From Bactrim]     


 


plastic tape Allergy Mild Blisters Uncoded 10/10/17 06:16














PMH/Surg Hx/FS Hx/Imm Hx


Endocrine/Hematology History: Reports: Hx Anticoagulant Therapy - Aspirin., Hx 

Blood Transfusions, Hx Diabetes, Hx Anemia - hx of - reports had tranfusion in 

2014 after mi


   Comment Only: Hx Thyroid Disease - nodule biopsied, normal


Cardiovascular History: Reports: Hx Angina, Hx Cardiac Arrest - in Dec. 2014 

with CPR, Hx Cardiomegaly, Hx Coronary Artery Disease, Hx Deep Vein Thrombosis, 

Hx Hypercholesterolemia, Hx Hypertension, Hx Myocardial Infarction, Other 

Cardiovascular Problems/Disorders - hyperlipidemia, CAD


   Denies: Hx Congestive Heart Failure, Hx Pacemaker/ICD, Hx Valvular Heart 

Disease


Respiratory History: Reports: Hx Asthma, Hx Chronic Obstructive Pulmonary 

Disease (COPD) - smoker, Hx Sleep Apnea - pt reports md thinks, but no official 

testing done yet, Other Respiratory Problems/Disorders - acute respipatory 

failure with hypoxia - dec 2016


   Denies: Hx Lung Cancer, Hx Pneumonia, Hx Pulmonary Embolism


GI History: Reports: Hx Gastroesophageal Reflux Disease, Other GI Disorders - 

GASTROPARESIS - TAKING OMEPRAZOLE, reglan and zofran


   Denies: Hx Gall Bladder Disease, Hx Gastrointestinal Bleed, Hx Ulcer, Hx 

Urosepsis


 History: Reports: Hx Acute Renal Failure, Hx Chronic Renal Failure - ESRD on 

PD, Hx Dialysis - PERITONEAL, Hx Kidney Stones, Hx Renal Disease - ESRD on home 

peritoneal dialysis , Other  Problems/Disorders


Musculoskeletal History: Reports: Hx Arthritis - back, hips, Hx Back Problems - 

Sciatica and Herniated L3 disk, Other Musculoskeletal History - partial 

amputation of toes/foot


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 

of R eye, Hx Vision Problem


   Denies: Hx Contacts or Glasses, Hx Hearing Aid


Opthamlomology History: Reports: Hx Eye Prosthesis - left, Hx Legally Blind - 30

% use of R eye, Hx Vision Problem


   Denies: Hx Contacts or Glasses


Neurological History: Reports: Other Neuro Impairments/Disorders - neuropathy


   Denies: Hx Transient Ischemic Attacks (TIA)


Psychiatric History: Reports: Hx Anxiety, Hx Depression, Hx Bipolar Disorder


   Denies: Hx Eating Disorder, Hx Panic Disorder, Hx Schizophrenia, Hx of 

Violent Episodes Against Others





- Cancer History


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


Hx Chemotherapy: No


Hx Radiation Therapy: No





- Surgical History


Surgery Procedure, Year, and Place: LEFT EYE REMOVED 2012 - HAS PROSTHETIC EYE (

ORBITS DONE 5/2015 OK'D BY DR SAE COLE TO SCAN IN MRI) ;.  MELANOMA REMOVED 

FROM V[facility]VA 2013 (PROCEDURE DONE 4X);.  CARDIAC CATH 2014 - NO STENTS;.  LEFT 

WRIST FISTULA PLACEMENT ([facility]) 2014;.  HEMODIALYSIS CATH RIGHT CHEST WALL 2014;.

  PERITONEAL DIALYSIS CATH 2/2015;.  CHEST WALL CATH REMOVAL 7/2016 [facility];.  

RIGHT GREAT TOE AMPUTATION, [facility] 4/2016;.  PLACEMENT RIGHT JUGULAR TESIO 

HEMODIALYSIS CATHETER [facility] 8/27/2016;.  REMOVAL OF JUGULAR CATH - SEPT 2016.  

CARDIAC CATH - stentsx2 right leg.  PARTIAL AMPUTATION RIGHT TOES 5/2017.  

RIGHT TOES ALL REMOVED


Hx Anesthesia Reactions: No





- Immunization History


Date of Tetanus Vaccine: UTD


Date of Influenza Vaccine: 10/2017


Infectious Disease History: No


Infectious Disease History: Reports: Hx of Known/Suspected MRSA - MRSA R 1st toe

, History Other Infectious Disease - GANGRENE


   Denies: Hx Clostridium Difficile, Hx Hepatitis, Hx Human Immunodeficiency 

Virus (HIV), Hx Shingles, Hx Tuberculosis, Traveled Outside the [place] in Last 30 

Days





- Family History


Known Family History: Positive: Cardiac Disease - father MI at 41 y/o, 

Hypertension, Other - Positive for bipolar and depression. Completed suicide to 

sister.  





- Social History


Alcohol Use: unknown


Hx Substance Use: No


Substance Use Type: Reports: None, Other


Substance Use Comment - Amount & Last Used: METHADONE (noted in chart, not 

confirmed still prescribed in 10/2017)


Hx Tobacco Use: Yes


Smoking Status (MU): Heavy Every Day Tobacco Smoker


Type: Cigarettes


Amount Used/How Often: 1 PPD


Length of Time of Smoking/Using Tobacco: 20 YEARS


Have You Smoked in the Last Year: Yes





Review of Systems


Negative: Fever, Chills


Positive: Chest Pain


Positive: Shortness Of Breath


Musculoskeletal: Negative


Skin: Negative


Neurological: Negative


All Other Systems Reviewed And Are Negative: Yes





Physical Exam





- Summary


Physical Exam Summary: 





Appearance: Well appearing, no pain distress


Skin: warm, dry, reflects adequate perfusion


Head/face: normal


Eyes: EOMI, VIOLETTA


ENT: normal


Neck: supple, nontender


Respiratory: Bilateral wheezes, breath sounds present


Cardiovascular: RRR, pulses symmetrical 


Abdomen: nontender, soft


Bowel: present


Musculoskeletal:  strength/ROM intact. Right foot amputation.


Neuro: normal, sensory motor intact, A&Ox3


Triage Information Reviewed: Yes


Vital Signs On Initial Exam: 


 Initial Vitals











Temp Pulse Resp BP Pulse Ox


 


 98.2 F   116   24   145/110   91 


 


 12/14/17 16:51  12/14/17 16:51  12/14/17 16:51  12/14/17 16:51  12/14/17 16:51











Vital Signs Reviewed: Yes





Diagnostics





- Vital Signs


 Vital Signs











  Temp Pulse Resp BP Pulse Ox


 


 12/14/17 18:43   107    88


 


 12/14/17 18:42     170/93 


 


 12/14/17 16:51  98.2 F  116  24  145/110  91














- Laboratory


Lab Results: 


 Lab Results











  12/14/17 12/14/17 12/14/17 Range/Units





  17:28 17:28 17:28 


 


WBC  10.4    (3.5-10.8)  10^3/ul


 


RBC  4.14    (4.0-5.4)  10^6/ul


 


Hgb  11.4 L    (12.0-16.0)  g/dl


 


Hct  35    (35-47)  %


 


MCV  84    (80-97)  fL


 


MCH  28    (27-31)  pg


 


MCHC  33    (31-36)  g/dl


 


RDW  19 H    (10.5-15)  %


 


Plt Count  492 H D    (150-450)  10^3/ul


 


MPV  8    (7.4-10.4)  um3


 


Neut % (Auto)  78.8    (38-83)  %


 


Lymph % (Auto)  11.2 L    (25-47)  %


 


Mono % (Auto)  4.9    (1-9)  %


 


Eos % (Auto)  4.4    (0-6)  %


 


Baso % (Auto)  0.7    (0-2)  %


 


Absolute Neuts (auto)  8.2 H    (1.5-7.7)  10^3/ul


 


Absolute Lymphs (auto)  1.2    (1.0-4.8)  10^3/ul


 


Absolute Monos (auto)  0.5    (0-0.8)  10^3/ul


 


Absolute Eos (auto)  0.5    (0-0.6)  10^3/ul


 


Absolute Basos (auto)  0.1    (0-0.2)  10^3/ul


 


Absolute Nucleated RBC  0    10^3/ul


 


Nucleated RBC %  0    


 


Sodium   135   (133-145)  mmol/L


 


Potassium   3.4 L   (3.5-5.0)  mmol/L


 


Chloride   99 L   (101-111)  mmol/L


 


Carbon Dioxide   29   (22-32)  mmol/L


 


Anion Gap   7   (2-11)  mmol/L


 


BUN   29 H   (6-24)  mg/dL


 


Creatinine   3.21 H   (0.51-0.95)  mg/dL


 


Est GFR ( Amer)   20.9   (>60)  


 


Est GFR (Non-Af Amer)   16.2   (>60)  


 


BUN/Creatinine Ratio   9.0   (8-20)  


 


Glucose   314 H   ()  mg/dL


 


Lactic Acid    1.5  (0.5-2.0)  mmol/L


 


Calcium   8.7   (8.6-10.3)  mg/dL


 


Total Bilirubin   0.20   (0.2-1.0)  mg/dL


 


AST   7 L   (13-39)  U/L


 


ALT   7   (7-52)  U/L


 


Alkaline Phosphatase   80   ()  U/L


 


Total Protein   6.5   (6.4-8.9)  g/dL


 


Albumin   2.7 L   (3.2-5.2)  g/dL


 


Globulin   3.8   (2-4)  g/dL


 


Albumin/Globulin Ratio   0.7 L   (1-3)  


 


Lipase   29   (11.0-82.0)  U/L











Result Diagrams: 


 12/14/17 17:28





 12/14/17 17:28


Lab Statement: Any lab studies that have been ordered have been reviewed, and 

results considered in the medical decision making process.





- Radiology


  ** Chest XR


Xray Interpretation: Positive (See Comments) - IMPRESSION: Mild pulmonary 

interstitial edema. Dr. Horta has reviewed this radiology report.


Radiology Interpretation Completed By: Radiologist





- EKG


  ** 2004


Cardiac Rate: Tachycardia


EKG Rhythm: Sinus Tachycardia - at 102 bpm


EKG Interpretation: No acute changes.





Re-Evaluation





- Re-Evaluation


  ** First Eval


Re-Evaluation Time: 21:18


Comment: I reviewed the lab and XR results with the pt.





  ** Second Eval


Re-Evaluation Time: 21:29


Comment: The pt is declining admission. She would like to leave A.





Course/Dx





- Course


Course Of Treatment: Pt is a 36 y/o female, with recent discharge from [facility] (12/ 11/17) for pneumonia, presents with increased SOB and chest pain for the last 3 

days. Bloodwork, EKG, and chest XR were obtained. Chest XR shows mild pulmonary 

interstitial edema. I discussed the pt's case with Dr. Jolley, hospitalist, who 

has agreed to admit the pt. The pt does not want to be admitted and is 

requesting to leave AMA. Pt understands the risks of leaving AMA.





- Diagnoses


Provider Diagnoses: 


 Exacerbation of asthma, Renal failure, Hypoxia, History of pneumonia








- Physician Notifications


Discussed Care of Patient With: Rosana Jolley


Time Discussed With Above Provider: 21:11


Instructed by Provider To: Other - I discussed the pt's case with Dr. Jolley, who 

has agreed to accept the pt.





Discharge





- Discharge Plan


Condition: Stable


Disposition: AGAINST MEDICAL ADVICE


Referrals: 


Yeni Shanks MD [Primary Care Provider] - 





The documentation as recorded by the Scout cardenas Angela accurately reflects 

the service I personally performed and the decisions made by me, Mateo Horta.

## 2017-12-25 NOTE — RAD
INDICATION:  Abdominal and chest pain and wheezing, recent pneumonia.



COMPARISON:  Comparison is made with a prior chest x-ray study from December 14, 2017.



TECHNIQUE: A portable view of the chest was obtained.



FINDINGS: Cardiac and mediastinal contours appear to be within normal limits.



There are small infiltrates in the left upper lobe and at both lung bases which appear

unchanged. No pleural effusion is seen.



IMPRESSION:  SMALL BILATERAL INFILTRATES, UNCHANGED.

## 2017-12-25 NOTE — HP
CC:  Dr. Shanks *

 

[facility] MEDICINE HISTORY AND PHYSICAL:

 

DATE OF ADMISSION:  17

 

PRIMARY CARE PHYSICIAN:  Dr. Shanks.

 

ATTENDING PHYSICIAN:  Dr. Jamar Mathew * (dictation provided by Phil Skelton NP).

 

CHIEF COMPLAINT:  Upper abdominal pain with nausea and vomiting.

 

HISTORY OF PRESENT ILLNESS:  Ms. Masters is a 37-year-old female with past 
medical history of insulin-dependent type 2 diabetes associated with 
gastroparesis; diabetic end-stage renal disease, on peritoneal dialysis; 
peripheral microvasculopathy; right transmetatarsal amputation.  She also has a 
history of coronary artery disease with peripheral arterial disease, status 
post stent to the right lower extremity.  She has hypertension, hyperlipidemia, 
asthma, tobacco abuse, and currently is on 5 L nasal cannula routinely at home.
  She has had multiple admissions to our hospital and workup outpatient for 
ongoing episodes of upper abdominal pain.  She presents today to the hospital 
again complaining of upper abdominal pain associated with nausea and vomiting.  
Ms. Masters states that these symptoms are exactly as they have been over the 
past several months.  She reports they happen intermittently but that there is 
no clear inciting cause.  They are associated with feeling tired, having chills
, having hot flashes, feeling sweaty, and having upper abdominal pain 
associated with nausea and vomiting.  These symptoms returned again last night 
at 10 p.m. that persisted through the evening.  Therefore, she presented to the 
emergency room today because she could not tolerate oral intake and the pain 
was very severe.  Her workup for this has included an upper endoscopy, which 
she reports showed gastritis only for which she is on omeprazole.  She has also 
had chest, abdomen, and pelvis CTA in 2017 and abdomen and pelvis CT, 
10/27/17.  She reports that the only positive finding has been a possible 
dilated duct for which she was planned to have an outpatient MRI at [facility].  



Her last admission to our hospital was with discharge on 17 after being 
treated for pneumonia.  She denies cough.  She states she is on her baseline 5 
L nasal cannula.  She denies shortness of breath.  She denies chest pain.  On 
further questioning, Ms. Masters notes that she has had "suspicion for a yeast 
infection" with copious white discharge.  She denies itching to the vagina or 
vulva.  She reports not being sexually active since developing these symptoms.  
She reports continued menstruation, which is erratic.  She has been continuing 
her PD dialysis per routine.

 

In the emergency room, Ms. Masters had white blood cell count that was elevated 
to 20.3, though she has had elevations of her white count, this is the highest 
we have seen from her since last year, at which time, she had had shortness of 
breath of unclear etiology.  Her CRP is 33.42.  Her amylase is less than 10.  
Her LFTs are low.  Her chest x-ray shows persistent small bilateral infiltrates
, which are unchanged.  Her vital signs are stable.  She is afebrile.

 

PAST MEDICAL HISTORY:

1.  Type 2 diabetes, which is insulin dependent.

2.  Gastroparesis.

3.  End-stage renal disease, on peritoneal dialysis.

4.  Coronary artery disease.

5.  Peripheral arterial dialysis with right lower extremity stenting.

6.  History of pneumonia discharge from our hospital on 17.

7.  Chronic hypoxic respiratory failure with 5 L nasal cannula usage at home.

8.  Right transmetatarsal amputation.

9.  Hypertension.

10.  Hyperlipidemia.

11.  Asthma.

12.  History of tobacco abuse.

13.  History of vulvar malignant melanoma.

14.  GERD.

15.  Depression.

16.  Anxiety.

 

MEDICATIONS:

1.  Budesonide 1 mg inhaled b.i.d.

2.  NovoLog insulin with meals p.r.n.

3.  Lantus insulin 6 units subcutaneously at bedtime.

4.  Losartan 75 mg p.o. daily.

5.  Acetaminophen 650 mg p.o. q.6 hours p.r.n.

6.  Albuterol inhaler 2 puffs inhaled q.6 hours p.r.n.

7.  Albuterol with ipratropium nebulizer, 1 nebulizer q.4 hours p.r.n.

8.  Amlodipine 10 mg p.o. q.p.m.

9.  Aspirin 81 mg p.o. daily.

10.  Gabapentin 200 mg p.o. q.p.m.

11.  Methadone 5 mg p.o. q.6 hours p.r.n.

12.  Nicotine inhaler 10 mg inhaled q.2 hours p.r.n.

13.  Omeprazole 40 mg p.o. q.p.m.

14.  Promethazine 25 mg p.o. q.8 hours p.r.n.

15.  Sertraline 25 mg p.o. daily.

16.  Warfarin 7.5 mg p.o. daily.

 

ALLERGIES:  HEPARIN, NIACIN, ROPINIROLE, AMOXICILLIN, CLAVULANIC ACID, CODEINE, 
ERYTHROMYCIN, METRONIDAZOLE, MORPHINE, BACTRIM, PLASTIC TAPE.

 

FAMILY HISTORY:  Strongly positive for diabetes.

 

SOCIAL HISTORY:  The patient states she has cut down to less than half-a-pack-a-
day smoking and has strong intention to quit smoking in the New Year.  She 
denies any alcohol or drug use.  She lives with her boyfriend, who is the 
healthcare proxy.

 

REVIEW OF SYSTEMS:  A 14-point review of systems was completed with Ms. Masters 
and all those not mentioned above were negative.

 

                               PHYSICAL EXAMINATION

 

GENERAL:  Ms. Masters is lying in the bed.  She is in no acute distress.  Her 
boyfriend is at the bedside.

 

VITAL SIGNS:  Temperature 97.9, heart rate 98, respiratory rate 13, O2 
saturation 94% on 5 L nasal cannula, blood pressure 141/77.

 

LUNGS:  Clear to auscultation bilaterally with no accessory muscle use and good 
aeration.

 

HEART:  S1, S2.  No murmur, rub, or gallop, and regular.

 

ABDOMEN:  Soft.  There is mild tenderness to palpation in the upper abdomen, 
but there is no rebound, no guarding.

 

:  Pelvic exam showed the patient has copious white curd-like discharge from 
the vagina.  She is not able to visualize the cervix, but she had no tenderness 
with palpation of the cervix.  No other lesions were noted.

 

EXTREMITIES:  No cyanosis or edema.

 

NEURO:  She is alert.  She is oriented x3.  She moves all extremities equally. 
There is no facial asymmetry or focal weakness.  Extraocular movements are 
intact.

 

SKIN:  The patient has a well-healed scar to her right foot at the site of her 
transmetatarsal amputation.  No erythema.  No drainage.  No other skin 
breakdown is noted.

 

 DIAGNOSTIC STUDIES/LAB DATA:  Sodium 135, potassium 3.9, chloride 100, serum 
bicarbonate 28, BUN 28, creatinine 3.15, glucose 253, lactic acid 1.2, 
magnesium 1.3.  AST 7, ALT 6, total bilirubin is 0.3, alk phos 87.  Ammonia 46.
  Troponin 0.04.  CRP 33.42.  .  Amylase less than 10.  WBC 20.3, 
hemoglobin 11.1, hematocrit 34, platelet count 551.  INR 1.95.

 

Chest x-ray was read per above.

 

The EKG shows sinus tachycardia with a heart rate about 100.  There is no 
evidence of ischemia.

 

ASSESSMENT:  Ms. Masters is a 37-year-old female with a past medical history of 
insulin-dependent diabetes associated with end-stage renal disease, on 
peritoneal dialysis; gastroparesis; peripheral arterial disease, who 
furthermore has a history of coronary artery disease, hypertension, 
hyperlipidemia, chronic smoking with chronic hypoxic respiratory failure, on 5 
L nasal cannula.  She has also had multiple admissions to our hospital for 
upper abdominal pain of unclear etiology despite workup, both inpatient and 
outpatient, with CT scans, endoscopies, etc. She returns today with report of 
similar episode of abdominal pain associated with nausea and dry heaves.  Of 
note, the patient does have a leukocytosis and mild elevation in her troponin 
to 0.04.  Our plans will be for observation in the hospital for the followin.  Abdominal pain with nausea and vomiting:  The patient confirms that this 
episode is exactly the same as all previous episodes.  She states these 
episodes can last from 2 days to 3 weeks.  Her request today is for intravenous 
Dilaudid and Zofran for symptomatic management.  Plan to treat symptomatically 
overnight.  I do not see any indication for further workup of the abdominal pain
, other than to send peritoneal dialysate fluid for analysis, given her repeat 
negative workups in the past. If her symptoms are persistent, plan to obtain 
records from her gastroenterologist and consider ordering MRI inpatient.

2.  Vaginal discharge:  The patient denies any itching with the discharge.  I 
note that back in September, she had a positive yeast.  I did perform a pelvic 
exam and send the culture today, but will start empric treatment with 
fluconazole now.  I see that she has a reported allergy to METRONIDAZOLE with 
nausea and vomiting.  I think this is less an allergy than more of anticipated 
adverse effect.  She will only need the fluconazole once every 3 days, so 
likely this will not be a significant problem. At this point, she is not 
vomiting in the ED.  If she is not able to tolerate the fluconazole, we can 
switch to an intravenous medication.  Given her lack of sexual activity and 
lack of any other clinical findings, I do suspect this is yeast, but we will 
wait for culture as well.

3.  Type 2 diabetes:  Plan to continue her lower dose insulin, as I suspect her 
dietary intake to be less while she is feeling nauseous intermittently.  She 
will have lispro sliding scale with meals as well as consistent carbohydrate 
diet.

4.  Chronic hypoxic respiratory failure:  The patient is at baseline 5 L nasal 
cannula.

5.  Leukocytosis:  The patient has leukocytosis today.  Her CRP is not 
dramatically elevated.  I note that her chest x-ray shows persistent infiltrate 
which are unchanged.  I think this infiltrate is reflective of her recent 
pneumonia rather than any new infection particularly given the lack of cough, 
chest pain, or shortness of breath.  I have sent blood cultures, peritoneal 
dialysate cultures, and urinalysis is pending.

6.  Elevated troponin:  Plan to trend the troponin, I have low suspicion that 
there is any acute coronary syndrome.  I suspect that the elevated troponin is 
secondary to her chronic kidney disease.

7.  Hypertension:  Continue amlodipine and losartan.

8.  History of tobacco abuse:  Continue nicotine inhaler.

9.  Depression:  Continue sertraline.

10.  History of deep venous thrombosis with history of stent to the lower 
extremity.  Continue warfarin.  INR is 1.95.  We will recheck in the a.m.

11.  Code status:  Full code.

12.  Disposition:  To medical floor.

13.  DVT prophylaxis:  With warfarin and SCDs.

 

TIME SPENT:  Approximately 60 minutes was spent on the admission of this patient
, more than half the time spent with the patient at the bedside reviewing the 
events leading up to this hospitalization, performing the physical examination, 
and reviewing my plan of care.

 

 ____________________________________ 

PHIL SKELTON NP

 

[id number]/CPS #: [id number]

SAEED

## 2017-12-26 NOTE — PN
Subjective


Date of Service: 12/26/17


Interval History: 





Patient complains of ongoing nausea and severe upper abdominal pain. Patient 

states these are present but better controlled with IV Dilaudid and Zofran and 

that nothing PO works at this point and that she is nauseated with any oral 

intake. Patient denies any vomiting. Patient has occasional chills. Patient on 

3L O2 but denies SOB, CP, Fevers, dysuria, Patient had a dilated CBD on a CT 

scan done at Gastroenterologist's office and was scheduled for a MRCP several 

weeks ago and has had ongoing intractable N/V. 


Family History: Unchanged from Admission


Social History: Unchanged from Admission


Past Medical History: Unchanged from Admission





Objective


Active Medications: 








Acetaminophen (Tylenol Tab*)  650 mg PO Q6HR PRN


   PRN Reason: pain/fever


Albuterol (Ventolin Hfa Inhaler*)  2 puff INH Q6H PRN


   PRN Reason: WHEEZING


   Last Admin: 12/26/17 03:24 Dose:  2 puff


Albuterol/Ipratropium (Duoneb (Albuterol 2.5 Mg/Ipratropium 0.5 Mg))  1 neb INH 

Q4H PRN


   PRN Reason: DYSPNEA


   Last Admin: 12/26/17 08:23 Dose:  1 neb


Amlodipine Besylate (Norvasc Tab*)  10 mg PO QPM TRISTAN


   Last Admin: 12/25/17 17:11 Dose:  10 mg


Aspirin (Aspirin Low Dose Tab*)  81 mg PO DAILY TRISTAN


   Last Admin: 12/26/17 08:37 Dose:  81 mg


Dextrose (D50w Syringe 50 Ml*)  12.5 gm IV PUSH .FOR FS < 60 - SS PRN


   PRN Reason: FS < 60


Fluconazole (Diflucan 100 Mg Tab*)  150 mg PO Q72H [facility]


   Stop: 12/31/17 15:01


   Last Admin: 12/25/17 17:10 Dose:  150 mg


Gabapentin (Neurontin Cap(*))  200 mg PO QPM TRISTAN


   Last Admin: 12/25/17 17:08 Dose:  200 mg


Hydromorphone HCl (Dilaudid Inj*)  1 mg IV SLOW PU Q4H PRN


   PRN Reason: PAIN


   Last Admin: 12/26/17 15:58 Dose:  1 mg


Insulin Glargine (Lantus(*))  40 units SUBCUT BEDTIME TRISTAN


   Last Admin: 12/25/17 21:52 Dose:  40 units


Insulin Human Lispro (Humalog*)  0 units SUBCUT ACHS TRISTAN


   PRN Reason: Protocol


Losartan Potassium (Cozaar Tab*)  100 mg PO DAILY [facility]


Methadone HCl (Dolophine Tab*)  5 mg PO Q6H PRN


   PRN Reason: PAIN


   Last Admin: 12/25/17 17:09 Dose:  5 mg


Mupirocin (Bactroban 2 % Oint*)  1 applic TOPICAL DAILY TRISTAN


   Last Admin: 12/26/17 13:15 Dose:  1 applic


Nicotine (Nicotine Inhaler*)  10 mg INH Q2H PRN


   PRN Reason: CRAVING


Omeprazole (Prilosec Cap*)  40 mg PO QPM TRISTAN


   Last Admin: 12/25/17 17:11 Dose:  40 mg


Ondansetron HCl (Zofran Inj*)  4 mg IV Q4H PRN


   PRN Reason: nausea


   Last Admin: 12/26/17 15:58 Dose:  4 mg


Promethazine HCl (Phenergan Tab*)  25 mg PO Q8H PRN


   PRN Reason: NAUSEA


   Last Admin: 12/25/17 17:11 Dose:  25 mg


Sertraline HCl (Zoloft*)  25 mg PO DAILY [facility]


   Last Admin: 12/26/17 08:37 Dose:  25 mg


Warfarin Sodium (Coumadin Tab(*))  7.5 mg PO DAILY TRISTAN


   PRN Reason: Protocol


   Last Admin: 12/26/17 08:39 Dose:  7.5 mg








 Vital Signs - 8 hr











  12/26/17 12/26/17





  15:58 17:24


 


Respiratory 18 18





Rate  











Oxygen Devices in Use Now: Nasal Cannula - 3L


Appearance: Patient is a 38yo female who appears older than stated age and is 

sitting in the bed in NAD.


Eyes: No Scleral Icterus, PERRLA


Ears/Nose/Mouth/Throat: NL Teeth, Lips, Gums, Clear Oropharnyx, Mucous 

Membranes Moist


Neck: NL Appearance and Movements; NL JVP, Trachea Midline


Respiratory: Symmetrical Chest Expansion and Respiratory Effort, Clear to 

Auscultation


Cardiovascular: NL Sounds; No Murmurs; No JVD, RRR, No Edema


Abdominal: No Hepatosplenomegaly, - - Tenderness diffusely in abdomen without 

voluntary or involuntary guarding. Negative Xavier's sign. BS present and 

normoactive in all 4 quadrants.


Lymphatic: No Cervical Adenopathy


Extremities: No Edema, No Clubbing, Cyanosis


Skin: No Rash or Ulcers, No Nodules or Sclerosis


Neurological: Alert and Oriented x 3, NL Sensation, NL Muscle Strength and Tone


Result Diagrams: 


 12/26/17 08:39





 12/26/17 08:39


Additional Lab and Data: 


 Lab Results

















Assess/Plan/Problems-Billing


Assessment: 





Patient is a 38yo female with a PMH significant for ESRD on peritoneal dialysis

, DMII, Diabetic gastroparesis, CAD, Chronic Respiratory Failure, RTMA, Asthma, 

HTN and HLD who presents with intractable vomiting and upper abdominal pain 

which is responding to IV medication. 





- Patient Problems


(1) Abdominal pain


Current Visit: No   Status: Acute   Priority: High   Onset Date: 05/03/15   Code

(s): R10.9 - UNSPECIFIED ABDOMINAL PAIN   SNOMED Code(s): 79908521


   Comment: 


Unknown cause, in the middle of [facility] Unable to discharge due to need for IV pain medication. 


MRCP ordered with MRI of abdomen, will consider GI consult based on results. 


Previous CBD dilation on CT scan, Possible Cholecystitis, Not likely 

Cholangitis or Choledocholithiasis.   





(2) GERD (gastroesophageal reflux disease)


Current Visit: No   Status: Acute   Code(s): K21.9 - GASTRO-ESOPHAGEAL REFLUX 

DISEASE WITHOUT ESOPHAGITIS   SNOMED Code(s): 495288171


   Comment: Continue PPI.   





(3) IDDM (insulin dependent diabetes mellitus)


Current Visit: No   Status: Acute   Code(s): E11.9 - TYPE 2 DIABETES MELLITUS 

WITHOUT COMPLICATIONS; Z79.4 - LONG TERM (CURRENT) USE OF INSULIN   SNOMED Code(

s): 46821228


   Comment: 


Poorly controlled, last HgbA1c 14.3%


Patient reports compliance with insulin administration and dietary 

recommendations


Patient demonstrates a pattern of severe hyperglycemia overnight, during PD 

infusion and good control during daytime hours.  Lower dextrose solution will 

be trialed this evening


Will also increase evening Lantus to 100U and continue mealtime boluses and q4h 

glucose checks


Patient would likely benefit from outpatient endocrinology support and consider 

a pump that could accomodate higher nighttime insulin needs   





(4) Leukocytosis


Current Visit: No   Status: Acute   Code(s): D72.829 - ELEVATED WHITE BLOOD 

CELL COUNT, UNSPECIFIED   SNOMED Code(s): 131601343


   Comment: Levofloxacin started 12/15 by Dr. Jean Baptiste, WBC stable with elevated 

ESR and Procalcitonin (ESR has been as high as 80s in past). PD fluid appears 

unremarkable. BCx NGTD. UA not obtained as patient does not make much urine.    





(5) PAD (peripheral artery disease)


Current Visit: No   Status: Acute   Code(s): I73.9 - PERIPHERAL VASCULAR DISEASE

, UNSPECIFIED   SNOMED Code(s): 390690775


   Comment: No acute issues at this time. INR has climbed significantly. Will 

decrease coumadin to 6mg tonight and follow up the INR tomorrow.    





(6) Type II diabetes mellitus with complication, uncontrolled


Current Visit: No   Status: Acute   Code(s): E11.8 - TYPE 2 DIABETES MELLITUS 

WITH UNSPECIFIED COMPLICATIONS; E11.65 - TYPE 2 DIABETES MELLITUS WITH 

HYPERGLYCEMIA   SNOMED Code(s): 013321022


   Comment: 


The patient has uncontrolled type II DM. She is insulin dependent. Continue 

Glargine 40u and SSI insulin.   





(7) CAD (coronary artery disease)


Current Visit: No   Status: Chronic   Code(s): I25.10 - ATHSCL HEART DISEASE OF 

NATIVE CORONARY ARTERY W/O ANG PCTRS   SNOMED Code(s): 38182908


   Comment: 


No signs of ACS at this time.    





(8) ESRD (end stage renal disease)


Current Visit: No   Status: Chronic   Code(s): N18.6 - END STAGE RENAL DISEASE 

  SNOMED Code(s): 12129537


   Comment: 


Continue peritoneal dialysis.


Followed by Dr. Baker.


Dialysilate gram stain negative.   





(9) Gastroparesis


Current Visit: No   Status: Chronic   Code(s): K31.84 - GASTROPARESIS   SNOMED 

Code(s): 162351117


   Comment: 


Related to Diabetes.


Continue IV Zofran for nausea and Phenergan. Pateint reports to response to 

Reglan at home.    





(10) History of asthma


Current Visit: No   Status: Chronic   Priority: Medium   Code(s): Z87.09 - 

PERSONAL HISTORY OF OTHER DISEASES OF THE RESPIRATORY SYSTEM   SNOMED Code(s): 

599338309


   Comment: 


Continue albuterol and Duonebs PRN.   





(11) Anemia


Current Visit: Yes   Status: Acute   Code(s): D64.9 - ANEMIA, UNSPECIFIED   

SNOMED Code(s): 170283028


   Comment: 


Related to ESRD and TERESA. Appreciate Nephrology input, Will give IV iron while 

in hospital up to 5 doses.   





(12) Full code status


Current Visit: No   Status: Acute   Onset Date: 05/03/15   Code(s): Z78.9 - 

OTHER SPECIFIED HEALTH STATUS   SNOMED Code(s): 586306748


   





(13) DVT prophylaxis


Current Visit: No   Status: Acute   Onset Date: 05/03/15   Code(s): XRO6677 -  

  SNOMED Code(s): 728001222


   Comment: 


Therapeutic INR   


Status and Disposition: 





Patient is admitted inpatient for IV pain medication and IV antiemetics, will 

discharge when medically able.

## 2017-12-27 NOTE — RAD
Indication: Abdominal pain.



CT of the abdomen and pelvis was performed without IV contrast demonstration. The lung

bases demonstrate no pleural fluid nodules or masses. Heart demonstrates no pericardial

effusion. Right middle lobe atelectasis



Liver is normal in size. No focal lesions are noted. No intrahepatic duct dilatation is

noted. Spleen is normal in size.. Arterial calcifications are noted. There is fluid in the

abdomen likely due to dialysate. Peritoneal dialysis catheter is in place. No dilated

loops of bowel. The colon is filled with stool. The common duct is not dilated. The

gallbladder is partially contracted. Pancreas demonstrates no mass or pancreatic ductal

dilatation. No adrenal masses are noted. The kidneys demonstrate no hydronephrosis. The

retroperitoneal lymphadenopathy is noted.



CT of the pelvis demonstrates no retroperitoneal or pelvic lymphadenopathy. Urinary

bladder is otherwise unremarkable.



The bony structures are grossly unremarkable.



IMPRESSION: Peritoneal dialysis catheter in place. Fluid in the abdomen is likely due to

peritoneal dialysate.



Findings right middle lobe atelectasis noted. Overall no changes noted since October 27, 2017..

## 2017-12-28 NOTE — DS
CC:  Yeni Shanks MD; SHELLEY [facility] [place] *

 

DISCHARGE SUMMARY:

 

DATE OF ADMISSION:  12/25/17

 

DATE OF DISCHARGE:  12/27/17

 

PRIMARY CARE PROVIDER:  Yeni Shanks MD

 

MY ATTENDING WHILE IN THE HOSPITAL:  Virginie Barbour DO * (DICTATED BY SHELLEY SOUSA)

 

PATIENT'S OUTPATIENT GASTROENTEROLOGIST:  SHELLEY [facility]ham of [place].

 

PRIMARY DISCHARGE DIAGNOSES:

1.  Intractable nausea and vomiting.

2.  Upper abdominal pain.

3.  Diabetes mellitus type 2, uncontrolled.

4.  Chronic kidney disease.

 

SECONDARY DISCHARGE DIAGNOSES:

1.  Type 2 diabetes.

2.  Diabetic gastroparesis.

3.  End-stage renal disease, on peritoneal dialysis.

4.  Coronary artery disease.

5.  Peripheral arterial disease, status post stenting.

6.  Pneumonia from 12/11/17.

7.  Chronic hypoxic respiratory failure with 5 L.

8.  Transmetatarsal amputation.

9.  Hypertension.

10.  Hyperlipidemia.

11.  Asthma.

12.  Tobacco abuse.

13.  Gastroesophageal reflux disease.

14.  Depression.

15.  Anxiety.

 

STUDIES DONE WHILE IN THE HOSPITAL:

1.  Electrocardiogram from 12/25/17 read as sinus tachycardia, early 
repolarization abnormality V2 to V3, no other ST segment abnormalities, normal 
axis, no enlargement or hypertrophy, QTC of 43.

2.  Chest x-ray on 12/25/17 read as small bilateral infiltrates, unchanged.

3.  Abdomen and pelvis CT from 12/27/17 read as peritoneal dialysis catheter in 
place, fluid in the abdomen is likely due to peritoneal dialysate, findings 
middle lobe atelectasis noted, no overall changes since 12/27/17.

 

MEDICATIONS AT DISCHARGE:

1.  Omeprazole 40 mg p.o. q.a.m.

2.  NovoLog 0 to 100 units subcutaneous a.c. p.r.n., sliding scale.

3.  Morphine 10 mg p.o. q.p.m.

4.  Albuterol 2 puffs inhalation q.6 hours as needed.

5.  Insulin glargine 4 units subcutaneous at bedtime.

6.  Aspirin 81 mg p.o. daily.

7.  Methadone 5 mg p.o. q.6 hours as needed.

8.  Promethazine 25 mg p.o. q.8 hours.

9.  Gabapentin 200 mg p.o. q.p.m.

10.  Sertraline 25 mg p.o. daily.

11.  Tylenol 650 mg p.o. q.6 hours.

12.  Nicotine inhaler 10 mg p.o. inhalation q.2 hours.

13.  Budesonide 1 mg inhalation b.i.d.

14.  DuoNeb 1 inhalation q.4 hours as needed.

15.  Warfarin 7.5 mg p.o. daily.

16.  Zofran 4 mg sublingually q.4 hours as needed.

17.  Losartan 100 mg p.o. daily.

18.  Metoclopramide 5 mg p.o. q.6 hours.

 

Medication discontinued at discharge:

1.  Insulin glargine 6 units subcutaneous at bedtime.

2.  Losartan 75 mg p.o. daily.

 

HOSPITAL COURSE:  This is a brief summary to the patient's presentation.  For 
more details, please see the history and physical from Marielle Skelton from 12/25/
17.  In brief, the patient is a 37-year-old female who has been admitted to 
this institution 10 times this year with a complex past medical history 
significant for the above.  Patient came into the emergency department with 
upper abdominal pain and intractable nausea and vomiting.  Patient has been 
having an extensive workup as an outpatient with her gastroenterologist through 
[place].  Patient's only positive finding thus far has been a dilated common 
bile duct on a CT scan. Patient was admitted to the hospital for IV pain 
medication and IV antiemetics. Patient had a white blood cell count of 20.3 
with a CRP of 33.42 with no clear cause of infection.  Patient had a yeast 
infection, which was treated and no other changes from previous admissions.  
Patient remained within the same state on 12/26/17 stating her only relief came 
from IV Dilaudid and IV Zofran.  Patient's blood sugars were relatively well 
controlled at one point going down to the 87 and at the highest being 189.  
Patient continued peritoneal dialysis while in the hospital.  Patient was on 40 
units of lispro sliding scale which she only received 6 units.  Patient 
continued having Dilaudid as soon as possible every time it was due.  Patient 
was scheduled for an MRCP, which was initially scheduled for outpatient with 
her gastroenterologist.  However, patient was deemed to be ineligible for an 
MRI due to stents in her legs while inpatient.  Patient also received Venofer 
200 mg IV for iron deficiency anemia that was covered by her outpatient 
nephrologist and previous lab work.  Patient was supposed to receive a second 
dose while in the hospital, but refused.  Patient was hypomagnesemic and that 
was replaced, but was unable to rechecked.  Patient has no other abnormalities 
on her except for a slight moderate hypertension.  Patient had no fever.  
Patient's white count decreased while she was in the hospital.  Patient's pulse 
rate also decreased.  While she was in the hospital, patient was tachycardic.  
On admission, patient had no increase in her oxygen demand.  Patient was 
scheduled for a CT scan of the abdomen and gallbladder ultrasound on 12/27/17 
after it was discovered that the MRI was not possible in patient stay.  She was 
feeling better and was not willing to stay inpatient for these testings.  
Patient was discharged to continue her workup for the cause of her upper 
abdominal pain with her outpatient gastroenterologist, Dr. Baker of Nephrology
, who recommended a Surgery consult for possibility of omental infarction, 
which would require laparoscopy.  This was deferred until pathology of patient'
s gallbladder is more definitively ruled out with the possibility of ERCP and 
HIDA scan, which were discussed with her outpatient provider.  Patient had an 
EGD, which showed gastritis for which she is on omeprazole for, but this would 
not adequately explain her symptoms.  Patient's losartan was increased from 75 
mg to 100 mg daily to good effect.

 

PHYSICAL EXAM:  General:  The patient is a 37-year-old female who appears much 
older than her stated age.  She is sitting comfortably in bed, in no acute 
distress.  Vital signs at the time of discharge, temperature 98.1, pulse rate 87
, respiratory rate 17, oxygen saturation 94% on 4 L of oxygen, blood pressure 
135/72. HEENT:  Head normocephalic, atraumatic.  Sclerae anicteric.  The 
patient has a prosthetic left eye.  Nasal mucosa moist.  Oral mucosa moist.  No 
pharyngeal erythema, exudate or postnasal drainage.  Neck:  Supple, nontender, 
no lymphadenopathy, no carotid bruits auscultated.  Cardiac:  Regular rate and 
rhythm. No clicks, murmurs, gallops or rubs.  Pulses are 2+ in bilateral 
dorsalis pedis, posterior tibialis and radial areas.  Respiratory:  Patient had 
severe wheezes inspiratory and expiratory throughout all lung fields.  This is 
unchanged from previous exam.  Patient has no accessory muscle use or increased 
respiratory effort.  Abdomen:  Soft, nondistended.  No hepatosplenomegaly.  
Patient has severe pain with severe tenderness to palpation over the upper 
abdomen.  No abdominal bruits auscultated.  Bowel sounds present normoactive in 
all 4 quadrants.  Negative Xavier sign.  Genitourinary:  No suprapubic 
tenderness or CVA tenderness.  Skin: Clean, dry, intact.  Neurologic:  Cranial 
nerves II through XII grossly intact. Unable to test movement or pupillary 
reaction in the prosthetic eye.  No other focal deficits.  Psychiatric:  
Patient is anxious and at times hostile.

 

LABORATORY DATA ON DAY OF DISCHARGE:  White blood cell count 11.7, hemoglobin 
10.5, platelet count 464.  INR 2.05 on 12/26/17.  Sodium 135, potassium 3.7, 
chloride 99, carbon dioxide 32, anion gap 4, BUN 25, creatinine 3.29. 
hemoglobin A1c 11.9, calcium 8.5, magnesium 1.5, bilirubin 0.2, AST 17, ALT 6, 
alkaline phosphatase 90, total protein 57, albumin 2.5, globulin 3.2, albumin 
globulin ratio 0.8.

 

DISCHARGE PLAN:  The patient will be discharged to home to continue to workup 
for her abdominal pain.  Patient will be prescribed Zofran, Phenergan, Reglan 
as needed.  Patient should start pain control with her methadone, which she has 
been previously successful.  Patient should follow up with her 
gastroenterologist and consider a HIDA scan and ERCP and possible surgical 
consultation for exploratory laparoscopy.  Patient should also follow up with 
her primary care provider for better control of her diabetes, which was well 
controlled while in the hospital. However, her hemoglobin A1c is 11.9.  So, 
this is probably not the case for her home situation.  Patient should return to 
the hospital for any alarming symptoms such as chest pain or inability to take 
required medications.

 

ACTIVITIES:  The patient should engage in activity as tolerated.

 

DIET:  The patient should have a consistent carbohydrate diet avoiding foods 
that are upsetting to her stomach preferring small meals due to gastroparesis.

 

TIME SPENT:  Approximately 60 minutes were spent on this discharge, 30 of which 
was spent face-to-face with patient and her family obtaining history and 
physical and discussing treatment plan.

 

____________________________________ SHELLEY SOUSA

 

[id number]/[facility] #: [id number]

SAEED

## 2018-01-15 NOTE — RAD
HISTORY: Nausea and vomiting, evaluate for infiltrates



COMPARISONS: December 25, 2017



VIEWS: 1: frontal portable view of the chest at 4:35 AM



FINDINGS:

LINES AND TUBES: None.

CARDIOMEDIASTINAL SILHOUETTE: The cardiomediastinal silhouette is normal for portable

technique.

PLEURA: The costophrenic angles are sharp. No pleural abnormalities are noted.

LUNG PARENCHYMA: There is linear opacification of the left upper lung stable from previous

examinations consistent with minimal linear pleuroparenchymal scarring.

ABDOMEN: The upper abdomen is clear. There is no subphrenic gas.

BONES AND SOFT TISSUES: No bone or soft tissue abnormalities are noted.



IMPRESSION: NO ACTIVE CARDIOPULMONARY DISEASE.

## 2018-01-15 NOTE — RAD
INDICATION: Abdominal pain     



COMPARISON: CT December 27, 2017

 

TECHNIQUE: Longitudinal and transverse scans of the right upper quadrant were obtained.

Doppler interrogation of the hepatic and portal venous system was performed.



FINDINGS:



Liver: There is hepatomegaly with hepatic steatosis.  There are no masses . The liver

measures 22 cm in cephalocaudal dimension.



Vessels: There is normal hepatic and portal venous flow.



Bile ducts: There is no evidence of intrahepatic or extrahepatic ductal dilatation.  The

common duct measures 0.5 cm.



Gallbladder: There is no evidence of cholelithiasis. The gallbladder wall appears mildly

thickened measuring 0.4 cm.

The patient was tender over the gallbladder while scanning.



Pancreas: The visualized pancreas appears normal



Right kidney: The right kidney is normal in size and echogenicity. There are no masses or

calculi. There is no evidence of hydronephrosis. The right kidney measures 12.9 x 4.7 x

4.3 cm.



IVC and aorta: The aorta and superior vena cava appear normal.



Fluid: There is moderate free fluid. The patient is a peritoneal dialysis patient.



Other: None.



IMPRESSION:

1.  Hepatomegaly with hepatic steatosis.

2.  Mild gallbladder wall thickening and tenderness.

3.  There is free fluid consistent with perineal dialysis.

## 2018-01-15 NOTE — PN
Subjective


Date of Service: 01/15/18


Interval History: 





Patient seen and examined this AM. Appeared tired, wanted increase in frequency 

of pain meds. Explained US of gallbladder findings. Explained we would send her 

for HIDA scan today. She is agreeable. States she is still nauseous and wants 

pain meds every 2 hours, no vomiting. Explained she could have pain meds every 

4 hours, not every 2 hours. No vomiting, BG is stable.





Objective


 Vital Signs - 8 hr











  01/15/18





  12:57


 


Temperature 98.1 F


 


Pulse Rate 93


 


Respiratory 16





Rate 


 


Blood Pressure 114/72





(mmHg) 


 


O2 Sat by Pulse 98





Oximetry 











Oxygen Devices in Use Now: None


Appearance: Alert, NAD


Eyes: No Scleral Icterus


Ears/Nose/Mouth/Throat: - - poor dentition


Neck: Trachea Midline


Respiratory: Symmetrical Chest Expansion and Respiratory Effort, Clear to 

Auscultation


Cardiovascular: NL Sounds; No Murmurs; No JVD, RRR


Abdominal: - - tender epigastric region


Extremities: No Edema, No Clubbing, Cyanosis


Skin: No Rash or Ulcers


Neurological: Alert and Oriented x 3


Nutrition: - - NPO


Result Diagrams: 


 01/14/18 23:48





 01/15/18 01:00


Additional Lab and Data: 


 Lab Results











  01/14/18 01/14/18 Range/Units





  00:00 00:00 


 


WBC   16.9 H  (3.5-10.8)  10^3/ul


 


RBC   4.02  (4.0-5.4)  10^6/ul


 


Hgb   11.0 L  (12.0-16.0)  g/dl


 


Hct   34 L  (35-47)  %


 


MCV   84  (80-97)  fL


 


MCH   27  (27-31)  pg


 


MCHC   33  (31-36)  g/dl


 


RDW   20 H  (10.5-15)  %


 


Plt Count   383  (150-450)  10^3/ul


 


MPV   8  (7.4-10.4)  um3


 


Neut % (Auto)   85.6 H  (38-83)  %


 


Lymph % (Auto)   7.9 L  (25-47)  %


 


Mono % (Auto)   3.4  (1-9)  %


 


Eos % (Auto)   2.2  (0-6)  %


 


Baso % (Auto)   0.9  (0-2)  %


 


Absolute Neuts (auto)   14.4 H  (1.5-7.7)  10^3/ul


 


Absolute Lymphs (auto)   1.3  (1.0-4.8)  10^3/ul


 


Absolute Monos (auto)   0.6  (0-0.8)  10^3/ul


 


Absolute Eos (auto)   0.4  (0-0.6)  10^3/ul


 


Absolute Basos (auto)   0.2  (0-0.2)  10^3/ul


 


Absolute Nucleated RBC   0  10^3/ul


 


Nucleated RBC %   0.1  


 


Sodium  132 L   (133-145)  mmol/L


 


Potassium  TNP   


 


Chloride  97 L   (101-111)  mmol/L


 


Carbon Dioxide  27   (22-32)  mmol/L


 


Anion Gap  8   (2-11)  mmol/L


 


BUN  26 H   (6-24)  mg/dL


 


Creatinine  4.08 H   (0.51-0.95)  mg/dL


 


Est GFR ( Amer)  15.8   (>60)  


 


Est GFR (Non-Af Amer)  12.3   (>60)  


 


BUN/Creatinine Ratio  6.4 L   (8-20)  


 


Glucose  162 H   ()  mg/dL


 


Calcium  8.7   (8.6-10.3)  mg/dL


 


Magnesium  1.6 L   (1.9-2.7)  mg/dL


 


Total Bilirubin  0.30   (0.2-1.0)  mg/dL


 


AST  TNP   


 


ALT  6 L   (7-52)  U/L


 


Alkaline Phosphatase  119 H   ()  U/L


 


C-Reactive Protein  51.22 H   (< 5.00)  mg/L


 


Total Protein  6.1 L   (6.4-8.9)  g/dL


 


Albumin  2.8 L   (3.2-5.2)  g/dL


 


Globulin  3.3   (2-4)  g/dL


 


Albumin/Globulin Ratio  0.8 L   (1-3)  


 


Amylase  < 10 L   ()  U/L


 


Lipase  21   (11.0-82.0)  U/L














Assess/Plan/Problems-Billing


Assessment: 











- Patient Problems


(1) Abdominal pain


Code(s): R10.9 - UNSPECIFIED ABDOMINAL PAIN   SNOMED Code(s): 52055849


   Comment: 


 - Etiology likely diabetic gastroparesis


 - Gallbladder wall thickening noted


 - Per patient, has GI at [facility] and is getting outpatient workup 


 - HIDA scan without acute cholycystitis   





(2) Diabetic neuropathy


Code(s): E11.40 - TYPE 2 DIABETES MELLITUS WITH DIABETIC NEUROPATHY, UNSP   

SNOMED Code(s): 513323736


   Comment: 


 - Continue neurontin.   





(3) Leukocytosis


Code(s): D72.829 - ELEVATED WHITE BLOOD CELL COUNT, UNSPECIFIED   SNOMED Code(s)

: 117779019


   Comment: 


 - Afebrile


 - CRP elevated


 - Continue to monitor   





(4) Tobacco abuse


Status: Acute   Code(s): Z72.0 - TOBACCO USE   SNOMED Code(s): 538572357


   Comment: 


 - counseled   





(5) CAD (coronary artery disease)


Code(s): I25.10 - ATHSCL HEART DISEASE OF NATIVE CORONARY ARTERY W/O ANG PCTRS 

  SNOMED Code(s): 14472035


   Comment: 





 - Stable   





(6) ESRD (end stage renal disease)


Code(s): N18.6 - END STAGE RENAL DISEASE   SNOMED Code(s): 20090587


   Comment: 


 - continue PD   





(7) Gastroparesis


Code(s): K31.84 - GASTROPARESIS   SNOMED Code(s): 295876231


   Comment: 


 - Continue antiemetics and pain meds   





(8) Hx of insulin dependent diabetes mellitus


Code(s): Z86.39 - PERSONAL HISTORY OF ENDO, NUTRITIONAL AND METABOLIC DISEASE   

SNOMED Code(s): 776390826


   Comment: 


 - Labile sugars r/t non-compliance


 - Monitor AC HS


 - Lantus with lispro SS   





(9) COPD (chronic obstructive pulmonary disease)


Code(s): J44.9 - CHRONIC OBSTRUCTIVE PULMONARY DISEASE, UNSPECIFIED   SNOMED 

Code(s): 51067065


   Comment: 


 - Continue inhalers and nebs   


Status and Disposition: 





Patient was unwilling to stay for results of HIDA scan and signed out AMA as 

per RN. Patient refused to wait to discuss with me. Per RN, patient stated she 

will se her own GI doctor at [facility] and left. 





Of note, patient has history of leaving AMA for same complaints and usually 

does not complete work up and does not abide by recommendations. DC summary 

will be dictated.


Counseling and/or Coordination of Care Minutes: coordinated with RN.

## 2018-01-15 NOTE — ED
Caio RICARDO Nilda, scribed for Jatin Morse MD on 01/15/18 at 0126 .





Abdominal Pain/Female





- HPI Summary


HPI Summary: 


This patient is a 37 year old F presenting to [facility] with a chief complaint of 

constant worsening abd pain with N/V for about one week. The patient rates the 

pain 9/10 in severity. Symptoms aggravated and alleviated by nothing. Patient 

reports swelling in RUQ (past 2 weeks), CP secondary to dry heaving, dizziness, 

and lightheadedness. Pt states shes had similar chronic abd symptoms for about 

6 months and has had workup by GI specialist. She has had CT, XR, and US Abd. 

GI physician would also like MRI on pt but pt states no one would do it 

because of the stents in my leg. PMHx IDDM (usually uncontrolled). Pt states 

blood glucose normally between 200-400.





- History of Current Complaint


Chief Complaint: EDNauseaVomitDiarrh


Stated Complaint: NAUSEA,DIZZY,CP


Time Seen by Provider: 01/14/18 22:43


Hx Obtained From: Patient


Hx Last Menstrual Period: irregular 


Onset/Duration: Gradual Onset, Lasting Weeks, Still Present


Timing: Constant


Severity Currently: Severe


Pain Intensity: 9


Pain Scale Used: 0-10 Numeric


Location: Diffuse


Radiates: No


Aggravating Factor(s): Nothing


Alleviating Factor(s): Nothing


Associated Signs and Symptoms: Positive: Other: - swelling in RUQ (past 2 weeks)

, CP secondary to dry heaving, dizziness, and lightheadedness


Allergies/Adverse Reactions: 


 Allergies











Allergy/AdvReac Type Severity Reaction Status Date / Time


 


Heparin Allergy Intermediate pt states Verified 01/14/18 22:18





   it gave  





   her a  





   blood clot  


 


Niacin Allergy Mild Hives Verified 01/14/18 22:18


 


Ropinirole [From Requip] Allergy Mild Hives Verified 01/14/18 22:18


 


Amoxicillin [From Augmentin] AdvReac Mild "Doesn't Verified 01/14/18 22:18





   work"  


 


Clavulanic Acid AdvReac Mild "Doesn't Verified 01/14/18 22:18





[From Augmentin]   work"  


 


Codeine AdvReac Mild Nausea And Verified 01/14/18 22:18





   Vomiting  


 


Erythromycin AdvReac Mild "DOESN'T Verified 01/14/18 22:18





   WORK"  


 


Metronidazole [From Flagyl] AdvReac Mild Nausea And Verified 01/14/18 22:18





   Vomiting  


 


Sulfamethoxazole AdvReac Mild Nausea And Verified 01/14/18 22:18





w/Trimethoprim   Vomiting  





[From Bactrim]     


 


plastic tape Allergy Mild Blisters Uncoded 01/14/18 22:18














PMH/Surg Hx/FS Hx/Imm Hx


Endocrine/Hematology History: Reports: Hx Anticoagulant Therapy - Aspirin., Hx 

Blood Transfusions, Hx Diabetes, Hx Anemia - hx of - reports had tranfusion in 

2014 after mi


   Comment Only: Hx Thyroid Disease - nodule biopsied, normal


Cardiovascular History: Reports: Hx Angina, Hx Cardiac Arrest - in Dec. 2014 

with CPR, Hx Cardiomegaly, Hx Coronary Artery Disease, Hx Deep Vein Thrombosis, 

Hx Hypercholesterolemia, Hx Hypertension, Hx Myocardial Infarction, Other 

Cardiovascular Problems/Disorders


   Denies: Hx Congestive Heart Failure, Hx Pacemaker/ICD, Hx Valvular Heart 

Disease


Respiratory History: Reports: Hx Asthma, Hx Chronic Obstructive Pulmonary 

Disease (COPD) - smoker, Hx Sleep Apnea - pt reports md thinks, but no official 

testing done yet, Other Respiratory Problems/Disorders - acute respipatory 

failure with hypoxia - dec 2016


   Denies: Hx Lung Cancer, Hx Pneumonia, Hx Pulmonary Embolism


GI History: Reports: Hx Gastroesophageal Reflux Disease, Other GI Disorders - 

GASTROPARESIS - TAKING OMEPRAZOLE, reglan and zofran


   Denies: Hx Gall Bladder Disease, Hx Gastrointestinal Bleed, Hx Ulcer, Hx 

Urosepsis


 History: Reports: Hx Acute Renal Failure, Hx Chronic Renal Failure - ESRD on 

PD, Hx Dialysis - PERITONEAL, Hx Kidney Stones, Hx Renal Disease - ESRD on home 

peritoneal dialysis , Other  Problems/Disorders


Musculoskeletal History: Reports: Hx Arthritis - back, hips, Hx Back Problems - 

Sciatica and Herniated L3 disk, Other Musculoskeletal History - partial 

amputation of toes/foot


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 

of R eye, Hx Vision Problem


   Denies: Hx Contacts or Glasses, Hx Hearing Aid


Opthamlomology History: Reports: Hx Eye Prosthesis - left, Hx Legally Blind - 30

% use of R eye, Hx Vision Problem


   Denies: Hx Contacts or Glasses


Neurological History: Reports: Other Neuro Impairments/Disorders - neuropathy


   Denies: Hx Transient Ischemic Attacks (TIA)


Psychiatric History: Reports: Hx Anxiety, Hx Depression, Hx Bipolar Disorder


   Denies: Hx Eating Disorder, Hx Panic Disorder, Hx Schizophrenia, Hx of 

Violent Episodes Against Others





- Cancer History


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


Hx Chemotherapy: No


Hx Radiation Therapy: No





- Surgical History


Surgery Procedure, Year, and Place: LEFT EYE REMOVED 2012 - HAS PROSTHETIC EYE (

ORBITS DONE 5/2015 OK'D BY DR SAE COLE TO SCAN IN MRI) ;.  MELANOMA REMOVED 

FROM [facility] 2013 (PROCEDURE DONE 4X);.  CARDIAC CATH 2014 - NO STENTS;.  LEFT 

WRIST FISTULA PLACEMENT ([facility]) 2014;.  HEMODIALYSIS CATH RIGHT CHEST WALL 2014;.

  PERITONEAL DIALYSIS CATH 2/2015;.  CHEST WALL CATH REMOVAL 7/2016 [facility] [place];.  

RIGHT GREAT TOE AMPUTATION, [facility] [place] 4/2016;.  PLACEMENT RIGHT JUGULAR TESIO 

HEMODIALYSIS CATHETER [facility] [place] 8/27/2016;.  REMOVAL OF JUGULAR CATH - SEPT 2016.  

CARDIAC CATH - stentsx2 right leg.  PARTIAL AMPUTATION RIGHT TOES 5/2017.  

RIGHT TOES ALL REMOVED


Hx Anesthesia Reactions: No





- Immunization History


Date of Tetanus Vaccine: UTD


Date of Influenza Vaccine: 10/2017


Infectious Disease History: No


Infectious Disease History: Reports: Hx of Known/Suspected MRSA - MRSA R 1st toe

, History Other Infectious Disease - GANGRENE


   Denies: Hx Clostridium Difficile, Hx Hepatitis, Hx Human Immunodeficiency 

Virus (HIV), Hx Shingles, Hx Tuberculosis, Traveled Outside the [place] in Last 30 

Days





- Family History


Known Family History: Positive: Cardiac Disease - father MI at 41 y/o, 

Hypertension, Other - Positive for bipolar and depression. Completed suicide to 

sister.  





- Social History


Alcohol Use: unknown


Hx Substance Use: No


Substance Use Type: Reports: None, Other


Substance Use Comment - Amount & Last Used: METHADONE (noted in chart, not 

confirmed still prescribed in 10/2017)


Hx Tobacco Use: Yes


Smoking Status (MU): Heavy Every Day Tobacco Smoker


Type: Cigarettes


Amount Used/How Often: 1 PPD


Length of Time of Smoking/Using Tobacco: 20 YEARS


Have You Smoked in the Last Year: Yes





Review of Systems


Positive: Chest Pain - secondary to dry heaving


Positive: Abdominal Pain, Vomiting, Nausea, Other - swelling in RUQ


Neurological: Other - dizziness and lightheadedness


All Other Systems Reviewed And Are Negative: Yes





Physical Exam





- Summary


Physical Exam Summary: 


VITAL SIGNS: Reviewed.


GENERAL:  Patient is a well-developed and nourished female who is lying 

comfortable in the stretcher. Patient is not in any acute respiratory distress.


HEAD AND FACE: No signs of trauma. No ecchymosis, hematomas or skull 

depressions. No sinus tenderness.


EYES: PERRLA, EOMI x 2, No injected conjunctiva, no nystagmus.


EARS: Hearing grossly intact. Ear canals and tympanic membranes are within 

normal limits.


MOUTH: Oropharynx within normal limits.


NECK: Supple, trachea is midline, no adenopathy, no JVD, no carotid bruit, no c-

spine tenderness, neck with full ROM.


CHEST: Symmetric, no tenderness at palpation


LUNGS: Clear to auscultation bilaterally. No wheezing or crackles.


CVS: Regular rate and rhythm, S1 and S2 present, no murmurs or gallops 

appreciated.


ABDOMEN: Soft, Mild diffuse abd tenderness more on epigastrium, Peritoneal 

dialysis catheter in lower abdomen. No signs of distention. No rebound no 

guarding, and no masses palpated. Bowel sounds are normal.


EXTREMITIES: FROM in all major joints, no edema, no cyanosis or clubbing. Right 

fore-foot amputation. 


NEURO: Alert and oriented x 3. No acute neurological deficits. Speech is normal 

and follows commands.


SKIN: Dry and warm


Triage Information Reviewed: Yes


Vital Signs On Initial Exam: 


 Initial Vitals











Temp Pulse Resp BP Pulse Ox


 


 97.1 F   102   16   161/98   98 


 


 01/14/18 22:14  01/14/18 22:14  01/14/18 22:14  01/14/18 22:14  01/14/18 22:14











Vital Signs Reviewed: Yes





- Dalia Coma Scale


Coma Scale Total: 15





Diagnostics





- Vital Signs


 Vital Signs











  Temp Pulse Resp BP Pulse Ox


 


 01/14/18 23:58    16  


 


 01/14/18 23:57    16  


 


 01/14/18 23:24    16  


 


 01/14/18 22:14  97.1 F  102  16  161/98  98














- Laboratory


Lab Results: 


 Lab Results











  01/14/18 01/14/18 Range/Units





  00:00 00:00 


 


WBC   16.9 H  (3.5-10.8)  10^3/ul


 


RBC   4.02  (4.0-5.4)  10^6/ul


 


Hgb   11.0 L  (12.0-16.0)  g/dl


 


Hct   34 L  (35-47)  %


 


MCV   84  (80-97)  fL


 


MCH   27  (27-31)  pg


 


MCHC   33  (31-36)  g/dl


 


RDW   20 H  (10.5-15)  %


 


Plt Count   383  (150-450)  10^3/ul


 


MPV   8  (7.4-10.4)  um3


 


Neut % (Auto)   85.6 H  (38-83)  %


 


Lymph % (Auto)   7.9 L  (25-47)  %


 


Mono % (Auto)   3.4  (1-9)  %


 


Eos % (Auto)   2.2  (0-6)  %


 


Baso % (Auto)   0.9  (0-2)  %


 


Absolute Neuts (auto)   14.4 H  (1.5-7.7)  10^3/ul


 


Absolute Lymphs (auto)   1.3  (1.0-4.8)  10^3/ul


 


Absolute Monos (auto)   0.6  (0-0.8)  10^3/ul


 


Absolute Eos (auto)   0.4  (0-0.6)  10^3/ul


 


Absolute Basos (auto)   0.2  (0-0.2)  10^3/ul


 


Absolute Nucleated RBC   0  10^3/ul


 


Nucleated RBC %   0.1  


 


Sodium  132 L   (133-145)  mmol/L


 


Potassium  TNP   


 


Chloride  97 L   (101-111)  mmol/L


 


Carbon Dioxide  27   (22-32)  mmol/L


 


Anion Gap  8   (2-11)  mmol/L


 


BUN  26 H   (6-24)  mg/dL


 


Creatinine  4.08 H   (0.51-0.95)  mg/dL


 


Est GFR ( Amer)  15.8   (>60)  


 


Est GFR (Non-Af Amer)  12.3   (>60)  


 


BUN/Creatinine Ratio  6.4 L   (8-20)  


 


Glucose  162 H   ()  mg/dL


 


Calcium  8.7   (8.6-10.3)  mg/dL


 


Magnesium  1.6 L   (1.9-2.7)  mg/dL


 


Total Bilirubin  0.30   (0.2-1.0)  mg/dL


 


AST  TNP   


 


ALT  6 L   (7-52)  U/L


 


Alkaline Phosphatase  119 H   ()  U/L


 


C-Reactive Protein  51.22 H   (< 5.00)  mg/L


 


Total Protein  6.1 L   (6.4-8.9)  g/dL


 


Albumin  2.8 L   (3.2-5.2)  g/dL


 


Globulin  3.3   (2-4)  g/dL


 


Albumin/Globulin Ratio  0.8 L   (1-3)  


 


Amylase  < 10 L   ()  U/L


 


Lipase  21   (11.0-82.0)  U/L











Result Diagrams: 


 01/14/18 00:00





 01/15/18 01:00


Lab Statement: Any lab studies that have been ordered have been reviewed, and 

results considered in the medical decision making process.





- EKG


  ** 2219


EKG Interpretation: Sinus arrhythmia , LV[facility], 101 bpm





Re-Evaluation





- Re-Evaluation


  ** First Eval


Re-Evaluation Time: 00:40


Comment: Reviewed labs and EKG with pt.





Abdominal Pain Fem Course/Dx





- Course


Course Of Treatment: This pt is a 36 y/o F with Hx of longstanding DM, 

uncontrolled most of the time. Pt having N/V, chronic abd pain. Pt has had full 

work up by GI specialist. On 12/27/17 pt was admitted for similar symptoms and 

CT Abd returned negative. Sx most likely secondary to gastroparesis as 

complication of long standing DM. Pt received 2 rounds antinausea and 2 rounds 

pain medication. Pt still symptomatic. An EKG reveals sinus arrhythmia, 101 bpm

, LVH.  0241 Dr. Barbour (hospitalist) agrees to admit pt.  Pt will be admitted 

with Dx gastroparesis and Abd pain.





- Diagnoses


Provider Diagnoses: 


 Gastroparesis, Abdominal pain








- Provider Notifications


Discussed Care Of Patient With: Virginie Barbour - Hospitalist


Time Discussed With Above Provider: 02:41


Instructed by Provider To: Admit As Inpatient





Discharge





- Discharge Plan


Condition: Stable


Disposition: ADMITTED TO [facility]


Referrals: 


Yeni Shanks MD [Primary Care Provider] - 





The documentation as recorded by the Caio cardenas Nilda accurately 

reflects the service I personally performed and the decisions made by me, Jatin Morse MD.

## 2018-01-19 NOTE — ED
Aroldo RICARDO Stephanie, scribed for Jatin Morse MD on 01/19/18 at 2115 .





GI/ HPI





- HPI Summary


HPI Summary: 


The pt is a 38 y/o F presenting to the ED with c/o vomiting that began today at 

12:00. Symptoms  include abd pain, shakes, and muscle spasms. The pt was in the 

ED on 01/15/18 for abd pain and had a negative HIDA scan. The pt is diabetic 

and has a history of gastroporesis. She currently takes Methadone prescribed by 

PCP. 








- History of Current Complaint


Chief Complaint: EDAbdPain


Time Seen by Provider: 01/19/18 20:49


Stated Complaint: NAUSE/VOMITING/ABD PAIN


Hx Obtained From: Patient


Hx Last Menstrual Period: irregular 


Onset/Duration: Started Hours Ago - 9, Still Present


Timing: Intermittent


Severity: Moderate


Current Severity: Moderate


Pain Intensity: 8


Location of Pain: Diffuse


Associated Signs and Symptoms: Positive: Vomiting, Abdominal Pain


Additional Signs & Symptoms: Positive: Other: - shakes, muscle spasms


Aggravating Factor(s): Nothing


Alleviating Factor(s): Nothing





- Additional Pertinent History


Primary Care Physician: [id number]





- Allergy/Home Medications


Allergies/Adverse Reactions: 


 Allergies











Allergy/AdvReac Type Severity Reaction Status Date / Time


 


Heparin Allergy Intermediate pt states Verified 01/19/18 20:47





   it gave  





   her a  





   blood clot  


 


Niacin Allergy Mild Hives Verified 01/19/18 20:47


 


Ropinirole [From Requip] Allergy Mild Hives Verified 01/19/18 20:47


 


Amoxicillin [From Augmentin] AdvReac Mild "Doesn't Verified 01/19/18 20:47





   work"  


 


Clavulanic Acid AdvReac Mild "Doesn't Verified 01/19/18 20:47





[From Augmentin]   work"  


 


Codeine AdvReac Mild Nausea And Verified 01/19/18 20:47





   Vomiting  


 


Erythromycin AdvReac Mild "DOESN'T Verified 01/19/18 20:47





   WORK"  


 


Metronidazole [From Flagyl] AdvReac Mild Nausea And Verified 01/19/18 20:47





   Vomiting  


 


Sulfamethoxazole AdvReac Mild Nausea And Verified 01/14/18 22:18





w/Trimethoprim   Vomiting  





[From Bactrim]     


 


plastic tape Allergy Mild Blisters Uncoded 01/19/18 20:47














PMH/Surg Hx/FS Hx/Imm Hx


Endocrine/Hematology History: Reports: Hx Anticoagulant Therapy - Aspirin., Hx 

Blood Transfusions, Hx Diabetes, Hx Anemia - hx of - reports had tranfusion in 

2014 after mi


   Comment Only: Hx Thyroid Disease - nodule biopsied, normal


Cardiovascular History: Reports: Hx Angina, Hx Cardiac Arrest - in Dec. 2014 

with CPR, Hx Cardiomegaly, Hx Coronary Artery Disease, Hx Deep Vein Thrombosis, 

Hx Hypercholesterolemia, Hx Hypertension, Hx Myocardial Infarction, Other 

Cardiovascular Problems/Disorders


   Denies: Hx Congestive Heart Failure, Hx Pacemaker/ICD, Hx Valvular Heart 

Disease


Respiratory History: Reports: Hx Asthma, Hx Chronic Obstructive Pulmonary 

Disease (COPD) - smoker, Hx Sleep Apnea - pt reports md thinks, but no official 

testing done yet, Other Respiratory Problems/Disorders - acute respipatory 

failure with hypoxia - dec 2016


   Denies: Hx Lung Cancer, Hx Pneumonia, Hx Pulmonary Embolism


GI History: Reports: Hx Gastroesophageal Reflux Disease, Other GI Disorders - 

GASTROPARESIS - TAKING OMEPRAZOLE, reglan and zofran


   Denies: Hx Gall Bladder Disease, Hx Gastrointestinal Bleed, Hx Ulcer, Hx 

Urosepsis


 History: Reports: Hx Acute Renal Failure, Hx Chronic Renal Failure - ESRD on 

PD, Hx Dialysis - PERITONEAL, Hx Kidney Stones, Hx Renal Disease - ESRD on home 

peritoneal dialysis , Other  Problems/Disorders


Musculoskeletal History: Reports: Hx Arthritis - back, hips, Hx Back Problems - 

Sciatica and Herniated L3 disk, Other Musculoskeletal History - partial 

amputation of toes/foot


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 

of R eye, Hx Vision Problem


   Denies: Hx Contacts or Glasses, Hx Hearing Aid


Opthamlomology History: Reports: Hx Eye Prosthesis - left, Hx Legally Blind - 30

% use of R eye, Hx Vision Problem


   Denies: Hx Contacts or Glasses


Neurological History: Reports: Other Neuro Impairments/Disorders - neuropathy


   Denies: Hx Transient Ischemic Attacks (TIA)


Psychiatric History: Reports: Hx Anxiety, Hx Depression, Hx Bipolar Disorder


   Denies: Hx Eating Disorder, Hx Panic Disorder, Hx Schizophrenia, Hx of 

Violent Episodes Against Others





- Cancer History


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


Hx Chemotherapy: No


Hx Radiation Therapy: No





- Surgical History


Surgery Procedure, Year, and Place: LEFT EYE REMOVED 2012 - HAS PROSTHETIC EYE (

ORBITS DONE 5/2015 OK'D BY DR SAE COLE TO SCAN IN MRI) ;.  MELANOMA REMOVED 

FROM [facility] 2013 (PROCEDURE DONE 4X);.  CARDIAC CATH 2014 - NO STENTS;.  LEFT 

WRIST FISTULA PLACEMENT ([facility]) 2014;.  HEMODIALYSIS CATH RIGHT CHEST WALL 2014;.

  PERITONEAL DIALYSIS CATH 2/2015;.  CHEST WALL CATH REMOVAL 7/2016 [facility] [place];.  

RIGHT GREAT TOE AMPUTATION, [facility] 4/2016;.  PLACEMENT RIGHT JUGULAR TESIO 

HEMODIALYSIS CATHETER [facility] [place] 8/27/2016;.  REMOVAL OF JUGULAR CATH - SEPT 2016.  

CARDIAC CATH - stentsx2 right leg.  PARTIAL AMPUTATION RIGHT TOES 5/2017.  

RIGHT TOES ALL REMOVED


Hx Anesthesia Reactions: No





- Immunization History


Date of Tetanus Vaccine: utd


Date of Influenza Vaccine: 10/2017


Infectious Disease History: No


Infectious Disease History: Reports: Hx of Known/Suspected MRSA - MRSA R 1st toe

, History Other Infectious Disease - GANGRENE


   Denies: Hx Clostridium Difficile, Hx Hepatitis, Hx Human Immunodeficiency 

Virus (HIV), Hx Shingles, Hx Tuberculosis, Traveled Outside the  in Last 30 

Days





- Family History


Known Family History: Positive: Cardiac Disease - father MI at 41 y/o, 

Hypertension, Other - Positive for bipolar and depression. Completed suicide to 

sister.  





- Social History


Occupation: Unemployed


Lives: Alone


Alcohol Use: None


Hx Substance Use: No


Substance Use Type: Reports: None, Other


Substance Use Comment - Amount & Last Used: METHADONE (noted in chart, not 

confirmed still prescribed in 10/2017)


Hx Tobacco Use: Yes


Smoking Status (MU): Heavy Every Day Tobacco Smoker


Type: Cigarettes


Amount Used/How Often: 1 PPD


Length of Time of Smoking/Using Tobacco: 20 YEARS


Have You Smoked in the Last Year: Yes





Review of Systems


Positive: Abdominal Pain


Positive: Other - shakes, muscle spasms


All Other Systems Reviewed And Are Negative: Yes





Physical Exam





- Summary


Physical Exam Summary: 


VITAL SIGNS: Reviewed.


GENERAL:  Pt is chronically ill. She looks older than her age. 


HEAD AND FACE: No signs of trauma. No ecchymosis, hematomas or skull 

depressions. No sinus tenderness.


EYES: PERRLA, EOMI x 2, No injected conjunctiva, no nystagmus.


EARS: Hearing grossly intact. Ear canals and tympanic membranes are within 

normal limits.


MOUTH: Oropharynx within normal limits.


NECK: Supple, trachea is midline, no adenopathy, no JVD, no carotid bruit, no c-

spine tenderness, neck with full ROM.


CHEST: Symmetric, no tenderness at palpation


LUNGS: Clear to auscultation bilaterally. No wheezing or crackles.


CVS: Regular rate and rhythm, S1 and S2 present, no murmurs or gallops 

appreciated.


ABDOMEN: tenderness over RUQ and epigastric region. Pt has PD catheter in RUQ. 

No signs of distention. No rebound no guarding, and no masses palpated. Bowel 

sounds are normal.


EXTREMITIES: FROM in all major joints, no edema, no cyanosis or clubbing.


NEURO: Alert and oriented x 3. No acute neurological deficits. Speech is normal 

and follows commands.


SKIN: Dry and warm








Triage Information Reviewed: Yes


Vital Signs On Initial Exam: 


 Initial Vitals











Temp Pulse Resp BP Pulse Ox


 


 97.8 F   110   22   163/99   100 


 


 01/19/18 19:59  01/19/18 19:59  01/19/18 19:59  01/19/18 19:59  01/19/18 19:59











Vital Signs Reviewed: Yes





- [place] Coma Scale


Coma Scale Total: 15





Diagnostics





- Vital Signs


 Vital Signs











  Temp Pulse Resp BP Pulse Ox


 


 01/19/18 19:59  97.8 F  110  22  163/99  100














- Laboratory


Result Diagrams: 


 01/19/18 22:00





 01/19/18 22:00


Lab Statement: Any lab studies that have been ordered have been reviewed, and 

results considered in the medical decision making process.





- EKG


  ** 20:03


EKG Rhythm: Sinus Rhythm - 108 BPM


EKG Interpretation: Normal axis. Normal interval. No ischemic changes





GIGU Course/Dx





- Course


Course Of Treatment: The pt is feeling better. She is a longstanding diabetic 

that is poorly controlled. Her symptoms of abd pain and vomiting are most-

likely the result of possible gastroporesis. Follow up with PCP and 

gastroenterologist.





- Diagnoses


Provider Diagnoses: 


 Abdominal pain, Gastroparesis








Discharge





- Discharge Plan


Condition: Stable


Disposition: HOME


Patient Education Materials:  Diabetic Gastroparesis (DC)


Referrals: 


Yeni Shanks MD [Primary Care Provider] - 


Additional Instructions: 


RETURN TO EMERGENCY DEPARTMENT FOR ANY NEW OR WORSENING SYMPTOMS





The documentation as recorded by the Aroldo cardenas Stephanie accurately reflects 

the service I personally performed and the decisions made by me, Jatin Morse MD.

## 2018-02-19 NOTE — ED
Gabriele RICARDO Rebecca, scribed for Varghese Bal MD on 07/16/17 at 1916 .





Lower Extremity





- HPI Summary


HPI Summary: 


Pt is a 38 y/o F who presents to ED c/o worsening R foot pain. She presents s/p 

surgical treatment of osteomyelitis to the R foot in late May and has an 

additional surgery scheduled tomorrow to clean out the wound. Earlier today, at 

1400, she left the hospital AMA after the 3rd floor nurses were unable to 

insert an IV. States that Dilaudid and IV Abx are the only treatments that ease 

the pain, she was unable to get them without an IV, which is why she left. Pain 

began after initial surgery and has been constant since onset. States that pain 

is worse tonight, which is why she returned. Currently, pain is severe, ranked 9

/10 and discrete to the R foot. Sx aggravated by nothing, alleviated by 

Dilaudid and IV Abx. Denies any other complaints at this time, including fever.





- History of Current Complaint


Chief Complaint: EDExtrePinky


Stated Complaint: RIGHT FOOT PAIN


Time Seen by Provider: 07/16/17 19:13


Hx Obtained From: Patient


Hx Last Menstrual Period: 5/12/17


Onset of Pain: Prior to Arrival - Late May s/p surgery


Onset/Duration: Worse Since - Tonight


Severity Currently: Severe


Pain Intensity: 9


Pain Scale Used: 0-10 Numeric


Timing: Constant


Location: Is Discrete @ - R foot


Associated Signs And Symptoms: Positive: Negative.  Negative: Fever


Aggravating Factor(s): Nothing


Alleviating Factor(s): Other - Dilaudid and IV Abx





- Allergies/Home Medications


Allergies/Adverse Reactions: 


 Allergies











Allergy/AdvReac Type Severity Reaction Status Date / Time


 


Amoxicillin [From Augmentin] Allergy  See Comment Verified 07/16/17 17:10


 


Clavulanic Acid Allergy  See Comment Verified 07/16/17 17:10





[From Augmentin]     


 


Heparin Allergy  See Comment Verified 07/16/17 17:10


 


Metronidazole [From Flagyl] Allergy  Nausea And Verified 07/16/17 17:10





   Vomiting  


 


Morphine Allergy  See Comment Verified 07/16/17 17:10


 


Ropinirole [From Requip] Allergy  Hives Verified 07/16/17 17:10


 


Sulfamethoxazole Allergy  Nausea And Verified 07/16/17 17:10





w/Trimethoprim   Vomiting  





[From Bactrim]     


 


Codeine AdvReac  Nausea And Verified 07/16/17 17:10





   Vomiting  


 


Erythromycin AdvReac  "DOESN'T Verified 07/16/17 17:10





   WORK"  


 


Niacin AdvReac  Hives Verified 07/16/17 17:10


 


plastic tape Allergy  Blisters Uncoded 07/16/17 17:10














PMH/Surg Hx/FS Hx/Imm Hx


Endocrine/Hematology History: Reports: Hx Anticoagulant Therapy - Aspirin., Hx 

Blood Transfusions, Hx Diabetes, Hx Thyroid Disease - nodule biopsied, normal, 

Hx Anemia - hx of - reports had tranfusion in 2014 after mi


Cardiovascular History: Reports: Hx Angina, Hx Cardiac Arrest - in Dec. 2014 

with CPR, Hx Cardiomegaly, Hx Coronary Artery Disease, Hx Hypercholesterolemia, 

Hx Hypertension - on med, Hx Myocardial Infarction, Other Cardiovascular 

Problems/Disorders - hyperlipidemia, CAD


   Denies: Hx Congestive Heart Failure, Hx Deep Vein Thrombosis, Hx Pacemaker/

ICD, Hx Valvular Heart Disease


Respiratory History: Reports: Hx Asthma - inhaler, Hx Chronic Obstructive 

Pulmonary Disease (COPD) - smoker, Hx Sleep Apnea - pt reports md thinks, but 

no official testing done yet, Other Respiratory Problems/Disorders - acute 

respipatory failure with hypoxia - dec 2016


   Denies: Hx Lung Cancer, Hx Pneumonia, Hx Pulmonary Embolism


GI History: Reports: Hx Gastroesophageal Reflux Disease - on med, Other GI 

Disorders - GASTROPARESIS - TAKING OMEPRAZOLE


   Denies: Hx Gall Bladder Disease, Hx Gastrointestinal Bleed, Hx Ulcer, Hx 

Urosepsis


 History: Reports: Hx Acute Renal Failure, Hx Chronic Renal Failure, Hx 

Dialysis - PERITONEAL BUT DOES HAVE FISTULA ON LEFT ARM, Hx Kidney Stones - in 

past, Hx Renal Disease - ESRD on home peritoneal dialysis , Other  Problems/

Disorders - ESRD- URINATES A SMALL AMOUNT


Musculoskeletal History: Reports: Hx Arthritis - back, hips, Hx Back Problems - 

Sciatica and Herniated L3 disk


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 

of R eye, Hx Vision Problem


   Denies: Hx Contacts or Glasses, Hx Hearing Aid


Opthamlomology History: Reports: Hx Eye Prosthesis - left, Hx Legally Blind - 30

% use of R eye, Hx Vision Problem


   Denies: Hx Contacts or Glasses


Neurological History: Reports: Other Neuro Impairments/Disorders - neuropathy


   Denies: Hx Transient Ischemic Attacks (TIA)


Psychiatric History: Reports: Hx Anxiety - on med, Hx Depression - on med, Hx 

Bipolar Disorder


   Denies: Hx Panic Disorder, Hx Schizophrenia





- Cancer History


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


Hx Chemotherapy: No


Hx Radiation Therapy: No





- Surgical History


Surgery Procedure, Year, and Place: LEFT EYE REMOVED 2012 - HAS PROSTHETIC EYE (

ORBITS DONE 5/2015 OK'D BY DR SAE COLE TO SCAN IN MRI) ;.  MELANOMA REMOVED 

FROM [facility] 2013 (PROCEDURE DONE 4X);.  CARDIAC CATH 2014 - NO STENTS;.  LEFT 

WRIST FISTULA PLACEMENT ([facility]) 2014;.  HEMODIALYSIS CATH RIGHT CHEST WALL 2014;.

  PERITONEAL DIALYSIS CATH 2/2015;.  CHEST WALL CATH REMOVAL 7/2016 [facility] [place];.  

RIGHT GREAT TOE AMPUTATION, [facility] 4/2016;.  PLACEMENT RIGHT JUGULAR TESIO 

HEMODIALYSIS CATHETER [facility] 8/27/2016;.  REMOVAL OF JUGULAR CATH - SEPT 2016.  

CARDIAC CATH - stentsx2 right leg


Hx Anesthesia Reactions: No





- Immunization History


Date of Tetanus Vaccine: UTD


Date of Influenza Vaccine: 8/15/16


Infectious Disease History: Reports: Hx of Known/Suspected MRSA - MRSA R 1st toe


   Denies: Hx Clostridium Difficile, Hx Hepatitis, Hx Human Immunodeficiency 

Virus (HIV), Hx Shingles, Hx Tuberculosis, History Other Infectious Disease, 

Traveled Outside the [place] in Last 30 Days





- Family History


Known Family History: Positive: Cardiac Disease - father MI at 41 y/o, 

Hypertension





- Social History


Alcohol Use: None


Hx Substance Use: No


Substance Use Type: Reports: None


Substance Use Comment - Amount & Last Used: using chantix to quit smoking


Hx Tobacco Use: Yes


Smoking Status (MU): Current Every Day Smoker


Type: Cigarettes


Amount Used/How Often: 5 ciagrettes/day


Length of Time of Smoking/Using Tobacco: 20 YEARS


Have You Smoked in the Last Year: Yes





Review of Systems


Negative: Fever


Positive: Arthralgia - R foot pain s/p surgery in May


All Other Systems Reviewed And Are Negative: Yes





Physical Exam


Triage Information Reviewed: Yes


Vital Signs On Initial Exam: 


 Initial Vitals











Temp Pulse Resp BP Pulse Ox


 


 97.8 F   94   16   131/86   94 


 


 07/16/17 17:11  07/16/17 17:11  07/16/17 17:11  07/16/17 17:11  07/16/17 17:11











Vital Signs Reviewed: Yes


Appearance: Positive: Pain Distress - mild discomfort, Obese


Skin: Positive: Warm


Head/Face: Positive: Normal Head/Face Inspection


Eyes: Positive: VIOLETTA


ENT: Positive: Hearing grossly normal


Neck: Positive: Supple


Respiratory/Lung Sounds: Positive: Breath Sounds Present


Musculoskeletal: Positive: Other - s/p transmetaytarsal amp with mild purulent 

oozing from foot


Neurological: Positive: Alert, Oriented to Person Place, Time





- [place] Coma Scale


Coma Scale Total: 15





Diagnostics





- Vital Signs


 Vital Signs











  Temp Pulse Resp BP Pulse Ox


 


 07/16/17 18:59  97.9 F  93  18  144/76  94


 


 07/16/17 17:11  97.8 F  94  16  131/86  94














- Laboratory


Lab Results: 


 Lab Results











  07/16/17 07/16/17 Range/Units





  19:59 19:59 


 


WBC  11.4 H   (3.5-10.8)  10^3/ul


 


RBC  4.04   (4.0-5.4)  10^6/ul


 


Hgb  12.2   (12.0-16.0)  g/dl


 


Hct  38   (35-47)  %


 


MCV  95   (80-97)  fL


 


MCH  30   (27-31)  pg


 


MCHC  32   (31-36)  g/dl


 


RDW  17 H   (10.5-15)  %


 


Plt Count  261   (150-450)  10^3/ul


 


MPV  9   (7.4-10.4)  um3


 


Neut % (Auto)  77.8   (38-83)  %


 


Lymph % (Auto)  13.2 L   (25-47)  %


 


Mono % (Auto)  5.1   (1-9)  %


 


Eos % (Auto)  2.3   (0-6)  %


 


Baso % (Auto)  1.6   (0-2)  %


 


Absolute Neuts (auto)  8.9 H   (1.5-7.7)  10^3/ul


 


Absolute Lymphs (auto)  1.5   (1.0-4.8)  10^3/ul


 


Absolute Monos (auto)  0.6   (0-0.8)  10^3/ul


 


Absolute Eos (auto)  0.3   (0-0.6)  10^3/ul


 


Absolute Basos (auto)  0.2   (0-0.2)  10^3/ul


 


Absolute Nucleated RBC  0.02   10^3/ul


 


Nucleated RBC %  0.1   


 


INR (Anticoag Therapy)   0.98  (0.89-1.11)  











Result Diagrams: 


 07/16/17 19:59





 07/16/17 19:59


Lab Statement: Any lab studies that have been ordered have been reviewed, and 

results considered in the medical decision making process.





Lower Extremity Course/Dx





- Course


Assessment/Plan: Pt is a 38 y/o F who presents to ED c/o worsening R foot pain. 

She presents s/p surgical treatment of osteomyelitis to the R foot in late May 

and has an additional surgery scheduled tomorrow to clean out the wound. 

Earlier today, at 1400, she left the hospital AMA after the 3rd floor nurses 

were unable to insert an IV. Pain began after initial surgery and has been 

constant since onset, worse tonight. Currently, pain is severe, ranked 9/10 and 

discrete to the R foot. Sx aggravated by nothing, alleviated by Dilaudid and IV 

Abx. Denies any other complaints at this time, including fever. WBC of 11.4. 

Discussed care of pt with Dr. Ivan Kwong, who reports he will readmit her. 

Discussed care of pt with Dr. Kwong again who reported that his PA will be 

entering admisison orders. Pt will be admitted with Dx of osteomyelitis. She 

understands and agrees. Patient's medications reviewed this visit.





- Diagnoses


Provider Diagnoses: 


 Osteomyelitis








- Physician Notifications


Discussed Care Of Patient With: Ivan Kwong


Time Discussed With Above Provider: 19:22


Instructed by Provider To: Admit As Observation - Discussed the case with the 

doctor who reports he will readmit her. Discussed care of pt with Dr. Kwong 

again at 1940 who reported that his PA will be entering admisison orders.





Discharge





- Discharge Plan


Condition: Fair


Disposition: ADMITTED TO [facility]





The documentation as recorded by the Gabriele cardenas Rebecca accurately 

reflects the service I personally performed and the decisions made by me, 

Varghese Bal MD. SUBJECTIVE:    Patient is a 79y old  Male who presents with a chief complaint of fall (17 Feb 2018 16:51)    Currently admitted to medicine with the primary diagnosis of Syncope and collapse     Today is hospital day 2d. This morning he is resting comfortably in bed and reports no new issues or overnight events.     PAST MEDICAL & SURGICAL HISTORY  PAST MEDICAL & SURGICAL HISTORY:  Dementia  HTN (hypertension)  S/P TKR (total knee replacement), left    SOCIAL HISTORY:    ALLERGIES:  No Known Allergies    MEDICATIONS:  STANDING MEDICATIONS  amLODIPine   Tablet 2.5 milliGRAM(s) Oral daily  atorvastatin 10 milliGRAM(s) Oral at bedtime  heparin  Injectable 5000 Unit(s) SubCutaneous every 8 hours  losartan 50 milliGRAM(s) Oral daily  pantoprazole    Tablet 40 milliGRAM(s) Oral before breakfast  sodium chloride 0.9%. 1000 milliLiter(s) IV Continuous <Continuous>  traMADol 50 milliGRAM(s) Oral every 12 hours    PRN MEDICATIONS    VITALS:   T(F): 96.9  HR: 72  BP: 189/75  RR: 18  SpO2: 97%    LABS:                        12.3   6.46  )-----------( 218      ( 18 Feb 2018 05:46 )             38.7     02-18    138  |  107  |  13  ----------------------------<  78  4.2   |  23  |  1.0    Ca    9.5      18 Feb 2018 05:46  Mg     2.1     02-18    TPro  6.0  /  Alb  3.8  /  TBili  1.4<H>  /  DBili  x   /  AST  33  /  ALT  7   /  AlkPhos  54  02-17    PT/INR - ( 17 Feb 2018 10:57 )   PT: 10.80 sec;   INR: 1.00 ratio         PTT - ( 17 Feb 2018 10:57 )  PTT:21.1 sec          CARDIAC MARKERS ( 18 Feb 2018 00:44 )  0.03 ng/mL / x     / 162 U/L / x     / 2.4 ng/mL  CARDIAC MARKERS ( 17 Feb 2018 10:57 )  0.02 ng/mL / x     / x     / x     / 2.3 ng/mL      RADIOLOGY:    PHYSICAL EXAM:  GEN: No acute distress  HEENT:   LUNGS: Clear to auscultation bilaterally   HEART: S1/S2 present. RRR.   ABD: Soft, non-tender, non-distended. Bowel sounds present  NEURO: AAOX3, abrasions to the forehead and to the left hand, laceration to the left cheek and to the upper lip SUBJECTIVE:    Patient is a 79y old  Male who presents with a chief complaint of fall (17 Feb 2018 16:51)    Currently admitted to medicine with the primary diagnosis of Syncope and collapse  Today is hospital day 2d.   Pt C/o dizziness, nausea, Vomitting today.    PAST MEDICAL & SURGICAL HISTORY  PAST MEDICAL & SURGICAL HISTORY:  Dementia  HTN (hypertension)  S/P TKR (total knee replacement), left    SOCIAL HISTORY:    ALLERGIES:  No Known Allergies    MEDICATIONS:  STANDING MEDICATIONS  amLODIPine   Tablet 2.5 milliGRAM(s) Oral daily  atorvastatin 10 milliGRAM(s) Oral at bedtime  heparin  Injectable 5000 Unit(s) SubCutaneous every 8 hours  losartan 50 milliGRAM(s) Oral daily  pantoprazole    Tablet 40 milliGRAM(s) Oral before breakfast  sodium chloride 0.9%. 1000 milliLiter(s) IV Continuous <Continuous>  traMADol 50 milliGRAM(s) Oral every 12 hours    PRN MEDICATIONS    VITALS:   T(F): 96.9  HR: 72  BP: 189/75  RR: 18  SpO2: 97%    LABS:                        12.3   6.46  )-----------( 218      ( 18 Feb 2018 05:46 )             38.7     02-18    138  |  107  |  13  ----------------------------<  78  4.2   |  23  |  1.0    Ca    9.5      18 Feb 2018 05:46  Mg     2.1     02-18    TPro  6.0  /  Alb  3.8  /  TBili  1.4<H>  /  DBili  x   /  AST  33  /  ALT  7   /  AlkPhos  54  02-17    PT/INR - ( 17 Feb 2018 10:57 )   PT: 10.80 sec;   INR: 1.00 ratio         PTT - ( 17 Feb 2018 10:57 )  PTT:21.1 sec          CARDIAC MARKERS ( 18 Feb 2018 00:44 )  0.03 ng/mL / x     / 162 U/L / x     / 2.4 ng/mL  CARDIAC MARKERS ( 17 Feb 2018 10:57 )  0.02 ng/mL / x     / x     / x     / 2.3 ng/mL      RADIOLOGY:    PHYSICAL EXAM:  GEN: No acute distress  HEENT:  abrasions to the forehead , EOMI  LUNGS: Clear to auscultation bilaterally   HEART: S1/S2 present. RRR.   ABD: Soft, non-tender, non-distended. Bowel sounds present  NEURO: AAOX3,   Skin abrasions to the forehead and  left hand, laceration to the left cheek and to the upper lip

## 2018-04-30 NOTE — XMS REPORT
[facility] - Continuity of Care Document

 Created on:2018



Patient:MAKAYLA RUBIN

Sex:Female

:1980

External Reference #:[id number]





Demographics







 Address  [address] [place] [address]

 

 Home Phone  [phone]

 

 Mobile Phone  [phone]

 

 Preferred Language  en

 

 Marital Status  Not  or 

 

 Orthodoxy Affiliation  Unknown

 

 Race  White

 

 Ethnic Group  Not  or 









Author







 Organization  [facility]









Support







 Name  Relationship  Address  Phone

 

 PATI RUBIN  mother  PO   [phone]



     [address]   



     [place] [address]  

 

 DEEPIKAKYLE  significant other  [address]  [phone]



     [place] [address]  









Care Team Providers







 Name  Role  Phone

 

 TEGAN VIDAL  Admitting Physician  [phone]

 

 TEGAN VIDAL  Attending Physician  [phone]

 

 RAJ DUNCAN  Primary Care Physician  [phone]









Allergies and Intolerances







 Code  Code  Allergy  Type  Reaction  Severity  Start  End  Status



   System  Substance        Date  Date  

 

 4053  RXNorm  Erythromycin  Drug    Unknown      Active



     Base  allergy          



       (disorder)          

 

 5224  RXNorm  heparin  Drug    Unknown      Active



       allergy          



       (disorder)          

 

 270044  RXNorm  Flagyl  Drug    Unknown      Active



       allergy          



       (disorder)          







Medications







 RxNorm  Medication  Dose  Route  Instructions  Start  End  Status



           Date  Date  

 

 4467725  3 ML Insulin,    Subcutaneous  subcutaneously 3      Active



   Aspart, Human 100      times per day      



   UNT/ML Pen      (sliding scale)      



   Injector            

 

 627698  Acetaminophen 325  1 tab  oral  orally every 6  /20    Active



   MG / Hydrocodone      hours as needed.  18    



   Bitartrate 5 MG            



   Oral Tablet            

 

 6895829  Acetaminophen 325  1 tab  oral  orally every 6  /1620    Active



   MG / Oxycodone      hours as needed.  18    



   Hydrochloride 5      (as needed for      



   MG Oral Tablet      severe pain;      



         MDD=4 tablets)      

 

   Albuterol HFA  2 puff  Inhalation  inhaled 4 times      Active



   Inhaler 90      per day as      



   mcg/actuation      needed.      

 

   Albuterol  2.5 mg  Inhalation  inhaled every 4      Active



   Nebulized      hours as needed.      



         (until response)      

 

 779181  Amlodipine 10 MG  10 mg  oral  orally every day      Active



   Oral Tablet            

 

 1191  Aspirin  81 mg  oral  orally every day      Active

 

 1894  Calcitriol  0.25 mcg  oral  orally 2 times      Active



         per day      

 

 1975  Ciprofloxacin 250  250 mg  oral  orally 2 times      Active



   MG Oral Tablet      per day (7 days)      

 

 40089  gabapentin  100 mg  oral  orally 3 times      Active



         per day      

 

 756272  Insulin Glargine  40 to 60  Subcutaneous  subcutaneously      Active



   100 UNT/ML  unit    every day at      



   Injectable      bedtime      



   Solution            

 

 824115  Losartan  100 mg  oral  orally every day      Active



   Potassium 100 MG            



   Oral Tablet            

 

 2003  Omeprazole 40 MG  40 mg  oral  orally every day      Active



   Delayed Release      (swallow whole;      



   Oral Capsule      do not crush,      



         chew, dissolve,      



         or cut/break)      

 

 870885  Promethazine  25 mg  oral  orally every 6      Active



   Hydrochloride 25      hours as needed.      



   MG Oral Tablet            

 

 257941  Sertraline 100 MG  100 mg  oral  orally every day      Active



   Oral Tablet            

 

   Warfarin Oral  7.5 mg  oral  orally every day      Active







Problems







 Code  Code System  Problem Name  Start Date  End Date  Status

 

 80591417  SNSquirro-CT  Diabetes mellitus  U    Active

 

 67502962  SNSquirro-CT  Kidney disease  U    Active

 

 38421981  Postify-CT  Peritoneal dialysis  U    Active

 

 80313171  [facility] Holding[facility]ED-CT  Hypertensive disorder  U    Active







Procedures

No data in the system



Results

******** Laboratory Results ******** Order:  BASIC METABOLIC PANEL Specimen 
Source: Body Site: Legend: (G,H)=High, (GG,HH,CH,#H)=Above High Threshold, (#,L)
=Low, (##,CL,#L,LL)=Below Low Threshold, (C,CC,CA,#A,A)=Abnormal





  LOINC   Test   Result   Flag   Range   Units   Date

 

 2951  1Sodium SerPl-sCnc  130   L  136-145  mmol/L  2018 20:26

 

 2823-3  1Potassium SerPl-sCnc  3.9    3.5-5.2  mmol/L  2018 20:26

 

 5-0  1Chloride SerPl-sCnc  97   L  100-108  mmol/L  2018 20:26

 

 8-9  1CO2 SerPl-sCnc  25    21-32  mmol/L  2018 20:26

 

 2345-7  1Glucose SerPl-mCnc  469  CH    mg/dL  2018 20:26

 

 3094-0  1BUN SerPl-mCnc  23   H  7-21  mg/dL  2018 20:26

 

 2160-0  1Creat SerPl-mCnc  4.0   H  0.6-1.3  mg/dL  2018 20:26









 **Interpretive Carol:  1Normal Kidney Function or Mild Disease - GFR &gt;OR=60 
Chronic



   Kidney Disease - GFR 15-59 Renal Failure - GFR &lt; 15 GFR not



   calculated on patients under 18 years of age.  Calculated



   (estimated) GFR is based on the MDRD Study equation, which



   assumes a steady state for creatinine.  Estimated GFR may not be



   appropriate for medication dosing.









 77674-2  1Ca-I SerPl-mCnc  8.4   L  8.5-10.8  mg/dL  2018 20:26

 

 18876-4  1GFR/BSA.pred SerPl-ArVRat  13        2018 20:26



 Performing Lab Footnotes:[facility] - [id number] - 
[address] [place] [address] [facility] RAGHAVOMD1





 _______________________________________________________________________________
_______ Order:  CBC DIFF Specimen Source: Body Site: Legend: (G,H)=High, (GG,HH,
CH,#H)=Above High Threshold, (#,L)=Low, (##,CL,#L,LL)=Below Low Threshold, (C,CC
,CA,#A,A)=Abnormal





  LOINC   Test   Result   Flag   Range   Units   Date

 

 6690-2  1WBC # Bld Auto  11.6   H  4.8-10.8  K/uL  2018 20:26

 

 16493-1  1RBC # Bld  3.41   L  4.20-5.40  M/uL  2018 20:26

 

 718-7  1Hgb Bld-mCnc  10.4   L  12.0-16.0  gm/dL  2018 20:26

 

 4544-3  1Hct VFr Bld Auto  29.0   L  36.0-48.0  %  2018 20:26

 

 787-2  1MCV RBC Auto  85.0    80.0-100.0  fL  2018 20:26

 

 97466-2  1MCHC RBC-mCnc  36.0    30.0-36.5  %  2018 20:26

 

 62977-0  1MCH RBC Qn  30.6    27.0-34.0  pg  2018 20:26

 

 29325-3  1RDW RBC  14.2    11.0-15.0  %  2018 20:26

 

 777-3  1Platelet # Bld Auto  325    130-450  K/uL  2018 20:26

 

 03105-2  1PMV Bld Auto  7.3    6.0-12.0  fL  2018 20:26

 

 751-8  1Neutrophils # Bld Auto  79    37-80  %  2018 20:26

 

 14613-5  1Lymphocytes NFr Bld  15    10-50  %  2018 20:26

 

 5905-5  1Monocytes NFr Bld Auto  3     0-12  %  2018 20:26

 

 47863-6  1Eosinophil # Bld  2    &lt;=8  %  2018 20:26

 

 704-7  1Basophils # Bld Auto  1    &lt;=3  %  2018 20:26

 

 25420-5  1Neutrophils # Bld  9.1   H  1.8-8.6  K/uL  2018 20:26

 

 731-0  1Lymphocytes # Bld Auto  1.8    0.5-5.0  K/uL  2018 20:26

 

 742-7  1Monocytes # Bld Auto  0.3    0.0-1.3  K/uL  2018 20:26

 

 76599-3  1Eosinophil # Bld  0.3    0.0-0.9  K/uL  2018 20:26

 

 704-7  1Basophils # Bld Auto  0.1    0.0-0.3  K/ul  2018 20:26



 Performing Lab Footnotes:[facility] - [id number] - 
[address] [place] [address] [address]





 _______________________________________________________________________________
_______ Order:  PT/INR Specimen Source: Body Site: Legend: (G,H)=High, (GG,HH,CH
,#H)=Above High Threshold, (#,L)=Low, (##,CL,#L,LL)=Below Low Threshold, (C,CC,
CA,#A,A)=Abnormal





  LOINC   Test   Result   Flag   Range   Units   Date

 

 5902-2  1PT Time PPP  22.4   H  9.4-12.4  sec  2018 20:26

 

 6301-6  1INR PPP  1.9        2018 20:26









 **Interpretive Carol:  1********* INR INTERPERTATION ******** 2.0-3.0    
THERAPEUTIC



   MONITORING 2.5-3.5    HEART VALVE REPLACEMENT



 Performing Lab Footnotes:[facility] - [id number] - 
[address] [place] [address] [facility] DIMPLECIOMD1





 _______________________________________________________________________________
_______



Social History







 Code  Code System  Social History  Description  Dates Observed



     Observation    

 

 984688390684323  SNOMED CT  Current Smoking  Current some day  



     Status  smoker  

 

 UNK  AdministrativeGender  Sex Assigned At  Unknown  



     Birth    







Vital Signs







 Code  Code System  Vitals  Value  Date

 

 8310-5  LOINC  Body Temperature  97.5 [degF]  2018

 

 8865-8  LOINC  Pulse Rate  106 {beats}/min  2018

 

 9279-1  LOINC  Respiratory Rate  18 /min  2018

 

 47368-3  LOINC  O2% BldC Oximetry  97 %  2018

 

 8480-6  LOINC  BP Systolic  154 mm[Hg]  2018

 

 8462-4  LOINC  BP Diastolic  97 mm[Hg]  2018

 

 8302-2  LOINC  Height  65 [in_i]  2018

 

 10921-0  LOINC  Weight  90.72 kg  2018

 

 3140-1  LOINC  Body surface area Derived from formula  1.98 m2  2018

 

 68402-1  LOINC  BMI (Body Mass Index)  33.3 kg/m2  2018







Goals Section

No data in the system



Health Concerns

No data in the systemEncounter Diagnosis









 Date  Code  Code System  Diagnosis  Status

 

   E11.65  ICD10  TYPE 2 DM W/HYPERGLYCEMIA  Active







Advance Directives

*RHIO - CONSENT IS YES





 Directive Type  Effective Date    Notes  Supporting Document









 Name  Address  Phone









 No Directive Type  2017 1:26:39  Not Specified  Not Specified  Not 
Specified  None  No



 specified  PM          







Family History

No data in the system



Functional Status







 Code  Functional Condition  Code System  Date  Status

 

   Independent adls  SNOMED CT  2018  Active

 

   Appears well nourished/hydrated  SNOMED CT  2018  Active







Immunizations

No data in the system



Medical Equipment

No data in the system



Mental Status







 Code  Cognitive Condition  Code System  Date  Status

 

   Oriented x 3  SNOMED CT  2018  Active

 

   No acute distress  SNOMED CT  2018  Active

 

   Alert  SNOMED CT  2018  Active







Assessment and Plan

Assessments

No data in the systemPlan Of Treatment

No data in the systemPending Tests

No data in the system



Hospital Discharge Instructions

No data in the system



Reason for Visit







 Reason for Visit

 

 Leg Pain

## 2018-04-30 NOTE — RAD
INDICATION: Shortness of breath. Acute respiratory failure with hypoxia in December 2016.

History of tobacco use.



COMPARISON: January 15, 2018



TECHNIQUE:  Dual energy PA and routine lateral views of the chest were obtained.



REPORT:  Mildly elevated lung volumes with increased AP thoracic diameter. Mild bilateral

perihilar and lower lung zone linear subsegmental atelectasis or scarring similar to the

prior exam. Diffuse mild prominence of interstitial markings. No suspicious focal

pulmonary lesion, pleural effusion, pneumothorax. The heart, pulmonary vasculature, and

mediastinal contours are unremarkable.



IMPRESSION: Stigmata of chronic obstructive pulmonary disease. No significant change in

bilateral linear subsegmental atelectasis or scarring compared with the January 15, 2018

exam. No superimposed acute cardiopulmonary process evident.

## 2018-04-30 NOTE — XMS REPORT
[facility] - Continuity of Care Document

 Created on:2018



Patient:MAKAYLA RUBIN

Sex:Female

:1980

External Reference #:[id number]





Demographics







 Address  [address] [place] [address]

 

 Home Phone  [phone]

 

 Mobile Phone  [phone]

 

 Preferred Language  en

 

 Marital Status  Not  or 

 

 Congregational Affiliation  Unknown

 

 Race  White

 

 Ethnic Group  Not  or 









Author







 Organization  [facility]









Support







 Name  Relationship  Address  Phone

 

 PATI RUBIN  mother  PO   [phone]



     [address] [facility] [facility] [address] [facility]   



     [place] [address]  

 

 DEEPIKAKYLE  significant other  [address]  [phone]



     [place] [address]  









Care Team Providers







 Name  Role  Phone

 

 RAJ DUNCAN  Primary Care Physician  [phone]









Allergies and Intolerances







 Code  Code  Allergy  Type  Reaction  Severity  Start  End  Status



   System  Substance        Date  Date  

 

 4053  RXNorm  Erythromycin  Drug    Unknown      Active



     Base  allergy          



       (disorder)          

 

 5224  RXNorm  heparin  Drug    Unknown      Active



       allergy          



       (disorder)          

 

 189944  RXNorm  Flagyl  Drug    Unknown      Active



       allergy          



       (disorder)          







Medications







 RxNorm  Medication  Dose  Route  Instructions  Start  End  Status



           Date  Date  

 

 4686843  3 ML Insulin,    Subcutaneous  subcutaneously 3      Active



   Aspart, Human      times per day      



   100 UNT/ML Pen      (sliding scale)      



   Injector            

 

 151320  Acetaminophen  1 tab  oral  orally every 6  20    Active



   325 MG /      hours as needed.  18    



   Hydrocodone            



   Bitartrate 5 MG            



   Oral Tablet            

 

 5144012  Acetaminophen  1 tab  oral  orally every 6  20    Active



   325 MG /      hours as needed.  18    



   Oxycodone      (as needed for      



   Hydrochloride 5      severe pain;      



   MG Oral Tablet      MDD=4 tablets)      

 

   Albuterol HFA  2 puff  Inhalation  inhaled 4 times      Active



   Inhaler 90      per day as      



   mcg/actuation      needed.      

 

   Albuterol  2.5 mg  Inhalation  inhaled every 4      Active



   Nebulized      hours as needed.      



         (until response)      

 

 643952  Amlodipine 10 MG  10 mg  oral  orally every day      Active



   Oral Tablet            

 

 1191  Aspirin  81 mg  oral  orally every day      Active

 

   Calcitriol  0.25  oral  orally 2 times      Active



     mcg    per day      

 

 1975  Ciprofloxacin  250 mg  oral  orally 2 times      Active



   250 MG Oral      per day (7 days)      



   Tablet            

 

 61356  gabapentin  100 mg  oral  orally 3 times      Active



         per day      

 

 372133  Insulin Glargine  40 to  Subcutaneous  subcutaneously      Active



   100 UNT/ML  60 unit    every day at      



   Injectable      bedtime      



   Solution            

 

 870194  Losartan  100 mg  oral  orally every day      Active



   Potassium 100 MG            



   Oral Tablet            

 

 093340  Omeprazole 40 MG  40 mg  oral  orally every day      Active



   Delayed Release      (swallow whole;      



   Oral Capsule      do not crush,      



         chew, dissolve,      



         or cut/break)      

 

 052758  Promethazine  25 mg  oral  orally every 6      Active



   Hydrochloride 25      hours as needed.      



   MG Oral Tablet            

 

 155445  Sertraline 100  100 mg  oral  orally every day      Active



   MG Oral Tablet            

 

 014228  Warfarin Sodium  7.5 mg  oral  orally every day      Active



   5 MG Oral Tablet            

 

 1897  Calcium  2500 mg  oral  orally every day      Completed



   Carbonate      (Ordered Dose: of      



         calcium      



         carbonate)      

 

   Warfarin Oral  10  mg  oral  orally 2 times      Completed



         per week (Monday      



         and Friday)      







Problems







 Code  Code System  Problem Name  Start Date  End Date  Status

 

 29272167  SNOMED-CT  Diabetes mellitus  U    Active

 

 04918861  SNOMED-CT  Kidney disease  U    Active

 

 31756778  SNOMED-CT  Peritoneal dialysis  U    Active

 

 54585582  SNOMED-CT  Hypertensive disorder  U    Active







Procedures

No data in the system



Results

******** Laboratory Results ******** Order:  AMYLASE Specimen Source: Body Site
: Legend: (G,H)=High, (GG,HH,CH,#H)=Above High Threshold, (#,L)=Low, (##,CL,#L,
LL)=Below Low Threshold, (C,CC,CA,#A,A)=Abnormal





  LOINC   Test   Result   Flag   Range   Units   Date

 

 1799  1Amylase Ur-cCnc  10   L    U/L  2018 07:55



 Performing Lab Footnotes:[facility] - [id number] - 
[address] [place] [address] [place] HERB[address] RAGHAVOMD1





 _______________________________________________________________________________
_______ Order:  CBC DIFF Man Diff Specimen Source: Body Site:Order Result 
Comment:Unable to report platelet count due to the presence of platelet clumps 
on smear. Legend: (G,H)=High, (GG,HH,CH,#H)=Above High Threshold, (#,L)=Low, (##
,CL,#L,LL)=Below Low Threshold, (C,CC,CA,#A,A)=Abnormal





  LOINC   Test   Result   Flag   Range   Units   Date

 

 6690-2  1WBC # Bld Auto  13.8   H  4.8-10.8  K/uL  2018 07:55

 

 25137-3  1RBC # Bld  3.61   L  4.20-5.40  M/uL  2018 07:55

 

 718-7  1Hgb Bld-mCnc  10.5   L  12.0-16.0  gm/dL  2018 07:55

 

 4544-3  1Hct VFr Bld Auto  30.7   L  36.0-48.0  %  2018 07:55

 

 787-2  1MCV RBC Auto  84.9    80.0-100.0  fL  2018 07:55

 

 45961-5  1MCH RBC Qn  29.2    27.0-34.0  pg  2018 07:55

 

 28540-3  1MCHC RBC-mCnc  34.3    30.0-36.5  %  2018 07:55

 

 46288-1  1RDW RBC  14.2    11.0-15.0  %  2018 07:55









   1MANUAL DIFFERENTIAL









 95436-4  1Neuts Seg NFr Bld  84   H  37-80  %  2018 07:55

 

 714-6  1Eosinophil NFr Bld Manual  4    &lt;=8  %  2018 07:55

 

 77691-0  1Lymphocytes NFr Bld  10    10-50  %  2018 07:55

 

 743-5  1Monocytes # Bld Manual  2    &lt;=12  %  2018 07:55



 Performing Lab Footnotes:[facility] - [id number] - 
[address] [place] [address] [place]





 _______________________________________________________________________________
_______ Order:  COMPREHENSIVE PANEL Specimen Source: Body Site: Legend: (G,H)=
High, (GG,HH,CH,#H)=Above High Threshold, (#,L)=Low, (##,CL,#L,LL)=Below Low 
Threshold, (C,CC,CA,#A,A)=Abnormal





  LOINC   Test   Result   Flag   Range   Units   Date

 

 2951-2  1Sodium SerPl-sCnc  130   L  136-145  mmol/L  2018 07:55

 

 2823-3  1Potassium SerPl-sCnc  4.3    3.5-5.2  mmol/L  2018 07:55

 

 2075-0  1Chloride SerPl-sCnc  97   L  100-108  mmol/L  2018 07:55

 

 8-9  1CO2 SerPl-sCnc  20   L  21-32  mmol/L  2018 07:55

 

 2345-7  1Glucose SerPl-mCnc  411   H    mg/dL  2018 07:55

 

 3094-0  1BUN SerPl-mCnc  26   H  7-21  mg/dL  2018 07:55

 

 2160-0  1Creat SerPl-mCnc  3.7   H  0.6-1.3  mg/dL  2018 07:55









 **Interpretive Carol:  1Normal Kidney Function or Mild Disease - GFR &gt;OR=60 
Chronic



   Kidney Disease - GFR 15-59 Renal Failure - GFR &lt; 15 GFR not



   calculated on patients under 18 years of age.  Calculated



   (estimated) GFR is based on the MDRD Study equation, which



   assumes a steady state for creatinine.  Estimated GFR may not be



   appropriate for medication dosing.









 93727-7  1Ca-I SerPl-mCnc  9.0    8.5-10.8  mg/dL  2018 07:55

 

 56021-6  1GFR/BSA.pred SerPl-ArVRat  14        2018 07:55

 

 71875-9  1Bilirub Bld-mCnc  0.2    0.0-1.2  mg/dL  2018 07:55

 

 2885-2  1Prot SerPl-mCnc  6.2   L  6.4-8.2  gm/dL  2018 07:55

 

 1751-7  1Albumin SerPl-mCnc  3.1   L  3.4-4.8  gm/dL  2018 07:55

 

 6768-6  1ALP SerPl-cCnc  117      U/L  2018 07:55

 

 1742-6  1ALT SerPl-cCnc  10     0-55  U/L  2018 07:55

 

 1920-8  1AST SerPl-cCnc  8    5-37  U/L  2018 07:55



 Performing Lab Footnotes:[facility] - [id number] - 
[address] [place] [address] [place]





 _______________________________________________________________________________
_______ Order:  Influenza A&amp;B PCR Specimen Source:  Swab Body Site: Legend: 
(G,H)=High, (GG,HH,CH,#H)=Above High Threshold, (#,L)=Low, (##,CL,#L,LL)=Below 
Low Threshold, (C,CC,CA,#A,A)=Abnormal





  LOINC   Test   Result   Flag   Range   Units   Date

 

 84419-9  1FLUAV RNA XXX Ql  Not Detected    Not Detected    2018 07:55



   PCR          

 

 50702-5  1FLUBV RNA XXX Ql  Not Detected    Not Detected    2018 07:55



   PCR          



 Performing Lab Footnotes:[facility] - [id number] - 
[address] [place] [address] [place]





 _______________________________________________________________________________
_______ Order:  LIPASE Specimen Source: Body Site: Legend: (G,H)=High, (GG,HH,CH
,#H)=Above High Threshold, (#,L)=Low, (##,CL,#L,LL)=Below Low Threshold, (C,CC,
CA,#A,A)=Abnormal





  LOINC   Test   Result   Flag   Range   Units   Date

 

 3040-3  1Lipase SerPl-cCnc  49    8-78  U/L  2018 07:55



 Performing Lab Footnotes:[facility] - [id number] - 
[address] [place] [address] [place] [address]





 _______________________________________________________________________________
_______ Order:  Point of Care Glucose ACCUCHECK Specimen Source:  Blood Body 
Site: Legend: (G,H)=High, (GG,HH,CH,#H)=Above High Threshold, (#,L)=Low, (##,CL,
#L,LL)=Below Low Threshold, (C,CC,CA,#A,A)=Abnormal





  LOINC   Test   Result   Flag   Range   Units   Date

 

   1GLUCOSE BedSide  420  CH    mg/dL  2018 07:35









 **Test Comment:  [address] LEXX HARPER



   

 

 **Test Comment:  1Cleaned Meter



   

 

 **Test Comment:  1RN Notified



   



 Performing Lab Footnotes:[facility] - [id number] - 
[address] [place] [address] [place] [address]





 _______________________________________________________________________________
_______******** Radiology Results ********Order:  CT-ABD PELVIS NO CONTRAST (DRY
)Exam Completion Date:2018 07: 9:00 AM    CT-ABD PELVIS NO 
CONTRAST (DRY)    ORDERING CLINICAL INFORMATION:  UNSPECIFIED ABDOMINAL PAIN   
ADDITIONAL CLINICAL INFORMATION:  None.      COMPARISON:  2017     
PROCEDURE: CT scan of abdomen and pelvis was performed without oral and IV 
contrast. Coronal and sagittal reformats were created.     CONCURRENT EXAM: 
Emphysematous changes     Chest Base findings: Visualized portion of the lung 
bases are unremarkable.     ABDOMEN FINDINGS:    Liver:  No focal liver 
lesions.     Biliary Tract:  No gallbladder wall thickening. No biliary ductal 
dilation. No gallstones.      Pancreas: Normal     Spleen: Normal    Adrenals: 
Normal    Kidneys and Collecting Systems: No renal calculi. No hydronephrosis. 
   Stomach and esophagus: Stomach is unremarkable. No hiatal hernia.    
Abdominal GI Tract:  Small bowel is unremarkable.     Mesentery and Peritoneal 
Cavity: No free air. No free fluid.     Vessels: Diffuse atherosclerotic 
disease noted.    Lymph Nodes: No lymphadenopathy.     Soft Tissues/
Musculoskeletal:  No acute bone abnormality.  No hernias.    PELVIC FINDINGS:  
  Visualized Reproductive Organs: Normal    Bladder: Normal    Lymph Nodes: No 
lymphadenopathy.     Vessels: Normal    Pelvic GI Tract:  Mild to moderate 
ascites. Large bowel is unremarkable.  Appendix is not visualized  There is 
catheter seen in the pelvis could be due to peritoneal dialysis.    IMPRESSION:
Moderate ascites  Peritoneal dialysis visualized.  END REPORT    Electronically 
signed By: Luis M Lopez    Read By: LUIS M MCKEON  Date:  2018 09:06



Social History







 Code  Code System  Social History  Description  Dates Observed



     Observation    

 

 49005862  SNOMED CT  Current Smoking  Smoker, current  



     Status  status unknown  

 

 UNK  AdministrativeGender  Sex Assigned At  Unknown  



     Birth    







Vital Signs







 Code  Code System  Vitals  Value  Date

 

 8865-8  LOINC  Pulse Rate  91 {beats}/min  2018

 

 8480-6  LOINC  BP Systolic  233 mm[Hg]  2018

 

 8462-4  LOINC  BP Diastolic  121 mm[Hg]  2018

 

 9279-1  LOINC  Respiratory Rate  16 /min  2018

 

 64952-6  LOINC  O2% BldC Oximetry  98 %  2018

 

 8310-5  LOINC  Body Temperature  98.2 [degF]  2018

 

 8302-2  LOINC  Height  65 [in_i]  2018

 

 29170-9  LOINC  Weight  90 kg  2018

 

 3140-1  LOINC  Body surface area Derived from formula  1.97 m2  2018

 

 92021-2  LOINC  BMI (Body Mass Index)  33 kg/m2  2018







Goals Section

No data in the system



Health Concerns

No data in the systemEncounter Diagnosis









 Date  Code  Code System  Diagnosis  Status

 

   K52.9  ICD10  NONINFECTIVE GE &amp; COLITIS UNS  Active







Advance Directives

*RHIO - CONSENT IS YES





 Directive Type  Effective Date    Notes  Supporting Document









 Name  Address  Phone









 No Directive Type  2017 1:26:39  Not Specified  Not Specified  Not 
Specified  None  No



 specified  PM          







Family History

No data in the system



Functional Status







 Code  Functional Condition  Code System  Date  Status

 

   Independent adls  SNOMED CT  2018  Active

 

   Appears well nourished/hydrated  SNOMED CT  2018  Active

 

   Assisted  SNOMED CT  2018  Active







Immunizations

No data in the system



Medical Equipment

No data in the system



Mental Status







 Code  Cognitive Condition  Code System  Date  Status

 

   Weakness  SNOMED CT  2018  Active

 

   Moderate distress  SNOMED CT  2018  Active

 

 798046110  Orientated  SNOMED CT  2018  Active

 

 843009234  Mentally alert  SNOMED CT  2018  Active







Assessment and Plan

Assessments

No data in the systemPlan Of Treatment

No data in the systemPending Tests

No data in the system



Hospital Discharge Instructions

No data in the system



Reason for Visit







 Reason for Visit

 

 Abdominal Pain

## 2018-04-30 NOTE — ECHO
Patient:      MAKAYLA RUBIN

Med Rec#:     [id number]            :          1980          

Date:         2018            Age:          37y                 

Account#:     [id number]          Height:       165.1 cm / 65.0 in

Accession#:   [id number]           Weight:       88.45 kg / 194.9 lbs

Sex:          F                     BSA:          1.96

Room#:        -17                 

Admit Date#:  2018          

Type:         Inpatient

 

Referring:    Dahiana Preston

Reading:      Dilip Strauss MD

Sonographer:  Valery Thornton CYNTHIA

CC:           David Baker MD

CC:           Yeni Shanks MD

______________________________________________________________________

 

Transthoracic Echocardiogram

 

Indication:

CP

BP:           120/72

HR:           104

Rhythm:       Tachycardia

 

Findings     

History:

ESRD with partioneal dialysis,MD,CAD,prior PE,HTN,HLD,smoker. 

 

Technical Comments:

The study is technically limited due to patient body habitus.  

Completed at 1445. The study is technically limited due to the patient's

smoking history.  

 

Left Ventricle:

Moderate concentric left ventricular hypertrophy is observed. Global

left ventricular wall motion and contractility are within normal limits.

There is normal left ventricular systolic function. The estimated

ejection fraction is 50-55%.  Abnormal left ventricular diastolic

function is observed. 

 

Left Atrium:

The left atrium is mildly dilated. 

 

Right Ventricle:

The right ventricular cavity size is normal. The right ventricular

global systolic function is normal. 

 

Right Atrium:

The right atrium is mildly dilated.  

 

Aortic Valve:

The aortic valve is trileaflet. There is no evidence of aortic valve

thickening. There is no evidence of aortic regurgitation. There is no

evidence of aortic stenosis. 

 

Mitral Valve:

The mitral valve leaflets are mildly thickened. There is a trace of

mitral regurgitation. There is no evidence of mitral stenosis. 

 

Tricuspid Valve:

The tricuspid valve leaflets are normal.  There is trace tricuspid

regurgitation.  Unable to estimate the right ventricular systolic

pressure.   There is no tricuspid stenosis. 

 

Pulmonic Valve:

The pulmonic valve appears normal. There is no evidence of pulmonic

regurgitation. There is no pulmonic stenosis.  

 

Pericardium:

A pericardial fat pad is visualized. 

 

Aorta:

There is no dilatation of the ascending aorta. There is no dilatation of

the aortic arch. There is no dilation of the aortic root. 

 

Pulmonary Artery:

The main pulmonary artery appears normal. 

 

Venous:

The venous system is not well visualized. 

 

Summary:

There are no significant changes when compared to the previous study

done on 16 

 

Conclusions

Global left ventricular wall motion and contractility are within normal

limits.

Moderate concentric left ventricular hypertrophy is observed.

There is normal left ventricular systolic function.

The estimated ejection fraction is 50-55%. 

The right ventricular global systolic function is normal.

There is no evidence of aortic stenosis.

There is a trace of mitral regurgitation.

There is trace tricuspid regurgitation. 

Unable to estimate the right ventricular systolic pressure.  

A pericardial fat pad is visualized.

There are no significant changes when compared to the previous study

done on 16

 

Measurements     

Name                    Value         Normal Range            

RVIDd (AP) 2D           2.8 cm        (0.9 - 2.6)             

RVDdMajor (2D)          2.9 cm        (2.2 - 4.4)             

RAd ISD 4CH             5.1 cm        (3.4 - 4.9)             

RA (A4C)W               4 cm          (2.9 - 4.6)             

IVSd (2D)               1.3 cm        (0.6 - 1)               

LVPWd (2D)              1.6 cm        (0.6 - 1)               

LVIDd (2D)              3.9 cm        (3.6 - 5.4)             

LVIDs (2D)              2.8 cm        -                        

LV FS (2D)              28 %          (25 - 45)               

Aortic Annulus          1.7 cm        (1.4 - 2.6)             

Ao root diameter (2D)   2.2 cm        (2.1 - 3.5)             

Ascending Ao            2.3 cm        (2.1 - 3.4)             

Aortic arch             2.2 cm        (1.8 - 3.4)             

Descending Ao           1 cm          -                        

LA dimension (AP) 2D    4 cm          (2.3 - 3.8)             

LAd ISD 4CH             5.7 cm        (2.9 - 5.3)             

LA ISD 4CH W            4.2 cm        (2.5 - 4.5)             

 

Name                    Value         Normal Range            

LA ESV SP 4CH (A/L)     62 ml         -                        

LA ESV SP 2CH (A/L)     44 ml         -                        

LA ESV BP (A/L)         56 ml         -                        

LA ESV BP (A/L) index   28.41 ml/m2   -                        

LA ESV SP 4CH (MOD)     58 ml         -                        

LA ESV SP 2CH (MOD)     39 ml         -                        

 

Name                    Value         Normal Range            

MV E-wave Vmax          1.1 m/sec     -                        

MV deceleration time    89 msec       -                        

MV A-wave Vmax          0.9 m/sec     -                        

MV E:A ratio            1.16 ratio    -                        

LV septal e' Vmax       0.06 m/sec    -                        

LV lateral e' Vmax      0.07 m/sec    -                        

LV E:e' septal ratio    18.33 ratio   -                        

LV E:e' lateral ratio   15.74 ratio   -                        

 

Name                    Value         Normal Range            

AV Vmax                 1.8 m/sec     -                        

AV VTI                  30.4 cm       -                        

AV peak gradient        13.12 mmHg    -                        

AV mean gradient        5.15 mmHg     -                        

LVOT Vmax               1.1 m/sec     -                        

LVOT VTI                19.9 cm       -                        

LVOT peak gradient      5.06 mmHg     -                        

LVOT mean gradient      2.14 mmHg     -                        

 

Name                    Value         Normal Range            

TR Vmax                 2.7 m/sec     -                        

TR peak gradient        30 mmHg       -                        

 

Name                    Value         Normal Range            

PV Vmax                 0.7 m/sec     -                        

PV peak gradient        1.9 mmHg      -                        

 

Electronically signed by: Dilip Strauss MD on 2018 16:12:59

## 2018-04-30 NOTE — ED
Caio RICARDO Nilda, scribed for Salomon Jackson MD on 04/30/18 at 0833 .





HPI Chest Pain





- HPI Summary


HPI Summary: 


This patient is a 37 year old F presenting to [facility] accompanied by family with 

a chief complaint of constant diffuse CP radiating to bilat shoulders since 

last night.  The patient rates the pain 8/10 in severity. Symptoms aggravated 

by deep inspiration and recumbent position. Symptoms alleviated by nothing 

including NTG and Methadone last taken at 0100. Patient reports tremors and 

nausea. She states she had a similar episode in December 2016 in which she 

found she had PE.





- History of Current Complaint


Chief Complaint: EDChestPainROMI


Hx Obtained From: Patient


Hx Last Menstrual Period: irregular 


Onset/Duration: Started Minutes Ago, Started Hours Ago, Still Present


Timing: Constant


Current Severity: Severe


Pain Intensity: 8


Pain Scale Used: 0-10 Numeric


Chest Pain Location: Diffuse


Chest Pain Radiates: Yes


Chest Pain Radiates To:: Shoulder - bilat


Aggravating Factor(s): Deep Breaths, Recumbent Position


Alleviating Factor(s): , Other: - methadone


Associated Signs and Symptoms: Positive: Other: - tremors and nausea


Related History: Similar Episode/Dx as: - December 2016 Dx of PE.





- Additional Pertinent History


Primary Care Physician: [id number]





- Allergy/Home Medications


Allergies/Adverse Reactions: 


 Allergies











Allergy/AdvReac Type Severity Reaction Status Date / Time


 


amoxicillin [From Augmentin] Allergy  See Comment Verified 04/30/18 08:23


 


clavulanic acid Allergy  See Comment Verified 04/30/18 08:23





[From Augmentin]     


 


erythromycin base Allergy  See Comment Verified 04/30/18 08:23


 


heparin Allergy  See Comment Verified 04/30/18 08:23


 


metronidazole [From Flagyl] Allergy  Nausea And Verified 04/30/18 08:23





   Vomiting  


 


niacin Allergy  Rash Verified 04/30/18 08:23


 


ropinirole [From Requip] Allergy  Hives Verified 04/30/18 08:23


 


Sulfa (Sulfonamide Allergy  Vomiting Verified 04/30/18 08:23





Antibiotics)     


 


plastic tape Allergy Mild Blisters Uncoded 04/30/18 08:23











Home Medications: 


 Home Medications





Albuterol 2.5MG/3ML (0.083%)* [Ventolin 2.5 MG/3 ML NEB.SOL*] 2.5 mg INH Q6H 

PRN 04/30/18 [History Confirmed 04/30/18]


Bethanechol TAB* [Urecholine TAB*] 10 mg PO QID 04/30/18 [History Confirmed 04/ 30/18]


Insulin ASPART (NF) [Novolog (NF)] 0 - 100 units SUBCUT AC PRN 04/30/18 [

History Confirmed 04/30/18]


Insulin GLARGINE(*) [Lantus(*)] 60 units SUBCUT BEDTIME 04/30/18 [History 

Confirmed 04/30/18]


Losartan TAB* [Cozaar TAB*] 75 mg PO DAILY 04/30/18 [History Confirmed 04/30/18]


Promethazine TAB* [Phenergan TAB*] 25 mg PO Q8H PRN 04/30/18 [History Confirmed 

04/30/18]











PMH/Surg Hx/FS Hx/Imm Hx


Endocrine/Hematology History: Reports: Hx Anticoagulant Therapy - Aspirin., Hx 

Blood Transfusions, Hx Diabetes, Hx Anemia - hx of - reports had tranfusion in 

2014 after mi


   Comment Only: Hx Thyroid Disease - nodule biopsied, normal


Cardiovascular History: Reports: Hx Angina, Hx Cardiac Arrest - in Dec. 2014 

with CPR, Hx Cardiomegaly, Hx Coronary Artery Disease, Hx Deep Vein Thrombosis, 

Hx Hypercholesterolemia, Hx Hypertension, Hx Myocardial Infarction, Other 

Cardiovascular Problems/Disorders


   Denies: Hx Congestive Heart Failure, Hx Pacemaker/ICD, Hx Valvular Heart 

Disease


Respiratory History: Reports: Hx Asthma, Hx Chronic Obstructive Pulmonary 

Disease (COPD) - smoker, Hx Pulmonary Embolism, Hx Sleep Apnea - pt reports md 

thinks, but no official testing done yet, Other Respiratory Problems/Disorders 

- acute respipatory failure with hypoxia - dec 2016


   Denies: Hx Lung Cancer, Hx Pneumonia


GI History: Reports: Hx Gastroesophageal Reflux Disease, Other GI Disorders - 

GASTROPARESIS - TAKING OMEPRAZOLE, reglan and zofran


   Denies: Hx Gall Bladder Disease, Hx Gastrointestinal Bleed, Hx Ulcer, Hx 

Urosepsis


 History: Reports: Hx Acute Renal Failure, Hx Chronic Renal Failure - ESRD on 

PD, Hx Dialysis - PERITONEAL, Hx Kidney Stones, Hx Renal Disease - ESRD on home 

peritoneal dialysis , Other  Problems/Disorders


Musculoskeletal History: Reports: Hx Arthritis - back, hips, Hx Back Problems - 

Sciatica and Herniated L3 disk, Other Musculoskeletal History - partial 

amputation of toes/foot


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 

of R eye, Hx Vision Problem


   Denies: Hx Contacts or Glasses, Hx Hearing Aid


Opthamlomology History: Reports: Hx Eye Prosthesis - left, Hx Legally Blind - 30

% use of R eye, Hx Vision Problem


   Denies: Hx Contacts or Glasses


Neurological History: Reports: Other Neuro Impairments/Disorders - neuropathy


   Denies: Hx Transient Ischemic Attacks (TIA)


Psychiatric History: Reports: Hx Anxiety, Hx Depression, Hx Bipolar Disorder


   Denies: Hx Eating Disorder, Hx Panic Disorder, Hx Schizophrenia, Hx of 

Violent Episodes Against Others





- Cancer History


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


Hx Chemotherapy: No


Hx Radiation Therapy: No





- Surgical History


Surgery Procedure, Year, and Place: LEFT EYE REMOVED 2012 - HAS PROSTHETIC EYE (

ORBITS DONE 5/2015 OK'D BY DR SAE COLE TO SCAN IN MRI) ;.  MELANOMA REMOVED 

FROM [facility] 2013 (PROCEDURE DONE 4X);.  CARDIAC CATH 2014 - NO STENTS;.  LEFT 

WRIST FISTULA PLACEMENT ([facility]) 2014;.  HEMODIALYSIS CATH RIGHT CHEST WALL 2014;.

  PERITONEAL DIALYSIS CATH 2/2015;.  [place] [facility] [place]  

[place] [facility] [place].  [place]ACEMENT RIGHT JUGULAR TESIO 

[place] [facility] [place].  REMOVAL OF JUGULAR CATH - SEPT 2016.  

CARDIAC CATH - stentsx2 right leg.  PARTIAL AMPUTATION RIGHT TOES 5/2017.  

RIGHT TOES ALL REMOVED


Hx Anesthesia Reactions: No





- Immunization History


Date of Tetanus Vaccine: utd


Date of Influenza Vaccine: 10/2017


Infectious Disease History: No


Infectious Disease History: Reports: Hx of Known/Suspected MRSA - MRSA R 1st toe

, History Other Infectious Disease - GANGRENE


   Denies: Hx Clostridium Difficile, Hx Hepatitis, Hx Human Immunodeficiency 

Virus (HIV), Hx Shingles, Hx Tuberculosis, Traveled Outside the [place] in Last 30 

Days





- Family History


Known Family History: Positive: Cardiac Disease - father MI at 43 y/o, 

Hypertension, Other - Positive for bipolar and depression. Completed suicide to 

sister.  





- Social History


Alcohol Use: None


Hx Substance Use: No


Substance Use Type: Reports: None, Other


Substance Use Comment - Amount & Last Used: METHADONE (noted in chart, not 

confirmed still prescribed in 10/2017)


Hx Tobacco Use: Yes


Smoking Status (MU): Heavy Every Day Tobacco Smoker


Type: Cigarettes


Amount Used/How Often: 1 PPD


Length of Time of Smoking/Using Tobacco: 20 YEARS


Have You Smoked in the Last Year: Yes





Review of Systems


Positive: Chest Pain - radiates to bilat shoulders


Positive: Nausea


Neurological: Other - tremors


All Other Systems Reviewed And Are Negative: Yes





Physical Exam





- Summary


Physical Exam Summary: 


Appearance: The patient is well-nourished in acute distress and in acute pain. 

Pt has strong odor of tobacco. 


 


Skin: The skin is warm and dry and skin color reflects adequate perfusion.


 


HEENT: The head is normocephalic and atraumatic. The pupils are equal and 

reactive. The conjunctivae are clear and without drainage. Nares are patent and 

without drainage. Mouth reveals moist mucous membranes and the throat is 

without erythema and exudate. The external ears are intact. The ear canals are 

patent and without drainage. The tympanic membranes are intact.


 


Neck: the neck is supple with full range of motion and non-tender. There are no 

carotid bruits. There is no neck vein distension.


 


Respiratory: Chest is non-tender. Rhonchi in all lung fields. 


 


Cardiovascular: Heart is tachycardic. There is no murmur or rub auscultated. 

There is no peripheral edema and pulses are symmetrical and equal.


 


Abdomen: The abdomen is soft and non-tender. There are normal bowel sounds 

heard in all four quadrants and there is no organomegaly palpated.


 


Musculoskeletal: There is no back tenderness noted. Extremities are non-tender 

with full range of motion. There is good capillary refill. There is no 

peripheral edema or calf tenderness elicited.


 


Neurological: Patient is alert and oriented to person, place and time. The 

patient has symmetrical motor strength in all four extremities. Cranial nerves 

are grossly intact. Deep tendon reflexes are symmetrical and equal in all four 

extremities.


 


Psychiatric: The patient has an appropriate affect and does not exhibit any 

anxiety or depression.


Triage Information Reviewed: Yes


Vital Signs On Initial Exam: 


 Initial Vitals











Temp Pulse Resp BP Pulse Ox


 


 98.6 F   123   16   173/87   93 


 


 04/30/18 07:54  04/30/18 07:54  04/30/18 07:54  04/30/18 07:54  04/30/18 07:54











Vital Signs Reviewed: Yes





Diagnostics





- Vital Signs


 Vital Signs











  Temp Pulse Resp BP Pulse Ox


 


 04/30/18 07:54  98.6 F  123  16  173/87  93














- Laboratory


Lab Results: 


 Lab Results











  04/30/18 04/30/18 04/30/18 Range/Units





  08:35 08:35 08:35 


 


WBC   19.6 H   (3.5-10.8)  10^3/ul


 


RBC   3.32 L   (4.0-5.4)  10^6/ul


 


Hgb   9.5 L   (12.0-16.0)  g/dl


 


Hct   28 L   (35-47)  %


 


MCV   84   (80-97)  fL


 


MCH   29   (27-31)  pg


 


MCHC   34   (31-36)  g/dl


 


RDW   16 H   (10.5-15)  %


 


Plt Count   354   (150-450)  10^3/ul


 


MPV   8.7   (7.4-10.4)  um3


 


Neut % (Auto)   88.4 H   (38-83)  %


 


Lymph % (Auto)   5.4 L   (25-47)  %


 


Mono % (Auto)   4.5   (0-7)  %


 


Eos % (Auto)   1.4   (0-6)  %


 


Baso % (Auto)   0.3   (0-2)  %


 


Absolute Neuts (auto)   17.3 H   (1.5-7.7)  10^3/ul


 


Absolute Lymphs (auto)   1.1   (1.0-4.8)  10^3/ul


 


Absolute Monos (auto)   0.9 H   (0-0.8)  10^3/ul


 


Absolute Eos (auto)   0.3   (0-0.6)  10^3/ul


 


Absolute Basos (auto)   0.1   (0-0.2)  10^3/ul


 


Absolute Nucleated RBC   0   10^3/ul


 


Nucleated RBC %   0   


 


INR (Anticoag Therapy)     (0.77-1.02)  


 


Sodium  131 L    (139-145)  mmol/L


 


Potassium  4.0    (3.5-5.0)  mmol/L


 


Chloride  94 L    (101-111)  mmol/L


 


Carbon Dioxide  28    (22-32)  mmol/L


 


Anion Gap  9    (2-11)  mmol/L


 


BUN  39 H    (6-24)  mg/dL


 


Creatinine  6.14 H    (0.51-0.95)  mg/dL


 


Est GFR ( Amer)  9.9    (>60)  


 


Est GFR (Non-Af Amer)  7.7    (>60)  


 


BUN/Creatinine Ratio  6.4 L    (8-20)  


 


Glucose  308 H    ()  mg/dL


 


Lactic Acid    1.2  (0.5-2.0)  mmol/L


 


Calcium  8.4 L    (8.6-10.3)  mg/dL


 


Total Bilirubin  0.30    (0.2-1.0)  mg/dL


 


AST  6 L    (13-39)  U/L


 


ALT  6 L    (7-52)  U/L


 


Alkaline Phosphatase  141 H    ()  U/L


 


Troponin I  0.06 H*    (<0.04)  ng/mL


 


C-Reactive Protein  112.55 H    (< 5.00)  mg/L


 


Total Protein  6.3 L    (6.4-8.9)  g/dL


 


Albumin  3.0 L    (3.2-5.2)  g/dL


 


Globulin  3.3    (2-4)  g/dL


 


Albumin/Globulin Ratio  0.9 L    (1-3)  


 


Urine Color     


 


Urine Appearance     


 


Urine pH     (5-9)  


 


Ur Specific Gravity     (1.010-1.030)  


 


Urine Protein     (Negative)  


 


Urine Ketones     (Negative)  


 


Urine Blood     (Negative)  


 


Urine Nitrate     (Negative)  


 


Urine Bilirubin     (Negative)  


 


Urine Urobilinogen     (Negative)  


 


Ur Leukocyte Esterase     (Negative)  


 


Urine WBC (Auto)     (Absent)  


 


Urine RBC (Auto)     (Absent)  


 


Ur Squamous Epith Cells     (Absent)  


 


Urine Bacteria     (Absent)  


 


Hyaline Casts     (Absent)  


 


Urine Glucose     (Negative)  














  04/30/18 04/30/18 Range/Units





  08:35 11:15 


 


WBC    (3.5-10.8)  10^3/ul


 


RBC    (4.0-5.4)  10^6/ul


 


Hgb    (12.0-16.0)  g/dl


 


Hct    (35-47)  %


 


MCV    (80-97)  fL


 


MCH    (27-31)  pg


 


MCHC    (31-36)  g/dl


 


RDW    (10.5-15)  %


 


Plt Count    (150-450)  10^3/ul


 


MPV    (7.4-10.4)  um3


 


Neut % (Auto)    (38-83)  %


 


Lymph % (Auto)    (25-47)  %


 


Mono % (Auto)    (0-7)  %


 


Eos % (Auto)    (0-6)  %


 


Baso % (Auto)    (0-2)  %


 


Absolute Neuts (auto)    (1.5-7.7)  10^3/ul


 


Absolute Lymphs (auto)    (1.0-4.8)  10^3/ul


 


Absolute Monos (auto)    (0-0.8)  10^3/ul


 


Absolute Eos (auto)    (0-0.6)  10^3/ul


 


Absolute Basos (auto)    (0-0.2)  10^3/ul


 


Absolute Nucleated RBC    10^3/ul


 


Nucleated RBC %    


 


INR (Anticoag Therapy)  3.18 H   (0.77-1.02)  


 


Sodium    (139-145)  mmol/L


 


Potassium    (3.5-5.0)  mmol/L


 


Chloride    (101-111)  mmol/L


 


Carbon Dioxide    (22-32)  mmol/L


 


Anion Gap    (2-11)  mmol/L


 


BUN    (6-24)  mg/dL


 


Creatinine    (0.51-0.95)  mg/dL


 


Est GFR ( Amer)    (>60)  


 


Est GFR (Non-Af Amer)    (>60)  


 


BUN/Creatinine Ratio    (8-20)  


 


Glucose    ()  mg/dL


 


Lactic Acid    (0.5-2.0)  mmol/L


 


Calcium    (8.6-10.3)  mg/dL


 


Total Bilirubin    (0.2-1.0)  mg/dL


 


AST    (13-39)  U/L


 


ALT    (7-52)  U/L


 


Alkaline Phosphatase    ()  U/L


 


Troponin I    (<0.04)  ng/mL


 


C-Reactive Protein    (< 5.00)  mg/L


 


Total Protein    (6.4-8.9)  g/dL


 


Albumin    (3.2-5.2)  g/dL


 


Globulin    (2-4)  g/dL


 


Albumin/Globulin Ratio    (1-3)  


 


Urine Color   Yellow  


 


Urine Appearance   Cloudy  


 


Urine pH   5.0  (5-9)  


 


Ur Specific Gravity   1.023  (1.010-1.030)  


 


Urine Protein   3+(>=500 mg/dl) A  (Negative)  


 


Urine Ketones   Negative  (Negative)  


 


Urine Blood   1+ A  (Negative)  


 


Urine Nitrate   Negative  (Negative)  


 


Urine Bilirubin   Negative  (Negative)  


 


Urine Urobilinogen   Negative  (Negative)  


 


Ur Leukocyte Esterase   Negative  (Negative)  


 


Urine WBC (Auto)   Trace(0-5/hpf)  (Absent)  


 


Urine RBC (Auto)   2+(6-10/hpf) A  (Absent)  


 


Ur Squamous Epith Cells   Present A  (Absent)  


 


Urine Bacteria   1+ A  (Absent)  


 


Hyaline Casts   Present A  (Absent)  


 


Urine Glucose   3+(>=500 mg/dl) A  (Negative)  











Result Diagrams: 


 04/30/18 08:35





 04/30/18 08:35


Lab Statement: Any lab studies that have been ordered have been reviewed, and 

results considered in the medical decision making process.





- Radiology


  ** CXR


Radiology Interpretation Completed By: Radiologist - CXR reveals stigmata of 

chronic obstructive pulmonary disease. No significant change in bilateral 

linear subsegmental atelectasis or scarring compared with the January 15, 2018 

exam. No superimposed acute cardiopulmonary process evident. Dr. Jackson has 

reviewed this radiology report.





- EKG


  ** 0742


Cardiac Rate: Tachycardia - 123 bpm


EKG Rhythm: Sinus Tachycardia





Re-Evaluation





- Re-Evaluation


  ** First Eval


Re-Evaluation Time: 10:22


Comment: Pt states she's feeling a little better.





  ** Second Eval


Re-Evaluation Time: 11:34


Comment: Reviewed labs, imaging, and admission plan with pt.





Chest Pain Course/Dx





- Course


Course Of Treatment: [1025] Discussed case with Dr. Baker (Nephrology).


Assessment/Plan: Ms. Masters presented with pleuritic chest pain since last 

night.  Her ECG and Trop were unchanged from her normal.  Her CXR was negative 

however both her BUN and creatinine were about double what they usually run and 

it is possible that a pneumonia would be missed.  She is getting fluids slowly 

and antibiotics.  Dr. Baker recommended OBV.





- Diagnoses


Provider Diagnoses: 


 Chest pain, Bronchitis








- Provider Notifications


Discussed Care Of Patient With: Luis A Landeros - Hospitalist


Time Discussed With Above Provider: 11:48


Instructed by Provider To: Admit As Inpatient





Discharge





- Sign-Out/Discharge


Documenting (check all that apply): Discharge/Admit/Transfer





- Discharge Plan


Condition: Stable


Disposition: ADMITTED TO [facility]


Referrals: 


Yeni Shanks MD [Primary Care Provider] - 





- Billing Disposition and Condition


Condition: STABLE


Disposition: [id number]





The documentation as recorded by the Caio cardenas Nilda accurately 

reflects the service I personally performed and the decisions made by me, Salomon Jackson MD.

## 2018-04-30 NOTE — XMS REPORT
[facility] - Continuity of Care Document

 Created on:2018



Patient:MAKAYLA RUBIN

Sex:Female

:1980

External Reference #:[id number]





Demographics







 Address  [address] [place] [address]

 

 Home Phone  [phone]

 

 Mobile Phone  [phone]

 

 Preferred Language  en

 

 Marital Status  Not  or 

 

 Voodoo Affiliation  Unknown

 

 Race  White

 

 Ethnic Group  Not  or 









Author







 Organization  [facility]









Support







 Name  Relationship  Address  Phone

 

 PATI RUBIN  mother  PO   [phone]



     [address]   



     [place] [address]  

 

 DEEPIKAKYLE  significant other  [address] [facility] [facility]  [phone]



     [place] [address]  









Care Team Providers







 Name  Role  Phone

 

 PATEL HUITRON  Admitting Physician  [phone]

 

 APTEL HUITRON  Attending Physician  [phone]

 

 RAJ DUNCAN  Primary Care Physician  [phone]









Allergies and Intolerances







 Code  Code  Allergy  Type  Reaction  Severity  Start  End  Status



   System  Substance        Date  Date  

 

 4053  RXNorm  Erythromycin  Drug    Unknown      Active



     Base  allergy          



       (disorder)          

 

 5224  RXNorm  heparin  Drug    Unknown      Active



       allergy          



       (disorder)          

 

 851573  RXNorm  Flagyl  Drug    Unknown      Active



       allergy          



       (disorder)          







Medications







 RxNorm  Medication  Dose  Route  Instructions  Start  End  Status



           Date  Date  

 

 7703884  3 ML Insulin,    Subcutaneous  subcutaneously 3      Active



   Aspart, Human      times per day      



   100 UNT/ML Pen      (sliding scale)      



   Injector            

 

 166379  Acetaminophen  1 tab  oral  orally every 6  20    Active



   325 MG /      hours as needed.  18    



   Hydrocodone            



   Bitartrate 5 MG            



   Oral Tablet            

 

 2395852  Acetaminophen  1 tab  oral  orally every 6  20    Active



   325 MG /      hours as needed.  18    



   Oxycodone      (as needed for      



   Hydrochloride 5      severe pain;      



   MG Oral Tablet      MDD=4 tablets)      

 

   Albuterol HFA  2 puff  Inhalation  inhaled 4 times      Active



   Inhaler 90      per day as      



   mcg/actuation      needed.      

 

   Albuterol  2.5 mg  Inhalation  inhaled every 4      Active



   Nebulized      hours as needed.      



         (until response)      

 

 278175  Amlodipine 10 MG  10 mg  oral  orally every day      Active



   Oral Tablet            

 

 1191  Aspirin  81 mg  oral  orally every day      Active

 

 1894  Calcitriol  0.25  oral  orally 2 times      Active



     mcg    per day      

 

 1975  Ciprofloxacin  250 mg  oral  orally 2 times      Active



   250 MG Oral      per day (7 days)      



   Tablet            

 

 69564  gabapentin  100 mg  oral  orally 3 times      Active



         per day      

 

 454351  Insulin Glargine  40 to  Subcutaneous  subcutaneously      Active



   100 UNT/ML  60 unit    every day at      



   Injectable      bedtime      



   Solution            

 

 792740  Losartan  100 mg  oral  orally every day      Active



   Potassium 100 MG            



   Oral Tablet            

 

 2003  Omeprazole 40 MG  40 mg  oral  orally every day      Active



   Delayed Release      (swallow whole;      



   Oral Capsule      do not crush,      



         chew, dissolve,      



         or cut/break)      

 

 946173  Promethazine  25 mg  oral  orally every 6      Active



   Hydrochloride 25      hours as needed.      



   MG Oral Tablet            

 

 164921  Sertraline 100  100 mg  oral  orally every day      Active



   MG Oral Tablet            

 

   Warfarin Oral  7.5 mg  oral  orally every day      Active

 

 026281  Methadone  5 mg  oral  orally every 6      Completed



   Hydrochloride 5      hours as needed.      



   MG Oral Tablet            







Problems







 Code  Code System  Problem Name  Start Date  End Date  Status

 

 00762942  SNOMED-CT  Diabetes mellitus  U    Active

 

 95619603  SNOMED-CT  Kidney disease  U    Active

 

 46432003  SNOMED-CT  Peritoneal dialysis  U    Active

 

 43404824  SNOMED-CT  Hypertensive disorder  U    Active







Procedures

No data in the system



Results

******** Laboratory Results ******** Order:  Point of Care Glucose ACCUCHECK 
Specimen Source:  Blood Body Site: Legend: (G,H)=High, (GG,HH,CH,#H)=Above High 
Threshold, (#,L)=Low, (##,CL,#L,LL)=Below Low Threshold, (C,CC,CA,#A,A)=Abnormal





  LOINC   Test   Result   Flag   Range   Units   Date

 

   1GLUCOSE BedSide  304  CH    mg/dL  2018 03:18









 **Test Comment:  [id number] NIKO AVINA



   

 

 **Test Comment:  1No Action



   



 Performing Lab Footnotes:[facility] - [id number] - 
[address] [place] [address] [address] NEWD[address]





 _______________________________________________________________________________
_______ Order:  PT/INR Specimen Source: Body Site: Legend: (G,H)=High, (GG,HH,CH
,#H)=Above High Threshold, (#,L)=Low, (##,CL,#L,LL)=Below Low Threshold, (C,CC,
CA,#A,A)=Abnormal





  LOINC   Test   Result   Flag   Range   Units   Date

 

 5902  1PT Time PPP  15.4   H  9.4-12.4  sec  2018 01:15

 

 6301-6  1INR PPP  1.3        2018 01:15









 **Interpretive Carol:  1********* INR INTERPERTATION ******** 2.0-3.0    
THERAPEUTIC



   MONITORING 2.5-3.5    HEART VALVE REPLACEMENT



 Performing Lab Footnotes:[facility] - [id number] - 
[address] [place] [address] [address] SU





 _______________________________________________________________________________
_______ Order:  PTT Specimen Source: Body Site: Legend: (G,H)=High, (GG,HH,CH,#H
)=Above High Threshold, (#,L)=Low, (##,CL,#L,LL)=Below Low Threshold, (C,CC,CA,#
A,A)=Abnormal





  LOINC   Test   Result   Flag   Range   Units   Date

 

 31732  1aPTT Time Bld  35.6    25.6-36.4  sec  2018 01:15



 Performing Lab Footnotes:[facility] - [id number] - 
[address] [place] [address] [address] SU





 _______________________________________________________________________________
_______ Order:  B-TYPE NATRIURETIC PEPTID Specimen Source: Body Site: Legend: (G
,H)=High, (GG,HH,CH,#H)=Above High Threshold, (#,L)=Low, (##,CL,#L,LL)=Below 
Low Threshold, (C,CC,CA,#A,A)=Abnormal





  LOINC   Test   Result   Flag   Range   Units   Date

 

 31839-1  1BNP SerPl-mCnc  350   H   0-100  pg/mL  2018 01:00



 Performing Lab Footnotes:[facility] - [id number] - 
[address] [place] [address] [address] HERBERT SHAQD1





 _______________________________________________________________________________
_______ Order:  CBC DIFF Specimen Source: Body Site: Legend: (G,H)=High, (GG,HH,
CH,#H)=Above High Threshold, (#,L)=Low, (##,CL,#L,LL)=Below Low Threshold, (C,CC
,CA,#A,A)=Abnormal





  LOINC   Test   Result   Flag   Range   Units   Date

 

 6690-2  1WBC # Bld Auto  8.7    4.8-10.8  K/uL  2018 01:00

 

 39466-2  1RBC # Bld  3.33   L  4.20-5.40  M/uL  2018 01:00

 

 718-7  1Hgb Bld-mCnc  10.4   L  12.0-16.0  gm/dL  2018 01:00

 

 4544-3  1Hct VFr Bld Auto  28.1   L  36.0-48.0  %  2018 01:00

 

 787-2  1MCV RBC Auto  84.3    80.0-100.0  fL  2018 01:00

 

 28540-3  1MCHC RBC-mCnc  37.1   H  30.0-36.5  %  2018 01:00

 

 50125-1  1MCH RBC Qn  31.3    27.0-34.0  pg  2018 01:00

 

 70889-3  1RDW RBC  14.1    11.0-15.0  %  2018 01:00

 

 777-3  1Platelet # Bld Auto  373    130-450  K/uL  2018 01:00

 

 50867-5  1PMV Bld Auto  7.3    6.0-12.0  fL  2018 01:00

 

 751-8  1Neutrophils # Bld Auto  69    37-80  %  2018 01:00

 

 06440-9  1Lymphocytes NFr Bld  22    10-50  %  2018 01:00

 

 5905-5  1Monocytes NFr Bld Auto  5     0-12  %  2018 01:00

 

 44062-3  1Eosinophil # Bld  3    &lt;=8  %  2018 01:00

 

 704-7  1Basophils # Bld Auto  1    &lt;=3  %  2018 01:00

 

 45311-4  1Neutrophils # Bld  6.0    1.8-8.6  K/uL  2018 01:00

 

 731-0  1Lymphocytes # Bld Auto  1.9    0.5-5.0  K/uL  2018 01:00

 

 742-7  1Monocytes # Bld Auto  0.4    0.0-1.3  K/uL  2018 01:00

 

 53225-7  1Eosinophil # Bld  0.3    0.0-0.9  K/uL  2018 01:00

 

 704-7  1Basophils # Bld Auto  0.1    0.0-0.3  K/ul  2018 01:00



 Performing Lab Footnotes:[facility] - [id number] - 
[address] [place] [address] [address] SU





 _______________________________________________________________________________
_______ Order:  CK Specimen Source: Body Site: Legend: (G,H)=High, (GG,HH,CH,#H)
=Above High Threshold, (#,L)=Low, (##,CL,#L,LL)=Below Low Threshold, (C,CC,CA,#A
,A)=Abnormal





  LOINC   Test   Result   Flag   Range   Units   Date

 

 2157-6  1CK SerPl-cCnc  173      U/L  2018 01:00



 Performing Lab Footnotes:[facility] - [id number] - 
[address] [place] [address] [place] MUSADOMINICK





 _______________________________________________________________________________
_______ Order:  COMPREHENSIVE PANEL Specimen Source: Body Site: Legend: (G,H)=
High, (GG,HH,CH,#H)=Above High Threshold, (#,L)=Low, (##,CL,#L,LL)=Below Low 
Threshold, (C,CC,CA,#A,A)=Abnormal





  LOINC   Test   Result   Flag   Range   Units   Date

 

 2951-2  1Sodium SerPl-sCnc  130   L  136-145  mmol/L  2018 01:00

 

 2823-3  1Potassium SerPl-sCnc  4.8    3.5-5.2  mmol/L  2018 01:00

 

 2075-0  1Chloride SerPl-sCnc  100    100-108  mmol/L  2018 01:00

 

 2028-9  1CO2 SerPl-sCnc  19   L  21-32  mmol/L  2018 01:00

 

 2345-7  1Glucose SerPl-mCnc  568  CH    mg/dL  2018 01:00

 

 3094-0  1BUN SerPl-mCnc  23   H  7-21  mg/dL  2018 01:00

 

 2160-0  1Creat SerPl-mCnc  3.9   H  0.6-1.3  mg/dL  2018 01:00









 **Interpretive Carol:  1Normal Kidney Function or Mild Disease - GFR &gt;OR=60 
Chronic



   Kidney Disease - GFR 15-59 Renal Failure - GFR &lt; 15 GFR not



   calculated on patients under 18 years of age.  Calculated



   (estimated) GFR is based on the MDRD Study equation, which



   assumes a steady state for creatinine.  Estimated GFR may not be



   appropriate for medication dosing.









 65847-2  1Ca-I SerPl-mCnc  8.6    8.5-10.8  mg/dL  2018 01:00

 

 08239-1  1GFR/BSA.pred SerPl-ArVRat  13        2018 01:00

 

 02651-2  1Bilirub Bld-mCnc  0.1    0.0-1.2  mg/dL  2018 01:00

 

 2885-2  1Prot SerPl-mCnc  6.1   L  6.4-8.2  gm/dL  2018 01:00

 

 1751-7  1Albumin SerPl-mCnc  2.9   L  3.4-4.8  gm/dL  2018 01:00

 

 6768-6  1ALP SerPl-cCnc  134      U/L  2018 01:00

 

 1742-6  1ALT SerPl-cCnc  &lt;10     0-55  U/L  2018 01:00

 

 1920-8  1AST SerPl-cCnc  10    5-37  U/L  2018 01:00



 Performing Lab Footnotes:[facility] - [id number] - 
[address] [place] [address] [address] SU





 _______________________________________________________________________________
_______ Order:  MAGNESIUM Specimen Source: Body Site: Legend: (G,H)=High, (GG,HH
,CH,#H)=Above High Threshold, (#,L)=Low, (##,CL,#L,LL)=Below Low Threshold, (C,
CC,CA,#A,A)=Abnormal





  LOINC   Test   Result   Flag   Range   Units   Date

 

 56046-2  1Magnesium SerPl-mCnc  1.7    1.7-2.6  mg/dL  2018 01:00



 Performing Lab Footnotes:[facility] - [id number] - 
[address] [place] [address] [address] HERBERT SHARLENE





 _______________________________________________________________________________
_______ Order:  TROPONIN I Specimen Source: Body Site: Legend: (G,H)=High, (GG,
HH,CH,#H)=Above High Threshold, (#,L)=Low, (##,CL,#L,LL)=Below Low Threshold, (C
,CC,CA,#A,A)=Abnormal





  LOINC   Test   Result   Flag   Range   Units   Date

 

 98177-8  1Troponin I SerPl-mCnc  0.03    0.00-0.04  ng/mL  2018 01:00









 **Interpretive Carol:  1         TROPONIN  INTERPRETATION           0.00 -  0.04 
ng/ml



       Normal           0.05 -  0.29 ng/ml       Gray Zone,



   Uncertain for AMI Greater than 0.30 ng/ml       Suggestive of AMI



 Performing Lab Footnotes:[facility] - [id number] - 
[address] [place] [address] [address] HERBERT SHARLENE





 _______________________________________________________________________________
_______******** Radiology Results ********Order:  CHEST TWO VIEWSExam 
Completion Date:2018 00:384 1:26 AM    CHEST TWO VIEWS    
ORDERING CLINICAL INFORMATION:  CHEST XRAY-2VIEWS  --  CHESTPAIN, UNSPECIFIED  
ADDITIONAL CLINICAL INFORMATION:  None.      PROCEDURE: PA and lateral views of 
the chest.    COMPARISON:  2017     FINDINGS:      The cardiomediastinal 
silhouette is within normal limits. The lungs are clear without airspace 
consolidation. Pulmonary vascularity is within normal limits. There is no 
pneumothorax or pleural effusion. Bony thorax is intact.     IMPRESSION:     
Noacute cardiopulmonary disease.    Electronically signed By: Lyndsey Arriaga M.D.  
  Read By: LYNDSEY ARRIAGA  Date:  2018 01:44Order:  US-DOPPLER EXT VEIN 
BILATERALExam Completion Date:2018 01:214 2:09 AM    US-DOPPLER 
EXT VEIN BILATERAL    ORDERING CLINICAL INFORMATION:  Bilat leg PainDiabetic  -
-  Deep venous thrombosis of lower extremity (disorder)  ADDITIONAL CLINICAL 
INFORMATION: None.      PROCEDURE: Real-time ultrasound of the deep venous 
system of both the right and left lower extremities was performed, including 
color and spectral Doppler analysis.    COMPARISON:  None. FINDINGS:    The 
deep venous system in both right and left lower extremities from the common 
femoral vein through the popliteal vein is normally compressible.  No echogenic 
thrombus is seen.  There isnormal color flow throughout the visualized deep 
venous system in both legs. There is normal response to augmentation. The 
visualized calf veins are patent.    IMPRESSION:   No evidence of deep venous 
thrombosis in either the right or left lower extremity.    Electronically 
signed By: Lyndsey Arriaga M.D.  Read By: LYNDSEY ARRIAGA  Date:  2018 02:13



Social History







 Code  Code System  Social History  Description  Dates Observed



     Observation    

 

 053787259  SNOMED CT  Current Smoking  Current every day  



     Status  smoker  

 

 UNK  AdministrativeGender  Sex Assigned At  Unknown  



     Birth    







Vital Signs







 Code  Code System  Vitals  Value  Date

 

 8480-6  LOINC  BP Systolic  168 mm[Hg]  2018

 

 8462-4  LOINC  BP Diastolic  97 mm[Hg]  2018

 

 8310-5  LOINC  Body Temperature  98.6 [degF]  2018

 

 8865-8  LOINC  Pulse Rate  102 {beats}/min  2018

 

 9279-1  [facility]  Respiratory Rate  20 /min  2018

 

 58835-1  [facility]  O2% BldC Oximetry  100 %  2018

 

 3150-0  [facility]  Inhaled Oxygen Concentration  3 liters per minute  2018

 

 8302-2  [facility]  Height  65 [in_i]  2018

 

 00445-3  [facility]  Weight  88.45 kg  2018

 

 3140-1  [facility]  Body surface area Derived from  1.96 m2  2018



     formula    

 

 79872-1  [facility]  BMI (Body Mass Index)  32.4 kg/m2  2018







Goals Section

No data in the system



Health Concerns

No data in the systemEncounter Diagnosis









 Date  Code  Code System  Diagnosis  Status

 

   I24.9  ICD10  ACUTE ISCHEMIC HEART DISEASE UNS  Active







Advance Directives

*RHIO - CONSENT IS YES





 Directive Type  Effective Date    Notes  Supporting Document









 Name  Address  Phone









 No Directive Type  2017 1:26:39  Not Specified  Not Specified  Not 
Specified  None  No



 specified  PM          







Family History

No data in the system



Functional Status







 Code  Functional Condition  Code System  Date  Status

 

   Independent adls  SNOMED CT  2018  Active

 

   Appears well nourished/hydrated  SNOMED CT  2018  Active







Immunizations

No data in the system



Medical Equipment

No data in the system



Mental Status







 Code  Cognitive Condition  Code System  Date  Status

 

   Oriented x 3  SNOMED CT  2018  Active

 

   No acute distress  SNOMED CT  2018  Active

 

   Alert  SNOMED CT  2018  Active







Assessment and Plan

Assessments

No data in the systemPlan Of Treatment

No data in the systemPending Tests

No data in the system



Hospital Discharge Instructions

No data in the system



Reason for Visit







 Reason for Visit

 

 Chest Pain

## 2018-05-01 NOTE — HP
CC:  Dr. Yeni Shanks *

 

HISTORY AND PHYSICAL:

 

DATE OF ADMISSION:  04/30/18

 

PRIMARY CARE PROVIDER:  Dr. Yeni Shanks.

 

ATTENDING PHYSICIAN WHILE IN THE HOSPITAL:  Dr. Luis A Landeros * (dictated by 
Dahiana Preston NP)

 

CHIEF COMPLAINT:

1.  Shortness of breath.

2.  Chest pain.

 

HISTORY OF PRESENT ILLNESS:  Ms. Masters is a 37-year-old female who carries a 
past medical history significant for end-stage renal disease with ambulatory 
peritoneal dialysis, acid reflux, coronary artery disease and melanoma who 
presented to the emergency room with complaints of chest pain that started last 
p.m.  The patient reports that she was lying in bed, watching TV and developed 
chest pain.  She reports that she propped herself up on a pillow to ease the 
chest pain.  She reports that she took 1 nitro at home with no relief.  She 
does report she did have some nausea.  She reports that the pain is worse with 
deep breath.  The pain is also worse with coughing and talking.  She does 
report that the pain on evaluation does not ache.  She reports it as a 
squeezing pain that is in her upper chest and radiates under her left breast 
into her back between her shoulder blades.  She does report that she had the 
same episode approximately 2 years ago and at that time, she went into 
respiratory failure and cardiac arrested.  She was placed in ICU and was in a 
coma for approximately 1 week and was hospitalized for 2 weeks.  She does 
report a history of her previous cardiac catheterization last being in February 2017 showed 3 blocked arteries.  The patient does report that she is 
chronically on 3 L of oxygen at home, but has recently had to increase her 
oxygen to 6 L for the past 2 days due to her increased shortness of breath.  
The patient denies any fever or chills.  She does report chest pain x3 days.  
She does report an occasional cough that is productive with white sputum.  She 
also reports some shortness of breath that started yesterday.  She does report 
she has had occasional chills on and off x3 weeks.  She denies any vomiting or 
diarrhea.  Denies any abdominal pain. Denies any hematuria or dysuria.  No 
focal weakness or sensory loss.  No visual complaints.  No dysphagia.  No 
rashes or lesions.  She does report some depression, but denies suicidal or 
homicidal ideations.  Given her symptoms of chest pain and increased shortness 
of breath, we were asked by the emergency room to evaluate her for admission.

 

PAST MEDICAL HISTORY:  Significant for GERD, coronary artery disease, melanoma, 
end- stage renal disease, type 2 diabetes, gastroparesis, peripheral artery 
disease, chronic hypoxic respiratory failure requiring O2 continuously, 
hypertension, hyperlipidemia, asthma, history of tobacco abuse, depression and 
anxiety.

 

PAST SURGICAL HISTORY:  Right transmetatarsal amputation.

 

HOME MEDICATIONS:  Include:

1.  Phenergan 25 mg p.o. q.8 hours as needed.

2.  Bethanechol 10 mg p.o. 4 times a day.

3.  Albuterol 2.5/3 mL nebs q.6 hours as needed for shortness of breath.

4.  Norvasc 10 mg p.o. q.p.m.

5.  Warfarin 7.5 mg p.o. daily.

6.  Zofran 4 mg sublingual q.6 hours as needed for nausea.

7.  Omeprazole 40 mg p.o. q.p.m.

8.  Methadone 10 mg q.6 hours as needed for pain.

9.  Cozaar 75 mg p.o. daily.

10.  Lantus insulin 60 mg subcu at bedtime.

11.  NovoLog sliding scale.

12.  Gabapentin 200 mg p.o. t.i.d.

13.  Aspirin 81 mg p.o. daily.

14.  Albuterol HFA inhaler 2 puffs q.6 hours as needed for shortness of breath.

 

ALLERGIES TO MEDICATIONS:

1.  AMOXICILLIN.

2.  AUGMENTIN.

3.  ERYTHROMYCIN BASE.

4.  HEPARIN.

5.  FLAGYL.

6.  NIACIN.

7.  REQUIP.

8.  SULFA.

9.  PLASTIC TAPE.

 

FAMILY HISTORY:  Father with an MI at age 49.  Diabetes:  Mom, dad and siblings 
all have diabetes.  Cancer:  Grandfather with lung cancer and grandmother with 
ovarian cancer.

 

SOCIAL HISTORY:  The patient reports she smokes a half a pack per day.  Denies 
any alcohol or drug use.  She is disabled.  She currently lives with her 
boyfriend.  In the event, she is unable to make her own decisions, her 
boyfriend is to make those decisions.  His number is [phone].

 

REVIEW OF SYSTEMS:  There was no documented fever.  There has been no 
significant weight change.  There was no double vision.  No ear drainage.  No 
rhinorrhea. Denies any sore throat.  She does report chest pain.  She also 
reports some shortness of breath.  She denies any abdominal pain.  She does 
report some nausea. Denies vomiting.  Denies any dysuria or urinary frequency.  
Denies any hematuria. There has been no dysphagia.  No visual complaints.  No 
focal weakness.  No rashes or lesions.  She does report some depression, but 
denies any suicidal or homicidal ideation.  A review of 14 systems was 
completed and all others are negative.

 

                               PHYSICAL EXAMINATION

 

GENERAL:  At this time, Ms. Masters is a 37-year-old female who appears fatigue
, resting on the stretcher in the emergency room.  She does appear to have mild 
respiratory distress.

 

VITAL SIGNS:  As follows:  Blood pressure 127/95, heart rate was 100, 
respirations are 14, O2 saturation is 88% to 91% on 8 L in the emergency room 
via nasal cannula.

 

HEENT:  Head is atraumatic, normocephalic.  Eyes:  EOMs are intact.  Sclerae 
anicteric and not pale.  Oral mucosa appears to be moist.

 

NECK:  Supple.

 

LUNGS:  Clear to auscultation bilaterally with diminished lung sounds in the 
bases. There were no wheezes, rales or rhonchi.

 

HEART:  Sounds S1, S2.  Regular rate and rhythm.  There were no murmurs, rubs 
or gallops.

 

ABDOMEN:  Soft, obese, nontender.  Bowel sounds are present x4.

 

EXTREMITIES:  Pulses are +2 throughout.  There is no lower extremity edema.  
She is able to move all 4 extremities.

 

NEUROLOGIC:  She is alert and oriented x3.  Her speech is clear.  There are no 
focal deficits.

 

SKIN:  Intact.

 

 DIAGNOSTIC STUDIES/LAB DATA:  WBCs are 19.6, RBCs are 3.32, hemoglobin was 9.5
, hematocrit was 28, platelet count was 354, INR was 3.18.  Chemistry:  Sodium 
was 131, potassium 4.0, chloride was 94, carbon dioxide was 28, anion gap was 9
, BUN was 39, creatinine 6.14, which appears to be above her baseline.  Glucose 
was 308, lactic acid was 1.2, calcium 8.4, ASTs were 6, ALTs were 6, troponin 
was 0.06 x3. C-reactive protein was 112.55, total protein was 6.3, albumin was 
3.0, procalcitonin was 1.4.  Her urine; pH was 5, specific gravity was 1.023.  
Urine protein was +3, ketones were negative.  Urine blood was +1, urine 
nitrites were negative, bilirubin was negative.  Urobilinogen was negative.  
Urine leukocyte esterase was negative.  Urine wbc's were trace, rbc's 2.0.  
Urine squamous epithelial cells were present.  Bacteria is +1.  Hyaline cast 
was present.  Urine glucose was +3.  



She did have a chest x-ray, radiologist's impression:  Stigmata of chronic 
obstructive pulmonary disease.  No significant change in bibasilar linear 
subsegmental atelectasis or scarring compared with the 01/15/18 exam.  No 
superimposed acute cardiopulmonary process is evident.  There were no pleural 
effusions.  No pulmonary lesions.  No pneumothorax.  She did have 
electrocardiogram completed, which showed sinus cardiac at a rate of 101.

 

ASSESSMENT AND PLAN:  Ms. Masters is a 37-year-old female who presented to the 
emergency room today with complaints of shortness of breath and chest pain.  We 
were asked to evaluate her for her shortness of breath and chest pain.  She 
will be admitted to the ICU for:

 

1.  Acute on chronic hypoxemic respiratory failure.  We will continue her on 
oxygen via facemask at 6 L to keep her saturations above 92%.  We will give her 
albuterol nebulizer as needed for shortness of breath and wheezing.  She was 
also started on Levaquin in the emergency room.  We will continue her Levaquin 
during her hospitalization.  This will be renal dosed at 500 mg q.48 hours.  We 
will continue to support her oxygen needs and titrate her oxygen as needed.  I 
do suspect that she may have some underlying pneumonia as her procalcitonin is 
mildly elevated.

2.  Chest pain.  We will do an echo and serial troponins.  We will also repeat 
an EKG.  I requested her last cardiac catheterization report from Dr. Russell 
as well as her last Cardiology followup.  I will also order a nuclear stress 
for the morning.  She was given Dilaudid for pain management.  She refused 
nitroglycerin sublingual.  The patient has requested that she not be given 
morphine and that she be given Dilaudid and stronger medications to help with 
her pain control.

3.  End-stage renal disease.  She should continue peritoneal dialysis every 6 
hours as she does at home.  Her urinalysis does show some bacteria and blood in 
the urine.  Her urine culture is pending.

4.  Diabetes.  We will continue her on fingersticks a.c. and h.s. with Lantus 
60 units coverage and lispro sliding scale.

5.  Hypertension and coronary artery disease.  We will continue her on Norvasc 
and aspirin as well as Cozaar.

6.  Acid reflux.  We will continue her on omeprazole 40 mg p.o. q.p.m.  She has 
a history of deep vein thrombosis and stenting to the lower extremity.  We will 
continue her Coumadin today given her INR is 3.18 and we will resume when able.

7.  DVT prophylaxis.  We will continue to use her Coumadin as DVT prophylaxis 
when able supratherapeutic INR.  We will hold her Coumadin today and resume 
tomorrow after a repeat INR.

8.  Tobacco abuse.  We will continue nicotine inhaler.

9.  Leukocytosis.  The patient has presented several times with leukocytosis. 
There is no clear sign of infection.  She has no fevers.  Her procalcitonin was 
mildly elevated at 1.4 and her chest x-ray was completed in the emergency room 
that was negative.  Given her poor O2 saturation and requiring additional 
oxygen to maintain saturations above 92%, I suspect this may be related to 
underlying possible pneumonia.

10.  Code status.  She is a full code.

11.  Fluids, electrolytes, and nutrition.  She can be placed on a heart healthy 
diet.

 

TIME SPENT:  Time spent on this admission was approximately 60 minutes, greater 
than half the time was spent face-to-face with the patient obtaining my history 
and physical.  The other half time was spent going over my plan of care with 
the patient and implementing my plan of care.

 

I discussed this with my attending, Dr. Luis A Landeros, and he is in agreement with 
my plan.

 

 ____________________________________ DAHIANA PRESTON, NP

 

[id number]/CPS #: [id number]

SAEED

## 2018-05-01 NOTE — DS
CC:  Dr. Shanks; Dr. Baker *

 

DISCHARGE SUMMARY:

 

DATE OF ADMISSION:  04/30/18

 

DATE OF DISCHARGE:  04/30/18

 

PROVIDER:  Muriel Preston NP



ATTENDING PROVIDER:  Luis A Landeros MD * (DICTATED BY MURIEL PRESTON NP)

 

CHIEF COMPLAINT:  Shortness of breath and chest pain.

 

HISTORY AND HOSPITAL COURSE:  The patient presented to the emergency room on 04/
30/18 for evaluation of shortness of breath and chest pain.  The patient 
received serial troponins, EKGs and echocardiogram.  The patient also had a 
chest x- ray for her shortness of breath, a D-dimer, which was negative.  
Throughout the afternoon and early evening, the patient continued to request to 
leave against medical advice.  The patient was spoke to several times by myself 
and Dr. Landeros and encouraged to stay for further treatment in the hospital.  The 
patient continued to refuse to stay for further treatment.  I did discuss with 
the patient the risk of leaving the hospital including death, serious 
disability and serious infection. The patient is competent.  She is alert and 
oriented and understands these risks. The patient does report she is not 
suicidal or homicidal. The patient reports that she just like to return home.  
The patient was strongly advised to remain in the hospital to receive IV 
antibiotics and oxygen support in that she has a risk of hypoxic respiratory 
failure requiring increased oxygen support.  The patient continues to decline 
wanting to stay in the hospital.  The patient did sign out AMA and did 
verbalize understanding of the risks of leaving including death, severe 
disability and severe illness.  Given her increased shortness of breath, I will 
discharge this patient with antibiotic therapy for possible underlying 
pneumonia. She was given Levaquin.  She is to take 500 mg p.o. q.48 hours x3 
doses.  I am recommending that she hold her Coumadin today and tomorrow as 
Levaquin can increase her INR.  I recommended and gave her a prescription for 
repeat INR in 2 days.  I recommend that she follow up with her primary care 
provider in 1 to 3 days.  I also instructed her to return to the emergency room 
if she has any increased shortness of breath or chest pain or any worsening of 
her symptoms. Again, this patient did sign out AMA and was advised of all the 
risks of leaving.

 

____________________________________ MURIEL PRESTON, NP

 

[id number]/CPS #: [id number]

[facility]

## 2018-07-29 NOTE — ED
Throat Pain/Nasal Congestion





- HPI Summary


HPI Summary: 





38 yr old female with the complaint of sore throat.  Onset of symptoms a couple 

of days ago with sore throat, and some tonsil swelling.  No fever, chills.  She 

has felt like she has had to clear her throat, and coughs to clear  throat.  no 

drooling, no stridor, no productive cough, no SOB.   She is a Diabetic on 

dialysis.   





- History of Current Complaint


Chief Complaint: UCGeneralIllness


Time Seen by Provider: 07/29/18 16:42





- Allergies/Home Medications


Allergies/Adverse Reactions: 


 Allergies











Allergy/AdvReac Type Severity Reaction Status Date / Time


 


amoxicillin [From Augmentin] Allergy  See Comment Verified 07/29/18 16:45


 


clavulanic acid Allergy  See Comment Verified 07/29/18 16:45





[From Augmentin]     


 


erythromycin base Allergy  See Comment Verified 07/29/18 16:45


 


heparin Allergy  See Comment Verified 07/29/18 16:45


 


metronidazole [From Flagyl] Allergy  Nausea And Verified 07/29/18 16:45





   Vomiting  


 


niacin Allergy  Rash Verified 07/29/18 16:45


 


ropinirole [From Requip] Allergy  Hives Verified 07/29/18 16:45


 


Sulfa (Sulfonamide Allergy  Vomiting Verified 07/29/18 16:45





Antibiotics)     


 


plastic tape Allergy Mild Blisters Uncoded 07/29/18 16:45











Home Medications: 


 Home Medications





Aspirin 81 mg CHEW TAB* 81 mg PO BEDTIME 07/29/18 [History Confirmed 07/29/18]


Warfarin TAB(*) [Coumadin TAB(*)] 7.5 mg PO BEDTIME 07/29/18 [History Confirmed 

07/29/18]











PMH/Surg Hx/FS Hx/Imm Hx


Endocrine/Hematology History: Reports: Hx Anticoagulant Therapy - Aspirin., Hx 

Blood Transfusions, Hx Diabetes, Hx Thyroid Disease, Hx Anemia - hx of - 

reports had tranfusion in 2014 after mi


Cardiovascular History: Reports: Hx Angina, Hx Cardiac Arrest - in Dec. 2014 

with CPR, Hx Cardiomegaly, Hx Coronary Artery Disease, Hx Deep Vein Thrombosis, 

Hx Hypercholesterolemia, Hx Hypertension, Hx Myocardial Infarction, Other 

Cardiovascular Problems/Disorders


   Denies: Hx Congestive Heart Failure, Hx Pacemaker/ICD, Hx Valvular Heart 

Disease


Respiratory History: Reports: Hx Asthma, Hx Chronic Obstructive Pulmonary 

Disease (COPD) - smoker, Hx Pneumonia, Hx Sleep Apnea, Other Respiratory 

Problems/Disorders - acute respipatory failure with hypoxia - dec 2016


   Denies: Hx Lung Cancer, Hx Pulmonary Embolism - pt says no, although 

documented in char


GI History: Reports: Hx Gall Bladder Disease, Hx Gastroesophageal Reflux Disease

, Other GI Disorders - GASTROPARESIS - TAKING OMEPRAZOLE, reglan and zofran


   Denies: Hx Gastrointestinal Bleed, Hx Ulcer, Hx Urosepsis


 History: Reports: Hx Acute Renal Failure, Hx Chronic Renal Failure - ESRD on 

PD, Hx Dialysis - PERITONEAL, Hx Kidney Stones, Hx Renal Disease - ESRD on home 

peritoneal dialysis , Other  Problems/Disorders


Musculoskeletal History: Reports: Hx Arthritis - back, hips, Hx Back Problems - 

Sciatica and Herniated L3 disk, Other Musculoskeletal History - partial 

amputation of toes/foot


Sensory History: Reports: Hx Eye Prosthesis - left, Hx Legally Blind - legally 

blind in right eye. no vision in left, Hx Vision Problem


   Denies: Hx Contacts or Glasses, Hx Hearing Aid


Opthamlomology History: Reports: Hx Eye Prosthesis - left, Hx Legally Blind - 

legally blind in right eye. no vision in left, Hx Vision Problem


   Denies: Hx Contacts or Glasses


Neurological History: Reports: Other Neuro Impairments/Disorders - neuropathy


   Denies: Hx Transient Ischemic Attacks (TIA)


Psychiatric History: Reports: Hx Anxiety, Hx Depression, Hx Bipolar Disorder


   Denies: Hx Eating Disorder, Hx Panic Disorder, Hx Schizophrenia, Hx of 

Violent Episodes Against Others





- Cancer History


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


Hx Chemotherapy: No


Hx Radiation Therapy: No





- Surgical History


Surgery Procedure, Year, and Place: LEFT EYE REMOVED 2012 - HAS PROSTHETIC EYE (

ORBITS DONE 5/2015 OK'D BY DR SAE COLE TO SCAN IN MRI) ;.  MELANOMA REMOVED 

FROM [facility] 2013 (PROCEDURE DONE 4X);.  CARDIAC CATH 2014 - NO STENTS;.  LEFT 

WRIST FISTULA PLACEMENT ([facility]) 2014;.  HEMODIALYSIS CATH RIGHT CHEST WALL 2014;.

  PERITONEAL DIALYSIS CATH 2/2015;.  CHEST WALL CATH REMOVAL 7/2016 [facility];.  

RIGHT GREAT TOE AMPUTATION, [facility] 4/2016;.  PLACEMENT RIGHT JUGULAR TESIO 

HEMODIALYSIS CATHETER [facility] 8/27/2016;.  REMOVAL OF JUGULAR CATH - SEPT 2016.  

CARDIAC CATH - stentsx2 right leg.  PARTIAL AMPUTATION RIGHT TOES 5/2017.  

RIGHT TOES ALL REMOVED


Hx Anesthesia Reactions: No





- Immunization History


Date of Tetanus Vaccine: utd


Date of Influenza Vaccine: 10/2017


Infectious Disease History: No


Infectious Disease History: Reports: Hx of Known/Suspected MRSA - MRSA R 1st toe

, History Other Infectious Disease - GANGRENE


   Denies: Hx Clostridium Difficile, Hx Hepatitis, Hx Human Immunodeficiency 

Virus (HIV), Hx Shingles, Hx Tuberculosis, Traveled Outside the [place] in Last 30 

Days





- Family History


Known Family History: Positive: Cardiac Disease - father MI at 43 y/o, 

Hypertension, Other - Positive for bipolar and depression. Completed suicide to 

sister.  





- Social History


Alcohol Use: None


Hx Substance Use: No


Substance Use Type: Reports: None


Substance Use Comment - Amount & Last Used: METHADONE (noted in chart, not 

confirmed still prescribed in 10/2017)


Hx Tobacco Use: Yes


Smoking Status (MU): Heavy Every Day Tobacco Smoker


Type: Cigarettes


Amount Used/How Often: 1 PPD


Length of Time of Smoking/Using Tobacco: 20 YEARS


Have You Smoked in the Last Year: Yes





Review of Systems


Constitutional: Negative


Positive: Sore Throat


All Other Systems Reviewed And Are Negative: Yes





Physical Exam


Triage Information Reviewed: Yes


Vital Signs On Initial Exam: 


 Initial Vitals











Temp Pulse Resp BP Pulse Ox


 


 98 F   106   16   178/105   98 


 


 07/29/18 16:40  07/29/18 16:40  07/29/18 16:40  07/29/18 16:40  07/29/18 16:40











Vital Signs Reviewed: Yes


Appearance: Positive: Well-Appearing, No Pain Distress


Skin: Positive: Warm, Skin Color Reflects Adequate Perfusion


Head/Face: Positive: Normal Head/Face Inspection


Eyes: Positive: EOMI


ENT: Positive: Pharyngeal erythema, TMs normal, Tonsillar swelling, Uvula 

midline.  Negative: Nasal congestion, Nasal drainage, Tonsillar exudate, 

Muffled voice, Hoarse voice


Neck: Positive: Nontender


Respiratory/Lung Sounds: Positive: Clear to Auscultation, Breath Sounds Present


Cardiovascular: Positive: RRR.  Negative: Murmur


Abdomen Description: Positive: Nontender


Musculoskeletal: Positive: Strength/ROM Intact


Neurological: Positive: Sensory/Motor Intact, Alert, Oriented to Person Place, 

Time, CN Intact II-III


Psychiatric: Positive: Normal





- Dalia Coma Scale


Best Eye Response: 4 - Spontaneous


Best Motor Response: 6 - Obeys Commands


Best Verbal Response: 5 - Oriented


Coma Scale Total: 15





Diagnostics





- Vital Signs


 Vital Signs











  Temp Pulse Resp BP Pulse Ox


 


 07/29/18 16:40  98 F  106  16  178/105  98














- Laboratory


Lab Statement: Any lab studies that have been ordered have been reviewed, and 

results considered in the medical decision making process.





EENT Course/Dx





- Course


Course Of Treatment: 38 yr old female with sore throat.





- Diagnoses


Provider Diagnoses: 


 Sore throat, Hypertension








Discharge





- Sign-Out/Discharge


Documenting (check all that apply): Patient Departure





- Discharge Plan


Condition: Good


Disposition: HOME


Patient Education Materials:  Hypertension (ED), Strep Throat (ED)


Referrals: 


Yeni Shanks MD [Primary Care Provider] - 2 Days





- Billing Disposition and Condition


Condition: GOOD


Disposition: Home

## 2018-09-03 NOTE — ED
Abdominal Pain/Female





- HPI Summary


HPI Summary: 





The pt is a 37 y/o female presenting to [facility] c/o acute on chronic epigastric 

pain since one week ago  worsened today.  The pain rated 6/10 in intensity 

radiates to the back. She notes nausea, vomiting, diarrhea, back pain, 

abdominal bloating and tenderness, fever, chills, and dysuria. 





- History of Current Complaint


Chief Complaint: EDAbdPain


Stated Complaint: ABD PAIN/VOMITING


Time Seen by Provider: 09/03/18 15:10


Hx Obtained From: Patient


Hx Last Menstrual Period: 7/8/18


Onset/Duration: Other - Acute on chronic


Timing: Constant


Severity Currently: Moderate


Pain Intensity: 6


Pain Scale Used: 0-10 Numeric


Location: Epigastric


Radiates: Yes


Radiates to: Back


Associated Signs and Symptoms: Positive: Fever, Back Pain, Urinary Symptoms - 

Dysuria, Nausea, Vomiting, Diarrhea, Other: - Positive: bloating


Allergies/Adverse Reactions: 


 Allergies











Allergy/AdvReac Type Severity Reaction Status Date / Time


 


amoxicillin [From Augmentin] Allergy  See Comment Verified 09/03/18 14:03


 


clavulanic acid Allergy  See Comment Verified 09/03/18 14:03





[From Augmentin]     


 


erythromycin base Allergy  See Comment Verified 09/03/18 14:03


 


heparin Allergy  See Comment Verified 09/03/18 14:03


 


metronidazole [From Flagyl] Allergy  Nausea And Verified 09/03/18 14:03





   Vomiting  


 


niacin Allergy  Rash Verified 09/03/18 14:03


 


ropinirole [From Requip] Allergy  Hives Verified 09/03/18 14:03


 


Sulfa (Sulfonamide Allergy  Vomiting Verified 09/03/18 14:03





Antibiotics)     


 


plastic tape Allergy Mild Blisters Uncoded 09/03/18 14:03














PMH/Surg Hx/FS Hx/Imm Hx


Previously Healthy: No


Endocrine/Hematology History: Reports: Hx Anticoagulant Therapy - Aspirin., Hx 

Blood Transfusions, Hx Diabetes, Hx Thyroid Disease, Hx Anemia - hx of - 

reports had tranfusion in 2014 after mi


Cardiovascular History: Reports: Hx Angina, Hx Cardiac Arrest - in Dec. 2014 

with CPR, Hx Cardiomegaly, Hx Coronary Artery Disease, Hx Deep Vein Thrombosis, 

Hx Hypercholesterolemia, Hx Hypertension, Hx Myocardial Infarction, Other 

Cardiovascular Problems/Disorders


   Denies: Hx Congestive Heart Failure, Hx Pacemaker/ICD, Hx Valvular Heart 

Disease


Respiratory History: Reports: Hx Asthma, Hx Chronic Obstructive Pulmonary 

Disease (COPD) - smoker, Hx Pneumonia, Hx Sleep Apnea, Other Respiratory 

Problems/Disorders - acute respipatory failure with hypoxia - dec 2016


   Denies: Hx Lung Cancer, Hx Pulmonary Embolism - pt says no, although 

documented in char


GI History: Reports: Hx Gall Bladder Disease, Hx Gastroesophageal Reflux Disease

, Other GI Disorders - GASTROPARESIS - TAKING OMEPRAZOLE, reglan and zofran


   Denies: Hx Gastrointestinal Bleed, Hx Ulcer, Hx Urosepsis


 History: Reports: Hx Acute Renal Failure, Hx Chronic Renal Failure - ESRD on 

PD, Hx Dialysis - PERITONEAL, Hx Kidney Stones, Hx Renal Disease - ESRD on home 

peritoneal dialysis , Other  Problems/Disorders


Musculoskeletal History: Reports: Hx Arthritis - back, hips, Hx Back Problems - 

Sciatica and Herniated L3 disk, Other Musculoskeletal History - partial 

amputation of toes/foot


Sensory History: Reports: Hx Eye Prosthesis - left, Hx Legally Blind - legally 

blind in right eye. no vision in left, Hx Vision Problem


   Denies: Hx Contacts or Glasses, Hx Hearing Aid


Opthamlomology History: Reports: Hx Eye Prosthesis - left, Hx Legally Blind - 

legally blind in right eye. no vision in left, Hx Vision Problem


   Denies: Hx Contacts or Glasses


Neurological History: Reports: Other Neuro Impairments/Disorders - neuropathy


   Denies: Hx Transient Ischemic Attacks (TIA)


Psychiatric History: Reports: Hx Anxiety, Hx Depression, Hx Bipolar Disorder


   Denies: Hx Eating Disorder, Hx Panic Disorder, Hx Schizophrenia, Hx of 

Violent Episodes Against Others





- Cancer History


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


Hx Chemotherapy: No


Hx Radiation Therapy: No





- Surgical History


Surgery Procedure, Year, and Place: LEFT EYE REMOVED 2012 - HAS PROSTHETIC EYE (

ORBITS DONE 5/2015 OK'D BY DR SAE COLE TO SCAN IN MRI) ;.  MELANOMA REMOVED 

FROM VULVA 2013 (PROCEDURE DONE 4X);.  CARDIAC CATH 2014 - NO STENTS;.  LEFT 

WRIST FISTULA PLACEMENT ([facility]) 2014;.  HEMODIALYSIS CATH RIGHT CHEST WALL 2014;.

  PERITONEAL DIALYSIS CATH 2/2015;.  CHEST WALL CATH REMOVAL 7/2016 [facility];.  

RIGHT GREAT TOE AMPUTATION, [facility] 4/2016;.  PLACEMENT RIGHT JUGULAR TESIO 

HEMODIALYSIS CATHETER [facility] 8/27/2016;.  REMOVAL OF JUGULAR CATH - SEPT 2016.  

CARDIAC CATH - stentsx2 right leg.  PARTIAL AMPUTATION RIGHT TOES 5/2017.  

RIGHT TOES ALL REMOVED


Hx Anesthesia Reactions: No





- Immunization History


Date of Tetanus Vaccine: utd


Date of Influenza Vaccine: 10/2017


Infectious Disease History: No


Infectious Disease History: Reports: Hx of Known/Suspected MRSA - MRSA R 1st toe

, History Other Infectious Disease - GANGRENE


   Denies: Hx Clostridium Difficile, Hx Hepatitis, Hx Human Immunodeficiency 

Virus (HIV), Hx Shingles, Hx Tuberculosis, Traveled Outside the [place] in Last 30 

Days





- Family History


Known Family History: Positive: Cardiac Disease - father MI at 43 y/o, 

Hypertension, Other - Positive for bipolar and depression. Completed suicide to 

sister.  





- Social History


Occupation: Unemployed


Lives: Alone


Alcohol Use: None


Hx Substance Use: No


Substance Use Type: Reports: None


Substance Use Comment - Amount & Last Used: METHADONE (noted in chart, not 

confirmed still prescribed in 10/2017)


Hx Tobacco Use: Yes


Smoking Status (MU): Heavy Every Day Tobacco Smoker


Type: Cigarettes


Amount Used/How Often: 1 PPD


Length of Time of Smoking/Using Tobacco: 20 YEARS


Have You Smoked in the Last Year: Yes





Review of Systems


Positive: Fever, Chills


Positive: Abdominal Pain, Vomiting, Diarrhea, Nausea, Other - Positve: 

Abdominal bloating and tenderness


Positive: dysuria


Positive: Other - Positive: back pain 


All Other Systems Reviewed And Are Negative: Yes





Physical Exam





- Summary


Physical Exam Summary: 





Appearance: The patient is well-nourished in no acute distress and in no acute 

pain.


 


Skin: The skin is warm and dry and skin color reflects adequate perfusion.


 


HEENT: The head is normocephalic and atraumatic. The pupils are equal and 

reactive. The conjunctivae are clear and without drainage. Nares are patent and 

without drainage. Mouth reveals moist mucous membranes and the throat is 

without erythema and exudate. The external ears are intact. The ear canals are 

patent and without drainage. The tympanic membranes are intact.


 


Neck: The neck is supple with full range of motion and non-tender. There are no 

carotid bruits. There is no neck vein distension.


 


Respiratory: Chest is non-tender. Lungs are clear to auscultation and breath 

sounds are symmetrical and equal.


 


Cardiovascular: Heart is regular rate and rhythm. There is no murmur or rub 

auscultated. There is no peripheral edema and pulses are symmetrical and equal.


 


Abdomen: The abdomen is soft and diffusely tender. There are normal bowel 

sounds heard in all four quadrants and there is no organomegaly palpated.


 


Musculoskeletal: There is no back tenderness noted. Extremities are non-tender 

with full range of motion. There is good capillary refill. There is no 

peripheral edema or calf tenderness elicited.


 


Neurological: Patient is alert and oriented to person, place and time. The 

patient has symmetrical motor strength in all four extremities. Cranial nerves 

are grossly intact. Deep tendon reflexes are symmetrical and equal in all four 

extremities.


 


Psychiatric: The patient has an appropriate affect and does not exhibit any 

anxiety or depression.


Triage Information Reviewed: Yes


Vital Signs On Initial Exam: 


 Initial Vitals











Temp Pulse Resp BP Pulse Ox


 


 98.3 F   101   17   166/83   95 


 


 09/03/18 14:00  09/03/18 14:00  09/03/18 14:00  09/03/18 14:00  09/03/18 14:00











Vital Signs Reviewed: Yes





Diagnostics





- Vital Signs


 Vital Signs











  Temp Pulse Resp BP Pulse Ox


 


 09/03/18 14:00  98.3 F  101  17  166/83  95














- Laboratory


Lab Results: 


 Lab Results











  09/03/18 09/03/18 09/03/18 Range/Units





  14:18 14:18 14:18 


 


WBC  10.5    (3.5-10.8)  10^3/ul


 


RBC  5.18    (4.00-5.40)  10^6/ul


 


Hgb  14.9    (12.0-16.0)  g/dl


 


Hct  45    (35-47)  %


 


MCV  86    (80-97)  fL


 


MCH  29    (27-31)  pg


 


MCHC  33    (31-36)  g/dl


 


RDW  16 H    (10.5-15)  %


 


Plt Count  359    (150-450)  10^3/ul


 


MPV  9.0    (7.4-10.4)  um3


 


Neut % (Auto)  82.6    (38-83)  %


 


Lymph % (Auto)  10.8 L    (25-47)  %


 


Mono % (Auto)  4.4    (0-7)  %


 


Eos % (Auto)  1.4    (0-6)  %


 


Baso % (Auto)  0.8    (0-2)  %


 


Absolute Neuts (auto)  8.7 H    (1.5-7.7)  10^3/ul


 


Absolute Lymphs (auto)  1.1    (1.0-4.8)  10^3/ul


 


Absolute Monos (auto)  0.5    (0-0.8)  10^3/ul


 


Absolute Eos (auto)  0.2    (0-0.6)  10^3/ul


 


Absolute Basos (auto)  0.1    (0-0.2)  10^3/ul


 


Absolute Nucleated RBC  0    10^3/ul


 


Nucleated RBC %  0.2    


 


Sodium   132 L   (135-145)  mmol/L


 


Potassium   3.4 L   (3.5-5.0)  mmol/L


 


Chloride   93 L   (101-111)  mmol/L


 


Carbon Dioxide   28   (22-32)  mmol/L


 


Anion Gap   11   (2-11)  mmol/L


 


BUN   36 H   (6-24)  mg/dL


 


Creatinine   5.90 H   (0.51-0.95)  mg/dL


 


Est GFR ( Amer)   9.7   (>60)  


 


Est GFR (Non-Af Amer)   8.0   (>60)  


 


BUN/Creatinine Ratio   6.1 L   (8-20)  


 


Glucose   454 H   ()  mg/dL


 


Lactic Acid    1.7  (0.5-2.0)  mmol/L


 


Calcium   8.4 L   (8.6-10.3)  mg/dL


 


Total Bilirubin   0.20   (0.2-1.0)  mg/dL


 


AST   7 L   (13-39)  U/L


 


ALT   5 L   (7-52)  U/L


 


Alkaline Phosphatase   146 H   ()  U/L


 


C-Reactive Protein   24.29 H   (<8.01)  mg/L


 


Total Protein   6.3 L   (6.4-8.9)  g/dL


 


Albumin   3.1 L   (3.2-5.2)  g/dL


 


Globulin   3.2   (2-4)  g/dL


 


Albumin/Globulin Ratio   1.0   (1-3)  


 


Lipase   41   (11.0-82.0)  U/L











Result Diagrams: 


 09/03/18 14:18





 09/03/18 14:18


Lab Statement: Any lab studies that have been ordered have been reviewed, and 

results considered in the medical decision making process.





Re-Evaluation





- Re-Evaluation


  ** First Eval


Re-Evaluation Time: 18:47


Change: Improved - The pt feels better but is not completely pain free. She is 

ready to go home.





Abdominal Pain Fem Course/Dx





- Course


Course Of Treatment: Ms. Masters presented to the emergency department 

complaining of about a week of abdominal pain across her upper abdomen.  She can

't really describe any exacerbating or relieving factors or associated 

symptoms.  She use to have quite a bit of problem with similar symptoms and 

states many times that "they could never find anything wrong".  She has not had 

a problem for about 6 months.  Her workup was consistent with her past 

concerning her laboratory values.  She improved some with a shot of Dilaudid 

and Zofran and IV fluids.  The differential for her pain includes her history 

of gastroparesis, as well as possible gastritis/duodenitis, scarring from 

previous surgeries, common bile duct or pancreatic pathology as well as 

multiple other etiologies.  Her lab work would indicate that she's not in any 

acute danger.  I did offer a CT scan.  She has had many CT scans in the past 

and would prefer not to which is a decision I agree with.  She was discharged 

home to follow up with her PCP.





- Diagnoses


Provider Diagnoses: 


 Epigastric abdominal pain








Discharge





- Sign-Out/Discharge


Documenting (check all that apply): Patient Departure - DC





- Discharge Plan


Condition: Stable


Disposition: HOME


Patient Education Materials:  Abdominal Pain (ED)


Referrals: 


Yeni Shanks MD [Primary Care Provider] - 3 Days


Additional Instructions: 


Return to ED for any new or worsening symptoms


Follow up with your PCP in 3 days





- Billing Disposition and Condition


Condition: STABLE


Disposition: Home





- Attestation Statements


Document Initiated by Selin: Yes


Documenting Scribe: Magdalena Burrows


Provider For Whom Selin is Documenting (Include Credential): Dr. Salomon Jackson MD 


Scribe Attestation: 


Magdalena RICARDO scribed for Dr. Salomon Jackson MD  on 09/03/18 at 2053. 


Scribe Documentation Reviewed: Yes


Provider Attestation: 


The documentation as recorded by the Magdalena cardenas accurately 

reflects the service I personally performed and the decisions made by me, Dr. Salomon Jackson MD

## 2018-10-05 NOTE — RAD
EXAM:

  US Duplex Right Lower Extremity Veins



CLINICAL HISTORY:

  38 years old, female; Pain; Leg, upper; Right; Prior surgery; Surgery date: 

6+ months; Surgery type: Patient states a stent was placed , but it looks like 

a graft from cfa totpt. ; Patient HX: Right leg pain in area of stent medial 

thigh



TECHNIQUE:

  Real-time duplex ultrasound scan of the right lower extremity veins 

integrating B-mode two-dimensional vascular structure, Doppler spectral 

analysis, color flow Doppler imaging and compression.



COMPARISON:

  No relevant prior studies available.



FINDINGS:

  Deep veins:  Unremarkable.  No DVT in the visualized common femoral, femoral, 

proximal deep femoral or popliteal veins.  The veins demonstrate normal color 

flow, are normally compressible, with normal phasic flow and/or augmentation 

response.

  Superficial veins:  Unremarkable.  No thrombus in the visualized great 

saphenous vein.

  Soft tissues:  No acute findings.  No popliteal cyst.



IMPRESSION:     

  Normal right lower extremity duplex venous ultrasound. No DVT seen in the 

right leg.



To contact [facility] with a general question: [facility] - [phone]

For direct physician to physician contact: Physician Hotline - [phone]

[facility] ([facility] Facility ID #[id number])

## 2018-10-05 NOTE — ED
Back Pain





- HPI Summary


HPI Summary: 


Patient is a 38-year-old female presenting to the emergency department for right

-sided low back pain and right leg pain Times several days.  Patient denies any 

injuries, falls or heavy lifting.  Patient states she has a history of clots in 

her legs and states the pain in her right leg feels similar.  She admits to 

mild bilateral leg edema.  She denies chest pain, shortness of breath, 

abdominal pain, vomiting, urinary symptoms.  She does not mild diarrhea.  

Patient is a history of insulin-dependent diabetes, renal failure and is on 

peritoneal dialysis. Pt. states pain feels similar to previous DVTs. Pt. 

currently on coumadin and states her levels have been off reently. Movement 

makes symptoms worse. Nothing makes symptoms better. Pt. states she did not 

take any of her mediations today. Symptoms are moderate in severity. 








- History of Current Complaint


Chief Complaint: EDBackInjuryPain


Stated Complaint: RT SIDE LOWER BACK PAIN


Time Seen by Provider: 10/05/18 18:24


Hx Obtained From: Patient


Hx Last Menstrual Period: 7/8/18


Pain Intensity: 9





- Allergies/Home Medications


Allergies/Adverse Reactions: 


 Allergies











Allergy/AdvReac Type Severity Reaction Status Date / Time


 


amoxicillin [From Augmentin] Allergy  See Comment Verified 10/05/18 13:38


 


clavulanic acid Allergy  See Comment Verified 10/05/18 13:38





[From Augmentin]     


 


erythromycin base Allergy  See Comment Verified 10/05/18 13:38


 


heparin Allergy  See Comment Verified 10/05/18 13:38


 


metronidazole [From Flagyl] Allergy  Nausea And Verified 10/05/18 13:38





   Vomiting  


 


niacin Allergy  Rash Verified 10/05/18 13:38


 


ropinirole [From Requip] Allergy  Hives Verified 10/05/18 13:38


 


Sulfa (Sulfonamide Allergy  Vomiting Verified 10/05/18 13:38





Antibiotics)     


 


plastic tape Allergy Mild Blisters Uncoded 10/05/18 13:38














PMH/Surg Hx/FS Hx/Imm Hx


Previously Healthy: No


Endocrine/Hematology History: Reports: Hx Anticoagulant Therapy - Aspirin., Hx 

Blood Transfusions, Hx Diabetes, Hx Thyroid Disease, Hx Anemia - hx of - 

reports had tranfusion in 2014 after mi


Cardiovascular History: Reports: Hx Angina, Hx Cardiac Arrest - in Dec. 2014 

with CPR, Hx Cardiomegaly, Hx Coronary Artery Disease, Hx Deep Vein Thrombosis, 

Hx Hypercholesterolemia, Hx Hypertension, Hx Myocardial Infarction, Other 

Cardiovascular Problems/Disorders


   Denies: Hx Congestive Heart Failure, Hx Pacemaker/ICD, Hx Valvular Heart 

Disease


Respiratory History: Reports: Hx Asthma, Hx Chronic Obstructive Pulmonary 

Disease (COPD) - smoker, Hx Pneumonia, Hx Sleep Apnea, Other Respiratory 

Problems/Disorders - acute respipatory failure with hypoxia - dec 2016


   Denies: Hx Lung Cancer, Hx Pulmonary Embolism - pt says no, although 

documented in [facility]


GI History: Reports: Hx Gall Bladder Disease, Hx Gastroesophageal Reflux Disease

, Other GI Disorders - GASTROPARESIS - TAKING OMEPRAZOLE, reglan and zofran


   Denies: Hx Gastrointestinal Bleed, Hx Ulcer, Hx Urosepsis


 History: Reports: Hx Acute Renal Failure, Hx Chronic Renal Failure - ESRD on 

PD, Hx Dialysis - PERITONEAL, Hx Kidney Stones, Hx Renal Disease - ESRD on home 

peritoneal dialysis , Other  Problems/Disorders


Musculoskeletal History: Reports: Hx Arthritis - back, hips, Hx Back Problems - 

Sciatica and Herniated L3 disk, Other Musculoskeletal History - partial 

amputation of toes/foot


Sensory History: Reports: Hx Eye Prosthesis - left, Hx Legally Blind - legally 

blind in right eye. no vision in left, Hx Vision Problem


   Denies: Hx Contacts or Glasses, Hx Hearing Aid


Opthamlomology History: Reports: Hx Eye Prosthesis - left, Hx Legally Blind - 

legally blind in right eye. no vision in left, Hx Vision Problem


   Denies: Hx Contacts or Glasses


Neurological History: Reports: Other Neuro Impairments/Disorders - neuropathy


   Denies: Hx Transient Ischemic Attacks (TIA)


Psychiatric History: Reports: Hx Anxiety, Hx Depression, Hx Bipolar Disorder


   Denies: Hx Eating Disorder, Hx Panic Disorder, Hx Schizophrenia, Hx of 

Violent Episodes Against Others





- Cancer History


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


Hx Chemotherapy: No


Hx Radiation Therapy: No





- Surgical History


Surgery Procedure, Year, and Place: LEFT EYE REMOVED 2012 - HAS PROSTHETIC EYE (

ORBITS DONE 5/2015 OK'D BY DR SAE COLE TO SCAN IN MRI) ;.  MELANOMA REMOVED 

FROM [facility] 2013 (PROCEDURE DONE 4X);.  CARDIAC CATH 2014 - NO STENTS;.  LEFT 

WRIST FISTULA PLACEMENT ([facility]) 2014;.  HEMODIALYSIS CATH RIGHT CHEST WALL 2014;.

  PERITONEAL DIALYSIS CATH 2/2015;.  CHEST WALL CATH REMOVAL 7/2016 [facility] [place];.  

RIGHT GREAT TOE AMPUTATION, [facility] [place] 4/2016;.  PLACEMENT RIGHT JUGULAR TESIO 

HEMODIALYSIS CATHETER [facility] [place] 8/27/2016;.  REMOVAL OF JUGULAR CATH - SEPT 2016.  

CARDIAC CATH - stentsx2 right leg.  PARTIAL AMPUTATION RIGHT TOES 5/2017.  

RIGHT TOES ALL REMOVED


Hx Anesthesia Reactions: No





- Immunization History


Date of Tetanus Vaccine: utd


Date of Influenza Vaccine: 10/2017


Infectious Disease History: No


Infectious Disease History: Reports: Hx of Known/Suspected MRSA - MRSA R 1st toe

, History Other Infectious Disease - GANGRENE


   Denies: Hx Clostridium Difficile, Hx Hepatitis, Hx Human Immunodeficiency 

Virus (HIV), Hx Shingles, Hx Tuberculosis, Traveled Outside the [place] in Last 30 

Days





- Family History


Known Family History: Positive: Cardiac Disease - father MI at 41 y/o, 

Hypertension, Other - Positive for bipolar and depression. Completed suicide to 

sister.  





- Social History


Occupation: Unemployed


Lives: With Family


Alcohol Use: None


Hx Substance Use: No


Substance Use Type: Reports: None


Substance Use Comment - Amount & Last Used: METHADONE (noted in chart, not 

confirmed still prescribed in 10/2017)


Hx Tobacco Use: Yes


Smoking Status (MU): Heavy Every Day Tobacco Smoker


Type: Cigarettes


Amount Used/How Often: 1 PPD


Length of Time of Smoking/Using Tobacco: 20 YEARS


Have You Smoked in the Last Year: Yes





Review of Systems


Constitutional: Negative


Negative: Fever, Chills


Cardiovascular: Negative


Respiratory: Negative


Positive: Diarrhea.  Negative: Abdominal Pain, Vomiting, Nausea


Genitourinary: Negative, Other - Does not make urine


Positive: Other - Right low back pain and right leg pain.


Negative: Weakness, Paresthesia, Numbness


All Other Systems Reviewed And Are Negative: Yes





Physical Exam


Triage Information Reviewed: Yes


Vital Signs On Initial Exam: 


 Initial Vitals











Temp Pulse Resp BP Pulse Ox


 


 98.5 F   109   15   159/100   95 


 


 10/05/18 13:34  10/05/18 13:34  10/05/18 13:34  10/05/18 13:34  10/05/18 13:34











Vital Signs Reviewed: Yes


Appearance: Positive: Well-Appearing - Pt. lying in bed in NAD. Morbidly obese. 

Family present.


Skin: Positive: Warm, Dry


Head/Face: Positive: Normal Head/Face Inspection


Eyes: Positive: Normal, EOMI


Neck: Positive: Supple


Respiratory/Lung Sounds: Positive: Clear to Auscultation, Breath Sounds Present


Cardiovascular: Positive: Normal, RRR


Abdomen Description: Positive: Nontender, Soft


Musculoskeletal: Positive: Normal, Strength/ROM Intact, Other - Partial 

amputation of right foot. Good palpable pedal pulses bilaterally. Pain to right 

upper inner thigh.


Neurological: Positive: Normal, CN Intact II-III


Psychiatric: Positive: Affect/Mood Appropriate





Diagnostics





- Vital Signs


 Vital Signs











  Temp Pulse Resp BP Pulse Ox


 


 10/05/18 17:11  97.9 F  101  17  163/94  94


 


 10/05/18 13:34  98.5 F  109  15  159/100  95














- Laboratory


Lab Results: 


 Lab Results











  10/05/18 10/05/18 10/05/18 Range/Units





  14:33 14:33 14:33 


 


WBC  9.3    (3.5-10.8)  10^3/ul


 


RBC  4.99    (4.00-5.40)  10^6/ul


 


Hgb  14.5    (12.0-16.0)  g/dl


 


Hct  44    (35-47)  %


 


MCV  87    (80-97)  fL


 


MCH  29    (27-31)  pg


 


MCHC  33    (31-36)  g/dl


 


RDW  15    (10.5-15)  %


 


Plt Count  299    (150-450)  10^3/ul


 


MPV  8.3    (7.4-10.4)  um3


 


Neut % (Auto)  80.1    (38-83)  %


 


Lymph % (Auto)  12.5 L    (25-47)  %


 


Mono % (Auto)  4.4    (0-7)  %


 


Eos % (Auto)  2.1    (0-6)  %


 


Baso % (Auto)  0.9    (0-2)  %


 


Absolute Neuts (auto)  7.4    (1.5-7.7)  10^3/ul


 


Absolute Lymphs (auto)  1.2    (1.0-4.8)  10^3/ul


 


Absolute Monos (auto)  0.4    (0-0.8)  10^3/ul


 


Absolute Eos (auto)  0.2    (0-0.6)  10^3/ul


 


Absolute Basos (auto)  0.1    (0-0.2)  10^3/ul


 


Absolute Nucleated RBC  0    10^3/ul


 


Nucleated RBC %  0.1    


 


INR (Anticoag Therapy)   1.48 H   (0.77-1.02)  


 


APTT   36.7 H   (26.0-36.3)  seconds


 


Sodium    131 L  (135-145)  mmol/L


 


Potassium    3.6  (3.5-5.0)  mmol/L


 


Chloride    95 L  (101-111)  mmol/L


 


Carbon Dioxide    29  (22-32)  mmol/L


 


Anion Gap    7  (2-11)  mmol/L


 


BUN    23  (6-24)  mg/dL


 


Creatinine    3.55 H  (0.51-0.95)  mg/dL


 


Est GFR ( Amer)    17.4  (>60)  


 


Est GFR (Non-Af Amer)    14.4  (>60)  


 


BUN/Creatinine Ratio    6.5 L  (8-20)  


 


Glucose    571 H*  ()  mg/dL


 


Calcium    8.4 L  (8.6-10.3)  mg/dL


 


Total Bilirubin    0.20  (0.2-1.0)  mg/dL


 


AST    5 L  (13-39)  U/L


 


ALT    5 L  (7-52)  U/L


 


Alkaline Phosphatase    126 H  ()  U/L


 


Total Protein    5.9 L  (6.4-8.9)  g/dL


 


Albumin    2.8 L  (3.2-5.2)  g/dL


 


Globulin    3.1  (2-4)  g/dL


 


Albumin/Globulin Ratio    0.9 L  (1-3)  











Result Diagrams: 


 10/05/18 14:33





 10/05/18 14:33


Lab Statement: Any lab studies that have been ordered have been reviewed, and 

results considered in the medical decision making process.





Back Pain Course/Dx





- Course


Course Of Treatment: Pt. presenting for low back pain and leg pain. HTN. 

Afebrile. Pt. waited in waiting room for 4-5 hours before being able to be 

brought back into ER for evaluation. Pt. states she did not take any of her 

meds today. She had blood work done in the [facility] and glucose noted to be 517, 

normal anion gap, INR 1.48, chronic renal failure. U/S of right leg ordered. 

Pt. requesting pain medication but she is currently on methadone which she did 

not take today, offered a dose of methadone which she refused. IV fluids and 

insulin ordered. Pt. refused iv fluids and insulin. Pt. is now requesting to be 

dc home bc her pain is not controlled. U/S is negative for acute findings, per 

radiology. Will sign pt. out AMA since workup and treatment is not complete. 

Pt. has decision making compacity. She is aware of risk of leaving such as 

death. Left with her family.





- Diagnoses


Provider Diagnoses: 


 Back pain, Hyperglycemia








Discharge





- Sign-Out/Discharge


Documenting (check all that apply): Patient Departure





- Discharge Plan


Condition: Stable


Disposition: AGAINST MEDICAL ADVICE


Referrals: 


Yeni Shanks MD [Primary Care Provider] - 





- Billing Disposition and Condition


Condition: STABLE


Disposition: Against Medical Advice

## 2018-10-25 NOTE — ED
Abdominal Pain/Female





- HPI Summary


HPI Summary: 





A 37 y/o female presents to the ED c/o intermittent episodes of abd pain 

ongoing for a year.  She also c/o nausea, vomiting, night sweats and chills 

that she has been feeling for a year now. She says that her abd pain radiates 

to her back. She states that she cannot keep water down.  She has IDDM and 

since 2014 shes been on peritoneal dialysis. Her last dialysis was at 1200 

today. She denies HLD or HTN.She sees Dr. Baker, neprhology. 





- History of Current Complaint


Chief Complaint: EDAbdPain


Stated Complaint: STOMACH PAIN,FEVER, VOMITING


Time Seen by Provider: 10/25/18 15:05


Hx Obtained From: Patient


Hx Last Menstrual Period: 7/8/18


Onset/Duration: Gradual Onset, Lasting Weeks, Still Present


Timing: Constant


Severity Initially: Moderate


Severity Currently: Moderate


Pain Intensity: 8


Location: Diffuse


Radiates: Yes


Radiates to: Back


Associated Signs and Symptoms: Positive: Nausea, Vomiting, Other: - Positive: 

chills


Allergies/Adverse Reactions: 


 Allergies











Allergy/AdvReac Type Severity Reaction Status Date / Time


 


amoxicillin [From Augmentin] Allergy  See Comment Verified 10/25/18 14:50


 


clavulanic acid Allergy  See Comment Verified 10/25/18 14:50





[From Augmentin]     


 


erythromycin base Allergy  See Comment Verified 10/25/18 14:50


 


heparin Allergy  See Comment Verified 10/25/18 14:50


 


metronidazole [From Flagyl] Allergy  Nausea And Verified 10/25/18 14:50





   Vomiting  


 


niacin Allergy  Rash Verified 10/25/18 14:50


 


ropinirole [From Requip] Allergy  Hives Verified 10/25/18 14:50


 


Sulfa (Sulfonamide Allergy  Vomiting Verified 10/25/18 14:50





Antibiotics)     


 


plastic tape Allergy Mild Blisters Uncoded 10/05/18 13:38














PMH/Surg Hx/FS Hx/Imm Hx


Previously Healthy: No


Endocrine/Hematology History: Reports: Hx Anticoagulant Therapy - Aspirin., Hx 

Blood Transfusions, Hx Diabetes, Hx Thyroid Disease, Hx Anemia - hx of - 

reports had tranfusion in 2014 after mi


Cardiovascular History: Reports: Hx Angina, Hx Cardiac Arrest - in Dec. 2014 

with CPR, Hx Cardiomegaly, Hx Coronary Artery Disease, Hx Deep Vein Thrombosis, 

Hx Hypercholesterolemia, Hx Hypertension, Hx Myocardial Infarction, Other 

Cardiovascular Problems/Disorders


   Denies: Hx Congestive Heart Failure, Hx Pacemaker/ICD, Hx Valvular Heart 

Disease


Respiratory History: Reports: Hx Asthma, Hx Chronic Obstructive Pulmonary 

Disease (COPD) - smoker, Hx Pneumonia, Hx Sleep Apnea, Other Respiratory 

Problems/Disorders - acute respipatory failure with hypoxia - dec 2016


   Denies: Hx Lung Cancer, Hx Pulmonary Embolism - pt says no, although 

documented in [facility]


GI History: Reports: Hx Gall Bladder Disease, Hx Gastroesophageal Reflux Disease

, Other GI Disorders - GASTROPARESIS - TAKING OMEPRAZOLE, reglan and zofran


   Denies: Hx Gastrointestinal Bleed, Hx Ulcer, Hx Urosepsis


 History: Reports: Hx Acute Renal Failure, Hx Chronic Renal Failure - ESRD on 

PD, Hx Dialysis - PERITONEAL, Hx Kidney Stones, Hx Renal Disease - ESRD on home 

peritoneal dialysis , Other  Problems/Disorders


Musculoskeletal History: Reports: Hx Arthritis - back, hips, Hx Back Problems - 

Sciatica and Herniated L3 disk, Other Musculoskeletal History - partial 

amputation of toes/foot


Sensory History: Reports: Hx Eye Prosthesis - left, Hx Legally Blind - legally 

blind in right eye. no vision in left, Hx Vision Problem


   Denies: Hx Contacts or Glasses, Hx Hearing Aid


Opthamlomology History: Reports: Hx Eye Prosthesis - left, Hx Legally Blind - 

legally blind in right eye. no vision in left, Hx Vision Problem


   Denies: Hx Contacts or Glasses


Neurological History: Reports: Other Neuro Impairments/Disorders - neuropathy


   Denies: Hx Transient Ischemic Attacks (TIA)


Psychiatric History: Reports: Hx Anxiety, Hx Depression, Hx Bipolar Disorder


   Denies: Hx Eating Disorder, Hx Panic Disorder, Hx Schizophrenia, Hx of 

Violent Episodes Against Others





- Cancer History


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


Hx Chemotherapy: No


Hx Radiation Therapy: No





- Surgical History


Surgery Procedure, Year, and Place: LEFT EYE REMOVED 2012 - HAS PROSTHETIC EYE (

ORBITS DONE 5/2015 OK'D BY DR SAE COLE TO SCAN IN MRI) ;.  MELANOMA REMOVED 

FROM [facility] 2013 (PROCEDURE DONE 4X);.  CARDIAC CATH 2014 - NO STENTS;.  LEFT 

WRIST FISTULA PLACEMENT ([facility]) 2014;.  HEMODIALYSIS CATH RIGHT CHEST WALL 2014;.

  PERITONEAL DIALYSIS CATH 2/2015;.  CHEST WALL CATH REMOVAL 7/2016 [facility] [place].  

RIGHT GREAT TOE AMPUTATION, [facility] [place] 4[place]2016;.  PLACEMENT RIGHT JUGULAR TESIO 

HEMODIALYSIS CATHETER [facility] [place] 8/27/2016;.  REMOVAL OF JUGULAR CATH - SEPT 2016.  

CARDIAC CATH - stentsx2 right leg.  PARTIAL AMPUTATION RIGHT TOES 5/2017.  

RIGHT TOES ALL REMOVED


Hx Anesthesia Reactions: No





- Immunization History


Date of Tetanus Vaccine: utd


Date of Influenza Vaccine: 10/2017


Infectious Disease History: No


Infectious Disease History: Reports: Hx of Known/Suspected MRSA - MRSA R 1st toe

, History Other Infectious Disease - GANGRENE


   Denies: Hx Clostridium Difficile, Hx Hepatitis, Hx Human Immunodeficiency 

Virus (HIV), Hx Shingles, Hx Tuberculosis, Traveled Outside the [place] in Last 30 

Days





- Family History


Known Family History: Positive: Cardiac Disease - father MI at 43 y/o, 

Hypertension, Other - Positive for bipolar and depression. Completed suicide to 

sister.  





- Social History


Alcohol Use: None


Hx Substance Use: No


Substance Use Type: Reports: None


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


Hx Tobacco Use: Yes


Smoking Status (MU): Heavy Every Day Tobacco Smoker


Type: Cigarettes


Amount Used/How Often: 1 PPD


Length of Time of Smoking/Using Tobacco: 20 YEARS


Have You Smoked in the Last Year: Yes





Review of Systems


Positive: Fever, Chills, Skin Diaphoresis


Positive: Abdominal Pain, Vomiting, Nausea


All Other Systems Reviewed And Are Negative: Yes





Physical Exam





- Summary


Physical Exam Summary: 





GENERAL: Patient is a well-developed and nourished Female who is lying 

comfortable in the stretcher. Patient is not in any acute respiratory distress.


HEAD AND FACE: Normocephalic


EYES: PERRLA, EOMI x 2.


EARS: Hearing grossly intact.


MOUTH: Oropharynx within normal limits.


NECK: Supple, trachea is midline, no adenopathy, no JVD, no carotid bruit.


CHEST: Symmetric, no tenderness at palpation


LUNGS: Clear to auscultation bilaterally. No wheezing or crackles.


CVS: Regular rate and rhythm, S1 and S2 present, no murmurs or gallops 

appreciated.


ABDOMEN: Peritoneal Dialysis catheter in RLQ, Diffuse TTP and no rebound or 

guarding


EXTREMITIES: Full ROM in all major joints, Left mid-foot amputation


NEURO: Alert and oriented x 3. No acute neurological deficits. Speech is normal 

and follows commands.


SKIN: Dry and warm


Triage Information Reviewed: Yes


Vital Signs On Initial Exam: 


 Initial Vitals











Temp Pulse Resp BP Pulse Ox


 


 99.6 F   106   16   181/104   94 


 


 10/25/18 14:47  10/25/18 14:47  10/25/18 14:47  10/25/18 14:47  10/25/18 14:47











Vital Signs Reviewed: Yes





Diagnostics





- Vital Signs


 Vital Signs











  Temp Pulse Resp BP Pulse Ox


 


 10/25/18 15:04   105   180/113  93


 


 10/25/18 15:02   109    85


 


 10/25/18 14:47  99.6 F  106  16  181/104  94














- Laboratory


Result Diagrams: 


 10/25/18 15:24





 10/25/18 15:24


Lab Statement: Any lab studies that have been ordered have been reviewed, and 

results considered in the medical decision making process.





- EKG


  ** 1527


Cardiac Rate: NL - 97 bpm


EKG Rhythm: Sinus Rhythm


Summary of EKG Findings: LVH, ST elevation most likely secondary to early 

repolarization.  Borderline prolonged  QT.  Similar to EKG on April 30, 2018





Abdominal Pain Fem Course/Dx





- Course


Course Of Treatment: A 37 y/o female presents to the ED c/o intermittent 

episodes of abd pain ongoing for a year. Patient eloped in middle of assessment.





- Diagnoses


Provider Diagnoses: 


 Abdominal pain








Discharge





- Sign-Out/Discharge


Documenting (check all that apply): Patient Departure - eloped





- Discharge Plan


Condition: Stable


Disposition: ELOPEMENT


Referrals: 


Yeni Shanks MD [Primary Care Provider] - 





- Billing Disposition and Condition


Condition: STABLE


Disposition: Elopement





- Attestation Statements


Document Initiated by Scribe: Yes


Documenting Scribe: Deandre Ortega


Provider For Whom Scribe is Documenting (Include Credential): Anastasiia Lim MD


Scribe Attestation: 


I, Deandre Ortega, scribed for Anastasiia Lim MD on 10/26/18 at 1619. 


Scribe Documentation Reviewed: Yes


Provider Attestation: 


The documentation as recorded by the Deandre cardenas accurately 

reflects the service I personally performed and the decisions made by me, Shilpi Lim MD

## 2018-11-03 ENCOUNTER — HOSPITAL ENCOUNTER (EMERGENCY)
Dept: HOSPITAL 25 - ED | Age: 38
Discharge: LEFT BEFORE DISCHARGE | End: 2018-11-03
Payer: COMMERCIAL

## 2018-11-03 VITALS — SYSTOLIC BLOOD PRESSURE: 226 MMHG | DIASTOLIC BLOOD PRESSURE: 130 MMHG

## 2018-11-03 DIAGNOSIS — Z79.01: ICD-10-CM

## 2018-11-03 DIAGNOSIS — N28.89: ICD-10-CM

## 2018-11-03 DIAGNOSIS — R10.9: Primary | ICD-10-CM

## 2018-11-03 DIAGNOSIS — Z88.0: ICD-10-CM

## 2018-11-03 DIAGNOSIS — R07.9: ICD-10-CM

## 2018-11-03 DIAGNOSIS — Z99.2: ICD-10-CM

## 2018-11-03 DIAGNOSIS — I10: ICD-10-CM

## 2018-11-03 DIAGNOSIS — R11.2: ICD-10-CM

## 2018-11-03 DIAGNOSIS — N18.6: ICD-10-CM

## 2018-11-03 DIAGNOSIS — F17.210: ICD-10-CM

## 2018-11-03 LAB
BASOPHILS # BLD AUTO: 0.2 10^3/UL (ref 0–0.2)
EOSINOPHIL # BLD AUTO: 0.2 10^3/UL (ref 0–0.6)
HCT VFR BLD AUTO: 43 % (ref 35–47)
HGB BLD-MCNC: 13.8 G/DL (ref 12–16)
INR PPP/BLD: 1.62 (ref 0.77–1.02)
LYMPHOCYTES # BLD AUTO: 0.9 10^3/UL (ref 1–4.8)
MCH RBC QN AUTO: 28 PG (ref 27–31)
MCHC RBC AUTO-ENTMCNC: 32 G/DL (ref 31–36)
MCV RBC AUTO: 86 FL (ref 80–97)
MONOCYTES # BLD AUTO: 0.5 10^3/UL (ref 0–0.8)
NEUTROPHILS # BLD AUTO: 16.6 10^3/UL (ref 1.5–7.7)
NRBC # BLD AUTO: 0 10^3/UL
NRBC BLD QL AUTO: 0.1
PLATELET # BLD AUTO: 302 10^3/UL (ref 150–450)
RBC # BLD AUTO: 4.93 10^6/UL (ref 4–5.4)
WBC # BLD AUTO: 18.4 10^3/UL (ref 3.5–10.8)

## 2018-11-03 PROCEDURE — 74019 RADEX ABDOMEN 2 VIEWS: CPT

## 2018-11-03 PROCEDURE — 85025 COMPLETE CBC W/AUTO DIFF WBC: CPT

## 2018-11-03 PROCEDURE — 82140 ASSAY OF AMMONIA: CPT

## 2018-11-03 PROCEDURE — 83690 ASSAY OF LIPASE: CPT

## 2018-11-03 PROCEDURE — 83605 ASSAY OF LACTIC ACID: CPT

## 2018-11-03 PROCEDURE — 96374 THER/PROPH/DIAG INJ IV PUSH: CPT

## 2018-11-03 PROCEDURE — 80053 COMPREHEN METABOLIC PANEL: CPT

## 2018-11-03 PROCEDURE — 96375 TX/PRO/DX INJ NEW DRUG ADDON: CPT

## 2018-11-03 PROCEDURE — 85610 PROTHROMBIN TIME: CPT

## 2018-11-03 PROCEDURE — 86140 C-REACTIVE PROTEIN: CPT

## 2018-11-03 PROCEDURE — 36415 COLL VENOUS BLD VENIPUNCTURE: CPT

## 2018-11-03 PROCEDURE — 99282 EMERGENCY DEPT VISIT SF MDM: CPT

## 2018-11-03 NOTE — RAD
INDICATION:  Diffuse abdominal pain



COMPARISON:  None



TECHNIQUE: Supine and upright views of the abdomen were obtained.



FINDINGS:



The right lower quadrant peritoneal dialysis catheter appears similar to the previous CT

of the abdomen and pelvis.



The small bowel and colon appear nondistended. No free intraperitoneal air is seen.



No grossly abnormal or pathologic appearing calcifications are noted.



Visualized bones are within normal limits for the patient's age.



Incidentally noted are bilateral superficial femoral artery stents.



IMPRESSION:  No radiographically apparent acute abnormality of the abdomen.

## 2018-11-03 NOTE — ED
Abdominal Pain/Female





- HPI Summary


HPI Summary: 


This patient is a 38 year old F presenting to [facility] accompanied by family with 

a chief complaint of diffuse abd pain that began yesterday night. The patient 

rates the pain 8/10 in severity. Symptoms aggravated by nothing. Symptoms 

alleviated by nothing. Patient reports nausea, vomiting, and burning CP. 

Patient states she has been dealing with chronic diffuse abdominal pain for a 

year, and frequently goes to the hospital for symptomatic treatment. Patient 

states she has a history of chronic renal failure with dialysis treatment.  Her 

diasolate fluid has been clear of late.








- History of Current Complaint


Chief Complaint: EDChestPainROMI


Stated Complaint: CHEST PAIN, N,V ABD PAIN


Time Seen by Provider: 11/03/18 09:15


Hx Obtained From: Patient


Hx Last Menstrual Period: 7/8/18


Pregnant?: No


Onset/Duration: Sudden Onset, Lasting Days, Still Present


Timing: Constant


Severity Initially: Severe


Severity Currently: Severe


Pain Intensity: 8


Pain Scale Used: 0-10 Numeric


Location: Diffuse


Radiates: No


Aggravating Factor(s): Nothing


Alleviating Factor(s): Nothing


Associated Signs and Symptoms: Positive: Nausea, Vomiting, Other: - Positive CP


Allergies/Adverse Reactions: 


 Allergies











Allergy/AdvReac Type Severity Reaction Status Date / Time


 


amoxicillin [From Augmentin] Allergy  See Comment Verified 11/03/18 09:05


 


clavulanic acid Allergy  See Comment Verified 11/03/18 09:05





[From Augmentin]     


 


erythromycin base Allergy  See Comment Verified 11/03/18 09:05


 


heparin Allergy  See Comment Verified 11/03/18 09:05


 


metronidazole [From Flagyl] Allergy  Nausea And Verified 11/03/18 09:05





   Vomiting  


 


niacin Allergy  Rash Verified 11/03/18 09:05


 


ropinirole [From Requip] Allergy  Hives Verified 11/03/18 09:05


 


Sulfa (Sulfonamide Allergy  Vomiting Verified 11/03/18 09:05





Antibiotics)     


 


plastic tape Allergy Mild Blisters Uncoded 11/03/18 09:05














PMH/Surg Hx/FS Hx/Imm Hx


Previously Healthy: No


Endocrine/Hematology History: Reports: Hx Anticoagulant Therapy - Aspirin., Hx 

Blood Transfusions, Hx Diabetes, Hx Thyroid Disease, Hx Anemia - hx of - 

reports had tranfusion in 2014 after mi


Cardiovascular History: Reports: Hx Angina, Hx Cardiac Arrest - in Dec. 2014 

with CPR, Hx Cardiomegaly, Hx Coronary Artery Disease, Hx Deep Vein Thrombosis, 

Hx Hypercholesterolemia, Hx Hypertension, Hx Myocardial Infarction, Other 

Cardiovascular Problems/Disorders


   Denies: Hx Congestive Heart Failure, Hx Pacemaker/ICD, Hx Valvular Heart 

Disease


Respiratory History: Reports: Hx Asthma, Hx Chronic Obstructive Pulmonary 

Disease (COPD) - smoker, Hx Pneumonia, Hx Sleep Apnea, Other Respiratory 

Problems/Disorders - acute respipatory failure with hypoxia - dec 2016


   Denies: Hx Lung Cancer, Hx Pulmonary Embolism - pt says no, although 

documented in char


GI History: Reports: Hx Gall Bladder Disease, Hx Gastroesophageal Reflux Disease

, Other GI Disorders - GASTROPARESIS - TAKING OMEPRAZOLE, reglan and zofran


   Denies: Hx Gastrointestinal Bleed, Hx Ulcer, Hx Urosepsis


 History: Reports: Hx Acute Renal Failure, Hx Chronic Renal Failure - ESRD on 

PD, Hx Dialysis - PERITONEAL, Hx Kidney Stones, Hx Renal Disease - ESRD on home 

peritoneal dialysis , Other  Problems/Disorders


Musculoskeletal History: Reports: Hx Arthritis - back, hips, Hx Back Problems - 

Sciatica and Herniated L3 disk, Other Musculoskeletal History - partial 

amputation of toes/foot


Sensory History: Reports: Hx Eye Prosthesis - left, Hx Legally Blind - legally 

blind in right eye. no vision in left, Hx Vision Problem


   Denies: Hx Contacts or Glasses, Hx Hearing Aid


Opthamlomology History: Reports: Hx Eye Prosthesis - left, Hx Legally Blind - 

legally blind in right eye. no vision in left, Hx Vision Problem


   Denies: Hx Contacts or Glasses


Neurological History: Reports: Other Neuro Impairments/Disorders - neuropathy


   Denies: Hx Transient Ischemic Attacks (TIA)


Psychiatric History: Reports: Hx Anxiety, Hx Depression, Hx Bipolar Disorder


   Denies: Hx Eating Disorder, Hx Panic Disorder, Hx Schizophrenia, Hx of 

Violent Episodes Against Others





- Cancer History


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


Hx Chemotherapy: No


Hx Radiation Therapy: No





- Surgical History


Surgery Procedure, Year, and Place: LEFT EYE REMOVED 2012 - HAS PROSTHETIC EYE (

ORBITS DONE 5/2015 OK'D BY DR SAE COLE TO SCAN IN MRI) ;.  MELANOMA REMOVED 

FROM [facility] 2013 (PROCEDURE DONE 4X);.  CARDIAC CATH 2014 - NO STENTS;.  LEFT 

WRIST FISTULA PLACEMENT ([facility]) 2014;.  HEMODIALYSIS CATH RIGHT CHEST WALL 2014;.

  PERITONEAL DIALYSIS CATH 2/2015;.  CHEST WALL CATH REMOVAL 7/2016 [facility] [place];.  

RIGHT GREAT TOE AMPUTATION, [facility] [place] 4/2016;.  PLACEMENT RIGHT JUGULAR TESIO 

HEMODIALYSIS CATHETER [facility] [place] 8/27/2016;.  REMOVAL OF JUGULAR CATH - SEPT 2016.  

CARDIAC CATH - stentsx2 right leg.  PARTIAL AMPUTATION RIGHT TOES 5/2017.  

RIGHT TOES ALL REMOVED


Hx Anesthesia Reactions: No





- Immunization History


Date of Tetanus Vaccine: utd


Date of Influenza Vaccine: 10/2017


Infectious Disease History: No


Infectious Disease History: Reports: Hx of Known/Suspected MRSA - MRSA R 1st toe

, History Other Infectious Disease - GANGRENE


   Denies: Hx Clostridium Difficile, Hx Hepatitis, Hx Human Immunodeficiency 

Virus (HIV), Hx Shingles, Hx Tuberculosis, Traveled Outside the [place] in Last 30 

Days





- Family History


Known Family History: Positive: Cardiac Disease - father MI at 43 y/o, 

Hypertension, Other - Positive for bipolar and depression. Completed suicide to 

sister.  





- Social History


Occupation: Unemployed


Lives: Alone


Alcohol Use: None


Hx Substance Use: No


Substance Use Type: Reports: None


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


Hx Tobacco Use: Yes


Smoking Status (MU): Heavy Every Day Tobacco Smoker


Type: Cigarettes


Amount Used/How Often: 1 PPD


Length of Time of Smoking/Using Tobacco: 20 YEARS


Have You Smoked in the Last Year: Yes





Review of Systems


Positive: Chest Pain


Positive: Abdominal Pain, Vomiting, Nausea


All Other Systems Reviewed And Are Negative: Yes





Physical Exam





- Summary


Physical Exam Summary: 


Appearance: Well appearing, minimal pain distress, appears older than stated age


Skin: warm, dry, reflects adequate perfusion


Head/face: normal


Eyes: EOMI, VIOLETTA


ENT: mucous membranes moist


Neck: supple, non-tender


Respiratory: breath sounds present, Mild wheeze both bases, no rales or rhonchi


Cardiovascular: tachycardic but regular rhythm, pulses symmetrical 


Abdomen: Peritoneal dialysis catheter in RLQ, scar in LLQ, mild diffuse 

tenderness without focality, no rebound or guarding


Bowel Sounds: decreased


Musculoskeletal: normal, strength/ROM intact


Neuro: normal, sensory motor intact, A&Ox3





Triage Information Reviewed: Yes


Vital Signs On Initial Exam: 


 Initial Vitals











Temp Pulse Resp BP Pulse Ox


 


 99.8 F   101   18   170/133   97 


 


 11/03/18 09:06  11/03/18 09:06  11/03/18 09:06  11/03/18 09:06  11/03/18 09:06











Vital Signs Reviewed: Yes





Diagnostics





- Vital Signs


 Vital Signs











  Temp Pulse Resp BP Pulse Ox


 


 11/03/18 09:06  99.8 F  101  18  170/133  97














- Laboratory


Result Diagrams: 


 11/03/18 10:15





 11/03/18 10:15


Lab Statement: Any lab studies that have been ordered have been reviewed, and 

results considered in the medical decision making process.





- Radiology


  ** Abdomen XR


Radiology Interpretation Completed By: ED Physician


Summary of Radiographic Findings: Abdomen XR reveals, per ED physician, no 

obstructive pattern





Re-Evaluation





- Re-Evaluation


  ** First Eval


Re-Evaluation Time: 11:05


Change: Unchanged


Comment: Patient requests to go home. I offered her treatment of pain. Patient 

reports not taking her methadone this morning, and I offered her that. Patient 

still requests to go home.





Abdominal Pain Fem Course/Dx





- Course


Course Of Treatment: Patient is a well-known, frequent visitor to the emergency 

department with recurring abdominal pain and history of end-stage renal disease 

on peritoneal dialysis.  She has diffuse tenderness but no focality.  She is 

given medication for nausea, prokinesis.  Patient was having ongoing discomfort 

however she wanted to leave.  She had not taken her methadone which was offered 

to her as was IV pain medication.  However, the patient wishes to department.  

She has no fever and is alert, oriented and normally conversant.  X-rays do 

show positive bowel gas.  There is no obstruction.  Patient was offered 

continued evaluation/treatment however she wishes to leave AGAINST MEDICAL 

ADVICE.





- Diagnoses


Differential Diagnosis: Positive: Bowel Obstruction, Constipation, Gall Bladder 

Disease, Irritable Bowel Syndrome, Other - Subacute bacterial peritonitis


Provider Diagnoses: 


 Chronic abdominal pain, End stage renal disease on dialysis, Uncontrolled 

hypertension








Discharge





- Sign-Out/Discharge


Documenting (check all that apply): Patient Departure - Left AMA





- Discharge Plan


Condition: Fair


Disposition: AGAINST MEDICAL ADVICE


Patient Education Materials:  Chronic Abdominal Pain (ED)


Referrals: 


Yeni Shanks MD [Primary Care Provider] - 


Additional Instructions: 


Call your doctor first thing Monday morning to be seen promptly.  Return to the 

ER with any concerns but to include chest pain, shortness of breath, increased 

abdominal pain, persistent nausea or vomiting, uncontrolled pain, new symptoms 

or other concerns as discussed.  Your blood pressure was uncontrolled today.  

Make sure you take your methadone today





- Billing Disposition and Condition


Condition: FAIR


Disposition: Against Medical Advice





- Attestation Statements


Document Initiated by Scribe: Yes


Documenting Scribe: Val Murray


Provider For Whom Scribe is Documenting (Include Credential): Dr. Abdoul Rodriguez MD


Scribe Attestation: 


I, Val Murray, scribed for Dr. Abdoul Rodriguez MD on 11/03/18 at 1451. 


Scribe Documentation Reviewed: Yes


Provider Attestation: 


The documentation as recorded by the [email or web address] carbajal accurately reflects the 

service I personally performed and the decisions made by me, Dr. Abdoul Rodriguez MD

## 2018-12-26 ENCOUNTER — HOSPITAL ENCOUNTER (OUTPATIENT)
Dept: HOSPITAL 25 - ED | Age: 38
Setting detail: OBSERVATION
LOS: 1 days | Discharge: HOME | End: 2018-12-27
Attending: HOSPITALIST | Admitting: HOSPITALIST
Payer: COMMERCIAL

## 2018-12-26 DIAGNOSIS — Z85.820: ICD-10-CM

## 2018-12-26 DIAGNOSIS — Z79.4: ICD-10-CM

## 2018-12-26 DIAGNOSIS — I73.9: ICD-10-CM

## 2018-12-26 DIAGNOSIS — J96.11: ICD-10-CM

## 2018-12-26 DIAGNOSIS — Z99.2: ICD-10-CM

## 2018-12-26 DIAGNOSIS — J45.909: ICD-10-CM

## 2018-12-26 DIAGNOSIS — F32.9: ICD-10-CM

## 2018-12-26 DIAGNOSIS — C43.9: ICD-10-CM

## 2018-12-26 DIAGNOSIS — R51: ICD-10-CM

## 2018-12-26 DIAGNOSIS — I25.10: ICD-10-CM

## 2018-12-26 DIAGNOSIS — R10.9: Primary | ICD-10-CM

## 2018-12-26 DIAGNOSIS — K31.84: ICD-10-CM

## 2018-12-26 DIAGNOSIS — R42: ICD-10-CM

## 2018-12-26 DIAGNOSIS — Z88.0: ICD-10-CM

## 2018-12-26 DIAGNOSIS — F41.9: ICD-10-CM

## 2018-12-26 DIAGNOSIS — E11.9: ICD-10-CM

## 2018-12-26 DIAGNOSIS — R50.9: ICD-10-CM

## 2018-12-26 DIAGNOSIS — R11.0: ICD-10-CM

## 2018-12-26 DIAGNOSIS — Z79.82: ICD-10-CM

## 2018-12-26 DIAGNOSIS — K21.9: ICD-10-CM

## 2018-12-26 DIAGNOSIS — R07.9: ICD-10-CM

## 2018-12-26 DIAGNOSIS — F17.210: ICD-10-CM

## 2018-12-26 DIAGNOSIS — N18.6: ICD-10-CM

## 2018-12-26 LAB
ALBUMIN SERPL BCG-MCNC: 3 G/DL (ref 3.2–5.2)
ALBUMIN/GLOB SERPL: 1 {RATIO} (ref 1–3)
ALP SERPL-CCNC: 144 U/L (ref 34–104)
ALT SERPL W P-5'-P-CCNC: 6 U/L (ref 7–52)
ANION GAP SERPL CALC-SCNC: 8 MMOL/L (ref 2–11)
AST SERPL-CCNC: 7 U/L (ref 13–39)
BASOPHILS # BLD AUTO: 0.1 10^3/UL (ref 0–0.2)
BUN SERPL-MCNC: 30 MG/DL (ref 6–24)
BUN/CREAT SERPL: 7.5 (ref 8–20)
CALCIUM SERPL-MCNC: 8.4 MG/DL (ref 8.6–10.3)
CHLORIDE SERPL-SCNC: 96 MMOL/L (ref 101–111)
EOSINOPHIL # BLD AUTO: 0.2 10^3/UL (ref 0–0.6)
FOLATE SERPL-MCNC: 4.63 NG/ML (ref 3.99–?)
GLOBULIN SER CALC-MCNC: 3.1 G/DL (ref 2–4)
GLUCOSE SERPL-MCNC: 513 MG/DL (ref 70–100)
HCG SERPL QL: 7.3 MIU/ML
HCO3 SERPL-SCNC: 28 MMOL/L (ref 22–32)
HCT VFR BLD AUTO: 30 % (ref 35–47)
HGB BLD-MCNC: 9.9 G/DL (ref 12–16)
INR PPP/BLD: 1.71 (ref 0.77–1.02)
IRON SERPL-MCNC: 37 UG/DL (ref 50–212)
LYMPHOCYTES # BLD AUTO: 0.9 10^3/UL (ref 1–4.8)
MCH RBC QN AUTO: 29 PG (ref 27–31)
MCHC RBC AUTO-ENTMCNC: 33 G/DL (ref 31–36)
MCV RBC AUTO: 88 FL (ref 80–97)
MONOCYTES # BLD AUTO: 0.6 10^3/UL (ref 0–0.8)
NEUTROPHILS # BLD AUTO: 18.6 10^3/UL (ref 1.5–7.7)
NEUTS BAND NFR FLD: 4 %
NRBC # BLD AUTO: 0 10^3/UL
NRBC BLD QL AUTO: 0
PLATELET # BLD AUTO: 284 10^3/UL (ref 150–450)
POTASSIUM SERPL-SCNC: 3.9 MMOL/L (ref 3.5–5)
PROT SERPL-MCNC: 6.1 G/DL (ref 6.4–8.9)
RBC # BLD AUTO: 3.46 10^6/UL (ref 4–5.4)
RBC UR QL AUTO: (no result)
SODIUM SERPL-SCNC: 132 MMOL/L (ref 135–145)
WBC # BLD AUTO: 20.4 10^3/UL (ref 3.5–10.8)
WBC UR QL AUTO: (no result)

## 2018-12-26 PROCEDURE — 93005 ELECTROCARDIOGRAM TRACING: CPT

## 2018-12-26 PROCEDURE — 83690 ASSAY OF LIPASE: CPT

## 2018-12-26 PROCEDURE — 89051 BODY FLUID CELL COUNT: CPT

## 2018-12-26 PROCEDURE — 85025 COMPLETE CBC W/AUTO DIFF WBC: CPT

## 2018-12-26 PROCEDURE — 96376 TX/PRO/DX INJ SAME DRUG ADON: CPT

## 2018-12-26 PROCEDURE — 85610 PROTHROMBIN TIME: CPT

## 2018-12-26 PROCEDURE — 82746 ASSAY OF FOLIC ACID SERUM: CPT

## 2018-12-26 PROCEDURE — 36415 COLL VENOUS BLD VENIPUNCTURE: CPT

## 2018-12-26 PROCEDURE — 81003 URINALYSIS AUTO W/O SCOPE: CPT

## 2018-12-26 PROCEDURE — 83605 ASSAY OF LACTIC ACID: CPT

## 2018-12-26 PROCEDURE — 96375 TX/PRO/DX INJ NEW DRUG ADDON: CPT

## 2018-12-26 PROCEDURE — 82607 VITAMIN B-12: CPT

## 2018-12-26 PROCEDURE — 84702 CHORIONIC GONADOTROPIN TEST: CPT

## 2018-12-26 PROCEDURE — 83540 ASSAY OF IRON: CPT

## 2018-12-26 PROCEDURE — 96372 THER/PROPH/DIAG INJ SC/IM: CPT

## 2018-12-26 PROCEDURE — 86140 C-REACTIVE PROTEIN: CPT

## 2018-12-26 PROCEDURE — 81015 MICROSCOPIC EXAM OF URINE: CPT

## 2018-12-26 PROCEDURE — 80048 BASIC METABOLIC PNL TOTAL CA: CPT

## 2018-12-26 PROCEDURE — G0378 HOSPITAL OBSERVATION PER HR: HCPCS

## 2018-12-26 PROCEDURE — 80053 COMPREHEN METABOLIC PANEL: CPT

## 2018-12-26 PROCEDURE — 74176 CT ABD & PELVIS W/O CONTRAST: CPT

## 2018-12-26 PROCEDURE — 87086 URINE CULTURE/COLONY COUNT: CPT

## 2018-12-26 PROCEDURE — 96374 THER/PROPH/DIAG INJ IV PUSH: CPT

## 2018-12-26 PROCEDURE — 82803 BLOOD GASES ANY COMBINATION: CPT

## 2018-12-26 PROCEDURE — 99284 EMERGENCY DEPT VISIT MOD MDM: CPT

## 2018-12-26 PROCEDURE — 87040 BLOOD CULTURE FOR BACTERIA: CPT

## 2018-12-26 RX ADMIN — INSULIN LISPRO SCH UNITS: 100 INJECTION, SOLUTION INTRAVENOUS; SUBCUTANEOUS at 17:33

## 2018-12-26 RX ADMIN — GABAPENTIN SCH MG: 100 CAPSULE ORAL at 20:08

## 2018-12-26 RX ADMIN — METHADONE HYDROCHLORIDE PRN MG: 5 TABLET ORAL at 20:07

## 2018-12-26 RX ADMIN — BETHANECHOL CHLORIDE SCH MG: 10 TABLET ORAL at 20:08

## 2018-12-26 RX ADMIN — HYDROMORPHONE HYDROCHLORIDE PRN MG: 1 INJECTION, SOLUTION INTRAMUSCULAR; INTRAVENOUS; SUBCUTANEOUS at 21:20

## 2018-12-26 RX ADMIN — PROMETHAZINE HYDROCHLORIDE PRN MG: 25 TABLET ORAL at 17:39

## 2018-12-26 RX ADMIN — HYDROMORPHONE HYDROCHLORIDE PRN MG: 1 INJECTION, SOLUTION INTRAMUSCULAR; INTRAVENOUS; SUBCUTANEOUS at 16:07

## 2018-12-26 RX ADMIN — BETHANECHOL CHLORIDE SCH MG: 10 TABLET ORAL at 17:39

## 2018-12-26 NOTE — HP
CC:  Dr. Shanks*

 

[facility] MEDICINE HISTORY AND PHYSICAL:

 

DATE OF ADMISSION:  18

 

ATTENDING PHYSICIAN:  Dr. Luis A Landeros* (dictation provided by Marielle Skelton NP)

 

CHIEF COMPLAINT:  Abdominal pain, headache, fever, chest pain, nausea, 
dizziness.

 

HISTORY OF PRESENT ILLNESS:  Ms. Masters is a 38-year-old female with a past 
medical history of end-stage renal disease, on peritoneal dialysis; as well as 
GERD; coronary artery disease; melanoma; insulin-dependent type 2 diabetes; 
gastroparesis; peripheral arterial disease; chronic hypoxic respiratory failure
, on 2 L nasal cannula; tobacco abuse; depression; and anxiety; who presents to 
the hospital today with multiple complaints.  Ms. Masters believes that her 
symptoms all trace back to a procedure that she had done on her leg 
approximately an year and a half ago.  She believes that there were 
complications after that procedure, and now, she will have episodes with severe 
intense abdominal pain associated with headache, nausea, vomiting, dizziness, 
fevers.  She states she has been treated with antibiotics, IV narcotics, and IV 
antiemetics for these episodes but that nothing clearly was ever found to be 
causing them.  She states that this episode began 2 weeks ago.  She states that 
they often begin with her "sleeping a lot." Ultimately today, she had severe 
pain along the left side of her abdomen, which she thought was stabbing in 
nature and unusual despite her history of chronic abdominal pain.  She also 
reports nausea, vomiting, chest pain.  She reports fevers and chills with a 
temperature up to 101.  She has a headache.  She feels dizzy.  She states that 
the most prominent symptom for her is the abdominal pain.  She denies any 
dysuria or frequency.  She reports normal formed bowel movements.

 

In the emergency room, Ms. Masters had labs which showed a white blood cell 
count of 20.4; hemoglobin 9.9, this is down from her baseline which was last 
found to be 13.9.  Her INR is 1.71.  Her BUN and creatinine are 30 and 4.00 
respectively consistent with her history of end-stage renal disease.  Her 
glucose was dramatically elevated at 513, but is now down to 218.  Her CRP is 
64.32.  She has had an abdomen and pelvis CT, which showed no acute abdominal 
process, but did show interstitial edema and small dependent pleural effusions 
noted at the visualized inferior thorax with mild cardiomegaly.

 

On review of the record from our hospital, I note the patient has had numerous 
admissions with similar complaints of upper abdominal pain, intractable nausea, 
and vomiting thought to be related to her peritoneal dialysis and gastroparesis 
in the past.  For this, recently in January of last year, she did have a HIDA 
scan, gallbladder ultrasound which only showed cholestasis.  On at least the 
past three admissions, the patient has left against medical advice.

 

PAST MEDICAL HISTORY:

1.  End-stage renal disease, on peritoneal dialysis.

2.  GERD.

3.  Coronary artery disease.

4.  Melanoma.

5.  Insulin-dependent type 2 diabetes.

6.  Gastroparesis.

7.  Peripheral arterial disease.

8.  Chronic hypoxic respiratory failure, on 2 L nasal cannula constantly.

9.  Anxiety.

10.  Depression.

11.  Asthma.

12.  History of tobacco abuse.

 

MEDICATIONS:

1.  Losartan 100 mg p.o. daily.

2.  Lantus insulin 8 units subcutaneously at bedtime.

3.  NovoLog insulin with meals via sliding scale.

4.  Aspirin 81 mg p.o. daily.

5.  Methadone 5 mg p.o. q.8 hours p.r.n.

6.  Warfarin 7.5 to 10 mg alternating dose.  The patient is unsure of which day 
she takes each dose.

7.  Sertraline 25 mg p.o. daily.

8.  Urecholine 10 mg p.o. 4 times a day.

9.  Albuterol inhaler 2 puffs inhaled q.6 hours p.r.n.

10.  Albuterol nebulizer 2.5 mg inhaled q.4 hours p.r.n.

11.  Promethazine 25 mg p.o. q.8 hours p.r.n.

12.  Omeprazole 40 mg p.o. q.p.m.

13.  Gabapentin 200 mg p.o. t.i.d.

 

ALLERGIES:  AMOXICILLIN, CLAVULANIC ACID, ERYTHROMYCIN BASE, HEPARIN, 
METOCLOPRAMIDE, METRONIDAZOLE, NIACIN, ROPINIROLE, SULFA, and PLASTIC TAPE.

 

FAMILY HISTORY:  Father had an MI at 49.  Mom, dad, and all her siblings have 
diabetes.  Her grandfather had lung cancer and her mother had ovarian cancer.

 

SOCIAL HISTORY:  The patient is a half a pack a day smoker.  She denies alcohol 
use.  She is disabled.  She lives with her boyfriend.  He is her healthcare 
proxy. His number is [phone].

 

REVIEW OF SYSTEMS:  A 14-point review of systems was completed and all those 
not mentioned above were negative.

 

                               PHYSICAL EXAMINATION

 

GENERAL:  Ms. Masters is sitting up in the bed.  She is in no acute distress.

 

VITAL SIGNS:  Temperature 99.5, pulse rate 113, respiratory rate 14, O2 
saturation 94% on 2 L nasal cannula, blood pressure 181/93.

 

LUNGS:  Have crackles at the bases bilaterally.  There is no accessory muscle 
use. There is good aeration.

 

HEART:  S1, S2.  No murmur, rub, or gallop, and regular.

 

ABDOMEN:  Soft.  It is nontender.  Bowel sounds positive x4.

 

EXTREMITIES:  No cyanosis.  Positive for 1+ edema.

 

NEURO:  She is alert, she is oriented x3.  She moves all extremities equally. 
There is no facial asymmetry or focal weakness.  Extraocular movements are 
intact.

 

SKIN:  Intact.

 

 DIAGNOSTIC STUDIES/LAB DATA:  WBC 20.4, hemoglobin 9.9, hematocrit 30, 
platelet count 284.  INR 1.71.  A pH 7.40, pCO2 of 41, pO2 of 116, bicarbonate 
25.2.  Sodium 132, potassium 3.9, chloride 96, serum bicarbonate 28, BUN 30, 
creatinine 4.00, glucose was 513.  CRP 64.32.

 

CT abdomen and pelvis is read as follows:  Interstitial edema and small 
dependent pleural effusions noted at the visualized inferior thorax with mild 
cardiomegaly, hepatomegaly, small hiatal hernia, normal appendix documented, 
mild colonic diverticulosis without findings of acute diverticulitis, tip of 
the peritoneal dialysis catheter is coiled at the right lower quadrant, 
moderate diffuse peritoneal fluid, no loculated peritoneal fluid collection 
evident.

 

ASSESSMENT:  Ms. Masters is a 38-year-old female who presents today to the 
hospital with concern for multiple complaints including abdominal pain, headache
, nausea, fever.  Our plans are for inpatient admission as I expect length of 
stay will be greater than 2 days for the followin.  Abdominal pain.  The patient has had chronic abdominal pain at least going 
back 1 year with multiple hospitalizations, after which she has left against 
medical advice from the hospital.  Her abdomen and pelvis CT shows no acute 
abnormality. She did have a HIDA scan last year for similar complaint as well 
as gallbladder ultrasound.  Her pain seems to be more generalized.  I 
questioned whether or not this is related to her gastroparesis and peritoneal 
dialysis history.  Regardless, do plan to check peritoneal dialysis fluid, 
start pain meds p.r.n.  Plan to follow up and check further imaging if needed 
based on clinical course.

2.  Hypervolemia.  The patient appears to have fluid overload based on the low 
hemoglobin and her reported weight gain (she states her baseline weight is 191, 
she weighs 211 today0.  Her blood pressure is also elevated and she is 
tachycardic which all could go along with hypervolemia related to ESRD.  I plan 
to review this again with the team at dialysis.  Her last echocardiogram was in 
April, at which time she had a normal ejection fraction, no significant 
valvular abnormalities.  I do not see any indication at this time to repeat that
, but that could be considered.

3. Leukocytosis.  Plan to treat with ceftriaxone for now, can de-escalate in 24 
hours if no etiology determined.

4.  Headache.  I suspect this is related to elevated blood pressure.  We will 
treat her blood pressure aggressively and provide pain medications for her 
other generalized pain.  She has no neurologic deficits.

5.  Anemia.  Plan to check her stool occult blood.  She likely has a component 
of anemia of chronic disease.   I do question whether or not this could be 
related to fluid overload and is simply a dilutional effect.  We will add on 
iron, vitamin B12, and folate.

6.  Peritoneal dialysis.  I have alerted the team and they will be providing 
materials for her to continue with her peritoneal dialysis.

7.  Gastroesophageal reflux disease, continue omeprazole.

8.  Type 2 diabetes.  Plan to continue her home Lantus, and she will have 
lispro sliding scale with each meal.

9.  Tobacco abuse.  She will have nicotine replacement available.

10.  Code status is full code.

 

TIME SPENT:  Approximately 60 minutes were spent in the admission of this 
patient, more than half the time spent with the patient at bedside reviewing 
the events leading up to this hospitalization, performing the physical 
examination, and reviewing my plan of care.

 

 ____________________________________ 

MARIELLE SKELTON NP

 

[id number]/CPS #: [id number]

SAEED

## 2018-12-26 NOTE — ED
Abdominal Pain/Female





- HPI Summary


HPI Summary: 





This patient is a 38 year old female presenting to [facility] with a chief complaint 

of abd pain since 2 weeks ago. Patient states these symptoms have been present 

for nearly a year, but have worsened these past 2 weeks. Patient states that 

she is scheduled for a colonoscopy next month and is waiting on urine culture 

results. Today at 0300, patient states she experienced right flank pain that 

was new to her. The pain is rated 10/10 in severity. Symptoms aggravated by 

nothing. Symptoms alleviated by nothing. Patient additionally reports nausea, 

vomiting, fever, chills. Patient denies cough.





- History of Current Complaint


Chief Complaint: EDAbdPain


Stated Complaint: ABD PAIN/VOMITING


Time Seen by Provider: 12/26/18 11:03


Hx Obtained From: Patient


Hx Last Menstrual Period: 7/8/18


Onset/Duration: Lasting Weeks, Still Present


Timing: Constant


Severity Currently: Severe


Pain Intensity: 10


Pain Scale Used: 0-10 Numeric


Location: Diffuse, Flank - right


Aggravating Factor(s): Nothing


Alleviating Factor(s): Nothing


Associated Signs and Symptoms: Positive: Negative - cough, Other: - nausea, 

vomiting, fever, chills


Allergies/Adverse Reactions: 


 Allergies











Allergy/AdvReac Type Severity Reaction Status Date / Time


 


amoxicillin [From Augmentin] Allergy  See Comment Verified 12/26/18 10:02


 


clavulanic acid Allergy  See Comment Verified 12/26/18 10:02





[From Augmentin]     


 


erythromycin base Allergy  See Comment Verified 12/26/18 10:02


 


heparin Allergy  See Comment Verified 12/26/18 10:02


 


metoclopramide [From Reglan] Allergy  Hives Verified 12/26/18 10:03


 


metronidazole [From Flagyl] Allergy  Nausea And Verified 12/26/18 10:02





   Vomiting  


 


niacin Allergy  Rash Verified 12/26/18 10:02


 


ropinirole [From Requip] Allergy  Hives Verified 12/26/18 10:02


 


Sulfa (Sulfonamide Allergy  Vomiting Verified 12/26/18 10:02





Antibiotics)     


 


plastic tape Allergy Mild Blisters Uncoded 12/26/18 10:02














PMH/Surg Hx/FS Hx/Imm Hx


Previously Healthy: No


Endocrine/Hematology History: Reports: Hx Anticoagulant Therapy - Aspirin., Hx 

Blood Transfusions, Hx Diabetes, Hx Thyroid Disease, Hx Anemia - hx of - 

reports had tranfusion in 2014 after mi


Cardiovascular History: Reports: Hx Angina, Hx Cardiac Arrest - in Dec. 2014 

with CPR, Hx Cardiomegaly, Hx Coronary Artery Disease, Hx Deep Vein Thrombosis, 

Hx Hypercholesterolemia, Hx Hypertension, Hx Myocardial Infarction, Other 

Cardiovascular Problems/Disorders


   Denies: Hx Congestive Heart Failure, Hx Pacemaker/ICD, Hx Valvular Heart 

Disease


Respiratory History: Reports: Hx Asthma, Hx Chronic Obstructive Pulmonary 

Disease (COPD) - smoker, Hx Pneumonia, Hx Sleep Apnea, Other Respiratory 

Problems/Disorders - acute respipatory failure with hypoxia - dec 2016


   Denies: Hx Lung Cancer, Hx Pulmonary Embolism - pt says no, although 

documented in char


GI History: Reports: Hx Gall Bladder Disease, Hx Gastroesophageal Reflux Disease

, Other GI Disorders - GASTROPARESIS - TAKING OMEPRAZOLE, reglan and zofran


   Denies: Hx Gastrointestinal Bleed, Hx Ulcer, Hx Urosepsis


 History: Reports: Hx Acute Renal Failure, Hx Chronic Renal Failure - ESRD on 

PD, Hx Dialysis - PERITONEAL, Hx Kidney Stones, Hx Renal Disease - ESRD on home 

peritoneal dialysis , Other  Problems/Disorders


Musculoskeletal History: Reports: Hx Arthritis - back, hips, Hx Back Problems - 

Sciatica and Herniated L3 disk, Other Musculoskeletal History - partial 

amputation of toes/foot


Sensory History: Reports: Hx Eye Prosthesis - left, Hx Legally Blind - legally 

blind in right eye. no vision in left, Hx Vision Problem


   Denies: Hx Contacts or Glasses, Hx Hearing Aid


Opthamlomology History: Reports: Hx Eye Prosthesis - left, Hx Legally Blind - 

legally blind in right eye. no vision in left, Hx Vision Problem


   Denies: Hx Contacts or Glasses


Neurological History: Reports: Other Neuro Impairments/Disorders - neuropathy


   Denies: Hx Transient Ischemic Attacks (TIA)


Psychiatric History: Reports: Hx Anxiety, Hx Depression, Hx Bipolar Disorder


   Denies: Hx Eating Disorder, Hx Panic Disorder, Hx Schizophrenia, Hx of 

Violent Episodes Against Others





- Cancer History


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


Hx Chemotherapy: No


Hx Radiation Therapy: No





- Surgical History


Surgery Procedure, Year, and Place: LEFT EYE REMOVED 2012 - HAS PROSTHETIC EYE (

ORBITS DONE 5/2015 OK'D BY DR SAE COLE TO SCAN IN MRI) ;.  MELANOMA REMOVED 

FROM [facility] 2013 (PROCEDURE DONE 4X);.  CARDIAC CATH 2014 - NO STENTS;.  LEFT 

WRIST FISTULA PLACEMENT ([facility]) 2014;.  HEMODIALYSIS CATH RIGHT CHEST WALL 2014;.

  PERITONEAL DIALYSIS CATH 2/2015;.  CHEST WALL CATH REMOVAL 7/2016 [facility];.  

RIGHT GREAT TOE AMPUTATION, [facility] 4/2016;.  PLACEMENT RIGHT JUGULAR TESIO 

HEMODIALYSIS CATHETER [facility] 8/27/2016;.  REMOVAL OF JUGULAR CATH - SEPT 2016.  

CARDIAC CATH - stentsx2 right leg.  PARTIAL AMPUTATION RIGHT TOES 5/2017.  

RIGHT TOES ALL REMOVED


Hx Anesthesia Reactions: No





- Immunization History


Date of Tetanus Vaccine: utd


Date of Influenza Vaccine: 10/2017


Infectious Disease History: No


Infectious Disease History: Reports: Hx of Known/Suspected MRSA - MRSA R 1st toe

, History Other Infectious Disease - GANGRENE


   Denies: Hx Clostridium Difficile, Hx Hepatitis, Hx Human Immunodeficiency 

Virus (HIV), Hx Shingles, Hx Tuberculosis, Traveled Outside the [place] in Last 30 

Days





- Family History


Known Family History: Positive: Cardiac Disease - father MI at 43 y/o, 

Hypertension, Other - Positive for bipolar and depression. Completed suicide to 

sister.  





- Social History


Lives: With Family


Alcohol Use: None


Hx Substance Use: No


Substance Use Type: Reports: None


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


Hx Tobacco Use: Yes


Smoking Status (MU): Heavy Every Day Tobacco Smoker


Type: Cigarettes


Amount Used/How Often: 1 PPD


Length of Time of Smoking/Using Tobacco: 20 YEARS


Have You Smoked in the Last Year: Yes





Review of Systems


Positive: Fever, Chills


Negative: Cough


Positive: Abdominal Pain, Vomiting, Nausea


All Other Systems Reviewed And Are Negative: Yes





Physical Exam





- Summary


Physical Exam Summary: 





Appearance: Well appearing, no pain distress


Skin: warm, dry, reflects adequate perfusion


Head/face: normal


Eyes: EOMI, VIOLETTA


ENT: mucous membranes moist


Neck: supple, non-tender


Respiratory: diffuse wheezing, no rales or rhonchi


Cardiovascular: tachycardic, but regular, pulses symmetrical 


Abdomen: non-tender, soft


Bowel Sounds: present


Musculoskeletal: strength/ROM intact, Amputation of metatarsals on right


Neuro: normal, sensory motor intact, A&Ox3. however, loss of sensation in feet


Triage Information Reviewed: Yes


Vital Signs On Initial Exam: 


 Initial Vitals











Temp Pulse Resp BP Pulse Ox


 


 99.4 F   118   24   175/104   98 


 


 12/26/18 09:58  12/26/18 09:58  12/26/18 09:58  12/26/18 09:58  12/26/18 09:58











Vital Signs Reviewed: Yes





Diagnostics





- Vital Signs


 Vital Signs











  Temp Pulse Resp BP Pulse Ox


 


 12/26/18 11:07    16  


 


 12/26/18 09:58  99.4 F  118  24  175/104  98














- Laboratory


Result Diagrams: 


 12/26/18 11:30





 12/26/18 11:30


Lab Statement: Any lab studies that have been ordered have been reviewed, and 

results considered in the medical decision making process.





- CT


  ** CT Abd/Pel


CT Interpretation Completed By: Radiologist


Summary of CT Findings: CT Abd/Pel reveals, per radiologist, IMPRESSION:  #. 

Interstitial edema and small dependent pleural effusions noted at the 

visualized inferior thorax. Mild cardiomegaly.  #. Hepatomegaly.  #. Small 

hiatal hernia.  #. Normal appendix documented.  #. Mild colonic diverticulosis 

without findings of acute diverticulitis.  #. Tip of the peritoneal dialysis 

catheter is coiled at the RIGHT lower quadrant. Moderate diffuse peritoneal 

fluid. No loculated peritoneal fluid collection evident.  ED physician has 

reviewed this radiology report.





- EKG


  ** 1135


Cardiac Rate: Tachycardia


EKG Rhythm: Sinus Tachycardia - 115 BPM


Summary of EKG Findings: An EKG, taken 1135, reveals Sinus Tachycardia (115 BPM)

, Normal axis, normal intervals, nonspecific ST.





Abdominal Pain Fem Course/Dx





- Course


Course Of Treatment: Nurse's notes reviewed.  Patient is comorbid and 

chronically ill-appearing.  She has grossly elevated blood sugars out of range 

on bedside testing.  Chronic worsening abdominal discomfort with history of 

peritoneal dialysis.  Possibility of SBP.  IV antibiotics given.  Patient 

hydrated and given insulin.  She is non-acidemic.  Insulin drip not initiated.  

Discussed with hospitalist who will evaluate in the ER and admit.





- Diagnoses


Differential Diagnosis: Positive: Gall Bladder Disease, Irritable Bowel Syndrome

, Pancreatitis, Pneumonia, Urinary Tract Infection


Provider Diagnoses: 


 End stage renal disease on dialysis, Spontaneous bacterial peritonitis, 

Hyperglycemia








- Provider Notifications


Discussed Care Of Patient With: Luis A Landeros - Hospitalist


Time Discussed With Above Provider: 12:45 - We discussed patient care with Dr. Landeros (Hospitalist) at 1245 and they agreed to admit the patient. 





- Critical Care Time


Critical Care Time: 30-74 min





Discharge





- Sign-Out/Discharge


Documenting (check all that apply): Patient Departure





- Discharge Plan


Condition: Guarded


Disposition: ADMITTED TO [facility]





- Billing Disposition and Condition


Condition: GUARDED


Disposition: Admitted to [facility]





- Attestation Statements


Document Initiated by Selin: Yes


Documenting Scribe: Valeri Sun


Provider For Whom Scribe is Documenting (Include Credential): Abdoul Rodriguez MD


Scribe Attestation: 


IValeri, scribed for Abdoul Rodriguez MD on 12/26/18 at 2000. 


Scribe Documentation Reviewed: Yes


Provider Attestation: 


The documentation as recorded by the Valeri cardenas accurately reflects 

the service I personally performed and the decisions made by me, Abdoul Rodriguez MD


Status of Scribe Document: Viewed

## 2018-12-27 VITALS — SYSTOLIC BLOOD PRESSURE: 157 MMHG | DIASTOLIC BLOOD PRESSURE: 65 MMHG

## 2018-12-27 LAB
ANION GAP SERPL CALC-SCNC: 5 MMOL/L (ref 2–11)
BASOPHILS # BLD AUTO: 0.1 10^3/UL (ref 0–0.2)
BUN SERPL-MCNC: 31 MG/DL (ref 6–24)
BUN/CREAT SERPL: 7.4 (ref 8–20)
CALCIUM SERPL-MCNC: 8.1 MG/DL (ref 8.6–10.3)
CHLORIDE SERPL-SCNC: 101 MMOL/L (ref 101–111)
EOSINOPHIL # BLD AUTO: 0.3 10^3/UL (ref 0–0.6)
GLUCOSE SERPL-MCNC: 173 MG/DL (ref 70–100)
HCO3 SERPL-SCNC: 28 MMOL/L (ref 22–32)
HCT VFR BLD AUTO: 29 % (ref 35–47)
HGB BLD-MCNC: 9.4 G/DL (ref 12–16)
INR PPP/BLD: 2.14 (ref 0.77–1.02)
LYMPHOCYTES # BLD AUTO: 1.5 10^3/UL (ref 1–4.8)
MCH RBC QN AUTO: 29 PG (ref 27–31)
MCHC RBC AUTO-ENTMCNC: 33 G/DL (ref 31–36)
MCV RBC AUTO: 89 FL (ref 80–97)
MONOCYTES # BLD AUTO: 0.4 10^3/UL (ref 0–0.8)
NEUTROPHILS # BLD AUTO: 11.1 10^3/UL (ref 1.5–7.7)
NRBC # BLD AUTO: 0 10^3/UL
NRBC BLD QL AUTO: 0.1
PLATELET # BLD AUTO: 279 10^3/UL (ref 150–450)
POTASSIUM SERPL-SCNC: 4 MMOL/L (ref 3.5–5)
RBC # BLD AUTO: 3.2 10^6/UL (ref 4–5.4)
SODIUM SERPL-SCNC: 134 MMOL/L (ref 135–145)
WBC # BLD AUTO: 13.4 10^3/UL (ref 3.5–10.8)

## 2018-12-27 RX ADMIN — INSULIN LISPRO SCH UNITS: 100 INJECTION, SOLUTION INTRAVENOUS; SUBCUTANEOUS at 09:09

## 2018-12-27 RX ADMIN — GABAPENTIN SCH MG: 100 CAPSULE ORAL at 13:49

## 2018-12-27 RX ADMIN — HYDROMORPHONE HYDROCHLORIDE PRN MG: 1 INJECTION, SOLUTION INTRAMUSCULAR; INTRAVENOUS; SUBCUTANEOUS at 05:28

## 2018-12-27 RX ADMIN — INSULIN LISPRO SCH UNITS: 100 INJECTION, SOLUTION INTRAVENOUS; SUBCUTANEOUS at 13:48

## 2018-12-27 RX ADMIN — HYDROMORPHONE HYDROCHLORIDE PRN MG: 1 INJECTION, SOLUTION INTRAMUSCULAR; INTRAVENOUS; SUBCUTANEOUS at 09:04

## 2018-12-27 RX ADMIN — PROMETHAZINE HYDROCHLORIDE PRN MG: 25 TABLET ORAL at 05:28

## 2018-12-27 RX ADMIN — METHADONE HYDROCHLORIDE PRN MG: 5 TABLET ORAL at 08:02

## 2018-12-27 RX ADMIN — GABAPENTIN SCH MG: 100 CAPSULE ORAL at 08:01

## 2018-12-27 RX ADMIN — HYDROMORPHONE HYDROCHLORIDE PRN MG: 1 INJECTION, SOLUTION INTRAMUSCULAR; INTRAVENOUS; SUBCUTANEOUS at 01:22

## 2018-12-27 RX ADMIN — BETHANECHOL CHLORIDE SCH MG: 10 TABLET ORAL at 07:59

## 2018-12-27 NOTE — DS
CC:  Dr. Shanks*

 

DISCHARGE SUMMARY:

 

DATE OF ADMISSION:  12/26/18

 

DATE OF DISCHARGE:  12/27/18

 

PRIMARY CARE PROVIDER:  Dr. Shanks.

 

ATTENDING PHYSICIAN:  Dr. Luis A Landeros* (dictated by Ki Mello NP).

 

PRIMARY DIAGNOSES:

1.  Abdominal pain.

2.  Headache.

3.  Fever.

4.  Chest pain.

5.  Nausea.

6.  Dizziness.

 

SECONDARY DIAGNOSES:

1.  End-stage renal disease, on peritoneal dialysis.

2.  Gastroesophageal reflux disease.

3.  Coronary artery disease.

4.  Melanoma.

5.  Insulin-dependent type 2 diabetes.

6.  Gastroparesis.

7.  Peripheral artery disease.

8.  Chronic hypoxic respiratory failure, on 2 L nasal cannula.

9.  Anxiety.

10.  Depression.

11.  Asthma.

12.  Tobacco abuse.

 

CONSULTATIONS WHILE IN THE HOSPITAL:  No consultations.

 

PROCEDURES WHILE IN THE HOSPITAL:  No procedures.

 

STUDIES WHILE IN THE HOSPITAL:

1.  CT abdomen/pelvis, impression:  Interstitial edema with small dependent 
pleural effusions noted at the visualized inferior thorax.  Mild cardiomegaly. 
Hepatomegaly.  Small hiatal hernia.  Normal appendix.  Mild colonic 
diverticulosis without findings of acute diverticulitis.  Tip of peritoneal 
dialysis catheter is coiled in the right lower quadrant.  Moderate diffuse 
peritoneal fluid.  No loculated peritoneal fluid collection is evident.

2.  EKG, impression:  Sinus tachycardia.

 

DISCHARGE MEDICATIONS:  New home medications:  No new home medications.

 

Continued home medications:

 

1.  Urecholine 25 mg p.o. four times a day.

2.  Losartan 100 mg p.o. daily.

3.  Lantus 80 mg subcu at bedtime.

4.  NovoLog subcu a.c. p.r.n.

5.  Aspirin 81 mg p.o. daily.

6.  Methadone 5 mg p.o. q.8 hours p.r.n., max daily dose 15 mg.

7.  Coumadin 7.5 to 10 mg p.o. at bedtime.

8.  Zoloft 25 mg p.o. daily.

9.  Ventolin HFA 2 puffs inhalation q.6 hours p.r.n.

10.  Ventolin nebulizer 2.5 mg inhalation q.4 hours p.r.n.

11.  Phenergan 25 mg p.o. q.8 hours p.r.n.

12.  Omeprazole 40 mg p.o. q.p.m.

13.  Neurontin 200 mg p.o. t.i.d.

 

Changed home medications:  No home medications changed.

 

Discontinued home medications:  No home meds discontinued.

 

HISTORY OF PRESENT ILLNESS/HOSPITAL COURSE:  Ms. Masters is a 38-year-old 
female with a past medical history significant for end-stage renal disease, 
which she is on peritoneal dialysis; GERD; CAD; melanoma; insulin-dependent 
type 2 diabetes; gastroparesis; PAD; chronic hypoxic respiratory failure; 
tobacco abuse; depression and anxiety, who presented to the emergency 
department on 12/26/18 with complaints of abdominal pain that has been present 
for greater than 1 year and had worsened over the past 2 weeks.  In addition, 
the patient complained of headache, fever, chest pain, nausea, dizziness.  
Please see history and physical dictated by Marielle Skelton NP, for complete 
summary of events leading up to hospitalization, but in short, the patient 
presented with above-mentioned complaints and states she has been treated with 
antibiotics, IV narcotics, antiemetics for these episodes and nothing has ever 
been found.  In addition, the patient presented herself to the ER as the pain 
became more severe over the past 2 weeks and included severe pain along the 
left side of her abdomen, which was stabbing in nature.  While in the emergency 
room, Ms. Masters was found to have an elevated white count at 12.4 and 
hemoglobin of 9.9, which is down from her baseline of 13.9.  Her glucose was 
dramatically elevated at 513, but responded well to insulin and came down to 
218.  Finally, abdominal CT showed no acute abdominal process.  The patient was 
admitted to telemetry and during this hospital stay, the patient's medical 
records reviewed by her admitting provider, who noted that she has had numerous 
admissions with similar complaints of upper abdominal pain, intractable nausea 
and vomiting, most recently being in January of last year which she had a HIDA 
scan, gallbladder ultrasound which showed cholestasis.  It should be mentioned 
that in the last 3 admissions, the patient had left against medical advice.  
During her admission, the patient has remained in sinus rhythm to sinus 
tachycardia with an average BPM of 100.  The patient's pain has been more 
generalized and not localized to one specific area. In order to further 
evaluate the patient's abdominal pain, her peritoneal fluid was evaluated and 
within normal limits.  I did prefer a Gram stain of her peritoneal fluid, but 
this was not possible.  In addition, as previously mentioned, the patient was 
found to have a low hemoglobin and it was suspected that the patient was in a 
hypervolemic state as she has had a recent weight gain, blood pressure was 
elevated, and she was tachycardic.  The patient completed her peritoneal 
dialysis while in the hospital and weight has decreased by 1 kg.  Leukocytosis 
was initially treated with ceftriaxone x1, but has now decreased from 20.4 to 
13.4.  In addition, the patient's anemia was further worked up and it was noted 
that her anemia is most likely multifactorial related to hemodilution and iron 
deficiency as the patient's iron is 37.

 

Ms. Masters is stable for discharge to home.  Vital signs as follows:  Temp 97.9
, heart rate 97, respiratory rate 16, O2 saturation 100% on 2 L, /77.  
The patient reports her BP is always elevated during one of these "events."  In 
addition, the patient reports anxiety.  I would prefer the patient's blood 
pressure to be low at discharge, but due to her report of this being baseline 
during these type of episodes and her anxiety, I have encouraged the patient to 
monitor her blood pressure at home and bring readings to her primary care 
provider for further adjustment of medications as needed.  In addition, the 
patient has been educated on when to return to the emergency department.

 

FOLLOWUP/DISCHARGE PLAN:

1.  Abdominal pain:  On assessment this morning, the patient reports her 
abdominal pain has greatly improved.  She reports she would like to be 
discharged.  She reports she has a followup with her GI in mid January with a 
planned colonoscopy at the end of January.  The patient's last BM was 2 days 
ago.  Reports this is normal for her as she has a BM every 2 to 3 days.  The 
patient denies nausea, vomiting, flank pain, urinary symptoms.

2.  Anemia:  As previously mentioned, I suspect this anemia is multifactorial 
due to hemodilution, iron deficiency, chronic disease.  I would encourage the 
patient to follow up with her primary care provider to discuss further 
evaluation and treatment.

3.  Leukocytosis:  As previously mentioned, the patient's WBC was 20.4 on 
admission and is currently 13.4.  The patient has been afebrile during her 
hospital stay.  In addition, there has been no clear source or suggestion of 
infection.  Therefore, I suspect that this leukocytosis was due to viral/
inflammatory process.  The patient should follow up with her primary care 
provider for a repeat BMP in 1 week.

4.  Headache:  The patient reported headache on her admission, which admitting 
provider suspected was related to elevated blood pressure.  The patient reports 
her headache is now resolved.  The patient has no neurological deficits.

5.  Peritoneal dialysis:  The patient's peritoneal fluid was evaluated and 
within normal limits.  The patient should continue her peritoneal dialysis as 
same.

6.  GERD:  The patient should continue her omeprazole and follow up with her GI 
as scheduled.

7.  Type 2 diabetes:  The patient should continue her home diabetic regimen as 
same.

8.  Tobacco abuse:  The patient was educated on smoking and its relation to 
heart diseases.  The patient was encouraged to quit.

9.  Chest pain:  The patient has been chest pain-free while hospitalized.  The 
patient has been on tele and has been sinus to sinus tachycardic.  The patient'
s EKG was unremarkable.

10.  Fever:  The patient reported fever and chills on admission, but was found 
to be afebrile.  The patient has remained afebrile during her hospital stay.

11.  Nausea:  The patient reports nausea has improved and she is tolerating a 
regular diet.

12.  Dizziness:  The patient reports dizziness has improved.  Reports that this 
is a normal occurrence for her when she has abdominal pain.

 

This is a summarized report of a complex medical history and hospital stay.  
For further details, please see the entire medical record.

 

My plan was discussed with Dr. Luis A Landeros, who agrees with my plan.

 

TIME SPENT:  Approximately 45 minutes was spent on this discharge, greater than 
half that time was spent face-to-face with the patient discussing discharge 
plans and instructions.

 

____________________________________ 

KI MELLO, KISHA

 

[id number]/CPS #: [id number]

SAEED

## 2019-01-05 ENCOUNTER — HOSPITAL ENCOUNTER (EMERGENCY)
Dept: HOSPITAL 25 - UCEAST | Age: 39
Discharge: HOME | End: 2019-01-05
Payer: COMMERCIAL

## 2019-01-05 VITALS — SYSTOLIC BLOOD PRESSURE: 180 MMHG | DIASTOLIC BLOOD PRESSURE: 90 MMHG

## 2019-01-05 DIAGNOSIS — L02.215: ICD-10-CM

## 2019-01-05 DIAGNOSIS — I12.0: ICD-10-CM

## 2019-01-05 DIAGNOSIS — Z88.1: ICD-10-CM

## 2019-01-05 DIAGNOSIS — J45.909: ICD-10-CM

## 2019-01-05 DIAGNOSIS — J20.9: Primary | ICD-10-CM

## 2019-01-05 DIAGNOSIS — E11.22: ICD-10-CM

## 2019-01-05 DIAGNOSIS — Z88.0: ICD-10-CM

## 2019-01-05 DIAGNOSIS — F17.210: ICD-10-CM

## 2019-01-05 DIAGNOSIS — N18.6: ICD-10-CM

## 2019-01-05 DIAGNOSIS — Z91.048: ICD-10-CM

## 2019-01-05 DIAGNOSIS — Z88.2: ICD-10-CM

## 2019-01-05 DIAGNOSIS — Z88.8: ICD-10-CM

## 2019-01-05 PROCEDURE — 71046 X-RAY EXAM CHEST 2 VIEWS: CPT

## 2019-01-05 PROCEDURE — 93005 ELECTROCARDIOGRAM TRACING: CPT

## 2019-01-05 PROCEDURE — G0463 HOSPITAL OUTPT CLINIC VISIT: HCPCS

## 2019-01-05 PROCEDURE — 99213 OFFICE O/P EST LOW 20 MIN: CPT

## 2019-01-05 NOTE — UC
Respiratory Complaint HPI





- HPI Summary


HPI Summary: 





Patient is a 38-year-old female with diabetes as well as end-stage renal 

disease who presents here with cough and wheezing 2-3 weeks.  She states that 

she has bilateral chest pain from coughing so much.  She has felt feverish and 

had chills.  She also has an abscess on her left lower labia that drained 

spontaneously today.  She states that this is in the region that she had a 

melanoma removed about 8 or 9 years ago.





- History of Current Complaint


Chief Complaint: UCGeneralIllness


Stated Complaint: COLD SYMPTOMS, FEMALE PERSONAL


Time Seen by Provider: 01/05/19 17:35


Hx Obtained From: Patient


Hx Last Menstrual Period: just finished


Onset/Duration: Gradual Onset, Lasting Weeks


Timing: Constant


Severity Initially: Mild


Severity Currently: Moderate


Pain Intensity: 6


Pain Scale Used: 0-10 Numeric


Character: Cough: Productive


Aggravating Factors: Exertion, Deep Breaths


Alleviating Factors: Nothing


Associated Signs And Symptoms: Positive: Fever, Chills, Wheezing





- Allergies/Home Medications


Allergies/Adverse Reactions: 


 Allergies











Allergy/AdvReac Type Severity Reaction Status Date / Time


 


amoxicillin [From Augmentin] Allergy  See Comment Verified 01/05/19 16:17


 


clavulanic acid Allergy  See Comment Verified 01/05/19 16:17





[From Augmentin]     


 


erythromycin base Allergy  See Comment Verified 01/05/19 16:17


 


heparin Allergy  See Comment Verified 01/05/19 16:17


 


metoclopramide [From Reglan] Allergy  Hives Verified 01/05/19 16:17


 


metronidazole [From Flagyl] Allergy  Nausea And Verified 01/05/19 16:17





   Vomiting  


 


niacin Allergy  Rash Verified 01/05/19 16:17


 


ropinirole [From Requip] Allergy  Hives Verified 01/05/19 16:17


 


Sulfa (Sulfonamide Allergy  Vomiting Verified 01/05/19 16:17





Antibiotics)     


 


plastic tape Allergy Mild Blisters Uncoded 01/05/19 16:17











Home Medications: 


 Home Medications





Calcitriol CAP* [Rocaltrol  CAP*] 0.25 mcg PO TID 01/05/19 [History Confirmed 01 /05/19]











PMH/Surg Hx/FS Hx/Imm Hx


Endocrine History: Diabetes


Cardiovascular History: Cardiac Disease, Hypertension, Deep Vein Thrombosis


Respiratory History: Asthma, Bronchitis


GI/ History: Renal Disease


Other History Of: Anticoagulant Therapy - Aspirin.


   Negative For: HIV, Hepatitis B, Hepatitis C





- Surgical History


Surgical History: Yes


Surgery Procedure, Year, and Place: LEFT EYE REMOVED 2012 - HAS PROSTHETIC EYE (

ORBITS DONE 5/2015 OK'D BY DR SAE COLE TO SCAN IN MRI) ;.  MELANOMA REMOVED 

FROM V[facility]VA 2013 (PROCEDURE DONE 4X);.  CARDIAC CATH 2014 - NO STENTS;.  LEFT 

WRIST FISTULA PLACEMENT ([facility]) 2014;.  HEMODIALYSIS CATH RIGHT CHEST WALL 2014;.

  PERITONEAL DIALYSIS CATH 2/2015;.  CHEST WALL CATH REMOVAL 7/2016 [facility] [place];.  

RIGHT GREAT TOE AMPUTATION, [facility] [place] 4/2016;.  PLACEMENT RIGHT JUGULAR TESIO 

HEMODIALYSIS CATHETER [facility] [place] 8/27/2016;.  REMOVAL OF JUGULAR CATH - SEPT 2016.  

CARDIAC CATH - stentsx2 right leg.  PARTIAL AMPUTATION RIGHT TOES 5/2017.  

RIGHT TOES ALL REMOVED





- Family History


Known Family History: Positive: Cardiac Disease - father MI at 41 y/o, 

Hypertension, Other - Positive for bipolar and depression. Completed suicide to 

sister.  





- Social History


Alcohol Use: None


Substance Use Type: None


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


Smoking Status (MU): Heavy Every Day Tobacco Smoker


Type: Cigarettes


Amount Used/How Often: 1 PPD


Length of Time of Smoking/Using Tobacco: 20 YEARS


Have You Smoked in the Last Year: Yes


Household Exposure Type: Cigarettes





- Immunization History


Most Recent Influenza Vaccination: 2017


Most Recent Tetanus Shot: 2014


Most Recent Pneumonia Vaccination: per pt: sometime in 2014





Review of Systems


All Other Systems Reviewed And Are Negative: Yes


Constitutional: Positive: Negative


Skin: Positive: Negative


Eyes: Positive: Negative


ENT: Positive: Negative


Respiratory: Positive: Cough


Cardiovascular: Positive: Chest Pain


Gastrointestinal: Positive: Negative


Genitourinary: Positive: Negative


Motor: Positive: Negative


Neurovascular: Positive: Negative


Musculoskeletal: Positive: Negative


Neurological: Positive: Negative


Psychological: Positive: Negative





Physical Exam


Triage Information Reviewed: Yes


Appearance: Well-Appearing, No Pain Distress, Well-Nourished


Vital Signs: 


 Initial Vital Signs











Temp  97.8 F   01/05/19 16:08


 


Pulse  103   01/05/19 16:08


 


Resp  18   01/05/19 16:08


 


BP  179/90   01/05/19 16:08


 


Pulse Ox  100   01/05/19 16:08











Vital Signs Reviewed: Yes


Eyes: Positive: Conjunctiva Clear - R, Other: - absent left eye


ENT: Positive: Hearing grossly normal.  Negative: Pharyngeal erythema, Nasal 

drainage, Tonsillar swelling, Tonsillar exudate


Neck: Positive: Supple, Nontender, No Lymphadenopathy


Respiratory: Positive: No respiratory distress, No accessory muscle use, 

Wheezing


Cardiovascular: Positive: RRR, No Murmur


Abdomen Description: Positive: Nontender


Bowel Sounds: Positive: Present


Musculoskeletal: Positive: Other: - partial amp right foot


Neurological: Positive: Alert, Other: - peripheral neuropathy


Skin: Positive: Other - tender small non flutuant abscess right perineum





UC Diagnostic Evaluation





- Laboratory


O2 Sat by Pulse Oximetry: 100 - normal/not hypoxic





- Radiology


Radiology Interpretation Completed By: Radiologist


Summary of Radiographic Findings: CXR neg





- EKG


Cardiac Rate: Tachycardia


Cardiac Rhythm: Sinus: Normal


Ectopy: None


ST Segment: Non-Specific





Respiratory Course/Dx





- Differential Dx/Diagnosis


Provider Diagnosis: 


 Bronchospasm with bronchitis, acute, Abscess, perineum








Discharge





- Sign-Out/Discharge


Documenting (check all that apply): Patient Departure


All imaging exams completed and their final reports reviewed: Yes





- Discharge Plan


Condition: Stable


Disposition: HOME


Prescriptions: 


DOXYcycline CAP(*) [DOXYcycline 100MG CAP(*)] 100 mg PO BID #12 cap


Patient Education Materials:  Acute Bronchitis (ED), Abscess (ED)


Referrals: 


Yeni Shanks MD [Primary Care Provider] - 2 Days


Additional Instructions: 


take doxy with food





warm compresses to abscess





- Billing Disposition and Condition


Condition: STABLE


Disposition: Home

## 2019-01-08 ENCOUNTER — HOSPITAL ENCOUNTER (INPATIENT)
Dept: HOSPITAL 25 - ED | Age: 39
LOS: 1 days | Discharge: LEFT BEFORE DISCHARGE | DRG: 951 | End: 2019-01-09
Attending: INTERNAL MEDICINE | Admitting: INTERNAL MEDICINE
Payer: COMMERCIAL

## 2019-01-08 DIAGNOSIS — Z86.718: ICD-10-CM

## 2019-01-08 DIAGNOSIS — N18.6: ICD-10-CM

## 2019-01-08 DIAGNOSIS — I12.0: ICD-10-CM

## 2019-01-08 DIAGNOSIS — K61.0: Primary | ICD-10-CM

## 2019-01-08 DIAGNOSIS — Z88.8: ICD-10-CM

## 2019-01-08 DIAGNOSIS — Z85.44: ICD-10-CM

## 2019-01-08 DIAGNOSIS — F17.210: ICD-10-CM

## 2019-01-08 DIAGNOSIS — Z86.74: ICD-10-CM

## 2019-01-08 DIAGNOSIS — Z89.411: ICD-10-CM

## 2019-01-08 DIAGNOSIS — J44.9: ICD-10-CM

## 2019-01-08 DIAGNOSIS — D63.1: ICD-10-CM

## 2019-01-08 DIAGNOSIS — Z79.01: ICD-10-CM

## 2019-01-08 DIAGNOSIS — Z96.89: ICD-10-CM

## 2019-01-08 DIAGNOSIS — Z79.4: ICD-10-CM

## 2019-01-08 DIAGNOSIS — E78.00: ICD-10-CM

## 2019-01-08 DIAGNOSIS — E11.40: ICD-10-CM

## 2019-01-08 DIAGNOSIS — Z81.8: ICD-10-CM

## 2019-01-08 DIAGNOSIS — Z88.0: ICD-10-CM

## 2019-01-08 DIAGNOSIS — K21.9: ICD-10-CM

## 2019-01-08 DIAGNOSIS — G47.30: ICD-10-CM

## 2019-01-08 DIAGNOSIS — Z80.1: ICD-10-CM

## 2019-01-08 DIAGNOSIS — F41.9: ICD-10-CM

## 2019-01-08 DIAGNOSIS — H54.8: ICD-10-CM

## 2019-01-08 DIAGNOSIS — I47.1: ICD-10-CM

## 2019-01-08 DIAGNOSIS — E11.22: ICD-10-CM

## 2019-01-08 DIAGNOSIS — M15.9: ICD-10-CM

## 2019-01-08 DIAGNOSIS — F31.9: ICD-10-CM

## 2019-01-08 DIAGNOSIS — J96.11: ICD-10-CM

## 2019-01-08 DIAGNOSIS — K31.84: ICD-10-CM

## 2019-01-08 DIAGNOSIS — I25.10: ICD-10-CM

## 2019-01-08 DIAGNOSIS — E66.9: ICD-10-CM

## 2019-01-08 DIAGNOSIS — I25.2: ICD-10-CM

## 2019-01-08 DIAGNOSIS — J81.1: ICD-10-CM

## 2019-01-08 DIAGNOSIS — Z88.1: ICD-10-CM

## 2019-01-08 DIAGNOSIS — Z95.5: ICD-10-CM

## 2019-01-08 DIAGNOSIS — Z82.49: ICD-10-CM

## 2019-01-08 DIAGNOSIS — Z91.048: ICD-10-CM

## 2019-01-08 DIAGNOSIS — Z79.82: ICD-10-CM

## 2019-01-08 DIAGNOSIS — Z95.820: ICD-10-CM

## 2019-01-08 DIAGNOSIS — Z99.81: ICD-10-CM

## 2019-01-08 DIAGNOSIS — Z87.01: ICD-10-CM

## 2019-01-08 DIAGNOSIS — E11.43: ICD-10-CM

## 2019-01-08 DIAGNOSIS — Z87.442: ICD-10-CM

## 2019-01-08 DIAGNOSIS — Z89.421: ICD-10-CM

## 2019-01-08 DIAGNOSIS — E11.9: ICD-10-CM

## 2019-01-08 DIAGNOSIS — M51.26: ICD-10-CM

## 2019-01-08 DIAGNOSIS — Z88.2: ICD-10-CM

## 2019-01-08 DIAGNOSIS — Z80.41: ICD-10-CM

## 2019-01-08 DIAGNOSIS — E11.51: ICD-10-CM

## 2019-01-08 DIAGNOSIS — Z53.21: ICD-10-CM

## 2019-01-08 DIAGNOSIS — Z86.14: ICD-10-CM

## 2019-01-08 DIAGNOSIS — Z99.2: ICD-10-CM

## 2019-01-08 LAB
ALBUMIN SERPL BCG-MCNC: 3.2 G/DL (ref 3.2–5.2)
ALBUMIN/GLOB SERPL: 1 {RATIO} (ref 1–3)
ALP SERPL-CCNC: 104 U/L (ref 34–104)
ALT SERPL W P-5'-P-CCNC: 9 U/L (ref 7–52)
ANION GAP SERPL CALC-SCNC: 9 MMOL/L (ref 2–11)
AST SERPL-CCNC: 8 U/L (ref 13–39)
BASOPHILS # BLD AUTO: 0.1 10^3/UL (ref 0–0.2)
BUN SERPL-MCNC: 33 MG/DL (ref 6–24)
BUN/CREAT SERPL: 7.1 (ref 8–20)
CALCIUM SERPL-MCNC: 8.6 MG/DL (ref 8.6–10.3)
CHLORIDE SERPL-SCNC: 97 MMOL/L (ref 101–111)
EOSINOPHIL # BLD AUTO: 0.1 10^3/UL (ref 0–0.6)
FLUAV RNA SPEC QL NAA+PROBE: NEGATIVE
FLUBV RNA SPEC QL NAA+PROBE: NEGATIVE
GLOBULIN SER CALC-MCNC: 3.2 G/DL (ref 2–4)
GLUCOSE SERPL-MCNC: 302 MG/DL (ref 70–100)
HCO3 SERPL-SCNC: 29 MMOL/L (ref 22–32)
HCT VFR BLD AUTO: 28 % (ref 35–47)
HGB BLD-MCNC: 8.9 G/DL (ref 12–16)
INR PPP/BLD: 2.37 (ref 0.77–1.02)
LYMPHOCYTES # BLD AUTO: 0.7 10^3/UL (ref 1–4.8)
MCH RBC QN AUTO: 29 PG (ref 27–31)
MCHC RBC AUTO-ENTMCNC: 32 G/DL (ref 31–36)
MCV RBC AUTO: 91 FL (ref 80–97)
MONOCYTES # BLD AUTO: 0.4 10^3/UL (ref 0–0.8)
NEUTROPHILS # BLD AUTO: 16.2 10^3/UL (ref 1.5–7.7)
NRBC # BLD AUTO: 0 10^3/UL
NRBC BLD QL AUTO: 0
PLATELET # BLD AUTO: 369 10^3/UL (ref 150–450)
POTASSIUM SERPL-SCNC: 3.6 MMOL/L (ref 3.5–5)
PROT SERPL-MCNC: 6.4 G/DL (ref 6.4–8.9)
RBC # BLD AUTO: 3.07 10^6/UL (ref 4–5.4)
SODIUM SERPL-SCNC: 135 MMOL/L (ref 135–145)
TROPONIN I SERPL-MCNC: 0.06 NG/ML (ref ?–0.04)
WBC # BLD AUTO: 17.6 10^3/UL (ref 3.5–10.8)

## 2019-01-08 PROCEDURE — 85025 COMPLETE CBC W/AUTO DIFF WBC: CPT

## 2019-01-08 PROCEDURE — 86140 C-REACTIVE PROTEIN: CPT

## 2019-01-08 PROCEDURE — 80048 BASIC METABOLIC PNL TOTAL CA: CPT

## 2019-01-08 PROCEDURE — 87640 STAPH A DNA AMP PROBE: CPT

## 2019-01-08 PROCEDURE — 87040 BLOOD CULTURE FOR BACTERIA: CPT

## 2019-01-08 PROCEDURE — 94640 AIRWAY INHALATION TREATMENT: CPT

## 2019-01-08 PROCEDURE — 71046 X-RAY EXAM CHEST 2 VIEWS: CPT

## 2019-01-08 PROCEDURE — 99284 EMERGENCY DEPT VISIT MOD MDM: CPT

## 2019-01-08 PROCEDURE — 87205 SMEAR GRAM STAIN: CPT

## 2019-01-08 PROCEDURE — 72192 CT PELVIS W/O DYE: CPT

## 2019-01-08 PROCEDURE — 83605 ASSAY OF LACTIC ACID: CPT

## 2019-01-08 PROCEDURE — 87641 MR-STAPH DNA AMP PROBE: CPT

## 2019-01-08 PROCEDURE — 93005 ELECTROCARDIOGRAM TRACING: CPT

## 2019-01-08 PROCEDURE — 87070 CULTURE OTHR SPECIMN AEROBIC: CPT

## 2019-01-08 PROCEDURE — 87077 CULTURE AEROBIC IDENTIFY: CPT

## 2019-01-08 PROCEDURE — 85610 PROTHROMBIN TIME: CPT

## 2019-01-08 PROCEDURE — 99406 BEHAV CHNG SMOKING 3-10 MIN: CPT

## 2019-01-08 PROCEDURE — G0378 HOSPITAL OBSERVATION PER HR: HCPCS

## 2019-01-08 PROCEDURE — 36415 COLL VENOUS BLD VENIPUNCTURE: CPT

## 2019-01-08 PROCEDURE — 84484 ASSAY OF TROPONIN QUANT: CPT

## 2019-01-08 PROCEDURE — 80053 COMPREHEN METABOLIC PANEL: CPT

## 2019-01-08 RX ADMIN — GABAPENTIN SCH MG: 300 CAPSULE ORAL at 22:19

## 2019-01-08 RX ADMIN — CALCITRIOL SCH MCG: 0.25 CAPSULE ORAL at 21:11

## 2019-01-08 RX ADMIN — BETHANECHOL CHLORIDE SCH MG: 25 TABLET ORAL at 21:12

## 2019-01-08 NOTE — HP
CC:  Dr. Yeni Shanks; Dr. David Baker *

 

HISTORY AND PHYSICAL:

 

DATE OF ADMISSION:  01/08/19

 

PRIMARY CARE PROVIDER:  Dr. Yeni Shanks.

 

NEPHROLOGIST:  Dr. David Baker.

 

CHIEF COMPLAINT:  Abdominal pain, headaches, fever, nausea, vomiting.

 

HISTORY OF PRESENT ILLNESS:  Ms. Masters is a 38-year-old female with past 
medical history of ESRD, on peritoneal dialysis; GERD; CAD; melanoma; type 2 
insulin- dependent diabetes; peripheral arterial disease with stent; chronic 
hypoxic respiratory failure, on home O2 at 2 L, who now presents to the 
emergency room with numerous complaints.  She reports that she is having some 
leg pain as well as having abdominal pain which is characterized as dull pain 
associated with nausea and vomiting.  She is also having chest pain and 
shortness of breath.  She reports chronic cough with no recent changes.  She 
reports that her chest pain is dull, present at all times with no relation to 
activity or coughing.  She reports that she is having fever at home, states 
that the fever at home is 99 degrees Fahrenheit associated with chills.  She 
has generalized throbbing headache which is chronic for her with no recent 
changes.  She is also having nausea and vomiting.  It should be noted that the 
patient did not have any vomiting in the emergency room.  In the emergency room
, the patient had workup done in the form of blood work, which showed her white 
cell count was 17.6, her troponin was 0.06, INR was 2.37.  Subsequently, the 
hospitalist service was called for admission.

 

PAST MEDICAL HISTORY:

1.  End-stage renal disease, on peritoneal dialysis.

2.  GERD.

3.  Coronary artery disease.

4.  Melanoma.

5.  Insulin-dependent type 2 diabetes.

6.  Peripheral arterial disease.

7.  Gastroparesis.

8.  Chronic hypoxic respiratory failure, on 2 L nasal cannula.

9.  Anxiety.

10.  Depression.

11.  Asthma.

12.  Tobacco abuse.

 

HOME MEDICATIONS:  Include:

1.  Omeprazole 40 mg at bedtime.

2.  Albuterol 2 puffs inhaler.

3.  Methadone 5 mg every 8 hours as needed.

4.  Gabapentin 200 mg 3 times a day.

5.  Insulin a.c. aspart sliding scale.

6.  Promethazine 25 mg as needed every 8 hours.

7.  Albuterol inhaler as needed.

8.  Insulin glargine 80 units at bedtime.

9.  Warfarin 7.5 mg at bedtime.

10.  Aspirin 81 mg daily.

11.  Losartan 100 mg daily.

12.  Sertraline 25 mg daily.

13.  Bethanechol 25 mg 4 times a day.

14.  Calcitriol 0.25 mcg 3 times a day.

15.  Doxycycline 100 mg, which she finished.

 

ALLERGIES:  Allergic to AMOXICILLIN, AUGMENTIN, ERYTHROMYCIN, HEPARIN, REGLAN, 
METRONIDAZOLE, NIACIN, ROPINIROLE, SULFA, PLASTIC TAPE.  It should be noted 
that she tolerated Rocephin during the last admission.

 

FAMILY HISTORY:  Father had an MI at 49, all siblings and parents have diabetes
, grandfather had lung cancer, and mother with ovarian cancer.

 

SOCIAL HISTORY:  She smokes half-a-pack per day.  Lives at home.  No alcohol 
use. She says she lives with her boyfriend, who is her healthcare proxy, phone 
number is [phone].

 

REVIEW OF SYSTEMS:  A 14-point review of systems was done, otherwise mentioned 
as above.  Otherwise is negative.

 

                               PHYSICAL EXAMINATION

 

GENERAL:  She is a young female, who appears older than her age, lying in an ER 
stretcher, in no acute distress, nasal cannula is on.

 

VITAL SIGNS:  Blood pressure is 170/89, heart rate of 111, temperature of 98.7, 
respiratory rate of 16 with saturation of 94% on nasal cannula.

 

HEENT:  Pupils equal, round, reactive to light.  Atraumatic, normocephalic.

 

LUNGS:  There is no tachypnea, no use of accessory muscles.  There are 
bibasilar crackles heard.  There are no rhonchi, no wheezing.

 

CARDIAC:  Regular rate and rhythm.  No murmurs appreciated.

 

ABDOMEN:  Obese, nondistended.  Bowel sounds are normoactive in all 4 
quadrants. There is no tenderness.  No rigidity or guarding.

 

MUSCULOSKELETAL:  No lower extremity edema.  No calf tenderness.

 

EXTREMITIES:  There is no lower extremity edema.

 

NEUROLOGICAL:  Alert and oriented x3, with no focal neurological deficits.

 

 DIAGNOSTIC STUDIES/LAB DATA:  Labs show white count of 17.6, hemoglobin 8.9, 
hematocrit of 28, platelets of 369.  Sodium of 135, potassium of 3.6, chloride 
of 97, bicarb 29, BUN 33, creatinine of 4.66.  Alk phos of 104.  CRP of 73.  
Troponin of 0.06.

 

Chest x-ray was done, which shows cardiomegaly with interstitial edema with 
vascular congestion.  This is slightly more progressive than exam done 
previously.

 

EKG shows sinus tachycardia with left ventricular hypertrophy with no acute ST-
T changes as prior to the previous EKG.

 

IMPRESSION AND PLAN:

1.  Chest pain.  Seems nonspecific; however, we will check a second and third 
troponin as well as a repeat EKG in the morning.

2.  Shortness of breath.  This could be secondary to end-stage renal disease, 
currently not requiring more oxygen than her baseline.  We will monitor this, 
continue nebulizers.

3.  Elevated blood pressure.  Could be secondary to episodic blood pressure.  
Was given hydralazine in the emergency room.  We will monitor this.

4.  End-stage renal disease, on peritoneal dialysis.  We discussed the case 
with Dr. Baker, nephrologist, they will evaluate the patient in the morning, 
and we will start peritoneal dialysis in the morning as well.

5.  Smoker.  Discussed smoking cessation.  We will start the patient on 
nicotine patch.

6.  Peripheral vascular disease.  Resume Coumadin with monitoring the daily 
INRs. INR goal of 2 to 3.

7.  Type 2 diabetes.  Continue home insulin regimen, diabetic diet.

8.  Leukocytosis.  This could be secondary to spontaneous bacterial peritonitis
; however, abdomen exam is benign.  For the meantime, we will start the patient 
on Rocephin and reevaluate the situation in the morning.  We will get repeat 
WBC count and monitor her vitals throughout the hospital course.

 

 

 

[id number]/CPS #: [id number]

SAEED

## 2019-01-08 NOTE — ED
Shortness of Breath





- HPI Summary


HPI Summary: 


A 39 y/o female brought in by [place] ambulance presents to [facility] with a chief 

complaint of SOB for a month which worsened on 01/07/19. She rates her pain as 8

/10. She also c/o chest pain, which she describes as a tightness, along with a 

cough. She states that a nebulizer and O2 alleviate her pain. The patient also 

reports being on dialysis every 6 hours at home. The patient also claims that 

she has not been taking her doxycycline [facility] 

because she did not fill it up. She had a CXR which showed bronchitis. The 

patient was supposed to see her PCP today, but came to the ED due to her pain.








- History of Current Complaint


Chief Complaint: EDShortnessOfBreath


Time Seen by Provider: 01/08/19 15:21


Hx Obtained From: Patient


Onset/Duration: Sudden Onset, Lasting Weeks, Still Present


Timing: Constant


Current Severity: Severe


Dyspnea At: Rest


Aggrevating Factors: Nothing


Alleviating Factors: Oxygen, Other - Nebulizer





- Allergy/Home Medications


Allergies/Adverse Reactions: 


 Allergies











Allergy/AdvReac Type Severity Reaction Status Date / Time


 


amoxicillin [From Augmentin] Allergy  See Comment Verified 01/08/19 15:18


 


clavulanic acid Allergy  See Comment Verified 01/08/19 15:18





[From Augmentin]     


 


erythromycin base Allergy  See Comment Verified 01/08/19 15:18


 


heparin Allergy  See Comment Verified 01/08/19 15:18


 


metoclopramide [From Reglan] Allergy  Hives Verified 01/08/19 15:18


 


metronidazole [From Flagyl] Allergy  Nausea And Verified 01/08/19 15:18





   Vomiting  


 


niacin Allergy  Rash Verified 01/08/19 15:18


 


ropinirole [From Requip] Allergy  Hives Verified 01/08/19 15:18


 


Sulfa (Sulfonamide Allergy  Vomiting Verified 01/08/19 15:18





Antibiotics)     


 


plastic tape Allergy Mild Blisters Uncoded 01/08/19 15:18














PMH/Surg Hx/FS Hx/Imm Hx


Endocrine/Hematology History: Reports: Hx Anticoagulant Therapy - Aspirin., Hx 

Blood Transfusions, Hx Diabetes - uses insulin, Hx Thyroid Disease - nodule, Hx 

Anemia - hx of - reports had tranfusion in 2014 after mi


Cardiovascular History: Reports: Hx Angina, Hx Cardiac Arrest - in Dec. 2014 

with CPR, Hx Cardiomegaly, Hx Coronary Artery Disease, Hx Deep Vein Thrombosis, 

Hx Hypercholesterolemia, Hx Hypertension, Hx Myocardial Infarction, Other 

Cardiovascular Problems/Disorders


   Denies: Hx Congestive Heart Failure, Hx Pacemaker/ICD, Hx Valvular Heart 

Disease


Respiratory History: Reports: Hx Asthma, Hx Chronic Obstructive Pulmonary 

Disease (COPD) - smoker, Hx Pneumonia, Hx Sleep Apnea, Other Respiratory 

Problems/Disorders - acute respipatory failure with hypoxia - dec 2016


   Denies: Hx Lung Cancer, Hx Pulmonary Embolism - pt says no, although 

documented in char


GI History: Reports: Hx Gall Bladder Disease, Hx Gastroesophageal Reflux Disease

, Other GI Disorders - GASTROPARESIS - TAKING OMEPRAZOLE, reglan and zofran


   Denies: Hx Gastrointestinal Bleed, Hx Ulcer, Hx Urosepsis


 History: Reports: Hx Acute Renal Failure, Hx Chronic Renal Failure - ESRD on 

PD, Hx Dialysis - PERITONEAL, Hx Kidney Stones, Hx Renal Disease - ESRD on home 

peritoneal dialysis , Other  Problems/Disorders


Musculoskeletal History: Reports: Hx Arthritis - back, hips, Hx Back Problems - 

Sciatica and Herniated L3 disk, Other Musculoskeletal History - partial 

amputation of toes/foot


Sensory History: Reports: Hx Eye Prosthesis - left, Hx Legally Blind - legally 

blind in right eye. no vision in left, Hx Vision Problem


   Denies: Hx Contacts or Glasses, Hx Hearing Aid


Opthamlomology History: Reports: Hx Eye Prosthesis - left, Hx Legally Blind - 

legally blind in right eye. no vision in left, Hx Vision Problem


   Denies: Hx Contacts or Glasses


Neurological History: Reports: Other Neuro Impairments/Disorders - neuropathy


   Denies: Hx Transient Ischemic Attacks (TIA)


Psychiatric History: Reports: Hx Anxiety, Hx Depression, Hx Bipolar Disorder


   Denies: Hx Eating Disorder, Hx Panic Disorder, Hx Schizophrenia, Hx of 

Violent Episodes Against Others





- Cancer History


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


Hx Chemotherapy: No


Hx Radiation Therapy: No





- Surgical History


Surgery Procedure, Year, and Place: LEFT EYE REMOVED 2012 - HAS PROSTHETIC EYE (

ORBITS DONE 5/2015 OK'D BY DR SAE COLE TO SCAN IN MRI) ;.  MELANOMA REMOVED 

FROM VULVA 2013 (PROCEDURE DONE 4X);.  CARDIAC CATH 2014 - NO STENTS;.  LEFT 

WRIST FISTULA PLACEMENT ([facility]) 2014;.  HEMODIALYSIS CATH RIGHT CHEST WALL 2014;.

  PERITONEAL DIALYSIS CATH 2/2015;.  CHEST WALL CATH REMOVAL 7/2016 [facility] [place];.  

RIGHT GREAT TOE AMPUTATION, [facility] [place] 4/2016;.  PLACEMENT RIGHT JUGULAR TESIO 

HEMODIALYSIS CATHETER [facility] [place] 8/27/2016;.  REMOVAL OF JUGULAR CATH - SEPT 2016.  

CARDIAC CATH - stentsx2 right leg.  PARTIAL AMPUTATION RIGHT TOES 5/2017.  

RIGHT TOES ALL REMOVED


Hx Anesthesia Reactions: No





- Immunization History


Date of Tetanus Vaccine: utd


Date of Influenza Vaccine: 10/2017


Infectious Disease History: No


Infectious Disease History: Reports: Hx of Known/Suspected MRSA - MRSA R 1st toe

, History Other Infectious Disease - GANGRENE


   Denies: Hx Clostridium Difficile, Hx Hepatitis, Hx Human Immunodeficiency 

Virus (HIV), Hx Shingles, Hx Tuberculosis, Traveled Outside the [place] in Last 30 

Days





- Family History


Known Family History: Positive: Cardiac Disease - father MI at 41 y/o, 

Hypertension, Other - Positive for bipolar and depression. Completed suicide to 

sister.  





- Social History


Alcohol Use: None


Hx Substance Use: No


Substance Use Type: Reports: None


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


Hx Tobacco Use: Yes


Smoking Status (MU): Heavy Every Day Tobacco Smoker


Type: Cigarettes


Amount Used/How Often: 1 PPD


Length of Time of Smoking/Using Tobacco: 20 YEARS


Have You Smoked in the Last Year: Yes





Review of Systems


Negative: Fever


Positive: Chest Pain


Positive: Shortness Of Breath, Cough


All Other Systems Reviewed And Are Negative: Yes





Physical Exam





- Summary


Physical Exam Summary: 





Appearance: The patient is well-nourished in no acute distress and in no acute 

pain.


 


Skin: The skin is warm and dry and skin color reflects adequate perfusion.





HEENT: The head is normocephalic and atraumatic. The pupils are equal and 

reactive. The conjunctivae are clear and without drainage. Nares are patent and 

without drainage. Mouth reveals moist mucous membranes and the throat is 

without erythema and exudate. The external ears are intact. The ear canals are 

patent and without drainage. The tympanic membranes are intact.


 


Neck: The neck is supple with full range of motion and non-tender. There are no 

carotid bruits. There is no neck vein distension.


 





Respiratory: Chest is non-tender. Expiratory wheezing with coughing.


 


Cardiovascular: Heart is regular rate and rhythm. There is no murmur or rub 

auscultated. There is no peripheral edema and pulses are symmetrical and equal.


 


Abdomen: The abdomen is soft and non-tender. There are normal bowel sounds 

heard in all four quadrants and there is no organomegaly palpated.


 


Musculoskeletal: There is no back tenderness noted. Extremities are non-tender 

with full range of motion. There is good capillary refill. There is no 

peripheral edema or calf tenderness elicited.


 


Neurological: Patient is alert and oriented to person, place and time. The 

patient has symmetrical motor strength in all four extremities. Cranial nerves 

are grossly intact. Deep tendon reflexes are symmetrical and equal in all four 

extremities.


 


Psychiatric: The patient has an appropriate affect and does not exhibit any 

anxiety or depression.


Triage Information Reviewed: Yes


Vital Signs On Initial Exam: 


 Initial Vitals











Temp Pulse Resp BP Pulse Ox


 


 98.2 F   118   12   186/94   90 


 


 01/08/19 15:15  01/08/19 15:15  01/08/19 15:15  01/08/19 15:15  01/08/19 15:15











Vital Signs Reviewed: Yes





Diagnostics





- Vital Signs


 Vital Signs











  Temp Pulse Resp BP Pulse Ox


 


 01/08/19 16:09      82


 


 01/08/19 16:01   109  21  161/82  89


 


 01/08/19 16:00   109  17   90


 


 01/08/19 15:53   110  16   94


 


 01/08/19 15:44      88


 


 01/08/19 15:39      90


 


 01/08/19 15:38    111  160/99  88


 


 01/08/19 15:31   111  16  160/99  95


 


 01/08/19 15:17      87


 


 01/08/19 15:15  98.2 F  118  12  186/94  90














- Laboratory


Result Diagrams: 


 01/08/19 16:49





 01/08/19 16:49


Lab Statement: Any lab studies that have been ordered have been reviewed, and 

results considered in the medical decision making process.





- Radiology


  ** CXR


Radiology Interpretation Completed By: Radiologist


Summary of Radiographic Findings: Cardiomegaly with interstitial edema 

consistent with vascular congestion. This.  is slightly more progressive than 

on previous exam of 1.19.  ED provider has reviewed this imaging report.





- EKG


  ** 15:16


Cardiac Rate: Tachycardia - 115 bpm


EKG Rhythm: Sinus Tachycardia


Summary of EKG Findings: EKG at 15:16 shows sinus tachycardia at 115 bpm with 

LVH.





Course/Dx





- Course


Course Of Treatment: Ms. Masters presented complaining of shortness of breath.  

She improved a lot in the ambulance ride in after a DuoNeb.  She was diagnosed 

with bronchitis a few days ago in the [facility] and prescribed 

doxycycline which she did not fill it.  She also complains of some abdominal 

pain and some thigh pain.  She has expiratory wheezing with coughing but her 

breath sounds are clear otherwise.  She was found to have a leukocytosis here 

as well as anemia.  The hospitalists were asked to evaluate her for admission.





- Diagnoses


Provider Diagnoses: 


 Abdominal pain, Leukocytosis, Pulmonary edema








- Physician Notifications


Discussed Care of Patient With: Virginie Barbour


Time Discussed With Above Provider: 17:30


Instructed by Provider To: Admit As Inpatient





Discharge





- Sign-Out/Discharge


Documenting (check all that apply): Patient Departure - admit





- Discharge Plan


Condition: Fair


Disposition: ADMITTED TO [facility]


Referrals: 


Yeni Shanks MD [Primary Care Provider] - 





- Billing Disposition and Condition


Condition: FAIR


Disposition: Admitted to [facility]





- Attestation Statements


Document Initiated by Selin: Yes


Documenting Scribe: Deandre Ortega


Provider For Whom Selin is Documenting (Include Credential): Salomon Jackson MD


Scribe Attestation: 


IDeandre scribed for Salomon Jackson MD on 01/08/19 at 1836. 


Scribe Documentation Reviewed: Yes


Provider Attestation: 


The documentation as recorded by the Deandre cardenas accurately 

reflects the service I personally performed and the decisions made by me, 

Salomon Jackson MD


Status of Scribe Document: Viewed

## 2019-01-09 VITALS — DIASTOLIC BLOOD PRESSURE: 83 MMHG | SYSTOLIC BLOOD PRESSURE: 169 MMHG

## 2019-01-09 LAB
ANION GAP SERPL CALC-SCNC: 8 MMOL/L (ref 2–11)
BASOPHILS # BLD AUTO: 0.1 10^3/UL (ref 0–0.2)
BUN SERPL-MCNC: 37 MG/DL (ref 6–24)
BUN/CREAT SERPL: 7.9 (ref 8–20)
CALCIUM SERPL-MCNC: 7.9 MG/DL (ref 8.6–10.3)
CHLORIDE SERPL-SCNC: 99 MMOL/L (ref 101–111)
EOSINOPHIL # BLD AUTO: 0.3 10^3/UL (ref 0–0.6)
GLUCOSE SERPL-MCNC: 173 MG/DL (ref 70–100)
HCO3 SERPL-SCNC: 28 MMOL/L (ref 22–32)
HCT VFR BLD AUTO: 25 % (ref 35–47)
HGB BLD-MCNC: 8.1 G/DL (ref 12–16)
INR PPP/BLD: 2.47 (ref 0.77–1.02)
LYMPHOCYTES # BLD AUTO: 1.1 10^3/UL (ref 1–4.8)
MCH RBC QN AUTO: 29 PG (ref 27–31)
MCHC RBC AUTO-ENTMCNC: 32 G/DL (ref 31–36)
MCV RBC AUTO: 91 FL (ref 80–97)
MONOCYTES # BLD AUTO: 0.6 10^3/UL (ref 0–0.8)
NEUTROPHILS # BLD AUTO: 12.1 10^3/UL (ref 1.5–7.7)
NRBC # BLD AUTO: 0 10^3/UL
NRBC BLD QL AUTO: 0
PLATELET # BLD AUTO: 323 10^3/UL (ref 150–450)
POTASSIUM SERPL-SCNC: 3.8 MMOL/L (ref 3.5–5)
RBC # BLD AUTO: 2.75 10^6/UL (ref 4–5.4)
SODIUM SERPL-SCNC: 135 MMOL/L (ref 135–145)
TROPONIN I SERPL-MCNC: 0.07 NG/ML (ref ?–0.04)
WBC # BLD AUTO: 14.1 10^3/UL (ref 3.5–10.8)

## 2019-01-09 PROCEDURE — 0J990ZZ DRAINAGE OF BUTTOCK SUBCUTANEOUS TISSUE AND FASCIA, OPEN APPROACH: ICD-10-PCS | Performed by: SURGERY

## 2019-01-09 PROCEDURE — 0H98XZZ DRAINAGE OF BUTTOCK SKIN, EXTERNAL APPROACH: ICD-10-PCS | Performed by: SURGERY

## 2019-01-09 RX ADMIN — ALBUTEROL SULFATE PRN MG: 2.5 SOLUTION RESPIRATORY (INHALATION) at 08:58

## 2019-01-09 RX ADMIN — CALCITRIOL SCH: 0.25 CAPSULE ORAL at 15:43

## 2019-01-09 RX ADMIN — BETHANECHOL CHLORIDE SCH MG: 25 TABLET ORAL at 12:57

## 2019-01-09 RX ADMIN — BETHANECHOL CHLORIDE SCH MG: 25 TABLET ORAL at 09:40

## 2019-01-09 RX ADMIN — GABAPENTIN SCH MG: 300 CAPSULE ORAL at 09:39

## 2019-01-09 RX ADMIN — CALCITRIOL SCH MCG: 0.25 CAPSULE ORAL at 12:42

## 2019-01-09 RX ADMIN — GABAPENTIN SCH: 300 CAPSULE ORAL at 15:44

## 2019-01-09 RX ADMIN — ALBUTEROL SULFATE PRN MG: 2.5 SOLUTION RESPIRATORY (INHALATION) at 13:39

## 2019-01-09 NOTE — CONS
CC:  Dr. Yeni Shanks; Dr. David Baker *

 

CONSULT AND PROCEDURE NOTE:

 

DATE OF CONSULT:  19

 

ATTENDING SURGEON:  Dr. Charles Hogan

 

CHIEF COMPLAINT:  Right buttock abscess.

 

HISTORY OF PRESENT ILLNESS:  This is a 38-year-old female with multiple medical 
problems including end-stage renal disease, on peritoneal dialysis, type 2 
diabetes, obesity, coronary artery disease, GERD, peripheral arterial disease, 
chronic hypoxic respiratory failure, anxiety, depression, asthma, and tobacco 
abuse, who was admitted to [facility] [place] on 19, for variety of complaints including 
lower extremity pain, abdominal pain, nausea, vomiting, chest pain without 
shortness of breath (see full admission history and physical).  Beginning about 
1-1/2 to 2 weeks the patient had noticed a small area of tenderness in the 
right buttock, which gradually progressed to a larger area of tenderness and 
swelling.  She was seen at [facility] 3 days ago, at which time the 
provider attempted to express pus.  Since that time, the patient states that 
the area has been more tender and more swollen, and without any spontaneous 
drainage though, patient states she was able to express some pus from the wound 
this morning.  Her vital signs show normal temperature, blood pressure 
moderately elevated; pulse rate also somewhat elevated, ranging between 102 and 
112.  Exam is limited to the aforementioned area of the right buttock.  There 
is a palpable and visible area of tenderness, induration, and mild erythema 
with a central area of fluctuance consistent with a limited subcutaneous right 
buttock abscess.  The CT scan was reviewed by myself, showing a subcutaneous 
collection measuring up to 1.8 cm in dimension (the subsequent report of the CT 
was reviewed describing a 1.9 x 1.9 x 1.3-cm abscess collection at the right 
margin of the vanessa cleft, though this is well away from both the  cleft 
and the anus. There is no tenderness at the anus.  The pathology report 
describes a fistula extending between the abscess collection and the anus.  
This will be clarified with Dr. Hogan).  Of note, the patient is fully 
anticoagulated on warfarin with an INR of 2.47.  After verbal explanation to 
the patient and recommendation of incision and drainage, consent was obtained 
and time-out performed at the bedside with the nurse.  The area of the right 
buttock was infiltrated with 1% plain lidocaine (a total of 15 cc) and then a 
cruciate incision was made in the area of fluctuance with returning of copious 
thin cloudy fluid.  This was submitted for culture and sensitivity.  The wound 
was explored for loculations, irrigated with normal saline and then a short 
wick of 1/2-inch iodoform packing was placed.  The patient tolerated the 
procedure well, direct pressure was maintained along with a pressure dressing 
subsequently to limit bleeding or oozing.  Instructions were given to the 
patient, and it sounded as though she will be discharged by the hospitalist 
later today.  She has a followup appointment with our office on Friday, .  I will defer antibiotic treatment to the hospitalist.



ADDENDUM: I spoke with Dr. Hogan about the case, and he reviewed the CT and 
report. He advised that there would be no change in treatment plan (I&D as 
performed with close followup) and the patient was contacted by phone 1/10/19 
to explain these findings and recommendation. She expressed understanding.

 

____________________________________ SHELLEY LEIJA

 

[id number] #: [id number]

SAEED

## 2019-01-09 NOTE — DS
CC:  Dr. Yeni Shanks; Odessa Benedict NP *

 

DISCHARGE SUMMARY:

 

DATE OF ADMISSION:  01/08/19

 

DATE OF DISCHARGE:  01/09/19

 

ATTENDING PHYSICIAN:  Dr. Maliha Carrasco.

 

PRIMARY CARE PROVIDER:  Dr. Yeni Shnaks.

 

REASON FOR ADMISSION:  Abdominal pain, headaches, fever, nausea, vomiting.

 

HOSPITAL COURSE:  This is a 38-year-old female with a past medical history of 
end- stage renal disease, on peritoneal dialysis; GERD; coronary artery disease
; melanoma; type 2 diabetes; peripheral artery disease, with stent; chronic 
hypoxic respiratory failure, on home O2 at 2 L, who was brought into the 
emergency room with numerous complaints including abdominal pain, headaches, 
fevers, nausea, and vomiting.

 

On admission, the patient was found to have a slightly elevated troponin. 
Additionally, she was found to have chest x-ray, which showed mild pulmonary 
edema. The patient was given 1 dose of IV Lasix and her home peritoneal 
dialysis was resumed the following day with consultation from Dr. Baker, 
nephrologist. Regarding the elevated troponin, we attributed that to chronic 
kidney disease. Second and third troponin did not show any significant changes.
  EKG did not have any acute changes as compared to the prior EKG.  We, however
, did find an abscess at the right buttock region.  Subsequently a CAT scan of 
the pelvis was done as well as a surgical consultation was obtained.  The CAT 
scan of the pelvis showed small right perianal fistula and abscess collection.  
The patient underwent I and D at bedside.  During the day of the admission, the 
patient was started on antibiotic, Rocephin, and coverage was broadened to 
continue Rocephin and Flagyl. The patient's home medications were continued as 
well.  The patient wanted to leave against medical advice.  We explained to the 
patient the risks of leaving against medical advice which included start of 
infection, having blood infection/sepsis/endocarditis, and ultimately death.  
The patient accepted and understood the risk that she was taking of leaving 
against medical advice.

 

CONDITION OF THE PATIENT ON DISCHARGE:  Fair and stable.

 

DISCHARGE MEDICATIONS:  Include:

1.  Omeprazole 40 mg at bedtime.

2.  Albuterol 2 mg inhaler every 6 hours as needed.

3.  Methadone 5 mg every 8 hours as needed.

4.  Gabapentin 200 mg 3 times a day.

5.  Insulin sliding scale, Aspart.

6.  Promethazine 25 mg every 8 hours as needed.

7.  Albuterol inhaler nebulizer treatment as needed.

8.  Glargine/Lantus 80 units subcu at bedtime.

9.  Coumadin 2.5 mg tablet 7.5 to 10 mg tablets at bedtime per discussion with 
primary care doctor.

10.  Aspirin 81 mg daily.

11.  Losartan 100 mg daily.

12.  Sertraline 25 mg daily.

13.  Bethanechol 25 mg 4 times a day.

14.  Calcitriol 0.25 mcg 3 times a day.

15.  Coumadin 7.5 mg at bedtime.

16.  Doxycycline 100 mg twice a day, 10 tablets, the prescription was sent to 
the pharmacy for her.  That was only new medication.

 

DISCHARGE INSTRUCTIONS:  To follow a diabetic diet.  Regarding the right 
buttock wound, the patient may shower or use sponge bath or usual.  Change 
outer dressing as needed except some bloody and/or cloudy drainage, but should 
not have anything excessive.  Should there be anything excessive, the patient 
to return to emergency room.  To resume activity as tolerated, to return to the 
emergency room for fevers, chills, worsening redness, pain at the right buttock 
region or if any new symptoms occur.  The patient to follow up with PCP in 1 
week and to check INR on Friday.



LEFT AGAINST MEDICAL ADVICE

 

DISCHARGE DIAGNOSIS:  Right buttock abscess/incision and drainage of the right 
buttock abscess.

 

ADDITIONAL DIAGNOSES:  Include:

1.  Type 2 diabetes.

2.  Chronic respiratory failure, on home oxygen at 2 L.

3.  Coronary artery disease.

4.  Peripheral vascular disease.

 

 [id number] #: [id number]

MTDD

## 2019-01-09 NOTE — PN
Subjective


Date of Service: 01/09/19


Interval History: 





Patient complaining of buttock pain. No fever. No chills. Had dialysis earlier 

this morning.





Objective


Active Medications: 








Albuterol (Ventolin 2.5 Mg/3 Ml Neb.Sol*)  2.5 mg INH Q4HR PRN


   PRN Reason: SOB/WHEEZING


   Last Admin: 01/09/19 08:58 Dose:  2.5 mg


Albuterol (Ventolin Hfa Inhaler*)  2 puff INH Q6H PRN


   PRN Reason: WHEEZING


Aspirin (Aspirin 81 Mg Chew Tab*)  81 mg PO DAILY [facility]


   Last Admin: 01/09/19 09:42 Dose:  81 mg


Bethanechol Chloride (Urecholine Tab*)  25 mg PO QID [facility]


   Last Admin: 01/09/19 09:40 Dose:  25 mg


Calcitriol (Rocaltrol  Cap*)  0.25 mcg PO TID [facility]


   Last Admin: 01/08/19 21:11 Dose:  0.25 mcg


Carvedilol (Coreg Tab*)  3.125 mg PO BID [facility]


Dextrose (D50w Syringe 50 Ml*)  12.5 gm IV PUSH .FOR FS < 60 - SS PRN


   PRN Reason: FS < 60


Gabapentin (Neurontin Cap(*))  300 mg PO TID [facility]


   Last Admin: 01/09/19 09:39 Dose:  300 mg


Hydralazine HCl (Apresoline Iv*)  10 mg IV SLOW PU Q2H PRN


   PRN Reason: BLOOD PRESSURE


Ceftriaxone Sodium 1 gm/ (Sodium Chloride)  50 mls @ 200 mls/hr IVPB Q24H [facility]


   Last Admin: 01/08/19 21:12 Dose:  200 mls/hr


Metronidazole/Sodium Chloride (Flagyl 500 Mg Ivpb*)  500 mg in 100 mls @ 100 mls

/hr IVPB Q8H [facility]


Insulin Glargine (Lantus(*))  80 units SUBCUT BEDTIME [facility]


   Last Admin: 01/08/19 22:19 Dose:  80 unit


Insulin Human Lispro (Humalog*)  0 units SUBCUT AC PRN; Protocol


   PRN Reason: BLOOD GLUCOSE


Losartan Potassium (Cozaar Tab*)  100 mg PO DAILY [facility]


   Last Admin: 01/09/19 09:41 Dose:  100 mg


Methadone HCl (Dolophine Tab*)  5 mg PO Q8HR PRN


   PRN Reason: PAIN


   Last Admin: 01/08/19 18:46 Dose:  5 mg


Morphine Sulfate (Morphine Vial*)  1 mg IV Q12HR PRN


   PRN Reason: PAIN


Nicotine (Nicotine Patch 14 Mg/24 Hr*)  1 patch TRANSDERM DAILY [facility]


   Last Admin: 01/09/19 09:42 Dose:  1 patch


Ondansetron HCl (Zofran Inj*)  4 mg IV Q6H PRN


   PRN Reason: NAUSEA


   Last Admin: 01/08/19 18:46 Dose:  4 mg


Pantoprazole Sodium (Protonix Tab *)  80 mg PO QPM [facility]


Pharmacy Profile Note (Nicotine Patch Removal Note*)  1 note PATCH OFF 2100 [facility]


Promethazine HCl (Phenergan Tab*)  25 mg PO Q8H PRN


   PRN Reason: NAUSEA


Warfarin Sodium (Coumadin Tab(*))  7.5 mg PO BEDTIME [facility]; Protocol


   Last Admin: 01/08/19 22:19 Dose:  7.5 mg








 Vital Signs - 8 hr











  01/09/19 01/09/19 01/09/19





  02:25 03:26 07:46


 


Temperature  97.4 F 98.2 F


 


Pulse Rate  104 105


 


Respiratory  20 16





Rate   


 


Blood Pressure  150/55 167/85





(mmHg)   


 


O2 Sat by Pulse 92 91 100





Oximetry   














  01/09/19 01/09/19





  09:01 09:39


 


Temperature  


 


Pulse Rate  


 


Respiratory 14 16





Rate  


 


Blood Pressure  





(mmHg)  


 


O2 Sat by Pulse  





Oximetry  











Oxygen Devices in Use Now: Nasal Cannula


Appearance: Obese female lying in an ER stretcher in no distress.


Respiratory: Clear to Auscultation


Cardiovascular: NL Sounds; No Murmurs; No JVD, RRR


Abdominal: NL Sounds; No Tenderness; No Distention


Skin: - - At the right buttock region about 4 cm X 4cm area of induration, 

warmth, tenderness with fluctuance


Result Diagrams: 


 01/09/19 06:06





 01/09/19 06:06


Microbiology and Other Data: 


 Microbiology











 01/08/19 16:25 Influenza Types A,B Antigen - Final





 Nasopharyngeal    Specimen received for Influenza A/B Molecular testing














Assess/Plan/Problems-Billing


Assessment: 











- Patient Problems


(1) Abscess of buttock, right


Current Visit: Yes   Status: Acute   Code(s): L02.31 - CUTANEOUS ABSCESS OF 

BUTTOCK   SNOMED Code(s): 67946218


   Comment: Will continue rocephin, add flagyl.


consulted Dr. Hogan, surgery


will get CT pelvis for further assessment.   





(2) COPD (chronic obstructive pulmonary disease)


Current Visit: No   Status: Acute   Code(s): J44.9 - CHRONIC OBSTRUCTIVE 

PULMONARY DISEASE, UNSPECIFIED   SNOMED Code(s): 30477728


   Comment: Stable


continue home regimen   





(3) DVT prophylaxis


Current Visit: No   Status: Acute   Onset Date: 05/03/15   Code(s): QUH5346 -  

  SNOMED Code(s): 424668643


   Comment: Therapeutic INR   





(4) IDDM (insulin dependent diabetes mellitus)


Current Visit: No   Status: Acute   Code(s): E11.9 - TYPE 2 DIABETES MELLITUS 

WITHOUT COMPLICATIONS; Z79.4 - LONG TERM (CURRENT) USE OF INSULIN   SNOMED Code(

s): 03296939


   Comment: Poorly controlled, resumed home regimen, will monitor fingersticks 

and adjust if needed.   





(5) ESRD (end stage renal disease)


Current Visit: No   Status: Chronic   Code(s): N18.6 - END STAGE RENAL DISEASE 

  SNOMED Code(s): 36005726


   Comment: On peritoneal dialysis, 


Dr. Baker (nephro) consulted.   





(6) Hx of coronary artery disease


Current Visit: No   Status: Chronic   Priority: Medium   Code(s): Z86.79 - 

PERSONAL HISTORY OF OTHER DISEASES OF THE CIRCULATORY SYSTEM   SNOMED Code(s): 

937437390


   Comment: Continue aspirin   





(7) Essential hypertension


Current Visit: Yes   Status: Acute   Code(s): I10 - ESSENTIAL (PRIMARY) 

HYPERTENSION   SNOMED Code(s): 48242579


   Comment: uncontrolled.


continue losartan 100mg from home


added coreg 3.125mg BID   





(8) PAD (peripheral artery disease)


Current Visit: No   Status: Acute   Code(s): I73.9 - PERIPHERAL VASCULAR DISEASE

, UNSPECIFIED   SNOMED Code(s): 016755573


   Comment: Continue coumadin.   


Status and Disposition: 





Home when medically stable.

## 2019-01-09 NOTE — PN
Progress Note





- Progress Note


Date of Service: 01/09/19


Note: 





Brief Surgery Note:





I&D performed of Right buttock abscess after review of hx, PE, CT scan of the 

pelvis, verbal explanation, written consent and time-out. Instructions reviewed 

w/ patient. C&S sent. Office f/u Friday. Antibiotics per hospitalist .

## 2019-01-21 ENCOUNTER — HOSPITAL ENCOUNTER (EMERGENCY)
Dept: HOSPITAL 25 - ED | Age: 39
Discharge: HOME | End: 2019-01-21
Payer: COMMERCIAL

## 2019-01-21 VITALS — DIASTOLIC BLOOD PRESSURE: 85 MMHG | SYSTOLIC BLOOD PRESSURE: 157 MMHG

## 2019-01-21 DIAGNOSIS — I13.11: ICD-10-CM

## 2019-01-21 DIAGNOSIS — Z79.4: ICD-10-CM

## 2019-01-21 DIAGNOSIS — Z79.82: ICD-10-CM

## 2019-01-21 DIAGNOSIS — Z88.2: ICD-10-CM

## 2019-01-21 DIAGNOSIS — Z88.1: ICD-10-CM

## 2019-01-21 DIAGNOSIS — Z89.421: ICD-10-CM

## 2019-01-21 DIAGNOSIS — F17.210: ICD-10-CM

## 2019-01-21 DIAGNOSIS — B34.9: ICD-10-CM

## 2019-01-21 DIAGNOSIS — Z89.411: ICD-10-CM

## 2019-01-21 DIAGNOSIS — Z99.2: ICD-10-CM

## 2019-01-21 DIAGNOSIS — Z88.8: ICD-10-CM

## 2019-01-21 DIAGNOSIS — E11.22: Primary | ICD-10-CM

## 2019-01-21 DIAGNOSIS — N18.6: ICD-10-CM

## 2019-01-21 DIAGNOSIS — Z91.048: ICD-10-CM

## 2019-01-21 DIAGNOSIS — Z95.5: ICD-10-CM

## 2019-01-21 DIAGNOSIS — K31.84: ICD-10-CM

## 2019-01-21 DIAGNOSIS — Z82.49: ICD-10-CM

## 2019-01-21 LAB
ALBUMIN SERPL BCG-MCNC: 2.9 G/DL (ref 3.2–5.2)
ALBUMIN/GLOB SERPL: 0.8 {RATIO} (ref 1–3)
ALP SERPL-CCNC: 88 U/L (ref 34–104)
ALT SERPL W P-5'-P-CCNC: 6 U/L (ref 7–52)
ANION GAP SERPL CALC-SCNC: 9 MMOL/L (ref 2–11)
AST SERPL-CCNC: 7 U/L (ref 13–39)
BASOPHILS # BLD AUTO: 0.1 10^3/UL (ref 0–0.2)
BUN SERPL-MCNC: 32 MG/DL (ref 6–24)
BUN/CREAT SERPL: 6.7 (ref 8–20)
CALCIUM SERPL-MCNC: 8.8 MG/DL (ref 8.6–10.3)
CHLORIDE SERPL-SCNC: 97 MMOL/L (ref 101–111)
EOSINOPHIL # BLD AUTO: 0.2 10^3/UL (ref 0–0.6)
GLOBULIN SER CALC-MCNC: 3.6 G/DL (ref 2–4)
GLUCOSE SERPL-MCNC: 305 MG/DL (ref 70–100)
HCO3 SERPL-SCNC: 29 MMOL/L (ref 22–32)
HCT VFR BLD AUTO: 27 % (ref 35–47)
HGB BLD-MCNC: 8.7 G/DL (ref 12–16)
LYMPHOCYTES # BLD AUTO: 0.9 10^3/UL (ref 1–4.8)
MCH RBC QN AUTO: 30 PG (ref 27–31)
MCHC RBC AUTO-ENTMCNC: 33 G/DL (ref 31–36)
MCV RBC AUTO: 93 FL (ref 80–97)
MONOCYTES # BLD AUTO: 0.4 10^3/UL (ref 0–0.8)
NEUTROPHILS # BLD AUTO: 9.4 10^3/UL (ref 1.5–7.7)
NRBC # BLD AUTO: 0 10^3/UL
NRBC BLD QL AUTO: 0
PLATELET # BLD AUTO: 373 10^3/UL (ref 150–450)
POTASSIUM SERPL-SCNC: 4 MMOL/L (ref 3.5–5)
PROT SERPL-MCNC: 6.5 G/DL (ref 6.4–8.9)
RBC # BLD AUTO: 2.88 10^6/UL (ref 4–5.4)
SODIUM SERPL-SCNC: 135 MMOL/L (ref 135–145)
WBC # BLD AUTO: 11 10^3/UL (ref 3.5–10.8)
WBC UR QL AUTO: (no result)

## 2019-01-21 PROCEDURE — 81015 MICROSCOPIC EXAM OF URINE: CPT

## 2019-01-21 PROCEDURE — 99283 EMERGENCY DEPT VISIT LOW MDM: CPT

## 2019-01-21 PROCEDURE — 36415 COLL VENOUS BLD VENIPUNCTURE: CPT

## 2019-01-21 PROCEDURE — 87070 CULTURE OTHR SPECIMN AEROBIC: CPT

## 2019-01-21 PROCEDURE — 87205 SMEAR GRAM STAIN: CPT

## 2019-01-21 PROCEDURE — 85025 COMPLETE CBC W/AUTO DIFF WBC: CPT

## 2019-01-21 PROCEDURE — 71046 X-RAY EXAM CHEST 2 VIEWS: CPT

## 2019-01-21 PROCEDURE — 87040 BLOOD CULTURE FOR BACTERIA: CPT

## 2019-01-21 PROCEDURE — 96374 THER/PROPH/DIAG INJ IV PUSH: CPT

## 2019-01-21 PROCEDURE — 80053 COMPREHEN METABOLIC PANEL: CPT

## 2019-01-21 PROCEDURE — 87086 URINE CULTURE/COLONY COUNT: CPT

## 2019-01-21 PROCEDURE — 81003 URINALYSIS AUTO W/O SCOPE: CPT

## 2019-01-21 NOTE — ED
Influenza-Like Illness





- HPI Summary


HPI Summary: 





A 37 y/o F with multiple co-morbidities presents to ED with fever (102 F) onset 

3 days ago. Associated sx: severe chills and diaphoresis onset five days ago, 

tremors, unproductive cough, decreased appetite, vomiting 4x since last night, 

constipation (her last BM 3 days ago), difficulty ambulating worse than 

baseline where her legs "just give out." Pt uses a walker. She has chest 

congestion and CP which she feels is caused from her lungs, not her heart. She 

saw her PCP 3 days ago who changed her ABX to Augmentin, gave her a new inhaler 

with steroids, and thought she might be septic. Pt is on home dialysis. 

Peritoneal catheter fluids have been clear. Her sugars are in the 300s which is 

her baseline. Aggravating: deep breaths, supine position. She took Tylenol PTA 

to mild relief. She did get her flu shot. No ETOH. Smoker. 








- History of Current Complaint


Chief Complaint: EDFluSymptoms


Time Seen by Provider: 01/21/19 11:52


Hx Obtained From: Patient


Onset/Duration: Lasting Days, Still Present


Severity: Moderate


Associated Signs & Symptoms: Fever, Cough, Vomiting





- Allergy/Home Medications


Allergies/Adverse Reactions: 


 Allergies











Allergy/AdvReac Type Severity Reaction Status Date / Time


 


Adhesive Tape [Plastic Tape] Allergy Mild Blisters Verified 01/21/19 11:52


 


erythromycin base Allergy  See Comment Verified 01/21/19 11:52


 


heparin Allergy  See Comment Verified 01/21/19 11:52


 


metoclopramide [From Reglan] Allergy  Hives Verified 01/21/19 11:52


 


metronidazole [From Flagyl] Allergy  Nausea And Verified 01/21/19 11:52





   Vomiting  


 


niacin Allergy  Rash Verified 01/21/19 11:52


 


ropinirole [From Requip] Allergy  Hives Verified 01/21/19 11:52


 


Sulfa (Sulfonamide Allergy  Vomiting Verified 01/21/19 11:52





Antibiotics)     











Home Medications: 


 Home Medications





Amoxicillin/Clavulanate TAB* [Augmentin *] 875 mg PO BID 01/21/19 [

History Confirmed 01/21/19]


Aspirin EC TAB* [Ecotrin EC Low Dose 81 MG*] 81 mg PO DAILY 01/21/19 [History 

Confirmed 01/21/19]


Budesonide/Formote 160/4.5(NF) [Symbicort 160/4.5 (NF)] 2 puff INH BID 01/21/19 

[History Confirmed 01/21/19]


Cholecalciferol TAB* [Vitamin D TAB*] 1,000 unit PO DAILY 01/21/19 [History 

Confirmed 01/21/19]


Insulin LISPRO* [HumaLOG*] 10 units SUBCUT AS DIRECTED 01/21/19 [History 

Confirmed 01/21/19]


Methadone TAB* [Dolophine TAB*] 5 mg PO Q8HR PRN 01/21/19 [History Confirmed 01/ 21/19]


Omega-3 Fatty Acids (Nf) [Fish Oil (NF)] 4,000 mg PO DAILY 01/21/19 [History 

Confirmed 01/21/19]


Sertraline* [Zoloft*] 25 mg PO DAILY 01/21/19 [History Confirmed 01/21/19]











PMH/Surg Hx/FS Hx/Imm Hx


Previously Healthy: No


Endocrine/Hematology History: Reports: Hx Anticoagulant Therapy - Aspirin., Hx 

Blood Transfusions, Hx Diabetes - uses insulin, Hx Thyroid Disease - nodule, Hx 

Anemia - hx of - reports had tranfusion in 2014 after mi


Cardiovascular History: Reports: Hx Angina, Hx Cardiac Arrest - in Dec. 2014 

with CPR, Hx Cardiomegaly, Hx Coronary Artery Disease, Hx Deep Vein Thrombosis, 

Hx Hypercholesterolemia, Hx Hypertension, Hx Myocardial Infarction, Other 

Cardiovascular Problems/Disorders


   Denies: Hx Congestive Heart Failure, Hx Pacemaker/ICD, Hx Valvular Heart 

Disease


Respiratory History: Reports: Hx Asthma, Hx Chronic Obstructive Pulmonary 

Disease (COPD) - smoker, Hx Pneumonia, Hx Sleep Apnea, Other Respiratory 

Problems/Disorders - acute respipatory failure with hypoxia - dec 2016


   Denies: Hx Lung Cancer, Hx Pulmonary Embolism - pt says no, although 

documented in char


GI History: Reports: Hx Gall Bladder Disease, Hx Gastroesophageal Reflux Disease

, Other GI Disorders - GASTROPARESIS - TAKING OMEPRAZOLE, reglan and zofran


   Denies: Hx Gastrointestinal Bleed, Hx Ulcer, Hx Urosepsis


 History: Reports: Hx Acute Renal Failure, Hx Chronic Renal Failure - ESRD on 

PD, Hx Dialysis - PERITONEAL, Hx Kidney Stones, Hx Renal Disease - ESRD on home 

peritoneal dialysis , Other  Problems/Disorders


Musculoskeletal History: Reports: Hx Arthritis - back, hips, Hx Back Problems - 

Sciatica and Herniated L3 disk, Other Musculoskeletal History - partial 

amputation of toes/foot


Sensory History: Reports: Hx Eye Prosthesis - left, Hx Legally Blind - legally 

blind in right eye. no vision in left, Hx Vision Problem


   Denies: Hx Contacts or Glasses, Hx Hearing Aid


Opthamlomology History: Reports: Hx Eye Prosthesis - left, Hx Legally Blind - 

legally blind in right eye. no vision in left, Hx Vision Problem


   Denies: Hx Contacts or Glasses


Neurological History: Reports: Other Neuro Impairments/Disorders - neuropathy


   Denies: Hx Transient Ischemic Attacks (TIA)


Psychiatric History: Reports: Hx Anxiety, Hx Depression, Hx Bipolar Disorder


   Denies: Hx Eating Disorder, Hx Panic Disorder, Hx Schizophrenia, Hx of 

Violent Episodes Against Others





- Cancer History


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


Hx Chemotherapy: No


Hx Radiation Therapy: No





- Surgical History


Surgery Procedure, Year, and Place: LEFT EYE REMOVED 2012 - HAS PROSTHETIC EYE (

ORBITS DONE 5/2015 OK'D BY DR SAE COLE TO SCAN IN MRI) ;.  MELANOMA REMOVED 

FROM [facility] 2013 (PROCEDURE DONE 4X);.  CARDIAC CATH 2014 - NO STENTS;.  LEFT 

WRIST FISTULA PLACEMENT ([facility]) 2014;.  HEMODIALYSIS CATH RIGHT CHEST WALL 2014;.

  PERITONEAL DIALYSIS CATH 2/2015;.  CHEST WALL CATH REMOVAL 7/2016 [facility] [place];.  

RIGHT GREAT TOE AMPUTATION, [facility] [place] 4/2016;.  PLACEMENT RIGHT JUGULAR TESIO 

HEMODIALYSIS CATHETER [facility] [place] 8/27/2016;.  REMOVAL OF JUGULAR CATH - SEPT 2016.  

CARDIAC CATH - stentsx2 right leg.  PARTIAL AMPUTATION RIGHT TOES 5/2017.  

RIGHT TOES ALL REMOVED


Hx Anesthesia Reactions: No





- Immunization History


Date of Tetanus Vaccine: utd


Date of Influenza Vaccine: 10/2017


Infectious Disease History: No


Infectious Disease History: Reports: Hx of Known/Suspected MRSA - MRSA R 1st toe

, History Other Infectious Disease - GANGRENE


   Denies: Hx Clostridium Difficile, Hx Hepatitis, Hx Human Immunodeficiency 

Virus (HIV), Hx Shingles, Hx Tuberculosis, Traveled Outside the [place] in Last 30 

Days





- Family History


Known Family History: Positive: Cardiac Disease - father MI at 41 y/o, 

Hypertension, Other - Positive for bipolar and depression. Completed suicide to 

sister.  





- Social History


Occupation: Unemployed - OTHER


Lives: Alone


Alcohol Use: None


Hx Substance Use: No


Substance Use Type: Reports: None


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


Hx Tobacco Use: Yes


Smoking Status (MU): Heavy Every Day Tobacco Smoker


Type: Cigarettes


Amount Used/How Often: 1 PPD


Length of Time of Smoking/Using Tobacco: 20 YEARS


Have You Smoked in the Last Year: Yes





Review of Systems


Positive: Fever, Chills, Skin Diaphoresis, Other - pos: decreased appetite


Positive: Chest Pain - and chest congestion


Positive: Cough


Positive: Vomiting, Other - pos: constipation.  Negative: Diarrhea


Neurological: Other - pos: difficulty ambulating, tremors


All Other Systems Reviewed And Are Negative: Yes





Physical Exam





- Summary


Physical Exam Summary: 





Appearance: Chronically ill appearing, no pain distress


Skin: warm, dry, reflects adequate perfusion


Head/face: normal


Eyes: EOMI, VIOLETTA


ENT: mucous membranes moist, no nasal congestion


Neck: supple, non-tender


Respiratory: Mild expiratory wheezes, breath sounds present


Cardiovascular: Tachy, pulses symmetrical 


Abdomen: non-tender, soft


Bowel Sounds: present


Musculoskeletal: normal, strength/ROM intact


Neuro: normal, sensory motor intact, A&Ox3





Triage Information Reviewed: Yes


Vital Signs On Initial Exam: 


 Initial Vitals











Temp Pulse Resp BP Pulse Ox


 


 97.5 F   108   16   177/93   100 


 


 01/21/19 11:48  01/21/19 11:48  01/21/19 11:48  01/21/19 11:48  01/21/19 11:48











Vital Signs Reviewed: Yes





Diagnostics





- Vital Signs


 Vital Signs











  Temp Pulse Resp BP Pulse Ox


 


 01/21/19 11:48  97.5 F  108  16  177/93  100














- Laboratory


Result Diagrams: 


 01/21/19 12:58





 01/21/19 12:58


Lab Statement: Any lab studies that have been ordered have been reviewed, and 

results considered in the medical decision making process.





- Radiology


  ** CXR


Radiology Interpretation Completed By: Radiologist


Summary of Radiographic Findings: IMPRESSION: No active cardiopulmonary disease 

is noted. Cardiomegaly is noted. ED provider has reviewed this report.





Re-Evaluation





- Re-Evaluation


  ** 1


Re-Evaluation Time: 14:03


Change: Improved


Comment: Discussing results and plan to D/C. Pt voiced understanding.





Flu Symptom Course/Dx





- Course


Course Of Treatment: Nurse's notes reviewed.  Patient with a history of end-

stage renal disease on peritoneal dialysis at home presents with flulike 

symptoms.  No fever here and laboratories are fairly benign-appearing aside 

from her chronic anemia.  There is no infiltrate on x-ray and she is negative 

for flu.  She is treated with small volume IV fluids given her renal 

dysfunction with improvement.  She was discharged in good condition and 

continues on outpatient antibiotics.  She'll follow closely with her primary 

care physician.





- Diagnoses


Differential Diagnosis/HQI/PQRI: Positive: Bronchitis, Influenza, Pneumonia, 

Upper Respiratory Infection, Other - CHF


Provider Diagnoses: 


 Hypoglycemia, Viral syndrome, End stage renal disease on dialysis








Discharge





- Sign-Out/Discharge


Documenting (check all that apply): Patient Departure - D/C





- Discharge Plan


Condition: Improved


Disposition: HOME


Patient Education Materials:  Viral Syndrome (ED)


Referrals: 


Yeni Shanks MD [Primary Care Provider] - 


Additional Instructions: 


Continue antibiotic.  Go to to your Coumadin clinic appointment later today.  

Return with fevers over 100.4, difficulty breathing, abdominal pain, worse, new 

symptoms or other concerns as discussed.  Follow-up promptly with your family 

doctor.  Call today for an appointment.





- Billing Disposition and Condition


Condition: IMPROVED


Disposition: Home





- Attestation Statements


Document Initiated by Scribe: Yes


Documenting Scribe: Casie Velasco


Provider For Whom Selin is Documenting (Include Credential): Dr. Abdoul Rodriguez MD


Scribe Attestation: 


I, Casie Velasco scribed for Dr. Abdoul Rodriguez MD on 01/21/19 at 1635. 


Scribe Documentation Reviewed: Yes


Provider Attestation: 


The documentation as recorded by the Casie cardenas accurately 

reflects the service I personally performed and the decisions made by me, Dr. Abdoul Rodriguez MD


Status of Scribe Document: Viewed

## 2019-01-21 NOTE — XMS REPORT
Continuity of Care Document (CCD)

 Created on:January 10, 2019



Patient:Adri Rubin

Sex:Female

:1980

External Reference #:[id number]





Demographics







 Address  [address]



   [place] [address]

 

 Home Phone  [phone]

 

 Mobile Phone  [phone]

 

 Email Address  [email or web address]

 

 Preferred Language  en

 

 Marital Status  Not  or 

 

 [facility] Affiliation  Unknown

 

 Race  White

 

 Ethnic Group  Not  or 









Author







 Name  Winsome Nickerson









Support







 Name  Relationship  Address  Phone

 

 Raymundo Solorzano  Unavailable  Unavailable  [phone]

 

 Louise Rubin  Unavailable  Unavailable  [phone]









Care Team Providers







 Name  Role  Phone

 

 Yeni Shanks MD  Primary Care Physician  Unavailable









Payers







 Type  Date  Identification Numbers  Payment Provider  Subscriber

 

   Effective:  Policy Number: [id number]  Sim Rubin



   2014      









 Group Number: [id number]  [address]

 

 PayID: [id number]  [place] [address]









   Expires: 2017  Policy Number: [id number]  Medicaid  Louise Rubin









 Group Name: 1   [address]

 

 PayID: [id number]  [place] [address]







Advance Directives







 Description

 

 No Information Available







Problems







 Date  Description  Provider  Status

 

 Onset: 06/10/2015  Lumbosacral spondylosis without  Michael Mendez M.D.  
Active



   myelopathy    

 

 Onset: 06/10/2015  Diabetic polyneuropathy  Michael Mendez M.D.  Active

 

 Onset: 2017  Disorder of lung  Lily Pantoja MD  Active

 

 Onset: 2017  Lymphadenopathy  Lily Pantoja MD  Active

 

 Onset: 2017  Disturbance in sleep behavior  Lily Pantoja MD  Active

 

 Onset: 2017  Tobacco user  Lily Pantoja MD  Active

 

 Onset: 2017  Morbid obesity  Lily Pantoja MD  Active







Family History







 Date  Family Member(s)  Problem(s)  Comments

 

   General  Crohn's Disease  

 

   General  Diabetes  

 

   General  Heart Disease  

 

   Father  Coronary Artery Disease (CAD)  

 

   Mother  Diabetes  

 

   Mother  Crohn's Disease  







Social History







 Type  Date  Description  Comments

 

 Birth Sex    Unknown  

 

 Occupation    Disabled  

 

 ETOH Use    Denies alcohol use  

 

 Recreational Drug Use    Denies Drug Use  

 

 Tobacco Use  Start: Unknown  Patient is a current smoker, smokes  



     every day  

 

 Tobacco Use  Start: Unknown  Heavy tobacco smoker (more than 10  



     cigarettes/day)  

 

 Smoking Status  Reviewed: 10/25/17  Heavy tobacco smoker (more than 10  



     cigarettes/day)  







Allergies, Adverse Reactions, Alerts







 Date  Description  Reaction  Status  Severity  Comments

 

 06/10/2015  Morphine  does not work  Active    

 

 06/10/2015  Codeine  Nausea and Vomiting  Active    

 

 06/10/2015  Augmentin  Urticaria  Active    

 

 2016  Tape    Active    

 

 2016  Bactrim    Active    

 

 2017  Heparin    Active    excessive clotting

 

 2017  Flagyl    Active    stomach upset,vomiting,



           and abd pain.







Medications







 Medication  Date  Status  Form  Strength  Qnty  SIG  Indications  Ordering



                 Provider

 

 Gabapentin    Active  Capsules  300mg    1 by mouth    Unknown



   /2017          three times    



             a day    

 

 Zofran    Active  Tablets  4mg    1 tab by    Unknown



   /2017          mouth every    



             6 hours as    



             needed    



             nausea    

 

 Albuterol    Active  Nebulizer  (2.5mg/3M    1 vial via    Unknown



 Sulfate  /2017      L) 0.083%    nebulizer 4    



             times daily    



             as needed    

 

 Novolog Mix  06/10  Active  Supn  (70-30)10  1unit  sliding    Michael CRUZ



 70/30 Prefilled  /      0Unit/ML  s  scale    Frances Mendez M.D.

 

 Omeprazole    Active  Capsules  20mg    1 by mouth    Unknown



   /0000    DR      every day    

 

 Lantus Solostar    Active  Solution  100Unit/M    80 u hs    Unknown



   /    Pen-Inject  L        

 

 Amlodipine    Active  Tablets  10mg    1 by mouth    Unknown



 Besylate  /          every day    

 

 Calcitriol    Active  Capsules  0.25mcg    1 by mouth    Unknown



   /0000          every day    

 

 Bisoprolol  00  Active  Tablets  10mg    1 by mouth    Unknown



 Fumarate  /0000          every day    

 

 Atorvastatin    Active  Tablets  80mg    1 by mouth    Unknown



 Calcium  /          every day    

 

 Losartan    Active  Tablets  100mg    1 by mouth    Unknown



 Potassium  /          every day    

 

 Aspir-Low    Active  Tablets DR  81mg    1 by mouth    Unknown



   /0000          every day    

 

 Coumadin    Active  Tablets  2.5    4 po daily    Unknown



   /0000          2 days a    



             week and 3    



             tabs po on    



             thee other    



             days    

 

 Doxycycline    Active  Tablets  100mg    Take One    Unknown



 Hyclate  /0000          Tablet By    



             Mouth Twice    



             A Day    

 

 Methadone HCL    Active  Tablets  5mg    Take One To    Unknown



   /0000          Two Tablets    



             By Mouth    



             Every 6    



             Hours as    



             Needed For    



             Pain  Max    

 

                 

 

 Levofloxacin  09/15  Hx  Tablets  500mg  14tab  1 by mouth  I73.9          s  every day    Maricarmen Zaragoza M.D.



   10/24              



   /2017              

 

 Hydromorphone    Hx  Tablets  2mg  60tab  take    Ivan



 HCL  /2017        s  one-two    Nik,



   -          every four    M.D.



             hours as    



   /2017          needed for    



             breakthough    



             pain    

 

 Dilaudid    Hx  Tablets  2mg  30tab  1 tabs by  M86.171          s  mouth every    Nik,



   -          6 hours as    M.DSaurabh



             needed pain    



                 

 

 Levofloxacin    Hx  Tablets  500mg  11tab  1 tab by            s  mouth q 48    Nik,



   -          hrs    M.D.



                 

 

 Metronidazole    Hx  Tablets  500mg  63tab  1 tab po            s  tid x21    Nik,



   -          days    M.D.



                 

 

 Hydromorphone    Hx  Tablets  2mg  60tab  1 tab po    Ivan



 HCL  /2017        s  q6h prn    Nik,



   -          pain    M.D.



                 

 

 Cyclobenzaprine    Hx  Tablets  10mg    one by    Unknown



 HCL  /2017          mouth three    



             times a day    



             as needed    



             spasm    

 

 Fish Oil    Hx  Capsules  1000mg    1 by mouth    Unknown



 Concentrate            twice a day    



   -              



   10/24              



   /2017              

 

 Percocet  10/17  Hx  Tablets  5-325mg  15tab  1-2 tabs by    Yesica Sun



           s  mouth every    MD Roger



   -          4 hours as    



             needed for    



             pain    

 

 Cephalexin  10/03  Hx  Tablets  250mg  20tab  1 Tablet  L03.313  Charles P.



   /        s  Every 6 HRS    MD Samira,



             For 5 Days    FACS

 

 Oxycodone HCL    Hx  Tablets  5mg  30tab  1 tab by    Lali



           s  mouth every    Wale,



   -          4-6hrs as    NP



             needed pain    



                 

 

 Bactrim DS    Hx  Tablets  800-160mg  30tab  1 by mouth    Ivan



           s  twice a day    Maricarmen Zaragoza M.D.



                 

 

 Bactrim DS    Hx  Tablets  800-160mg  40tab  1 by mouth  E10.621  Ivan



           s  twice a day    Maricarmen Zaragoza M.D.



                 

 

 Sertraline HCL  06/10  Hx  Tablets  50mg  90tab  1   by    Michael CRUZ



           s  mouth every    Soledadack,



             anali KENNEDY

 

 Valacyclovir HCL    Hx  Tablets  500mg  90tab  Take 1            s  Tablet    Johnny, NP



   -          Daily    



   06/10              



   /2015              

 

 Gabapentin    Hx  Capsules  300mg    take 1    Unknown



   /0000          capsule by    



   -          mouth twice    



   06/10          a day    



   /2015              

 

 Mupirocin    Hx  Ointment  2%        Unknown



   /0000              



   -              



   06/10              



   /2015              

 

 Promethazine HCL    Hx  Syrup  6.25mg/5M    prn    Unknown



   /0000      L        

 

 Torsemide    Hx  Tablets  20mg    1 by mouth    Unknown



   /          bid    



   -              



   10/24              



   /2017              

 

 Ciprofloxacin    Hx  Tablets  500mg    1 by mouth    Unknown



 HCL  /0000          twice a day    

 

 Magnesium    Hx  Capsules  400mg    1 by mouth    Unknown



             bid    

 

 Clindamycin HCL    Hx  Capsules          Unknown



                 

 

 Eliquis    Hx  Tablets  5mg    1 by mouth    Unknown



   /          twice a day    



   -              



                 

 

 Bactrim DS    Hx  Tablets  800-160mg    1 by mouth    Unknown



   /0000          twice a day    



   -              



                 

 

 Chantix    Hx  Tablets  0.5mg    as directed    Unknown



   /0000              



   -              



   10/24              



   /2017              







Immunizations







 Description

 

 No Information Available







Vital Signs







 Date  Vital  Result  Comment

 

 10/25/2017  2:58pm  Heart Rate  96 /min  









 BP Systolic Sitting  118 mmHg  

 

 BP Diastolic Sitting  88 mmHg  

 

 Respiratory Rate  20 /min  

 

 Pain Level  0  









 09/15/2017  9:42am  Heart Rate  96 /min  









 BP Systolic  108 mmHg  

 

 BP Diastolic  70 mmHg  

 

 Body Temperature  96.9 F  









 2017  2:47pm  Height  65 inches  5'5"









 Heart Rate  106 /min  

 

 BP Systolic Sitting  104 mmHg  

 

 BP Diastolic Sitting  68 mmHg  

 

 Respiratory Rate  12 /min  no respiratory distress

 

 Body Temperature  99.8 F  

 

 Pain Level  5  

 

 O2 % BldC Oximetry  95 %  









 2017 11:51am  Height  65 inches  5'5"









 Heart Rate  93 /min  

 

 BP Systolic  110 mmHg  

 

 BP Diastolic  88 mmHg  

 

 Respiratory Rate  20 /min  

 

 Pain Level  7  

 

 O2 % BldC Oximetry  90 %  no difficulties breathing.









 2017  9:22am  Height  65 inches  5'5"









 Weight  202.00 lb  

 

 Heart Rate  106 /min  

 

 BP Systolic  160 mmHg  

 

 BP Diastolic  100 mmHg  

 

 Body Temperature  97.6 F  

 

 BMI (Body Mass Index)  33.6 kg/m2  









 2017  3:09pm  Height  65 inches  5'5"









 Weight  202.00 lb  

 

 Heart Rate  68 /min  

 

 BP Systolic Sitting  120 mmHg  

 

 BP Diastolic Sitting  68 mmHg  

 

 Respiratory Rate  18 /min  

 

 Pain Level  2  

 

 BMI (Body Mass Index)  33.6 kg/m2  









 2017  3:43pm  Height  65 inches  5'5"









 Weight  203.00 lb  

 

 Heart Rate  78 /min  

 

 BP Systolic  116 mmHg  

 

 BP Diastolic  62 mmHg  

 

 Respiratory Rate  16 /min  

 

 Pain Level  1  

 

 BMI (Body Mass Index)  33.8 kg/m2  









 2017  3:15pm  Height  65 inches  5'5"









 Heart Rate  116 /min  has appt to see cardiologist soon

 

 BP Systolic  102 mmHg  

 

 BP Diastolic  82 mmHg  

 

 Respiratory Rate  20 /min  

 

 Pain Level  6  









 2017 10:54am  Height  65 inches  5'5"









 Heart Rate  76 /min  

 

 BP Systolic  134 mmHg  

 

 BP Diastolic  82 mmHg  

 

 Respiratory Rate  16 /min  

 

 Body Temperature  97.9 F  

 

 Pain Level  7  









 2017  1:58pm  Height  65 inches  5'5"









 Weight  200.00 lb  patient stated

 

 Heart Rate  120 /min  

 

 BP Systolic Sitting  114 mmHg  

 

 BP Diastolic Sitting  66 mmHg  

 

 Respiratory Rate  16 /min  

 

 Pain Level  5  

 

 BMI (Body Mass Index)  33.3 kg/m2  









 2017  2:05pm  Height  65 inches  5'5"









 Weight  240.00 lb  

 

 BP Systolic  114 mmHg  

 

 BP Diastolic  80 mmHg  

 

 Body Temperature  97.8 F  

 

 BMI (Body Mass Index)  39.9 kg/m2  









 2017  2:31pm  Height  65 inches  5'5"









 Weight  240.00 lb  

 

 Heart Rate  110 /min  

 

 BP Systolic Sitting  80 mmHg  

 

 BP Diastolic Sitting  48 mmHg  

 

 Respiratory Rate  19 /min  

 

 Pain Level  3  

 

 O2 % BldC Oximetry  95 %  Ra

 

 BMI (Body Mass Index)  39.9 kg/m2  









 2017  1:32pm  Heart Rate  66 /min  









 BP Systolic  108 mmHg  

 

 BP Diastolic  68 mmHg  

 

 Respiratory Rate  18 /min  

 

 Body Temperature  97.4 F  









 2017  2:52pm  Height  65 inches  5'5"









 Weight  240.00 lb  

 

 Heart Rate  91 /min  

 

 BP Systolic  132 mmHg  

 

 BP Diastolic  88 mmHg  

 

 Respiratory Rate  14 /min  

 

 O2 % BldC Oximetry  96 %  

 

 BMI (Body Mass Index)  39.9 kg/m2  









 11/10/2016  2:01pm  Height  65 inches  5'5"









 Weight  240.00 lb  

 

 Heart Rate  80 /min  

 

 Respiratory Rate  18 /min  

 

 Body Temperature  98.7 F  

 

 BMI (Body Mass Index)  39.9 kg/m2  









 10/27/2016 12:27pm  Heart Rate  84 /min  









 Respiratory Rate  18 /min  

 

 Body Temperature  98.9 F  









 10/17/2016  2:04pm  Height  65 inches  5'5"









 Weight  240.00 lb  

 

 Heart Rate  96 /min  

 

 BP Systolic Sitting  128 mmHg  

 

 BP Diastolic Sitting  78 mmHg  

 

 Respiratory Rate  18 /min  

 

 Body Temperature  97.9 F  

 

 BMI (Body Mass Index)  39.9 kg/m2  









 10/03/2016 11:52am  Heart Rate  90 /min  









 BP Systolic  140 mmHg  

 

 BP Diastolic  90 mmHg  

 

 Respiratory Rate  16 /min  

 

 Body Temperature  97.7 F  









 2016 11:10am  Height  65 inches  5'5"









 Weight  245.00 lb  

 

 BMI (Body Mass Index)  40.8 kg/m2  









 2016  3:09pm  Height  65 inches  5'5"









 Weight  245.00 lb  

 

 Heart Rate  83 /min  

 

 BP Systolic  144 mmHg  

 

 BP Diastolic  87 mmHg  

 

 BMI (Body Mass Index)  40.8 kg/m2  









 2016  1:02pm  Height  65 inches  5'5"









 Weight  245.00 lb  

 

 Pain Level  0  

 

 BMI (Body Mass Index)  40.8 kg/m2  









 2016  4:19pm  Height  65 inches  5'5"









 Weight  245.00 lb  

 

 Body Temperature  97.9 F  

 

 Pain Level  0  

 

 BMI (Body Mass Index)  40.8 kg/m2  









 2016  4:00pm  Height  65 inches  5'5"









 Weight  245.00 lb  

 

 Body Temperature  98.6 F  

 

 Pain Level  0  

 

 BMI (Body Mass Index)  40.8 kg/m2  









 2016 11:03am  Weight  245.00 lb  

 

 2016  9:38am  Height  65 inches  5'5"









 Weight  245.00 lb  

 

 Heart Rate  78 /min  

 

 BP Systolic Sitting  130 mmHg  

 

 BP Diastolic Sitting  90 mmHg  

 

 BMI (Body Mass Index)  40.8 kg/m2  









 06/10/2015  9:24am  Height  65 inches  5'5"









 Weight  234.00 lb  

 

 Heart Rate  82 /min  

 

 BP Systolic Sitting  140 mmHg  

 

 BP Diastolic Sitting  90 mmHg  

 

 Pain Level  5  back/trent leg

 

 BMI (Body Mass Index)  38.9 kg/m2  







Results







 Test  Date  Facility  Test  Result  H/L  Range  Note

 

 CBC Auto Diff  2017  [facility]  White Blood  12.0 10^3/uL  
High  3.5-10.8  



     [address]  Count        



     [place] [address] [phone]          









 Red Blood Count  4.44 10^6/uL  N  4.0-5.4  

 

 Hemoglobin  13.3 g/dL  N  12.0-16.0  

 

 Hematocrit  41 %  N  35-47  

 

 Mean Corpuscular Volume  92 fL  N  80-97  

 

 Mean Corpuscular Hemoglobin  30 pg  N  27-31  

 

 Mean Corpuscular HGB Conc  32 g/dL  N  31-36  

 

 Red Cell Distribution Width  17 %  High  10.5-15  

 

 Platelet Count  267 10^3/uL  N  150-450  

 

 Mean Platelet Volume  9 um3  N  7.4-10.4  

 

 Abs Neutrophils  9.7 10^3/uL  High  1.5-7.7  

 

 Abs Lymphocytes  1.2 10^3/uL  N  1.0-4.8  

 

 Abs Monocytes  0.6 10^3/uL  N  0-0.8  

 

 Abs Eosinophils  0.4 10^3/uL  N  0-0.6  

 

 Abs Basophils  0.1 10^3/uL  N  0-0.2  

 

 Abs Nucleated RBC  0 10^3/uL  N    

 

 Granulocyte %  80.4 %  N  38-83  

 

 Lymphocyte %  10.0 %  Low  25-47  

 

 Monocyte %  5.3 %  N  1-9  

 

 Eosinophil %  3.1 %  N  0-6  

 

 Basophil %  1.2 %  N  0-2  

 

 Nucleated Red Blood Cells %  0  N    









 Inr/Protime  2017  [facility]  Inr  0.90  N  0.89-1.11  



     [address]          



     [place] [address] [phone]          

 

 Laboratory test  2017  [facility]  Partial  30.6  N  26.0-
36.3  



 finding    [address]  Thrombo Time  seconds      



     [place] [address]  PTT        



     [phone]          

 

 Comp Metabolic  2017  [facility]  Sodium  128 mmol/L  Low  133
-145  



 Panel    [address]          



     [place] [address] [phone]          









 Potassium  5.1 mmol/L  High  3.5-5.0  

 

 Chloride  97 mmol/L  Low  101-111  

 

 Co2 Carbon Dioxide  22 mmol/L  N  22-32  

 

 Anion Gap  9 mmol/L  N  2-11  

 

 Glucose  432 mg/dL  High    

 

 Blood Urea Nitrogen  42 mg/dL  High  6-24  

 

 Creatinine  4.69 mg/dL  High  0.51-0.95  

 

 BUN/Creatinine Ratio  9.0  N  8-20  

 

 Calcium  8.8 mg/dL  N  8.6-10.3  

 

 Total Protein  6.4 g/dL  N  6.4-8.9  

 

 Albumin  3.2 g/dL  N  3.2-5.2  

 

 Globulin  3.2 g/dL  N  2-4  

 

 Albumin/Globulin Ratio  1.0  N  1-3  

 

 Total Bilirubin  0.40 mg/dL  N  0.2-1.0  

 

 Alkaline Phosphatase  102 U/L  N    

 

 Alt  9 U/L  N  7-52  

 

 Ast  9 U/L  Low  13-39  

 

 Egfr Non-  10.5  N  >60  

 

 Egfr   13.5  N  >60  1









 Laboratory test  2017  [facility]  C Reactive  6.19 mg/L  
High  < 5.00  2



 finding    [address]  Protein        



     [place] [address] [phone]          









 Lactic Acid  1.5 mmol/L  N  0.5-2.0  3









 Basic Metabolic Panel  10/12/2016  [facility]  Sodium  136 mmol/L  
N  133-145  



     [address]          



     [place] [address] [phone]          









 Potassium  4.7 mmol/L  N  3.5-5.0  

 

 Chloride  105 mmol/L  N  101-111  

 

 Co2 Carbon Dioxide  25 mmol/L  N  22-32  

 

 Anion Gap  6 mmol/L  N  2-11  

 

 Glucose  231 mg/dL  High    

 

 Blood Urea Nitrogen  36 mg/dL  High  6-24  

 

 Creatinine  3.87 mg/dL  High  0.51-0.95  

 

 BUN/Creatinine Ratio  9.3  N  8-20  

 

 Calcium  9.0 mg/dL  N  8.6-10.3  

 

 Egfr Non-  13.2  N  >60  

 

 Egfr   16.9  N  >60  4









 Laboratory test  10/12/2016  [facility]  Point of Care  245 mg/dL  
High    5



 finding    [address]  Glucose        



     [place] [address] [phone]          

 

 Laboratory test  10/12/2016  [facility]  Point of Care  178 mg/dL  
High    6



 finding    [address]  Glucose        



     [place] [address] [phone]          

 

 Laboratory test  10/12/2016  [facility]  Point of Care  190 mg/dL  
High    7



 finding    [address]  Glucose        



     [place] [address]          



     [phone]          

 

 Laboratory test  10/05/2016  [facility]  Point of Care  220 mg/dL  
High    8



 finding    [address]  Glucose        



     [place] [address]          



     [phone]          

 

 Laboratory test  10/05/2016  [facility]  Potassium  5.9  High  3.5-
5.0  9, 10



 finding    [address]    mmol/L      



     [place] [address]          



     [phone]          









 HCG Pregnancy  0.88 mIU/mL  N    11









 Wound  2016  [facility]  Wound/Misc  SEE RESULT      12



 Culture/Sensi    [address]  Culture-Gram  [place]ELOW      



     [place] [address]  Stain        



     [phone]          

 

 Laboratory test  2016  [facility]  Anaerobic  SEE RESULT      
13



 finding    [address]  Culture  [place]EL[place]      



     [place] [address]          



     [phone]          

 

 Laboratory test  2016  [facility]  Surgical  SEE RESULT      
14



 finding    [address]  Pathology  [place]EL[place]      



     [place] [address]          



     [phone]          

 

 Laboratory test  2016  [facility]  Point of Care  280 mg/dL  
High  74-1  15



 finding    [address]  Glucose      06  



     [place] [address]          



     [phone]          

 

 Laboratory test  2016  [facility]  HCG Pregnancy  0.66  N    
16



 finding    [address]    mIU/mL      



     [place] [address]          



     [phone]          

 

 Laboratory test  2016  [facility]  Point of Care  242 mg/dL  
High  74-1  17



 finding    [address]  Glucose      06  



     [place] [address]          



     [phone]          









 1  *******Because ethnic data is not always readily available,



   this report includes an eGFR for both -Americans and



   non- Americans.****



   The National Kidney Disease Education Program (NKDEP) does



   not endorse the use of the MDRD equation for patients that



   are not between the ages of 18 and 70, are pregnant, have



   extremes of body size, muscle mass, or nutritional status,



   or are non- or non-.



   According to the National Kidney Foundation, irrespective of



   diagnosis, the stage of the disease is based on the level of



   kidney function:



   Stage Description                      GFR(mL/min/1.73 m(2))



   1     Kidney damage with normal or decreased GFR       90



   2     Kidney damage with mild decrease in GFR          60-89



   3     Moderate decrease in GFR                         30-59



   4     Severe decrease in GFR                           15-29



   5     Kidney failure                       <15 (or dialysis)

 

 2  Acute inflammation:  >10.00

 

 3  [facility] Severe Sepsis and Septic Shock Management Bundle Measure



   requires all lactic acids initially measuring >2.0 mmol/L be



   repeated.

 

 4  *******Because ethnic data is not always readily available,



   this report includes an eGFR for both -Americans and



   non- Americans.****



   The National Kidney Disease Education Program (NKDEP) does



   not endorse the use of the MDRD equation for patients that



   are not between the ages of 18 and 70, are pregnant, have



   extremes of body size, muscle mass, or nutritional status,



   or are non- or non-.



   According to the National Kidney Foundation, irrespective of



   diagnosis, the stage of the disease is based on the level of



   kidney function:



   Stage Description                      GFR(mL/min/1.73 m(2))



   1     Kidney damage with normal or decreased GFR       90



   2     Kidney damage with mild decrease in GFR          60-89



   3     Moderate decrease in GFR                         30-59



   4     Severe decrease in GFR                           15-29



   5     Kidney failure                       <15 (or dialysis)

 

 5  : [id number] ELAINA LONG

 

 6  : [id number] GEMINI OLMOS

 

 7  : [id number] HALIE MONTANA

 

 8  : [id number] Fco Escalera

 

 9  Comment: on arrival preop

 

 10  Comment: on arrival preop

 

 11  <5.0 Negative



   



   5.0 - 25.0  Indeterminate (Repeat testing recommended after



   72 hours)



   >25.0  Positive



   



   Perimenopausal women can display HCG levels of up to 20



   mIU/mL

 

 12  SEE RESULT BELOW



   -----------------------------------------------------------------------------
---------------



   Name:  ADRI RUBIN                : 1980    Attend Dr: Ivan Zaragoza MD



   Acct:  [id number]  Unit: [id number]  AGE: 35            Location:  OR



   Re16                        SEX: F             Status:    REG SDC



   -----------------------------------------------------------------------------
---------------



   



   SPEC: [id number]         ROLAN:       SUBM DR: Ivan Zaragoza MD



   REQ:  [id number]              RECD:   



   STATUS: RES              OTHR DR: Non Staff,Doctor



   David Baker MD



   _



   SOURCE: WOUND          SPDESC:



   ORDERED:  Anaerobic Cult, Culture   Stain



   



   -----------------------------------------------------------------------------
---------------



   Procedure                         Result                         Reported   
        Site



   -----------------------------------------------------------------------------
---------------



   Anaerobic Culture



   PENDING



   



   Wound/Misc Gram Stain  Final                                     16-
1350      ML



   No Neutrophils Observed



   



   No Organisms Seen



   



   Wound/Misc Culture



   PENDING



   



   -----------------------------------------------------------------------------
---------------



   * ML - MAIN LAB (PSC1)



   .



   



   



   



   



   



   



   



   



   



   



   



   



   



   



   



   



   



   



   ** END OF REPORT **



   



   * ML=Testing performed at Main Lab



   DEPARTMENT OF PATHOLOGY,  [address] [place] [address]



   Phone # [phone]      Fax #[phone]



   Ashish Beltre M.D. Director     [facility] # [id number]

 

 13  SEE RESULT BELOW



   -----------------------------------------------------------------------------
---------------



   Name:  ADRI RUBIN                : 1980    Attend Dr: Ivan Zaragoza MD



   Acct:  [id number]  Unit: [id number]  AGE: 35            Location:  OR



   Re16                        SEX: F             Status:    REG SDC



   -----------------------------------------------------------------------------
---------------



   



   SPEC: [id number]         ROLAN:       [facility] DR: Ivan Zaragoza MD



   REQ:  [id number]              RECD:   



   STATUS: COMP             OTHR DR: Non Staff,Doctor



   David Baker MD



   _



   SOURCE: WOUND          SPDESC:



   ORDERED:  Anaerobic Cult, Culture   Stain



   



   -----------------------------------------------------------------------------
---------------



   Procedure                         Result                         Reported   
        Site



   -----------------------------------------------------------------------------
---------------



   Anaerobic Culture  Final                                         16-
24      ML



   No Growth Day 4



   



   Wound/Misc Gram Stain  Final                                     16-
1350      ML



   No Neutrophils Observed



   



   No Organisms Seen



   



   Wound/Misc Culture  Final                                        16-
24      ML



   No Growth Day 4



   



   -----------------------------------------------------------------------------
---------------



   * ML - MAIN LAB (PSC1)



   .



   



   



   



   



   



   



   



   



   



   



   



   



   



   



   



   



   



   



   ** END OF REPORT **



   



   * ML=Testing performed at Main Lab



   DEPARTMENT OF PATHOLOGY,  [address] [place] [address]



   Phone # [phone]      Fax #[phone]



   Ashish Beltre M.D. Director     [facility] # [id number]

 

 14  SEE RESULT BELOW



   -----------------------------------------------------------------------------
---------------



   Name:  ADRI RUBIN                : 1980    Attend Dr: Ivan Zaragoza MD



   Acct:  [id number]  Unit: [id number]  AGE: 35            Location:  OR



   Re16                        SEX: F             Status:    REG SDC



   -----------------------------------------------------------------------------
---------------



   



   SPEC: [id number]             ROLAN:       [facility] DR: Ivan Zaragoza MD



   REQ:  [id number]             RECD: 



   STATUS: SOUT



   _



   ORDERED:  Decal, LEVEL IV



   



   FINAL DIAGNOSIS



   



   



   Right great toe, amputation:



   -- Acral skin and subcutaneous tissue with chronic ischemic changes and 
focal ulceration.



   -- Chronic osteomyelitis



   -- Skin, subcutaneous tissue and bone at surgical margin viable.



   



   



   



   



   PRE-OPERATIVE DIAGNOSIS



   



   Type 2 diabetes mellitus with foot ulcer.



   



   GROSS DESCRIPTION



   



   The specimen is received in formalin labeled, Right Great Toe, and consists 
of a 4.6 x 3.5 x



   3.3 cm disarticulated digit.  The margin of resection displays glistening 
smooth tan-white



   articular surface and tan-pink soft tissue.  The skin margin appears viable.
  There is a 1.8



   x 0.1 cm tan-red linear defect on the plantar surface, 0.9 cm from the skin 
margin.  The



   surrounding skin is irregular and mottled tan-red.  The remaining skin is 
smooth to wrinkled



   tan-white.  Received separately in the same container is a 2.0 x 1.4 x 1.4 
cm tan-pink



   irregular bone fragment with attached soft tissue.  The fragment is 
partially surfaced by



   glistening smooth articular surface.  Representative sections are submitted 
in cassettes A



   and B to include lesion and inked skin margin in cassette A and bone 
following



   decalcification in cassette B.



   



   MICROSCOPIC DESCRIPTION



   



   



   Signed __________(signature on file)___________ Ashish Beltre MD  1152



   



   -----------------------------------------------------------------------------
---------------



   



   



   



   ** END OF REPORT **



   



   * ML=Testing performed at Main Lab



   DEPARTMENT OF PATHOLOGY,  [address] [place] [address]



   Phone # [phone]      Fax #[phone]



   Ashish Beltre M.D. Director     [facility] # [id number]

 

 15  : [id number] Fco Escalera

 

 16  <5.0 Negative



   



   5.0 - 25.0  Indeterminate (Repeat testing recommended after



   72 hours)



   >25.0  Positive



   



   Perimenopausal women can display HCG levels of up to 20



   mIU/mL

 

 17  : [id number] GROSS COREY







Procedures







 Date  Code  Description  Status

 

 2018  69435  ECHO Transthorasic Realtime 2D W Doppler & Color Flow Hosp  
Completed

 

 2017  47827  EKG, Interpretation Only  Completed

 

 2017  12215  Walking Cast  Completed

 

 2017  60779  Amputation Foot Transmetatarsal  Completed

 

 2017  25551  Amputation Foot Transmetatarsal  Completed

 

 2017  71042  EKG, Interpretation Only  Completed

 

 2017  41047  EKG, Interpretation Only  Completed

 

 2017  48047  Nerve Conduction 05-06 Studies  Completed

 

 2016  44134  ECHO Transthorasic Realtime 2D W Doppler & Color Flow Hosp  
Completed

 

 11/10/2016  24121  Removal Of Tunneled Central Venous Cath W/O Subcutaneous  
Completed



     Port/  

 

 10/12/2016  13589  Lap W/Insert Intraperitoneal Cannula Or Catheter Permanent  
Completed

 

 2016  30175  EKG, Interpretation Only  Completed

 

 2016  00495  Treadmill Interp/Report Only  Completed

 

 2016  65401  Stress Test Supervsn W/Out I/R  Completed

 

 2016  78236  EKG, Interpretation Only  Completed

 

 2016  64818  Amputation Toe MP JT  Completed

 

 2016  60530  Amputation Toe MP JT  Completed

 

 2016  84525  EKG, Interpretation Only  Completed

 

 2015  14120  EKG, Interpretation Only  Completed







Encounters







 Type  Date  Location  Provider  Dx  Diagnosis

 

 Office Visit  2018  [facility]  R10.9  Unspecified



   10:37a  Assoc,pc  Shortle, NP    abdominal pain



     Hospitalists      









 R50.9  Fever, unspecified

 

 N18.6  End stage renal disease

 

 Z99.2  Dependence on renal dialysis

 

 E11.43  Type 2 diabetes w diabetic autonomic (poly)neuropathy

 

 K31.84  Gastroparesis

 

 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

 

 Z79.4  Long term (current) use of insulin









 Office Visit  2018 10:36a  [facility]  Marielle Skelton,  R10.9  
Unspecified



     Assoc,pc  N.P.    abdominal pain



     Hospitalists      









 R50.9  Fever, unspecified

 

 D72.829  Elevated white blood cell count, unspecified

 

 E87.79  Other fluid overload

 

 E11.9  Type 2 diabetes mellitus without complications

 

 Z99.2  Dependence on renal dialysis

 

 Z79.4  Long term (current) use of insulin

 

 N18.6  End stage renal disease









 Office Visit  2018  [facility]  Dahiana  J96.21  Acute and chronic



   9:16a  Assoc,martha Preston NP    respiratory



     Hospitalists      failure with



           hypoxia









 R07.9  Chest pain, unspecified

 

 N18.6  End stage renal disease

 

 Z99.2  Dependence on renal dialysis









 Office Visit  01/15/2018  6:52a  [facility]  Virginie  R11.2  Nausea with



     Assoc,pc  EDWIN Barbour    vomiting,



     Hospitalists      unspecified









 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

 

 N18.6  End stage renal disease

 

 I10  Essential (primary) hypertension









 Office Visit  2017 10:18a  [facility]  Js  R10.13  Epigastric 
pain



     Assoc,pc  SHELLEY Mayers    



     Hospitalists      









 D72.829  Elevated white blood cell count, unspecified

 

 E11.9  Type 2 diabetes mellitus without complications

 

 Z79.4  Long term (current) use of insulin









 Office Visit  2017 10:18a  [facility]  Js  R10.13  Epigastric 
pain



     Assoc,SHELLEY Staley    



     Hospitalists      









 D72.829  Elevated white blood cell count, unspecified

 

 E11.9  Type 2 diabetes mellitus without complications

 

 Z79.4  Long term (current) use of insulin









 Office Visit  2017 10:17a  [facility]  Marielle Skelton,  R10.13  
Epigastric pain



     Assoc,pc  N.P.    



     Hospitalists      









 D72.829  Elevated white blood cell count, unspecified

 

 E11.9  Type 2 diabetes mellitus without complications

 

 Z79.4  Long term (current) use of insulin









 [facility]  2017  [facility]02a  [facility]  J96.01  Acute 
respiratory



     Assoc,martha Mathew M.D.    failure with



     Hospitalists      hypoxia









 J18.1  Lobar pneumonia, unspecified organism

 

 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

 

 Z99.2  Dependence on renal dialysis









 Office Visit  12/10/2017  [facility]  Abdirizak SNOWDEN  J96.01  Acute respiratory



   8:01a  Assoc,martha Jean Baptiste D.O.    failure with



     Hospitalists      hypoxia









 J18.1  Lobar pneumonia, unspecified organism

 

 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

 

 Z99.2  Dependence on renal dialysis









 Office Visit  2017  8:01a  [facility]  Virginie  J96.01  Acute 
respiratory



     Assoc,martha Barbour D.O.    failure with



     Hospitalists      hypoxia









 J18.1  Lobar pneumonia, unspecified organism

 

 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

 

 Z99.2  Dependence on renal dialysis









 Office Visit  2017  8:00a  [facility]  Virginie  J18.1  Lobar 
pneumonia,



     Assoc,martha Barbour D.O.    unspecified



     Hospitalists      organism









 J96.01  Acute respiratory failure with hypoxia

 

 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

 

 Z99.2  Dependence on renal dialysis









 Office Visit  2017  [facility]  Fred Frankenberg  J18.1  Lobar 
pneumonia,



   7:59a  Assmartha hopper II, M.D.    unspecified



     Hospitalists      organism









 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

 

 N18.6  End stage renal disease

 

 A41.9  Sepsis, unspecified organism









 Office Visit  10/25/2017  2:45p  Orthopedic  Ivan  M79.671  Pain in right



     Services Of Cecil Zaragoza M.D.    foot



     [facility]      

 

 Office Visit  09/15/2017  9:00a  Orthopedic  Ivan  I73.9  Peripheral



     Services Of  MARCIA Zaragoza    vascular disease,



     C.M.A.      unspecified









 M86.171  Other acute osteomyelitis, right ankle and foot

 

 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









 [facility]  E11.52  Type 2 
diabetes



   12:53p  martha Starr II, M.D.    w diabetic



     Hospitalists      peripheral



           angiopathy w



           gangrene









 E11.69  Type 2 diabetes mellitus with other specified complication

 

 K31.84  Gastroparesis

 

 L08.9  Local infection of the skin and subcutaneous tissue, unsp









 Office Visit  2017  3:03p  [facility]  Virginie  R10.84  Generalized



     Assoc,martha Barbour D.O.    abdominal pain



     Hospitalists      









 R11.2  Nausea with vomiting, unspecified

 

 E11.9  Type 2 diabetes mellitus without complications

 

 Z79.4  Long term (current) use of insulin









 Office Visit  2017  3:14p  [facility] Mechelle ALCANTARA  M79.671  Pain 
in



     Infectious  GABRIELLE Tapia.    right foot



     Diseases      









 Z89.421  Acquired absence of other right toe(s)

 

 E11.40  Type 2 diabetes mellitus with diabetic neuropathy, unsp









 Office Visit  2017  1:59p  [facility]  Rosana Jolley  L97.512  Non-
prs



     Assoc,martha KENNEDY    chronic ulcer



     Hospitalists      oth prt right



           foot w fat



           layer exposed









 E11.9  Type 2 diabetes mellitus without complications

 

 N18.6  End stage renal disease

 

 Z79.4  Long term (current) use of insulin









 Office Visit  2017  3:30p  [facility]  I73.9  Peripheral



     Medicine Of Cecil Hinton M.D.    vascular disease,



           unspecified









 E11.9  Type 2 diabetes mellitus without complications









 Office Visit  2017  1:59p  [facility]  Brigida  L97.512  Non-prs



     Assoc,martha Coombs M.D.    chronic ulcer



     Hospitalists      oth prt right



           foot w fat



           layer exposed









 E11.9  Type 2 diabetes mellitus without complications

 

 N18.6  End stage renal disease

 

 Z79.4  Long term (current) use of insulin









 Office Visit  2017  3:02p  [facility] Mechelle ALCANTARA  R60.0  
Localized edema



     Infectious  MARCIA Tapia    



     Diseases      









 M79.671  Pain in right foot

 

 Z89.421  Acquired absence of other right toe(s)

 

 N18.6  End stage renal disease

 

 E10.22  Type 1 diabetes mellitus w diabetic chronic kidney disease









 Office Visit  2017  [facility]  Ruben  L97.512  Non-prs



   1:58p  Assoc,SHELLEY Oglesby    chronic



     Hospitalists      ulcer oth



           prt right



           foot w fat



           layer



           exposed









 E11.9  Type 2 diabetes mellitus without complications

 

 N18.6  End stage renal disease

 

 Z79.4  Long term (current) use of insulin









 Office Visit  2017  4:17p  [facility] YOUNG  I70.201  Unsp athscl native



     Cardiology Of  MD Mac,    arteries of



     [facility] AT [facility]  FACC, FSCAI    extremities, right



           leg









 I25.10  Athscl heart disease of native coronary artery w/o ang pctrs









 [facility]  L08.9  Local infection



 Visit  9:44a  martha Starr PA    of the skin and



     Hospitalists      subcutaneous



           tissue, unsp









 I73.9  Peripheral vascular disease, unspecified

 

 E11.8  Type 2 diabetes mellitus with unspecified complications

 

 I10  Essential (primary) hypertension









 Office  07/15/2017  [facility]  L08.9  Local infection



 Visit  9:44a  martha Starr PA    of the skin and



     Hospitalists      subcutaneous



           tissue, unsp









 I73.9  Peripheral vascular disease, unspecified

 

 E11.8  Type 2 diabetes mellitus with unspecified complications

 

 I10  Essential (primary) hypertension









 Office Visit  2017  [facility]  Kenneth ALCANTARA  T81.31xA  Disruption of



   10:16a  For Infectious  MARCIA Tapia    external



     Diseases      operation



           (surgical)



           wound, NEC, init









 T81.4xxA  Infection following a procedure, initial encounter

 

 Z89.421  Acquired absence of other right toe(s)

 

 E11.40  Type 2 diabetes mellitus with diabetic neuropathy, unsp

 

 E11.51  Type 2 diabetes w diabetic peripheral angiopath w/o gangrene









 [facility]  L08.9  Local infection



 Visit  9:43a  martha Starr PA    of the skin and



     Hospitalists      subcutaneous



           tissue, unsp









 I73.9  Peripheral vascular disease, unspecified

 

 E11.8  Type 2 diabetes mellitus with unspecified complications

 

 I10  Essential (primary) hypertension









 Office Visit  2017  [facility]  Hugo  L08.9  Local infection of



   9:42a  martha Starr, N.P.    the skin and



     Hospitalists      subcutaneous



           tissue, unsp









 I73.9  Peripheral vascular disease, unspecified

 

 E11.8  Type 2 diabetes mellitus with unspecified complications

 

 I10  Essential (primary) hypertension









 Office Visit  2017  3:38p  [facility]  Eugene Faye,  R11.2  Nausea 
with



     martha Starr M.D.    vomiting,



     Hospitalists      unspecified









 E11.9  Type 2 diabetes mellitus without complications

 

 Z79.4  Long term (current) use of insulin

 

 N18.6  End stage renal disease









 [facility]  M86.671  Other chronic



   9:40a  For Infectious  GABRIELLE Tapia.    osteomyelitis,



     Diseases      right ankle and



           foot









 Z89.421  Acquired absence of other right toe(s)

 

 E11.69  Type 2 diabetes mellitus with other specified complication

 

 E11.40  Type 2 diabetes mellitus with diabetic neuropathy, unsp









 Office Visit  2017  [facility]  M86.171  Other acute



   10:38a  martha Starr NP    osteomyelitis,



     Hospitalists      right ankle and



           foot









 N18.6  End stage renal disease

 

 I73.9  Peripheral vascular disease, unspecified

 

 Z99.2  Dependence on renal dialysis









 Office Visit  2017  [facility]  M86.171  Other acute



   10:37a  martha Starr NP    osteomyelitis,



     Hospitalists      right ankle and



           foot









 N18.6  End stage renal disease

 

 I73.9  Peripheral vascular disease, unspecified

 

 Z99.2  Dependence on renal dialysis









 [facility]  M86.671  Other chronic



   9:35a  For Infectious  MARCIA Tapia    osteomyelitis,



     Diseases      right ankle and



           foot









 I96  Gangrene, not elsewhere classified

 

 Z89.421  Acquired absence of other right toe(s)

 

 R19.7  Diarrhea, unspecified

 

 N18.5  Chronic kidney disease, stage 5









 Office Visit  2017  [facility]  M86.171  Other acute



   10:37a  martha Starr NP    osteomyelitis,



     Hospitalists      right ankle and



           foot









 N18.6  End stage renal disease

 

 I73.9  Peripheral vascular disease, unspecified

 

 Z99.2  Dependence on renal dialysis









 Office Visit  2017  [facility]  M86.171  Other acute



   10:36a  martha Starr NP    osteomyelitis,



     Hospitalists      right ankle and



           foot









 N18.6  End stage renal disease

 

 I73.9  Peripheral vascular disease, unspecified

 

 Z99.2  Dependence on renal dialysis









 Office Visit  2017  [facility]  M86.171  Other acute



   10:35a  Assocmartha MD    osteomyelitis,



     Hospitalists      right ankle and



           foot









 N18.6  End stage renal disease

 

 I73.9  Peripheral vascular disease, unspecified

 

 Z99.2  Dependence on renal dialysis









 Office Visit  2017  Orthopedic  Ivan  M86.671  Other chronic



   2:15p  Services Of  MARCIA Zaragoza    osteomyelitis, right



     C.M.A.      ankle and foot

 

 Office Visit  2017  Orthopedic  Ivan  M86.671  Other chronic



   2:30p  Services Of Cecil Zaragoza M.D.    osteomyelitis, right



     [facility]      ankle and foot

 

 Office Visit  2017  Surgical  Charles CLARK  L03.031  Cellulitis of right



   1:30p  Associates Of  MD Samira,    toe



     C[facility]  FACS    









 E11.8  Type 2 diabetes mellitus with unspecified complications

 

 E11.52  Type 2 diabetes w diabetic peripheral angiopathy w gangrene

 

 E11.40  Type 2 diabetes mellitus with diabetic neuropathy, unsp









 Office Visit  2017  2:15p  W[facility] Care  Christie Adams,  E11.52  Type 2 
diabetes w



     Center AT [facility]  ANIL RN, FNP-BC    diabetic



           peripheral



           angiopathy w



           gangrene









 L97.514  Non-prs chronic ulcer oth prt right foot w necrosis of bone

 

 L97.419  Non-prs chr ulcer of right heel and midfoot w unsp severt

 

 M86.371  Chronic multifocal osteomyelitis, right ankle and foot

 

 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

 

 N18.6  End stage renal disease

 

 E11.42  Type 2 diabetes mellitus with diabetic polyneuropathy









 Office Visit  2017  [facility]  M86.9  Osteomyelitis,



   12:39p  martha Starr M.D.    unspecified



     Hospitalists      









 E11.8  Type 2 diabetes mellitus with unspecified complications

 

 E78.5  Hyperlipidemia, unspecified









 Office Visit  2017  [facility]  Ivan HARRIS86.9  Osteomyelitis,



   12:39p  martha Starr M.D.    unspecified



     Hospitalists      









 E11.8  Type 2 diabetes mellitus with unspecified complications

 

 E78.5  Hyperlipidemia, unspecified

 

 I10  Essential (primary) hypertension









 Office Visit  2017  7:54a  Orthopedic  Lizzie Brewer,  E11.22  Type 2 
diabetes



     Services Of  PA    mellitus w



     C.M.A.      diabetic



           chronic kidney



           disease









 N18.6  End stage renal disease

 

 S91.104A  Unsp opn wnd right lesser toe(s) w/o damage to nail, init









 Office Visit  2017  9:55a  [facility] Mechelle ALCANTARA  E11.52  Type 2 
diabetes



     Infectious  Macqueen, M.D.    w diabetic



     Diseases      peripheral



           angiopathy w



           gangrene









 L03.031  Cellulitis of right toe

 

 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

 

 N18.6  End stage renal disease









 Office Visit  2017  [facility]  Hugo  M86.9  Osteomyelitis,



   12:37p  Assoc,pc  AVEL Goldberg    unspecified



     Hospitalists      









 E11.8  Type 2 diabetes mellitus with unspecified complications

 

 E78.5  Hyperlipidemia, unspecified

 

 I10  Essential (primary) hypertension









 Office Visit  2017  2:45p  [facility]  Lily  R59.0  Localized



     Sleep Services Of  MD Kit    enlarged lymph



     Cma      nodes









 G47.9  Sleep disorder, unspecified

 

 F17.210  Nicotine dependence, cigarettes, uncomplicated

 

 E66.01  Morbid (severe) obesity due to excess calories

 

 J98.11  Atelectasis









 Office Visit  2016  Neurohospitalist  Ajay  M21.372  Foot drop, left



   2:16p  Clinic  MARCIA Silva    foot

 

 Office Visit  2016  [facility]  Ivna  J96.01  Acute



   2:55p  Assoc,pc [facility]ists  MARCIA Qureshi    respiratory



           failure with



           hypoxia









 N18.6  End stage renal disease

 

 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

 

 Z79.4  Long term (current) use of insulin









 Office Visit  2016  [facility]  Sandoval  J96.01  Acute respiratory



   2:54p  Assoc,pc  MD Lei    failure with



     Hospitalists      hypoxia









 N18.6  End stage renal disease

 

 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

 

 Z79.4  Long term (current) use of insulin









 Office Visit  2016  [facility]  Abdirizak SNOWDEN  J96.01  Acute respiratory



   2:55p  Assoc,pc  EDWIN Jean Baptiste    failure with



     Hospitalists      hypoxia









 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

 

 N18.6  End stage renal disease

 

 Z79.4  Long term (current) use of insulin









 Office Visit  2016  3:14p  [facility]  Lily  R59.0  Localized



     Sleep Services Of  MD Kit    enlarged lymph



     Cma      nodes









 G47.9  Sleep disorder, unspecified

 

 F17.210  Nicotine dependence, cigarettes, uncomplicated

 

 J98.11  Atelectasis









 Office Visit  12/15/2016  [facility]  Abdirizak SNOWDEN  J96.01  Acute respiratory



   2:53p  Assoc,martha Jean Baptiste D.O.    failure with



     Hospitalists      hypoxia









 R53.1  Weakness

 

 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

 

 N18.6  End stage renal disease









 Office Visit  12/15/2016  [facility]  Sandoval  J96.01  Acute respiratory



   2:53p  Assoc,martha Odom MD    failure with



     Hospitalists      hypoxia









 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

 

 N18.6  End stage renal disease

 

 Z79.4  Long term (current) use of insulin









 Office Visit  2016  Neurohospitalist  Ajay RIDER1.372  Foot drop,



   2:15p  Clinic  MARCIA Silva    left foot

 

 Office Visit  2016  [facility]  Abeba Hein,  R53.1  Weakness



   2:52p  Assoc,pc Hospitalists  DO    









 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

 

 N18.6  End stage renal disease

 

 Z79.4  Long term (current) use of insulin









 Office  2016  Neurohospitalist  Ajay  M21.372  Foot drop, left



 Visit  2:14p  Clinic  MARCIA Silva    foot

 

 Office  2016  [facility]  Vinod  R73.9  Hyperglycemia,



 Visit  9:45a  Assoc,pc Hospitalists  Frankenberg II,    unspecified



       MTONY    









 R07.89  Other chest pain

 

 N18.6  End stage renal disease

 

 I10  Essential (primary) hypertension









 Office Visit  10/03/2016 11:45a  [facility]  Charles CLARK  N18.6  End 
stage renal



     Of Cecil Hogan MD,    disease



       FACS    









 L03.311  Cellulitis of abdominal wall

 

 L03.313  Cellulitis of chest wall









 Office Visit  2016  1:00p  [facility]  [place]  J20.9  Acute 
bronchitis,



     Assoc,martha Mathew M.D.    unspecified



     Hospitalists      









 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

 

 N18.6  End stage renal disease

 

 I10  Essential (primary) hypertension









 Office Visit  2016  [facility]  [place]  L03.311  Cellulitis of



   3:29p  Assoc,martha Odom MD    abdominal wall



     Hospitalists      









 T85.71xA  Infect/inflm reaction due to periton dialysis catheter, init

 

 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

 

 Z79.4  Long term (current) use of insulin









 [facility]  [facility]  
Cellulitis of



   3:28p  Assmartha hopper II, M.D.    abdominal wall



     Hospitalists      









 T85.71xA  Infect/inflm reaction due to periton dialysis catheter, init

 

 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

 

 Z79.4  Long term (current) use of insulin









 Office Visit  2016  [facility]  R07.9  Chest pain,



   9:34a  Assoc,martha Kaplan NP    unspecified



     Hospitalists      









 N18.6  End stage renal disease

 

 E11.8  Type 2 diabetes mellitus with unspecified complications

 

 Z79.4  Long term (current) use of insulin









 Office Visit  2016  [facility]  Linda Cordero  R07.9  Chest pain,



   9:28a  Assmartha hopper NP    unspecified



     Hospitalists      









 N18.6  End stage renal disease

 

 E11.8  Type 2 diabetes mellitus with unspecified complications









 [facility]  [facility]  1[facility]57p  [facility]  R07.89  
Other chest



     Assocmartha II, M.D.    pain



     Hospitalists      









 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

 

 N18.6  End stage renal disease

 

 Z79.4  Long term (current) use of insulin









 Office Visit  2016  Orthopedic  Ivan  E11.621  Type 2 diabetes



   3:30p  Services Of ZANE Zaragzoa M.D.    mellitus with foot



           ulcer

 

 Office Visit  2016  [facility]  [place]  E11.8  Type 2 diabetes



   2:49p  Assmartha hopper,    mellitus with



     Hospitalists  N.P.    unspecified



           complications









 T14.8  Other injury of unspecified body region

 

 N18.6  End stage renal disease

 

 I10  Essential (primary) hypertension









 Office Visit  2016  2:12p  [facility] Mechelle ALCANTARA  L97.519  Non-
prs



     Infectious  MARCIA Tapia    chronic ulcer



     Diseases      oth prt right



           foot w unsp



           severity









 G62.9  Polyneuropathy, unspecified

 

 N18.6  End stage renal disease

 

 Z99.2  Dependence on renal dialysis

 

 I25.10  Athscl heart disease of native coronary artery w/o ang pctrs









 Office Visit  2016  [facility]  T14.8  Other injury of



   2:48p  Assoc,martha Kaplan, NP    unspecified body



     Hospitalists      region









 N18.6  End stage renal disease

 

 E11.8  Type 2 diabetes mellitus with unspecified complications

 

 I10  Essential (primary) hypertension









 Office Visit  2016 10:37a  [facility]  [place]  T14.8  Other injury of



     Assward,SHELLEY Tsai    unspecified body



     Hospitalists      region









 N18.6  End stage renal disease

 

 E11.8  Type 2 diabetes mellitus with unspecified complications

 

 I10  Essential (primary) hypertension









 Office Visit  2016  2:48p  [facility]  Chanel S.  T14.8  Other injury 
of



     Assoc,martha Duran, N.P.    unspecified body



     Hospitalists      region









 N18.6  End stage renal disease

 

 E11.8  Type 2 diabetes mellitus with unspecified complications

 

 I10  Essential (primary) hypertension









 Office Visit  2016  [facility]  [place]  T14.8  Other injury of



   2:47p  Assward,martha Goldberg, N.P.    unspecified body



     Hospitalists      region









 N18.6  End stage renal disease

 

 E11.8  Type 2 diabetes mellitus with unspecified complications

 

 I10  Essential (primary) hypertension









 Office Visit  2016 11:00a  Orthopedic  Ivan  E10.621  Type 1 diabetes



     Services Of Cecil Zaragoza M.D.    mellitus with



     [place]      foot ulcer









 L97.513  Non-prs chronic ulcer oth prt right foot w necros muscle









 Office Visit  2016  Orthopedic  Ivan Zaragoza,  E10.621  Type 1



   9:10a  Services Of Cecil GONZALEZ M.D.    diabetes



     [place]      mellitus with



           foot ulcer

 

 Office Visit  10/16/2015  [facility]  Vinod Shencecelia  R53.81  Other 
malaise



   3:34p  martha Starr II, M.D.    



     Hospitalists      









 E11.29  Type 2 diabetes mellitus w oth diabetic kidney complication

 

 G47.34  Idio sleep related nonobstructive alveolar hypoventilation









 Office Visit  06/10/2015  9:40a  Neurosurgery  Michael CRUZ  721.3  Spondylosis



     Services Of Cecil Mendez M.D.    Lumbar W/O



           Myelopathy









 357.2  Polyneuropathy In Diabetes

 

 250.00  Diabetes Mellitus W/O Compl Type II Or Unspec Controlled









 Office Visit  2015  8:07a  [facility]  Marylin Kaplan,  585.6  End 
Stage



     Assoc,martha  NP    Renal Disease



     Hospitalists      









 414.00  Coronary Atherosclerosis Unspec Type Vessel Native/Graft

 

 362.10  Retinopathy Background Unspec

 

 250.00  Diabetes Mellitus W/O Compl Type II Or Unspec Controlled









 Office Visit  2015  8:06a  [facility]  [place],  585.6  End 
Stage



     Assoc,martha  N.P.    Renal Disease



     Hospitalists      









 414.00  Coronary Atherosclerosis Unspec Type Vessel Native/Graft

 

 362.10  Retinopathy Background Unspec

 

 250.00  Diabetes Mellitus W/O Compl Type II Or Unspec Controlled









 Office Visit  2015  7:45a  [facility]  [place]  789.00  Pain 
Abdominal



     Assoc,martha Coombs M.D.    Unspec Site



     Hospitalists      









 585.6  End Stage Renal Disease

 

 250.00  Diabetes Mellitus W/O Compl Type II Or Unspec Controlled









 Office Visit  02/10/2015  8:26a  [facility]  Karsten [place],  786.50  
Pain Chest



     Assoc,martha KENNEDY    Unspec



     Hospitalists      









 585.6  End Stage Renal Disease

 

 250.00  Diabetes Mellitus W/O Compl Type II Or Unspec Controlled

 

 401.9  Hypertension Unspec









 Office Visit  2015  8:25a  [facility]  Chanel VILLAR  786.50  Pain Chest



     Assoc,martha Duran, N.P.    Unspec



     Hospitalists      









 585.6  End Stage Renal Disease

 

 250.00  Diabetes Mellitus W/O Compl Type II Or Unspec Controlled

 

 401.9  Hypertension Unspec







Plan of Treatment

Future Appointment(s):2019  2:15 pm - [place] Benedict NP at 
[facility]10/ - Ivan Zaragoza M.D.M79.671 Pain in 
right foot

## 2019-01-22 ENCOUNTER — HOSPITAL ENCOUNTER (INPATIENT)
Dept: HOSPITAL 25 - ED | Age: 39
Discharge: HOME | DRG: 139 | End: 2019-01-22
Attending: HOSPITALIST | Admitting: INTERNAL MEDICINE
Payer: COMMERCIAL

## 2019-01-22 VITALS — DIASTOLIC BLOOD PRESSURE: 47 MMHG | SYSTOLIC BLOOD PRESSURE: 137 MMHG

## 2019-01-22 DIAGNOSIS — Z99.2: ICD-10-CM

## 2019-01-22 DIAGNOSIS — E11.51: ICD-10-CM

## 2019-01-22 DIAGNOSIS — F41.9: ICD-10-CM

## 2019-01-22 DIAGNOSIS — Z88.2: ICD-10-CM

## 2019-01-22 DIAGNOSIS — I25.10: ICD-10-CM

## 2019-01-22 DIAGNOSIS — I13.11: ICD-10-CM

## 2019-01-22 DIAGNOSIS — Z79.4: ICD-10-CM

## 2019-01-22 DIAGNOSIS — Z80.1: ICD-10-CM

## 2019-01-22 DIAGNOSIS — Z79.51: ICD-10-CM

## 2019-01-22 DIAGNOSIS — N18.6: ICD-10-CM

## 2019-01-22 DIAGNOSIS — Z85.820: ICD-10-CM

## 2019-01-22 DIAGNOSIS — Z79.1: ICD-10-CM

## 2019-01-22 DIAGNOSIS — E11.22: ICD-10-CM

## 2019-01-22 DIAGNOSIS — Z83.3: ICD-10-CM

## 2019-01-22 DIAGNOSIS — J44.1: ICD-10-CM

## 2019-01-22 DIAGNOSIS — K57.30: ICD-10-CM

## 2019-01-22 DIAGNOSIS — Z85.89: ICD-10-CM

## 2019-01-22 DIAGNOSIS — F32.9: ICD-10-CM

## 2019-01-22 DIAGNOSIS — Z79.82: ICD-10-CM

## 2019-01-22 DIAGNOSIS — Z80.41: ICD-10-CM

## 2019-01-22 DIAGNOSIS — Z79.899: ICD-10-CM

## 2019-01-22 DIAGNOSIS — F17.210: ICD-10-CM

## 2019-01-22 DIAGNOSIS — Z79.01: ICD-10-CM

## 2019-01-22 DIAGNOSIS — Z88.1: ICD-10-CM

## 2019-01-22 DIAGNOSIS — Z91.048: ICD-10-CM

## 2019-01-22 DIAGNOSIS — J96.11: ICD-10-CM

## 2019-01-22 DIAGNOSIS — Z88.8: ICD-10-CM

## 2019-01-22 DIAGNOSIS — J18.9: Primary | ICD-10-CM

## 2019-01-22 DIAGNOSIS — Z82.49: ICD-10-CM

## 2019-01-22 DIAGNOSIS — Z99.81: ICD-10-CM

## 2019-01-22 LAB
ALBUMIN SERPL BCG-MCNC: 3 G/DL (ref 3.2–5.2)
ALBUMIN/GLOB SERPL: 0.9 {RATIO} (ref 1–3)
ALP SERPL-CCNC: 79 U/L (ref 34–104)
ALT SERPL W P-5'-P-CCNC: 6 U/L (ref 7–52)
AMYLASE SERPL-CCNC: < 10 U/L (ref 29–103)
ANION GAP SERPL CALC-SCNC: 6 MMOL/L (ref 2–11)
APTT PPP: 34.3 SECONDS (ref 26–36.3)
AST SERPL-CCNC: 6 U/L (ref 13–39)
BASOPHILS # BLD AUTO: 0.1 10^3/UL (ref 0–0.2)
BUN SERPL-MCNC: 35 MG/DL (ref 6–24)
BUN/CREAT SERPL: 6.8 (ref 8–20)
CALCIUM SERPL-MCNC: 8.8 MG/DL (ref 8.6–10.3)
CHLORIDE SERPL-SCNC: 99 MMOL/L (ref 101–111)
EOSINOPHIL # BLD AUTO: 0.3 10^3/UL (ref 0–0.6)
GLOBULIN SER CALC-MCNC: 3.2 G/DL (ref 2–4)
GLUCOSE SERPL-MCNC: 240 MG/DL (ref 70–100)
HCO3 SERPL-SCNC: 31 MMOL/L (ref 22–32)
HCT VFR BLD AUTO: 26 % (ref 35–47)
HGB BLD-MCNC: 8.3 G/DL (ref 12–16)
INR PPP/BLD: 1.97 (ref 0.77–1.02)
LYMPHOCYTES # BLD AUTO: 1.2 10^3/UL (ref 1–4.8)
MAGNESIUM SERPL-MCNC: 1.8 MG/DL (ref 1.9–2.7)
MCH RBC QN AUTO: 29 PG (ref 27–31)
MCHC RBC AUTO-ENTMCNC: 32 G/DL (ref 31–36)
MCV RBC AUTO: 93 FL (ref 80–97)
MONOCYTES # BLD AUTO: 0.6 10^3/UL (ref 0–0.8)
NEUTROPHILS # BLD AUTO: 9.7 10^3/UL (ref 1.5–7.7)
NRBC # BLD AUTO: 0 10^3/UL
NRBC BLD QL AUTO: 0
PLATELET # BLD AUTO: 362 10^3/UL (ref 150–450)
POTASSIUM SERPL-SCNC: 4.2 MMOL/L (ref 3.5–5)
PROT SERPL-MCNC: 6.2 G/DL (ref 6.4–8.9)
RBC # BLD AUTO: 2.83 10^6/UL (ref 4–5.4)
SODIUM SERPL-SCNC: 136 MMOL/L (ref 135–145)
WBC # BLD AUTO: 11.8 10^3/UL (ref 3.5–10.8)
WBC UR QL AUTO: (no result)

## 2019-01-22 PROCEDURE — 86140 C-REACTIVE PROTEIN: CPT

## 2019-01-22 PROCEDURE — 81003 URINALYSIS AUTO W/O SCOPE: CPT

## 2019-01-22 PROCEDURE — 85025 COMPLETE CBC W/AUTO DIFF WBC: CPT

## 2019-01-22 PROCEDURE — 83605 ASSAY OF LACTIC ACID: CPT

## 2019-01-22 PROCEDURE — 82150 ASSAY OF AMYLASE: CPT

## 2019-01-22 PROCEDURE — 94640 AIRWAY INHALATION TREATMENT: CPT

## 2019-01-22 PROCEDURE — 85610 PROTHROMBIN TIME: CPT

## 2019-01-22 PROCEDURE — 74176 CT ABD & PELVIS W/O CONTRAST: CPT

## 2019-01-22 PROCEDURE — 80053 COMPREHEN METABOLIC PANEL: CPT

## 2019-01-22 PROCEDURE — 83690 ASSAY OF LIPASE: CPT

## 2019-01-22 PROCEDURE — 85730 THROMBOPLASTIN TIME PARTIAL: CPT

## 2019-01-22 PROCEDURE — 36415 COLL VENOUS BLD VENIPUNCTURE: CPT

## 2019-01-22 PROCEDURE — 83735 ASSAY OF MAGNESIUM: CPT

## 2019-01-22 PROCEDURE — 99284 EMERGENCY DEPT VISIT MOD MDM: CPT

## 2019-01-22 PROCEDURE — 71250 CT THORAX DX C-: CPT

## 2019-01-22 PROCEDURE — 87040 BLOOD CULTURE FOR BACTERIA: CPT

## 2019-01-22 PROCEDURE — 99406 BEHAV CHNG SMOKING 3-10 MIN: CPT

## 2019-01-22 NOTE — ED
Complex/Multi-Sys Presentation





- HPI Summary


HPI Summary: 


Patient is a 39 y/o F presenting to ED with complaints of chills and 

diaphoresis. Patient reports that Sx onset five days ago. She states that she 

went to PCP three days ago, was noted to have a fever and was prescribed 

amoxicillin. Patient claims that she was told to go to hospital if Sx did not 

improve within the next two days. She reports fever has persisted, she measured 

temp to be 101 F today and took Tylenol at 1600 today. N/V/D, abdominal pain 

are reported to have onset today as well. Patient is on home o2. [facility] she was [facility] to [facility]. On triage, pain is 

rated 7/10. Home medications and allergies are reviewed. 








- History Of Current Complaint


Chief Complaint: EDGeneral


Time Seen by Provider: 01/22/19 00:31


Hx Obtained From: Patient


Onset/Duration: Lasting Days - 5, Still Present, Worse Since


Timing: Constant, Days - 5


Severity Initially: Severe - 7/10


Location: Pain At: - abd


Aggravating Factor(s): nothing


Alleviating Factor(s): nothing


Associated Signs And Symptoms: Positive: Nausea, Vomiting, Diarrhea, Abdominal 

Pain, Fever, Diaphoresis, Other - POSITIVE - CHILLS





- Allergies/Home Medications


Allergies/Adverse Reactions: 


 Allergies











Allergy/AdvReac Type Severity Reaction Status Date / Time


 


Adhesive Tape [Plastic Tape] Allergy Mild Blisters Verified 01/21/19 11:52


 


erythromycin base Allergy  See Comment Verified 01/21/19 11:52


 


heparin Allergy  See Comment Verified 01/21/19 11:52


 


metoclopramide [From Reglan] Allergy  Hives Verified 01/21/19 11:52


 


metronidazole [From Flagyl] Allergy  Nausea And Verified 01/21/19 11:52





   Vomiting  


 


niacin Allergy  Rash Verified 01/21/19 11:52


 


ropinirole [From Requip] Allergy  Hives Verified 01/21/19 11:52


 


Sulfa (Sulfonamide Allergy  Vomiting Verified 01/21/19 11:52





Antibiotics)     














PMH/Surg Hx/FS Hx/Imm Hx


Endocrine/Hematology History: Reports: Hx Anticoagulant Therapy - Aspirin., Hx 

Blood Transfusions, Hx Diabetes - uses insulin, Hx Thyroid Disease - nodule, Hx 

Anemia - hx of - reports had tranfusion in 2014 after mi


Cardiovascular History: Reports: Hx Angina, Hx Cardiac Arrest - in Dec. 2014 

with CPR, Hx Cardiomegaly, Hx Coronary Artery Disease, Hx Deep Vein Thrombosis, 

Hx Hypercholesterolemia, Hx Hypertension, Hx Myocardial Infarction, Other 

Cardiovascular Problems/Disorders


   Denies: Hx Congestive Heart Failure, Hx Pacemaker/ICD, Hx Valvular Heart 

Disease


Respiratory History: Reports: Hx Asthma, Hx Chronic Obstructive Pulmonary 

Disease (COPD) - smoker, Hx Pneumonia, Hx Sleep Apnea, Other Respiratory 

Problems/Disorders - acute respipatory failure with hypoxia - dec 2016


   Denies: Hx Lung Cancer, Hx Pulmonary Embolism - pt says no, although 

documented in char


GI History: Reports: Hx Gall Bladder Disease, Hx Gastroesophageal Reflux Disease

, Other GI Disorders - GASTROPARESIS - TAKING OMEPRAZOLE, reglan and zofran


   Denies: Hx Gastrointestinal Bleed, Hx Ulcer, Hx Urosepsis


 History: Reports: Hx Acute Renal Failure, Hx Chronic Renal Failure - ESRD on 

PD, Hx Dialysis - PERITONEAL, Hx Kidney Stones, Hx Renal Disease - ESRD on home 

peritoneal dialysis , Other  Problems/Disorders


Musculoskeletal History: Reports: Hx Arthritis - back, hips, Hx Back Problems - 

Sciatica and Herniated L3 disk, Other Musculoskeletal History - partial 

amputation of toes/foot


Sensory History: Reports: Hx Eye Prosthesis - left, Hx Legally Blind - legally 

blind in right eye. no vision in left, Hx Vision Problem


   Denies: Hx Contacts or Glasses, Hx Hearing Aid


Opthamlomology History: Reports: Hx Eye Prosthesis - left, Hx Legally Blind - 

legally blind in right eye. no vision in left, Hx Vision Problem


   Denies: Hx Contacts or Glasses


Neurological History: Reports: Other Neuro Impairments/Disorders - neuropathy


   Denies: Hx Transient Ischemic Attacks (TIA)


Psychiatric History: Reports: Hx Anxiety, Hx Depression, Hx Bipolar Disorder


   Denies: Hx Eating Disorder, Hx Panic Disorder, Hx Schizophrenia, Hx of 

Violent Episodes Against Others





- Cancer History


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


Hx Chemotherapy: No


Hx Radiation Therapy: No





- Surgical History


Surgery Procedure, Year, and Place: LEFT EYE REMOVED 2012 - HAS PROSTHETIC EYE (

ORBITS DONE 5/2015 OK'D BY DR SAE COLE TO SCAN IN MRI) ;.  MELANOMA REMOVED 

FROM VULVA 2013 (PROCEDURE DONE 4X);.  CARDIAC CATH 2014 - NO STENTS;.  LEFT 

WRIST FISTULA PLACEMENT ([facility]) 2014;.  HEMODIALYSIS CATH RIGHT CHEST WALL 2014;.

  PERITONEAL DIALYSIS CATH 2/2015;.  CHEST WALL CATH REMOVAL 7/2016 [facility];.  

RIGHT GREAT TOE AMPUTATION, [facility] 4/2016;.  PLACEMENT RIGHT JUGULAR TESIO 

HEMODIALYSIS CATHETER [facility] 8/27/2016;.  REMOVAL OF JUGULAR CATH - SEPT 2016.  

CARDIAC CATH - stentsx2 right leg.  PARTIAL AMPUTATION RIGHT TOES 5/2017.  

RIGHT TOES ALL REMOVED


Hx Anesthesia Reactions: No





- Immunization History


Date of Tetanus Vaccine: utd


Date of Influenza Vaccine: 10/2017


Infectious Disease History: No


Infectious Disease History: Reports: Hx of Known/Suspected MRSA - MRSA R 1st toe

, History Other Infectious Disease - GANGRENE


   Denies: Hx Clostridium Difficile, Hx Hepatitis, Hx Human Immunodeficiency 

Virus (HIV), Hx Shingles, Hx Tuberculosis, Traveled Outside the [place] in Last 30 

Days





- Family History


Known Family History: Positive: Cardiac Disease - father MI at 43 y/o, 

Hypertension, Other - Positive for bipolar and depression. Completed suicide to 

sister.  





- Social History


Alcohol Use: None


Hx Substance Use: No


Substance Use Type: Reports: None


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


Hx Tobacco Use: Yes


Smoking Status (MU): Heavy Every Day Tobacco Smoker


Type: Cigarettes


Amount Used/How Often: 1 PPD


Length of Time of Smoking/Using Tobacco: 20 YEARS


Have You Smoked in the Last Year: Yes





Review of Systems


Positive: Fever, Chills, Skin Diaphoresis


Positive: Abdominal Pain, Vomiting, Diarrhea, Nausea


All Other Systems Reviewed And Are Negative: Yes





Physical Exam





- Summary


Physical Exam Summary: 


VITAL SIGNS: Reviewed.


GENERAL:  Patient is a well-developed and nourished female who is lying 

comfortable in the stretcher. Patient is not in any acute respiratory distress.


HEAD AND FACE: No signs of trauma. No ecchymosis, hematomas or skull 

depressions. No sinus tenderness.


EYES: PERRLA, EOMI x 2, No injected conjunctiva, no nystagmus.


EARS: Hearing grossly intact. Ear canals and tympanic membranes are within 

normal limits.


MOUTH: Oropharynx within normal limits.


NECK: Supple, trachea is midline, no adenopathy, no JVD, no carotid bruit, no c-

spine tenderness, neck with full ROM.


CHEST: Symmetric, no tenderness at palpation


LUNGS: Clear to auscultation bilaterally. No wheezing or crackles.


CVS: Regular rate and rhythm, S1 and S2 present, no murmurs or gallops 

appreciated.


ABDOMEN: Soft, diffuse abdominal tenderness with distension. No rebound no 

guarding, and no masses palpated. Bowel sounds are normal. She has a peritoneal 

dialysis catheter at [facility].


EXTREMITIES: FROM in all major joints, no edema, no cyanosis or clubbing.


NEURO: Alert and oriented x 3. No acute neurological deficits. Speech is normal 

and follows commands.


SKIN: Dry and warm








Triage Information Reviewed: Yes


Vital Signs On Initial Exam: 


 Initial Vitals











Temp Pulse Resp BP Pulse Ox


 


 98.3 F   102   20   196/118   96 


 


 01/22/19 00:26  01/22/19 00:26  01/22/19 00:26  01/22/19 00:26  01/22/19 00:26











Vital Signs Reviewed: Yes





Diagnostics





- Vital Signs


 Vital Signs











  Temp Pulse Resp BP Pulse Ox


 


 01/22/19 00:26  98.3 F  102  20  196/118  96














- Laboratory


Result Diagrams: 


 01/22/19 02:08





 01/22/19 02:08


Lab Statement: Any lab studies that have been ordered have been reviewed, and 

results considered in the medical decision making process.





- CT


  ** chest/abdomen/pelvis CT


CT Interpretation Completed By: Radiologist


Summary of CT Findings: CT CHEST/ABD/PEL IMPRESSION:  1. Minimal scattered fibro

-atelectatic change, greatest in the lingula and.  right middle lobe and 

minimal patchy ground glass infiltrates, greatest in the.  lower lobes.  2. 

Otherwise negative CT chest.  This report was reviewed by ED physician





Re-Evaluation





- Re-Evaluation


  ** First Eval


Re-Evaluation Time: 02:53


Comment: Results of labs and tests were discussed with patient. She claims that 

she is too weak to go home. Patient to be admitted to hospitalist.





Complex Multi-Symp Course/Dx


Course Of Treatment: Patient is a 39 y/o F presenting to ED with complaints of 

chills and diaphoresis. Patient reports that Sx onset five days ago. She states 

that she went to PCP three days ago, was noted to have a fever and was 

prescribed amoxicillin. Patient claims that she was told to go to hospital if 

Sx did not improve within the next two days. She reports fever has persisted, 

she measured temp to be 101 F today and took Tylenol at 1600 today. N/V/D, 

abdominal pain are reported to have onset today as well. Patient is on home o2. 

[facility] she was discharge to home. On 

physical exam, diffuse abdominal tenderness and distension is noted. She has a 

peritoneal dialysis catheter at [facility].  CT CHEST/ABD/PEL IMPRESSION:  1. Minimal 

scattered fibro-atelectatic change, greatest in the lingula and.  right middle 

lobe and minimal patchy ground glass infiltrates, greatest in the.  lower 

lobes.  2. Otherwise negative CT chest.  During ED course, patient was given 

Zofran 8 mg IV, Morphine 4 mg IV, Trandate 20 mg IV.  Labs showed lipase 29, 

amylase < 10, CRP 52.95, total protein 6.2, albumin 3, albumin/globulin ratio 

0.9, ALT 6, AST 6, magnesium 1.8, lactic acid 0.8, glucose 240, BUN/creatinine 

ratio 6.8, creatinine 5.17,  BUN 35, chloride 99, INR 1.97, absolute neuts 9.7, 

RDW 18, Hct 26, Hgb 8.3, RBC 2.83, WBC 11.8.  Results of labs and tests were 

discussed with patient. She claims that she is too weak to go home. Patient to 

be admitted to hospitalist.  Patient' case was discussed with Dr. Carrasco, Dr. Carrasco accepts for admission.





- Diagnoses


Provider Diagnoses: 


 PNA (pneumonia), Fever, Weakness








- Physician Notifications


Discussed Care Of Patient With: Maliha Carrasco


Time Discussed With Above Provider: 02:55


Instructed by Provider To: Other - Patient' case was discussed with Dr. Carrasco, 

Dr. Carrasco accepts for admission.





Discharge





- Sign-Out/Discharge


Documenting (check all that apply): Patient Departure - admit 





- Discharge Plan


Condition: Stable


Disposition: ADMITTED TO [facility]


Referrals: 


Yeni Shanks MD [Primary Care Provider] - 





- Attestation Statements


Document Initiated by Scribe: Yes


Documenting Scribe: SHADIA LONDONO


Provider For Whom Royeribraven is Documenting (Include Credential): DACIA NAVARRO MD


Scribe Attestation: 


SHADIA RICARDO, scribed for DACIA NAVARRO MD on 01/22/19 at 0303. 


Status of Scribe Document: Ready

## 2019-01-22 NOTE — ADMNOTE
Subjective


Date of Service: 19


Interval History: 





38 year old Female with history of ESRD on Peritoneal dialysis, diabetes, 

Coronary artery disease, COPD, chronic hypoxic respiratory failure on 2 Liters 

nasal canula, who now comes to the emergency room because of shortness of 

breath and fever. About 5 days ago she developed cough, of yellow colored 

sputum production, sinus congestion and then yesterday started to develop 

chills and fever. She was seen in the emergency room earlier yesterday, and 

subsequently discharged. She now presents with fever, aches, shortness of 

breath. No chest pain.





Family History: Findings - Father: MI at age 49, grandfather: lung cancer, 

parents/siblings with diabetes, mother: ovarian cancer


Social History: Findings - She lives at home, does not drink alcohol, smokes 1/

2 pack per day


Past Medical History: Findings - End stage renal disease on peritoneal dialysis

, coronary artery diseas, melanoma, diabetes on insulin, peripheral arterial 

disease, chornic hypoxic respiratory failure on home oxygen 2 Liters, Anxiety, 

depression, COPD,





Review of Systems





- Measurements


Intake and Output: 


Intake and Output Last 24 Hours











 19





 06:59 06:59 06:59 06:59


 


Weight    210 lb














- Review of Systems


Constitutional Symptoms: Positive: Weakness, Fever, Other - chills


   Negative: Weight Loss


Dermatology: Positive: Normal


HEENT: 


   Negative: Change in Hearing, Vertigo


Eyes: 


   Negative: Change in Vision, Eye Pain


Thyroid: 


   Negative: Constipation, Palpitations


Pulmonary: Positive: Cough, Shortness of Breath, Asthma, Home Oxygen


Cardiology: Positive: Shortness of Breath


   Negative: Chest Pain, Faintness


Gastroenterology: Positive: Nausea


   Negative: Abdominal Pain, Difficulty Swallowing, Heartburn, Constipation


Genital - Urinary: 


   Negative: Polyuria, Nocturia


Musculoskeletal: Positive: Other - chornic generalized body pain


   Negative: Joint Pain


Endocrinology: Positive: Obesity, Diabetes Mellitus


Hematologic/Lymphatic: Positive: Use of Anticoagulant


Psychiatry: 


   Negative: Depression, Anxiety





Objective


Active Medications: 








Acetaminophen (Tylenol Tab*)  650 mg PO Q6H PRN


   PRN Reason: FEVER/PAIN


Albuterol (Ventolin 2.5 Mg/3 Ml Neb.Sol*)  2.5 mg INH Q4HR PRN


   PRN Reason: SOB/WHEEZING


Albuterol (Ventolin Hfa Inhaler*)  2 puff INH Q6H PRN


   PRN Reason: COUGH


Aspirin (Aspirin Ec Tab*)  81 mg PO DAILY [facility]


Bethanechol Chloride (Urecholine Tab*)  25 mg PO QID [facility]


Budesonide/Formoterol Fumarate (Symbicort 160/4.5 (Nf))  2 puff INH BID TRISTAN; 

Protocol


Calcitriol (Rocaltrol  Cap*)  0.25 mcg PO TID [facility]


Dextrose (D50w Syringe 50 Ml*)  12.5 gm IV PUSH .FOR FS < 60 - SS PRN


   PRN Reason: FS < 60


Gabapentin (Neurontin Cap(*))  200 mg PO TID [facility]


Levofloxacin/Dextrose (Levaquin 500 Mg Ivpremix(*))  500 mg in 100 mls @ 100 mls

/hr IVPB ED ONCE ONE


   Stop: 19 03:55


   Last Admin: 19 03:06 Dose:  100 mls/hr


Insulin Glargine (Lantus(*))  80 units SUBCUT BEDTIME [facility]


Insulin Human Lispro (Humalog*)  0 units SUBCUT ACHS [facility]; Protocol


Losartan Potassium (Cozaar Tab*)  100 mg PO DAILY [facility]


Methadone HCl (Dolophine Tab*)  5 mg PO Q8HR PRN


   PRN Reason: PAIN


Omeprazole (Prilosec Cap*)  40 mg PO QPM TRISTAN


Ondansetron HCl (Zofran Inj*)  4 mg IV Q6H PRN


   PRN Reason: NAUSEA


Sertraline HCl (Zoloft*)  25 mg PO DAILY [facility]


Warfarin Sodium (Coumadin Tab(*))  7.5 mg PO BEDTIME TRISTAN; Protocol








 Vital Signs - 8 hr











  19





  00:26 01:24 01:26


 


Temperature 98.3 F  


 


Pulse Rate 102  102


 


Respiratory 20 18 





Rate   


 


Blood Pressure 196/118  





(mmHg)   


 


O2 Sat by Pulse 96  99





Oximetry   














  19





  01:28 01:57 02:00


 


Temperature   


 


Pulse Rate 99 82 83


 


Respiratory   





Rate   


 


Blood Pressure 202/115 156/84 





(mmHg)   


 


O2 Sat by Pulse 98 99 96





Oximetry   














  19





  02:27 02:58 03:06


 


Temperature   


 


Pulse Rate 87  


 


Respiratory   18





Rate   


 


Blood Pressure 147/75 173/105 





(mmHg)   


 


O2 Sat by Pulse 98  





Oximetry   











Oxygen Devices in Use Now: Nasal Cannula


Appearance: Obese female sitting on ER stretcher in no distress


Eyes: PERRLA


Ears/Nose/Mouth/Throat: Mucous Membranes Moist


Respiratory: - - No tachypnea, no use of accessory muscles, mild rhonchi at the 

Right lower lobe


Cardiovascular: NL Sounds; No Murmurs; No JVD, RRR


Abdominal: NL Sounds; No Tenderness; No Distention, No Hepatosplenomegaly


Extremities: - - Trace lower extremity edema,


Neurological: Alert and Oriented x 3


Result Diagrams: 


 19 02:08





 19 02:08


Diagnostic Imaging: 








 CT Chest Without Contrast 











FINDINGS: 


 Lungs:  Minimal patchy ground glass infiltrates bilaterally. Minimal scattered 


fibro-atelectatic change, greatest in the right middle lobe and lingula. 


 Pleural space: Normal. No pneumothorax. No pleural effusion. 


 Heart:  Coronary artery calcifications are present. 


 Aorta: Normal. No aortic aneurysm. 


 Lymph nodes:  Small scattered mediastinal lymph nodes which are upper normal. 


 Bones/joints: Unremarkable. No acute fracture. 


 Soft tissues: Unremarkable. 





IMPRESSION: 


1. Minimal scattered fibro-atelectatic change, greatest in the lingula and 


right middle lobe and minimal patchy ground glass infiltrates, greatest in the 


lower lobes. 


2. Otherwise negative CT chest. 











 





 CT Abdomen and Pelvis Without Contrast 





[facility]


Patient Name:MAKAYLA RUBIN                                                  

                             MR:[id number]                                     

                     : 1980











size (includes targeted exams where dose is matched to clinical indication); or 


iterative reconstruction. 


 Coronal and sagittal reformatted images were created and reviewed. 





COMPARISON: 


 PELV WO CT PELVIS W/O 2019 10:39 AM 





FINDINGS: 


 Lower thorax: No acute findings. 





ABDOMEN: 


 Liver: Normal. No mass. 


 Gallbladder and bile ducts: Normal. No calcified stones. No ductal dilation. 


 Pancreas: Normal. No ductal dilation. 


 Spleen: Normal. No splenomegaly. 


 Adrenals: Normal. No mass. 


 Kidneys and ureters: Normal. No hydronephrosis. 


 Stomach and bowel:  There is colonic diverticulosis without evidence of 


diverticulitis. 


 Appendix:  A normal appendix is seen. 





PELVIS: 


 Bladder: Unremarkable as visualized. 


 Reproductive: Unremarkable as visualized. 





ABDOMEN and PELVIS: 


 Intraperitoneal space:  Mild peritoneal ascites with intraperitoneal dialysis 


catheter in the pelvis. 


 Bones/joints: No acute fracture. No dislocation. 


 Soft tissues: Unremarkable. 


 Vasculature:  There is mild calcification of the abdominal aorta with 


extension into the iliac arteries. 


 Lymph nodes: Normal. No enlarged lymph nodes. 





IMPRESSION: 


1. Peritoneal dialysis catheter in position with mild free fluid in the 


abdomen. 


2. Colonic diverticulosis without diverticulitis. 


3. Otherwise negative CT abdomen/pelvis. There is decreased dermal confluence 


in the medial right buttocks since 2019. 











Assess/Plan/Problems-Billing


Assessment: 





38 year old Female with ESRD on PD, COPD with home oxygen use, diabetes, 

peripheral arterial disease here with subjective fever, chills, and cough





- Patient Problems


(1) Pneumonia


Current Visit: Yes   Status: Acute   Code(s): J18.9 - PNEUMONIA, UNSPECIFIED 

ORGANISM   SNOMED Code(s): 494428461


   Comment: CT shows inflitrate, will start on zosyn, ordered sputum culture.


Blood culture ordered by ED.


frequent hospital visits, so opting to give zosyn.   





(2) COPD (chronic obstructive pulmonary disease)


Current Visit: No   Status: Chronic   Code(s): J44.9 - CHRONIC OBSTRUCTIVE 

PULMONARY DISEASE, UNSPECIFIED   SNOMED Code(s): 86931607


   Comment: Stable. continue home regimen   





(3) Essential hypertension


Current Visit: No   Status: Acute   Code(s): I10 - ESSENTIAL (PRIMARY) 

HYPERTENSION   SNOMED Code(s): 22429481


   Comment: uncontrolled.


continue losartan 100mg from home


added coreg 3.125mg BID. received one dose of labetalol in the ED   





(4) IDDM (insulin dependent diabetes mellitus)


Current Visit: No   Status: Chronic   Code(s): E11.9 - TYPE 2 DIABETES MELLITUS 

WITHOUT COMPLICATIONS; Z79.4 - LONG TERM (CURRENT) USE OF INSULIN   SNOMED Code(

s): 17679723


   Comment: Poorly controlled, resumed home regimen, will monitor fingersticks 

and adjust if needed.   





(5) PAD (peripheral artery disease)


Current Visit: No   Status: Chronic   Code(s): I73.9 - PERIPHERAL VASCULAR 

DISEASE, UNSPECIFIED   SNOMED Code(s): 751641305


   Comment: Continue coumadin.   





(6) ESRD on peritoneal dialysis


Current Visit: Yes   Status: Chronic   Code(s): N18.6 - END STAGE RENAL DISEASE

; Z99.2 - DEPENDENCE ON RENAL DIALYSIS   SNOMED Code(s): 72135533


   Comment: on peritoneal dialysis


will need to have nephrology consult.   





Medications/Allergies


Medications: 


 Home Medications











 Medication  Instructions  Recorded  Confirmed  Type


 


Omeprazole CAP (NF) [Prilosec CAP* 40 mg PO QPM 16 History





20 MG]    


 


Albuterol HFA INHALER* [Ventolin 2 puff INH Q6H PRN 17 History





HFA Inhaler*]    


 


Gabapentin CAP(*) [Neurontin 100 200 mg PO TID 17 History





mg CAP(*)]    


 


Albuterol 2.5MG/3ML (0.083%)* 2.5 mg INH Q4HR PRN 18 History





[Ventolin 2.5 MG/3 ML NEB.SOL*]    


 


Insulin ASPART (NF) [Novolog (NF)] 0 - 10 units SUBCUT AC PRN 18 

History


 


Insulin GLARGINE(*) [Lantus(*)] 80 units SUBCUT BEDTIME 18 

History


 


Promethazine TAB* [Phenergan Tab*] 25 mg PO Q8H PRN 18 History


 


Losartan TAB* [Cozaar TAB*] 100 mg PO DAILY 18 History


 


Bethanechol TAB* [Urecholine TAB*] 25 mg PO QID 18 History


 


Calcitriol CAP* [Rocaltrol  CAP*] 0.25 mcg PO TID 19 History


 


Warfarin TAB(*) [Coumadin TAB(*)] 7.5 mg PO BEDTIME  tab 19 Rx


 


Amoxicillin/Clavulanate TAB* 875 mg PO BID 19 History





[Augmentin *]    


 


Aspirin EC TAB* [Ecotrin EC Low 81 mg PO DAILY 19 History





Dose 81 MG*]    


 


Budesonide/Formote 160/4.5(NF) 2 puff INH BID 19 History





[Symbicort 160/4.5 (NF)]    


 


Methadone TAB* [Dolophine TAB*] 5 mg PO Q8HR PRN 19 History


 


Omega-3 Fatty Acids (Nf) [Fish Oil 4,000 mg PO DAILY 19 History





(NF)]    


 


Sertraline* [Zoloft*] 25 mg PO DAILY 19 History











Allergies/Adverse Reactions: 


 Allergies











Allergy/AdvReac Type Severity Reaction Status Date / Time


 


Adhesive Tape [Plastic Tape] Allergy Mild Blisters Verified 19 11:52


 


erythromycin base Allergy  See Comment Verified 19 11:52


 


heparin Allergy  See Comment Verified 19 11:52


 


metoclopramide [From Reglan] Allergy  Hives Verified 19 11:52


 


metronidazole [From Flagyl] Allergy  Nausea And Verified 19 11:52





   Vomiting  


 


niacin Allergy  Rash Verified 19 11:52


 


ropinirole [From Requip] Allergy  Hives Verified 19 11:52


 


Sulfa (Sulfonamide Allergy  Vomiting Verified 19 11:52





Antibiotics)

## 2019-01-22 NOTE — DS
CC:  Yeni Shanks MD; Lily Pantoja MD *

 

DISCHARGE SUMMARY:

 

DATE OF ADMISSION:  01/22/19

 

DATE OF DISCHARGE:  01/22/19

 

PRIMARY CARE PHYSICIAN:  Yeni Shanks MD.

 

PULMONOLOGIST:  Lily Pantoja MD.

 

PRINCIPAL DIAGNOSIS:  Community acquired pneumonia.

 

SECONDARY DIAGNOSES:

1.  Chronic obstructive pulmonary disease exacerbation.

2.  Endstage renal disease, on peritoneal dialysis.

3.  Diabetes, on insulin.

4.  Coronary artery disease.

5.  Chronic obstructive pulmonary disease with chronic hypoxic respiratory 
failure on 2 L chronically.

 

MEDICATIONS:  New medication prescribed on discharge:

1.  Levaquin 500 mg every 48 hours (First dose on 1/22) - duration 7 days - to 
start on 1/24

2.  Prednisone 40 mg p.o. daily x4 days and 20 mg p.o. daily x3 days.

 

Remaining medications are unchanged as follows:

1.  Bethanechol 25 mg p.o. q.i.d.

2.  Aspirin 81 mg daily.

3.  Albuterol inhaler 2 puffs every 6 hours as needed.

4.  Albuterol nebulizer every 4 hours as needed.

5.  Gabapentin 200 mg p.o. t.i.d.

6.  Calcitriol 0.25 mcg p.o. t.i.d.

7.  Symbicort 2 puffs inhaled b.i.d.

8.  Phenergan 25 mg every hour as needed.

9.  Omeprazole 40 mg daily.

10.  Omega-3 fatty acid 4000 mg daily.

11.  Methadone 5 mg p.o. every 8 hours as needed.

12.  Losartan 100 mg p.o. daily.

13.  Lantus 80 units at bedtime.

14.  NovoLog sliding scale.

15.  Warfarin 7.5 mg p.o. at bedtime.

16.  Zoloft 25 mg p.o. daily.

 

HOSPITAL COURSE:  This is a 38-year-old female with multiple comorbidities with 
frequent admissions and emergency room visits who presents to the emergency 
room with fevers, chills, shortness of breath, and cough.  The patient was 
found to have minimal scattered fibroatelectatic change, greatest in the 
lingula and right middle lobe and minimal patchy ground-glass infiltrates 
greatest in the lower lobe on her CAT of her chest.  She also had an abdominal 
and pelvis CAT scan that showed peritoneal dialysis catheter in position with 
mild fluid in the abdomen, colonic diverticulosis without diverticulitis.  The 
patient on admission had a mildly elevated white count of 11.8, H and H 
appeared at baseline, chronic anemia, INR is 1.97, electrolytes unremarkable 
and mildly elevated CRP of 53.  The patient states she went to her primary care 
doctor for a similar presentation of upper respiratory tract infection and was 
started on Augmentin on the 18th and she states she was not getting better with 
that.  On admission, the patient was started on Zosyn.  On day of discharge, 
this morning, the patient wants to leave against medical advice.  She states 
she feels better.  She wants to go home.  Her breathing feels back at baseline.
  She is still coughing somewhat but significantly improved.  She is ambulating 
with the walker and she is tolerating p.o.  No nausea, vomiting, or chest pain.
  Her boyfriend is sleeping in the bed with her, but did not wake up during our 
entire encounter.  Her exam shows some rhonchi in the left with expiratory 
wheezing bilaterally.

 

Her vitals, temp 98, pulse rate 82, respiratory rate 18, oxygen saturation 95% 
on 2 L, blood pressure 137/47.

 

I discussed, she will benefit from staying, but she is insisting that she has 
help at home; her boyfriend, her mother, and she feels back at her baseline 
regarding respiratory status.  She has seen Dr. Pantoja in the past, but has not 
seen her in the long time.  I recommended that she plug back into her care.  
The patient is going to be discharged to home under the care of her boyfriend 
and her mother.  I suspect that she is having an underlying COPD exacerbation 
as well, I am going to give her 40 mg of prednisone now and as mentioned, the 
taper as well as transitioning over to levaquin, renally dosed.  The patient is 
aware and agreeable to the discharge recommendations.  She is agreeable to 
returning if her respiratory status worsens and to follow up with Dr. Pantoja 
and her primary care doctor.

 

PATIENT TIME:  Greater than 30 minutes was spent doing discharge summary, more 
than half the time was spent in direct patient contact.

 

 [id number]/[facility] #: [id number]

[facility]

## 2019-01-24 ENCOUNTER — HOSPITAL ENCOUNTER (INPATIENT)
Dept: HOSPITAL 25 - ED | Age: 39
LOS: 5 days | Discharge: LEFT BEFORE DISCHARGE | DRG: 248 | End: 2019-01-29
Attending: HOSPITALIST | Admitting: INTERNAL MEDICINE
Payer: COMMERCIAL

## 2019-01-24 DIAGNOSIS — Z91.048: ICD-10-CM

## 2019-01-24 DIAGNOSIS — F17.210: ICD-10-CM

## 2019-01-24 DIAGNOSIS — J98.11: ICD-10-CM

## 2019-01-24 DIAGNOSIS — G89.4: ICD-10-CM

## 2019-01-24 DIAGNOSIS — J96.11: ICD-10-CM

## 2019-01-24 DIAGNOSIS — Z99.2: ICD-10-CM

## 2019-01-24 DIAGNOSIS — E11.22: ICD-10-CM

## 2019-01-24 DIAGNOSIS — Z79.4: ICD-10-CM

## 2019-01-24 DIAGNOSIS — Z88.2: ICD-10-CM

## 2019-01-24 DIAGNOSIS — E11.51: ICD-10-CM

## 2019-01-24 DIAGNOSIS — Z79.891: ICD-10-CM

## 2019-01-24 DIAGNOSIS — J18.9: ICD-10-CM

## 2019-01-24 DIAGNOSIS — F32.9: ICD-10-CM

## 2019-01-24 DIAGNOSIS — Z80.1: ICD-10-CM

## 2019-01-24 DIAGNOSIS — Z79.01: ICD-10-CM

## 2019-01-24 DIAGNOSIS — I12.0: ICD-10-CM

## 2019-01-24 DIAGNOSIS — E11.10: ICD-10-CM

## 2019-01-24 DIAGNOSIS — E11.43: ICD-10-CM

## 2019-01-24 DIAGNOSIS — K31.84: ICD-10-CM

## 2019-01-24 DIAGNOSIS — F41.9: ICD-10-CM

## 2019-01-24 DIAGNOSIS — Z88.8: ICD-10-CM

## 2019-01-24 DIAGNOSIS — A04.72: Primary | ICD-10-CM

## 2019-01-24 DIAGNOSIS — J44.1: ICD-10-CM

## 2019-01-24 DIAGNOSIS — Z79.899: ICD-10-CM

## 2019-01-24 DIAGNOSIS — N18.6: ICD-10-CM

## 2019-01-24 DIAGNOSIS — E11.649: ICD-10-CM

## 2019-01-24 DIAGNOSIS — Z82.49: ICD-10-CM

## 2019-01-24 DIAGNOSIS — L02.31: ICD-10-CM

## 2019-01-24 DIAGNOSIS — K21.9: ICD-10-CM

## 2019-01-24 DIAGNOSIS — Z79.82: ICD-10-CM

## 2019-01-24 DIAGNOSIS — Z79.52: ICD-10-CM

## 2019-01-24 DIAGNOSIS — Z99.81: ICD-10-CM

## 2019-01-24 LAB
ALBUMIN SERPL BCG-MCNC: 3.1 G/DL (ref 3.2–5.2)
ALBUMIN/GLOB SERPL: 0.9 {RATIO} (ref 1–3)
ALP SERPL-CCNC: 92 U/L (ref 34–104)
ALT SERPL W P-5'-P-CCNC: 4 U/L (ref 7–52)
ANION GAP SERPL CALC-SCNC: 15 MMOL/L (ref 2–11)
AST SERPL-CCNC: 8 U/L (ref 13–39)
BASOPHILS # BLD AUTO: 0.1 10^3/UL (ref 0–0.2)
BUN SERPL-MCNC: 40 MG/DL (ref 6–24)
BUN/CREAT SERPL: 7.1 (ref 8–20)
CALCIUM SERPL-MCNC: 8.9 MG/DL (ref 8.6–10.3)
CHLORIDE SERPL-SCNC: 96 MMOL/L (ref 101–111)
EOSINOPHIL # BLD AUTO: 0 10^3/UL (ref 0–0.6)
FLUAV RNA SPEC QL NAA+PROBE: NEGATIVE
FLUBV RNA SPEC QL NAA+PROBE: NEGATIVE
GLOBULIN SER CALC-MCNC: 3.6 G/DL (ref 2–4)
GLUCOSE SERPL-MCNC: 423 MG/DL (ref 70–100)
HCO3 SERPL-SCNC: 21 MMOL/L (ref 22–32)
HCT VFR BLD AUTO: 28 % (ref 35–47)
HGB BLD-MCNC: 9.1 G/DL (ref 12–16)
INR PPP/BLD: 2.78 (ref 0.77–1.02)
LYMPHOCYTES # BLD AUTO: 0.5 10^3/UL (ref 1–4.8)
MCH RBC QN AUTO: 30 PG (ref 27–31)
MCHC RBC AUTO-ENTMCNC: 33 G/DL (ref 31–36)
MCV RBC AUTO: 92 FL (ref 80–97)
MONOCYTES # BLD AUTO: 0.3 10^3/UL (ref 0–0.8)
NEUTROPHILS # BLD AUTO: 21.3 10^3/UL (ref 1.5–7.7)
NRBC # BLD AUTO: 0 10^3/UL
NRBC BLD QL AUTO: 0
PLATELET # BLD AUTO: 421 10^3/UL (ref 150–450)
POTASSIUM SERPL-SCNC: 4.6 MMOL/L (ref 3.5–5)
PROT SERPL-MCNC: 6.7 G/DL (ref 6.4–8.9)
RBC # BLD AUTO: 3.01 10^6/UL (ref 4–5.4)
RBC UR QL AUTO: (no result)
SODIUM SERPL-SCNC: 132 MMOL/L (ref 135–145)
WBC # BLD AUTO: 22.2 10^3/UL (ref 3.5–10.8)
WBC UR QL AUTO: (no result)

## 2019-01-24 PROCEDURE — 87086 URINE CULTURE/COLONY COUNT: CPT

## 2019-01-24 PROCEDURE — 83605 ASSAY OF LACTIC ACID: CPT

## 2019-01-24 PROCEDURE — 81015 MICROSCOPIC EXAM OF URINE: CPT

## 2019-01-24 PROCEDURE — 36415 COLL VENOUS BLD VENIPUNCTURE: CPT

## 2019-01-24 PROCEDURE — 94660 CPAP INITIATION&MGMT: CPT

## 2019-01-24 PROCEDURE — 71045 X-RAY EXAM CHEST 1 VIEW: CPT

## 2019-01-24 PROCEDURE — 87493 C DIFF AMPLIFIED PROBE: CPT

## 2019-01-24 PROCEDURE — 80053 COMPREHEN METABOLIC PANEL: CPT

## 2019-01-24 PROCEDURE — 71046 X-RAY EXAM CHEST 2 VIEWS: CPT

## 2019-01-24 PROCEDURE — 81003 URINALYSIS AUTO W/O SCOPE: CPT

## 2019-01-24 PROCEDURE — 3E1M39Z IRRIGATION OF PERITONEAL CAVITY USING DIALYSATE, PERCUTANEOUS APPROACH: ICD-10-PCS | Performed by: INTERNAL MEDICINE

## 2019-01-24 PROCEDURE — 82803 BLOOD GASES ANY COMBINATION: CPT

## 2019-01-24 PROCEDURE — 85610 PROTHROMBIN TIME: CPT

## 2019-01-24 PROCEDURE — 94640 AIRWAY INHALATION TREATMENT: CPT

## 2019-01-24 PROCEDURE — 36600 WITHDRAWAL OF ARTERIAL BLOOD: CPT

## 2019-01-24 PROCEDURE — 85025 COMPLETE CBC W/AUTO DIFF WBC: CPT

## 2019-01-24 PROCEDURE — 99284 EMERGENCY DEPT VISIT MOD MDM: CPT

## 2019-01-24 PROCEDURE — 80048 BASIC METABOLIC PNL TOTAL CA: CPT

## 2019-01-24 RX ADMIN — MUPIROCIN SCH APPLIC: 20 OINTMENT TOPICAL at 15:22

## 2019-01-24 RX ADMIN — GABAPENTIN SCH MG: 100 CAPSULE ORAL at 14:06

## 2019-01-24 RX ADMIN — BETHANECHOL CHLORIDE SCH: 25 TABLET ORAL at 11:17

## 2019-01-24 RX ADMIN — INSULIN LISPRO SCH UNITS: 100 INJECTION, SOLUTION INTRAVENOUS; SUBCUTANEOUS at 10:06

## 2019-01-24 RX ADMIN — MOMETASONE FUROATE AND FORMOTEROL FUMARATE DIHYDRATE SCH: 200; 5 AEROSOL RESPIRATORY (INHALATION) at 08:22

## 2019-01-24 RX ADMIN — BETHANECHOL CHLORIDE SCH MG: 25 TABLET ORAL at 20:26

## 2019-01-24 RX ADMIN — METHADONE HYDROCHLORIDE PRN MG: 5 TABLET ORAL at 08:06

## 2019-01-24 RX ADMIN — MOMETASONE FUROATE AND FORMOTEROL FUMARATE DIHYDRATE SCH PUFF: 200; 5 AEROSOL RESPIRATORY (INHALATION) at 19:33

## 2019-01-24 RX ADMIN — INSULIN LISPRO SCH UNITS: 100 INJECTION, SOLUTION INTRAVENOUS; SUBCUTANEOUS at 12:12

## 2019-01-24 RX ADMIN — INSULIN GLARGINE SCH UNITS: 100 INJECTION, SOLUTION SUBCUTANEOUS at 21:23

## 2019-01-24 RX ADMIN — BETHANECHOL CHLORIDE SCH MG: 25 TABLET ORAL at 14:06

## 2019-01-24 RX ADMIN — GABAPENTIN SCH: 100 CAPSULE ORAL at 12:35

## 2019-01-24 RX ADMIN — CALCITRIOL SCH MCG: 0.25 CAPSULE ORAL at 14:06

## 2019-01-24 RX ADMIN — PREDNISONE SCH MG: 20 TABLET ORAL at 14:06

## 2019-01-24 RX ADMIN — BETHANECHOL CHLORIDE SCH MG: 25 TABLET ORAL at 20:15

## 2019-01-24 RX ADMIN — PIPERACILLIN AND TAZOBACTAM SCH MLS/HR: 3; .375 INJECTION, POWDER, LYOPHILIZED, FOR SOLUTION INTRAVENOUS; PARENTERAL at 22:36

## 2019-01-24 RX ADMIN — GABAPENTIN SCH MG: 100 CAPSULE ORAL at 20:15

## 2019-01-24 RX ADMIN — ASPIRIN SCH MG: 81 TABLET, COATED ORAL at 14:06

## 2019-01-24 RX ADMIN — PROMETHAZINE HYDROCHLORIDE PRN MG: 25 TABLET ORAL at 20:26

## 2019-01-24 RX ADMIN — PANTOPRAZOLE SODIUM SCH MG: 40 TABLET, DELAYED RELEASE ORAL at 20:16

## 2019-01-24 RX ADMIN — CALCITRIOL SCH: 0.25 CAPSULE ORAL at 11:17

## 2019-01-24 RX ADMIN — MORPHINE SULFATE PRN MG: 4 INJECTION INTRAVENOUS at 14:15

## 2019-01-24 RX ADMIN — CALCITRIOL SCH MCG: 0.25 CAPSULE ORAL at 20:16

## 2019-01-24 RX ADMIN — MORPHINE SULFATE PRN MG: 4 INJECTION INTRAVENOUS at 20:16

## 2019-01-24 RX ADMIN — MUPIROCIN SCH: 20 OINTMENT TOPICAL at 17:44

## 2019-01-24 RX ADMIN — PIPERACILLIN AND TAZOBACTAM SCH MLS/HR: 3; .375 INJECTION, POWDER, LYOPHILIZED, FOR SOLUTION INTRAVENOUS; PARENTERAL at 13:58

## 2019-01-24 RX ADMIN — ONDANSETRON PRN MG: 2 INJECTION INTRAMUSCULAR; INTRAVENOUS at 04:52

## 2019-01-24 RX ADMIN — ONDANSETRON PRN MG: 2 INJECTION INTRAMUSCULAR; INTRAVENOUS at 14:15

## 2019-01-24 RX ADMIN — LOSARTAN POTASSIUM SCH MG: 25 TABLET, FILM COATED ORAL at 14:06

## 2019-01-24 RX ADMIN — PROMETHAZINE HYDROCHLORIDE PRN MG: 25 TABLET ORAL at 08:06

## 2019-01-24 RX ADMIN — SERTRALINE HYDROCHLORIDE SCH MG: 25 TABLET ORAL at 14:06

## 2019-01-24 RX ADMIN — INSULIN LISPRO SCH UNITS: 100 INJECTION, SOLUTION INTRAVENOUS; SUBCUTANEOUS at 17:42

## 2019-01-24 RX ADMIN — ALBUTEROL SULFATE PRN MG: 2.5 SOLUTION RESPIRATORY (INHALATION) at 19:41

## 2019-01-24 NOTE — HP
History of Present Illness





- History of Present Illness


Reason for Visit: Fever, chills


History of Present Illness: 





38 year old Female with history of COPD, chronic hypoxic respiratory failure on 

O2 at home, Diabetes on insulin, ESRD on Peritnoeal dialysis who presents to 

the emergency room because of fever, chills, and sweats. She was recently 

discharge home with diagnosis of pneumonia with renal dose levaquin. She has 

been having cough with yellow colored sputum production. Chest pain with cough. 

She has chronic abdominal pain with nausea and vomiting- she has them right now 

but unchanged from baseline.


Fever at home 100.5F.








- Past Medical History


Cardiac: CAD, Other - Peripheral vascular disease


Pulmonary: COPD


CNS: Peripheral neuropathy


Gastrointestinal: GERD, Other - gastroparesis


Psych: Anxiety, Depression


Renal/: Other - ESRD on Peritoneal dialysis





- Past Family History


Family History: Cancer - Lung cancer, CAD





- Past Social History


Smoke: <1 pack per day


Alcohol: None


Lives: With Family





Review of Systems





- Review of Systems


Constitutional: Positive: Fever, Chills, Sweats, Malaise


Eyes: Negative: Vision Change, Redness


ENT: Negative: Ear Discharge, Nose Congestion, Throat Pain, Throat Swelling


Respiratory: Positive: Shortness of Breath, Pleuritic Pain, Sputum, Wheezing


Cardiovascular: Positive: Chest Pain, Edema.  Negative: Palpitations


Gastrointestinal: Positive: Nausea, Vomiting, Abdominal Pain.  Negative: 

Diarrhea, Constipation


Genitourinary: Positive: Other - does not make urine, dialysis patient.


Neurological: Positive: Other - No headaches.  Negative: Weakness, Numbness





- Medications/Allergies


Allergies/Adverse Reactions: 


 Allergies











Allergy/AdvReac Type Severity Reaction Status Date / Time


 


Adhesive Tape [Plastic Tape] Allergy Mild Blisters Verified 01/24/19 01:51


 


doxycycline Allergy  Unknown Verified 01/24/19 04:22





   Reaction  





   Details  


 


erythromycin base Allergy  See Comment Verified 01/24/19 01:51


 


heparin Allergy  See Comment Verified 01/24/19 01:51


 


metoclopramide [From Reglan] Allergy  Hives Verified 01/24/19 01:51


 


metronidazole [From Flagyl] Allergy  Nausea And Verified 01/24/19 01:51





   Vomiting  


 


niacin Allergy  Rash Verified 01/24/19 01:51


 


ropinirole [From Requip] Allergy  Hives Verified 01/24/19 01:51


 


Sulfa (Sulfonamide Allergy  Vomiting Verified 01/24/19 01:51





Antibiotics)     











Medications: 


 Current Medications





Albuterol (Ventolin 2.5 Mg/3 Ml Neb.Sol*)  2.5 mg INH Q4HR PRN


   PRN Reason: SOB/WHEEZING


Albuterol (Ventolin Hfa Inhaler*)  2 puff INH Q6H PRN


   PRN Reason: COUGH


Aspirin (Aspirin Ec Tab*)  81 mg PO DAILY [facility]


Bethanechol Chloride (Urecholine Tab*)  25 mg PO QID [facility]


Budesonide/Formoterol Fumarate (Symbicort 160/4.5 (Nf))  2 puff INH BID [facility]; 

Protocol


Calcitriol (Rocaltrol  Cap*)  0.25 mcg PO TID [facility]


Dextrose (D50w Syringe 50 Ml*)  12.5 gm IV PUSH .FOR FS < 60 - SS PRN


   PRN Reason: FS < 60


Gabapentin (Neurontin Cap(*))  200 mg PO TID [facility]


Sodium Chloride (Ns 0.9% 1000 Ml*)  1,000 mls @ 75 mls/hr IV PER RATE [facility]


Piperacillin Sod/Tazobactam (Sod 2.25 gm/ Sodium Chloride)  100 mls @ 25 mls/hr 

IVPB Q12H [facility]


Insulin Glargine (Lantus(*))  80 units SUBCUT BEDTIME [facility]


Insulin Human Lispro (Humalog*)  0 units SUBCUT AC [facility]; Protocol


Losartan Potassium (Cozaar Tab*)  100 mg PO DAILY [facility]


Methadone HCl (Dolophine Tab*)  5 mg PO Q8HR PRN


   PRN Reason: PAIN


Morphine Sulfate (Morphine Inj ((Syringe))*)  2 mg IV Q6H PRN


   PRN Reason: PAIN - MILD


Omeprazole (Prilosec Cap*)  40 mg PO QPM [facility]


Ondansetron HCl (Zofran Inj*)  4 mg IV Q6H PRN


   PRN Reason: NAUSEA


Prednisone (Deltasone Tab*)  40 mg PO DAILY [facility]


Promethazine HCl (Phenergan Tab*)  25 mg PO Q8H PRN


   PRN Reason: NAUSEA


Sertraline HCl (Zoloft*)  25 mg PO DAILY [facility]


Warfarin Sodium (Coumadin Tab(*))  7.5 mg PO BEDTIME [facility]; Protocol











Exam





- Exam


Vital Signs: 


 Vital Signs (72 hours)











  01/24/19 01/24/19 01/24/19





  01:48 02:05 02:06


 


Temperature 96.3 F  


 


Pulse Rate 111 116 114


 


Respiratory 20  





Rate   


 


Blood Pressure 194/95  176/83





(mmHg)   


 


O2 Sat by Pulse 96 95 97





Oximetry   














  01/24/19 01/24/19 01/24/19





  02:23 03:00 03:25


 


Temperature 98.1 F  


 


Pulse Rate  103 


 


Respiratory   20





Rate   


 


Blood Pressure   





(mmHg)   


 


O2 Sat by Pulse  99 





Oximetry   














  01/24/19





  03:35


 


Temperature 


 


Pulse Rate 97


 


Respiratory 





Rate 


 


Blood Pressure 139/86





(mmHg) 


 


O2 Sat by Pulse 98





Oximetry 











General: Alert, Oriented x3, Cooperative, No acute distress, Other - obese 

female lying in ED stretcher


HEENT: Atraumatic, PERRLA, EOMI, Mucous membr. moist/pink


Lungs: Other - Mild tachypnea, no use of accessory muscles, moderate wheezing.


Cardiovascular: Regular rate, Normal S1, Normal S2, No murmurs, Other - No 

chest wall tenderness.


Abdomen: Normal bowel sounds, Soft, No masses, Other - non-distended, surgical 

scars noted, minimal tenderness


Extremities: No clubbing, No edema, Normal pulses, No tenderness/swelling, Other


Neurological: Normal speech, Strength at 5/5 X4 ext, Sensation intact, Cranial 

nerves 3-12 NL, Reflexes 2+


Psych/Mental Status: Mood NL





Assessment/Plan





- Assessment/Plan


Assessment: 





1. Pneumonia: She is frequently in the hospital, will treat with zosyn. Blood 

Cultures from few days ago are negative. negative for influenza, will get 

sputum culture.


2. Lactic acidosis: due to infection, IV hydration


3. ESRD: on Peritoneal dialysis: will need to consult nephrology for resuming 

of PD.


4. Chronic hypoxic respiratory failure with COPD with mild exacerbation: 

prednisone 40mg daily started, continue supplemental O2, duonebs, and home 

medications.


5. Type II DM: resume home insulin regimen.


6. Peripheral arterial disease: continue coumadin, recheck INR


7. Chronic pain syndrome: on methadone, PRN morphine,


8. DVT PPX: on coumadin.








Medications/Allergies


Medications: 


 Home Medications











 Medication  Instructions  Recorded  Confirmed  Type


 


Omeprazole CAP (NF) [Prilosec CAP* 40 mg PO QPM 08/25/16 01/24/19 History





20 MG]    


 


Albuterol HFA INHALER* [Ventolin 2 puff INH Q6H PRN 03/23/17 01/24/19 History





HFA Inhaler*]    


 


Gabapentin CAP(*) [Neurontin 100 200 mg PO TID 11/17/17 01/24/19 History





mg CAP(*)]    


 


Albuterol 2.5MG/3ML (0.083%)* 2.5 mg INH Q4HR PRN 04/30/18 01/24/19 History





[Ventolin 2.5 MG/3 ML NEB.SOL*]    


 


Insulin ASPART (NF) [Novolog (NF)] 0 - 10 units SUBCUT AC PRN 04/30/18 01/24/19 

History


 


Insulin GLARGINE(*) [Lantus(*)] 80 units SUBCUT BEDTIME 04/30/18 01/24/19 

History


 


Promethazine TAB* [Phenergan Tab*] 25 mg PO Q8H PRN 04/30/18 01/24/19 History


 


Losartan TAB* [Cozaar TAB*] 100 mg PO DAILY 11/14/18 01/24/19 History


 


Bethanechol TAB* [Urecholine TAB*] 25 mg PO QID 12/27/18 01/24/19 History


 


Calcitriol CAP* [Rocaltrol  CAP*] 0.25 mcg PO TID 01/05/19 01/24/19 History


 


Warfarin TAB(*) [Coumadin TAB(*)] 7.5 mg PO BEDTIME  tab 01/09/19 01/24/19 Rx


 


Aspirin EC TAB* [Ecotrin EC Low 81 mg PO DAILY 01/21/19 01/24/19 History





Dose 81 MG*]    


 


Budesonide/Formote 160/4.5(NF) 2 puff INH BID 01/21/19 01/24/19 History





[Symbicort 160/4.5 (NF)]    


 


Methadone TAB* [Dolophine TAB*] 5 mg PO Q8HR PRN 01/21/19 01/24/19 History


 


Sertraline* [Zoloft*] 25 mg PO DAILY 01/21/19 01/24/19 History


 


Levofloxacin TAB* [Levaquin TAB*] 500 mg PO DAILY #3 tab 01/22/19 01/24/19 Rx


 


predniSONE [Prednisone 20 MG TAB] 20 mg PO DAILY #22 tablet 01/22/19 01/24/19 Rx











Allergies/Adverse Reactions: 


 Allergies











Allergy/AdvReac Type Severity Reaction Status Date / Time


 


Adhesive Tape [Plastic Tape] Allergy Mild Blisters Verified 01/24/19 01:51


 


doxycycline Allergy  Unknown Verified 01/24/19 04:22





   Reaction  





   Details  


 


erythromycin base Allergy  See Comment Verified 01/24/19 01:51


 


heparin Allergy  See Comment Verified 01/24/19 01:51


 


metoclopramide [From Reglan] Allergy  Hives Verified 01/24/19 01:51


 


metronidazole [From Flagyl] Allergy  Nausea And Verified 01/24/19 01:51





   Vomiting  


 


niacin Allergy  Rash Verified 01/24/19 01:51


 


ropinirole [From Requip] Allergy  Hives Verified 01/24/19 01:51


 


Sulfa (Sulfonamide Allergy  Vomiting Verified 01/24/19 01:51





Antibiotics)     














Objective


 Allergies











Allergy/AdvReac Type Severity Reaction Status Date / Time


 


Adhesive Tape [Plastic Tape] Allergy Mild Blisters Verified 01/24/19 01:51


 


doxycycline Allergy  Unknown Verified 01/24/19 04:22





   Reaction  





   Details  


 


erythromycin base Allergy  See Comment Verified 01/24/19 01:51


 


heparin Allergy  See Comment Verified 01/24/19 01:51


 


metoclopramide [From Reglan] Allergy  Hives Verified 01/24/19 01:51


 


metronidazole [From Flagyl] Allergy  Nausea And Verified 01/24/19 01:51





   Vomiting  


 


niacin Allergy  Rash Verified 01/24/19 01:51


 


ropinirole [From Requip] Allergy  Hives Verified 01/24/19 01:51


 


Sulfa (Sulfonamide Allergy  Vomiting Verified 01/24/19 01:51





Antibiotics)     











 Home Medications











 Medication  Instructions  Recorded  Confirmed  Type


 


Omeprazole CAP (NF) [Prilosec CAP* 40 mg PO QPM 08/25/16 01/24/19 History





20 MG]    


 


Albuterol HFA INHALER* [Ventolin 2 puff INH Q6H PRN 03/23/17 01/24/19 History





HFA Inhaler*]    


 


Gabapentin CAP(*) [Neurontin 100 200 mg PO TID 11/17/17 01/24/19 History





mg CAP(*)]    


 


Albuterol 2.5MG/3ML (0.083%)* 2.5 mg INH Q4HR PRN 04/30/18 01/24/19 History





[Ventolin 2.5 MG/3 ML NEB.SOL*]    


 


Insulin ASPART (NF) [Novolog (NF)] 0 - 10 units SUBCUT AC PRN 04/30/18 01/24/19 

History


 


Insulin GLARGINE(*) [Lantus(*)] 80 units SUBCUT BEDTIME 04/30/18 01/24/19 

History


 


Promethazine TAB* [Phenergan Tab*] 25 mg PO Q8H PRN 04/30/18 01/24/19 History


 


Losartan TAB* [Cozaar TAB*] 100 mg PO DAILY 11/14/18 01/24/19 History


 


Bethanechol TAB* [Urecholine TAB*] 25 mg PO QID 12/27/18 01/24/19 History


 


Calcitriol CAP* [Rocaltrol  CAP*] 0.25 mcg PO TID 01/05/19 01/24/19 History


 


Warfarin TAB(*) [Coumadin TAB(*)] 7.5 mg PO BEDTIME  tab 01/09/19 01/24/19 Rx


 


Aspirin EC TAB* [Ecotrin EC Low 81 mg PO DAILY 01/21/19 01/24/19 History





Dose 81 MG*]    


 


Budesonide/Formote 160/4.5(NF) 2 puff INH BID 01/21/19 01/24/19 History





[Symbicort 160/4.5 (NF)]    


 


Methadone TAB* [Dolophine TAB*] 5 mg PO Q8HR PRN 01/21/19 01/24/19 History


 


Sertraline* [Zoloft*] 25 mg PO DAILY 01/21/19 01/24/19 History


 


Levofloxacin TAB* [Levaquin TAB*] 500 mg PO DAILY #3 tab 01/22/19 01/24/19 Rx


 


predniSONE [Prednisone 20 MG TAB] 20 mg PO DAILY #22 tablet 01/22/19 01/24/19 Rx











Hospital Medications: 


 Current Medications





Albuterol (Ventolin 2.5 Mg/3 Ml Neb.Sol*)  2.5 mg INH Q4HR PRN


   PRN Reason: SOB/WHEEZING


Albuterol (Ventolin Hfa Inhaler*)  2 puff INH Q6H PRN


   PRN Reason: COUGH


Aspirin (Aspirin Ec Tab*)  81 mg PO DAILY [facility]


Bethanechol Chloride (Urecholine Tab*)  25 mg PO QID [facility]


Budesonide/Formoterol Fumarate (Symbicort 160/4.5 (Nf))  2 puff INH BID [facility]; 

Protocol


Calcitriol (Rocaltrol  Cap*)  0.25 mcg PO TID [facility]


Dextrose (D50w Syringe 50 Ml*)  12.5 gm IV PUSH .FOR FS < 60 - SS PRN


   PRN Reason: FS < 60


Gabapentin (Neurontin Cap(*))  200 mg PO TID [facility]


Sodium Chloride (Ns 0.9% 1000 Ml*)  1,000 mls @ 75 mls/hr IV PER RATE [facility]


Piperacillin Sod/Tazobactam (Sod 2.25 gm/ Sodium Chloride)  100 mls @ 25 mls/hr 

IVPB Q12H [facility]


Insulin Glargine (Lantus(*))  80 units SUBCUT BEDTIME [facility]


Insulin Human Lispro (Humalog*)  0 units SUBCUT AC [facility]; Protocol


Losartan Potassium (Cozaar Tab*)  100 mg PO DAILY [facility]


Methadone HCl (Dolophine Tab*)  5 mg PO Q8HR PRN


   PRN Reason: PAIN


Morphine Sulfate (Morphine Inj ((Syringe))*)  2 mg IV Q6H PRN


   PRN Reason: PAIN - MILD


   Last Admin: 01/24/19 04:52 Dose:  2 mg


Omeprazole (Prilosec Cap*)  40 mg PO QPM [facility]


Ondansetron HCl (Zofran Inj*)  4 mg IV Q6H PRN


   PRN Reason: NAUSEA


   Last Admin: 01/24/19 04:52 Dose:  4 mg


Prednisone (Deltasone Tab*)  40 mg PO DAILY [facility]


Promethazine HCl (Phenergan Tab*)  25 mg PO Q8H PRN


   PRN Reason: NAUSEA


Sertraline HCl (Zoloft*)  25 mg PO DAILY [facility]


Warfarin Sodium (Coumadin Tab(*))  7.5 mg PO BEDTIME [facility]; Protocol








Lab Data: 


 











VBG pH  7.29  (7.32-7.43)  L  01/24/19  03:47    


 


Sodium  132 mmol/L (135-145)  L  01/24/19  02:54    


 


Potassium  4.6 mmol/L (3.5-5.0)   01/24/19  02:54    


 


BUN  40 mg/dL (6-24)  H  01/24/19  02:54    


 


Creatinine  5.64 mg/dL (0.51-0.95)  H  01/24/19  02:54    


 


Calcium  8.9 mg/dL (8.6-10.3)   01/24/19  02:54    


 


AST  8 U/L (13-39)  L  01/24/19  02:54    


 


ALT  4 U/L (7-52)  L  01/24/19  02:54    








 Laboratory Last Values











WBC  22.2 10^3/ul (3.5-10.8)  H  01/24/19  03:00    


 


RBC  3.01 10^6/ul (4.00-5.40)  L  01/24/19  03:00    


 


Hgb  9.1 g/dl (12.0-16.0)  L  01/24/19  03:00    


 


Hct  28 % (35-47)  L  01/24/19  03:00    


 


MCV  92 fL (80-97)   01/24/19  03:00    


 


MCH  30 pg (27-31)   01/24/19  03:00    


 


MCHC  33 g/dl (31-36)   01/24/19  03:00    


 


RDW  18 % (10.5-15)  H  01/24/19  03:00    


 


Plt Count  421 10^3/ul (150-450)   01/24/19  03:00    


 


MPV  8.4 fL (7.4-10.4)   01/24/19  03:00    


 


Neut % (Auto)  96.0 %  01/24/19  03:00    


 


Lymph % (Auto)  2.4 %  01/24/19  03:00    


 


Mono % (Auto)  1.3 %  01/24/19  03:00    


 


Eos % (Auto)  0 %  01/24/19  03:00    


 


Baso % (Auto)  0.3 %  01/24/19  03:00    


 


Absolute Neuts (auto)  21.3 10^3/ul (1.5-7.7)  H  01/24/19  03:00    


 


Absolute Lymphs (auto)  0.5 10^3/ul (1.0-4.8)  L  01/24/19  03:00    


 


Absolute Monos (auto)  0.3 10^3/ul (0-0.8)   01/24/19  03:00    


 


Absolute Eos (auto)  0 10^3/ul (0-0.6)   01/24/19  03:00    


 


Absolute Basos (auto)  0.1 10^3/ul (0-0.2)   01/24/19  03:00    


 


Absolute Nucleated RBC  0 10^3/ul  01/24/19  03:00    


 


Nucleated RBC %  0   01/24/19  03:00    


 


VBG pH  7.29  (7.32-7.43)  L  01/24/19  03:47    


 


VBG pCO2  56 mmHg (41-51)  H  01/24/19  03:47    


 


VBG pO2  44.0 mmHg (35-45)   01/24/19  03:47    


 


VBG HCO3  23.7 mmol/L (24-28)  L  01/24/19  03:47    


 


VBG O2 Saturation  80.7 % (70-80)  H  01/24/19  03:47    


 


VBG Base Excess  -0.7 mmol/L (0.0-4.0)  L  01/24/19  03:47    


 


Sodium  132 mmol/L (135-145)  L  01/24/19  02:54    


 


Potassium  4.6 mmol/L (3.5-5.0)   01/24/19  02:54    


 


Chloride  96 mmol/L (101-111)  L  01/24/19  02:54    


 


Carbon Dioxide  21 mmol/L (22-32)  L  01/24/19  02:54    


 


Anion Gap  15 mmol/L (2-11)  H  01/24/19  02:54    


 


BUN  40 mg/dL (6-24)  H  01/24/19  02:54    


 


Creatinine  5.64 mg/dL (0.51-0.95)  H  01/24/19  02:54    


 


Est GFR ( Amer)  10.2  (>60)   01/24/19  02:54    


 


Est GFR (Non-Af Amer)  8.4  (>60)   01/24/19  02:54    


 


BUN/Creatinine Ratio  7.1  (8-20)  L  01/24/19  02:54    


 


Glucose  423 mg/dL ()  H  01/24/19  02:54    


 


Lactic Acid  3.6 mmol/L (0.5-2.0)  H*  01/24/19  03:00    


 


Calcium  8.9 mg/dL (8.6-10.3)   01/24/19  02:54    


 


Total Bilirubin  0.20 mg/dL (0.2-1.0)   01/24/19  02:54    


 


AST  8 U/L (13-39)  L  01/24/19  02:54    


 


ALT  4 U/L (7-52)  L  01/24/19  02:54    


 


Alkaline Phosphatase  92 U/L ()   01/24/19  02:54    


 


Total Protein  6.7 g/dL (6.4-8.9)   01/24/19  02:54    


 


Albumin  3.1 g/dL (3.2-5.2)  L  01/24/19  02:54    


 


Globulin  3.6 g/dL (2-4)   01/24/19  02:54    


 


Albumin/Globulin Ratio  0.9  (1-3)  L  01/24/19  02:54    


 


Influenza A (Rapid)  Negative  (Negative)   01/24/19  02:34    


 


Influenza B (Rapid)  Negative  (Negative)   01/24/19  02:34    











Vital Signs: 


 Vital Signs











  01/24/19 01/24/19 01/24/19





  01:48 02:05 02:06


 


Temperature 96.3 F  


 


Pulse Rate 111 116 114


 


Respiratory 20  





Rate   


 


Blood Pressure 194/95  176/83





(mmHg)   


 


O2 Sat by Pulse 96 95 97





Oximetry   














  01/24/19 01/24/19 01/24/19





  02:23 03:00 03:25


 


Temperature 98.1 F  


 


Pulse Rate  103 


 


Respiratory   20





Rate   


 


Blood Pressure   





(mmHg)   


 


O2 Sat by Pulse  99 





Oximetry   














  01/24/19 01/24/19





  03:35 04:52


 


Temperature  


 


Pulse Rate 97 


 


Respiratory  20





Rate  


 


Blood Pressure 139/86 





(mmHg)  


 


O2 Sat by Pulse 98 





Oximetry

## 2019-01-24 NOTE — ED
Influenza-Like Illness





- HPI Summary


HPI Summary: 





Pt is a 37 y/o F presenting to the ED with a chief complaint of nausea. The pt 

reports diaphoresis, shaking, nausea, abd pain, and fever, and requests further 

evaluation.  Patient was admitted 122 and left shortly after from the hospital.

  She had a CT scan which showed groundglass infiltrates and she has been 

maintained on outpatient Levaquin.  She has only received 1 dose.  Her blood 

cultures have been negative.  She has a history of end-stage renal disease on 

dialysis receiving peritoneal dialysis every 6 hours at home.  She reports 

MAXIMUM TEMPERATURE of 100.4.  There is mild cough, congestion and runny nose 

with mild chronic abdominal pain.  Her dialysis fluid has been clear.





Previous cultures from 1/22/2019 and 1/21/2019 were negative.





- History of Current Complaint


Chief Complaint: EDGeneral


Time Seen by Provider: 01/24/19 02:08


Hx Obtained From: Patient


Onset/Duration: Sudden Onset, Lasting Weeks, Still Present


Severity: Mild


Associated Signs & Symptoms: Fever, T Max - 100.4, F/C - F, Vomiting





- Allergy/Home Medications


Allergies/Adverse Reactions: 


 Allergies











Allergy/AdvReac Type Severity Reaction Status Date / Time


 


Adhesive Tape [Plastic Tape] Allergy Mild Blisters Verified 01/24/19 01:51


 


doxycycline Allergy  Unknown Verified 01/24/19 04:22





   Reaction  





   Details  


 


erythromycin base Allergy  See Comment Verified 01/24/19 01:51


 


heparin Allergy  See Comment Verified 01/24/19 01:51


 


metoclopramide [From Reglan] Allergy  Hives Verified 01/24/19 01:51


 


metronidazole [From Flagyl] Allergy  Nausea And Verified 01/24/19 01:51





   Vomiting  


 


niacin Allergy  Rash Verified 01/24/19 01:51


 


ropinirole [From Requip] Allergy  Hives Verified 01/24/19 01:51


 


Sulfa (Sulfonamide Allergy  Vomiting Verified 01/24/19 01:51





Antibiotics)     














PMH/Surg Hx/FS Hx/Imm Hx


Previously Healthy: No


Endocrine/Hematology History: Reports: Hx Anticoagulant Therapy - Aspirin., Hx 

Blood Transfusions, Hx Diabetes, Hx Thyroid Disease - nodule, Hx Anemia - hx of 

- reports had tranfusion in 2014 after mi


Cardiovascular History: Reports: Hx Angina, Hx Cardiac Arrest, Hx Cardiomegaly, 

Hx Coronary Artery Disease, Hx Deep Vein Thrombosis, Hx Hypercholesterolemia, 

Hx Hypertension, Hx Myocardial Infarction, Hx Peripheral Vascular Disease, 

Other Cardiovascular Problems/Disorders


   Denies: Hx Congestive Heart Failure, Hx Pacemaker/ICD, Hx Valvular Heart 

Disease


Respiratory History: Reports: Hx Asthma, Hx Chronic Obstructive Pulmonary 

Disease (COPD) - smoker, Hx Pneumonia, Hx Sleep Apnea, Other Respiratory 

Problems/Disorders - acute respipatory failure with hypoxia - dec 2016


   Denies: Hx Lung Cancer, Hx Pulmonary Embolism - pt says no, although 

documented in [facility]


GI History: Reports: Hx Gall Bladder Disease, Hx Gastroesophageal Reflux Disease

, Other GI Disorders - GASTROPARESIS - TAKING OMEPRAZOLE, reglan and zofran


   Denies: Hx Gastrointestinal Bleed, Hx Ulcer, Hx Urosepsis


 History: Reports: Hx Acute Renal Failure, Hx Chronic Renal Failure - ESRD on 

PD, Hx Dialysis - PERITONEAL, Hx Kidney Stones, Hx Renal Disease - ESRD on home 

peritoneal dialysis , Other  Problems/Disorders


Musculoskeletal History: Reports: Hx Arthritis - back, hips, Hx Back Problems - 

Sciatica and Herniated L3 disk, Other Musculoskeletal History - partial 

amputation of toes/foot


Sensory History: Reports: Hx Contacts or Glasses, Hx Eye Prosthesis - left, Hx 

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


   Denies: Hx Hearing Aid


Opthamlomology History: Reports: Hx Contacts or Glasses, Hx Eye Prosthesis - 

left, Hx 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


Neurological History: Reports: Other Neuro Impairments/Disorders - neuropathy


   Denies: Hx Transient Ischemic Attacks (TIA)


Psychiatric History: Reports: Hx Anxiety, Hx Depression, Hx Bipolar Disorder


   Denies: Hx Eating Disorder, Hx Panic Disorder, Hx Schizophrenia, Hx of 

Violent Episodes Against Others





- Cancer History


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


Hx Chemotherapy: No


Hx Radiation Therapy: No





- Surgical History


Surgery Procedure, Year, and Place: LEFT EYE REMOVED 2012 - HAS PROSTHETIC EYE (

ORBITS DONE 5/2015 OK'D BY DR SAE COLE TO SCAN IN MRI) ;.  MELANOMA REMOVED 

FROM [facility] 2013 (PROCEDURE DONE 4X);.  CARDIAC CATH 2014 - NO STENTS;.  LEFT 

WRIST FISTULA PLACEMENT ([facility]) 2014;.  HEMODIALYSIS CATH RIGHT CHEST WALL 2014;.

  PERITONEAL DIALYSIS CATH 2/2015;.  CHEST WALL CATH REMOVAL 7/2016 [facility] [place].  

RIGHT GREAT TOE AMPUTATION, [facility] [place] 4/2016;.  PLACEMENT RIGHT JUGULAR TESIO 

HEMODIALYSIS CATHETER [facility] [place] 8/27/2016;.  REMOVAL OF JUGULAR CATH - SEPT 2016.  

CARDIAC CATH - stentsx2 right leg.  PARTIAL AMPUTATION RIGHT TOES 5/2017.  

RIGHT TOES ALL REMOVED


Hx Anesthesia Reactions: No





- Immunization History


Date of Tetanus Vaccine: utd


Date of Influenza Vaccine: 10/2017


Infectious Disease History: No


Infectious Disease History: Reports: Hx of Known/Suspected MRSA - MRSA R 1st toe

, History Other Infectious Disease - GANGRENE


   Denies: Hx Clostridium Difficile, Hx Hepatitis, Hx Human Immunodeficiency 

Virus (HIV), Hx Shingles, Hx Tuberculosis, Traveled Outside the [place] in Last 30 

Days





- Family History


Known Family History: Positive: Cardiac Disease - father MI at 43 y/o, 

Hypertension, Other - Positive for bipolar and depression. Completed suicide to 

sister.  





- Social History


Alcohol Use: None


Hx Substance Use: No


Substance Use Type: Reports: None


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


Hx Tobacco Use: Yes


Smoking Status (MU): Heavy Every Day Tobacco Smoker


Type: Cigarettes


Amount Used/How Often: 1 PPD


Length of Time of Smoking/Using Tobacco: 20 YEARS


Have You Smoked in the Last Year: Yes





Review of Systems


Positive: Fever, Chills, Skin Diaphoresis


Positive: Abdominal Pain, Nausea


All Other Systems Reviewed And Are Negative: Yes





Physical Exam





- Summary


Physical Exam Summary: 


Appearance: Well appearing, no pain distress. 


Skin: warm, lightly diaphoretic, reflects adequate perfusion


Head/face: normal


Eyes: EOMI, VIOLETTA


ENT: mucous membranes moist, no nasal discharge


Neck: supple, non-tender


Respiratory: wheezes in bilateral bases, breath sounds present, no crackles


Cardiovascular: tachycardic, pulses symmetrical 


Abdomen: non-tender, soft, peritoneal dialysis catheter


Bowel Sounds: present


Musculoskeletal: amputation toes of R foot, strength/ROM intact


Neuro: normal, sensory motor intact, A&Ox3 








Triage Information Reviewed: Yes


Vital Signs On Initial Exam: 


 Initial Vitals











Temp Pulse Resp BP Pulse Ox


 


 96.3 F   111   20   194/95   96 


 


 01/24/19 01:48  01/24/19 01:48  01/24/19 01:48  01/24/19 01:48  01/24/19 01:48











Vital Signs Reviewed: Yes





Diagnostics





- Vital Signs


 Vital Signs











  Temp Pulse Resp BP Pulse Ox


 


 01/24/19 02:06   114   176/83  97


 


 01/24/19 02:05   116    95


 


 01/24/19 01:48  96.3 F  111  20  194/95  96














- Laboratory


Result Diagrams: 


 01/24/19 03:00





 01/24/19 02:54


Lab Statement: Any lab studies that have been ordered have been reviewed, and 

results considered in the medical decision making process.





- Radiology


  ** Chest x-ray


Radiology Interpretation Completed By: ED Physician


Summary of Radiographic Findings: No acute findings.  Pending official 

radiology report.





Flu Symptom Course/Dx





- Course


Course Of Treatment: Nurse's notes reviewed.  Patient has been in and out of 

the ER in the hospital a lot as of late.  She is well-known to me in the ER.  

She has a sepsis syndrome with criteria for severe sepsis resulting from 

pneumonia confirmed with CT scan 2 days ago.  She was admitted briefly and 

decided to leave and go home.  She presents with Sirs criteria and had been on 

outpatient Levaquin.  She likely was underdosed given her renal dialysis.  She 

was given Rocephin, Zithromax and a small dose of IV fluids given her end-stage 

renal disease.  She is known to have poorly controlled diabetes and today is 

hyperglycemic with mild DKA.  She was given insulin bolus in addition to the 

fluids but we will not put her on a drip as of this point in time.  She is also 

well-known to the hospitalist who will readmit her.  I had a conversation about 

her leaving and suggested that she stay until she is fully well.  At this point 

she does pledged to do that.  She is improved here with breathing treatments, 

treatment of her pain, nausea and IV antibiotics with fluids.





- Diagnoses


Provider Diagnoses: 


 Community acquired pneumonia, Severe sepsis, DKA (diabetic ketoacidoses), COPD 

exacerbation





Critical Care Time: 30-74 min - CCT is EXCLUSIVE of separately billable 

procedures





Discharge





- Sign-Out/Discharge


Documenting (check all that apply): Patient Departure - admit





- Discharge Plan


Condition: Fair


Disposition: ADMITTED TO [facility]





- Billing Disposition and Condition


Condition: FAIR


Disposition: Admitted to [facility]





- Attestation Statements


Document Initiated by Scribe: Yes


Documenting Scribe: Abeba Lynn


Provider For Whom Scribe is Documenting (Include Credential): Abdoul Rodriguez MD.


Scribe Attestation: 


Abeba RICARDO, scribed for Abdoul Rodriguez MD. on 01/24/19 at 0519. 


Scribe Documentation Reviewed: Yes


Provider Attestation: 


The documentation as recorded by the scribe, Abeba Lynn accurately 

reflects the service I personally performed and the decisions made by me, Abdoul Rodriguez MD.


Status of Scribe Document: Viewed





Consult


Consult: 





[id number] - Spoke with Dr. Carrasco about the pt's condition. She will be admitted for 

further evaluation and testing.

## 2019-01-24 NOTE — PN
Hospitalist Progress Note


Date of Service: 01/24/19








Pt seen and examined. 


Adri Masters is a 37 yo female PMH ESRD on PD, IDDM, COPD on home O2, current 

smoker, PAD on coumadin, chronic pain syndrom on methadone with recent leaving 

AMA on 1/22 on day of admission for CT chest with perihilar ground glass 

opacities in setting of reported home fevers 100.5. She was discharged on 

renally dosed levaquin. She returned to the ED with continued productive cough, 

fevers, chills and admitted for sepsis secondary to pneumonia. Likely also 

component of COPD exacerbation.  Got Augmetin at PCP 1/18. Recent buttock 

abscess & perianal fistula s/p I&D 1/8-1/9 (left AMA). Continue Zosyn, inhalers

, prednisone 40mg daily. Lantus 80U qhs + SSI. Continue PD. 





No rhonchi or rales but exp wheezing and relatively poor air exchange.


abdomen not tender to palpation. She denies any abdominal pain currently.

## 2019-01-25 LAB
ANION GAP SERPL CALC-SCNC: 12 MMOL/L (ref 2–11)
BASOPHILS # BLD AUTO: 0.1 10^3/UL (ref 0–0.2)
BUN SERPL-MCNC: 50 MG/DL (ref 6–24)
BUN/CREAT SERPL: 8 (ref 8–20)
CALCIUM SERPL-MCNC: 8.5 MG/DL (ref 8.6–10.3)
CHLORIDE SERPL-SCNC: 100 MMOL/L (ref 101–111)
EOSINOPHIL # BLD AUTO: 0 10^3/UL (ref 0–0.6)
GLUCOSE SERPL-MCNC: 242 MG/DL (ref 70–100)
HCO3 SERPL-SCNC: 27 MMOL/L (ref 22–32)
HCT VFR BLD AUTO: 26 % (ref 35–47)
HGB BLD-MCNC: 8.1 G/DL (ref 12–16)
INR PPP/BLD: 3.32 (ref 0.77–1.02)
LYMPHOCYTES # BLD AUTO: 1.2 10^3/UL (ref 1–4.8)
MCH RBC QN AUTO: 29 PG (ref 27–31)
MCHC RBC AUTO-ENTMCNC: 31 G/DL (ref 31–36)
MCV RBC AUTO: 94 FL (ref 80–97)
MONOCYTES # BLD AUTO: 0.7 10^3/UL (ref 0–0.8)
NEUTROPHILS # BLD AUTO: 17.5 10^3/UL (ref 1.5–7.7)
NRBC # BLD AUTO: 0 10^3/UL
NRBC BLD QL AUTO: 0
PLATELET # BLD AUTO: 373 10^3/UL (ref 150–450)
POTASSIUM SERPL-SCNC: 4.4 MMOL/L (ref 3.5–5)
RBC # BLD AUTO: 2.76 10^6/UL (ref 4–5.4)
SODIUM SERPL-SCNC: 139 MMOL/L (ref 135–145)
WBC # BLD AUTO: 19.5 10^3/UL (ref 3.5–10.8)

## 2019-01-25 RX ADMIN — GABAPENTIN SCH MG: 100 CAPSULE ORAL at 08:21

## 2019-01-25 RX ADMIN — BETHANECHOL CHLORIDE SCH MG: 25 TABLET ORAL at 08:21

## 2019-01-25 RX ADMIN — INSULIN LISPRO SCH UNITS: 100 INJECTION, SOLUTION INTRAVENOUS; SUBCUTANEOUS at 08:22

## 2019-01-25 RX ADMIN — CALCITRIOL SCH MCG: 0.25 CAPSULE ORAL at 12:18

## 2019-01-25 RX ADMIN — INSULIN LISPRO SCH UNITS: 100 INJECTION, SOLUTION INTRAVENOUS; SUBCUTANEOUS at 18:24

## 2019-01-25 RX ADMIN — INSULIN GLARGINE SCH UNITS: 100 INJECTION, SOLUTION SUBCUTANEOUS at 22:14

## 2019-01-25 RX ADMIN — INSULIN LISPRO SCH UNITS: 100 INJECTION, SOLUTION INTRAVENOUS; SUBCUTANEOUS at 12:18

## 2019-01-25 RX ADMIN — LOSARTAN POTASSIUM SCH MG: 25 TABLET, FILM COATED ORAL at 08:22

## 2019-01-25 RX ADMIN — MORPHINE SULFATE PRN MG: 4 INJECTION INTRAVENOUS at 15:12

## 2019-01-25 RX ADMIN — ASPIRIN SCH MG: 81 TABLET, COATED ORAL at 08:22

## 2019-01-25 RX ADMIN — MOMETASONE FUROATE AND FORMOTEROL FUMARATE DIHYDRATE SCH PUFF: 200; 5 AEROSOL RESPIRATORY (INHALATION) at 18:27

## 2019-01-25 RX ADMIN — PIPERACILLIN AND TAZOBACTAM SCH MLS/HR: 3; .375 INJECTION, POWDER, LYOPHILIZED, FOR SOLUTION INTRAVENOUS; PARENTERAL at 22:12

## 2019-01-25 RX ADMIN — ONDANSETRON PRN MG: 2 INJECTION INTRAMUSCULAR; INTRAVENOUS at 15:12

## 2019-01-25 RX ADMIN — CALCITRIOL SCH MCG: 0.25 CAPSULE ORAL at 08:21

## 2019-01-25 RX ADMIN — METHADONE HYDROCHLORIDE PRN MG: 5 TABLET ORAL at 12:25

## 2019-01-25 RX ADMIN — MOMETASONE FUROATE AND FORMOTEROL FUMARATE DIHYDRATE SCH PUFF: 200; 5 AEROSOL RESPIRATORY (INHALATION) at 09:14

## 2019-01-25 RX ADMIN — PIPERACILLIN AND TAZOBACTAM SCH MLS/HR: 3; .375 INJECTION, POWDER, LYOPHILIZED, FOR SOLUTION INTRAVENOUS; PARENTERAL at 05:53

## 2019-01-25 RX ADMIN — PIPERACILLIN AND TAZOBACTAM SCH MLS/HR: 3; .375 INJECTION, POWDER, LYOPHILIZED, FOR SOLUTION INTRAVENOUS; PARENTERAL at 15:11

## 2019-01-25 RX ADMIN — PREDNISONE SCH MG: 20 TABLET ORAL at 08:22

## 2019-01-25 RX ADMIN — BETHANECHOL CHLORIDE SCH MG: 25 TABLET ORAL at 12:18

## 2019-01-25 RX ADMIN — BETHANECHOL CHLORIDE SCH MG: 25 TABLET ORAL at 18:24

## 2019-01-25 RX ADMIN — METHADONE HYDROCHLORIDE PRN MG: 5 TABLET ORAL at 22:14

## 2019-01-25 RX ADMIN — MORPHINE SULFATE PRN MG: 4 INJECTION INTRAVENOUS at 22:13

## 2019-01-25 RX ADMIN — GABAPENTIN SCH MG: 100 CAPSULE ORAL at 22:12

## 2019-01-25 RX ADMIN — ONDANSETRON PRN MG: 2 INJECTION INTRAMUSCULAR; INTRAVENOUS at 22:13

## 2019-01-25 RX ADMIN — MUPIROCIN SCH APPLIC: 20 OINTMENT TOPICAL at 08:22

## 2019-01-25 RX ADMIN — BETHANECHOL CHLORIDE SCH MG: 25 TABLET ORAL at 22:12

## 2019-01-25 RX ADMIN — GABAPENTIN SCH MG: 100 CAPSULE ORAL at 12:18

## 2019-01-25 RX ADMIN — CALCITRIOL SCH MCG: 0.25 CAPSULE ORAL at 22:13

## 2019-01-25 RX ADMIN — MOMETASONE FUROATE AND FORMOTEROL FUMARATE DIHYDRATE SCH: 200; 5 AEROSOL RESPIRATORY (INHALATION) at 19:00

## 2019-01-25 RX ADMIN — ALBUTEROL SULFATE PRN MG: 2.5 SOLUTION RESPIRATORY (INHALATION) at 02:05

## 2019-01-25 RX ADMIN — VANCOMYCIN HYDROCHLORIDE SCH MG: 250 CAPSULE ORAL at 22:24

## 2019-01-25 RX ADMIN — PANTOPRAZOLE SODIUM SCH MG: 40 TABLET, DELAYED RELEASE ORAL at 18:24

## 2019-01-25 RX ADMIN — SERTRALINE HYDROCHLORIDE SCH MG: 25 TABLET ORAL at 08:23

## 2019-01-25 NOTE — PN
Subjective


Date of Service: 19


Interval History: 





Patient seen and examined. No acute overnight events. Has had issues with IV 

access. Denies acute SOB, states her breathing is better but remains fatigued. 

Denies chest pain, no n/v, no headache, no fever or chills.





Objective


Active Medications: 








Albuterol (Ventolin 2.5 Mg/3 Ml Neb.Sol*)  2.5 mg INH Q4HR PRN


   PRN Reason: SOB/WHEEZING


   Last Admin: 19 02:05 Dose:  2.5 mg


Albuterol (Ventolin Hfa Inhaler*)  2 puff INH Q6H PRN


   PRN Reason: COUGH


Aspirin (Aspirin Ec Tab*)  81 mg PO DAILY [facility]


   Last Admin: 19 08:22 Dose:  81 mg


Bethanechol Chloride (Urecholine Tab*)  25 mg PO QID [facility]


   Last Admin: 19 12:18 Dose:  25 mg


Calcitriol (Rocaltrol  Cap*)  0.25 mcg PO TID [facility]


   Last Admin: 19 12:18 Dose:  0.25 mcg


Dextrose (D50w Syringe 50 Ml*)  12.5 gm IV PUSH .FOR FS < 60 - SS PRN


   PRN Reason: FS < 60


Gabapentin (Neurontin Cap(*))  200 mg PO TID [facility]


   Last Admin: 19 12:18 Dose:  200 mg


Piperacillin Sod/Tazobactam (Sod 2.25 gm/ Sodium Chloride)  50 mls @ 100 mls/hr 

IVPB 0630,1430,2230 [facility]


   Last Admin: 19 05:53 Dose:  100 mls/hr


Insulin Glargine (Lantus(*))  80 units SUBCUT BEDTIME [facility]


   Last Admin: 19 21:23 Dose:  80 units


Insulin Human Lispro (Humalog*)  0 units SUBCUT AC [facility]; Protocol


   Last Admin: 19 12:18 Dose:  6 units


Losartan Potassium (Cozaar Tab*)  100 mg PO DAILY [facility]


   Last Admin: 19 08:22 Dose:  100 mg


Methadone HCl (Dolophine Tab*)  5 mg PO Q8HR PRN


   PRN Reason: PAIN


   Last Admin: 19 12:25 Dose:  5 mg


Mometasone Furoate/Formoterol Fumar (Dulera 200/5 Mdi*)  2 puff INH BID [facility]; 

Protocol


   Last Admin: 19 09:14 Dose:  2 puff


Morphine Sulfate (Morphine Vial*)  2 mg IV Q6H PRN


   PRN Reason: PAIN - MILD


   Last Admin: 19 20:16 Dose:  2 mg


Mupirocin (Bactroban 2 % Oint*)  1 applic TOPICAL DAILY [facility]


   Last Admin: 19 08:22 Dose:  1 applic


Ondansetron HCl (Zofran Inj*)  4 mg IV Q6H PRN


   PRN Reason: NAUSEA


   Last Admin: 19 14:15 Dose:  4 mg


Pantoprazole Sodium (Protonix Tab*)  40 mg PO QPM [facility]


   Last Admin: 19 20:16 Dose:  40 mg


Pharmacy Profile Note (Coumadin Daily Reminder*)  1 note FOLLOW UP 1700 [facility]


Prednisone (Deltasone Tab*)  40 mg PO DAILY [facility]


   Last Admin: 19 08:22 Dose:  40 mg


Promethazine HCl (Phenergan Tab*)  25 mg PO Q8H PRN


   PRN Reason: NAUSEA


   Last Admin: 19 20:26 Dose:  25 mg


Sertraline HCl (Zoloft*)  25 mg PO DAILY [facility]


   Last Admin: 19 08:23 Dose:  25 mg


Warfarin Sodium (Coumadin Tab(*))  3 mg PO 1700 [facility]








 Vital Signs - 8 hr











  19





  08:21 09:15 09:16


 


Pulse Rate   98


 


Respiratory 16  16





Rate   


 


O2 Sat by Pulse  99 99





Oximetry   














  19





  12:18 12:25


 


Pulse Rate  


 


Respiratory 16 15





Rate  


 


O2 Sat by Pulse  





Oximetry  











Oxygen Devices in Use Now: Nasal Cannula


Appearance: alert, tired, NAD


Eyes: No Scleral Icterus, PERRLA


Ears/Nose/Mouth/Throat: Mucous Membranes Moist, - - poor dentition


Neck: NL Appearance and Movements; NL JVP, Trachea Midline


Respiratory: Symmetrical Chest Expansion and Respiratory Effort, - - clear 

apices, diminished throughout with bilateral exp wheeze


Cardiovascular: NL Sounds; No Murmurs; No JVD, RRR, No Edema


Abdominal: NL Sounds; No Tenderness; No Distention, - - PD access cath


Extremities: No Edema, No Clubbing, Cyanosis


Skin: No Rash or Ulcers, No Nodules or Sclerosis


Neurological: Alert and Oriented x 3


Nutrition: Taking PO's


Result Diagrams: 


 19 07:31





 19 07:31


Microbiology and Other Data: 


 Microbiology











 19 05:05 Urine Culture - Final





 Urine    No Growth (<1,000 CFU/mL)


 


 19 02:16 Influenza Types A,B Antigen - Final





 Nasal    Specimen received for Influenza A/B Molecular testing











Diagnostic Imaging: 





Patient Name:         MAKAYLA RUBIN                                         

                        Medical Record#: [id number]


Ordering Physician: Abdoul Rodriguez MD                                            

                        Acct.#: [id number]


:     1980         Age: 38   Sex: F                                   

                        Location: [address] [place] [address] - MEDICAL


Exam Date: 19                                                       

                        ADM Status: ADM IN


Order Information:                         CHEST PA & LAT 2 VWS


Accession Number:                          [id number]


CPT:                                       60199


HISTORY: f/u pneumonia





COMPARISONS: 2019 





VIEWS: 4: Frontal dual-energy and lateral views of the chest.





FINDINGS:


CARDIOMEDIASTINAL SILHOUETTE: The cardiac silhouette is mildly enlarged. The


cardiomediastinal silhouette is otherwise normal.


NII: The nii are normal.


PLEURA: The costophrenic angles are sharp. No pleural abnormalities are noted.


LUNG PARENCHYMA: The lungs are clear.


ABDOMEN: The upper abdomen is clear. There is no subphrenic gas.


BONES AND SOFT TISSUES: No bone or soft tissue abnormalities are noted.


OTHER: None.





IMPRESSION:


STABLE MILD CARDIOMEGALY. NO ACTIVE CARDIOPULMONARY DISEASE.





R0 








Preliminary Imaging Read 


R0                                                                         





____________________________________________________________


<Electronically signed by Ad Nguyen MD in OV>  19


Dictated By: Ad Nguyen MD


Dictated Date/Time: 19


Transcribed Date/Time: 19


Copy to:











Assess/Plan/Problems-Billing


Assessment: 





This is a 38 year old female with complex past medical history including ESRD 

on PD, PAD on coumadin, IDDM, chronic pain, COPD and obesity that presents with 

SOB fevers and chills after haivng left AMA on 19.








- Patient Problems


(1) Pneumonia


Code(s): J18.9 - PNEUMONIA, UNSPECIFIED ORGANISM   SNOMED Code(s): 365551875


   Comment: 


 - Noted perihilar ground glass opacities on CT scan in setting of subjective 

fevers meeting sepsis criteria with lactic acidosis and tachycardia on admission

, post fluid resuscitation in the ED


 - Repeated hospitalizations, will continue zosyn empirically, cannot exclude 

COPD exac as well


 - Continue O2 and pulmonary toilet   





(2) COPD (chronic obstructive pulmonary disease)


Code(s): J44.9 - CHRONIC OBSTRUCTIVE PULMONARY DISEASE, UNSPECIFIED   SNOMED 

Code(s): 54752328


   Comment: 


 - With chronic hypoxic respiratory failure, on home O2


 - Continue home inhalers and regimen


 - Continue prednisone 40mg daily in setting of PNA   





(3) Chronic pain


Code(s): G89.29 - OTHER CHRONIC PAIN   SNOMED Code(s): 61343809


   Comment: 


 - Likely combination of diabetic neuropathy and chronic disease


 - Continue methadone 5mg Q8h and lyrica   





(4) PAD (peripheral artery disease)


Code(s): I73.9 - PERIPHERAL VASCULAR DISEASE, UNSPECIFIED   SNOMED Code(s): 

469096580


   Comment: 


 - On coumadin, INR supratherapeutic today, will dose adjust tonight and follow 

INR in AM.   





(5) ESRD on peritoneal dialysis


Code(s): N18.6 - END STAGE RENAL DISEASE; Z99.2 - DEPENDENCE ON RENAL DIALYSIS 

  SNOMED Code(s): 15602723


   Comment: 


 - On PD at night


 - Stable   





(6) HTN (hypertension)


Code(s): I10 - ESSENTIAL (PRIMARY) HYPERTENSION   SNOMED Code(s): 72985250


   Comment: 


 - continue losartan   





(7) Hx of insulin dependent diabetes mellitus


Code(s): Z86.39 - PERSONAL HISTORY OF ENDO, NUTRITIONAL AND METABOLIC DISEASE   

SNOMED Code(s): 594722382


   Comment: 


 - Continue insulin regimen and lispro SS   





(8) Abscess of buttock, right


Current Visit: No   Code(s): L02.31 - CUTANEOUS ABSCESS OF BUTTOCK   SNOMED Code

(s): 99495287


   Comment: 


 - Recent buttock abscess & perianal fistula s/p I&D -, left AMA per 

record   





(9) DVT prophylaxis


Code(s): JOU9822 -    SNOMED Code(s): 089359557


   Comment: 


 - On coumadin   





(10) Full code status


Code(s): Z78.9 - OTHER SPECIFIED HEALTH STATUS   SNOMED Code(s): 495697060


   


Status and Disposition: 





Inpatient, anticipate DC home when medically stable.

## 2019-01-26 LAB — INR PPP/BLD: 3.42 (ref 0.77–1.02)

## 2019-01-26 RX ADMIN — BETHANECHOL CHLORIDE SCH MG: 25 TABLET ORAL at 09:53

## 2019-01-26 RX ADMIN — PANTOPRAZOLE SODIUM SCH MG: 40 TABLET, DELAYED RELEASE ORAL at 17:46

## 2019-01-26 RX ADMIN — PIPERACILLIN AND TAZOBACTAM SCH MLS/HR: 3; .375 INJECTION, POWDER, LYOPHILIZED, FOR SOLUTION INTRAVENOUS; PARENTERAL at 06:15

## 2019-01-26 RX ADMIN — CALCITRIOL SCH MCG: 0.25 CAPSULE ORAL at 21:47

## 2019-01-26 RX ADMIN — GABAPENTIN SCH MG: 100 CAPSULE ORAL at 21:47

## 2019-01-26 RX ADMIN — VANCOMYCIN HYDROCHLORIDE SCH MG: 250 CAPSULE ORAL at 21:48

## 2019-01-26 RX ADMIN — CALCITRIOL SCH MCG: 0.25 CAPSULE ORAL at 14:57

## 2019-01-26 RX ADMIN — MOMETASONE FUROATE AND FORMOTEROL FUMARATE DIHYDRATE SCH: 200; 5 AEROSOL RESPIRATORY (INHALATION) at 19:34

## 2019-01-26 RX ADMIN — VANCOMYCIN HYDROCHLORIDE SCH MG: 250 CAPSULE ORAL at 14:57

## 2019-01-26 RX ADMIN — PIPERACILLIN AND TAZOBACTAM SCH MLS/HR: 3; .375 INJECTION, POWDER, LYOPHILIZED, FOR SOLUTION INTRAVENOUS; PARENTERAL at 21:48

## 2019-01-26 RX ADMIN — BETHANECHOL CHLORIDE SCH MG: 25 TABLET ORAL at 14:58

## 2019-01-26 RX ADMIN — VANCOMYCIN HYDROCHLORIDE SCH MG: 250 CAPSULE ORAL at 09:52

## 2019-01-26 RX ADMIN — INSULIN LISPRO SCH: 100 INJECTION, SOLUTION INTRAVENOUS; SUBCUTANEOUS at 17:24

## 2019-01-26 RX ADMIN — INSULIN GLARGINE SCH: 100 INJECTION, SOLUTION SUBCUTANEOUS at 22:02

## 2019-01-26 RX ADMIN — GABAPENTIN SCH MG: 100 CAPSULE ORAL at 14:58

## 2019-01-26 RX ADMIN — PIPERACILLIN AND TAZOBACTAM SCH MLS/HR: 3; .375 INJECTION, POWDER, LYOPHILIZED, FOR SOLUTION INTRAVENOUS; PARENTERAL at 14:55

## 2019-01-26 RX ADMIN — INSULIN LISPRO SCH: 100 INJECTION, SOLUTION INTRAVENOUS; SUBCUTANEOUS at 12:38

## 2019-01-26 RX ADMIN — VANCOMYCIN HYDROCHLORIDE SCH MG: 250 CAPSULE ORAL at 17:46

## 2019-01-26 RX ADMIN — MOMETASONE FUROATE AND FORMOTEROL FUMARATE DIHYDRATE SCH PUFF: 200; 5 AEROSOL RESPIRATORY (INHALATION) at 08:04

## 2019-01-26 RX ADMIN — CALCITRIOL SCH MCG: 0.25 CAPSULE ORAL at 09:53

## 2019-01-26 RX ADMIN — INSULIN LISPRO SCH: 100 INJECTION, SOLUTION INTRAVENOUS; SUBCUTANEOUS at 08:21

## 2019-01-26 RX ADMIN — PREDNISONE SCH MG: 20 TABLET ORAL at 09:52

## 2019-01-26 RX ADMIN — ONDANSETRON PRN MG: 2 INJECTION INTRAMUSCULAR; INTRAVENOUS at 09:08

## 2019-01-26 RX ADMIN — MORPHINE SULFATE PRN MG: 4 INJECTION INTRAVENOUS at 09:08

## 2019-01-26 RX ADMIN — LOSARTAN POTASSIUM SCH MG: 25 TABLET, FILM COATED ORAL at 09:52

## 2019-01-26 RX ADMIN — GABAPENTIN SCH MG: 100 CAPSULE ORAL at 09:53

## 2019-01-26 RX ADMIN — BETHANECHOL CHLORIDE SCH MG: 25 TABLET ORAL at 17:46

## 2019-01-26 RX ADMIN — MUPIROCIN SCH APPLIC: 20 OINTMENT TOPICAL at 09:54

## 2019-01-26 RX ADMIN — ASPIRIN SCH MG: 81 TABLET, COATED ORAL at 09:53

## 2019-01-26 RX ADMIN — BETHANECHOL CHLORIDE SCH MG: 25 TABLET ORAL at 21:48

## 2019-01-26 RX ADMIN — SERTRALINE HYDROCHLORIDE SCH MG: 25 TABLET ORAL at 09:53

## 2019-01-26 RX ADMIN — ALBUTEROL SULFATE PRN MG: 2.5 SOLUTION RESPIRATORY (INHALATION) at 03:27

## 2019-01-27 LAB
ALBUMIN SERPL BCG-MCNC: 3.1 G/DL (ref 3.2–5.2)
ALBUMIN/GLOB SERPL: 1 {RATIO} (ref 1–3)
ALP SERPL-CCNC: 71 U/L (ref 34–104)
ALT SERPL W P-5'-P-CCNC: 6 U/L (ref 7–52)
ANION GAP SERPL CALC-SCNC: 13 MMOL/L (ref 2–11)
AST SERPL-CCNC: 8 U/L (ref 13–39)
BASOPHILS # BLD AUTO: 0.2 10^3/UL (ref 0–0.2)
BUN SERPL-MCNC: 48 MG/DL (ref 6–24)
BUN/CREAT SERPL: 6.1 (ref 8–20)
CALCIUM SERPL-MCNC: 8.4 MG/DL (ref 8.6–10.3)
CHLORIDE SERPL-SCNC: 100 MMOL/L (ref 101–111)
EOSINOPHIL # BLD AUTO: 0 10^3/UL (ref 0–0.6)
GLOBULIN SER CALC-MCNC: 3.2 G/DL (ref 2–4)
GLUCOSE SERPL-MCNC: 143 MG/DL (ref 70–100)
HCO3 SERPL-SCNC: 27 MMOL/L (ref 22–32)
HCT VFR BLD AUTO: 30 % (ref 35–47)
HGB BLD-MCNC: 9.1 G/DL (ref 12–16)
INR PPP/BLD: 3.23 (ref 0.77–1.02)
LYMPHOCYTES # BLD AUTO: 1.4 10^3/UL (ref 1–4.8)
MCH RBC QN AUTO: 29 PG (ref 27–31)
MCHC RBC AUTO-ENTMCNC: 30 G/DL (ref 31–36)
MCV RBC AUTO: 95 FL (ref 80–97)
MONOCYTES # BLD AUTO: 1 10^3/UL (ref 0–0.8)
NEUTROPHILS # BLD AUTO: 18.7 10^3/UL (ref 1.5–7.7)
NRBC # BLD AUTO: 0 10^3/UL
NRBC BLD QL AUTO: 0
PLATELET # BLD AUTO: 432 10^3/UL (ref 150–450)
POTASSIUM SERPL-SCNC: 3.8 MMOL/L (ref 3.5–5)
PROT SERPL-MCNC: 6.3 G/DL (ref 6.4–8.9)
RBC # BLD AUTO: 3.17 10^6/UL (ref 4–5.4)
SODIUM SERPL-SCNC: 140 MMOL/L (ref 135–145)
WBC # BLD AUTO: 21.4 10^3/UL (ref 3.5–10.8)

## 2019-01-27 RX ADMIN — INSULIN LISPRO SCH: 100 INJECTION, SOLUTION INTRAVENOUS; SUBCUTANEOUS at 17:14

## 2019-01-27 RX ADMIN — VANCOMYCIN HYDROCHLORIDE SCH MG: 250 CAPSULE ORAL at 21:29

## 2019-01-27 RX ADMIN — GABAPENTIN SCH MG: 100 CAPSULE ORAL at 13:22

## 2019-01-27 RX ADMIN — BETHANECHOL CHLORIDE SCH MG: 25 TABLET ORAL at 09:43

## 2019-01-27 RX ADMIN — PANTOPRAZOLE SODIUM SCH MG: 40 TABLET, DELAYED RELEASE ORAL at 17:14

## 2019-01-27 RX ADMIN — CALCITRIOL SCH MCG: 0.25 CAPSULE ORAL at 13:22

## 2019-01-27 RX ADMIN — DEXTROSE MONOHYDRATE PRN GM: 25 INJECTION, SOLUTION INTRAVENOUS at 07:42

## 2019-01-27 RX ADMIN — IPRATROPIUM BROMIDE AND ALBUTEROL SULFATE SCH NEB: .5; 3 SOLUTION RESPIRATORY (INHALATION) at 22:08

## 2019-01-27 RX ADMIN — MOMETASONE FUROATE AND FORMOTEROL FUMARATE DIHYDRATE SCH: 200; 5 AEROSOL RESPIRATORY (INHALATION) at 20:02

## 2019-01-27 RX ADMIN — PREDNISONE SCH MG: 20 TABLET ORAL at 09:42

## 2019-01-27 RX ADMIN — SERTRALINE HYDROCHLORIDE SCH MG: 25 TABLET ORAL at 09:42

## 2019-01-27 RX ADMIN — VANCOMYCIN HYDROCHLORIDE SCH MG: 250 CAPSULE ORAL at 17:14

## 2019-01-27 RX ADMIN — PIPERACILLIN AND TAZOBACTAM SCH MLS/HR: 3; .375 INJECTION, POWDER, LYOPHILIZED, FOR SOLUTION INTRAVENOUS; PARENTERAL at 15:57

## 2019-01-27 RX ADMIN — BETHANECHOL CHLORIDE SCH MG: 25 TABLET ORAL at 21:29

## 2019-01-27 RX ADMIN — DEXTROSE MONOHYDRATE AND SODIUM CHLORIDE SCH MLS/HR: 5; .45 INJECTION, SOLUTION INTRAVENOUS at 15:56

## 2019-01-27 RX ADMIN — INSULIN LISPRO SCH: 100 INJECTION, SOLUTION INTRAVENOUS; SUBCUTANEOUS at 11:45

## 2019-01-27 RX ADMIN — MUPIROCIN SCH: 20 OINTMENT TOPICAL at 11:00

## 2019-01-27 RX ADMIN — PIPERACILLIN AND TAZOBACTAM SCH MLS/HR: 3; .375 INJECTION, POWDER, LYOPHILIZED, FOR SOLUTION INTRAVENOUS; PARENTERAL at 09:55

## 2019-01-27 RX ADMIN — CALCITRIOL SCH MCG: 0.25 CAPSULE ORAL at 09:43

## 2019-01-27 RX ADMIN — CALCITRIOL SCH MCG: 0.25 CAPSULE ORAL at 21:29

## 2019-01-27 RX ADMIN — DEXTROSE MONOHYDRATE PRN GM: 25 INJECTION, SOLUTION INTRAVENOUS at 00:35

## 2019-01-27 RX ADMIN — VANCOMYCIN HYDROCHLORIDE SCH MG: 250 CAPSULE ORAL at 09:43

## 2019-01-27 RX ADMIN — ASPIRIN SCH MG: 81 TABLET, COATED ORAL at 09:42

## 2019-01-27 RX ADMIN — INSULIN LISPRO SCH: 100 INJECTION, SOLUTION INTRAVENOUS; SUBCUTANEOUS at 08:14

## 2019-01-27 RX ADMIN — IPRATROPIUM BROMIDE AND ALBUTEROL SULFATE SCH NEB: .5; 3 SOLUTION RESPIRATORY (INHALATION) at 17:09

## 2019-01-27 RX ADMIN — GABAPENTIN SCH MG: 100 CAPSULE ORAL at 21:29

## 2019-01-27 RX ADMIN — BETHANECHOL CHLORIDE SCH MG: 25 TABLET ORAL at 13:22

## 2019-01-27 RX ADMIN — PIPERACILLIN AND TAZOBACTAM SCH: 3; .375 INJECTION, POWDER, LYOPHILIZED, FOR SOLUTION INTRAVENOUS; PARENTERAL at 06:58

## 2019-01-27 RX ADMIN — DEXTROSE MONOHYDRATE AND SODIUM CHLORIDE SCH MLS/HR: 5; .45 INJECTION, SOLUTION INTRAVENOUS at 02:30

## 2019-01-27 RX ADMIN — VANCOMYCIN HYDROCHLORIDE SCH MG: 250 CAPSULE ORAL at 13:22

## 2019-01-27 RX ADMIN — BETHANECHOL CHLORIDE SCH MG: 25 TABLET ORAL at 17:15

## 2019-01-27 RX ADMIN — MOMETASONE FUROATE AND FORMOTEROL FUMARATE DIHYDRATE SCH: 200; 5 AEROSOL RESPIRATORY (INHALATION) at 07:56

## 2019-01-27 RX ADMIN — LOSARTAN POTASSIUM SCH MG: 25 TABLET, FILM COATED ORAL at 09:43

## 2019-01-27 RX ADMIN — GABAPENTIN SCH MG: 100 CAPSULE ORAL at 09:43

## 2019-01-27 NOTE — PN
Subjective


Date of Service: 19


Interval History: 





Patient seen and examined. Awake, tolerating NC, off bipap, diarrhea continues. 

Patient appears tired and somewhat lethargic but is otherwise appropriate. 

Denies chest pain, no fevers or chills, states breathing is "ok".





Objective


Active Medications: 








Albuterol (Ventolin 2.5 Mg/3 Ml Neb.Sol*)  2.5 mg INH Q4HR PRN


   PRN Reason: SOB/WHEEZING


   Last Admin: 19 03:27 Dose:  2.5 mg


Albuterol (Ventolin Hfa Inhaler*)  2 puff INH Q6H PRN


   PRN Reason: COUGH


Aspirin (Aspirin Ec Tab*)  81 mg PO DAILY [facility]


   Last Admin: 19 09:42 Dose:  81 mg


Bethanechol Chloride (Urecholine Tab*)  25 mg PO QID [facility]


   Last Admin: 19 13:22 Dose:  25 mg


Calcitriol (Rocaltrol  Cap*)  0.25 mcg PO TID [facility]


   Last Admin: 19 13:22 Dose:  0.25 mcg


Dextrose (D50w Syringe 50 Ml*)  12.5 gm IV PUSH .FOR FS < 60 - SS PRN


   PRN Reason: FS < 60


   Last Admin: 19 07:42 Dose:  12.5 gm


Gabapentin (Neurontin Cap(*))  200 mg PO TID [facility]


   Last Admin: 19 13:22 Dose:  200 mg


Piperacillin Sod/Tazobactam (Sod 2.25 gm/ Sodium Chloride)  50 mls @ 100 mls/hr 

IVPB 0000,0800,1600 [facility]


   Last Admin: 19 09:55 Dose:  100 mls/hr


Dextrose/Sodium Chloride (D5w 1/2 Ns 1000 Ml Bag*)  1,000 mls @ 75 mls/hr IV 

PER RATE [facility]


   Last Admin: 19 02:30 Dose:  75 mls/hr


Insulin Glargine (Lantus(*))  50 units SUBCUT BEDTIME [facility]


Insulin Human Lispro (Humalog*)  0 units SUBCUT AC [facility]; Protocol


   Last Admin: 19 11:45 Dose:  Not Given


Losartan Potassium (Cozaar Tab*)  100 mg PO DAILY [facility]


   Last Admin: 19 09:43 Dose:  100 mg


Methadone HCl (Dolophine Tab*)  5 mg PO Q8HR PRN


   PRN Reason: PAIN


   Last Admin: 19 22:14 Dose:  5 mg


Mometasone Furoate/Formoterol Fumar (Dulera 200/5 Mdi*)  2 puff INH BID [facility]; 

Protocol


   Last Admin: 19 07:56 Dose:  Not Given


Morphine Sulfate (Morphine Inj ((Syringe))*)  2 mg IV Q6H PRN


   PRN Reason: PAIN - MILD


Mupirocin (Bactroban 2 % Oint*)  1 applic TOPICAL DAILY [facility]


   Last Admin: 19 11:00 Dose:  Not Given


Ondansetron HCl (Zofran Inj*)  4 mg IV Q6H PRN


   PRN Reason: NAUSEA


   Last Admin: 19 09:08 Dose:  4 mg


Pantoprazole Sodium (Protonix Tab*)  40 mg PO QPM [facility]


   Last Admin: 19 17:46 Dose:  40 mg


Pharmacy Profile Note (Coumadin Daily Reminder*)  1 note FOLLOW UP 1700 [facility]


   Last Admin: 19 17:46 Dose:  1 note


Prednisone (Deltasone Tab*)  40 mg PO DAILY [facility]


   Last Admin: 19 09:42 Dose:  40 mg


Promethazine HCl (Phenergan Tab*)  25 mg PO Q8H PRN


   PRN Reason: NAUSEA


   Last Admin: 19 20:26 Dose:  25 mg


Sertraline HCl (Zoloft*)  25 mg PO DAILY [facility]


   Last Admin: 19 09:42 Dose:  25 mg


Vancomycin HCl (Vancomycin Cap*)  250 mg PO QID [facility]


   Last Admin: 19 13:22 Dose:  250 mg


Warfarin Sodium (Coumadin Tab(*))  1 mg PO 1700 [facility]


   Stop: 19 17:01








 Vital Signs - 8 hr











  19





  06:00 06:01 07:00


 


Temperature   


 


Pulse Rate 90 88 86


 


Respiratory 23 17 14





Rate   


 


Blood Pressure  109/81 107/80





(mmHg)   


 


O2 Sat by Pulse 100 100 95





Oximetry   














  19





  07:01 07:42 08:00


 


Temperature  96.2 F 


 


Pulse Rate 87  91


 


Respiratory 14  13





Rate   


 


Blood Pressure   





(mmHg)   


 


O2 Sat by Pulse 95  100





Oximetry   














  19





  08:01 08:31 09:00


 


Temperature   


 


Pulse Rate 91 99 116


 


Respiratory 12 14 21





Rate   


 


Blood Pressure  101/82 





(mmHg)   


 


O2 Sat by Pulse 100 100 78





Oximetry   














  19





  10:00 10:01 11:00


 


Temperature   


 


Pulse Rate 102 103 99


 


Respiratory 15 11 13





Rate   


 


Blood Pressure  169/83 





(mmHg)   


 


O2 Sat by Pulse 98 95 100





Oximetry   














  19





  11:01 11:49 11:54


 


Temperature  96.9 F 


 


Pulse Rate 98  


 


Respiratory 11  13





Rate   


 


Blood Pressure   





(mmHg)   


 


O2 Sat by Pulse 100  





Oximetry   











Oxygen Devices in Use Now: Nasal Cannula


Appearance: sleepy, NAD


Eyes: No Scleral Icterus, PERRLA


Ears/Nose/Mouth/Throat: Clear Oropharnyx


Neck: Trachea Midline


Respiratory: Symmetrical Chest Expansion and Respiratory Effort, - - bilat exp 

wheeze with low lung volumes


Cardiovascular: NL Sounds; No Murmurs; No JVD, No Edema


Abdominal: NL Sounds; No Tenderness; No Distention


Extremities: - - left mid foot amputation


Neurological: Alert and Oriented x 3


Nutrition: Taking PO's


Result Diagrams: 


 19 08:20





 19 08:20


Microbiology and Other Data: 


 Microbiology











 19 05:05 Urine Culture - Final





 Urine    No Growth (<1,000 CFU/mL)


 


 19 02:16 Influenza Types A,B Antigen - Final





 Nasal    Specimen received for Influenza A/B Molecular testing











Diagnostic Imaging: 


REPEAT CXR 19:





Patient Name:         MAKAYLA RUBIN                                         

                        Medical Record#: [id number]


Ordering Physician: Kaylee Rogers NP                                   

                        Acct.#: [id number]


:     1980         Age: 38   Sex: F                                   

                        Location: INTENSIVE CARE UNIT


Exam Date: 19                                                       

                        ADM Status: ADM IN


Order Information:                         CHEST AP PORTABLE


Accession Number:                          [id number]


CPT:                                       [id number]


Indication: Abnormal breast sounds, pneumonia.





Single frontal view of the chest performed at 1835 hours was reviewed.





Comparison is made with previous exam dated 2019.





No mediastinal shift is noted. Heart is mildly enlarged but is normal in 

configuration.


Lung fields appear clear.





IMPRESSION: NO ACTIVE CARDIOPULMONARY DISEASE IS NOTED. MILD CARDIOMEGALY





R1NF .








Preliminary Imaging Read 


R1NF                                                                       





____________________________________________________________


<Electronically signed by Shena Boyd MD in OV>  19


Dictated By: Shena Boyd MD


Dictated Date/Time: 19


Transcribed Date/Time: 19


Copy to:








CT CHEST ABDOMEN AND PELVIS 19:





Patient Name:                    MAKAYLA RUBIN                              

                                                                            

Medical Record#: [id number]


Ordering Physician: Jatin Morse MD                                             

                                                                            

Acct.#: [id number]


:         1980                    Age: 38   Sex: F                    

                                                                            

Location: EMERGENCY DEPARTMENT


Exam Date: 19                                                       

                                                                            ADM 

Status: REG ER


Order Information:                                               CT CHEST/ABD/

PEL W/O


Accession Number:                                                [id number]


CPT:                                                             [id number]


EXAM: 


 CT Chest Without Contrast 





EXAM DATE/TIME: 


 2019 1:38 AM 





CLINICAL HISTORY: 


 38 years old, female; Signs and symptoms; Fever 





TECHNIQUE: 


 Axial computed tomography images of the chest without intravenous contrast. 


 All CT scans at this facility use at least one of these dose optimization 


techniques: automated exposure control; mA and/or kV adjustment per patient 


size (includes targeted exams where dose is matched to clinical indication); or 


iterative reconstruction. 


 Coronal and sagittal reformatted images were created and reviewed. 





COMPARISON: 


 PELV WO CT PELVIS W/O 2019 10:39 AM 





FINDINGS: 


 Lungs:  Minimal patchy ground glass infiltrates bilaterally. Minimal scattered 


fibro-atelectatic change, greatest in the right middle lobe and lingula. 


 Pleural space: Normal. No pneumothorax. No pleural effusion. 


 Heart:  Coronary artery calcifications are present. 


 Aorta: Normal. No aortic aneurysm. 


 Lymph nodes:  Small scattered mediastinal lymph nodes which are upper normal. 


 Bones/joints: Unremarkable. No acute fracture. 


 Soft tissues: Unremarkable. 





IMPRESSION: 


1. Minimal scattered fibro-atelectatic change, greatest in the lingula and 


right middle lobe and minimal patchy ground glass infiltrates, greatest in the 


lower lobes. 


2. Otherwise negative CT chest. 











____________________________________________________________








Patient Name:         MAKAYLA RUBIN                                         

                        Medical Record#: [id number]


Ordering Physician: Abdoul Rodriguez MD                                            

                        Acct.#: [id number]


:     1980         Age: 38   Sex: F                                   

                        Location: [address] [place] [address] MEDICAL


Exam Date: 19                                                       

                        ADM Status: ADM IN


Order Information:                         CHEST PA & LAT 2 VWS


Accession Number:                          [id number]


CPT:                                       [id number]


HISTORY: f/u pneumonia





COMPARISONS: 2019 





VIEWS: 4: Frontal dual-energy and lateral views of the chest.





FINDINGS:


CARDIOMEDIASTINAL SILHOUETTE: The cardiac silhouette is mildly enlarged. The


cardiomediastinal silhouette is otherwise normal.


NII: The nii are normal.


PLEURA: The costophrenic angles are sharp. No pleural abnormalities are noted.


LUNG PARENCHYMA: The lungs are clear.


ABDOMEN: The upper abdomen is clear. There is no subphrenic gas.


BONES AND SOFT TISSUES: No bone or soft tissue abnormalities are noted.


OTHER: None.





IMPRESSION:


STABLE MILD CARDIOMEGALY. NO ACTIVE CARDIOPULMONARY DISEASE.





R0 








Preliminary Imaging Read 


R0                                                                         





____________________________________________________________


<Electronically signed by Ad Nguyen MD in OV>  19 075


Dictated By: Ad Nguyen MD


Dictated Date/Time: 19 075


Transcribed Date/Time: 19 075


Copy to:











Assess/Plan/Problems-Billing


Assessment: 





This is a 38 year old female with complex past medical history including ESRD 

on PD, PAD on coumadin, IDDM, chronic pain, COPD and obesity that presents with 

SOB fevers and chills after haivng left AMA on 19, upgraded to ICU last 

evening for worsening hypoxic respiratory failure with acidosis.








- Patient Problems


(1) Pneumonia


Code(s): J18.9 - PNEUMONIA, UNSPECIFIED ORGANISM   SNOMED Code(s): 854586844


   Comment: 


 - Off bipap, not much improvement on ABG, oxygenation is improving


 - Continue zosyn empirically, as patient has had multiple recent admissions, 

cannot exclude COPD exac in addition to ground glass opacities on CT scan


 - Continue O2 and and add pulmonary toilet, flutter valve/chest PT   





(2) COPD (chronic obstructive pulmonary disease)


Code(s): J44.9 - CHRONIC OBSTRUCTIVE PULMONARY DISEASE, UNSPECIFIED   SNOMED 

Code(s): 50229423


   Comment: 


 - Now with acute on chronic hypoxic respiratory failure requiring bipap this 

admission


 - Seems to be tolerating NC at the present


 - Continue home inhalers and regimen


 - Continue prednisone 40mg daily


 - Repeat CXR does not show any new interval findings, this may be a component 

of obesity hypoventilation syndrome with COPD exac, rather than sepsis or new 

pathology


   





(3) Chronic pain


Code(s): G89.29 - OTHER CHRONIC PAIN   SNOMED Code(s): 03170853


   Comment: 


 - Likely combination of diabetic neuropathy and chronic disease


 - Continue methadone 5mg Q8h and lyrica   





(4) PAD (peripheral artery disease)


Code(s): I73.9 - PERIPHERAL VASCULAR DISEASE, UNSPECIFIED   SNOMED Code(s): 

957559402


   Comment: 


 - On coumadin, INR remains supratherapeutic, continue adjusting coumadin


   





(5) ESRD on peritoneal dialysis


Code(s): N18.6 - END STAGE RENAL DISEASE; Z99.2 - DEPENDENCE ON RENAL DIALYSIS 

  SNOMED Code(s): 82775303


   Comment: 


 - On PD at night


 - Stable   





(6) Clostridium difficile diarrhea


Code(s): A04.72 - ENTEROCOLITIS D/T CLOSTRIDIUM DIFFICILE, NOT SPCF AS RECUR   

SNOMED Code(s): 9086894619348


   Comment: 


 - Continue vancomycin oral QID, monitor stools   





(7) HTN (hypertension)


Code(s): I10 - ESSENTIAL (PRIMARY) HYPERTENSION   SNOMED Code(s): 54197776


   Comment: 


 - continue losartan   





(8) Hx of insulin dependent diabetes mellitus


Code(s): Z86.39 - PERSONAL HISTORY OF ENDO, NUTRITIONAL AND METABOLIC DISEASE   

SNOMED Code(s): 681468579


   Comment: 


 - Had persistent hypoglycemia overnight and into this morning, seems to be 

improving throughout the day today


 - Evening lantus was held last night


 - D50 administered, initiated D5,1/2NS infusion


 - Adjusted lantus dose for tonight, however, if sugars remain low and patient 

does not have enough oral intake, will discontinue during acute illness   





(9) Abscess of buttock, right


Current Visit: No   Code(s): L02.31 - CUTANEOUS ABSCESS OF BUTTOCK   SNOMED Code

(s): 36858979


   Comment: 


 - Recent buttock abscess & perianal fistula s/p I&D -, left AMA per 

record   





(10) DVT prophylaxis


Code(s): SOL8130 -    SNOMED Code(s): 014255534


   Comment: 


 - On coumadin, which has been supratherapeutic, continue to adjust dosing for 

goal 


   INR 2-3   





(11) Full code status


Code(s): Z78.9 - OTHER SPECIFIED HEALTH STATUS   SNOMED Code(s): 592252019


   


Status and Disposition: 





Inpatient, anticipate downgrade to floor tomorrow if she remains stable through 

the night.

## 2019-01-27 NOTE — PN
Subjective


Date of Service: 19


Interval History: 





Patient seen and examined. Appears a little more lethargic, but states she 

feels OK, had dialysis. Discussed Cdiff findings, patient is agreeable to 

continuing vanco PO. Denies SOB, no chest pain, no further complaints.





Objective


Active Medications: 








Albuterol (Ventolin 2.5 Mg/3 Ml Neb.Sol*)  2.5 mg INH Q4HR PRN


   PRN Reason: SOB/WHEEZING


   Last Admin: 19 03:27 Dose:  2.5 mg


Albuterol (Ventolin Hfa Inhaler*)  2 puff INH Q6H PRN


   PRN Reason: COUGH


Aspirin (Aspirin Ec Tab*)  81 mg PO DAILY [facility]


   Last Admin: 19 09:42 Dose:  81 mg


Bethanechol Chloride (Urecholine Tab*)  25 mg PO QID [facility]


   Last Admin: 19 13:22 Dose:  25 mg


Calcitriol (Rocaltrol  Cap*)  0.25 mcg PO TID [facility]


   Last Admin: 19 13:22 Dose:  0.25 mcg


Dextrose (D50w Syringe 50 Ml*)  12.5 gm IV PUSH .FOR FS < 60 - SS PRN


   PRN Reason: FS < 60


   Last Admin: 19 07:42 Dose:  12.5 gm


Gabapentin (Neurontin Cap(*))  200 mg PO TID [facility]


   Last Admin: 19 13:22 Dose:  200 mg


Piperacillin Sod/Tazobactam (Sod 2.25 gm/ Sodium Chloride)  50 mls @ 100 mls/hr 

IVPB 0000,0800,1600 [facility]


   Last Admin: 19 09:55 Dose:  100 mls/hr


Dextrose/Sodium Chloride (D5w 1/2 Ns 1000 Ml Bag*)  1,000 mls @ 75 mls/hr IV 

PER RATE [facility]


   Last Admin: 19 02:30 Dose:  75 mls/hr


Insulin Glargine (Lantus(*))  50 units SUBCUT BEDTIME [facility]


Insulin Human Lispro (Humalog*)  0 units SUBCUT AC [facility]; Protocol


   Last Admin: 19 11:45 Dose:  Not Given


Losartan Potassium (Cozaar Tab*)  100 mg PO DAILY [facility]


   Last Admin: 19 09:43 Dose:  100 mg


Methadone HCl (Dolophine Tab*)  5 mg PO Q8HR PRN


   PRN Reason: PAIN


   Last Admin: 19 22:14 Dose:  5 mg


Mometasone Furoate/Formoterol Fumar (Dulera 200/5 Mdi*)  2 puff INH BID [facility]; 

Protocol


   Last Admin: 19 07:56 Dose:  Not Given


Morphine Sulfate (Morphine Inj ((Syringe))*)  2 mg IV Q6H PRN


   PRN Reason: PAIN - MILD


Mupirocin (Bactroban 2 % Oint*)  1 applic TOPICAL DAILY [facility]


   Last Admin: 19 11:00 Dose:  Not Given


Ondansetron HCl (Zofran Inj*)  4 mg IV Q6H PRN


   PRN Reason: NAUSEA


   Last Admin: 19 09:08 Dose:  4 mg


Pantoprazole Sodium (Protonix Tab*)  40 mg PO QPM [facility]


   Last Admin: 19 17:46 Dose:  40 mg


Pharmacy Profile Note (Coumadin Daily Reminder*)  1 note FOLLOW UP 1700 [facility]


   Last Admin: 19 17:46 Dose:  1 note


Prednisone (Deltasone Tab*)  40 mg PO DAILY [facility]


   Last Admin: 19 09:42 Dose:  40 mg


Promethazine HCl (Phenergan Tab*)  25 mg PO Q8H PRN


   PRN Reason: NAUSEA


   Last Admin: 19 20:26 Dose:  25 mg


Sertraline HCl (Zoloft*)  25 mg PO DAILY [facility]


   Last Admin: 19 09:42 Dose:  25 mg


Vancomycin HCl (Vancomycin Cap*)  250 mg PO QID [facility]


   Last Admin: 19 13:22 Dose:  250 mg


Warfarin Sodium (Coumadin Tab(*))  1 mg PO 1700 [facility]


   Stop: 19 17:01








 Vital Signs - 8 hr











  19





  06:00 06:01 07:00


 


Temperature   


 


Pulse Rate 90 88 86


 


Respiratory 23 17 14





Rate   


 


Blood Pressure  109/81 107/80





(mmHg)   


 


O2 Sat by Pulse 100 100 95





Oximetry   














  19





  07:01 07:42 08:00


 


Temperature  96.2 F 


 


Pulse Rate 87  91


 


Respiratory 14  13





Rate   


 


Blood Pressure   





(mmHg)   


 


O2 Sat by Pulse 95  100





Oximetry   














  01/27/19 01/27/19 01/27/19





  08:01 08:31 09:00


 


Temperature   


 


Pulse Rate 91 99 116


 


Respiratory 12 14 21





Rate   


 


Blood Pressure  101/82 





(mmHg)   


 


O2 Sat by Pulse 100 100 78





Oximetry   














  19





  10:00 10:01 11:00


 


Temperature   


 


Pulse Rate 102 103 99


 


Respiratory 15 11 13





Rate   


 


Blood Pressure  169/83 





(mmHg)   


 


O2 Sat by Pulse 98 95 100





Oximetry   














  19





  11:01 11:49 11:54


 


Temperature  96.9 F 


 


Pulse Rate 98  


 


Respiratory 11  13





Rate   


 


Blood Pressure   





(mmHg)   


 


O2 Sat by Pulse 100  





Oximetry   











Oxygen Devices in Use Now: Nasal Cannula


Appearance: alert, fatigued


Eyes: No Scleral Icterus, PERRLA


Neck: NL Appearance and Movements; NL JVP, Trachea Midline


Respiratory: Symmetrical Chest Expansion and Respiratory Effort, - - diminished 

BS, mild wheeze


Cardiovascular: NL Sounds; No Murmurs; No JVD, RRR, No Edema


Abdominal: NL Sounds; No Tenderness; No Distention


Extremities: No Edema, No Clubbing, Cyanosis, - - left midfoot amp


Neurological: Alert and Oriented x 3


Nutrition: Taking PO's


Result Diagrams: 


 19 08:20





 19 08:20


Microbiology and Other Data: 


 Microbiology











 19 05:05 Urine Culture - Final





 Urine    No Growth (<1,000 CFU/mL)


 


 19 02:16 Influenza Types A,B Antigen - Final





 Nasal    Specimen received for Influenza A/B Molecular testing











Diagnostic Imaging: 





Patient Name:         MAKAYLA RUBIN                                         

                        Medical Record#: [id number]


Ordering Physician: Abdoul Rodriguez MD                                            

                        Acct.#: [id number]


:     1980         Age: 38   Sex: F                                   

                        Location: [address] [place] [address] - MEDICAL


Exam Date: 19                                                       

                        ADM Status: ADM IN


Order Information:                         CHEST PA & LAT 2 VWS


Accession Number:                          [id number]


CPT:                                       [id number]


HISTORY: f/u pneumonia





COMPARISONS: 2019 





VIEWS: 4: Frontal dual-energy and lateral views of the chest.





FINDINGS:


CARDIOMEDIASTINAL SILHOUETTE: The cardiac silhouette is mildly enlarged. The


cardiomediastinal silhouette is otherwise normal.


NII: The nii are normal.


PLEURA: The costophrenic angles are sharp. No pleural abnormalities are noted.


LUNG PARENCHYMA: The lungs are clear.


ABDOMEN: The upper abdomen is clear. There is no subphrenic gas.


BONES AND SOFT TISSUES: No bone or soft tissue abnormalities are noted.


OTHER: None.





IMPRESSION:


STABLE MILD CARDIOMEGALY. NO ACTIVE CARDIOPULMONARY DISEASE.





R0 








Preliminary Imaging Read 


R0                                                                         





____________________________________________________________


<Electronically signed by Ad Nguyen MD in OV>  19


Dictated By: Ad Nguyen MD


Dictated Date/Time: 19


Transcribed Date/Time: 19


Copy to:











Assess/Plan/Problems-Billing


Assessment: 





This is a 38 year old female with complex past medical history including ESRD 

on PD, PAD on coumadin, IDDM, chronic pain, COPD and obesity that presents with 

SOB fevers and chills after haivng left AMA on 19.








- Patient Problems


(1) Pneumonia


Code(s): J18.9 - PNEUMONIA, UNSPECIFIED ORGANISM   SNOMED Code(s): 749271840


   Comment: 


 - Noted perihilar ground glass opacities on CT scan in setting of subjective 

fevers meeting sepsis criteria with lactic acidosis and tachycardia on admission

, post fluid resuscitation in the ED


 - Repeated hospitalizations, will continue zosyn empirically, cannot exclude 

COPD exac as well


 - Continue O2 and pulmonary toilet   





(2) COPD (chronic obstructive pulmonary disease)


Code(s): J44.9 - CHRONIC OBSTRUCTIVE PULMONARY DISEASE, UNSPECIFIED   SNOMED 

Code(s): 38709874


   Comment: 


 - With chronic hypoxic respiratory failure, on home O2


 - Continue home inhalers and regimen


 - Continue prednisone 40mg daily in setting of PNA


 - Titrate O2    





(3) Chronic pain


Code(s): G89.29 - OTHER CHRONIC PAIN   SNOMED Code(s): 94452121


   Comment: 


 - Likely combination of diabetic neuropathy and chronic disease


 - Continue methadone 5mg Q8h and lyrica   





(4) PAD (peripheral artery disease)


Code(s): I73.9 - PERIPHERAL VASCULAR DISEASE, UNSPECIFIED   SNOMED Code(s): 

483920081


   Comment: 


 - On coumadin, INR remains supratherapeutic, continue adjusting coumadin


   





(5) ESRD on peritoneal dialysis


Code(s): N18.6 - END STAGE RENAL DISEASE; Z99.2 - DEPENDENCE ON RENAL DIALYSIS 

  SNOMED Code(s): 62131646


   Comment: 


 - On PD at night


 - Stable   





(6) HTN (hypertension)


Code(s): I10 - ESSENTIAL (PRIMARY) HYPERTENSION   SNOMED Code(s): 14318818


   Comment: 


 - continue losartan   





(7) Hx of insulin dependent diabetes mellitus


Code(s): Z86.39 - PERSONAL HISTORY OF ENDO, NUTRITIONAL AND METABOLIC DISEASE   

SNOMED Code(s): 559648532


   Comment: 


 - Continue insulin regimen and lispro SS, sugars remain high today   





(8) Abscess of buttock, right


Current Visit: No   Code(s): L02.31 - CUTANEOUS ABSCESS OF BUTTOCK   SNOMED Code

(s): 44913145


   Comment: 


 - Recent buttock abscess & perianal fistula s/p I&D -, left AMA per 

record   





(9) DVT prophylaxis


Code(s): ECJ0462 -    SNOMED Code(s): 085452732


   Comment: 


 - On coumadin   





(10) Full code status


Code(s): Z78.9 - OTHER SPECIFIED HEALTH STATUS   SNOMED Code(s): 196382827


   


Status and Disposition: 





Inpatient, anticipate DC home when medically stable.

## 2019-01-28 LAB
ALBUMIN SERPL BCG-MCNC: 2.9 G/DL (ref 3.2–5.2)
ALBUMIN/GLOB SERPL: 1 {RATIO} (ref 1–3)
ALP SERPL-CCNC: 63 U/L (ref 34–104)
ALT SERPL W P-5'-P-CCNC: 6 U/L (ref 7–52)
ANION GAP SERPL CALC-SCNC: 10 MMOL/L (ref 2–11)
AST SERPL-CCNC: 10 U/L (ref 13–39)
BASOPHILS # BLD AUTO: 0.1 10^3/UL (ref 0–0.2)
BUN SERPL-MCNC: 38 MG/DL (ref 6–24)
BUN/CREAT SERPL: 5.3 (ref 8–20)
CALCIUM SERPL-MCNC: 8.3 MG/DL (ref 8.6–10.3)
CHLORIDE SERPL-SCNC: 100 MMOL/L (ref 101–111)
EOSINOPHIL # BLD AUTO: 0.1 10^3/UL (ref 0–0.6)
GLOBULIN SER CALC-MCNC: 2.8 G/DL (ref 2–4)
GLUCOSE SERPL-MCNC: 137 MG/DL (ref 70–100)
HCO3 SERPL-SCNC: 28 MMOL/L (ref 22–32)
HCT VFR BLD AUTO: 27 % (ref 35–47)
HGB BLD-MCNC: 8.6 G/DL (ref 12–16)
INR PPP/BLD: 3.36 (ref 0.77–1.02)
LYMPHOCYTES # BLD AUTO: 1.9 10^3/UL (ref 1–4.8)
MCH RBC QN AUTO: 29 PG (ref 27–31)
MCHC RBC AUTO-ENTMCNC: 32 G/DL (ref 31–36)
MCV RBC AUTO: 93 FL (ref 80–97)
MONOCYTES # BLD AUTO: 0.8 10^3/UL (ref 0–0.8)
NEUTROPHILS # BLD AUTO: 12.8 10^3/UL (ref 1.5–7.7)
NRBC # BLD AUTO: 0 10^3/UL
NRBC BLD QL AUTO: 0.1
PLATELET # BLD AUTO: 382 10^3/UL (ref 150–450)
POTASSIUM SERPL-SCNC: 3.1 MMOL/L (ref 3.5–5)
PROT SERPL-MCNC: 5.7 G/DL (ref 6.4–8.9)
RBC # BLD AUTO: 2.92 10^6/UL (ref 4–5.4)
SODIUM SERPL-SCNC: 138 MMOL/L (ref 135–145)
WBC # BLD AUTO: 15.7 10^3/UL (ref 3.5–10.8)

## 2019-01-28 RX ADMIN — LOSARTAN POTASSIUM SCH MG: 25 TABLET, FILM COATED ORAL at 08:49

## 2019-01-28 RX ADMIN — PIPERACILLIN AND TAZOBACTAM SCH MLS/HR: 3; .375 INJECTION, POWDER, LYOPHILIZED, FOR SOLUTION INTRAVENOUS; PARENTERAL at 02:36

## 2019-01-28 RX ADMIN — BETHANECHOL CHLORIDE SCH MG: 25 TABLET ORAL at 17:06

## 2019-01-28 RX ADMIN — CALCITRIOL SCH MCG: 0.25 CAPSULE ORAL at 20:55

## 2019-01-28 RX ADMIN — BETHANECHOL CHLORIDE SCH MG: 25 TABLET ORAL at 08:50

## 2019-01-28 RX ADMIN — INSULIN LISPRO SCH: 100 INJECTION, SOLUTION INTRAVENOUS; SUBCUTANEOUS at 11:36

## 2019-01-28 RX ADMIN — GABAPENTIN SCH MG: 100 CAPSULE ORAL at 12:05

## 2019-01-28 RX ADMIN — METHYLPREDNISOLONE SODIUM SUCCINATE SCH MG: 40 INJECTION, POWDER, FOR SOLUTION INTRAMUSCULAR; INTRAVENOUS at 17:06

## 2019-01-28 RX ADMIN — INSULIN LISPRO SCH: 100 INJECTION, SOLUTION INTRAVENOUS; SUBCUTANEOUS at 08:51

## 2019-01-28 RX ADMIN — PREDNISONE SCH MG: 20 TABLET ORAL at 08:50

## 2019-01-28 RX ADMIN — BETHANECHOL CHLORIDE SCH: 25 TABLET ORAL at 12:06

## 2019-01-28 RX ADMIN — VANCOMYCIN HYDROCHLORIDE SCH MG: 250 CAPSULE ORAL at 17:06

## 2019-01-28 RX ADMIN — IPRATROPIUM BROMIDE AND ALBUTEROL SULFATE SCH NEB: .5; 3 SOLUTION RESPIRATORY (INHALATION) at 23:29

## 2019-01-28 RX ADMIN — IPRATROPIUM BROMIDE AND ALBUTEROL SULFATE SCH: .5; 3 SOLUTION RESPIRATORY (INHALATION) at 04:00

## 2019-01-28 RX ADMIN — GABAPENTIN SCH MG: 100 CAPSULE ORAL at 08:50

## 2019-01-28 RX ADMIN — VANCOMYCIN HYDROCHLORIDE SCH MG: 250 CAPSULE ORAL at 12:05

## 2019-01-28 RX ADMIN — PIPERACILLIN AND TAZOBACTAM SCH MLS/HR: 3; .375 INJECTION, POWDER, LYOPHILIZED, FOR SOLUTION INTRAVENOUS; PARENTERAL at 16:05

## 2019-01-28 RX ADMIN — VANCOMYCIN HYDROCHLORIDE SCH MG: 250 CAPSULE ORAL at 08:50

## 2019-01-28 RX ADMIN — MUPIROCIN SCH APPLIC: 20 OINTMENT TOPICAL at 08:50

## 2019-01-28 RX ADMIN — MOMETASONE FUROATE AND FORMOTEROL FUMARATE DIHYDRATE SCH PUFF: 200; 5 AEROSOL RESPIRATORY (INHALATION) at 08:00

## 2019-01-28 RX ADMIN — CALCITRIOL SCH MCG: 0.25 CAPSULE ORAL at 08:50

## 2019-01-28 RX ADMIN — PANTOPRAZOLE SODIUM SCH MG: 40 TABLET, DELAYED RELEASE ORAL at 17:06

## 2019-01-28 RX ADMIN — SERTRALINE HYDROCHLORIDE SCH MG: 25 TABLET ORAL at 08:50

## 2019-01-28 RX ADMIN — PIPERACILLIN AND TAZOBACTAM SCH MLS/HR: 3; .375 INJECTION, POWDER, LYOPHILIZED, FOR SOLUTION INTRAVENOUS; PARENTERAL at 08:46

## 2019-01-28 RX ADMIN — VANCOMYCIN HYDROCHLORIDE SCH MG: 250 CAPSULE ORAL at 20:55

## 2019-01-28 RX ADMIN — GABAPENTIN SCH MG: 100 CAPSULE ORAL at 20:54

## 2019-01-28 RX ADMIN — IPRATROPIUM BROMIDE AND ALBUTEROL SULFATE SCH NEB: .5; 3 SOLUTION RESPIRATORY (INHALATION) at 16:57

## 2019-01-28 RX ADMIN — CALCITRIOL SCH MCG: 0.25 CAPSULE ORAL at 12:05

## 2019-01-28 RX ADMIN — IPRATROPIUM BROMIDE AND ALBUTEROL SULFATE SCH NEB: .5; 3 SOLUTION RESPIRATORY (INHALATION) at 19:50

## 2019-01-28 RX ADMIN — DEXTROSE MONOHYDRATE AND SODIUM CHLORIDE SCH MLS/HR: 5; .45 INJECTION, SOLUTION INTRAVENOUS at 04:56

## 2019-01-28 RX ADMIN — ASPIRIN SCH MG: 81 TABLET, COATED ORAL at 08:50

## 2019-01-28 RX ADMIN — BETHANECHOL CHLORIDE SCH MG: 25 TABLET ORAL at 20:55

## 2019-01-28 RX ADMIN — MOMETASONE FUROATE AND FORMOTEROL FUMARATE DIHYDRATE SCH: 200; 5 AEROSOL RESPIRATORY (INHALATION) at 20:08

## 2019-01-28 RX ADMIN — INSULIN LISPRO SCH UNITS: 100 INJECTION, SOLUTION INTRAVENOUS; SUBCUTANEOUS at 17:10

## 2019-01-28 NOTE — PN
Progress Note





- Progress Note


Date of Service: 01/28/19


Note: 





Glucose > 300, D/C D5 1/2 NS

## 2019-01-28 NOTE — PN
Subjective


Date of Service: 19


Interval History: 





Patient seen and examined, increased wheezing noted, unproductive cough, no 

fever or chills. Still with copious incontinence of stool and urine.





Objective


Active Medications: 








Albuterol (Ventolin 2.5 Mg/3 Ml Neb.Sol*)  2.5 mg INH Q4HR PRN


   PRN Reason: SOB/WHEEZING


   Last Admin: 19 03:27 Dose:  2.5 mg


Albuterol (Ventolin Hfa Inhaler*)  2 puff INH Q6H PRN


   PRN Reason: COUGH


Aspirin (Aspirin Ec Tab*)  81 mg PO DAILY [facility]


   Last Admin: 19 08:50 Dose:  81 mg


Bethanechol Chloride (Urecholine Tab*)  25 mg PO QID [facility]


   Last Admin: 19 12:06 Dose:  Not Given


Calcitriol (Rocaltrol  Cap*)  0.25 mcg PO TID [facility]


   Last Admin: 19 12:05 Dose:  0.25 mcg


Dextrose (D50w Syringe 50 Ml*)  12.5 gm IV PUSH .FOR FS < 60 - SS PRN


   PRN Reason: FS < 60


   Last Admin: 19 07:42 Dose:  12.5 gm


Gabapentin (Neurontin Cap(*))  200 mg PO TID [facility]


   Last Admin: 19 12:05 Dose:  200 mg


Dextrose/Sodium Chloride (D5w 1/2 Ns 1000 Ml Bag*)  1,000 mls @ 75 mls/hr IV 

PER RATE [facility]


   Last Admin: 19 04:56 Dose:  75 mls/hr


Piperacillin Sod/Tazobactam (Sod 3.375 gm/ Sodium Chloride)  100 mls @ 25 mls/

hr IVPB Q12H [facility]


Insulin Glargine (Lantus(*))  50 units SUBCUT BEDTIME [facility]


   Last Admin: 19 21:20 Dose:  Not Given


Insulin Human Lispro (Humalog*)  0 units SUBCUT AC [facility]; Protocol


   Last Admin: 19 11:36 Dose:  Not Given


Losartan Potassium (Cozaar Tab*)  100 mg PO DAILY [facility]


   Last Admin: 19 08:49 Dose:  100 mg


Methadone HCl (Dolophine Tab*)  5 mg PO Q8HR PRN


   PRN Reason: PAIN


   Last Admin: 19 22:14 Dose:  5 mg


Mometasone Furoate/Formoterol Fumar (Dulera 200/5 Mdi*)  2 puff INH BID [facility]; 

Protocol


   Last Admin: 19 08:00 Dose:  2 puff


Morphine Sulfate (Morphine Inj ((Syringe))*)  2 mg IV Q6H PRN


   PRN Reason: PAIN - MILD


Mupirocin (Bactroban 2 % Oint*)  1 applic TOPICAL DAILY [facility]


   Last Admin: 19 08:50 Dose:  1 applic


Ondansetron HCl (Zofran Inj*)  4 mg IV Q6H PRN


   PRN Reason: NAUSEA


   Last Admin: 19 09:08 Dose:  4 mg


Pantoprazole Sodium (Protonix Tab*)  40 mg PO QPM [facility]


   Last Admin: 19 17:14 Dose:  40 mg


Potassium Chloride (Klor Con Er Tab*)  40 meq PO ONCE ONE


   Stop: 19 15:04


Prednisone (Deltasone Tab*)  40 mg PO DAILY [facility]


   Last Admin: 19 08:50 Dose:  40 mg


Sertraline HCl (Zoloft*)  25 mg PO DAILY [facility]


   Last Admin: 19 08:50 Dose:  25 mg


Vancomycin HCl (Vancomycin Cap*)  250 mg PO QID [facility]


   Last Admin: 19 12:05 Dose:  250 mg








 Vital Signs - 8 hr











  19





  07:28 08:11 08:44


 


Temperature 98.2 F  


 


Pulse Rate 95 84 


 


Respiratory 18  





Rate   


 


Blood Pressure 163/75  148/76





(mmHg)   


 


O2 Sat by Pulse 98  





Oximetry   














  19





  08:50 08:57 09:11


 


Temperature   


 


Pulse Rate   


 


Respiratory 18 18 18





Rate   


 


Blood Pressure   





(mmHg)   


 


O2 Sat by Pulse   





Oximetry   














  19





  10:09 11:56 12:05


 


Temperature  98.4 F 


 


Pulse Rate  94 


 


Respiratory 18 18 18





Rate   


 


Blood Pressure  126/51 





(mmHg)   


 


O2 Sat by Pulse  94 





Oximetry   














  19





  13:40


 


Temperature 


 


Pulse Rate 


 


Respiratory 18





Rate 


 


Blood Pressure 





(mmHg) 


 


O2 Sat by Pulse 





Oximetry 











Oxygen Devices in Use Now: Nasal Cannula


Appearance: alert, mild distress


Eyes: PERRLA


Ears/Nose/Mouth/Throat: Mucous Membranes Moist


Neck: NL Appearance and Movements; NL JVP, Trachea Midline


Respiratory: - - poor air entry, course, wheezing


Abdominal: NL Sounds; No Tenderness; No Distention


Extremities: No Clubbing, Cyanosis, - - left midfoot ampuation


Neurological: Alert and Oriented x 3


Nutrition: Taking PO's, -


Result Diagrams: 


 19 05:56





 19 05:56


Microbiology and Other Data: 


 Microbiology











 19 05:05 Urine Culture - Final





 Urine    No Growth (<1,000 CFU/mL)


 


 19 02:16 Influenza Types A,B Antigen - Final





 Nasal    Specimen received for Influenza A/B Molecular testing











Diagnostic Imaging: 


REPEAT CXR 19:





Patient Name:         MAKAYLA RUBIN                                         

                        Medical Record#: [id number]


Ordering Physician: Kaylee Rogers NP                                   

                        Acct.#: [id number]


:     1980         Age: 38   Sex: F                                   

                        Location: INTENSIVE CARE UNIT


Exam Date: 19                                                       

                        ADM Status: ADM IN


Order Information:                         CHEST AP PORTABLE


Accession Number:                          [id number]


CPT:                                       [id number]


Indication: Abnormal breast sounds, pneumonia.





Single frontal view of the chest performed at 1835 hours was reviewed.





Comparison is made with previous exam dated 2019.





No mediastinal shift is noted. Heart is mildly enlarged but is normal in 

configuration.


Lung fields appear clear.





IMPRESSION: NO ACTIVE CARDIOPULMONARY DISEASE IS NOTED. MILD CARDIOMEGALY





R1NF .








Preliminary Imaging Read 


R1NF                                                                       





____________________________________________________________


<Electronically signed by Shena Boyd MD in OV>  19


Dictated By: Shena Boyd MD


Dictated Date/Time: 19


Transcribed Date/Time: 19


Copy to:








CT CHEST ABDOMEN AND PELVIS 19:





Patient Name:                    MAKAYLA RUBIN                              

                                                                            

Medical Record#: [id number]


Ordering Physician: Jatin Morse MD                                             

                                                                            

Acct.#: [id number]


:         1980                    Age: 38   Sex: F                    

                                                                            

Location: EMERGENCY DEPARTMENT


Exam Date: 19 0112                                                       

                                                                            ADM 

Status: REG ER


Order Information:                                               CT CHEST/ABD/

PEL W/O


Accession Number:                                                [id number]


CPT:                                                             [id number]


EXAM: 


 CT Chest Without Contrast 





EXAM DATE/TIME: 


 2019 1:38 AM 





CLINICAL HISTORY: 


 38 years old, female; Signs and symptoms; Fever 





TECHNIQUE: 


 Axial computed tomography images of the chest without intravenous contrast. 


 All CT scans at this facility use at least one of these dose optimization 


techniques: automated exposure control; mA and/or kV adjustment per patient 


size (includes targeted exams where dose is matched to clinical indication); or 


iterative reconstruction. 


 Coronal and sagittal reformatted images were created and reviewed. 





COMPARISON: 


 PELV WO CT PELVIS W/O 2019 10:39 AM 





FINDINGS: 


 Lungs:  Minimal patchy ground glass infiltrates bilaterally. Minimal scattered 


fibro-atelectatic change, greatest in the right middle lobe and lingula. 


 Pleural space: Normal. No pneumothorax. No pleural effusion. 


 Heart:  Coronary artery calcifications are present. 


 Aorta: Normal. No aortic aneurysm. 


 Lymph nodes:  Small scattered mediastinal lymph nodes which are upper normal. 


 Bones/joints: Unremarkable. No acute fracture. 


 Soft tissues: Unremarkable. 





IMPRESSION: 


1. Minimal scattered fibro-atelectatic change, greatest in the lingula and 


right middle lobe and minimal patchy ground glass infiltrates, greatest in the 


lower lobes. 


2. Otherwise negative CT chest. 











____________________________________________________________








Patient Name:         MAKAYLA RUBIN                                         

                        Medical Record#: [id number]


Ordering Physician: Abdoul Rodriguez MD                                            

                        Acct.#: [id number]


:     1980         Age: 38   Sex: F                                   

                        Location: [address] [place] [address]


Exam Date: 19                                                       

                        ADM Status: ADM IN


Order Information:                         CHEST PA & LAT 2 VWS


Accession Number:                          [id number]


CPT:                                       [id number]


HISTORY: f/u pneumonia





COMPARISONS: 2019 





VIEWS: 4: Frontal dual-energy and lateral views of the chest.





FINDINGS:


CARDIOMEDIASTINAL SILHOUETTE: The cardiac silhouette is mildly enlarged. The


cardiomediastinal silhouette is otherwise normal.


NII: The nii are normal.


PLEURA: The costophrenic angles are sharp. No pleural abnormalities are noted.


LUNG PARENCHYMA: The lungs are clear.


ABDOMEN: The upper abdomen is clear. There is no subphrenic gas.


BONES AND SOFT TISSUES: No bone or soft tissue abnormalities are noted.


OTHER: None.





IMPRESSION:


STABLE MILD CARDIOMEGALY. NO ACTIVE CARDIOPULMONARY DISEASE.





R0 








Preliminary Imaging Read 


R0                                                                         





____________________________________________________________


<Electronically signed by Ad Nguyen MD in OV>  19 075


Dictated By: Ad Nguyen MD


Dictated Date/Time: 19


Transcribed Date/Time: 19


Copy to:











Assess/Plan/Problems-Billing


Assessment: 





This is a 38 year old female with complex past medical history including ESRD 

on PD, PAD on coumadin, IDDM, chronic pain, COPD and obesity that presents with 

SOB fevers and chills after haivng left AMA on 19, upgraded to ICU for 

worsening hypoxic respiratory failure with acidosis, back on medical floor 

today.








- Patient Problems


(1) Pneumonia


Code(s): J18.9 - PNEUMONIA, UNSPECIFIED ORGANISM   SNOMED Code(s): 959784307


   Comment: 


 - Maintaining on O2 via NC today but lung sounds are worse


 - Continue zosyn empirically, likely COPD exac in addition to ground glass 

opacities/consolidations on CT scan


 - Change to duonebs Q4h with flutter valve, robitussin Q6h


 - Change to IV solumedrol Q8h


 - Afebrile   





(2) COPD (chronic obstructive pulmonary disease)


Code(s): J44.9 - CHRONIC OBSTRUCTIVE PULMONARY DISEASE, UNSPECIFIED   SNOMED 

Code(s): 96754805


   Comment: 


 - Now with acute on chronic hypoxic respiratory failure requiring bipap this 

admission


 - Continue inhalers, added duonebs


 - Change to IV solumendrol and DC prednisone


 - Repeat CXR does not show any new interval findings


   





(3) Chronic pain


Code(s): G89.29 - OTHER CHRONIC PAIN   SNOMED Code(s): 59921675


   Comment: 


 - Likely combination of diabetic neuropathy and chronic disease


 - Continue methadone 5mg Q8h and lyrica   





(4) PAD (peripheral artery disease)


Code(s): I73.9 - PERIPHERAL VASCULAR DISEASE, UNSPECIFIED   SNOMED Code(s): 

234652347


   Comment: 


 - On coumadin


 - INR continuing to rise in spite of decreased dosing


 - Hold coumadin tonight and follow INR in AM


   





(5) ESRD on peritoneal dialysis


Code(s): N18.6 - END STAGE RENAL DISEASE; Z99.2 - DEPENDENCE ON RENAL DIALYSIS 

  SNOMED Code(s): 43653398


   Comment: 


 - On PD at night


 - Stable   





(6) Clostridium difficile diarrhea


Code(s): A04.72 - ENTEROCOLITIS D/T CLOSTRIDIUM DIFFICILE, NOT SPCF AS RECUR   

SNOMED Code(s): 3109855024400


   Comment: 


 - Continue vancomycin oral QID, very frequent stools


 - Will consult ID in the AM   





(7) HTN (hypertension)


Code(s): I10 - ESSENTIAL (PRIMARY) HYPERTENSION   SNOMED Code(s): 27537612


   Comment: 


 - continue losartan   





(8) Hx of insulin dependent diabetes mellitus


Code(s): Z86.39 - PERSONAL HISTORY OF ENDO, NUTRITIONAL AND METABOLIC DISEASE   

SNOMED Code(s): 357075455


   Comment: 


 - Remains mildly hypoglycemic on D5 and 1/2 NS


 - Per RN, patient not eating much, will cancel long-atcing insulin for now and 

do not cover unless BG>200


    





(9) Abscess of buttock, right


Current Visit: No   Code(s): L02.31 - CUTANEOUS ABSCESS OF BUTTOCK   SNOMED Code

(s): 17751514


   Comment: 


 - Recent buttock abscess & perianal fistula s/p I&D -, left AMA per 

record   





(10) DVT prophylaxis


Code(s): AXE6384 -    SNOMED Code(s): 630289442


   Comment: 


 - On coumadin, which has been supratherapeutic, continue to adjust dosing for 

goal 


   INR 2-3   





(11) Full code status


Code(s): Z78.9 - OTHER SPECIFIED HEALTH STATUS   SNOMED Code(s): 172823594


   


Status and Disposition: 





Inpatient, DC home when medically stable.

## 2019-01-29 VITALS — DIASTOLIC BLOOD PRESSURE: 54 MMHG | SYSTOLIC BLOOD PRESSURE: 167 MMHG

## 2019-01-29 RX ADMIN — PIPERACILLIN AND TAZOBACTAM SCH: 3; .375 INJECTION, POWDER, LYOPHILIZED, FOR SOLUTION INTRAVENOUS; PARENTERAL at 04:44

## 2019-01-29 RX ADMIN — METHYLPREDNISOLONE SODIUM SUCCINATE SCH MG: 40 INJECTION, POWDER, FOR SOLUTION INTRAMUSCULAR; INTRAVENOUS at 00:53

## 2019-01-29 RX ADMIN — IPRATROPIUM BROMIDE AND ALBUTEROL SULFATE SCH: .5; 3 SOLUTION RESPIRATORY (INHALATION) at 04:13

## 2019-01-29 NOTE — DS
CC:  Yeni Shanks MD *

 

DISCHARGE SUMMARY:

 

DATE OF ADMISSION:  01/24/19

 

DATE OF DISCHARGE:  01/29/19

 

The patient is leaving against medical advice.

 

PRIMARY CARE PHYSICIAN:  Yeni Shanks MD.

 

PRINCIPAL DIAGNOSIS:  Clostridium difficile colitis.

 

SECONDARY DIAGNOSES:

1.  Community-acquired pneumonia.

2.  Chronic obstructive pulmonary disease exacerbation.

3.  End-stage renal disease, on peritoneal dialysis.

4.  Chronic hypoxic respiratory failure and chronic obstructive pulmonary 
disease, on oxygen.

5.  Diabetes.

 

MEDICATIONS:  New medications prescribed at discharge:  Flagyl 125 mg p.o. 4 
times a day x7 days for a total of 10 days.

 

Her remaining home medications are unchanged as follows:

1.  Bethanechol 25 mg p.o. 4 times a day.

2.  Aspirin 81 mg p.o. daily.

3.  Albuterol inhaler 2 puffs every 6 hours as needed.

4.  Albuterol nebulizer every 4 hours as needed.

5.  Gabapentin 200 mg p.o. t.i.d.

6.  Calcitriol 0.25 mcg p.o. t.i.d.

7.  Symbicort 2 puffs inhaled b.i.d.

8.  Phenergan 25 mg every 6 hours as needed.

9.  Omeprazole 40 mg daily.

10.  Omega-3 fatty acid 1000 mg daily.

11.  Methadone 5 mg every 8 hours as needed.

12.  Losartan 100 mg p.o. daily.

13.  Lantus 80 units at bedtime.

14.  NovoLog sliding scale.

15.  Warfarin 7.5 mg p.o. daily.

16.  Zoloft 25 mg p.o. daily.

 

HOSPITAL COURSE:  Please refer to the medical records for more thorough 
detailed history of the patient's hospitalization.  The patient was admitted 
initially on the 01/22/19 and diagnosed with pneumonia.  The following later 
that day, the patient states her respiratory status was back to her baseline.  
She was sent home to complete a 7-day course of Levaquin and started on 
prednisone taper.  She returned the following morning with fever, chills, and 
sweats, chest pain with coughing, found to have a low-grade temperature.  The 
patient was started on Zosyn and continued on the prednisone and her inhaler 
regimen.  The patient was then during her hospitalization had an episode where 
she was hypotensive, diaphoretic, and the concern was for early worsening 
sepsis.  She was transferred to the ICU. She was noted to have diarrhea as 
well.  She was also hypoglycemic at that time. No significant findings on 
repeat chest x-ray.  Her chest x-ray on the 01/27/19 showed no active 
cardiopulmonary disease.  On the 01/24/19, it showed stable cardiomegaly, again 
no active cardiopulmonary disease.  Her C. diff came back positive on the 01/25/
19 and she was started on vancomycin and she remained on Zosyn.  Her white 
count peaked on the 01/24/19 at 22 and has been trending down. Her INR has been 
slightly elevated; on the 01/28/19, it was 3.36.  Her Coumadin was held.  The 
patient was briefly on D5 half normal for hypoglycemia which came back up on 
day of discharge.  The patient was upset after being transferred back out of 
the ICU to the floor that her boyfriend could not stay.  We discussed that he 
would be allowed to stay but then he left without a phone.  She was not able to 
contact him.  She was very frustrated that he could not be there.  She is going 
to leave against medical advice.  She has since called her father, who is 
coming to pick her up.  She has capacity.  She understands the risk of leaving 
with her C. diff colitis, worsening infection, risk for bowel perforation, and 
death.  I did discuss continuing vancomycin for her C. diff colitis.  She has 
completed 7 days of antibiotics for her pneumonia and she needs to follow up 
with her primary care physician today to evaluate her respiratory status.  She 
is going to be provided with oxygen at discharge.  No further steroids at this 
point.  Again, the patient is leaving against medical advice.

 

PATIENT TIME:  Greater than 60 minutes spent with the patient, more than half 
the time was spent in direct patient contact.

 

 [id number]/[facility] #: [id number]

[facility]

## 2019-02-03 ENCOUNTER — HOSPITAL ENCOUNTER (OUTPATIENT)
Dept: HOSPITAL 25 - ED | Age: 39
Setting detail: OBSERVATION
Discharge: HOME | DRG: 720 | End: 2019-02-03
Attending: HOSPITALIST | Admitting: HOSPITALIST
Payer: COMMERCIAL

## 2019-02-03 VITALS — DIASTOLIC BLOOD PRESSURE: 70 MMHG | SYSTOLIC BLOOD PRESSURE: 159 MMHG

## 2019-02-03 DIAGNOSIS — E46: ICD-10-CM

## 2019-02-03 DIAGNOSIS — A04.72: ICD-10-CM

## 2019-02-03 DIAGNOSIS — I25.10: ICD-10-CM

## 2019-02-03 DIAGNOSIS — F41.9: ICD-10-CM

## 2019-02-03 DIAGNOSIS — Z79.4: ICD-10-CM

## 2019-02-03 DIAGNOSIS — Z99.81: ICD-10-CM

## 2019-02-03 DIAGNOSIS — E11.43: ICD-10-CM

## 2019-02-03 DIAGNOSIS — K31.84: ICD-10-CM

## 2019-02-03 DIAGNOSIS — B37.3: ICD-10-CM

## 2019-02-03 DIAGNOSIS — Z88.2: ICD-10-CM

## 2019-02-03 DIAGNOSIS — Z88.8: ICD-10-CM

## 2019-02-03 DIAGNOSIS — Z79.1: ICD-10-CM

## 2019-02-03 DIAGNOSIS — Z91.048: ICD-10-CM

## 2019-02-03 DIAGNOSIS — Z99.2: ICD-10-CM

## 2019-02-03 DIAGNOSIS — F17.210: ICD-10-CM

## 2019-02-03 DIAGNOSIS — A41.9: Primary | ICD-10-CM

## 2019-02-03 DIAGNOSIS — N18.6: ICD-10-CM

## 2019-02-03 DIAGNOSIS — J96.11: ICD-10-CM

## 2019-02-03 DIAGNOSIS — Z79.01: ICD-10-CM

## 2019-02-03 DIAGNOSIS — Z79.899: ICD-10-CM

## 2019-02-03 DIAGNOSIS — K21.9: ICD-10-CM

## 2019-02-03 DIAGNOSIS — E11.65: ICD-10-CM

## 2019-02-03 DIAGNOSIS — J44.9: ICD-10-CM

## 2019-02-03 DIAGNOSIS — Z80.41: ICD-10-CM

## 2019-02-03 DIAGNOSIS — Z88.1: ICD-10-CM

## 2019-02-03 DIAGNOSIS — Z83.3: ICD-10-CM

## 2019-02-03 DIAGNOSIS — E11.22: ICD-10-CM

## 2019-02-03 DIAGNOSIS — E11.51: ICD-10-CM

## 2019-02-03 DIAGNOSIS — G89.29: ICD-10-CM

## 2019-02-03 DIAGNOSIS — J18.9: ICD-10-CM

## 2019-02-03 DIAGNOSIS — J40: ICD-10-CM

## 2019-02-03 DIAGNOSIS — F32.9: ICD-10-CM

## 2019-02-03 DIAGNOSIS — Z79.891: ICD-10-CM

## 2019-02-03 DIAGNOSIS — Z79.82: ICD-10-CM

## 2019-02-03 DIAGNOSIS — Z82.49: ICD-10-CM

## 2019-02-03 DIAGNOSIS — Z80.1: ICD-10-CM

## 2019-02-03 LAB
ALBUMIN SERPL BCG-MCNC: 3 G/DL (ref 3.2–5.2)
ALBUMIN/GLOB SERPL: 0.9 {RATIO} (ref 1–3)
ALP SERPL-CCNC: 109 U/L (ref 34–104)
ALT SERPL W P-5'-P-CCNC: 15 U/L (ref 7–52)
ANION GAP SERPL CALC-SCNC: 15 MMOL/L (ref 2–11)
AST SERPL-CCNC: 5 U/L (ref 13–39)
BASOPHILS # BLD AUTO: 0.1 10^3/UL (ref 0–0.2)
BUN SERPL-MCNC: 41 MG/DL (ref 6–24)
BUN/CREAT SERPL: 6.7 (ref 8–20)
CALCIUM SERPL-MCNC: 8.8 MG/DL (ref 8.6–10.3)
CHLORIDE SERPL-SCNC: 95 MMOL/L (ref 101–111)
EOSINOPHIL # BLD AUTO: 0.3 10^3/UL (ref 0–0.6)
FLUAV RNA SPEC QL NAA+PROBE: NEGATIVE
FLUBV RNA SPEC QL NAA+PROBE: NEGATIVE
GLOBULIN SER CALC-MCNC: 3.2 G/DL (ref 2–4)
GLUCOSE SERPL-MCNC: 302 MG/DL (ref 70–100)
HCO3 SERPL-SCNC: 24 MMOL/L (ref 22–32)
HCT VFR BLD AUTO: 28 % (ref 35–47)
HGB BLD-MCNC: 9 G/DL (ref 12–16)
INR PPP/BLD: 1.16 (ref 0.77–1.02)
LYMPHOCYTES # BLD AUTO: 0.9 10^3/UL (ref 1–4.8)
MAGNESIUM SERPL-MCNC: 1.5 MG/DL (ref 1.9–2.7)
MCH RBC QN AUTO: 30 PG (ref 27–31)
MCHC RBC AUTO-ENTMCNC: 32 G/DL (ref 31–36)
MCV RBC AUTO: 93 FL (ref 80–97)
MONOCYTES # BLD AUTO: 1.3 10^3/UL (ref 0–0.8)
NEUTROPHILS # BLD AUTO: 17.4 10^3/UL (ref 1.5–7.7)
NRBC # BLD AUTO: 0 10^3/UL
NRBC BLD QL AUTO: 0
PLATELET # BLD AUTO: 279 10^3/UL (ref 150–450)
POTASSIUM SERPL-SCNC: 3.4 MMOL/L (ref 3.5–5)
PROT SERPL-MCNC: 6.2 G/DL (ref 6.4–8.9)
RBC # BLD AUTO: 3.04 10^6/UL (ref 4–5.4)
RBC UR QL AUTO: (no result)
SODIUM SERPL-SCNC: 134 MMOL/L (ref 135–145)
TROPONIN I SERPL-MCNC: 0.07 NG/ML (ref ?–0.04)
WBC # BLD AUTO: 19.9 10^3/UL (ref 3.5–10.8)
WBC UR QL AUTO: (no result)

## 2019-02-03 PROCEDURE — 71046 X-RAY EXAM CHEST 2 VIEWS: CPT

## 2019-02-03 PROCEDURE — 87480 CANDIDA DNA DIR PROBE: CPT

## 2019-02-03 PROCEDURE — 96375 TX/PRO/DX INJ NEW DRUG ADDON: CPT

## 2019-02-03 PROCEDURE — 96365 THER/PROPH/DIAG IV INF INIT: CPT

## 2019-02-03 PROCEDURE — 83880 ASSAY OF NATRIURETIC PEPTIDE: CPT

## 2019-02-03 PROCEDURE — 86140 C-REACTIVE PROTEIN: CPT

## 2019-02-03 PROCEDURE — 36415 COLL VENOUS BLD VENIPUNCTURE: CPT

## 2019-02-03 PROCEDURE — 87510 GARDNER VAG DNA DIR PROBE: CPT

## 2019-02-03 PROCEDURE — 85379 FIBRIN DEGRADATION QUANT: CPT

## 2019-02-03 PROCEDURE — 87660 TRICHOMONAS VAGIN DIR PROBE: CPT

## 2019-02-03 PROCEDURE — 99284 EMERGENCY DEPT VISIT MOD MDM: CPT

## 2019-02-03 PROCEDURE — 87493 C DIFF AMPLIFIED PROBE: CPT

## 2019-02-03 PROCEDURE — 81003 URINALYSIS AUTO W/O SCOPE: CPT

## 2019-02-03 PROCEDURE — 85025 COMPLETE CBC W/AUTO DIFF WBC: CPT

## 2019-02-03 PROCEDURE — 84484 ASSAY OF TROPONIN QUANT: CPT

## 2019-02-03 PROCEDURE — 81015 MICROSCOPIC EXAM OF URINE: CPT

## 2019-02-03 PROCEDURE — 83690 ASSAY OF LIPASE: CPT

## 2019-02-03 PROCEDURE — 83605 ASSAY OF LACTIC ACID: CPT

## 2019-02-03 PROCEDURE — G0378 HOSPITAL OBSERVATION PER HR: HCPCS

## 2019-02-03 PROCEDURE — 85610 PROTHROMBIN TIME: CPT

## 2019-02-03 PROCEDURE — 83735 ASSAY OF MAGNESIUM: CPT

## 2019-02-03 PROCEDURE — 93005 ELECTROCARDIOGRAM TRACING: CPT

## 2019-02-03 PROCEDURE — 80053 COMPREHEN METABOLIC PANEL: CPT

## 2019-02-03 PROCEDURE — 96366 THER/PROPH/DIAG IV INF ADDON: CPT

## 2019-02-03 PROCEDURE — 96372 THER/PROPH/DIAG INJ SC/IM: CPT

## 2019-02-03 PROCEDURE — 87040 BLOOD CULTURE FOR BACTERIA: CPT

## 2019-02-03 PROCEDURE — 87086 URINE CULTURE/COLONY COUNT: CPT

## 2019-02-03 RX ADMIN — BETHANECHOL CHLORIDE SCH MG: 25 TABLET ORAL at 15:52

## 2019-02-03 RX ADMIN — MORPHINE SULFATE PRN MG: 2 INJECTION, SOLUTION INTRAMUSCULAR; INTRAVENOUS at 18:17

## 2019-02-03 RX ADMIN — MORPHINE SULFATE PRN MG: 2 INJECTION, SOLUTION INTRAMUSCULAR; INTRAVENOUS at 12:51

## 2019-02-03 RX ADMIN — BETHANECHOL CHLORIDE SCH: 25 TABLET ORAL at 15:52

## 2019-02-03 NOTE — HP
CC:  Dr. Shanks *

 

\[facility]\"" MEDICINE HISTORY AND PHYSICAL:

 

DATE OF ADMISSION:  19

 

PRIMARY CARE PHYSICIAN:  Dr. Shanks.



ATTENDING PROVIDER:  Luis A Landeros MD * (DICTATED BY PHIL BANSAL NP)

 

CHIEF COMPLAINT:  Cough, chest pain with deep breathing, and shaking chills.

 

HISTORY OF PRESENT ILLNESS:  Ms. Masters is a 38-year-old female with a past 
medical history of end-stage renal disease, on peritoneal dialysis; insulin- 
dependent type 2 diabetes; chronic hypoxic respiratory failure, on 5 L nasal 
cannula secondary to COPD; peripheral arterial disease, on chronic warfarin 
therapy; and multiple recent admissions to our hospital since December for 
abdominal pain and then community-acquired pneumonia, and most recently for C. 
difficile colitis, who presents today to the hospital with concern for cough, 
chest pain on deep breathing, and shaking chills.  Ms. Masters states that she 
was feeling reasonably well at the time of her discharge against medical advice 
on 18 after being treated here in the hospital for C. difficile colitis. 
However, on Wednesday or Thursday of this past week, which would be 3 to 4 days 
ago, she developed a cough with discomfort in the center of her chest on deep 
breathing.  She states the cough is nonproductive.  She also felt some 
generalized malaise.  She further describes upper respiratory congestion.  She 
has had nausea, which is chronic, but no vomiting.  She reports having some 
shaking chills, which started on Saturday.  She notes a temperature up to 100.  
In terms of her recent C. difficile colitis, she reports her diarrhea is 
greatly improved, and she is tolerating oral intake well.  She continues on her 
Flagyl therapy.

 

In the emergency room, Ms. Masters was found to have labs, which showed a 
leukocytosis with a white blood cell count of 19.9, shows a CRP of 250.84.  She 
is afebrile, but tachycardic with a heart rate running about 110.  Her blood 
pressure is stable, running about 140 to 160 systolically.  She continues on 
her home O2. Her chest x-ray shows no acute process, but does describes some 
peribronchial cuffing consistent with bronchitis.  Her EKG shows no ischemia 
and is actually improved in terms of some T-wave inversions since last EKG 
during previous hospitalization.  Her troponin is elevated at 0.07 consistent 
with previous draws and her history of end-stage renal disease.

 

PAST MEDICAL HISTORY:

1.  End-stage renal disease, on peritoneal dialysis.

2.  COPD with chronic hypoxic respiratory failure, on 5 L nasal cannula at home.

3.  Insulin-dependent type 2 diabetes, uncontrolled with hyperglycemia.

4.  Recent admission for community-acquired pneumonia.

5.  Recent admission for C. difficile colitis, continuing on Flagyl treatment.

6.  Peripheral arterial disease, on chronic warfarin therapy.

7.  GERD.

8.  Coronary artery disease.

9.  Melanoma.

10.  Gastroparesis.

11.  Anxiety.

12.  Depression.

 

MEDICATIONS:  Today are:

1.  Tylenol p.r.n.

2.  Albuterol nebulizer and meter-dosed inhaler p.r.n.

3.  Aspirin 81 mg p.o. daily.

4.  Metronidazole 500 mg p.o. t.i.d.

5.  Symbicort 160/4.5 two puffs inhaled b.i.d.

6.  Bethanechol 25 mg p.o. q.i.d.

7.  Losartan 100 mg p.o. daily.

8.  Lantus insulin 80 units subcutaneously at bedtime.

9.  NovoLog insulin via sliding scale with meals.

10.  Gabapentin 200 mg p.o. t.i.d.

11.  Calcitriol 0.25 mcg p.o. t.i.d.

12.  Sertraline 25 mg p.o. daily.

13.  Promethazine 25 mg p.o. q.8 hours p.r.n.

14.  Omeprazole 40 mg p.o. q.p.m.

15.  Methadone 5 mg p.o. q.8 hours p.r.n.

16.  Warfarin 7.5 mg p.o. at bedtime.

 

ALLERGIES:  ADHESIVE TAPE, DOXYCYCLINE, ERYTHROMYCIN, HEPARIN, METOCLOPRAMIDE, 
NIACIN, ROPINIROLE, and SULFA.

 

FAMILY HISTORY:  Father had an MI at 49.  She has multiple family members with 
diabetes.  Her grandfather had lung cancer and her mother had ovarian cancer.

 

SOCIAL HISTORY:  The patient is a continued half-a-pack a day smoker.  She 
denies any alcohol or drug use.  She is disabled.  She lives with her boyfriend
, who is her healthcare proxy, his name is Raymundo and his phone number is [phone]
[phone].

 

REVIEW OF SYSTEMS:  A 14-point review of systems was completed with Ms. Masters 
and all those not mentioned above were negative.

 

                               PHYSICAL EXAMINATION

 

GENERAL:  Ms. Masters is lying on her side in the bed.  She is in no acute 
distress.  Her boyfriend Raymundo is at the bedside.

 

VITAL SIGNS:  Temperature 99.3, pulse rate 112, respiratory rate 18, O2 
saturation 98% on 4 L nasal cannula, blood  pressure 167/90.

 

LUNGS:  Clear to auscultation bilaterally with no accessory muscle use and good 
aeration.

 

HEART:  S1, S2.  No murmur, rub or gallop and regular.

 

ABDOMEN:  Soft, nontender with bowel sounds positive x4.  She has a PD catheter 
in the right lower quadrant with dressing, which is clean, dry, and intact.

 

EXTREMITIES:  No cyanosis or edema.

 

NEURO:  She is alert.  She is oriented x3.  She moves all extremities equally. 
There is no facial asymmetry or focal weakness.  Extraocular movements are 
intact.

 

SKIN:  Intact.

 

 DIAGNOSTIC STUDIES/LAB DATA:  Sodium 134, potassium 3.4, chloride 95, serum 
bicarbonate 24, BUN 41, creatinine 6.12, glucose 302, lactic acid 2.0, 
magnesium 1.5.  Troponin is 0.07.  .84, BNP is 1203.  WBC 19.9, 
hemoglobin 9.0, hematocrit 28, platelet count 279. D-dimer 236, INR pending.  
Urine shows 2+ leuk esterase, but present squamous cells, and no bacteria.  Flu 
swab is negative. Chest x-ray shows "mild-to-moderate degree of peribronchial 
cuffing could be seen in the setting of bronchitis or other inflammatory lung 
disease."  The EKG shows a sinus tachycardia with a heart rate about 110, and 
no evidence of ischemia.

 

ASSESSMENT AND PLAN:  Ms. Masters is a 38-year-old female with a past medical 
history of end-stage renal disease, on peritoneal dialysis; chronic hypoxic 
respiratory failure, on 5 L nasal cannula; insulin-dependent type 2 diabetes, 
which is uncontrolled; and recent admission to our hospital for community-
acquired pneumonia and then with Clostridium difficile colitis, who presents 
today to the hospital with concern for pain on deep breathing, cough, nasal 
congestion, and signs of sepsis likely secondary to bronchitis.  Our plans are 
for observation in the hospital given her multiple comorbidities for the 
followin.  Chest pain on deep breathing and cough:  The patient's overall symptoms are 
consistent with bronchitis likely from a viral source.  She does meet criteria 
for sepsis with elevated white blood cell count and tachycardia.  Plan to treat 
primarily with supportive care with cough suppressants and pain medications 
available p.r.n.  Also plan to treat for atypical pneumonia given the severity 
of her illness with azithromycin.  She does have an allergy to DOXYCYCLINE.  
She has been given an appropriate amount of IV fluids in the ED for sepsis and 
plan to continue with very low dose at 50 mL per hour while she remains 
tachycardic given her history of underlying end-stage renal disease, and now 
continue for 1 L with adjustments based on her clinical course.  In terms of 
chest pain and possible other etiologies, her troponin is elevated.  Her EKG 
shows no evidence of ischemia. I think the troponin elevation is chronic and 
consistent with a history of end- stage renal disease, but will check x2.

2.  End-stage renal disease, on peritoneal dialysis:  I have alerted Dr. Parr from Nephrology that the patient is being admitted to the hospital 
and she will arrange for peritoneal dialysis while here.  She shows no signs of 
fluid overload and her electrolytes are essentially normal.

3.  Chronic hypoxic respiratory failure secondary to chronic obstructive 
pulmonary disease:  The patient has no evidence of an acute exacerbation.  No 
wheezing on examination and  she is not needing additional oxygen support.  
Plan to continue to monitor closely and she will have albuterol available p.r.n.

4.  Type 2 diabetes, insulin dependent:  Plan to continue with her home Lantus 
and she will have lispro sliding scale with meals.

5.  Peripheral arterial disease:  Continue warfarin and INR is pending.

6.  History of Clostridium difficile:  Continue Flagyl.  She reports that her 
diarrhea is improving.

7.  Chronic pain:  Continue methadone.

8.  Gastroesophageal reflux disease:  Continue pantoprazole.

9.  Depression:  Continue sertraline.

10.  DVT prophylaxis with warfarin and SCDs.

11.  Code status is full code.

12.  Vaginitis:  The patient complains of burning and increased vaginal 
discharge with concern for a yeast infection.  Plan to check a firm swab for 
vaginitis and to treat accordingly based on those results, which should be 
available shortly.

 

TIME SPENT:  Approximately 60 minutes was spent on the admission of this patient
, more than half the time was spent with the patient at the bedside reviewing 
the events leading up to this hospitalization, performing the physical 
examination, and reviewing my plan of care.

 

 ____________________________________ 

PHIL BANSAL, NP

 

[id number]/CPS #: [id number]

SAEED

## 2019-02-03 NOTE — ED
Shortness of Breath





- HPI Summary


HPI Summary: 


Patient is a 38-year-old female who has been seen multiple times in the ED and 

has a history of COPD, ESRD on PD, hypoxic respiratory failure, diabetes, 

community-acquired pneumonia and most recently C. difficile.  She was recently 

discharged on 1/29/19 from a stay for 1 week on both the hospital floor and the 

ICU.  She states after discharge, she felt improved 2 days.  However over the 

last 3 days she has felt worsening cough, shortness of breath, requiring 5 L at 

home (normally only requires 2 L at baseline).  She is also endorsing chest 

pains radiating to the left side of the neck as well as down the arm.  Endorses 

nausea, but denies any vomiting.  She states her bowel movements have been 

becoming less frequent, however continues to have diarrhea.  During her last 

stay, last week in the ICU, she was found to be hypotensive, diaphoretic with 

sepsis.  She was diagnosed with community-acquired pneumonia and started on 

Zosyn at the time.  She was found to have C. difficile and was finally 

discharged 5 days ago with a prescription for metronidazole.  She remains on 

her Coumadin.  She ended up leaving AMA 5 days ago d/t her boyfriend not being 

able to stay with her in the room.  She remains on flaygl, but denies any 

current abx. 





- History of Current Complaint


Chief Complaint: EDFluSymptoms


Time Seen by Provider: 02/03/19 08:52


Hx Obtained From: Patient


Onset/Duration: Gradual Onset


Timing: Constant


Dyspnea At: Rest


Aggrevating Factors: Deep Breaths, Recumbent Position


Alleviating Factors: Upright Position


Associated Signs & Symptoms: Cough (Productive), Wheezing, Chest Pain Unrelated 

to Cough, Nasal Congestion





- Risk Factors


Pulmonary Embolism: Bedrest, DVT, Previous PE, Smoking


Cardiac: Prior MI, CAD, Elevated lipids, Diabetes


Pseudomonas: Chronic Lung Disease


Tuberculosis: Immune Deficiency, Malnutrition, Diabetes, Chronic Respiratory 

Failure, Smoking





- Allergy/Home Medications


Allergies/Adverse Reactions: 


 Allergies











Allergy/AdvReac Type Severity Reaction Status Date / Time


 


Adhesive Tape [Plastic Tape] Allergy Mild Blisters Verified 02/03/19 08:40


 


doxycycline Allergy  Unknown Verified 02/03/19 08:40





   Reaction  





   Details  


 


erythromycin base Allergy  See Comment Verified 02/03/19 08:40


 


heparin Allergy  See Comment Verified 02/03/19 08:40


 


metoclopramide [From Reglan] Allergy  Hives Verified 02/03/19 08:40


 


metronidazole [From Flagyl] Allergy  Nausea And Verified 02/03/19 08:40





   Vomiting  


 


niacin Allergy  Rash Verified 02/03/19 08:40


 


ropinirole [From Requip] Allergy  Hives Verified 02/03/19 08:40


 


Sulfa (Sulfonamide Allergy  Vomiting Verified 02/03/19 08:40





Antibiotics)     














PMH/Surg Hx/FS Hx/Imm Hx


Previously Healthy: No


Endocrine/Hematology History: Reports: Hx Anticoagulant Therapy - Aspirin., Hx 

Blood Transfusions, Hx Diabetes, Hx Thyroid Disease - nodule, Hx Anemia - hx of 

- reports had tranfusion in 2014 after mi


Cardiovascular History: Reports: Hx Angina, Hx Cardiac Arrest, Hx Cardiomegaly, 

Hx Coronary Artery Disease, Hx Deep Vein Thrombosis, Hx Hypercholesterolemia, 

Hx Hypertension, Hx Myocardial Infarction, Hx Peripheral Vascular Disease, 

Other Cardiovascular Problems/Disorders


   Denies: Hx Congestive Heart Failure, Hx Pacemaker/ICD, Hx Valvular Heart 

Disease


Respiratory History: Reports: Hx Asthma, Hx Chronic Obstructive Pulmonary 

Disease (COPD) - smoker, Hx Pneumonia, Hx Sleep Apnea, Other Respiratory 

Problems/Disorders - acute respipatory failure with hypoxia - dec 2016


   Denies: Hx Lung Cancer, Hx Pulmonary Embolism - pt says no, although 

documented in [facility]


GI History: Reports: Hx Gall Bladder Disease, Hx Gastroesophageal Reflux Disease

, Other GI Disorders - GASTROPARESIS - TAKING OMEPRAZOLE, reglan and zofran


   Denies: Hx Gastrointestinal Bleed, Hx Ulcer, Hx Urosepsis


 History: Reports: Hx Acute Renal Failure, Hx Chronic Renal Failure - ESRD on 

PD, Hx Dialysis - PERITONEAL, Hx Kidney Stones, Hx Renal Disease - ESRD on home 

peritoneal dialysis , Other  Problems/Disorders


Musculoskeletal History: Reports: Hx Arthritis - back, hips, Hx Back Problems - 

Sciatica and Herniated L3 disk, Other Musculoskeletal History - partial 

amputation of toes/foot


Sensory History: Reports: Hx Contacts or Glasses, Hx Eye Prosthesis - left, Hx 

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


   Denies: Hx Hearing Aid


Opthamlomology History: Reports: Hx Contacts or Glasses, Hx Eye Prosthesis - 

left, Hx 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


Neurological History: Reports: Other Neuro Impairments/Disorders - neuropathy


   Denies: Hx Transient Ischemic Attacks (TIA)


Psychiatric History: Reports: Hx Anxiety, Hx Depression, Hx Bipolar Disorder


   Denies: Hx Eating Disorder, Hx Panic Disorder, Hx Schizophrenia, Hx of 

Violent Episodes Against Others





- Cancer History


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


Hx Chemotherapy: No


Hx Radiation Therapy: No





- Surgical History


Surgery Procedure, Year, and Place: LEFT EYE REMOVED 2012 - HAS PROSTHETIC EYE (

ORBITS DONE 5/2015 OK'D BY DR SAE COLE TO SCAN IN MRI) ;.  MELANOMA REMOVED 

FROM [facility] 2013 (PROCEDURE DONE 4X);.  CARDIAC CATH 2014 - NO STENTS;.  LEFT 

WRIST FISTULA PLACEMENT ([facility]) 2014;.  HEMODIALYSIS CATH RIGHT CHEST WALL 2014;.

  PERITONEAL DIALYSIS CATH 2/2015;.  CHEST WALL CATH REMOVAL 7/2016 [facility] [place];.  

RIGHT GREAT TOE AMPUTATION, [facility] [place] 4/2016;.  PLACEMENT RIGHT JUGULAR TESIO 

HEMODIALYSIS CATHETER [facility] [place] 8/27/2016;.  REMOVAL OF JUGULAR CATH - SEPT 2016.  

CARDIAC CATH - stentsx2 right leg.  PARTIAL AMPUTATION RIGHT TOES 5/2017.  

RIGHT TOES ALL REMOVED


Hx Anesthesia Reactions: No





- Immunization History


Date of Tetanus Vaccine: utd


Date of Influenza Vaccine: 10/2017


Hx Pertussis Vaccination: Yes


Immunizations Up to Date: Yes


Infectious Disease History: No


Infectious Disease History: Reports: Hx of Known/Suspected MRSA - MRSA R 1st toe

, History Other Infectious Disease - GANGRENE


   Denies: Hx Clostridium Difficile, Hx Hepatitis, Hx Human Immunodeficiency 

Virus (HIV), Hx Shingles, Hx Tuberculosis, Traveled Outside the  in Last 30 

Days





- Family History


Known Family History: Positive: Cardiac Disease - father MI at 41 y/o, 

Hypertension, Other - Positive for bipolar and depression. Completed suicide to 

sister.  





- Social History


Occupation: Unemployed, Disabled


Lives: With Family


Alcohol Use: None


Hx Substance Use: No


Substance Use Type: Reports: None


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


Hx Tobacco Use: Yes


Smoking Status (MU): Heavy Every Day Tobacco Smoker


Type: Cigarettes


Amount Used/How Often: 1 PPD


Length of Time of Smoking/Using Tobacco: 20 YEARS


Have You Smoked in the Last Year: Yes





Review of Systems


Positive: Fever, Chills, Fatigue, Skin Diaphoresis


Negative: Blurred Vision, Diplopia


Negative: Sore Throat, Ear Ache, Nasal Discharge


Positive: Palpitations, Chest Pain


Positive: Shortness Of Breath, Cough


Positive: Diarrhea, Nausea.  Negative: Abdominal Pain, Vomiting


Positive: see HPI


Negative: Arthralgia, Myalgia, Decreased ROM, Edema


Negative: Rash, Bruising


Positive: Weakness


Psychological: Normal


All Other Systems Reviewed And Are Negative: Yes





Physical Exam


Triage Information Reviewed: Yes


Vital Signs On Initial Exam: 


 Initial Vitals











Temp Pulse Resp BP Pulse Ox


 


 99 F   121   18   170/79   99 


 


 02/03/19 08:40  02/03/19 08:40  02/03/19 08:40  02/03/19 08:40  02/03/19 08:40











Vital Signs Reviewed: Yes


Appearance: Positive: Ill-Appearing, Obese


Skin: Positive: Diaphoretic, Other


Head/Face: Positive: Normal Head/Face Inspection


Eyes: Positive: EOMI, VIOLETTA, Conjunctiva Clear


Neck: Positive: No Lymphadenopathy


Respiratory/Lung Sounds: Positive: Decreased Breath Sounds


Cardiovascular: Positive: RRR


Musculoskeletal: Positive: Strength/ROM Intact


Neurological: Positive: Speech Normal


Psychiatric: Positive: Affect/Mood Appropriate


AVPU Assessment: Alert





Diagnostics





- Vital Signs


 Vital Signs











  Temp Pulse Resp BP Pulse Ox


 


 02/03/19 09:34    20  


 


 02/03/19 08:55  99.3 F    


 


 02/03/19 08:40  99 F  121  18  170/79  99














- Laboratory


Lab Results: 


 Lab Results











  02/03/19 02/03/19 02/03/19 Range/Units





  09:13 09:13 09:13 


 


WBC  19.9 H    (3.5-10.8)  10^3/ul


 


RBC  3.04 L    (4.00-5.40)  10^6/ul


 


Hgb  9.0 L    (12.0-16.0)  g/dl


 


Hct  28 L    (35-47)  %


 


MCV  93    (80-97)  fL


 


MCH  30    (27-31)  pg


 


MCHC  32    (31-36)  g/dl


 


RDW  17 H    (10.5-15)  %


 


Plt Count  279    (150-450)  10^3/ul


 


MPV  9.2    (7.4-10.4)  fL


 


Neut % (Auto)  87.6    %


 


Lymph % (Auto)  4.3    %


 


Mono % (Auto)  6.3    %


 


Eos % (Auto)  1.5    %


 


Baso % (Auto)  0.3    %


 


Absolute Neuts (auto)  17.4 H    (1.5-7.7)  10^3/ul


 


Absolute Lymphs (auto)  0.9 L    (1.0-4.8)  10^3/ul


 


Absolute Monos (auto)  1.3 H    (0-0.8)  10^3/ul


 


Absolute Eos (auto)  0.3    (0-0.6)  10^3/ul


 


Absolute Basos (auto)  0.1    (0-0.2)  10^3/ul


 


Absolute Nucleated RBC  0    10^3/ul


 


Nucleated RBC %  0    


 


D-Dimer, Quantitative     (Less Than 230)  ng/mL


 


Sodium   134 L   (135-145)  mmol/L


 


Potassium   3.4 L   (3.5-5.0)  mmol/L


 


Chloride   95 L   (101-111)  mmol/L


 


Carbon Dioxide   24   (22-32)  mmol/L


 


Anion Gap   15 H   (2-11)  mmol/L


 


BUN   41 H   (6-24)  mg/dL


 


Creatinine   6.12 H   (0.51-0.95)  mg/dL


 


Est GFR ( Amer)   9.3   (>60)  


 


Est GFR (Non-Af Amer)   7.7   (>60)  


 


BUN/Creatinine Ratio   6.7 L   (8-20)  


 


Glucose   302 H   ()  mg/dL


 


Lactic Acid    2.0  (0.5-2.0)  mmol/L


 


Calcium   8.8   (8.6-10.3)  mg/dL


 


Magnesium   1.5 L   (1.9-2.7)  mg/dL


 


Total Bilirubin   0.20   (0.2-1.0)  mg/dL


 


AST   5 L   (13-39)  U/L


 


ALT   15   (7-52)  U/L


 


Alkaline Phosphatase   109 H   ()  U/L


 


Troponin I   0.07 H*   (<0.04)  ng/mL


 


C-Reactive Protein   250.84 H   (<8.01)  mg/L


 


B-Natriuretic Peptide     (<=100)  pg/mL


 


Total Protein   6.2 L   (6.4-8.9)  g/dL


 


Albumin   3.0 L   (3.2-5.2)  g/dL


 


Globulin   3.2   (2-4)  g/dL


 


Albumin/Globulin Ratio   0.9 L   (1-3)  


 


Lipase   30   (11.0-82.0)  U/L


 


Influenza A (Rapid)     (Negative)  


 


Influenza B (Rapid)     (Negative)  














  02/03/19 02/03/19 02/03/19 Range/Units





  09:13 09:13 09:38 


 


WBC     (3.5-10.8)  10^3/ul


 


RBC     (4.00-5.40)  10^6/ul


 


Hgb     (12.0-16.0)  g/dl


 


Hct     (35-47)  %


 


MCV     (80-97)  fL


 


MCH     (27-31)  pg


 


MCHC     (31-36)  g/dl


 


RDW     (10.5-15)  %


 


Plt Count     (150-450)  10^3/ul


 


MPV     (7.4-10.4)  fL


 


Neut % (Auto)     %


 


Lymph % (Auto)     %


 


Mono % (Auto)     %


 


Eos % (Auto)     %


 


Baso % (Auto)     %


 


Absolute Neuts (auto)     (1.5-7.7)  10^3/ul


 


Absolute Lymphs (auto)     (1.0-4.8)  10^3/ul


 


Absolute Monos (auto)     (0-0.8)  10^3/ul


 


Absolute Eos (auto)     (0-0.6)  10^3/ul


 


Absolute Basos (auto)     (0-0.2)  10^3/ul


 


Absolute Nucleated RBC     10^3/ul


 


Nucleated RBC %     


 


D-Dimer, Quantitative   236 H   (Less Than 230)  ng/mL


 


Sodium     (135-145)  mmol/L


 


Potassium     (3.5-5.0)  mmol/L


 


Chloride     (101-111)  mmol/L


 


Carbon Dioxide     (22-32)  mmol/L


 


Anion Gap     (2-11)  mmol/L


 


BUN     (6-24)  mg/dL


 


Creatinine     (0.51-0.95)  mg/dL


 


Est GFR ( Amer)     (>60)  


 


Est GFR (Non-Af Amer)     (>60)  


 


BUN/Creatinine Ratio     (8-20)  


 


Glucose     ()  mg/dL


 


Lactic Acid     (0.5-2.0)  mmol/L


 


Calcium     (8.6-10.3)  mg/dL


 


Magnesium     (1.9-2.7)  mg/dL


 


Total Bilirubin     (0.2-1.0)  mg/dL


 


AST     (13-39)  U/L


 


ALT     (7-52)  U/L


 


Alkaline Phosphatase     ()  U/L


 


Troponin I     (<0.04)  ng/mL


 


C-Reactive Protein     (<8.01)  mg/L


 


B-Natriuretic Peptide  1203 H    (<=100)  pg/mL


 


Total Protein     (6.4-8.9)  g/dL


 


Albumin     (3.2-5.2)  g/dL


 


Globulin     (2-4)  g/dL


 


Albumin/Globulin Ratio     (1-3)  


 


Lipase     (11.0-82.0)  U/L


 


Influenza A (Rapid)    Negative  (Negative)  


 


Influenza B (Rapid)    Negative  (Negative)  











Result Diagrams: 


 02/03/19 09:13





 02/03/19 09:13


Lab Statement: Any lab studies that have been ordered have been reviewed, and 

results considered in the medical decision making process.





Course/Dx





- Course


Course Of Treatment: During the course treatment, the patient  was found to be 

hypoxic on arrival.  She was immediately put on 5 L and had a sat of 97%.  

Tachycardia at 121, temperature 99.3 and /79.  Labs obtained with a CRP 

of 250, d-dimer of 236.  Patient remains on Coumadin.  She is currently 

requiring 4 L in the ED to maintain a sat of above 95%.  She tends to be on 2L 

at home.  She was recently dx with PNA and C-diff and left AMA 5 days ago.  She 

now has developed chest pains radiating to the L side of the neck and the L 

arm.  Hx of MI and stents.  Hx of blood clots.  ESRD currently on PD.  Septic 

protocol intitiated with 2300ml fluids, zosyn and levaquin for pneumonia with 

possible ICU admission.  Also, morphine and zofran given for pain and nausea.





- Diagnoses


Differential Diagnosis/HQI/PQRI: Positive: Pneumonia, Other - C. difficile, 

bronchitis, influenza, septicemia, COPD


Provider Diagnoses: 


 Chest pain, Bronchitis, Leukocytosis








- Physician Notifications


Discussed Care of Patient With: Luis A Landeros


Instructed by Provider To: Admit As Inpatient





- Critical Care Time


Critical Care Time: 30-74 min





Discharge





- Sign-Out/Discharge


Documenting (check all that apply): Patient Departure


Patient Received Moderate/Deep Sedation with Procedure: No





- Discharge Plan


Condition: Fair


Disposition: ADMITTED TO [facility]


Referrals: 


Yeni Shanks MD [Primary Care Provider] - 





- Billing Disposition and Condition


Condition: FAIR


Disposition: Admitted to [facility]

## 2019-02-14 NOTE — DS
CC:  Dr. Shanks *

 

[facility] MEDICINE DISCHARGE SUMMARY:

 

DATE OF ADMISSION:  02/03/19

 

DATE OF DISCHARGE:  02/03/19 (against medical advice).

 

PRIMARY CARE PHYSICIAN:  Dr. Shanks.

 

ATTENDING PHYSICIAN:  Dr. Luis A Landeros * (dictation provided by Marielle Skelton NP).

 

PRIMARY DIAGNOSIS:  Sepsis secondary to suspected viral bronchitis.

 

SECONDARY DIAGNOSES:

1.  End-stage renal disease, on peritoneal dialysis.

2.  Chronic obstructive pulmonary disease with chronic hypoxic respiratory 
failure, on 5 L nasal cannula at home.

3.  Insulin-dependent type 2 diabetes, uncontrolled with hyperglycemia.

4.  Recent admission for community-acquired pneumonia.

5.  Recent admission for Clostridium difficile colitis, continuing Flagyl 
treatment.

6.  Peripheral arterial disease, on chronic warfarin therapy.

7.  Gastroesophageal reflux disease.

8.  Coronary artery disease.

9.  Melanoma.

10.  Gastroparesis.

11.  Anxiety.

12.  Depression.

 

MEDICATIONS:  At the time of discharge are:

1.  Tylenol p.r.n.

2.  Albuterol nebulizer and metered-dose inhaler p.r.n.

3.  Aspirin 81 mg p.o. daily.

4.  Metronidazole 500 mg p.o. t.i.d. (treatment for C. diff).

5.  Symbicort 160/4.5 two puffs inhaled b.i.d.

6.  Bethanechol 25 mg p.o. q.i.d.

7.  Losartan 100 mg p.o. daily.

8.  Lantus insulin 80 units subcutaneously at bedtime.

9.  NovoLog insulin via sliding scale with meals.

10.  Gabapentin 200 mg p.o. t.i.d.

11.  Calcitriol 0.25 mcg p.o. t.i.d.

12.  Sertraline 25 mg p.o. daily.

13.  Promethazine 25 mg p.o. q.8 hours p.r.n.

14.  Omeprazole 40 mg p.o. q.p.m.

15.  Methadone 5 mg p.o. q.8 hours p.r.n.

16.  Warfarin 7.5 mg p.o. at bedtime.

17.  Azithromycin 250 mg p.o. daily (treatment of bronchitis).

 

HOSPITAL COURSE:  Ms. Masters is a 38-year-old female who had originally 
presented to the emergency room on 02/03/19 with concerns for cough, chest pain 
with deep breathing, and shaking chills.  Please see the dictated H and P from 
myself for complete details.  In brief, the patient had recently been admitted 
to the hospital for a community-acquired pneumonia and then developed C. 
difficile colitis.  She was discharged against medical advice on 01/29/18.  She 
stated that she was initially feeling well, but then about 3 to 4 days prior to 
this admission, which would have been on or about 02/01/19, the patient 
suddenly developed cough with discomfort in the center of her chest on deep 
breathing.  She had generalized malaise.  She had shaking chills.  In the 
emergency room, her labs showed leukocytosis with a white blood cell count of 
19.9.  BUN and creatinine were elevated, but consistent with history of end-
stage renal disease.  Her CRP was 250.84.  She was afebrile.  Her pulse was 
elevated with tachycardia running around 110, but she was satting well on her 
home oxygen actually at 3 L with a stable blood pressure.  Her chest x-ray 
showed no acute intrathoracic process, but did show some peribronchial cuffing 
that could be seen in the setting of bronchitis or other inflammatory process.  
Her urinalysis showed 2+ leukocyte esterase, no nitrites, no bacteria.

 

Ms. Garnica was admitted to the hospital based on her multiple comorbid fragile 
medical conditions and concerns for sepsis which was thought to be secondary to 
bronchitis.  She was started on treatment with azithromycin.  Later that evening
, Ms. Masters indicated that she desired to leave against medical advice.  I 
discussed this with her at length, with her partner at the bedside.  I 
explained to her that I was concerned that she was showing symptoms of sepsis 
and highly suspected this was secondary perhaps to a viral bronchitis but at 
this point, she deserved close observation in the hospital for continued 
monitoring and treatment. I have discussed with her the possibility that her 
condition could worsen with worsening sepsis and instability, especially in the 
setting of her type 2 diabetes and end-stage renal disease.  She indicated 
understanding of this.  She stated that she "just needed to get out of the 
hospital."  She reported an intention to return to the hospital should she feel 
worse.  She does have capacity this evening and will be allowed to leave 
against medical advice.  I have provided her with a prescription for 
azithromycin and strongly encouraged her to return to the hospital at any point 
if she is not feeling well and assured her that certainly we would admit her 
again at that point.

 

DISPOSITION:  To home against medical advice.

 

DIET:  Low-carb, renal.

 

ACTIVITY:  As tolerated.

 

FOLLOWUP PLANS:  Please follow up with Dr. Shanks and I have encouraged the 
patient to call for an appointment  for reevaluation in the 3 days or less.

 

TIME SPENT:  Approximately 60 minutes was spent in the discharge against 
medical advice for this patient.

 

____________________________________ 

MARIELLE SKELTON NP

 

[id number]/CPS #: [id number]

SAEED

## 2019-02-18 ENCOUNTER — HOSPITAL ENCOUNTER (OUTPATIENT)
Dept: HOSPITAL 25 - ED | Age: 39
Setting detail: OBSERVATION
LOS: 1 days | Discharge: LEFT BEFORE DISCHARGE | End: 2019-02-19
Attending: INTERNAL MEDICINE | Admitting: INTERNAL MEDICINE
Payer: COMMERCIAL

## 2019-02-18 DIAGNOSIS — F32.9: ICD-10-CM

## 2019-02-18 DIAGNOSIS — R74.0: ICD-10-CM

## 2019-02-18 DIAGNOSIS — R53.1: Primary | ICD-10-CM

## 2019-02-18 DIAGNOSIS — Z99.2: ICD-10-CM

## 2019-02-18 DIAGNOSIS — I73.9: ICD-10-CM

## 2019-02-18 DIAGNOSIS — Z79.01: ICD-10-CM

## 2019-02-18 DIAGNOSIS — J44.9: ICD-10-CM

## 2019-02-18 DIAGNOSIS — J96.11: ICD-10-CM

## 2019-02-18 DIAGNOSIS — M54.9: ICD-10-CM

## 2019-02-18 DIAGNOSIS — N18.6: ICD-10-CM

## 2019-02-18 DIAGNOSIS — K21.9: ICD-10-CM

## 2019-02-18 DIAGNOSIS — E11.9: ICD-10-CM

## 2019-02-18 DIAGNOSIS — Z79.82: ICD-10-CM

## 2019-02-18 LAB
ALBUMIN SERPL BCG-MCNC: 2.7 G/DL (ref 3.2–5.2)
ALBUMIN/GLOB SERPL: 0.9 {RATIO} (ref 1–3)
ALP SERPL-CCNC: 80 U/L (ref 34–104)
ALT SERPL W P-5'-P-CCNC: 6 U/L (ref 7–52)
ANION GAP SERPL CALC-SCNC: 14 MMOL/L (ref 2–11)
AST SERPL-CCNC: 5 U/L (ref 13–39)
BASOPHILS # BLD AUTO: 0.1 10^3/UL (ref 0–0.2)
BUN SERPL-MCNC: 35 MG/DL (ref 6–24)
BUN/CREAT SERPL: 5.3 (ref 8–20)
CALCIUM SERPL-MCNC: 8.2 MG/DL (ref 8.6–10.3)
CHLORIDE SERPL-SCNC: 92 MMOL/L (ref 101–111)
EOSINOPHIL # BLD AUTO: 0.4 10^3/UL (ref 0–0.6)
FLUAV RNA SPEC QL NAA+PROBE: NEGATIVE
FLUBV RNA SPEC QL NAA+PROBE: NEGATIVE
GLOBULIN SER CALC-MCNC: 3.1 G/DL (ref 2–4)
GLUCOSE SERPL-MCNC: 190 MG/DL (ref 70–100)
HCO3 SERPL-SCNC: 25 MMOL/L (ref 22–32)
HCT VFR BLD AUTO: 29 % (ref 35–47)
HGB BLD-MCNC: 9.3 G/DL (ref 12–16)
LYMPHOCYTES # BLD AUTO: 0.7 10^3/UL (ref 1–4.8)
MCH RBC QN AUTO: 30 PG (ref 27–31)
MCHC RBC AUTO-ENTMCNC: 32 G/DL (ref 31–36)
MCV RBC AUTO: 92 FL (ref 80–97)
MONOCYTES # BLD AUTO: 1.1 10^3/UL (ref 0–0.8)
NEUTROPHILS # BLD AUTO: 13.7 10^3/UL (ref 1.5–7.7)
NRBC # BLD AUTO: 0 10^3/UL
NRBC BLD QL AUTO: 0.1
PLATELET # BLD AUTO: 424 10^3/UL (ref 150–450)
POTASSIUM SERPL-SCNC: 3.5 MMOL/L (ref 3.5–5)
PROT SERPL-MCNC: 5.8 G/DL (ref 6.4–8.9)
RBC # BLD AUTO: 3.12 10^6/UL (ref 4–5.4)
SODIUM SERPL-SCNC: 131 MMOL/L (ref 135–145)
WBC # BLD AUTO: 16 10^3/UL (ref 3.5–10.8)

## 2019-02-18 PROCEDURE — 72100 X-RAY EXAM L-S SPINE 2/3 VWS: CPT

## 2019-02-18 PROCEDURE — 93005 ELECTROCARDIOGRAM TRACING: CPT

## 2019-02-18 PROCEDURE — 96375 TX/PRO/DX INJ NEW DRUG ADDON: CPT

## 2019-02-18 PROCEDURE — 80048 BASIC METABOLIC PNL TOTAL CA: CPT

## 2019-02-18 PROCEDURE — 99284 EMERGENCY DEPT VISIT MOD MDM: CPT

## 2019-02-18 PROCEDURE — 85025 COMPLETE CBC W/AUTO DIFF WBC: CPT

## 2019-02-18 PROCEDURE — 85610 PROTHROMBIN TIME: CPT

## 2019-02-18 PROCEDURE — 86140 C-REACTIVE PROTEIN: CPT

## 2019-02-18 PROCEDURE — 94640 AIRWAY INHALATION TREATMENT: CPT

## 2019-02-18 PROCEDURE — 80053 COMPREHEN METABOLIC PANEL: CPT

## 2019-02-18 PROCEDURE — 96372 THER/PROPH/DIAG INJ SC/IM: CPT

## 2019-02-18 PROCEDURE — 71046 X-RAY EXAM CHEST 2 VIEWS: CPT

## 2019-02-18 PROCEDURE — 96376 TX/PRO/DX INJ SAME DRUG ADON: CPT

## 2019-02-18 PROCEDURE — 96374 THER/PROPH/DIAG INJ IV PUSH: CPT

## 2019-02-18 PROCEDURE — G0378 HOSPITAL OBSERVATION PER HR: HCPCS

## 2019-02-18 PROCEDURE — 36415 COLL VENOUS BLD VENIPUNCTURE: CPT

## 2019-02-18 PROCEDURE — 83605 ASSAY OF LACTIC ACID: CPT

## 2019-02-18 RX ADMIN — CALCITRIOL SCH MCG: 0.25 CAPSULE ORAL at 20:33

## 2019-02-18 RX ADMIN — INSULIN LISPRO SCH UNITS: 100 INJECTION, SOLUTION INTRAVENOUS; SUBCUTANEOUS at 19:27

## 2019-02-18 RX ADMIN — GABAPENTIN SCH MG: 300 CAPSULE ORAL at 20:26

## 2019-02-18 RX ADMIN — ONDANSETRON PRN MG: 2 INJECTION INTRAMUSCULAR; INTRAVENOUS at 16:06

## 2019-02-18 RX ADMIN — BETHANECHOL CHLORIDE SCH MG: 25 TABLET ORAL at 20:33

## 2019-02-18 RX ADMIN — MOMETASONE FUROATE AND FORMOTEROL FUMARATE DIHYDRATE SCH: 200; 5 AEROSOL RESPIRATORY (INHALATION) at 21:23

## 2019-02-18 NOTE — ED
Nausea/Vomiting/Diarrhea HPI





- HPI Summary


HPI Summary: 


Patient is a severely immunocompromised 38-year-old female presenting to the ED 

with bilateral hip pain, bilateral wrist pain, nausea, vomiting, chills, sweats

, fevers and diarrhea.  Symptoms began several days ago with the diarrhea 

beginning this morning.  History of chronic kidney failure and has peritoneal 

dialysis.  History of osteomyelitis with medications.  She states her at home 

tramadol and opioids are not improving her symptoms and like something 

stronger.  She states she has had the flu shot.  Denies any cough, congestion.  

Nothing makes the symptoms better or worse.  Endorses insomnia.








- History of Current Complaint


Chief Complaint: EDGeneral


Stated Complaint: GENERAL ILLNESS


Time Seen by Provider: 02/18/19 12:06


Hx Obtained From: Patient


Hx Last Menstrual Period: just finished


Pregnant?: No


Onset/Duration: Sudden Onset


Timing: Constant


Severity Initially: Severe


Severity Currently: Moderate


Pain Intensity: 8


Pain Scale Used: 0-10 Numeric


Character: Cramping


Aggravating Factor(s): Nothing


Alleviating Factor(s): Nothing


Nausea/Vomiting Duration: 12-24 hours


Vomiting Characteristics: Retching


Diarrhea Presence: Yes


Diarrhea Frequency: Every 1-2 hours


Diarrhea Duration: 0-12 hours





- Risk Factors


Influenza Risk Factors: Chronic Medical or Immunosuppresive Condition





- Allergies/Home Medications


Allergies/Adverse Reactions: 


 Allergies











Allergy/AdvReac Type Severity Reaction Status Date / Time


 


Adhesive Tape [Plastic Tape] Allergy Mild Blisters Verified 02/18/19 12:05


 


doxycycline Allergy  Unknown Verified 02/18/19 12:05





   Reaction  





   Details  


 


erythromycin base Allergy  See Comment Verified 02/18/19 12:05


 


heparin Allergy  See Comment Verified 02/18/19 12:05


 


metoclopramide [From Reglan] Allergy  Hives Verified 02/18/19 12:05


 


metronidazole [From Flagyl] Allergy  Nausea And Verified 02/18/19 12:05





   Vomiting  


 


niacin Allergy  Rash Verified 02/18/19 12:05


 


ropinirole [From Requip] Allergy  Hives Verified 02/18/19 12:05


 


Sulfa (Sulfonamide Allergy  Vomiting Verified 02/18/19 12:05





Antibiotics)     














PMH/Surg Hx/FS Hx/Imm Hx


Previously Healthy: No


Endocrine/Hematology History: Reports: Hx Anticoagulant Therapy - Aspirin., Hx 

Blood Transfusions, Hx Diabetes, Hx Thyroid Disease - nodule, Hx Anemia - hx of 

- reports had tranfusion in 2014 after mi


Cardiovascular History: Reports: Hx Angina, Hx Cardiac Arrest, Hx Cardiomegaly, 

Hx Coronary Artery Disease, Hx Deep Vein Thrombosis, Hx Hypercholesterolemia, 

Hx Hypertension, Hx Myocardial Infarction, Hx Peripheral Vascular Disease, 

Other Cardiovascular Problems/Disorders


   Denies: Hx Congestive Heart Failure, Hx Pacemaker/ICD, Hx Valvular Heart 

Disease


Respiratory History: Reports: Hx Asthma, Hx Chronic Obstructive Pulmonary 

Disease (COPD) - smoker, Hx Pneumonia, Hx Sleep Apnea, Other Respiratory 

Problems/Disorders - acute respipatory failure with hypoxia - dec 2016


   Denies: Hx Lung Cancer, Hx Pulmonary Embolism - pt says no, although 

documented in char


GI History: Reports: Hx Gall Bladder Disease, Hx Gastroesophageal Reflux Disease

, Other GI Disorders - GASTROPARESIS - TAKING OMEPRAZOLE, reglan and zofran


   Denies: Hx Gastrointestinal Bleed, Hx Ulcer, Hx Urosepsis


 History: Reports: Hx Acute Renal Failure, Hx Chronic Renal Failure - ESRD on 

PD, Hx Dialysis - PERITONEAL, Hx Kidney Stones, Hx Renal Disease - ESRD on home 

peritoneal dialysis , Other  Problems/Disorders


Musculoskeletal History: Reports: Hx Arthritis - back, hips, Hx Back Problems - 

Sciatica and Herniated L3 disk, Other Musculoskeletal History - partial 

amputation of toes/foot


Sensory History: Reports: Hx Contacts or Glasses, Hx Eye Prosthesis - left, Hx 

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


   Denies: Hx Hearing Aid


Opthamlomology History: Reports: Hx Contacts or Glasses, Hx Eye Prosthesis - 

left, Hx 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


Neurological History: Reports: Other Neuro Impairments/Disorders - neuropathy


   Denies: Hx Transient Ischemic Attacks (TIA)


Psychiatric History: Reports: Hx Anxiety, Hx Depression, Hx Bipolar Disorder


   Denies: Hx Eating Disorder, Hx Panic Disorder, Hx Schizophrenia, Hx of 

Violent Episodes Against Others





- Cancer History


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


Hx Chemotherapy: No


Hx Radiation Therapy: No





- Surgical History


Surgery Procedure, Year, and Place: LEFT EYE REMOVED 2012 - HAS PROSTHETIC EYE (

ORBITS DONE 5/2015 OK'D BY DR SAE COLE TO SCAN IN MRI) ;.  MELANOMA REMOVED 

FROM [facility] 2013 (PROCEDURE DONE 4X);.  CARDIAC CATH 2014 - NO STENTS;.  LEFT 

WRIST FISTULA PLACEMENT ([facility]) 2014;.  HEMODIALYSIS CATH RIGHT CHEST WALL 2014;.

  PERITONEAL DIALYSIS CATH 2/2015;.  CHEST WALL CATH REMOVAL 7/2016 [facility];.  

RIGHT GREAT TOE AMPUTATION, [facility] 4/2016;.  PLACEMENT RIGHT JUGULAR TESIO 

HEMODIALYSIS CATHETER [facility] 8/27/2016;.  REMOVAL OF JUGULAR CATH - SEPT 2016.  

CARDIAC CATH - stentsx2 right leg.  PARTIAL AMPUTATION RIGHT TOES 5/2017.  

RIGHT TOES ALL REMOVED


Hx Anesthesia Reactions: No





- Immunization History


Date of Tetanus Vaccine: utd


Date of Influenza Vaccine: 10/2017


Hx Pertussis Vaccination: No


Immunizations Up to Date: Yes


Infectious Disease History: No


Infectious Disease History: Reports: Hx of Known/Suspected MRSA - MRSA R 1st toe

, History Other Infectious Disease - GANGRENE


   Denies: Hx Clostridium Difficile, Hx Hepatitis, Hx Human Immunodeficiency 

Virus (HIV), Hx Shingles, Hx Tuberculosis, Traveled Outside the [place] in Last 30 

Days





- Family History


Known Family History: Positive: Cardiac Disease - father MI at 41 y/o, 

Hypertension, Other - Positive for bipolar and depression. Completed suicide to 

sister.  





- Social History


Occupation: Unemployed, Disabled


Lives: With Family


Alcohol Use: None


Hx Substance Use: No


Substance Use Type: Reports: None


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


Hx Tobacco Use: Yes


Smoking Status (MU): Heavy Every Day Tobacco Smoker


Type: Cigarettes


Amount Used/How Often: 1 PPD


Length of Time of Smoking/Using Tobacco: 20 YEARS


Have You Smoked in the Last Year: Yes





Review of Systems


Positive: Fever, Chills, Fatigue, Skin Diaphoresis


Negative: Blurred Vision, Diplopia, Drainage


Negative: Sore Throat, Ear Ache


Negative: Chest Pain


Positive: Cough.  Negative: Shortness Of Breath


Positive: Abdominal Pain, Vomiting, Diarrhea, Nausea


Positive: see HPI


Positive: Arthralgia, Myalgia, Decreased ROM


Positive: Other - PVD


Positive: Headache


All Other Systems Reviewed And Are Negative: Yes





Physical Exam


Triage Information Reviewed: Yes


Vital Signs On Initial Exam: 


 Initial Vitals











Pulse BP Pulse Ox


 


 110   114/57   95 


 


 02/18/19 11:56  02/18/19 11:56  02/18/19 11:56











Vital Signs Reviewed: Yes


Appearance: Positive: Ill-Appearing


Skin: Negative: Warm, Skin Color Reflects Adequate Perfusion, Dry, Cold


Head/Face: Positive: Normal Head/Face Inspection


Neck: Positive: No Lymphadenopathy


Respiratory/Lung Sounds: Positive: Clear to Auscultation


Cardiovascular: Positive: RRR, Pulses are Symmetrical in both Upper and Lower 

Extremities.  Negative: Leg Edema Left, Leg Edema Right


Abdomen Description: Positive: Other: - tednerness


Neurological: Positive: Sensory/Motor Intact


Psychiatric: Positive: Affect/Mood Appropriate





Diagnostics





- Vital Signs


 Vital Signs











  Temp Pulse Resp BP Pulse Ox


 


 02/18/19 16:09    18  


 


 02/18/19 16:00   99  18  97/56  95


 


 02/18/19 15:00    20  


 


 02/18/19 14:00   100  14   100


 


 02/18/19 13:53   103  17  130/84  98


 


 02/18/19 13:49    13  


 


 02/18/19 13:00   104  17   96


 


 02/18/19 12:18   103  14  95/65  97


 


 02/18/19 12:00   106    97


 


 02/18/19 11:58   110    97


 


 02/18/19 11:57  98.6 F  108  24  114/57  96


 


 02/18/19 11:56   110   114/57  95














- Laboratory


Lab Results: 


 Lab Results











  02/18/19 02/18/19 02/18/19 Range/Units





  12:22 12:22 12:22 


 


WBC  16.0 H    (3.5-10.8)  10^3/ul


 


RBC  3.12 L    (4.00-5.40)  10^6/ul


 


Hgb  9.3 L    (12.0-16.0)  g/dl


 


Hct  29 L    (35-47)  %


 


MCV  92    (80-97)  fL


 


MCH  30    (27-31)  pg


 


MCHC  32    (31-36)  g/dl


 


RDW  17 H    (10.5-15)  %


 


Plt Count  424    (150-450)  10^3/ul


 


MPV  8.4    (7.4-10.4)  fL


 


Neut % (Auto)  85.8    %


 


Lymph % (Auto)  4.6    %


 


Mono % (Auto)  6.7    %


 


Eos % (Auto)  2.4    %


 


Baso % (Auto)  0.5    %


 


Absolute Neuts (auto)  13.7 H    (1.5-7.7)  10^3/ul


 


Absolute Lymphs (auto)  0.7 L    (1.0-4.8)  10^3/ul


 


Absolute Monos (auto)  1.1 H    (0-0.8)  10^3/ul


 


Absolute Eos (auto)  0.4    (0-0.6)  10^3/ul


 


Absolute Basos (auto)  0.1    (0-0.2)  10^3/ul


 


Absolute Nucleated RBC  0    10^3/ul


 


Nucleated RBC %  0.1    


 


Sodium   131 L   (135-145)  mmol/L


 


Potassium   3.5   (3.5-5.0)  mmol/L


 


Chloride   92 L   (101-111)  mmol/L


 


Carbon Dioxide   25   (22-32)  mmol/L


 


Anion Gap   14 H   (2-11)  mmol/L


 


BUN   35 H   (6-24)  mg/dL


 


Creatinine   6.62 H   (0.51-0.95)  mg/dL


 


Est GFR ( Amer)   8.5   (>60)  


 


Est GFR (Non-Af Amer)   7.0   (>60)  


 


BUN/Creatinine Ratio   5.3 L   (8-20)  


 


Glucose   190 H   ()  mg/dL


 


Lactic Acid    2.2 H*  (0.5-2.0)  mmol/L


 


Calcium   8.2 L   (8.6-10.3)  mg/dL


 


Total Bilirubin   0.30   (0.2-1.0)  mg/dL


 


AST   5 L   (13-39)  U/L


 


ALT   6 L   (7-52)  U/L


 


Alkaline Phosphatase   80   ()  U/L


 


C-Reactive Protein   199.86 H   (<8.01)  mg/L


 


Total Protein   5.8 L   (6.4-8.9)  g/dL


 


Albumin   2.7 L   (3.2-5.2)  g/dL


 


Globulin   3.1   (2-4)  g/dL


 


Albumin/Globulin Ratio   0.9 L   (1-3)  


 


Influenza A (Rapid)     (Negative)  


 


Influenza B (Rapid)     (Negative)  














  02/18/19 02/18/19 Range/Units





  14:07 16:16 


 


WBC    (3.5-10.8)  10^3/ul


 


RBC    (4.00-5.40)  10^6/ul


 


Hgb    (12.0-16.0)  g/dl


 


Hct    (35-47)  %


 


MCV    (80-97)  fL


 


MCH    (27-31)  pg


 


MCHC    (31-36)  g/dl


 


RDW    (10.5-15)  %


 


Plt Count    (150-450)  10^3/ul


 


MPV    (7.4-10.4)  fL


 


Neut % (Auto)    %


 


Lymph % (Auto)    %


 


Mono % (Auto)    %


 


Eos % (Auto)    %


 


Baso % (Auto)    %


 


Absolute Neuts (auto)    (1.5-7.7)  10^3/ul


 


Absolute Lymphs (auto)    (1.0-4.8)  10^3/ul


 


Absolute Monos (auto)    (0-0.8)  10^3/ul


 


Absolute Eos (auto)    (0-0.6)  10^3/ul


 


Absolute Basos (auto)    (0-0.2)  10^3/ul


 


Absolute Nucleated RBC    10^3/ul


 


Nucleated RBC %    


 


Sodium    (135-145)  mmol/L


 


Potassium    (3.5-5.0)  mmol/L


 


Chloride    (101-111)  mmol/L


 


Carbon Dioxide    (22-32)  mmol/L


 


Anion Gap    (2-11)  mmol/L


 


BUN    (6-24)  mg/dL


 


Creatinine    (0.51-0.95)  mg/dL


 


Est GFR ( Amer)    (>60)  


 


Est GFR (Non-Af Amer)    (>60)  


 


BUN/Creatinine Ratio    (8-20)  


 


Glucose    ()  mg/dL


 


Lactic Acid   1.7  (0.5-2.0)  mmol/L


 


Calcium    (8.6-10.3)  mg/dL


 


Total Bilirubin    (0.2-1.0)  mg/dL


 


AST    (13-39)  U/L


 


ALT    (7-52)  U/L


 


Alkaline Phosphatase    ()  U/L


 


C-Reactive Protein    (<8.01)  mg/L


 


Total Protein    (6.4-8.9)  g/dL


 


Albumin    (3.2-5.2)  g/dL


 


Globulin    (2-4)  g/dL


 


Albumin/Globulin Ratio    (1-3)  


 


Influenza A (Rapid)  Negative   (Negative)  


 


Influenza B (Rapid)  Negative   (Negative)  











Result Diagrams: 


 02/18/19 12:22





 02/18/19 12:22


Lab Statement: Any lab studies that have been ordered have been reviewed, and 

results considered in the medical decision making process.





Naus/Vom/Diarrhea Course/Dx





- Course


Course Of Treatment: Patient is a immunocompromise 38-year-old individual who 

presents with multiple complaints to the ED.  She has been seen several times 

for uncontrollable pain, persistent sepsis and other infections, kidney failure

, as well as generalized illnesses.  Today she reports nausea, vomiting, 

diarrhea, abdominal pain, bilateral wrist pain which she states is "feels like 

they are bruised" as well as bilateral hip pain.  She's never had the hip pain 

or wrist pain in the past.  She endorses pain is severe and is requesting 

Dilaudid.  She is given Zofran and morphine, however I have declined her 

Dilaudid request.  She is often seen in the ED for request for pain medications 

with demands of IV pain control.Labs obtained which show a leukocytosis with a 

left shift.  Sodium of 131 however this is chronic.  CRP at 199.86.  Elevated 

CRP is also chronic.  Influenza negative.  Chest x-ray shows no acute 

cardiopulmonary findings.  Discussed case with Dr. krause, hospitalist who agrees 

to admit for further evaluation of her multiple symptoms.  Glucose elevated at 

190.  Patient is stable at this time.





- Differential Dx/Diagnosis


Provider Diagnosis: 


 Nausea, Vomiting, Diarrhea, Nausea & vomiting








Discharge





- Sign-Out/Discharge


Documenting (check all that apply): Patient Departure - review running





- Discharge Plan


Condition: Fair


Disposition: ADMITTED TO [facility]


Referrals: 


Yeni Shanks MD [Primary Care Provider] - 





- Billing Disposition and Condition


Condition: FAIR


Disposition: Admitted to [facility]

## 2019-02-18 NOTE — HP
CC:  Dr. Yeni Shanks *

 

HISTORY AND PHYSICAL:

 

DATE OF ADMISSION:  02/18/19

 

PRIMARY CARE PROVIDER:  Dr. Yeni Shanks.

 

ATTENDING PHYSICIAN:  Dr. Joy Pena * (dictated by Katie Bhat NP)
.

 

CHIEF COMPLAINT:  Low back pain with pain radiating down her both legs and pain 
in her hands.

 

HISTORY OF PRESENT ILLNESS:  Ms. Masters is a 38-year-old female with a past 
medical history significant for end-stage renal disease, on peritoneal dialysis
; COPD with chronic hypoxic respiratory failure, on 5 L nasal cannula at home; 
insulin-dependent diabetes mellitus type 2 with uncontrolled hyperglycemia; 
recent pneumonia; recent C. diff colitis; peripheral arterial disease, on 
chronic warfarin; GERD; coronary artery disease; melanoma; gastroparesis; 
anxiety; and depression, who presented to the emergency room with complaints of 
bilateral arm, leg and low back pain in addition to difficulty ambulating, 
nausea, vomiting, and diarrhea.  Ms. Masters was recently admitted on 02/03/19 
for a cough, chest pain with deep breathing and shaking.  At that time, it was 
felt that she likely had a viral bronchitis.  She was meeting sepsis criteria.  
She was admitted from the emergency room and continued on doxycycline.  She had 
an elevated troponin.  It was felt that this was likely consistent with her 
history of end-stage renal disease. Not long after the patient was admitted, 
she insists on leaving against medical advice and left on the same day of 
admission.  Ms. Masters again represented to the hospital today with complaints 
of bilateral hand discomfort, pain in her lower back radiating down her legs, 
difficulty ambulating, decreased appetite, nausea, vomiting, diarrhea.  She 
reports having fevers and chills 4 days ago.  She denies shortness of breath, 
chest pain.  She denies urinary symptoms such as urgency, dysuria, changes in 
frequency.  In terms of her arm, leg and back pain, she states the pain is 
worse with moving or touching the extremities or back.  She states that she has 
tried Tylenol and methadone without relief of her symptoms.  She finds nothing 
that helps with the pain and nothing that makes the pain worse.  She reports 
being unable to hold anything in bilateral hands.  She also reports pain in 
bilateral temples, complaining of a burning sensation.  She states that her 
hands feel as though they are bruised.  She is unable to further describe this.
  She states today she developed nausea, vomiting, and diarrhea with some 
abdominal cramping.  She has chronic hypoxic respiratory failure at baseline 
and is on 5 L of oxygen via nasal cannula.  She feels that the upper 
respiratory symptoms she had previously this month have resolved.  Due to her 
symptoms, she presented to the emergency room for further evaluation of her 
symptoms.

 

While in the emergency room, she had a chest x-ray showing stigmata of 
obstructive lung disease, no acute pulmonary or cardiac process evident.  She 
had an EKG showing an ST depression in lead III, V6, T-wave inversion in aVL.  
She had labs showing influenza A and B negative, CRP of 199.86, lactic acid of 
2.2, white blood cell count of 16.  She was anemic with a hemoglobin of 9.3, 
hematocrit of 29. Elevated BUN and creatinine 35 and 6.62 respectively.  These 
are consistent with her previous labs.  While in the emergency room, she 
received Zofran, Reglan, and morphine.  She was referred to the hospitalist 
service for possible admission.

 

PAST MEDICAL HISTORY:

1.  End-stage renal disease, on peritoneal dialysis.

2.  COPD with chronic hypoxic respiratory failure, on 5 L of oxygen via nasal 
cannula at home.

3.  Insulin-dependent type 2 diabetes mellitus.

4.  Peripheral arterial disease, on chronic warfarin therapy.

5.  GERD.

6.  Coronary artery disease.

7.  Melanoma.

8.  Gastroparesis.

9.  Anxiety.

10.  Depression.

11.  Hypertension.

12.  Hyperlipidemia.

13.  History of tobacco abuse.

 

PAST SURGICAL HISTORY:  Status post right metatarsal amputation.

 

HOME MEDICATIONS:  Include:

1.  Acetaminophen as needed.

2.  Albuterol nebulizer 2.5 mg/3 mL, 2.5 mg inhalation every 4 hours as needed 
for shortness of breath or wheeze.

3.  Albuterol HFA inhaler 2 puffs inhalation every 4 hours as needed for 
shortness of breath or wheeze.

4.  Aspirin 81 mg oral daily.

5.  Symbicort 160/4.5 mg 2 puffs inhalation twice daily.

6.  Bethanechol 25 mg oral 4 times daily.

7.  Losartan 100 mg oral daily.

8.  Lantus 80 units subcutaneous at bedtime.

9.  NovoLog insulin sliding scale, 0 to 1 units as directed.

10.  Gabapentin 200 mg oral 3 times daily.

11.  Calcitriol 0.25 mcg oral 3 times daily.

12.  Sertraline 25 mg oral daily.

13.  Promethazine 25 mg oral every 8 hours as needed for nausea.

14.  Omeprazole 40 mg oral every evening.

15.  Methadone 5 mg oral every 8 hours as needed for pain.

16.  Warfarin 7.5 mg oral daily.

 

ALLERGIES:  ADHESIVE TAPE, DOXYCYCLINE, ERYTHROMYCIN, HEPARIN, REGLAN, NIACIN, 
REQUIP, SULFA, and FLAGYL.

 

FAMILY HISTORY:  The patient's father had an MI at age 49.  Diabetes in mother, 
father and siblings.  Cancer; grandfather with lung cancer,  grandmother with 
ovarian cancer.

 

SOCIAL HISTORY:  She is a half a pack a day smoker.  Denies alcohol or 
recreational drug use.  She is disabled and lives with her boyfriend.  Her 
boyfriend, Raymundo Pascal will be her surrogate decision maker in the event she 
is unable to make decisions for herself.

 

REVIEW OF SYSTEMS:  I performed an 11-point review of systems.  All the 
pertinent positives and negatives are mentioned in the history of present 
illness.  The remaining review of systems are negative.

 

                               PHYSICAL EXAMINATION

 

GENERAL APPEARANCE:  The patient is alert, appears to be in no acute distress.

 

VITAL SIGNS:  Temperature 98.6, heart rate 103, respiratory rate 17, O2 sat 98% 
on 6 L via nasal cannula, blood pressure 130/84.

 

HEENT:  Normocephalic, atraumatic.  Pupils are equal and reactive to light. 
Extraocular movements are intact.

 

RESPIRATORY:  Lungs are clear to auscultation bilateral.  There is no accessory 
muscle use.

 

CARDIOVASCULAR:  Regular rate and rhythm.  S1, S2 present.  Tachycardic.

 

ABDOMEN:  Soft, nontender, nondistended.  Bowel sounds present x4.

 

EXTREMITIES:  No lower extremity edema.  DP and PT pulses are 1+ and symmetric.

 

MUSCULOSKELETAL:  There is no clubbing or cyanosis noted.  The patient exhibits 
good strength in all extremities.

 

NEUROLOGICAL:  Alert and oriented x4.  Cranial nerves II through XII are 
grossly intact.  She has equal handgrips bilateral, although they are weak 
bilateral.  She is able to dorsi and plantarflex bilateral, but it is weak 
bilateral.

 

PSYCHOLOGICAL:  She is calm and cooperative.

 

SKIN:  No rashes or abnormalities seen on the visualized skin.

 

 DIAGNOSTIC STUDIES/LAB DATA:  Sodium 131, potassium 3.5, chloride 92, CO2 of 25
, BUN 35, creatinine 6.62, glucose 169.  White blood cell count 16.0, 
hemoglobin 9.3, hematocrit 24, platelet count 424.  Lactic acid 2.2.  CRP 
199.86.  Influenza A and B negative.

 

EKG shows a sinus tachycardia, rate of 119.  There is ST depression in lead III 
and V6 and T-wave inversion in aVL.  This is similar to previous from 02/03/19.

 

Chest x-ray from today.  Radiologist's impression:  Stigmata of obstructive 
lung disease.  No acute pulmonary or cardiac process evident.

 

IMPRESSION:  Ms. Masters is a 38-year-old female with significant past medical 
history including end-stage renal disease, on peritoneal dialysis; chronic 
hypoxic respiratory failure, on 5 L of oxygen via nasal cannula; insulin-
dependent diabetes mellitus type 2 with recent hospitalization for viral 
bronchitis, who presented to the hospital with complaints of low back pain and 
pain in her arms and legs in addition to nausea, vomiting, and diarrhea.  Our 
plan is to observe the patient in the hospital given her multiple comorbidities.

 

ASSESSMENT/PLAN:

1.  Generalized weakness:  Ms. Masters has generalized weakness.  Her recent 
viral illness could be contributing to this.  I will have Physical Therapy 
evaluate her. We will see how see progresses.  She has no focal neurological 
deficits noted at this time.  In the differential for her upper extremity 
weakness and pain could be carpal tunnel syndrome, her other nerve impingement 
problems, additionally her lower extremity discomfort could be secondary to 
sciatica or diabetic neuropathy. We will provide supportive care.

2.  Intractable pain:  The patient is reporting intractable pain in her low back
, hands and legs.  The differential includes diabetic neuropathy, sciatica, 
carpal tunnel syndrome.  She is on methadone at home.  Plan will be to continue 
her home methadone.  I will increase her gabapentin to 300 mg t.i.d. from 200 
mg t.i.d.  We will also check lumbar spine plain film x-ray.

3.  End-stage renal disease, on peritoneal dialysis:  The patient has already 
alerted Dialysis that she has been admitted and is arranging for peritoneal 
dialysis.  She has no signs of fluid overload at this time.  She is slightly 
hyponatremic, but otherwise has normal electrolytes.

4.  Elevated lactic acid 2.2:  Currently, she has no signs of an infection.  
She is denying any urinary symptoms, has a negative chest x-ray.  We will check 
urinalysis if she is able to urinate.  I will recheck the lactic acid, give her 
some IV fluids.

5.  Chronic hypoxic respiratory failure secondary to chronic obstructive 
pulmonary disease.  The patient has no evidence of an exacerbation.  She has no 
wheezing on examination and is not needing additional oxygen at this time.  We 
will monitor her closely.  Continue her on her home inhaled medications.

6.  Type 2 diabetes mellitus:  Plan will be to check fingersticks a.c. and h.s. 
Place her on a lispro sliding scale and continue her home Lantus.

7.  Peripheral arterial disease:  I am going to continue her warfarin.  I will 
check an INR in the morning.  She was previously subtherapeutic.  We will 
continue aspirin.

8.  Gastroesophageal reflux disease:  We will continue her home pantoprazole.

9.  Depression:  Continue home sertraline.

10.  Fluids, electrolytes, and nutrition:  She will be on a renal consistent 
carbohydrate diet.

11.  Code status:  Full code.

12.  DVT prophylaxis:  She is at moderate risk.  She will be continued on her 
warfarin.

13.  Disposition:  Observation.

 

TIME SPENT:  Time for this admission was approximately 60 minutes, greater than 
half of that was spent with the patient and her boyfriend discussing medications
, past medical history, the events leading up to her arrival today, performing 
a physical examination.

 

The case has been reviewed with the attending, Dr. Pena, who agrees with the 
plan of care.

 

 ____________________________________ KATIE BHAT, KISHA

 

[id number]/CPS #: [id number]

SAEED

## 2019-02-19 VITALS — SYSTOLIC BLOOD PRESSURE: 116 MMHG | DIASTOLIC BLOOD PRESSURE: 37 MMHG

## 2019-02-19 LAB
ANION GAP SERPL CALC-SCNC: 13 MMOL/L (ref 2–11)
BASOPHILS # BLD AUTO: 0 10^3/UL (ref 0–0.2)
BUN SERPL-MCNC: 41 MG/DL (ref 6–24)
BUN/CREAT SERPL: 5.3 (ref 8–20)
CALCIUM SERPL-MCNC: 8.2 MG/DL (ref 8.6–10.3)
CHLORIDE SERPL-SCNC: 94 MMOL/L (ref 101–111)
EOSINOPHIL # BLD AUTO: 0.1 10^3/UL (ref 0–0.6)
GLUCOSE SERPL-MCNC: 215 MG/DL (ref 70–100)
HCO3 SERPL-SCNC: 27 MMOL/L (ref 22–32)
HCT VFR BLD AUTO: 27 % (ref 35–47)
HGB BLD-MCNC: 8.6 G/DL (ref 12–16)
INR PPP/BLD: 1.98 (ref 0.77–1.02)
LYMPHOCYTES # BLD AUTO: 0.5 10^3/UL (ref 1–4.8)
MCH RBC QN AUTO: 29 PG (ref 27–31)
MCHC RBC AUTO-ENTMCNC: 31 G/DL (ref 31–36)
MCV RBC AUTO: 93 FL (ref 80–97)
MONOCYTES # BLD AUTO: 1.2 10^3/UL (ref 0–0.8)
NEUTROPHILS # BLD AUTO: 17.1 10^3/UL (ref 1.5–7.7)
NRBC # BLD AUTO: 0 10^3/UL
NRBC BLD QL AUTO: 0.1
PLATELET # BLD AUTO: 399 10^3/UL (ref 150–450)
POTASSIUM SERPL-SCNC: 3.9 MMOL/L (ref 3.5–5)
RBC # BLD AUTO: 2.93 10^6/UL (ref 4–5.4)
SODIUM SERPL-SCNC: 134 MMOL/L (ref 135–145)
WBC # BLD AUTO: 18.9 10^3/UL (ref 3.5–10.8)

## 2019-02-19 RX ADMIN — MOMETASONE FUROATE AND FORMOTEROL FUMARATE DIHYDRATE SCH PUFF: 200; 5 AEROSOL RESPIRATORY (INHALATION) at 07:31

## 2019-02-19 RX ADMIN — CALCITRIOL SCH MCG: 0.25 CAPSULE ORAL at 08:18

## 2019-02-19 RX ADMIN — GABAPENTIN SCH MG: 300 CAPSULE ORAL at 13:25

## 2019-02-19 RX ADMIN — BETHANECHOL CHLORIDE SCH MG: 25 TABLET ORAL at 13:25

## 2019-02-19 RX ADMIN — LOSARTAN POTASSIUM SCH MG: 25 TABLET, FILM COATED ORAL at 08:19

## 2019-02-19 RX ADMIN — ONDANSETRON PRN MG: 2 INJECTION INTRAMUSCULAR; INTRAVENOUS at 09:20

## 2019-02-19 RX ADMIN — LOSARTAN POTASSIUM SCH: 25 TABLET, FILM COATED ORAL at 08:58

## 2019-02-19 RX ADMIN — CALCITRIOL SCH MCG: 0.25 CAPSULE ORAL at 13:26

## 2019-02-19 RX ADMIN — INSULIN LISPRO SCH UNITS: 100 INJECTION, SOLUTION INTRAVENOUS; SUBCUTANEOUS at 08:17

## 2019-02-19 RX ADMIN — GABAPENTIN SCH MG: 300 CAPSULE ORAL at 08:18

## 2019-02-19 RX ADMIN — INSULIN LISPRO SCH UNITS: 100 INJECTION, SOLUTION INTRAVENOUS; SUBCUTANEOUS at 13:24

## 2019-02-19 RX ADMIN — BETHANECHOL CHLORIDE SCH MG: 25 TABLET ORAL at 08:18

## 2019-02-19 NOTE — DS
CC:  Dr. Yeni Shanks; Dr. David Baker *

 

DISCHARGE SUMMARY:

 

DATE OF ADMISSION:  02/18/19

 

DATE OF DISCHARGE:  Against medical advice, 02/19/19.

 

PRIMARY CARE PROVIDER:  Yeni Shanks MD

 

NEPHROLOGIST:  David Baker MD

 

ATTENDING PHYSICIAN:  Joy Pena MD * (dictated by Miriam Torres NP)

 

PRIMARY DIAGNOSES:

1.  Generalized weakness secondary to pain.

2.  Elevated lactic acid.

 

SECONDARY DIAGNOSES:

1.  End-stage renal disease, on peritoneal dialysis.

2.  Chronic hypoxic respiratory failure.

3.  Chronic obstructive pulmonary disease.

4.  Diabetes mellitus, type 2.

5.  Peripheral arterial disease.

6.  Gastroesophageal reflux disease.

7.  Depression.

 

STUDIES WHILE IN THE HOSPITAL:

1.  Chest x-ray on 02/18/19 reads as stigmata of obstructive lung disease.  No 
acute pulmonary or cardiac process evident.

2.  EKG on 02/18/19 shows sinus tachycardia with a rate of 100, QTc 471, ST 
depression in 2 and V6, inverted T waves in aVL.  This is consistent with prior 
EKG.

3.  Lumbar spine on 02/18/19 reads as mild degenerative spondylosis.  Negative 
for fracture or spondylolisthesis.

 

HISTORY OF PRESENT ILLNESS AND HOSPITAL COURSE:  Ms. Masters is a 38-year-old 
female well known to our service with a past medical history of end-stage renal 
disease, on peritoneal dialysis, chronic hypoxic respiratory failure secondary 
to COPD, on 5 L nasal cannula at baseline, diabetes mellitus type 2, peripheral 
arterial disease, anxiety and depression who presented to the emergency room on 
02/18/19 with complaints of lower back pain and bilateral hand pain.  Please 
see the history and physical by Linda Moore NP for complete 
summary, the events leading up to this hospitalization.  In short, the patient 
was recently admitted to this facility on 02/13/19 for a suspected viral 
bronchitis.  She subsequently left against medical advice on the day of 
admission.  Yesterday, she presented with bilateral hand pain and lower back 
pain.  She additionally reported some nausea, vomiting, and diarrhea.  She was 
noted to be quite weak and unable to ambulate at her baseline and she was 
admitted by the hospitalist service.

 

The patient was requesting hydromorphone on admission, which she was not given 
initially, but was given overnight.  This morning, she was noted to have some 
soft blood pressures, which does seem to be consistent with her baseline and 
the hydromorphone was discontinued.  Physical Therapy consult was ordered, 
though they ultimately did not see the patient before she left against medical 
advice.  I spoke with the patient this afternoon and she reported that her pain 
in her back was a burning pain.  She did not report any hand pain or any pain 
radiating to either leg.  She does have chronic pain and takes methadone 
regularly, though she reports that this pain is increased from her baseline.  I 
spoke with the patient at length about options for pain management and my 
recommendation for her to possibly see the Pain Clinic in the future if this 
pain continues.  Her gabapentin was increased on admission due to the concern 
that this was neuropathic pain, though the patient is adamant that this pain is 
not neuropathic pain.  The patient was still quite weak when I saw her this 
afternoon; however, she was able to stand at the side of the bed and take a few 
steps with a walker.  She noted that she was feeling shaky and she typically 
feels that way when she does not take her methadone, which was held this 
morning due to her blood pressure.  Ultimately, the patient became frustrated 
as she did not feel that her pain was being managed and she requested to leave 
against medical advice.  I did discuss at length with the patient the risks of 
leaving against medical advice especially before a proper evaluation with 
physical therapy.  Risks include increased incidents of falls, subsequent injury
, and possible death.  The patient's significant other was at the bedside 
during this conversation.

 

Ms. Masters is leaving against medical advice today.  Vital signs are as follows
, temp 99.7, heart rate 113, respiratory rate 16, oxygen saturation 91% on 3 L 
nasal cannula, blood pressure 116/37.

 

DISCHARGE MEDICATIONS:  Continued medications:

1.  Albuterol 2.5 mg 1 neb q.4 hours p.r.n. shortness of breath, wheezing.

2.  Albuterol MDI 2 puffs q.6 hours p.r.n.

3.  Aspirin 81 mg p.o. daily.

4.  Bethanechol 25 mg p.o. 4 times a day.

5.  Symbicort 160/4.5 two puffs b.i.d.

6.  Calcitriol 0.25 mcg p.o. t.i.d.

7.  Gabapentin 200 mg p.o. t.i.d.

8.  NovoLog sliding scale (the patient may continue her usual sliding scale).

9.  Glargine 80 units subcu bedtime.

10.  Losartan 100 mg p.o. daily.

11.  Methadone 5 mg q.8 hours p.r.n. pain.

12.  Omeprazole 40 mg p.o. daily.

13.  Promethazine 25 mg p.o. q.8 hours p.r.n. nausea.

14.  Sertraline 25 mg p.o. daily.

15.  Warfarin 7.5 mg p.o. at bedtime.

 

DISCHARGE PLAN:  Ms. Masters is leaving against medical advice today.  Diet 
should be renal and consistent carb.  Activity should be as tolerated.  
Medications are noted above.  I am not making any medication changes at this 
point.  I will note that the patient's INR this morning was slightly 
subtherapeutic at 1.98, though she can continue her usual warfarin dosing and 
follow up with her PCP for any further dose adjustments.  She will need to 
follow up with her primary care provider in 4 to 7 days and I have advised that 
she may also speak with her primary at that time about potentially following up 
with the pain clinic if this pain persists.  The patient has been advised to 
return to the emergency room or nearest hospital for any worsening of symptoms, 
shortness of breath, lightheadedness, dizziness, chest discomfort, high fevers, 
chills, night sweats, loss of consciousness, or any other worrisome signs or 
symptoms.

 

This is summarized report of a complex medical history and hospital stay.  For 
further details, please see the entire medical record.

 

TIME SPENT:  Approximately 45 minutes were spent on this stage.

 

____________________________________ MIRIAM TORRES NP

 

[id number]/CPS #: [id number]

SAEED

## 2019-02-20 ENCOUNTER — HOSPITAL ENCOUNTER (INPATIENT)
Dept: HOSPITAL 25 - ED | Age: 39
LOS: 5 days | Discharge: HOME HEALTH | DRG: 720 | End: 2019-02-25
Attending: INTERNAL MEDICINE | Admitting: INTERNAL MEDICINE
Payer: COMMERCIAL

## 2019-02-20 DIAGNOSIS — Z79.4: ICD-10-CM

## 2019-02-20 DIAGNOSIS — G89.29: ICD-10-CM

## 2019-02-20 DIAGNOSIS — M51.36: ICD-10-CM

## 2019-02-20 DIAGNOSIS — I95.9: ICD-10-CM

## 2019-02-20 DIAGNOSIS — Z99.2: ICD-10-CM

## 2019-02-20 DIAGNOSIS — Z80.1: ICD-10-CM

## 2019-02-20 DIAGNOSIS — E11.43: ICD-10-CM

## 2019-02-20 DIAGNOSIS — M54.5: ICD-10-CM

## 2019-02-20 DIAGNOSIS — J44.1: ICD-10-CM

## 2019-02-20 DIAGNOSIS — Z99.81: ICD-10-CM

## 2019-02-20 DIAGNOSIS — E11.22: ICD-10-CM

## 2019-02-20 DIAGNOSIS — J96.21: ICD-10-CM

## 2019-02-20 DIAGNOSIS — Z79.01: ICD-10-CM

## 2019-02-20 DIAGNOSIS — I13.11: ICD-10-CM

## 2019-02-20 DIAGNOSIS — N18.6: ICD-10-CM

## 2019-02-20 DIAGNOSIS — N92.0: ICD-10-CM

## 2019-02-20 DIAGNOSIS — D62: ICD-10-CM

## 2019-02-20 DIAGNOSIS — D63.1: ICD-10-CM

## 2019-02-20 DIAGNOSIS — F17.210: ICD-10-CM

## 2019-02-20 DIAGNOSIS — Z88.2: ICD-10-CM

## 2019-02-20 DIAGNOSIS — Z79.899: ICD-10-CM

## 2019-02-20 DIAGNOSIS — E11.42: ICD-10-CM

## 2019-02-20 DIAGNOSIS — J44.9: ICD-10-CM

## 2019-02-20 DIAGNOSIS — F32.9: ICD-10-CM

## 2019-02-20 DIAGNOSIS — Z79.1: ICD-10-CM

## 2019-02-20 DIAGNOSIS — E78.5: ICD-10-CM

## 2019-02-20 DIAGNOSIS — Z80.41: ICD-10-CM

## 2019-02-20 DIAGNOSIS — D75.82: ICD-10-CM

## 2019-02-20 DIAGNOSIS — R79.1: ICD-10-CM

## 2019-02-20 DIAGNOSIS — J18.9: ICD-10-CM

## 2019-02-20 DIAGNOSIS — K21.9: ICD-10-CM

## 2019-02-20 DIAGNOSIS — M19.90: ICD-10-CM

## 2019-02-20 DIAGNOSIS — J44.0: ICD-10-CM

## 2019-02-20 DIAGNOSIS — F41.9: ICD-10-CM

## 2019-02-20 DIAGNOSIS — K31.84: ICD-10-CM

## 2019-02-20 DIAGNOSIS — Z88.1: ICD-10-CM

## 2019-02-20 DIAGNOSIS — Z83.3: ICD-10-CM

## 2019-02-20 DIAGNOSIS — Z79.51: ICD-10-CM

## 2019-02-20 DIAGNOSIS — I25.10: ICD-10-CM

## 2019-02-20 DIAGNOSIS — C51.9: ICD-10-CM

## 2019-02-20 DIAGNOSIS — Z82.49: ICD-10-CM

## 2019-02-20 DIAGNOSIS — Z91.048: ICD-10-CM

## 2019-02-20 DIAGNOSIS — Z88.8: ICD-10-CM

## 2019-02-20 DIAGNOSIS — A41.9: Primary | ICD-10-CM

## 2019-02-20 DIAGNOSIS — Z79.82: ICD-10-CM

## 2019-02-20 LAB
ALBUMIN SERPL BCG-MCNC: 2.7 G/DL (ref 3.2–5.2)
ALBUMIN/GLOB SERPL: 0.9 {RATIO} (ref 1–3)
ALP SERPL-CCNC: 73 U/L (ref 34–104)
ALT SERPL W P-5'-P-CCNC: 6 U/L (ref 7–52)
ANION GAP SERPL CALC-SCNC: 14 MMOL/L (ref 2–11)
AST SERPL-CCNC: 5 U/L (ref 13–39)
BASOPHILS # BLD AUTO: 0 10^3/UL (ref 0–0.2)
BUN SERPL-MCNC: 42 MG/DL (ref 6–24)
BUN/CREAT SERPL: 5 (ref 8–20)
CALCIUM SERPL-MCNC: 8 MG/DL (ref 8.6–10.3)
CHLORIDE SERPL-SCNC: 95 MMOL/L (ref 101–111)
EOSINOPHIL # BLD AUTO: 0.1 10^3/UL (ref 0–0.6)
GLOBULIN SER CALC-MCNC: 3.1 G/DL (ref 2–4)
GLUCOSE SERPL-MCNC: 121 MG/DL (ref 70–100)
HCO3 SERPL-SCNC: 25 MMOL/L (ref 22–32)
HCT VFR BLD AUTO: 25 % (ref 35–47)
HGB BLD-MCNC: 7.9 G/DL (ref 12–16)
LYMPHOCYTES # BLD AUTO: 0.7 10^3/UL (ref 1–4.8)
MCH RBC QN AUTO: 30 PG (ref 27–31)
MCHC RBC AUTO-ENTMCNC: 32 G/DL (ref 31–36)
MCV RBC AUTO: 93 FL (ref 80–97)
MONOCYTES # BLD AUTO: 0.7 10^3/UL (ref 0–0.8)
NEUTROPHILS # BLD AUTO: 14.2 10^3/UL (ref 1.5–7.7)
NRBC # BLD AUTO: 0 10^3/UL
NRBC BLD QL AUTO: 0.2
PLATELET # BLD AUTO: 417 10^3/UL (ref 150–450)
POTASSIUM SERPL-SCNC: 3.8 MMOL/L (ref 3.5–5)
PROT SERPL-MCNC: 5.8 G/DL (ref 6.4–8.9)
RBC # BLD AUTO: 2.67 10^6/UL (ref 4–5.4)
SODIUM SERPL-SCNC: 134 MMOL/L (ref 135–145)
WBC # BLD AUTO: 15.7 10^3/UL (ref 3.5–10.8)

## 2019-02-20 PROCEDURE — 85025 COMPLETE CBC W/AUTO DIFF WBC: CPT

## 2019-02-20 PROCEDURE — 83540 ASSAY OF IRON: CPT

## 2019-02-20 PROCEDURE — 81015 MICROSCOPIC EXAM OF URINE: CPT

## 2019-02-20 PROCEDURE — 99285 EMERGENCY DEPT VISIT HI MDM: CPT

## 2019-02-20 PROCEDURE — 85018 HEMOGLOBIN: CPT

## 2019-02-20 PROCEDURE — 81003 URINALYSIS AUTO W/O SCOPE: CPT

## 2019-02-20 PROCEDURE — 87493 C DIFF AMPLIFIED PROBE: CPT

## 2019-02-20 PROCEDURE — 87899 AGENT NOS ASSAY W/OPTIC: CPT

## 2019-02-20 PROCEDURE — 86900 BLOOD TYPING SEROLOGIC ABO: CPT

## 2019-02-20 PROCEDURE — 87077 CULTURE AEROBIC IDENTIFY: CPT

## 2019-02-20 PROCEDURE — 87186 SC STD MICRODIL/AGAR DIL: CPT

## 2019-02-20 PROCEDURE — 83550 IRON BINDING TEST: CPT

## 2019-02-20 PROCEDURE — 71045 X-RAY EXAM CHEST 1 VIEW: CPT

## 2019-02-20 PROCEDURE — 84100 ASSAY OF PHOSPHORUS: CPT

## 2019-02-20 PROCEDURE — 82746 ASSAY OF FOLIC ACID SERUM: CPT

## 2019-02-20 PROCEDURE — 94640 AIRWAY INHALATION TREATMENT: CPT

## 2019-02-20 PROCEDURE — 87086 URINE CULTURE/COLONY COUNT: CPT

## 2019-02-20 PROCEDURE — 80048 BASIC METABOLIC PNL TOTAL CA: CPT

## 2019-02-20 PROCEDURE — 85014 HEMATOCRIT: CPT

## 2019-02-20 PROCEDURE — 87040 BLOOD CULTURE FOR BACTERIA: CPT

## 2019-02-20 PROCEDURE — 74018 RADEX ABDOMEN 1 VIEW: CPT

## 2019-02-20 PROCEDURE — 83735 ASSAY OF MAGNESIUM: CPT

## 2019-02-20 PROCEDURE — 36600 WITHDRAWAL OF ARTERIAL BLOOD: CPT

## 2019-02-20 PROCEDURE — 82728 ASSAY OF FERRITIN: CPT

## 2019-02-20 PROCEDURE — 85610 PROTHROMBIN TIME: CPT

## 2019-02-20 PROCEDURE — 82803 BLOOD GASES ANY COMBINATION: CPT

## 2019-02-20 PROCEDURE — P9040 RBC LEUKOREDUCED IRRADIATED: HCPCS

## 2019-02-20 PROCEDURE — 83921 ORGANIC ACID SINGLE QUANT: CPT

## 2019-02-20 PROCEDURE — 80053 COMPREHEN METABOLIC PANEL: CPT

## 2019-02-20 PROCEDURE — 87106 FUNGI IDENTIFICATION YEAST: CPT

## 2019-02-20 PROCEDURE — 82607 VITAMIN B-12: CPT

## 2019-02-20 PROCEDURE — 82947 ASSAY GLUCOSE BLOOD QUANT: CPT

## 2019-02-20 PROCEDURE — 86850 RBC ANTIBODY SCREEN: CPT

## 2019-02-20 PROCEDURE — 86901 BLOOD TYPING SEROLOGIC RH(D): CPT

## 2019-02-20 PROCEDURE — 87641 MR-STAPH DNA AMP PROBE: CPT

## 2019-02-20 PROCEDURE — 85027 COMPLETE CBC AUTOMATED: CPT

## 2019-02-20 PROCEDURE — 86922 COMPATIBILITY TEST ANTIGLOB: CPT

## 2019-02-20 PROCEDURE — G0378 HOSPITAL OBSERVATION PER HR: HCPCS

## 2019-02-20 PROCEDURE — 36415 COLL VENOUS BLD VENIPUNCTURE: CPT

## 2019-02-20 RX ADMIN — PANTOPRAZOLE SODIUM SCH MG: 40 TABLET, DELAYED RELEASE ORAL at 19:11

## 2019-02-20 RX ADMIN — MOMETASONE FUROATE AND FORMOTEROL FUMARATE DIHYDRATE SCH PUFF: 200; 5 AEROSOL RESPIRATORY (INHALATION) at 20:34

## 2019-02-20 RX ADMIN — INSULIN LISPRO SCH UNITS: 100 INJECTION, SOLUTION INTRAVENOUS; SUBCUTANEOUS at 21:36

## 2019-02-20 RX ADMIN — BETHANECHOL CHLORIDE SCH: 25 TABLET ORAL at 21:37

## 2019-02-20 RX ADMIN — WARFARIN SODIUM SCH MG: 7.5 TABLET ORAL at 21:27

## 2019-02-20 RX ADMIN — IPRATROPIUM BROMIDE AND ALBUTEROL SULFATE SCH NEB: .5; 3 SOLUTION RESPIRATORY (INHALATION) at 20:35

## 2019-02-20 RX ADMIN — GUAIFENESIN SCH MG: 600 TABLET, EXTENDED RELEASE ORAL at 21:29

## 2019-02-20 RX ADMIN — SODIUM CHLORIDE SCH MLS/HR: 900 IRRIGANT IRRIGATION at 19:17

## 2019-02-20 RX ADMIN — INSULIN GLARGINE SCH UNIT: 100 INJECTION, SOLUTION SUBCUTANEOUS at 21:31

## 2019-02-20 RX ADMIN — BETHANECHOL CHLORIDE SCH MG: 25 TABLET ORAL at 19:57

## 2019-02-20 RX ADMIN — GABAPENTIN SCH MG: 100 CAPSULE ORAL at 21:28

## 2019-02-20 RX ADMIN — CALCITRIOL SCH MCG: 0.25 CAPSULE ORAL at 21:29

## 2019-02-20 NOTE — ED
HPI Chest Pain





- HPI Summary


HPI Summary: 


Pt is a 37 y/o female brought in by EMS who presents to the ED c/o CP. She was 

admitted to [facility] two days ago for N/V/D, fever, and low BP. Pt kept requesting 

pain medications then left AMA. Today she was found minimally responsive with a 

low BP and was taken to [facility]. There she had a full work-up that 

was unremarkable except for a WBC of 20, hemoglobin of 8.1, and INR of 2.6. A 

chest and abdomen XR were both negative. It was noted that pt appeared sedated 

and over-mediated. Pt was then transferred to [facility] for peritoneal dialysis, 

which she has scheduled today at 18:00. She currently c/o burning CP, HA, back 

pain, and low BP, but denies any diarrhea. The pain is rated a 10/10 in 

severity. Pt denies taking excess Methadone for pain. She does not want to be 

admitted today. PMHx HTN, HLD, DM, COPD, ESRD on dialysis. Pt is a smoker.





- History of Current Complaint


Chief Complaint: EDGeneral


Time Seen by Provider: 02/20/19 14:56


Hx Obtained From: Patient


Hx Last Menstrual Period: just finished


Onset/Duration: Started Hours Ago - Today, Still Present


Timing: Constant


Current Severity: Severe


Pain Intensity: 10


Pain Scale Used: 0-10 Numeric


Chest Pain Location: Left Lateral


Chest Pain Radiates: No


Character: Burning


Aggravating Factor(s): Nothing


Alleviating Factor(s): Nothing


Associated Signs and Symptoms: Positive: Chest Pain, Headaches, Back Pain


Related History: Similar Episode/Dx as: - frequently visits ED





- Additional Pertinent History


Primary Care Physician: LINDA





- Allergy/Home Medications


Allergies/Adverse Reactions: 


 Allergies











Allergy/AdvReac Type Severity Reaction Status Date / Time


 


Adhesive Tape [Plastic Tape] Allergy Mild Blisters Verified 02/18/19 12:05


 


doxycycline Allergy  Unknown Verified 02/18/19 12:05





   Reaction  





   Details  


 


erythromycin base Allergy  See Comment Verified 02/18/19 12:05


 


heparin Allergy  See Comment Verified 02/18/19 12:05


 


metoclopramide [From Reglan] Allergy  Hives Verified 02/18/19 12:05


 


metronidazole [From Flagyl] Allergy  Nausea And Verified 02/18/19 12:05





   Vomiting  


 


niacin Allergy  Rash Verified 02/18/19 12:05


 


ropinirole [From Requip] Allergy  Hives Verified 02/18/19 12:05


 


Sulfa (Sulfonamide Allergy  Vomiting Verified 02/18/19 12:05





Antibiotics)     











Home Medications: 


 Home Medications





Acetaminophen TAB* [Tylenol TAB*] 650 mg PO Q6H PRN 02/20/19 [History Confirmed 

02/20/19]











PMH/Surg Hx/FS Hx/Imm Hx


Endocrine/Hematology History: Reports: Hx Anticoagulant Therapy - Aspirin., Hx 

Blood Transfusions, Hx Diabetes, Hx Thyroid Disease - nodule, Hx Anemia - hx of 

- reports had tranfusion in 2014 after mi


Cardiovascular History: Reports: Hx Angina, Hx Cardiac Arrest, Hx Cardiomegaly, 

Hx Coronary Artery Disease, Hx Deep Vein Thrombosis, Hx Hypercholesterolemia, 

Hx Hypertension, Hx Myocardial Infarction, Hx Peripheral Vascular Disease, 

Other Cardiovascular Problems/Disorders


   Denies: Hx Congestive Heart Failure, Hx Pacemaker/ICD, Hx Valvular Heart 

Disease


Respiratory History: Reports: Hx Asthma, Hx Chronic Obstructive Pulmonary 

Disease (COPD) - smoker, Hx Pneumonia, Hx Sleep Apnea, Other Respiratory 

Problems/Disorders - acute respipatory failure with hypoxia - dec 2016


   Denies: Hx Lung Cancer, Hx Pulmonary Embolism - pt says no, although 

documented in char


GI History: Reports: Hx Gall Bladder Disease, Hx Gastroesophageal Reflux Disease

, Other GI Disorders - GASTROPARESIS - TAKING OMEPRAZOLE, reglan and zofran


   Denies: Hx Gastrointestinal Bleed, Hx Ulcer, Hx Urosepsis


 History: Reports: Hx Acute Renal Failure, Hx Chronic Renal Failure - ESRD on 

PD, Hx Dialysis - PERITONEAL, Hx Kidney Stones, Hx Renal Disease - ESRD on home 

peritoneal dialysis , Other  Problems/Disorders


Musculoskeletal History: Reports: Hx Arthritis - back, hips, Hx Back Problems - 

Sciatica and Herniated L3 disk, Other Musculoskeletal History - partial 

amputation of toes/foot


Sensory History: Reports: Hx Contacts or Glasses, Hx Eye Prosthesis - L eye, Hx 

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


   Denies: Hx Hearing Aid


Opthamlomology History: Reports: Hx Contacts or Glasses, Hx Eye Prosthesis - L 

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


Neurological History: Reports: Other Neuro Impairments/Disorders - neuropathy


   Denies: Hx Transient Ischemic Attacks (TIA)


Psychiatric History: Reports: Hx Anxiety, Hx Depression, Hx Bipolar Disorder


   Denies: Hx Eating Disorder, Hx Panic Disorder, Hx Schizophrenia, Hx of 

Violent Episodes Against Others





- Cancer History


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


Hx Chemotherapy: No


Hx Radiation Therapy: No





- Surgical History


Surgery Procedure, Year, and Place: LEFT EYE REMOVED 2012 - HAS PROSTHETIC EYE (

ORBITS DONE 5/2015 OK'D BY DR SAE COLE TO SCAN IN MRI) ;.  MELANOMA REMOVED 

FROM VULVA 2013 (PROCEDURE DONE 4X);.  CARDIAC CATH 2014 - NO STENTS;.  LEFT 

WRIST FISTULA PLACEMENT ([facility]) 2014;.  HEMODIALYSIS CATH RIGHT CHEST WALL 2014;.

  PERITONEAL DIALYSIS CATH 2/2015;.  CHEST WALL CATH REMOVAL 7/2016 [facility];.  

RIGHT GREAT TOE AMPUTATION, [facility] 4/2016;.  PLACEMENT RIGHT JUGULAR TESIO 

HEMODIALYSIS CATHETER [facility] 8/27/2016;.  REMOVAL OF JUGULAR CATH - SEPT 2016.  

CARDIAC CATH - stentsx2 right leg.  PARTIAL AMPUTATION RIGHT TOES 5/2017.  

RIGHT TOES ALL REMOVED


Hx Anesthesia Reactions: No





- Immunization History


Date of Tetanus Vaccine: utd


Date of Influenza Vaccine: 10/2017


Infectious Disease History: No


Infectious Disease History: Reports: Hx of Known/Suspected MRSA - MRSA R 1st toe

, History Other Infectious Disease - GANGRENE


   Denies: Hx Clostridium Difficile, Hx Hepatitis, Hx Human Immunodeficiency 

Virus (HIV), Hx Shingles, Hx Tuberculosis, Traveled Outside the [place] in Last 30 

Days





- Family History


Known Family History: Positive: Cardiac Disease - father MI at 41 y/o, 

Hypertension, Other - Positive for bipolar and depression. Completed suicide to 

sister.  





- Social History


Alcohol Use: None


Hx Substance Use: No


Substance Use Type: Reports: None


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


Hx Tobacco Use: Yes


Smoking Status (MU): Heavy Every Day Tobacco Smoker


Type: Cigarettes


Amount Used/How Often: 1 PPD


Length of Time of Smoking/Using Tobacco: 20 YEARS


Have You Smoked in the Last Year: Yes





Review of Systems


Positive: Chest Pain - burning, Other - hypotension


Negative: Diarrhea


Positive: Myalgia - back


Positive: Headache


All Other Systems Reviewed And Are Negative: Yes





Physical Exam





- Summary


Physical Exam Summary: 


Appearance: chronically-ill appearing, no pain distress, drowsy


Skin: warm, dry, poor color


Head/face: normal


Eyes: EOMI, VIOLETTA


ENT: mucous membranes moist


Neck: supple, non-tender


Respiratory: expiratory wheezes, breath sounds present


Cardiovascular: tachycardic but regular rhythm, pulses symmetrical 


Abdomen: non-tender, soft


Bowel Sounds: present


Musculoskeletal: normal, strength/ROM intact


Neuro: normal, sensory motor intact, A&Ox3


Triage Information Reviewed: Yes


Vital Signs On Initial Exam: 


 Initial Vitals











Temp Pulse Resp BP Pulse Ox


 


 97.4 F   99   18   109/76   100 


 


 02/20/19 14:52  02/20/19 14:52  02/20/19 14:52  02/20/19 14:52  02/20/19 14:52











Vital Signs Reviewed: Yes





Diagnostics





- Vital Signs


 Vital Signs











  Temp Pulse Resp BP Pulse Ox


 


 02/20/19 14:52  97.4 F  99  18  109/76  100














- Laboratory


Result Diagrams: 


 02/20/19 17:27





 02/20/19 17:27


Lab Statement: Any lab studies that have been ordered have been reviewed, and 

results considered in the medical decision making process.





Chest Pain Course/Dx





- Course


Course Of Treatment: Nurse's notes reviewed.  Outside records reviewed 

including full laboratories, x-ray of the chest and pelvis.  She has a white 

blood cell count of 20,000 and a hemoglobin of 8.1.  Her potassium is 

nonelevated.  Patient is still drowsy and has soft blood pressures.  She self 

medicates with opiates including methadone.  This is likely causative.  They 

did obtain peritoneal fluid for testing and culture at [facility].  She'

ll be admitted through the hospitalist service.  Patient is well known to both 

the ER and hospitalist service and just left AGAINST MEDICAL ADVICE yesterday.





- Chest Pain


Differential Diagnosis/HQI/PQRI: GI Disease, Lower Respiratory Infection, Other

: - Medication induced hypotension, sepsis





- Diagnoses


Provider Diagnoses: 


 COPD exacerbation, Opiate overdose, Leukocytosis, ESRD on peritoneal dialysis








- Provider Notifications


Discussed Care Of Patient With: Ivan Qureshi


Time Discussed With Above Provider: 15:50


Instructed by Provider To: Admit As Inpatient





Discharge





- Sign-Out/Discharge


Documenting (check all that apply): Patient Departure - Admit


Patient Received Moderate/Deep Sedation with Procedure: No





- Discharge Plan


Condition: Fair


Disposition: ADMITTED TO [facility]





- Billing Disposition and Condition


Condition: FAIR


Disposition: Admitted to [facility]





- Attestation Statements


Document Initiated by Scribe: Yes


Documenting Scribe: Pauly López


Provider For Whom Scribe is Documenting (Include Credential): Abdoul Rodriguez MD


Scribe Attestation: 


I, Pauly López, scribed for Abdoul Rodriguez MD on 02/20/19 at 1822. 


Scribe Documentation Reviewed: Yes


Provider Attestation: 


The documentation as recorded by the dorieibPauly carbajal accurately reflects the 

service I personally performed and the decisions made by me, Abdoul Rodriguez MD


Status of Scribe Document: Viewed

## 2019-02-20 NOTE — HP
CC:  Dr. Yeni Shanks *

 

ADMISSION HISTORY AND PHYSICAL:

 

DATE OF ADMISSION:  02/20/19

 

PRIMARY CARE PROVIDER:  Dr. Yeni Shanks.

 

MY ATTENDING WHILE IN THE HOSPITAL:  Dr. Ivan Qureshi.* (DICTATED BY SHELLEY SOUSA)

 

CHIEF COMPLAINT:  Hypotension, low back pain.

 

HISTORY OF PRESENT ILLNESS:  Ms. Masters is a 38-year-old female with numerous 
admissions to this hospital with a past medical history significant for end-
stage renal disease, on peritoneal dialysis; COPD, on 5 L chronic oxygen; 
diabetes mellitus; coronary artery disease; and chronic pain who has been 
admitted to this hospital 7 times since December of last year.  The patient was 
most recently admitted on 02/18/19 with pain in her back radiating into her 
feet as well as generalized weakness.  The patient at that time improved 
moderately with physical therapy, pain medication, left against medical advice 
on 02/19/19 due to concern for not having her pain addressed which is a 
frequent occurrence for her.  The patient was recently diagnosed on 02/03/19 
with viral bronchitis.  The patient states that since she was discharged, she 
has been feeling very poorly.  The patient states that she did not take anymore 
of her pain medication as prescribed. She states on the morning of 02/20/19, 
she was feeling poorly, sat up on edge of the bed and felt as if she is going 
to pass out but did not.  The patient had been having subjective fevers and 
chills.  The patient has been having cough.  The patient has been having 
persistent burning pain in her lower back, worse with movement or with touching 
her lower back, which radiates down both her legs and into the soles of her 
feet.  The patient denies bowel or bladder dysfunction or focal weakness.  The 
patient has not had any worsened cough.  The patient activated EMS and was 
taken to the [facility], where she was found to have an elevated 
white blood cell count and tachycardia and hypotension.  The patient was given 
several liters of fluid which eventually helped her blood pressure become 
normotensive.  The patient had labs which were consistent with her baseline.  
The patient had culture of her peritoneal fluid drawn.  The patient had blood 
cultures as well as a urine culture and a chest x-ray which showed no 
consolidation.  All of the others were pending.  The patient was transferred to 
[facility] as [facility] wanted to keep her for observation for 
possible infection with meeting SIRS criteria inpatient, but were unable to 
accommodate her peritoneal dialysis. The patient at [facility] was 
found to be persistently hypotensive.  At the time of evaluation, her blood 
pressure was 94/72.  The patient was also found to have significant wheezes on 
exam and we were asked to evaluate the patient for admission.

 

The patient also complains this morning of having at one point lost her hearing 
and had it slowly come back over the course of several hours.  The patient also 
complains of hearing noises of water flowing and wind blowing in her ears.

 

PAST MEDICAL HISTORY:

1.  End-stage renal disease, on peritoneal dialysis.

2.  COPD with chronic hypoxic respiratory failure, on 5 L of oxygen.

3.  Insulin-dependent diabetes mellitus type 2.

4.  Peripheral arterial disease.

5.  GERD.

6.  Coronary artery disease.

7.  Melanoma of the vulva.

8.  Gastroparesis.

9.  Anxiety.

10.  Depression.

11.  Hypertension.

12.  Hyperlipidemia.

13.  History of tobacco abuse.

14.  Arthritis.

15.  Degenerative disk disease.

 

PAST SURGICAL HISTORY:  Status post right metatarsal amputation.

 

MEDICATIONS:  At home, which the patient states she has been taking all of:

1.  Tylenol 650 mg p.o. q.4 hours as needed.

2.  Albuterol nebulizer 2.5 mg inhalation every 4 hours as needed for shortness 
of breath.

3.  Albuterol inhaler 2 puffs inhalation q.4 hours as needed for shortness of 
breath.

4.  Aspirin 81 mg p.o. daily.

5.  Symbicort 160/4.5 two puffs inhalation twice daily.

6.  Bethanechol 25 mg oral 4 times daily.

7.  Losartan 100 mg p.o. daily.

8.  Lantus 8 units subcutaneous at bedtime.

9.  NovoLog insulin 0 to 10 units as directed.

10.  Gabapentin 200 mg orally 3 times daily.

11.  Calcitriol 0.25 mcg orally 3 times daily.

12.  Sertraline 25 mg p.o. daily.

13.  Promethazine 25 mg orally every 8 hours as needed for nausea.

14.  Omeprazole 40 mg p.o. nightly.

15.  Methadone 5 mg orally q.8 hours as needed for pain.

16.  Warfarin 7.5 mg p.o. daily.

 

ALLERGIES:  ADHESIVE TAPE, DOXYCYCLINE, ERYTHROMYCIN, HEPARIN, REGLAN, FLAGYL, 
NIACIN, REQUIP, SULFA.

 

FAMILY HISTORY:  The patient's father had an MI at age 49.  The patient's 
mother had diabetes.  The patient's father also had diabetes.  The patient has 
several several siblings who have diabetes.  The patient had a grandfather with 
lung cancer and grandmother with ovarian cancer.

 

SOCIAL HISTORY:  The patient smokes half pack a day.  The patient denies 
alcohol or recreational drug use.  The patient is disabled, lives with her 
boyfriend.  The patient's boyfriend, Raymundo Pascal, will be her surrogate 
decision maker.

 

REVIEW OF SYSTEMS:  A 14-point review of systems was reviewed and is negative 
except as above in the HPI.

 

                               PHYSICAL EXAMINATION

 

GENERAL:  The patient is a 38-year-old female who appears older than stated age
, sitting comfortably in bed, in no acute distress.

 

VITAL SIGNS:  At the time of evaluation, temperature 99.7, pulse rate 113, 
respiratory rate 16, oxygen saturation 91% on 3 L, blood pressure 116/37.

 

HEENT:  Head:  Normocephalic, atraumatic.  Sclerae anicteric.  No conjunctival 
injection.  Nasal mucosa moist.  Oral mucosa moist.  No pharyngeal erythema, 
discharge, or exudate.  Mild erythema of the bilateral tympanic membrane, no 
bulging or exudate.

 

NECK:  Supple, nontender.  No lymphadenopathy.  No carotid bruits auscultated.  
No JVD.

 

RESPIRATORY:  Expiratory wheezes throughout, diminished breath sounds throughout
, rhonchi throughout.

 

CARDIAC:  Tachycardic.  No clicks, murmurs, gallops, or rubs.  Pulses are 2+ in 
the bilateral dorsalis pedis, posterior tibialis, and radial areas.

 

ABDOMEN:  Soft, nontender, nondistended.  Bowel sounds present and normoactive 
in all 4 quadrants.  No hepatosplenomegaly.  No abdominal bruits auscultated.  
No hepatojugular reflux.

 

GENITOURINARY:  No suprapubic or CVA tenderness.

 

NEURO:  Straight leg raise positive bilaterally with minor movement in the 
lower extremities.  Severe tenderness to palpation over the bilateral lower 
back. Reflexes normal.  No other focal neurologic deficits.  Cranial nerves II 
through XII intact.  Diffuse weakness.

 

PSYCHIATRIC:  Irritable but cooperative.

 

SKIN:  Clean, dry, and intact.  No rash.

 

 DIAGNOSTIC STUDIES/LABORATORY DATA:  Studies performed at [facility] include a x-ray of the abdomen and pelvis which shows no acute fracture, 
and PA and lateral chest x-ray showing no acute abnormality.

 

Laboratory data from [facility], white blood cell count 20.3; 
hemoglobin 8.1; platelet count 408; neutrophils high, neutrophil percent 94.3.  
PT 27.1, INR 2.6.  Glucose 281, BUN 41, creatinine 8.7, sodium 131, potassium 
3.9, chloride 93, carbon dioxide 26, anion gap 12, calcium 8.0, magnesium 2.3, 
protein 6.4, albumin 1.7, globulin 4.7, AST 8, ALT 10, alkaline phosphatase 93, 
lipase 93. Troponin I 0.165.  Urinalysis pending.  Peritoneal dialysis fluid 
pending.  Blood cultures pending.

 

ASSESSMENT AND PLAN:  Impression:  Ms. Masters is a 38-year-old female with 
complex medical history significant for end-stage renal disease, on peritoneal 
dialysis; chronic obstructive pulmonary disease with chronically needing 5 L of 
oxygen; diabetes mellitus type 2; coronary artery disease who presents to the 
emergency department with hypotension, elevated white blood cell count, 
tachycardia as well as severe low back pain unresponsive to medication.  The 
patient will be admitted to the hospital for observation while her cultures 
drawn at [facility] are pending as well as further investigation into her 
hypotension and low back pain.

 

1.  Hypotension.  The patient's hypotension is of unclear cause.  The patient 
has been taking her losartan and took it this morning.  The patient appears 
euvolemic on exam.  The patient denies taking any excessive opiates and was not 
given any at [facility].  The patient will be given fluids.  The patient is 
currently asymptomatic at rest.  The patient will be treated with steroids.  
The patient will be continued on fluids.  The patient will be continues on 
inpatient dialysis.  We will repeat the patient's labs here and the patient's 
antihypertensives will be held.  The patient will be monitored closely.  The 
patient will be dialyzed as needed for electrolyte abnormalities or 
hypertension.

2.  Sepsis.  The patient meets sepsis criteria, however, the patient has 
already received broad-spectrum antibiotics and a fluid bolus at [facility].  We 
will bolus again 1 L and then continue on fluids at a low rate for hypotension.
  The patient will be monitored closely and her Zosyn will be continued.

3.  Low back pain.  The patient has radicular low back pain with some atypical 
features.  The patient had a previous lumbar spine MRI in 2014 which showed 
degenerative disk disease.  We will repeat this at this time and pending result 
of that neurosurgical consultation may be indicated.  The patient will have her 
home methadone for pain control.

4.  Chronic obstructive pulmonary disease.  The patient appears to be in mild 
chronic obstructive pulmonary disease exacerbation possibly from a viral 
infection. The patient will have albuterol inhalers as needed, steroids orally 
at 60 mg daily and antitussives.  The patient is currently on her home dose of 
oxygen.  The patient has chronic respiratory failure associated with this.

5.  Coronary artery disease.  The patient not currently having chest pain. 
Continue the patient's aspirin.

6.  FEN:  The patient will have renal diet.  The patient will have fluids as 
above.

7.  DVT prophylaxis:  The patient is on warfarin.  The patient will have INRs 
monitored daily.  The patient was therapeutic earlier today at [facility].

8.  Disposition:  The patient is admitted under observation.

 

TIME SPENT:  Approximately 60 minutes was spent on the admission of this patient
, 30 of which was spent face-to-face with the patient obtaining history and 
physical and discussing treatment plan.

 

Plan was discussed with my attending, Dr. Ivan Qureshi, and he is in 
agreement.

 

 ____________________________________ SHELLEY SOUSA

 

[id number]/CPS #: [id number]

SAEED

## 2019-02-21 LAB
ANION GAP SERPL CALC-SCNC: 15 MMOL/L (ref 2–11)
BASOPHILS # BLD AUTO: 0.1 10^3/UL (ref 0–0.2)
BUN SERPL-MCNC: 47 MG/DL (ref 6–24)
BUN/CREAT SERPL: 5.3 (ref 8–20)
CALCIUM SERPL-MCNC: 8.4 MG/DL (ref 8.6–10.3)
CHLORIDE SERPL-SCNC: 97 MMOL/L (ref 101–111)
EOSINOPHIL # BLD AUTO: 0 10^3/UL (ref 0–0.6)
GLUCOSE SERPL-MCNC: 266 MG/DL (ref 70–100)
HCO3 SERPL-SCNC: 24 MMOL/L (ref 22–32)
HCT VFR BLD AUTO: 26 % (ref 35–47)
HGB BLD-MCNC: 7.9 G/DL (ref 12–16)
INR PPP/BLD: 3.77 (ref 0.77–1.02)
LYMPHOCYTES # BLD AUTO: 0.3 10^3/UL (ref 1–4.8)
MAGNESIUM SERPL-MCNC: 2.1 MG/DL (ref 1.9–2.7)
MCH RBC QN AUTO: 28 PG (ref 27–31)
MCHC RBC AUTO-ENTMCNC: 31 G/DL (ref 31–36)
MCV RBC AUTO: 92 FL (ref 80–97)
MONOCYTES # BLD AUTO: 0.5 10^3/UL (ref 0–0.8)
NEUTROPHILS # BLD AUTO: 19.6 10^3/UL (ref 1.5–7.7)
NRBC # BLD AUTO: 0 10^3/UL
NRBC BLD QL AUTO: 0.1
PLATELET # BLD AUTO: 448 10^3/UL (ref 150–450)
POTASSIUM SERPL-SCNC: 3.7 MMOL/L (ref 3.5–5)
RBC # BLD AUTO: 2.8 10^6/UL (ref 4–5.4)
SODIUM SERPL-SCNC: 136 MMOL/L (ref 135–145)
WBC # BLD AUTO: 20.6 10^3/UL (ref 3.5–10.8)

## 2019-02-21 PROCEDURE — 3E1M39Z IRRIGATION OF PERITONEAL CAVITY USING DIALYSATE, PERCUTANEOUS APPROACH: ICD-10-PCS | Performed by: INTERNAL MEDICINE

## 2019-02-21 RX ADMIN — MOMETASONE FUROATE AND FORMOTEROL FUMARATE DIHYDRATE SCH PUFF: 200; 5 AEROSOL RESPIRATORY (INHALATION) at 19:17

## 2019-02-21 RX ADMIN — PIPERACILLIN AND TAZOBACTAM SCH MLS/HR: 3; .375 INJECTION, POWDER, LYOPHILIZED, FOR SOLUTION INTRAVENOUS; PARENTERAL at 12:57

## 2019-02-21 RX ADMIN — IPRATROPIUM BROMIDE AND ALBUTEROL SULFATE SCH NEB: .5; 3 SOLUTION RESPIRATORY (INHALATION) at 19:16

## 2019-02-21 RX ADMIN — INSULIN LISPRO SCH UNITS: 100 INJECTION, SOLUTION INTRAVENOUS; SUBCUTANEOUS at 18:18

## 2019-02-21 RX ADMIN — LIDOCAINE 4% SCH: 4 GEL TOPICAL at 20:57

## 2019-02-21 RX ADMIN — ALBUTEROL SULFATE PRN MG: 2.5 SOLUTION RESPIRATORY (INHALATION) at 14:27

## 2019-02-21 RX ADMIN — BETHANECHOL CHLORIDE SCH MG: 25 TABLET ORAL at 18:17

## 2019-02-21 RX ADMIN — BETHANECHOL CHLORIDE SCH MG: 25 TABLET ORAL at 10:31

## 2019-02-21 RX ADMIN — CALCITRIOL SCH MCG: 0.25 CAPSULE ORAL at 10:31

## 2019-02-21 RX ADMIN — IPRATROPIUM BROMIDE AND ALBUTEROL SULFATE SCH NEB: .5; 3 SOLUTION RESPIRATORY (INHALATION) at 07:06

## 2019-02-21 RX ADMIN — GABAPENTIN SCH MG: 100 CAPSULE ORAL at 20:57

## 2019-02-21 RX ADMIN — INSULIN LISPRO SCH UNITS: 100 INJECTION, SOLUTION INTRAVENOUS; SUBCUTANEOUS at 21:27

## 2019-02-21 RX ADMIN — IPRATROPIUM BROMIDE AND ALBUTEROL SULFATE SCH: .5; 3 SOLUTION RESPIRATORY (INHALATION) at 00:31

## 2019-02-21 RX ADMIN — INSULIN LISPRO SCH UNITS: 100 INJECTION, SOLUTION INTRAVENOUS; SUBCUTANEOUS at 14:03

## 2019-02-21 RX ADMIN — IPRATROPIUM BROMIDE AND ALBUTEROL SULFATE SCH NEB: .5; 3 SOLUTION RESPIRATORY (INHALATION) at 12:05

## 2019-02-21 RX ADMIN — GUAIFENESIN SCH MG: 600 TABLET, EXTENDED RELEASE ORAL at 10:30

## 2019-02-21 RX ADMIN — CALCITRIOL SCH MCG: 0.25 CAPSULE ORAL at 14:04

## 2019-02-21 RX ADMIN — PIPERACILLIN AND TAZOBACTAM SCH MLS/HR: 3; .375 INJECTION, POWDER, LYOPHILIZED, FOR SOLUTION INTRAVENOUS; PARENTERAL at 05:46

## 2019-02-21 RX ADMIN — INSULIN LISPRO SCH UNITS: 100 INJECTION, SOLUTION INTRAVENOUS; SUBCUTANEOUS at 08:26

## 2019-02-21 RX ADMIN — BETHANECHOL CHLORIDE SCH MG: 25 TABLET ORAL at 12:48

## 2019-02-21 RX ADMIN — GUAIFENESIN SCH MG: 600 TABLET, EXTENDED RELEASE ORAL at 20:57

## 2019-02-21 RX ADMIN — WARFARIN SODIUM SCH MG: 7.5 TABLET ORAL at 20:57

## 2019-02-21 RX ADMIN — SERTRALINE HYDROCHLORIDE SCH MG: 25 TABLET ORAL at 10:30

## 2019-02-21 RX ADMIN — MOMETASONE FUROATE AND FORMOTEROL FUMARATE DIHYDRATE SCH PUFF: 200; 5 AEROSOL RESPIRATORY (INHALATION) at 07:05

## 2019-02-21 RX ADMIN — IPRATROPIUM BROMIDE AND ALBUTEROL SULFATE SCH: .5; 3 SOLUTION RESPIRATORY (INHALATION) at 03:14

## 2019-02-21 RX ADMIN — PREDNISONE SCH MG: 20 TABLET ORAL at 10:30

## 2019-02-21 RX ADMIN — INSULIN GLARGINE SCH UNIT: 100 INJECTION, SOLUTION SUBCUTANEOUS at 21:27

## 2019-02-21 RX ADMIN — PANTOPRAZOLE SODIUM SCH MG: 40 TABLET, DELAYED RELEASE ORAL at 18:18

## 2019-02-21 RX ADMIN — GABAPENTIN SCH MG: 100 CAPSULE ORAL at 10:30

## 2019-02-21 RX ADMIN — ASPIRIN SCH MG: 81 TABLET, COATED ORAL at 10:30

## 2019-02-21 RX ADMIN — CALCITRIOL SCH MCG: 0.25 CAPSULE ORAL at 20:57

## 2019-02-21 RX ADMIN — BETHANECHOL CHLORIDE SCH MG: 25 TABLET ORAL at 20:57

## 2019-02-21 RX ADMIN — PIPERACILLIN AND TAZOBACTAM SCH MLS/HR: 3; .375 INJECTION, POWDER, LYOPHILIZED, FOR SOLUTION INTRAVENOUS; PARENTERAL at 23:35

## 2019-02-21 RX ADMIN — GABAPENTIN SCH MG: 100 CAPSULE ORAL at 14:04

## 2019-02-21 RX ADMIN — SODIUM CHLORIDE SCH MLS/HR: 900 IRRIGANT IRRIGATION at 08:42

## 2019-02-21 NOTE — PN
Date of Service: 02/19/19


Critical Care Services: 


39 y/o female with multiple medical and behavioral problems and multiple 

admissions here - med problems include renal failure (Rx peritoneal dialysis), 

COPD, diabetes, CAD, chronic pain - this admission for low back pain, but this 

AM was found confused and hypoxic and brought to ICU - on arrival to ICU 

patient was alert, oriented, and oxygenating adequately on high-flow nasal O2. 

She is not aware of what hapened this AM





Vital Signs: 











Temp Pulse Resp BP SpO2 FiO2


 


99.7 F 108 13 113/35 100 100











Physical Exam: 


Gen:Awake, oriented, breathing comfortably


Lungs: BS distant. No adventitious sounds


Abdomen: Distended. Peritoneal dialysis catheter in place.


Extremities: No cyanosis or edema





Fluid Balance (Past 24 Hours): 











 02/21/19 02/22/19





 06:59 06:59


 


Intake Total 187 520


 


Balance 187 520


 


Weight 203 lb 


 


Intake:  


 


  IV Fluids 82 


 


    IV fluids & IVPB 82 


 


  IVPB 105 


 


    IV fluids & IVPB 105 


 


  Oral 0 520


 


Other:  


 


  Estimated Void Medium 


 


  Date of Last Bowel  2/21/19





  Movement  


 


  # Bowel Movements 1 2


 


  Estimated Stool Amount Small Medium


 


  # Voids 1 





 








Labs: 


 Laboratory Results - last 24 hr














  02/21/19 02/21/19 02/21/19





  07:18 08:30 12:25


 


WBC    20.6 


 


RBC    2.80 L


 


Hgb    7.9 L


 


Hct    26 L


 


MCV    92


 


MCH    28


 


MCHC    31


 


RDW    17 H


 


Plt Count    448


 


MPV    8.8


 


Neut % (Auto)    95.2


 


Lymph % (Auto)    1.6


 


Mono % (Auto)    2.6


 


Eos % (Auto)    0


 


Baso % (Auto)    0.6


 


Absolute Neuts (auto)    19.6 H


 


Absolute Lymphs (auto)    0.3 L


 


Absolute Monos (auto)    0.5


 


Absolute Eos (auto)    0


 


Absolute Basos (auto)    0.1


 


Absolute Nucleated RBC    0


 


Nucleated RBC %    0.1


 


INR (Anticoag Therapy)   


 


ABG pH   7.28 L 


 


ABG pCO2   44 


 


ABG pO2   120 H 


 


ABG HCO3   20.2 


 


ABG O2 Saturation   99.0 H 


 


ABG Base Excess   -6.0 L 


 


FiO2   100 


 


Sodium   


 


Potassium   


 


Chloride   


 


Carbon Dioxide   


 


Anion Gap   


 


BUN   


 


Creatinine   


 


Est GFR ( Amer)   


 


Est GFR (Non-Af Amer)   


 


BUN/Creatinine Ratio   


 


Glucose   


 


POC Glucose (mg/dL)  303 H  


 


Calcium   


 


Magnesium   


 


Total Bilirubin   


 


AST   


 


ALT   


 


Alkaline Phosphatase   


 


Total Protein   


 


Albumin   


 


Globulin   


 


Albumin/Globulin Ratio   














  02/21/19 02/21/19 02/21/19





  12:25 12:25 16:25


 


WBC   


 


RBC   


 


Hgb   


 


Hct   


 


MCV   


 


MCH   


 


MCHC   


 


RDW   


 


Plt Count   


 


MPV   


 


Neut % (Auto)   


 


Lymph % (Auto)   


 


Mono % (Auto)   


 


Eos % (Auto)   


 


Baso % (Auto)   


 


Absolute Neuts (auto)   


 


Absolute Lymphs (auto)   


 


Absolute Monos (auto)   


 


Absolute Eos (auto)   


 


Absolute Basos (auto)   


 


Absolute Nucleated RBC   


 


Nucleated RBC %   


 


INR (Anticoag Therapy)   3.77 H 


 


Patient Temperature   


 


ABG pH   


 


ABG pH (Temp Correct)   


 


ABG pCO2   


 


ABG pCO2 (Temp Corrct   


 


ABG pO2   


 


ABG pO2 (Temp Correct   


 


ABG HCO3   


 


ABG O2 Saturation   


 


ABG Base Excess   


 


Respiration Rate   


 


Ventilator Type   


 


Vent Mode   


 


FiO2   


 


Inspiratory Time   


 


PEEP   


 


Pressure Support   


 


Pressure Control   


 


EPAP   


 


IPAP   


 


BiPAP   


 


Sodium  136  


 


Potassium  3.7  


 


Chloride  97 L  


 


Carbon Dioxide  24  


 


Anion Gap  15 H  


 


BUN  47 H  


 


Creatinine  8.90 H  


 


Est GFR ( Amer)  6.0  


 


Est GFR (Non-Af Amer)  5.0  


 


BUN/Creatinine Ratio  5.3 L  


 


Glucose  266 H  


 


POC Glucose (mg/dL)    246 H


 


Calcium  8.4 L  


 


Magnesium  2.1  


 


Total Bilirubin   


 


AST   


 


ALT   


 


Alkaline Phosphatase   


 


Total Protein   


 


Albumin   


 


Globulin   


 


Albumin/Globulin Ratio   











Studies: 


CXR: ? infiltrate left base





Nutrition: 


Renal diet





Impression: 


? pneumonia left lung as source of hypoxemia.





Plan: 


1. Continue high-flow nasal O2 as needed


2. Continue IV PIP/TAZO pending culture results.


3. Repeat Chest x-ray tomorrow.








Critical Care Time: 30 minutes

## 2019-02-21 NOTE — PN
Progress Note





- Progress Note


Date of Service: 02/21/19


Note: 





Cross coverage: 





Called to see pt with new onset hypoxia. Uses 5L NC at baseline, this AM obn 

15L facemask with SpO2 85-90%


Pt reports feeling cold but denies SOB, CP or other symptoms


She received short PD from her boyfriend overnight with lower osm dialysate 


Reports that she urinates 1-2x/day still





BP 140s/80s


Lungs diffusely rhonchorous with expiratory wheezes throughout 


Alter and interactive





Admin 80IV lasix now


Stat CXR 


Transfer to ICU and trial vapotherm


ABG on vapotherm 





Will discuss with primary attending and intensivist on call

## 2019-02-22 LAB
ANION GAP SERPL CALC-SCNC: 14 MMOL/L (ref 2–11)
BUN SERPL-MCNC: 47 MG/DL (ref 6–24)
BUN/CREAT SERPL: 5.5 (ref 8–20)
CALCIUM SERPL-MCNC: 7.8 MG/DL (ref 8.6–10.3)
CHLORIDE SERPL-SCNC: 100 MMOL/L (ref 101–111)
GLUCOSE SERPL-MCNC: 135 MG/DL (ref 70–100)
HCO3 SERPL-SCNC: 26 MMOL/L (ref 22–32)
HCT VFR BLD AUTO: 22 % (ref 35–47)
HCT VFR BLD AUTO: 26 % (ref 35–47)
HGB BLD-MCNC: 6.8 G/DL (ref 12–16)
HGB BLD-MCNC: 8.4 G/DL (ref 12–16)
INR PPP/BLD: 6.68 (ref 0.77–1.02)
MCH RBC QN AUTO: 29 PG (ref 27–31)
MCHC RBC AUTO-ENTMCNC: 32 G/DL (ref 31–36)
MCV RBC AUTO: 91 FL (ref 80–97)
PLATELET # BLD AUTO: 406 10^3/UL (ref 150–450)
POTASSIUM SERPL-SCNC: 3.1 MMOL/L (ref 3.5–5)
RBC # BLD AUTO: 2.38 10^6/UL (ref 4–5.4)
SODIUM SERPL-SCNC: 140 MMOL/L (ref 135–145)
WBC # BLD AUTO: 19.9 10^3/UL (ref 3.5–10.8)

## 2019-02-22 PROCEDURE — 30233N1 TRANSFUSION OF NONAUTOLOGOUS RED BLOOD CELLS INTO PERIPHERAL VEIN, PERCUTANEOUS APPROACH: ICD-10-PCS | Performed by: HOSPITALIST

## 2019-02-22 RX ADMIN — ALBUTEROL SULFATE PRN MG: 2.5 SOLUTION RESPIRATORY (INHALATION) at 09:27

## 2019-02-22 RX ADMIN — GABAPENTIN SCH MG: 100 CAPSULE ORAL at 09:43

## 2019-02-22 RX ADMIN — BETHANECHOL CHLORIDE SCH MG: 25 TABLET ORAL at 13:04

## 2019-02-22 RX ADMIN — INSULIN LISPRO SCH UNITS: 100 INJECTION, SOLUTION INTRAVENOUS; SUBCUTANEOUS at 17:15

## 2019-02-22 RX ADMIN — BETHANECHOL CHLORIDE SCH MG: 25 TABLET ORAL at 22:12

## 2019-02-22 RX ADMIN — PIPERACILLIN AND TAZOBACTAM SCH MLS/HR: 3; .375 INJECTION, POWDER, LYOPHILIZED, FOR SOLUTION INTRAVENOUS; PARENTERAL at 12:01

## 2019-02-22 RX ADMIN — INSULIN GLARGINE SCH UNIT: 100 INJECTION, SOLUTION SUBCUTANEOUS at 21:57

## 2019-02-22 RX ADMIN — IPRATROPIUM BROMIDE AND ALBUTEROL SULFATE SCH NEB: .5; 3 SOLUTION RESPIRATORY (INHALATION) at 13:17

## 2019-02-22 RX ADMIN — ACETAMINOPHEN PRN MG: 325 TABLET ORAL at 12:00

## 2019-02-22 RX ADMIN — CALCITRIOL SCH MCG: 0.25 CAPSULE ORAL at 12:00

## 2019-02-22 RX ADMIN — BETHANECHOL CHLORIDE SCH MG: 25 TABLET ORAL at 17:14

## 2019-02-22 RX ADMIN — GABAPENTIN SCH MG: 100 CAPSULE ORAL at 14:56

## 2019-02-22 RX ADMIN — METHADONE HYDROCHLORIDE PRN MG: 5 TABLET ORAL at 07:09

## 2019-02-22 RX ADMIN — SERTRALINE HYDROCHLORIDE SCH MG: 25 TABLET ORAL at 09:42

## 2019-02-22 RX ADMIN — INSULIN LISPRO SCH UNITS: 100 INJECTION, SOLUTION INTRAVENOUS; SUBCUTANEOUS at 21:56

## 2019-02-22 RX ADMIN — MORPHINE SULFATE PRN MG: 4 INJECTION INTRAVENOUS at 09:40

## 2019-02-22 RX ADMIN — MORPHINE SULFATE PRN MG: 4 INJECTION INTRAVENOUS at 14:54

## 2019-02-22 RX ADMIN — INSULIN LISPRO SCH UNITS: 100 INJECTION, SOLUTION INTRAVENOUS; SUBCUTANEOUS at 12:03

## 2019-02-22 RX ADMIN — NICOTINE SCH PATCH: 21 PATCH TRANSDERMAL at 09:44

## 2019-02-22 RX ADMIN — GUAIFENESIN SCH MG: 600 TABLET, EXTENDED RELEASE ORAL at 20:26

## 2019-02-22 RX ADMIN — PANTOPRAZOLE SODIUM SCH MG: 40 TABLET, DELAYED RELEASE ORAL at 17:14

## 2019-02-22 RX ADMIN — GUAIFENESIN SCH MG: 600 TABLET, EXTENDED RELEASE ORAL at 09:43

## 2019-02-22 RX ADMIN — MOMETASONE FUROATE AND FORMOTEROL FUMARATE DIHYDRATE SCH PUFF: 200; 5 AEROSOL RESPIRATORY (INHALATION) at 21:07

## 2019-02-22 RX ADMIN — ACETAMINOPHEN PRN MG: 325 TABLET ORAL at 17:55

## 2019-02-22 RX ADMIN — CEFEPIME HYDROCHLORIDE SCH MLS/HR: 1 INJECTION, SOLUTION INTRAVENOUS at 17:54

## 2019-02-22 RX ADMIN — MOMETASONE FUROATE AND FORMOTEROL FUMARATE DIHYDRATE SCH PUFF: 200; 5 AEROSOL RESPIRATORY (INHALATION) at 06:09

## 2019-02-22 RX ADMIN — IPRATROPIUM BROMIDE AND ALBUTEROL SULFATE SCH: .5; 3 SOLUTION RESPIRATORY (INHALATION) at 00:28

## 2019-02-22 RX ADMIN — ASPIRIN SCH MG: 81 TABLET, COATED ORAL at 09:43

## 2019-02-22 RX ADMIN — IPRATROPIUM BROMIDE AND ALBUTEROL SULFATE SCH NEB: .5; 3 SOLUTION RESPIRATORY (INHALATION) at 06:09

## 2019-02-22 RX ADMIN — CALCITRIOL SCH MCG: 0.25 CAPSULE ORAL at 09:43

## 2019-02-22 RX ADMIN — GABAPENTIN SCH MG: 100 CAPSULE ORAL at 20:26

## 2019-02-22 RX ADMIN — IPRATROPIUM BROMIDE AND ALBUTEROL SULFATE SCH NEB: .5; 3 SOLUTION RESPIRATORY (INHALATION) at 21:07

## 2019-02-22 RX ADMIN — WATER SCH NOTE: 100 INJECTION, SOLUTION INTRAVENOUS at 22:16

## 2019-02-22 RX ADMIN — METHADONE HYDROCHLORIDE PRN MG: 5 TABLET ORAL at 17:55

## 2019-02-22 RX ADMIN — MORPHINE SULFATE PRN MG: 4 INJECTION INTRAVENOUS at 20:25

## 2019-02-22 RX ADMIN — LIDOCAINE 4% SCH: 4 GEL TOPICAL at 09:44

## 2019-02-22 RX ADMIN — BETHANECHOL CHLORIDE SCH MG: 25 TABLET ORAL at 09:43

## 2019-02-22 RX ADMIN — POTASSIUM CHLORIDE SCH MEQ: 750 TABLET, FILM COATED, EXTENDED RELEASE ORAL at 17:14

## 2019-02-22 RX ADMIN — CALCITRIOL SCH MCG: 0.25 CAPSULE ORAL at 22:12

## 2019-02-22 RX ADMIN — MOMETASONE FUROATE AND FORMOTEROL FUMARATE DIHYDRATE SCH PUFF: 200; 5 AEROSOL RESPIRATORY (INHALATION) at 07:20

## 2019-02-22 RX ADMIN — POTASSIUM CHLORIDE SCH MEQ: 750 TABLET, FILM COATED, EXTENDED RELEASE ORAL at 17:55

## 2019-02-22 RX ADMIN — INSULIN LISPRO SCH UNITS: 100 INJECTION, SOLUTION INTRAVENOUS; SUBCUTANEOUS at 09:39

## 2019-02-22 NOTE — PN
Progress Note





- Progress Note


Date of Service: 02/22/19


Note: 





I was notified of abnormal labs from this AM. Her Hb dropped from 9.3 on 2/18/ 19 to 7.9 on 2/21/19 to 6.8 today. Shortly after being notified the nurse asked 

for me to come to bedside. The patient had a BM and with it came a large blood 

clot. The patient has her period but that bleeding has been quite light and the 

clot clearly was mixed in with the stool. GI consult will need to be requested. 

1 unit PRBC ordered. Follow up H/H to be obtained at 0900. Her INR was elevated 

at 3.77 on 1/21/19. Will repeat the INR at 0900. Will only hold coumadin for 

now but may need FFP vs vitamin K if INR higher or H/H drops further.

## 2019-02-22 NOTE — PN
Subjective


Date of Service: 02/22/19


Interval History: 





No subj change in SOB.  Pt states she is having her menses and often passes 

blood clots.  No change chronic pain.  She requests NRT. 





Objective


Active Medications: 








Acetaminophen (Tylenol Tab*)  650 mg PO Q6H PRN


   PRN Reason: FEVER/PAIN


Albuterol (Ventolin 2.5 Mg/3 Ml Neb.Sol*)  2.5 mg INH Q2H PRN


   PRN Reason: SOB/WHEEZING


   Last Admin: 02/21/19 14:27 Dose:  2.5 mg


Albuterol/Ipratropium (Duoneb (Albuterol 2.5 Mg/Ipratropium 0.5 Mg))  1 neb INH 

RT.[id number]-VWVEL AWAKE [facility]


   Last Admin: 02/22/19 06:09 Dose:  1 neb


Aspirin (Aspirin Ec Tab*)  81 mg PO DAILY [facility]


   Last Admin: 02/21/19 10:30 Dose:  81 mg


Bethanechol Chloride (Urecholine Tab*)  25 mg PO QID [facility]


   Last Admin: 02/21/19 20:57 Dose:  25 mg


Calcitriol (Rocaltrol  Cap*)  0.25 mcg PO TID [facility]


   Last Admin: 02/21/19 20:57 Dose:  0.25 mcg


Dextrose (D50w Syringe 50 Ml*)  12.5 gm IV PUSH .FOR FS < 60 - SS PRN


   PRN Reason: FS < 60


Gabapentin (Neurontin Cap(*))  200 mg PO TID [facility]


   Last Admin: 02/21/19 20:57 Dose:  200 mg


Guaifenesin (Mucinex*)  1,200 mg PO BID [facility]


   Last Admin: 02/21/19 20:57 Dose:  1,200 mg


Piperacillin Sod/Tazobactam (Sod 3.375 gm/ Sodium Chloride)  100 mls @ 25 mls/

hr IVPB Q12H [facility]


   Last Admin: 02/21/19 23:35 Dose:  25 mls/hr


Insulin Glargine (Lantus(*))  80 units SUBCUT BEDTIME [facility]


   Last Admin: 02/21/19 21:27 Dose:  80 unit


Insulin Human Lispro (Humalog*)  0 units SUBCUT ACHS [facility]; Protocol


   Last Admin: 02/21/19 21:27 Dose:  9 units


Lidocaine (Topicaine 4% Gel*)  1 applic TOPICAL DAILY [facility]


   Last Admin: 02/21/19 20:57 Dose:  Not Given


Methadone HCl (Dolophine Tab*)  5 mg PO Q8HR PRN


   PRN Reason: PAIN


   Last Admin: 02/22/19 07:09 Dose:  5 mg


Mometasone Furoate/Formoterol Fumar (Dulera 200/5 Mdi*)  2 puff INH BID [facility]; 

Protocol


   Last Admin: 02/22/19 07:20 Dose:  2 puff


Morphine Sulfate (Morphine Vial*)  4 mg IV Q4H PRN


   PRN Reason: PAIN


Ondansetron HCl (Zofran Inj*)  4 mg IV Q6H PRN


   PRN Reason: NAUSEA


Pantoprazole Sodium (Protonix Tab*)  40 mg PO QPM [facility]


   Last Admin: 02/21/19 18:18 Dose:  40 mg


Pharmacy Consult (Zosyn Per Pharmacy*)  1 note FOLLOW UP .ZOSYN PER PHARMACY [facility]


Prednisone (Deltasone Tab*)  60 mg PO DAILY [facility]


   Last Admin: 02/21/19 10:30 Dose:  60 mg


Promethazine HCl (Phenergan Tab*)  25 mg PO Q8H PRN


   PRN Reason: NAUSEA


Sertraline HCl (Zoloft*)  25 mg PO DAILY [facility]


   Last Admin: 02/21/19 10:30 Dose:  25 mg








 Vital Signs - 8 hr











  02/22/19 02/22/19 02/22/19





  02:00 03:00 03:01


 


Temperature   


 


Pulse Rate 92 103 102


 


Respiratory 15 19 16





Rate   


 


Blood Pressure 110/56  114/80





(mmHg)   


 


O2 Sat by Pulse 90 89 89





Oximetry   














  02/22/19 02/22/19 02/22/19





  03:38 03:55 04:00


 


Temperature 96.6 F  


 


Pulse Rate   94


 


Respiratory  19 18





Rate   


 


Blood Pressure   137/64





(mmHg)   


 


O2 Sat by Pulse   91





Oximetry   














  02/22/19 02/22/19 02/22/19





  05:00 05:01 06:00


 


Temperature   


 


Pulse Rate 87 88 110


 


Respiratory 15 17 18





Rate   


 


Blood Pressure  123/67 





(mmHg)   


 


O2 Sat by Pulse 98 96 96





Oximetry   














  02/22/19 02/22/19 02/22/19





  06:02 06:09 06:25


 


Temperature   


 


Pulse Rate 113 111 


 


Respiratory 19 21 22





Rate   


 


Blood Pressure   





(mmHg)   


 


O2 Sat by Pulse 94 98 





Oximetry   














  02/22/19 02/22/19 02/22/19





  07:00 07:01 07:05


 


Temperature   


 


Pulse Rate  100 106


 


Respiratory 18 18 21





Rate   


 


Blood Pressure   122/61





(mmHg)   


 


O2 Sat by Pulse  98 99





Oximetry   














  02/22/19 02/22/19 02/22/19





  07:09 07:49 08:00


 


Temperature   


 


Pulse Rate  100 


 


Respiratory 23 14 18





Rate   


 


Blood Pressure  145/68 





(mmHg)   


 


O2 Sat by Pulse  98 





Oximetry   














  02/22/19 02/22/19 02/22/19





  08:01 08:11 08:12


 


Temperature   


 


Pulse Rate 98 93 93


 


Respiratory 18 16 16





Rate   


 


Blood Pressure  118/69 107/62





(mmHg)   


 


O2 Sat by Pulse 97 94 95





Oximetry   














  02/22/19 02/22/19





  09:00 09:01


 


Temperature  


 


Pulse Rate 102 105


 


Respiratory 25 18





Rate  


 


Blood Pressure  141/43





(mmHg)  


 


O2 Sat by Pulse 96 95





Oximetry  











Oxygen Devices in Use Now: High Flow Heated Nasal Cannula


Appearance: Alert, on her R side in ICU bed.  In fair spirits.  Somewhat 

tachypneic but otherwise looks comfortable.


Eyes: No Scleral Icterus


Neck: NL Appearance and Movements; NL JVP, No Thyroid Enlargement, Masses


Respiratory: Symmetrical Chest Expansion and Respiratory Effort, Clear to 

Percussion, - - mod rhonchi BL


Cardiovascular: NL Sounds; No Murmurs; No JVD, RRR, No Edema, -


Abdominal: NL Sounds; No Tenderness; No Distention, No Hepatosplenomegaly, -


Extremities: No Edema, No Clubbing, Cyanosis, -


Skin: No Rash or Ulcers, No Nodules or Sclerosis, -


Neurological: Alert and Oriented x 3, NL Sensation


Result Diagrams: 


 02/22/19 05:15





 02/22/19 05:15


Microbiology and Other Data: 


 Microbiology











 02/21/19 20:30 Stool Gross Appearance - Final





 Stool C. difficile DNA Amplification - Final





    027 Presumptive NEGATIVE





    Toxigenic C.diff NEGATIVE


 


 02/21/19 10:54 Nasal Screen MRSA (PCR) - Final





 Nasal    Mrsa Not Detected














Assess/Plan/Problems-Billing


Assessment: 











- Patient Problems


(1) Pneumonia


Current Visit: No   Status: Acute   Code(s): J18.9 - PNEUMONIA, UNSPECIFIED 

ORGANISM   SNOMED Code(s): 960098080


   Comment: Abnl CXR, leukocytosis, worsened A-A gradient.  Although afebrile, 

PNA is a strong possibility.  Continue pip/gerry.   





(2) COPD (chronic obstructive pulmonary disease)


Current Visit: No   Status: Chronic   Code(s): J44.9 - CHRONIC OBSTRUCTIVE 

PULMONARY DISEASE, UNSPECIFIED   SNOMED Code(s): 25950135


   Comment: Slow prednisone taper start 2/23.  Continue neb tx, mometasone/

formoterol in place of her Symbicort.


In process of weaning off Vapotherm 2/22.   





(3) Chronic pain


Current Visit: No   Status: Acute   Code(s): G89.29 - OTHER CHRONIC PAIN   

SNOMED Code(s): 34248912


   Comment: 


 - Likely combination of diabetic neuropathy and chronic disease.


 - Continue methadone 5mg Q8h and gabapentin.   





(4) Tobacco abuse


Current Visit: No   Status: Acute   Code(s): Z72.0 - TOBACCO USE   SNOMED Code(s

): 594298260


   Comment: Pt advised to quit smoking and avoid second hand smoke.  NRT 

ordered.    





(5) ESRD on peritoneal dialysis


Current Visit: No   Status: Chronic   Code(s): N18.6 - END STAGE RENAL DISEASE; 

Z99.2 - DEPENDENCE ON RENAL DIALYSIS   SNOMED Code(s): 39601847


   Comment: 


 - On PD at night


 - Stable   





(6) Type II diabetes mellitus with complication, uncontrolled


Current Visit: No   Status: Acute   Code(s): E11.8 - TYPE 2 DIABETES MELLITUS 

WITH UNSPECIFIED COMPLICATIONS; E11.65 - TYPE 2 DIABETES MELLITUS WITH 

HYPERGLYCEMIA   SNOMED Code(s): 85663699


   Comment: 


The patient has uncontrolled type II DM. She is insulin dependent. Continue 

Glargine 40u and SSI insulin.

## 2019-02-23 LAB
ANION GAP SERPL CALC-SCNC: 12 MMOL/L (ref 2–11)
BASOPHILS # BLD AUTO: 0 10^3/UL (ref 0–0.2)
BASOPHILS # BLD AUTO: 0 10^3/UL (ref 0–0.2)
BUN SERPL-MCNC: 43 MG/DL (ref 6–24)
BUN/CREAT SERPL: 6 (ref 8–20)
CALCIUM SERPL-MCNC: 7.5 MG/DL (ref 8.6–10.3)
CHLORIDE SERPL-SCNC: 102 MMOL/L (ref 101–111)
EOSINOPHIL # BLD AUTO: 0 10^3/UL (ref 0–0.6)
EOSINOPHIL # BLD AUTO: 0.2 10^3/UL (ref 0–0.6)
GLUCOSE SERPL-MCNC: 34 MG/DL (ref 70–100)
HCO3 SERPL-SCNC: 27 MMOL/L (ref 22–32)
HCT VFR BLD AUTO: 25 % (ref 35–47)
HCT VFR BLD AUTO: 28 % (ref 35–47)
HGB BLD-MCNC: 7.8 G/DL (ref 12–16)
HGB BLD-MCNC: 8.9 G/DL (ref 12–16)
INR PPP/BLD: 3.34 (ref 0.77–1.02)
LYMPHOCYTES # BLD AUTO: 0.2 10^3/UL (ref 1–4.8)
LYMPHOCYTES # BLD AUTO: 1.5 10^3/UL (ref 1–4.8)
MCH RBC QN AUTO: 29 PG (ref 27–31)
MCH RBC QN AUTO: 29 PG (ref 27–31)
MCHC RBC AUTO-ENTMCNC: 32 G/DL (ref 31–36)
MCHC RBC AUTO-ENTMCNC: 32 G/DL (ref 31–36)
MCV RBC AUTO: 91 FL (ref 80–97)
MCV RBC AUTO: 91 FL (ref 80–97)
MONOCYTES # BLD AUTO: 0.1 10^3/UL (ref 0–0.8)
MONOCYTES # BLD AUTO: 0.5 10^3/UL (ref 0–0.8)
NEUTROPHILS # BLD AUTO: 12.3 10^3/UL (ref 1.5–7.7)
NEUTROPHILS # BLD AUTO: 22 10^3/UL (ref 1.5–7.7)
NRBC # BLD AUTO: 0 10^3/UL
NRBC # BLD AUTO: 0 10^3/UL
NRBC BLD QL AUTO: 0.1
NRBC BLD QL AUTO: 0.2
PLATELET # BLD AUTO: 409 10^3/UL (ref 150–450)
PLATELET # BLD AUTO: 443 10^3/UL (ref 150–450)
POTASSIUM SERPL-SCNC: 2.9 MMOL/L (ref 3.5–5)
RBC # BLD AUTO: 2.73 10^6/UL (ref 4–5.4)
RBC # BLD AUTO: 3.11 10^6/UL (ref 4–5.4)
RBC UR QL AUTO: (no result)
SODIUM SERPL-SCNC: 141 MMOL/L (ref 135–145)
WBC # BLD AUTO: 14.5 10^3/UL (ref 3.5–10.8)
WBC # BLD AUTO: 22.3 10^3/UL (ref 3.5–10.8)

## 2019-02-23 RX ADMIN — MORPHINE SULFATE PRN MG: 4 INJECTION INTRAVENOUS at 19:50

## 2019-02-23 RX ADMIN — METHADONE HYDROCHLORIDE PRN MG: 5 TABLET ORAL at 20:40

## 2019-02-23 RX ADMIN — POTASSIUM CHLORIDE SCH MEQ: 1500 TABLET, EXTENDED RELEASE ORAL at 13:07

## 2019-02-23 RX ADMIN — BETHANECHOL CHLORIDE SCH MG: 25 TABLET ORAL at 20:42

## 2019-02-23 RX ADMIN — MOMETASONE FUROATE AND FORMOTEROL FUMARATE DIHYDRATE SCH PUFF: 200; 5 AEROSOL RESPIRATORY (INHALATION) at 19:56

## 2019-02-23 RX ADMIN — MORPHINE SULFATE PRN MG: 4 INJECTION INTRAVENOUS at 15:49

## 2019-02-23 RX ADMIN — GABAPENTIN SCH MG: 100 CAPSULE ORAL at 10:13

## 2019-02-23 RX ADMIN — SERTRALINE HYDROCHLORIDE SCH MG: 25 TABLET ORAL at 10:14

## 2019-02-23 RX ADMIN — GABAPENTIN SCH MG: 100 CAPSULE ORAL at 20:39

## 2019-02-23 RX ADMIN — INSULIN GLARGINE SCH UNIT: 100 INJECTION, SOLUTION SUBCUTANEOUS at 20:39

## 2019-02-23 RX ADMIN — CALCITRIOL SCH MCG: 0.25 CAPSULE ORAL at 20:42

## 2019-02-23 RX ADMIN — INSULIN LISPRO SCH UNITS: 100 INJECTION, SOLUTION INTRAVENOUS; SUBCUTANEOUS at 17:51

## 2019-02-23 RX ADMIN — BETHANECHOL CHLORIDE SCH MG: 25 TABLET ORAL at 17:52

## 2019-02-23 RX ADMIN — WATER SCH NOTE: 100 INJECTION, SOLUTION INTRAVENOUS at 20:41

## 2019-02-23 RX ADMIN — LIDOCAINE 4% SCH: 4 GEL TOPICAL at 10:22

## 2019-02-23 RX ADMIN — NICOTINE SCH PATCH: 21 PATCH TRANSDERMAL at 10:13

## 2019-02-23 RX ADMIN — MORPHINE SULFATE PRN MG: 4 INJECTION INTRAVENOUS at 05:41

## 2019-02-23 RX ADMIN — GUAIFENESIN SCH MG: 600 TABLET, EXTENDED RELEASE ORAL at 10:13

## 2019-02-23 RX ADMIN — CALCITRIOL SCH MCG: 0.25 CAPSULE ORAL at 13:08

## 2019-02-23 RX ADMIN — PREDNISONE SCH MG: 50 TABLET ORAL at 10:14

## 2019-02-23 RX ADMIN — IPRATROPIUM BROMIDE AND ALBUTEROL SULFATE SCH NEB: .5; 3 SOLUTION RESPIRATORY (INHALATION) at 01:54

## 2019-02-23 RX ADMIN — GUAIFENESIN SCH MG: 600 TABLET, EXTENDED RELEASE ORAL at 20:40

## 2019-02-23 RX ADMIN — GABAPENTIN SCH MG: 100 CAPSULE ORAL at 13:07

## 2019-02-23 RX ADMIN — MUPIROCIN SCH: 20 OINTMENT TOPICAL at 17:08

## 2019-02-23 RX ADMIN — MORPHINE SULFATE PRN MG: 4 INJECTION INTRAVENOUS at 11:46

## 2019-02-23 RX ADMIN — BETHANECHOL CHLORIDE SCH MG: 25 TABLET ORAL at 10:14

## 2019-02-23 RX ADMIN — ASPIRIN SCH MG: 81 TABLET, COATED ORAL at 10:13

## 2019-02-23 RX ADMIN — PANTOPRAZOLE SODIUM SCH MG: 40 TABLET, DELAYED RELEASE ORAL at 17:52

## 2019-02-23 RX ADMIN — PREDNISONE SCH: 20 TABLET ORAL at 10:23

## 2019-02-23 RX ADMIN — CALCITRIOL SCH MCG: 0.25 CAPSULE ORAL at 10:14

## 2019-02-23 RX ADMIN — INSULIN LISPRO SCH: 100 INJECTION, SOLUTION INTRAVENOUS; SUBCUTANEOUS at 07:51

## 2019-02-23 RX ADMIN — MORPHINE SULFATE PRN MG: 4 INJECTION INTRAVENOUS at 01:03

## 2019-02-23 RX ADMIN — IPRATROPIUM BROMIDE AND ALBUTEROL SULFATE SCH NEB: .5; 3 SOLUTION RESPIRATORY (INHALATION) at 07:54

## 2019-02-23 RX ADMIN — BETHANECHOL CHLORIDE SCH MG: 25 TABLET ORAL at 13:08

## 2019-02-23 RX ADMIN — IPRATROPIUM BROMIDE AND ALBUTEROL SULFATE SCH NEB: .5; 3 SOLUTION RESPIRATORY (INHALATION) at 12:49

## 2019-02-23 RX ADMIN — IPRATROPIUM BROMIDE AND ALBUTEROL SULFATE SCH NEB: .5; 3 SOLUTION RESPIRATORY (INHALATION) at 19:56

## 2019-02-23 RX ADMIN — INSULIN LISPRO SCH UNITS: 100 INJECTION, SOLUTION INTRAVENOUS; SUBCUTANEOUS at 20:38

## 2019-02-23 RX ADMIN — MOMETASONE FUROATE AND FORMOTEROL FUMARATE DIHYDRATE SCH PUFF: 200; 5 AEROSOL RESPIRATORY (INHALATION) at 07:55

## 2019-02-23 RX ADMIN — INSULIN LISPRO SCH UNITS: 100 INJECTION, SOLUTION INTRAVENOUS; SUBCUTANEOUS at 13:14

## 2019-02-23 RX ADMIN — POTASSIUM CHLORIDE SCH MEQ: 1500 TABLET, EXTENDED RELEASE ORAL at 10:13

## 2019-02-23 NOTE — PN
Hospitalist Progress Note


Date of Service: 02/23/19





Called for ongoing leg pain.  I came to evaluate Adri.  She complains of b/l 

thigh and hip pain, but worse in the right, and right thigh swelling.  This has 

been going on for three weeks.  On exam, her right thigh is mildly increased in 

diameter than her left thigh and is tender to palpation.  She has good range of 

motion actively and passively in both hips, no skin changes, and both legs are 

warm and well perfused.  Given her elevated INR the past few days, I am 

checking a CT of her thigh to rule out a hematoma.

## 2019-02-23 NOTE — PN
Subjective


Date of Service: 02/23/19


Interval History: 





Adri's blood sugar was 34 this morning.  She says she did not eat dinner last 

night, and then she wanted fruit and juice and her nurse did not allow it.  SHe 

normally takes 80U of Lantus at night, which is what she received last night.  

Her only complaint this morning is of pain, thinks her meds are overdue.  She 

denies hematochezia, melena.  She is sure the clot that was thought to be in 

her stool the other morning was actually from her period--says she has always 

bled very heavily with clots.  She has been weaned down to 5L O2 and says her 

breathing feels comfortable.  She is still coughing with a little phlegm. 

Afebrile.





Objective


Active Medications: 








Acetaminophen (Tylenol Tab*)  650 mg PO Q6H PRN


   PRN Reason: FEVER/PAIN


   Last Admin: 02/22/19 17:55 Dose:  650 mg


Albuterol (Ventolin 2.5 Mg/3 Ml Neb.Sol*)  2.5 mg INH Q2H PRN


   PRN Reason: SOB/WHEEZING


   Last Admin: 02/22/19 09:27 Dose:  2.5 mg


Albuterol/Ipratropium (Duoneb (Albuterol 2.5 Mg/Ipratropium 0.5 Mg))  1 neb INH 

RT.[id number]-XRGSH AWAKE [facility]


   Last Admin: 02/23/19 07:54 Dose:  1 neb


Aspirin (Aspirin Ec Tab*)  81 mg PO DAILY [facility]


   Last Admin: 02/23/19 10:13 Dose:  81 mg


Bethanechol Chloride (Urecholine Tab*)  25 mg PO QID [facility]


   Last Admin: 02/23/19 10:14 Dose:  25 mg


Calcitriol (Rocaltrol  Cap*)  0.25 mcg PO TID [facility]


   Last Admin: 02/23/19 10:14 Dose:  0.25 mcg


Dextrose (D50w Syringe 50 Ml*)  12.5 gm IV PUSH .FOR FS < 60 - SS PRN


   PRN Reason: FS < 60


   Last Admin: 02/23/19 07:58 Dose:  12.5 gm


Gabapentin (Neurontin Cap(*))  200 mg PO TID [facility]


   Last Admin: 02/23/19 10:13 Dose:  200 mg


Guaifenesin (Mucinex*)  1,200 mg PO BID [facility]


   Last Admin: 02/23/19 10:13 Dose:  1,200 mg


Cefepime HCl (Maxipime 1 Gm In Dextrose Duplex (*))  1 gm in 50 mls @ 100 mls/

hr IV Q48H [facility]


   Last Admin: 02/22/19 17:54 Dose:  100 mls/hr


Insulin Glargine (Lantus(*))  80 units SUBCUT BEDTIME [facility]


   Last Admin: 02/22/19 21:57 Dose:  80 unit


Insulin Human Lispro (Humalog*)  0 units SUBCUT ACHS [facility]; Protocol


   Last Admin: 02/23/19 07:51 Dose:  Not Given


Lidocaine (Topicaine 4% Gel*)  1 applic TOPICAL DAILY [facility]


   Last Admin: 02/23/19 10:22 Dose:  Not Given


Methadone HCl (Dolophine Tab*)  5 mg PO Q8HR PRN


   PRN Reason: PAIN


   Last Admin: 02/22/19 17:55 Dose:  5 mg


Mometasone Furoate/Formoterol Fumar (Dulera 200/5 Mdi*)  2 puff INH BID [facility]; 

Protocol


   Last Admin: 02/23/19 07:55 Dose:  2 puff


Morphine Sulfate (Morphine Vial*)  4 mg IV Q4H PRN


   PRN Reason: PAIN


   Last Admin: 02/23/19 05:41 Dose:  4 mg


Nicotine (Nicotine Patch 21 Mg/24 Hr*)  1 patch TRANSDERM DAILY@0800 [facility]


   Last Admin: 02/23/19 10:13 Dose:  1 patch


Ondansetron HCl (Zofran Inj*)  4 mg IV Q6H PRN


   PRN Reason: NAUSEA


Pantoprazole Sodium (Protonix Tab*)  40 mg PO QPM [facility]


   Last Admin: 02/22/19 17:14 Dose:  40 mg


Pharmacy Profile Note (Nicotine Patch Removal Note*)  1 note PATCH OFF 2100 [facility]


   Last Admin: 02/22/19 22:16 Dose:  1 note


Potassium Chloride (Klor Con Er Tab*)  40 meq PO Q4H [facility]


   Stop: 02/23/19 14:01


   Last Admin: 02/23/19 10:13 Dose:  40 meq


Prednisone (Deltasone Tab*)  50 mg PO DAILY [facility]


   Last Admin: 02/23/19 10:14 Dose:  50 mg


Promethazine HCl (Phenergan Tab*)  25 mg PO Q8H PRN


   PRN Reason: NAUSEA


Sertraline HCl (Zoloft*)  25 mg PO DAILY [facility]


   Last Admin: 02/23/19 10:14 Dose:  25 mg








 Vital Signs - 8 hr











  02/23/19 02/23/19 02/23/19





  05:41 07:56 07:57


 


Pulse Rate  95 95


 


Respiratory 18 18 





Rate   


 


O2 Sat by Pulse  92 95





Oximetry   














  02/23/19





  10:13


 


Pulse Rate 


 


Respiratory 20





Rate 


 


O2 Sat by Pulse 





Oximetry 











Oxygen Devices in Use Now: Nasal Cannula, High Flow Nasal Cannula


Appearance: alert, no distress, lying in bed


Eyes: No Scleral Icterus, - - left pupil >> right pupil, L is not reactive 


Ears/Nose/Mouth/Throat: NL Teeth, Lips, Gums


Neck: NL Appearance and Movements; NL JVP


Respiratory: - - rhonchi throughout, worse on the right


Cardiovascular: RRR, No Edema


Abdominal: - - PD catheter site clean.  abdomen obese, not distended, nontender


Lymphatic: No Cervical Adenopathy


Extremities: - - R midfoot amputation


Skin: No Rash or Ulcers


Neurological: Alert and Oriented x 3


Result Diagrams: 


 02/23/19 06:45





 02/23/19 06:45


Microbiology and Other Data: 


 Microbiology











 02/21/19 20:30 Stool Gross Appearance - Final





 Stool C. difficile DNA Amplification - Final





    027 Presumptive NEGATIVE





    Toxigenic C.diff NEGATIVE


 


 02/21/19 10:54 Nasal Screen MRSA (PCR) - Final





 Nasal    Mrsa Not Detected














Assess/Plan/Problems-Billing


Assessment: 











- Patient Problems


(1) Pneumonia


Current Visit: No   Status: Acute   Code(s): J18.9 - PNEUMONIA, UNSPECIFIED 

ORGANISM   SNOMED Code(s): 450916010


   Comment: Check sputum cultures, blood cultures, strep/legionella antigens


Continue cefepime   





(2) Anemia


Current Visit: No   Status: Acute   Code(s): D64.9 - ANEMIA, UNSPECIFIED   

SNOMED Code(s): 572327646


   Comment: multifactorial--anemia of CKD and menorrhagia, chronic inflammation 


received 1U PRBCs 2/21 with appropriate response 


stable    





(3) Acute respiratory failure with hypoxia


Current Visit: No   Status: Acute   Code(s): J96.01 - ACUTE RESPIRATORY FAILURE 

WITH HYPOXIA   SNOMED Code(s): 97054283


   Comment: She is back to her baseline O2 requirement of 5L continuously. 


Suspect this was related to worsening pneumonia.  


Required vapotherm in ICU for one day.    





(4) Supratherapeutic INR


Current Visit: Yes   Status: Acute   Code(s): R79.1 - ABNORMAL COAGULATION 

PROFILE   SNOMED Code(s): 991923049


   Comment: Likely related to sepsis in the setting of warfarin 


She describes HIT as the reason she is on warfarin--continue 


Received vitamin K also on 2/21 with good response


Continue to hold warfarin until INR <3    





(5) Chronic pain


Current Visit: No   Status: Acute   Code(s): G89.29 - OTHER CHRONIC PAIN   

SNOMED Code(s): 86835832


   Comment: Continue methadone and morphine and gabapentin.   





(6) Type II diabetes mellitus with complication, uncontrolled


Current Visit: No   Status: Acute   Code(s): E11.8 - TYPE 2 DIABETES MELLITUS 

WITH UNSPECIFIED COMPLICATIONS; E11.65 - TYPE 2 DIABETES MELLITUS WITH 

HYPERGLYCEMIA   SNOMED Code(s): 59463728


   Comment: with now HYPOGLYCEMIA 


liberalize diet and decrease lantus for tonight--she admits she is eating less 

here than she does at home   





(7) ESRD (end stage renal disease)


Current Visit: No   Status: Chronic   Code(s): N18.6 - END STAGE RENAL DISEASE 

  SNOMED Code(s): 93422025


   Comment: On peritoneal dialysis


Lytes and volume okay (needed some K this morning) 


   





(8) History of heparin-induced thrombocytopenia


Current Visit: Yes   Status: Acute   Code(s): Z86.2 - PRSNL HISTORY OF DIS OF 

[facility] BLD[facility]   SNOMED Code(s): 336338300


   Comment: noted

## 2019-02-24 LAB
ALBUMIN SERPL BCG-MCNC: 2.9 G/DL (ref 3.2–5.2)
ALBUMIN/GLOB SERPL: 0.9 {RATIO} (ref 1–3)
ALP SERPL-CCNC: 79 U/L (ref 34–104)
ALT SERPL W P-5'-P-CCNC: 10 U/L (ref 7–52)
ANION GAP SERPL CALC-SCNC: 13 MMOL/L (ref 2–11)
AST SERPL-CCNC: 11 U/L (ref 13–39)
BASOPHILS # BLD AUTO: 0.1 10^3/UL (ref 0–0.2)
BUN SERPL-MCNC: 50 MG/DL (ref 6–24)
BUN/CREAT SERPL: 7.8 (ref 8–20)
CALCIUM SERPL-MCNC: 8.2 MG/DL (ref 8.6–10.3)
CHLORIDE SERPL-SCNC: 100 MMOL/L (ref 101–111)
EOSINOPHIL # BLD AUTO: 0 10^3/UL (ref 0–0.6)
GLOBULIN SER CALC-MCNC: 3.2 G/DL (ref 2–4)
GLUCOSE SERPL-MCNC: 123 MG/DL (ref 70–100)
HCO3 SERPL-SCNC: 25 MMOL/L (ref 22–32)
HCT VFR BLD AUTO: 28 % (ref 35–47)
HGB BLD-MCNC: 8.7 G/DL (ref 12–16)
INR PPP/BLD: 2.13 (ref 0.77–1.02)
LYMPHOCYTES # BLD AUTO: 0.3 10^3/UL (ref 1–4.8)
MAGNESIUM SERPL-MCNC: 1.7 MG/DL (ref 1.9–2.7)
MCH RBC QN AUTO: 28 PG (ref 27–31)
MCHC RBC AUTO-ENTMCNC: 31 G/DL (ref 31–36)
MCV RBC AUTO: 91 FL (ref 80–97)
MONOCYTES # BLD AUTO: 0.3 10^3/UL (ref 0–0.8)
NEUTROPHILS # BLD AUTO: 21.3 10^3/UL (ref 1.5–7.7)
NRBC # BLD AUTO: 0 10^3/UL
NRBC BLD QL AUTO: 0
PLATELET # BLD AUTO: 433 10^3/UL (ref 150–450)
POTASSIUM SERPL-SCNC: 4 MMOL/L (ref 3.5–5)
PROT SERPL-MCNC: 6.1 G/DL (ref 6.4–8.9)
RBC # BLD AUTO: 3.07 10^6/UL (ref 4–5.4)
SODIUM SERPL-SCNC: 138 MMOL/L (ref 135–145)
WBC # BLD AUTO: 22 10^3/UL (ref 3.5–10.8)

## 2019-02-24 RX ADMIN — GUAIFENESIN SCH MG: 600 TABLET, EXTENDED RELEASE ORAL at 10:33

## 2019-02-24 RX ADMIN — IPRATROPIUM BROMIDE AND ALBUTEROL SULFATE SCH: .5; 3 SOLUTION RESPIRATORY (INHALATION) at 19:32

## 2019-02-24 RX ADMIN — PANTOPRAZOLE SODIUM SCH MG: 40 TABLET, DELAYED RELEASE ORAL at 17:52

## 2019-02-24 RX ADMIN — MUPIROCIN SCH: 20 OINTMENT TOPICAL at 11:50

## 2019-02-24 RX ADMIN — IPRATROPIUM BROMIDE AND ALBUTEROL SULFATE SCH NEB: .5; 3 SOLUTION RESPIRATORY (INHALATION) at 01:20

## 2019-02-24 RX ADMIN — INSULIN GLARGINE SCH UNIT: 100 INJECTION, SOLUTION SUBCUTANEOUS at 21:49

## 2019-02-24 RX ADMIN — BETHANECHOL CHLORIDE SCH MG: 25 TABLET ORAL at 17:52

## 2019-02-24 RX ADMIN — WATER SCH NOTE: 100 INJECTION, SOLUTION INTRAVENOUS at 21:51

## 2019-02-24 RX ADMIN — ACETAMINOPHEN SCH MG: 325 TABLET ORAL at 21:46

## 2019-02-24 RX ADMIN — PREDNISONE SCH MG: 50 TABLET ORAL at 10:33

## 2019-02-24 RX ADMIN — IPRATROPIUM BROMIDE AND ALBUTEROL SULFATE SCH NEB: .5; 3 SOLUTION RESPIRATORY (INHALATION) at 13:53

## 2019-02-24 RX ADMIN — MORPHINE SULFATE PRN MG: 4 INJECTION INTRAVENOUS at 18:32

## 2019-02-24 RX ADMIN — CALCITRIOL SCH: 0.25 CAPSULE ORAL at 14:33

## 2019-02-24 RX ADMIN — INSULIN LISPRO SCH UNITS: 100 INJECTION, SOLUTION INTRAVENOUS; SUBCUTANEOUS at 17:52

## 2019-02-24 RX ADMIN — BETHANECHOL CHLORIDE SCH MG: 25 TABLET ORAL at 14:35

## 2019-02-24 RX ADMIN — MOMETASONE FUROATE AND FORMOTEROL FUMARATE DIHYDRATE SCH PUFF: 200; 5 AEROSOL RESPIRATORY (INHALATION) at 19:32

## 2019-02-24 RX ADMIN — MOMETASONE FUROATE AND FORMOTEROL FUMARATE DIHYDRATE SCH PUFF: 200; 5 AEROSOL RESPIRATORY (INHALATION) at 07:52

## 2019-02-24 RX ADMIN — INSULIN LISPRO SCH UNITS: 100 INJECTION, SOLUTION INTRAVENOUS; SUBCUTANEOUS at 10:34

## 2019-02-24 RX ADMIN — MORPHINE SULFATE PRN MG: 4 INJECTION INTRAVENOUS at 23:07

## 2019-02-24 RX ADMIN — BETHANECHOL CHLORIDE SCH MG: 25 TABLET ORAL at 21:48

## 2019-02-24 RX ADMIN — GUAIFENESIN SCH MG: 600 TABLET, EXTENDED RELEASE ORAL at 21:47

## 2019-02-24 RX ADMIN — SERTRALINE HYDROCHLORIDE SCH MG: 25 TABLET ORAL at 10:33

## 2019-02-24 RX ADMIN — MORPHINE SULFATE PRN MG: 4 INJECTION INTRAVENOUS at 05:07

## 2019-02-24 RX ADMIN — GABAPENTIN SCH MG: 100 CAPSULE ORAL at 21:47

## 2019-02-24 RX ADMIN — BETHANECHOL CHLORIDE SCH MG: 25 TABLET ORAL at 11:49

## 2019-02-24 RX ADMIN — GABAPENTIN SCH MG: 100 CAPSULE ORAL at 14:36

## 2019-02-24 RX ADMIN — IPRATROPIUM BROMIDE AND ALBUTEROL SULFATE SCH NEB: .5; 3 SOLUTION RESPIRATORY (INHALATION) at 07:52

## 2019-02-24 RX ADMIN — MORPHINE SULFATE PRN MG: 4 INJECTION INTRAVENOUS at 10:30

## 2019-02-24 RX ADMIN — INSULIN LISPRO SCH: 100 INJECTION, SOLUTION INTRAVENOUS; SUBCUTANEOUS at 12:05

## 2019-02-24 RX ADMIN — CALCITRIOL SCH MCG: 0.25 CAPSULE ORAL at 21:48

## 2019-02-24 RX ADMIN — CALCITRIOL SCH MCG: 0.25 CAPSULE ORAL at 11:49

## 2019-02-24 RX ADMIN — ASPIRIN SCH MG: 81 TABLET, COATED ORAL at 10:33

## 2019-02-24 RX ADMIN — LIDOCAINE 4% SCH: 4 GEL TOPICAL at 11:49

## 2019-02-24 RX ADMIN — GABAPENTIN SCH MG: 100 CAPSULE ORAL at 10:33

## 2019-02-24 RX ADMIN — INSULIN LISPRO SCH UNITS: 100 INJECTION, SOLUTION INTRAVENOUS; SUBCUTANEOUS at 21:49

## 2019-02-24 RX ADMIN — NICOTINE SCH PATCH: 21 PATCH TRANSDERMAL at 10:33

## 2019-02-24 RX ADMIN — CEFEPIME HYDROCHLORIDE SCH MLS/HR: 1 INJECTION, SOLUTION INTRAVENOUS at 17:52

## 2019-02-24 RX ADMIN — MORPHINE SULFATE PRN MG: 4 INJECTION INTRAVENOUS at 14:35

## 2019-02-24 RX ADMIN — MORPHINE SULFATE PRN MG: 4 INJECTION INTRAVENOUS at 00:06

## 2019-02-24 NOTE — PN
Subjective


Date of Service: 02/24/19


Interval History: 


Pt seen and examined.  Meds and labs reviewed.  





CC: Anterior thigh numbness of BLLE; mentions later that she has diagnosis of 

peripheral neuropathy; Mild swelling of right thigh.  Changed CT ordered 

yesterday to RLE upon reviewing w/pt and Dr. Son note.





ROS:  Denied HA/dizziness, F/C, N/V, CP, SOB, increased cough, sputum production

, abd pain, diarrhea, constipation, dysuria, throat pain, and new skin lesions.

  The rest of the 14 point ROS are unremarkable.





PHYSICAL EXAM:


GEN APPEARANCE: Awake, not in acute distress


HEENT: NC/AT, PERRLA, moist oral mucosa, (-) throat erythema


NECK: Soft, supple, (-) cervical LAD, (-)JVD


HEART: S1S2 WNL, RRR, No MRG


CHEST: CTA, BL, GAE, No W/R/R


ABD: Soft, ND/NT, NABS 4x Q


EXT: No C/C/Right thigh>Left, amputated toes, Right


SKIN: Warm to touch


PSYCH: No active psychosis, hallucinations, depression, SI/HI





Objective


Active Medications: 








Acetaminophen (Tylenol Tab*)  500 mg PO BID [facility]


Acetaminophen (Tylenol Tab*)  650 mg PO Q6H PRN


   PRN Reason: FEVER/PAIN


Albuterol (Ventolin 2.5 Mg/3 Ml Neb.Sol*)  2.5 mg INH Q2H PRN


   PRN Reason: SOB/WHEEZING


   Last Admin: 02/22/19 09:27 Dose:  2.5 mg


Albuterol/Ipratropium (Duoneb (Albuterol 2.5 Mg/Ipratropium 0.5 Mg))  1 neb INH 

RT.T3OF-HAHWX AWAKE [facility]


   Last Admin: 02/24/19 13:53 Dose:  1 neb


Aspirin (Aspirin Ec Tab*)  81 mg PO DAILY [facility]


   Last Admin: 02/24/19 10:33 Dose:  81 mg


Bethanechol Chloride (Urecholine Tab*)  25 mg PO QID [facility]


   Last Admin: 02/24/19 14:35 Dose:  25 mg


Calcitriol (Rocaltrol  Cap*)  0.25 mcg PO TID [facility]


   Last Admin: 02/24/19 14:33 Dose:  Not Given


Dextrose (D50w Syringe 50 Ml*)  12.5 gm IV PUSH .FOR FS < 60 - SS PRN


   PRN Reason: FS < 60


   Last Admin: 02/23/19 07:58 Dose:  12.5 gm


Gabapentin (Neurontin Cap(*))  300 mg PO TID [facility]


Guaifenesin (Mucinex*)  1,200 mg PO BID [facility]


   Last Admin: 02/24/19 10:33 Dose:  1,200 mg


Cefepime HCl (Maxipime 1 Gm In Dextrose Duplex (*))  1 gm in 50 mls @ 100 mls/

hr IV Q48H [facility]


   Last Admin: 02/22/19 17:54 Dose:  100 mls/hr


Insulin Glargine (Lantus(*))  70 units SUBCUT BEDTIME [facility]


   Last Admin: 02/23/19 20:39 Dose:  70 unit


Insulin Human Lispro (Humalog*)  0 units SUBCUT ACHS [facility]; Protocol


   Last Admin: 02/24/19 12:05 Dose:  Not Given


Lidocaine (Topicaine 4% Gel*)  1 applic TOPICAL DAILY [facility]


   Last Admin: 02/24/19 11:49 Dose:  Not Given


Methadone HCl (Dolophine Tab*)  5 mg PO Q8HR PRN


   PRN Reason: PAIN


   Last Admin: 02/23/19 20:40 Dose:  5 mg


Mometasone Furoate/Formoterol Fumar (Dulera 200/5 Mdi*)  2 puff INH BID [facility]; 

Protocol


   Last Admin: 02/24/19 07:52 Dose:  2 puff


Morphine Sulfate (Morphine Vial*)  4 mg IV Q4H PRN


   PRN Reason: PAIN


   Last Admin: 02/24/19 10:30 Dose:  4 mg


Mupirocin (Bactroban 2 % Oint*)  1 applic TOPICAL DAILY [facility]


   Last Admin: 02/24/19 11:50 Dose:  Not Given


Nicotine (Nicotine Patch 21 Mg/24 Hr*)  1 patch TRANSDERM DAILY@0800 [facility]


   Last Admin: 02/24/19 10:33 Dose:  1 patch


Ondansetron HCl (Zofran Inj*)  4 mg IV Q6H PRN


   PRN Reason: NAUSEA


Pantoprazole Sodium (Protonix Tab*)  40 mg PO QPM [facility]


   Last Admin: 02/23/19 17:52 Dose:  40 mg


Pharmacy Profile Note (Nicotine Patch Removal Note*)  1 note PATCH OFF 2100 [facility]


   Last Admin: 02/23/19 20:41 Dose:  1 note


Prednisone (Deltasone Tab*)  50 mg PO DAILY [facility]


   Last Admin: 02/24/19 10:33 Dose:  50 mg


Promethazine HCl (Phenergan Tab*)  25 mg PO Q8H PRN


   PRN Reason: NAUSEA


Sertraline HCl (Zoloft*)  25 mg PO DAILY [facility]


   Last Admin: 02/24/19 10:33 Dose:  25 mg








 Vital Signs - 8 hr











  02/24/19 02/24/19 02/24/19





  07:55 08:41 10:30


 


Temperature  98.4 F 


 


Pulse Rate 103 110 


 


Respiratory 16 18 20





Rate   


 


Blood Pressure  150/50 





(mmHg)   


 


O2 Sat by Pulse 96 95 





Oximetry   














  02/24/19 02/24/19 02/24/19





  10:33 11:16 13:56


 


Temperature  98.6 F 


 


Pulse Rate  105 110


 


Respiratory 18 20 18





Rate   


 


Blood Pressure  156/65 





(mmHg)   


 


O2 Sat by Pulse  97 96





Oximetry   














  02/24/19 02/24/19





  14:01 14:36


 


Temperature  


 


Pulse Rate  


 


Respiratory 18 18





Rate  


 


Blood Pressure  





(mmHg)  


 


O2 Sat by Pulse  





Oximetry  











Oxygen Devices in Use Now: Nasal Cannula


Result Diagrams: 


 02/23/19 16:46





 02/23/19 06:45


Microbiology and Other Data: 


 Microbiology











 02/21/19 20:30 Stool Gross Appearance - Final





 Stool C. difficile DNA Amplification - Final





    027 Presumptive NEGATIVE





    Toxigenic C.diff NEGATIVE


 


 02/21/19 10:54 Nasal Screen MRSA (PCR) - Final





 Nasal    Mrsa Not Detected














Assess/Plan/Problems-Billing


Assessment: 











- Patient Problems


(1) Pneumonia


Current Visit: No   Status: Acute   Code(s): J18.9 - PNEUMONIA, UNSPECIFIED 

ORGANISM   SNOMED Code(s): 191234779


   Comment: 


-Blood Cx: (-)x 1 day


-(-)Legionella and S. pneumo urine Ags


-Continue cefepime, Abx day #2/10   





(2) Peripheral neuropathy


Current Visit: Yes   Status: Acute   Code(s): G62.9 - POLYNEUROPATHY, 

UNSPECIFIED   SNOMED Code(s): 438310749


   Comment: 


-Likely cause of BLLE numbness and possible peritoneal fluid leaking into thigh 

given mild swelling of RLE thigh?


-Increased Gabapentin to 300 PO TID


-Placed pt on Scheduled Tylenol BID


-No hematoma seen on RLE CT; only edema   





(3) Anemia


Current Visit: No   Status: Acute   Code(s): D64.9 - ANEMIA, UNSPECIFIED   

SNOMED Code(s): 593978349


   Comment: 


-Multifactorial--anemia of CKD and menorrhagia, chronic inflammation 


-received 1U PRBCs 2/21 with appropriate response 


-stable from labs available; for some reason AM labs still not available at 3PM

; will wait for AM labs


-Will obtain Iron studies, B12, Folate, MMA to confirm above suspicion   





(4) Acute respiratory failure with hypoxia


Current Visit: No   Status: Acute   Code(s): J96.01 - ACUTE RESPIRATORY FAILURE 

WITH HYPOXIA   SNOMED Code(s): 46860761


   Comment: 


-She is back to her baseline O2 requirement of 5L continuously. 


-Likely due to COPD exacerbation due to PNA


-Required vapotherm in ICU for one day.       





(5) Supratherapeutic INR


Current Visit: Yes   Status: Acute   Code(s): R79.1 - ABNORMAL COAGULATION 

PROFILE   SNOMED Code(s): 499188642


   Comment: 


-Likely related to sepsis in the setting of warfarin 


-She describes HIT as the reason she is on warfarin--continue 


-Received vitamin K also on 2/21 with good response


-Continue to hold warfarin until INR <3---morning labs still pending   





(6) Chronic pain


Current Visit: No   Status: Acute   Code(s): G89.29 - OTHER CHRONIC PAIN   

SNOMED Code(s): 02271677


   Comment: Continue methadone and morphine and gabapentin.   





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


Current Visit: Yes   Status: Acute   Code(s): E13.9 - OTHER SPECIFIED DIABETES 

MELLITUS WITHOUT COMPLICATIONS   SNOMED Code(s): 007130085


   Comment: 


-Improved and will continue to observe o/n to see if she will require further 

adjustments and its magnitude   





(8) ESRD on peritoneal dialysis


Current Visit: No   Status: Chronic   Code(s): N18.6 - END STAGE RENAL DISEASE; 

Z99.2 - DEPENDENCE ON RENAL DIALYSIS   SNOMED Code(s): 88006337


   Comment: 


-On peritoneal dialysis


-PD nurse sees him everyday per RN; will defer   





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


Current Visit: Yes   Status: Acute   Code(s): D75.82 - HEPARIN INDUCED 

THROMBOCYTOPENIA (HIT)   SNOMED Code(s): 35798864


   Comment: 


-Hx of


-Off Coumadin due to mildly elevated INR   





(10) DVT prophylaxis


Current Visit: Yes   Status: Acute   Code(s): QTH6923 -    SNOMED Code(s): 

823258675


   Comment: 


-As above


-Coumadin being held   


Status and Disposition: 





-Will await official PT eval for D/C planning


-Possible D/C in 1-2 days

## 2019-02-25 VITALS — DIASTOLIC BLOOD PRESSURE: 60 MMHG | SYSTOLIC BLOOD PRESSURE: 158 MMHG

## 2019-02-25 LAB
ALBUMIN SERPL BCG-MCNC: 2.7 G/DL (ref 3.2–5.2)
ALBUMIN/GLOB SERPL: 0.9 {RATIO} (ref 1–3)
ALP SERPL-CCNC: 86 U/L (ref 34–104)
ALT SERPL W P-5'-P-CCNC: 10 U/L (ref 7–52)
ANION GAP SERPL CALC-SCNC: 13 MMOL/L (ref 2–11)
AST SERPL-CCNC: 9 U/L (ref 13–39)
BASOPHILS # BLD AUTO: 0.1 10^3/UL (ref 0–0.2)
BUN SERPL-MCNC: 53 MG/DL (ref 6–24)
BUN/CREAT SERPL: 8.4 (ref 8–20)
CALCIUM SERPL-MCNC: 8.3 MG/DL (ref 8.6–10.3)
CHLORIDE SERPL-SCNC: 99 MMOL/L (ref 101–111)
EOSINOPHIL # BLD AUTO: 0 10^3/UL (ref 0–0.6)
FERRITIN SERPL IA-MCNC: 265.5 NG/ML (ref 11–307)
FOLATE SERPL-MCNC: 5.32 NG/ML (ref 3.99–?)
GLOBULIN SER CALC-MCNC: 3 G/DL (ref 2–4)
GLUCOSE SERPL-MCNC: 144 MG/DL (ref 70–100)
HCO3 SERPL-SCNC: 27 MMOL/L (ref 22–32)
HCT VFR BLD AUTO: 26 % (ref 35–47)
HGB BLD-MCNC: 8 G/DL (ref 12–16)
IRON SERPL-MCNC: 49 UG/DL (ref 50–212)
LYMPHOCYTES # BLD AUTO: 0.9 10^3/UL (ref 1–4.8)
MAGNESIUM SERPL-MCNC: 1.7 MG/DL (ref 1.9–2.7)
MCH RBC QN AUTO: 28 PG (ref 27–31)
MCHC RBC AUTO-ENTMCNC: 31 G/DL (ref 31–36)
MCV RBC AUTO: 90 FL (ref 80–97)
MONOCYTES # BLD AUTO: 0.7 10^3/UL (ref 0–0.8)
NEUTROPHILS # BLD AUTO: 17.6 10^3/UL (ref 1.5–7.7)
NRBC # BLD AUTO: 0 10^3/UL
NRBC BLD QL AUTO: 0
PLATELET # BLD AUTO: 387 10^3/UL (ref 150–450)
POTASSIUM SERPL-SCNC: 4 MMOL/L (ref 3.5–5)
PROT SERPL-MCNC: 5.7 G/DL (ref 6.4–8.9)
RBC # BLD AUTO: 2.86 10^6/UL (ref 4–5.4)
SODIUM SERPL-SCNC: 139 MMOL/L (ref 135–145)
TIBC SERPL-MCNC: 161 MCG/DL (ref 250–450)
TRANSFERRIN SERPL-MCNC: 115 MG/DL (ref 203–362)
WBC # BLD AUTO: 19.3 10^3/UL (ref 3.5–10.8)

## 2019-02-25 RX ADMIN — METHADONE HYDROCHLORIDE PRN MG: 5 TABLET ORAL at 15:41

## 2019-02-25 RX ADMIN — BETHANECHOL CHLORIDE SCH MG: 25 TABLET ORAL at 09:26

## 2019-02-25 RX ADMIN — INSULIN LISPRO SCH: 100 INJECTION, SOLUTION INTRAVENOUS; SUBCUTANEOUS at 09:27

## 2019-02-25 RX ADMIN — INSULIN LISPRO SCH: 100 INJECTION, SOLUTION INTRAVENOUS; SUBCUTANEOUS at 13:12

## 2019-02-25 RX ADMIN — MORPHINE SULFATE PRN MG: 4 INJECTION INTRAVENOUS at 04:59

## 2019-02-25 RX ADMIN — LIDOCAINE 4% SCH: 4 GEL TOPICAL at 09:35

## 2019-02-25 RX ADMIN — ACETAMINOPHEN SCH MG: 325 TABLET ORAL at 09:27

## 2019-02-25 RX ADMIN — GUAIFENESIN SCH MG: 600 TABLET, EXTENDED RELEASE ORAL at 09:25

## 2019-02-25 RX ADMIN — BETHANECHOL CHLORIDE SCH MG: 25 TABLET ORAL at 15:34

## 2019-02-25 RX ADMIN — NICOTINE SCH PATCH: 21 PATCH TRANSDERMAL at 09:24

## 2019-02-25 RX ADMIN — CALCITRIOL SCH MCG: 0.25 CAPSULE ORAL at 15:34

## 2019-02-25 RX ADMIN — IPRATROPIUM BROMIDE AND ALBUTEROL SULFATE SCH: .5; 3 SOLUTION RESPIRATORY (INHALATION) at 01:24

## 2019-02-25 RX ADMIN — MOMETASONE FUROATE AND FORMOTEROL FUMARATE DIHYDRATE SCH PUFF: 200; 5 AEROSOL RESPIRATORY (INHALATION) at 07:08

## 2019-02-25 RX ADMIN — GABAPENTIN SCH MG: 100 CAPSULE ORAL at 15:33

## 2019-02-25 RX ADMIN — GABAPENTIN SCH MG: 100 CAPSULE ORAL at 09:26

## 2019-02-25 RX ADMIN — MORPHINE SULFATE PRN MG: 4 INJECTION INTRAVENOUS at 09:24

## 2019-02-25 RX ADMIN — IPRATROPIUM BROMIDE AND ALBUTEROL SULFATE SCH: .5; 3 SOLUTION RESPIRATORY (INHALATION) at 14:04

## 2019-02-25 RX ADMIN — PREDNISONE SCH MG: 50 TABLET ORAL at 09:25

## 2019-02-25 RX ADMIN — IPRATROPIUM BROMIDE AND ALBUTEROL SULFATE SCH NEB: .5; 3 SOLUTION RESPIRATORY (INHALATION) at 05:21

## 2019-02-25 RX ADMIN — MUPIROCIN SCH: 20 OINTMENT TOPICAL at 09:35

## 2019-02-25 RX ADMIN — SERTRALINE HYDROCHLORIDE SCH MG: 25 TABLET ORAL at 09:26

## 2019-02-25 RX ADMIN — CALCITRIOL SCH MCG: 0.25 CAPSULE ORAL at 09:26

## 2019-02-25 RX ADMIN — ASPIRIN SCH MG: 81 TABLET, COATED ORAL at 09:26

## 2019-02-25 NOTE — DS
CC:  Dr. Ivan Qureshi; Dr. Abdoul Rodriguez; Dr. Yeni Shanks *

 

DISCHARGE SUMMARY:

 

DATE OF ADMISSION:

 

DATE OF DISCHARGE:  02/25/19

 

DISCHARGE DIAGNOSES:  Are as follows:

1.  Pneumonia with sepsis, sepsis resolved.

2.  Peripheral neuropathy, improved.

3.  Anemia of chronic disease as well as due to anemia of blood loss due to her 
heavy menorrhagia, status post 3 units of PRBCs total transfused.

4.  Acute respiratory failure with hypoxia likely secondary to pneumonia with 
sepsis.

5.  History of chronic pain.

6.  Diabetes mellitus type 2.

7.  End-stage renal disease, on peritoneal dialysis.

8.  History of heparin-induced thrombocytopenia, on Coumadin.

 

DISCHARGE MEDICATIONS:  Are as follows:

1.  Tylenol 487.5 mg p.o. b.i.d. as well as p.r.n. Tylenol of 650 mg p.o. q.6 
p.r.n.

2.  Aspirin 81 mg p.o. daily.

3.  Bethanechol 25 mg p.o. 4 times a day.

4.  Symbicort 160/4.5 mcg 2 puffs inhalation b.i.d.

5.  Calcitriol 0.25 mcg p.o. t.i.d.

6.  Gabapentin 200 mg p.o. t.i.d.

7.  Insulin glargine 70 units subcu q.h.s.

8.  Lidocaine 4% gel 1 application topically daily for 14 more days.

9.  Methadone 5 mg q.8 hours p.r.n.

10.  Mupirocin 2% ointment 1 application topically daily.

11.  Nicotine patch 21 mg per 24 hour patch daily.

12.  Omeprazole 40 mg p.o. q.p.m.

13.  Promethazine 25 mg p.o. q.8 p.r.n.

14.  Sertraline 25 mg p.o. daily.

15.  Warfarin 6 mg p.o. daily, for repeat INR in a.m.

16.  Cefpodoxime 200 mg p.o. q.12 hours for 7 more days.

17.  Albuterol HFA 2 puffs inhalation q.6 p.r.n.

18.  Albuterol nebulization 2.5 mg inhalation q.4 hours p.r.n.

19.  Insulin aspart sliding scale subcu q.a.c.

20.  Floranex 2 tablets p.o. daily.

21.  Losartan 100 mg p.o. daily.

22.  Prednisone rapid taper as ordered.

 

HISTORY OF PRESENT ILLNESS/HOSPITAL COURSE:  The patient is a 38-year-old 
 lady with numerous admissions to the hospital with history of ESRD, 
on PD as well as COPD, who presented on 02/22/19 with hypotension and low back 
pain and was found to have pneumonia with sepsis.  She was treated with broad-
spectrum antibiotics, which was narrowed to cefepime where she had been stable 
with.  Her blood cultures were negative for 2 days.  She had been ruled out for 
a C. diff, although she did have Enterococcus faecium growing in her urine on 
review of urinalysis, it seems to have not been a clean catch.  Her cefepime 
was continued and her sepsis has resolved.  Gradually, she started complaining 
of bilateral lower extremity pain on her upper thighs and which was likely due 
to her peripheral neuropathy that is known.  She was placed on gabapentin and 
scheduled Tylenol b.i.d. in addition to p.r.n. and she has done well and 
mentioned that since these were started, her pain level from 8 or 9/10 to what 
is now 1/10 prior to her discharge.  She was also found to have anemia of 
chronic disease as well as due to acute blood loss, due to her heavy 
menorrhagia and has received total of 3 units of PRBC prior to her discharge.  
Her Coumadin was momentarily held for a few days given she was supratherapeutic 
when she was admitted, currently now at therapeutic levels with the last one 
being 2.13. Her Coumadin dose has been lowered to 6 mg and she had been asked 
to repeat INR in the a.m. and to discuss the results with her PCP.  She also 
was noted to have right thigh edema, which was evaluated with a CT scan, which 
showed some edema without any hematoma, which was a previous concern and has 
been ruled out for a DVT in that area as well and so cause is likely due to the 
leak of her hypotonic peritoneal dialysis fluid that has been used.  She 
mentions that she uses a different fluid at home.  Since using a different 
fluid this morning, her right thigh has since decreased in size and hence we 
will defer with nephrologist on further evaluation.

 

She had been advised to follow up and/or call her PCP within 3 days post 
discharge and to check her INR tomorrow and discuss the results with her PCP.  
She was advised that if her symptoms resume or develop new ones or feel unwell 
for any reason, to call her PCP first and if her PCP cannot entertain her due 
to scheduling issues alone, she was advised to call [facility] if the 
issue is not emergent.  She was advised to call my office regarding any 
questions, concerns or further clarifications regarding her discharge plan and/
or prescriptions and to take her medications as prescribed.

 

REVIEW OF SYSTEMS:  The patient currently denies any headaches, dizziness, 
fevers, chills, nausea, vomiting, chest pain, shortness of breath, increased 
cough and sputum production, abdominal pain, diarrhea, constipation, pain and/
or increased frequency in urination, myalgias, arthralgias, throat pain, or new 
skin lesions. The rest of the 14-point review of systems are, otherwise, 
unremarkable.

 

PHYSICAL EXAMINATION:  Reveals the most recent vital signs of records with 
blood pressure of 153/59, from 157/64, 20 per minute respiratory rate, heart 
rate of 98 beats per minute saturating 95%.  General Appearance:  The patient 
is awake, alert, and oriented x3, not in acute distress.  HEENT:  Normocephalic
, atraumatic, PERRLA. Extraocular muscles are intact.  Negative for icterus.  
Moist oral mucosa. Negative throat erythema.  Neck is soft, supple with no 
cervical lymphadenopathy, no JVD.  Heart:  S1, S2 within normal limits.  
Regular rate and rhythm.  No murmurs, rubs or gallops.  Chest:  Clear to 
auscultation bilaterally.  Good air entry.  No wheezes, rales, or rhonchi.  
Abdomen is soft, nondistended, nontender. Normoactive bowel sounds x4 
quadrants.  Extremities:  No cyanosis or clubbing. Psychiatric:  No active 
psychosis, depression, suicidal or homicidal ideation. Skin is warm to touch.

 

TIME SPENT:  The total time spent evaluating the patient, reviewing pertinent 
data and appropriate documentation is 1 hour and 5 minutes.

 

 [id number]/CPS #: [id number]

MTDD

## 2019-03-05 ENCOUNTER — HOSPITAL ENCOUNTER (INPATIENT)
Dept: HOSPITAL 25 - ED | Age: 39
LOS: 20 days | Discharge: ANOTHER HOSPITAL | DRG: 951 | End: 2019-03-25
Attending: HOSPITALIST | Admitting: HOSPITALIST
Payer: COMMERCIAL

## 2019-03-05 DIAGNOSIS — Z79.01: ICD-10-CM

## 2019-03-05 DIAGNOSIS — E83.59: ICD-10-CM

## 2019-03-05 DIAGNOSIS — Z99.81: ICD-10-CM

## 2019-03-05 DIAGNOSIS — D62: ICD-10-CM

## 2019-03-05 DIAGNOSIS — J18.9: Primary | ICD-10-CM

## 2019-03-05 DIAGNOSIS — E83.39: ICD-10-CM

## 2019-03-05 DIAGNOSIS — E11.42: ICD-10-CM

## 2019-03-05 DIAGNOSIS — E83.42: ICD-10-CM

## 2019-03-05 DIAGNOSIS — Z91.048: ICD-10-CM

## 2019-03-05 DIAGNOSIS — Z79.82: ICD-10-CM

## 2019-03-05 DIAGNOSIS — I27.20: ICD-10-CM

## 2019-03-05 DIAGNOSIS — Z79.51: ICD-10-CM

## 2019-03-05 DIAGNOSIS — Z83.3: ICD-10-CM

## 2019-03-05 DIAGNOSIS — D50.9: ICD-10-CM

## 2019-03-05 DIAGNOSIS — E11.40: ICD-10-CM

## 2019-03-05 DIAGNOSIS — J44.1: ICD-10-CM

## 2019-03-05 DIAGNOSIS — J98.11: ICD-10-CM

## 2019-03-05 DIAGNOSIS — F17.210: ICD-10-CM

## 2019-03-05 DIAGNOSIS — Z79.899: ICD-10-CM

## 2019-03-05 DIAGNOSIS — L89.159: ICD-10-CM

## 2019-03-05 DIAGNOSIS — I70.202: ICD-10-CM
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
## 2019-03-05 DIAGNOSIS — E87.5: ICD-10-CM

## 2019-03-05 DIAGNOSIS — J96.22: ICD-10-CM

## 2019-03-05 DIAGNOSIS — L98.9: ICD-10-CM

## 2019-03-05 DIAGNOSIS — E11.649: ICD-10-CM

## 2019-03-05 DIAGNOSIS — R04.2: ICD-10-CM

## 2019-03-05 DIAGNOSIS — A41.9: ICD-10-CM

## 2019-03-05 DIAGNOSIS — I95.9: ICD-10-CM

## 2019-03-05 DIAGNOSIS — J96.21: ICD-10-CM

## 2019-03-05 DIAGNOSIS — I13.2: ICD-10-CM

## 2019-03-05 DIAGNOSIS — G89.29: ICD-10-CM

## 2019-03-05 DIAGNOSIS — D75.82: ICD-10-CM

## 2019-03-05 DIAGNOSIS — K92.1: ICD-10-CM

## 2019-03-05 DIAGNOSIS — N18.6: ICD-10-CM

## 2019-03-05 DIAGNOSIS — K92.2: ICD-10-CM

## 2019-03-05 DIAGNOSIS — R74.8: ICD-10-CM

## 2019-03-05 DIAGNOSIS — E03.9: ICD-10-CM

## 2019-03-05 DIAGNOSIS — E66.9: ICD-10-CM

## 2019-03-05 DIAGNOSIS — D68.9: ICD-10-CM

## 2019-03-05 DIAGNOSIS — E66.3: ICD-10-CM

## 2019-03-05 DIAGNOSIS — J44.0: ICD-10-CM

## 2019-03-05 DIAGNOSIS — Z80.41: ICD-10-CM

## 2019-03-05 DIAGNOSIS — Z82.49: ICD-10-CM

## 2019-03-05 DIAGNOSIS — Z99.11: ICD-10-CM

## 2019-03-05 DIAGNOSIS — E11.22: ICD-10-CM

## 2019-03-05 DIAGNOSIS — I25.10: ICD-10-CM

## 2019-03-05 DIAGNOSIS — K55.9: ICD-10-CM

## 2019-03-05 DIAGNOSIS — K21.9: ICD-10-CM

## 2019-03-05 DIAGNOSIS — E11.51: ICD-10-CM

## 2019-03-05 DIAGNOSIS — E87.2: ICD-10-CM

## 2019-03-05 DIAGNOSIS — Z88.1: ICD-10-CM

## 2019-03-05 DIAGNOSIS — Z88.2: ICD-10-CM

## 2019-03-05 DIAGNOSIS — E78.5: ICD-10-CM

## 2019-03-05 DIAGNOSIS — Z79.1: ICD-10-CM

## 2019-03-05 DIAGNOSIS — Z99.2: ICD-10-CM

## 2019-03-05 DIAGNOSIS — Z79.4: ICD-10-CM

## 2019-03-05 DIAGNOSIS — I50.31: ICD-10-CM

## 2019-03-05 DIAGNOSIS — F41.9: ICD-10-CM

## 2019-03-05 DIAGNOSIS — Z79.891: ICD-10-CM

## 2019-03-05 DIAGNOSIS — G93.40: ICD-10-CM

## 2019-03-05 DIAGNOSIS — I70.8: ICD-10-CM

## 2019-03-05 DIAGNOSIS — I70.291: ICD-10-CM

## 2019-03-05 DIAGNOSIS — N25.81: ICD-10-CM

## 2019-03-05 DIAGNOSIS — I08.2: ICD-10-CM

## 2019-03-05 DIAGNOSIS — F32.9: ICD-10-CM

## 2019-03-05 DIAGNOSIS — Z80.1: ICD-10-CM

## 2019-03-05 DIAGNOSIS — E87.6: ICD-10-CM

## 2019-03-05 DIAGNOSIS — Z88.8: ICD-10-CM

## 2019-03-05 DIAGNOSIS — I24.8: ICD-10-CM

## 2019-03-05 DIAGNOSIS — D63.1: ICD-10-CM

## 2019-03-05 DIAGNOSIS — K31.84: ICD-10-CM

## 2019-03-05 LAB
ALBUMIN SERPL BCG-MCNC: 2.7 G/DL (ref 3.2–5.2)
ALBUMIN/GLOB SERPL: 0.8 {RATIO} (ref 1–3)
ALP SERPL-CCNC: 91 U/L (ref 34–104)
ALT SERPL W P-5'-P-CCNC: 8 U/L (ref 7–52)
ANION GAP SERPL CALC-SCNC: 13 MMOL/L (ref 2–11)
APTT PPP: 55.6 SECONDS (ref 26–36.3)
AST SERPL-CCNC: 5 U/L (ref 13–39)
BASOPHILS # BLD AUTO: 0.2 10^3/UL (ref 0–0.2)
BUN SERPL-MCNC: 53 MG/DL (ref 6–24)
BUN/CREAT SERPL: 7.7 (ref 8–20)
CALCIUM SERPL-MCNC: 8.2 MG/DL (ref 8.6–10.3)
CHLORIDE SERPL-SCNC: 93 MMOL/L (ref 101–111)
EOSINOPHIL # BLD AUTO: 0.3 10^3/UL (ref 0–0.6)
GLOBULIN SER CALC-MCNC: 3.3 G/DL (ref 2–4)
GLUCOSE SERPL-MCNC: 161 MG/DL (ref 70–100)
HCO3 SERPL-SCNC: 26 MMOL/L (ref 22–32)
HCT VFR BLD AUTO: 29 % (ref 35–47)
HGB BLD-MCNC: 9.1 G/DL (ref 12–16)
INR PPP/BLD: 5.36 (ref 0.77–1.02)
LYMPHOCYTES # BLD AUTO: 0.9 10^3/UL (ref 1–4.8)
MCH RBC QN AUTO: 29 PG (ref 27–31)
MCHC RBC AUTO-ENTMCNC: 32 G/DL (ref 31–36)
MCV RBC AUTO: 90 FL (ref 80–97)
MONOCYTES # BLD AUTO: 0.9 10^3/UL (ref 0–0.8)
NEUTROPHILS # BLD AUTO: 21.2 10^3/UL (ref 1.5–7.7)
NRBC # BLD AUTO: 0 10^3/UL
NRBC BLD QL AUTO: 0
PLATELET # BLD AUTO: 411 10^3/UL (ref 150–450)
POTASSIUM SERPL-SCNC: 3.8 MMOL/L (ref 3.5–5)
PROT SERPL-MCNC: 6 G/DL (ref 6.4–8.9)
RBC # BLD AUTO: 3.18 10^6/UL (ref 4–5.4)
SODIUM SERPL-SCNC: 132 MMOL/L (ref 135–145)
TROPONIN I SERPL-MCNC: 0.31 NG/ML (ref ?–0.04)
TROPONIN I SERPL-MCNC: 0.37 NG/ML (ref ?–0.04)
WBC # BLD AUTO: 24.4 10^3/UL (ref 3.5–10.8)

## 2019-03-05 PROCEDURE — G0257 UNSCHED DIALYSIS ESRD PT HOS: HCPCS

## 2019-03-05 PROCEDURE — 82947 ASSAY GLUCOSE BLOOD QUANT: CPT

## 2019-03-05 PROCEDURE — 86078 PHYS BLOOD BANK SERV REACTJ: CPT

## 2019-03-05 PROCEDURE — 89051 BODY FLUID CELL COUNT: CPT

## 2019-03-05 PROCEDURE — 84311 SPECTROPHOTOMETRY: CPT

## 2019-03-05 PROCEDURE — 72131 CT LUMBAR SPINE W/O DYE: CPT

## 2019-03-05 PROCEDURE — 94660 CPAP INITIATION&MGMT: CPT

## 2019-03-05 PROCEDURE — 85384 FIBRINOGEN ACTIVITY: CPT

## 2019-03-05 PROCEDURE — 36600 WITHDRAWAL OF ARTERIAL BLOOD: CPT

## 2019-03-05 PROCEDURE — 87186 SC STD MICRODIL/AGAR DIL: CPT

## 2019-03-05 PROCEDURE — 94667 MNPJ CHEST WALL 1ST: CPT

## 2019-03-05 PROCEDURE — 94003 VENT MGMT INPAT SUBQ DAY: CPT

## 2019-03-05 PROCEDURE — P9016 RBC LEUKOCYTES REDUCED: HCPCS

## 2019-03-05 PROCEDURE — 85018 HEMOGLOBIN: CPT

## 2019-03-05 PROCEDURE — 86901 BLOOD TYPING SEROLOGIC RH(D): CPT

## 2019-03-05 PROCEDURE — 82585 ASSAY OF CRYOFIBRINOGEN: CPT

## 2019-03-05 PROCEDURE — 82728 ASSAY OF FERRITIN: CPT

## 2019-03-05 PROCEDURE — 84484 ASSAY OF TROPONIN QUANT: CPT

## 2019-03-05 PROCEDURE — 84132 ASSAY OF SERUM POTASSIUM: CPT

## 2019-03-05 PROCEDURE — 86922 COMPATIBILITY TEST ANTIGLOB: CPT

## 2019-03-05 PROCEDURE — 80202 ASSAY OF VANCOMYCIN: CPT

## 2019-03-05 PROCEDURE — 81003 URINALYSIS AUTO W/O SCOPE: CPT

## 2019-03-05 PROCEDURE — 86927 PLASMA FRESH FROZEN: CPT

## 2019-03-05 PROCEDURE — 85025 COMPLETE CBC W/AUTO DIFF WBC: CPT

## 2019-03-05 PROCEDURE — 82040 ASSAY OF SERUM ALBUMIN: CPT

## 2019-03-05 PROCEDURE — 93306 TTE W/DOPPLER COMPLETE: CPT

## 2019-03-05 PROCEDURE — 93970 EXTREMITY STUDY: CPT

## 2019-03-05 PROCEDURE — 86141 C-REACTIVE PROTEIN HS: CPT

## 2019-03-05 PROCEDURE — 83516 IMMUNOASSAY NONANTIBODY: CPT

## 2019-03-05 PROCEDURE — 36415 COLL VENOUS BLD VENIPUNCTURE: CPT

## 2019-03-05 PROCEDURE — 83036 HEMOGLOBIN GLYCOSYLATED A1C: CPT

## 2019-03-05 PROCEDURE — 83880 ASSAY OF NATRIURETIC PEPTIDE: CPT

## 2019-03-05 PROCEDURE — 86900 BLOOD TYPING SEROLOGIC ABO: CPT

## 2019-03-05 PROCEDURE — 80048 BASIC METABOLIC PNL TOTAL CA: CPT

## 2019-03-05 PROCEDURE — 84100 ASSAY OF PHOSPHORUS: CPT

## 2019-03-05 PROCEDURE — 87040 BLOOD CULTURE FOR BACTERIA: CPT

## 2019-03-05 PROCEDURE — 94002 VENT MGMT INPAT INIT DAY: CPT

## 2019-03-05 PROCEDURE — 80053 COMPREHEN METABOLIC PANEL: CPT

## 2019-03-05 PROCEDURE — 83550 IRON BINDING TEST: CPT

## 2019-03-05 PROCEDURE — 82306 VITAMIN D 25 HYDROXY: CPT

## 2019-03-05 PROCEDURE — 80074 ACUTE HEPATITIS PANEL: CPT

## 2019-03-05 PROCEDURE — 88305 TISSUE EXAM BY PATHOLOGIST: CPT

## 2019-03-05 PROCEDURE — 85045 AUTOMATED RETICULOCYTE COUNT: CPT

## 2019-03-05 PROCEDURE — 86140 C-REACTIVE PROTEIN: CPT

## 2019-03-05 PROCEDURE — 75635 CT ANGIO ABDOMINAL ARTERIES: CPT

## 2019-03-05 PROCEDURE — 87641 MR-STAPH DNA AMP PROBE: CPT

## 2019-03-05 PROCEDURE — 82803 BLOOD GASES ANY COMBINATION: CPT

## 2019-03-05 PROCEDURE — 71045 X-RAY EXAM CHEST 1 VIEW: CPT

## 2019-03-05 PROCEDURE — 83735 ASSAY OF MAGNESIUM: CPT

## 2019-03-05 PROCEDURE — 86850 RBC ANTIBODY SCREEN: CPT

## 2019-03-05 PROCEDURE — 87106 FUNGI IDENTIFICATION YEAST: CPT

## 2019-03-05 PROCEDURE — 99283 EMERGENCY DEPT VISIT LOW MDM: CPT

## 2019-03-05 PROCEDURE — 94640 AIRWAY INHALATION TREATMENT: CPT

## 2019-03-05 PROCEDURE — 82550 ASSAY OF CK (CPK): CPT

## 2019-03-05 PROCEDURE — 85379 FIBRIN DEGRADATION QUANT: CPT

## 2019-03-05 PROCEDURE — 85652 RBC SED RATE AUTOMATED: CPT

## 2019-03-05 PROCEDURE — 83540 ASSAY OF IRON: CPT

## 2019-03-05 PROCEDURE — 83615 LACTATE (LD) (LDH) ENZYME: CPT

## 2019-03-05 PROCEDURE — 82595 ASSAY OF CRYOGLOBULIN: CPT

## 2019-03-05 PROCEDURE — 86038 ANTINUCLEAR ANTIBODIES: CPT

## 2019-03-05 PROCEDURE — C9132 KCENTRA, PER I.U.: HCPCS

## 2019-03-05 PROCEDURE — 87077 CULTURE AEROBIC IDENTIFY: CPT

## 2019-03-05 PROCEDURE — 87086 URINE CULTURE/COLONY COUNT: CPT

## 2019-03-05 PROCEDURE — 83970 ASSAY OF PARATHORMONE: CPT

## 2019-03-05 PROCEDURE — 87493 C DIFF AMPLIFIED PROBE: CPT

## 2019-03-05 PROCEDURE — 83605 ASSAY OF LACTIC ACID: CPT

## 2019-03-05 PROCEDURE — 85610 PROTHROMBIN TIME: CPT

## 2019-03-05 PROCEDURE — P9017 PLASMA 1 DONOR FRZ W/IN 8 HR: HCPCS

## 2019-03-05 PROCEDURE — 90945 DIALYSIS ONE EVALUATION: CPT

## 2019-03-05 PROCEDURE — 85730 THROMBOPLASTIN TIME PARTIAL: CPT

## 2019-03-05 PROCEDURE — 81015 MICROSCOPIC EXAM OF URINE: CPT

## 2019-03-05 PROCEDURE — 93005 ELECTROCARDIOGRAM TRACING: CPT

## 2019-03-05 PROCEDURE — 85014 HEMATOCRIT: CPT

## 2019-03-05 PROCEDURE — P9040 RBC LEUKOREDUCED IRRADIATED: HCPCS

## 2019-03-05 PROCEDURE — 85027 COMPLETE CBC AUTOMATED: CPT

## 2019-03-05 RX ADMIN — ONDANSETRON PRN MG: 2 INJECTION INTRAMUSCULAR; INTRAVENOUS at 23:09

## 2019-03-05 RX ADMIN — MORPHINE SULFATE PRN MG: 4 INJECTION INTRAVENOUS at 23:07

## 2019-03-05 NOTE — HP
CC:  Dr. Yeni Shanks *

 

HISTORY AND PHYSICAL:

 

DATE OF ADMISSION:  19

 

PRIMARY CARE PROVIDER:  Dr. Yeni Shanks.

 

ATTENDING PHYSICIAN:  Dr. Abeba Hein * (dictated by Miriam Torres NP).

 

CHIEF COMPLAINT:  Leg pain.

 

HISTORY OF PRESENT ILLNESS:  Ms. Masters is a 38-year-old female well known to 
our service with multiple recent admission to this facility and past medical 
history of end-stage renal disease, on peritoneal dialysis; COPD, on 5 L oxygen
; diabetes; CAD; and chronic pain, who presents to the emergency room today 
with complaints of leg pain, abdominal pain, nausea, and vomiting.  The patient'
s main complaint is bilateral leg pain.  She reports that this pain is new for 
her.  She states that the pain starts at her hips and ends at her knees.  It is 
circumferential around her thighs.  The pain is constant, stabbing.  She 
reports it also hurts to move her legs and they are painful to the touch.  She 
has had difficulty ambulating due to this pain.  She reports trying heat for 
pain management, which was not effective and she reports that her methadone and 
gabapentin are not effective at relieving the pain.  I will note that she 
states that this pain is new, though she was noted to complain of this pain 
during a hospitalization last month.  She reports that the only thing that 
relieves her pain is morphine.  She does have baseline neuropathy up to her mid-
calves, but sensation is intact to the knee and she has no pain at this level.  
She also reports nausea, vomiting, and abdominal pain.  She states she vomited 
one time yesterday when she was taking her medications and one time again today 
when she was taking her medications.  She has nausea on my exam.  She denies 
any actual abdominal pain at this point, though reports significant abdominal 
pain with coughing.  She does have chronic back pain, though this has not 
increased from baseline.  She does also have diabetic neuropathy, though she 
states that the pain is much different than her neuropathic pain.  She denies 
any fever, chills, chest pain, shortness of breath, edema, and rashes.

 

In the emergency room, the patient was noted to have an elevated white count, 
elevated from her baseline.  She was also noted to have a supratherapeutic INR 
as well as CRP increased from her baseline.  She was noted to have an elevated 
troponin at 0.37 and her baseline is around 0.08.  She had an EKG which did not 
show any acute changes.  Because of the patient's intractable pain, the 
hospitalist service is asked to evaluate for admission.

 

PAST MEDICAL HISTORY:

1.  End-stage renal disease, on peritoneal dialysis.

2.  COPD with chronic hypoxic respiratory failure, on 5 L of oxygen.

3.  Diabetes mellitus type 2, insulin dependent.

4.  Peripheral arterial disease.

5.  GERD.

6.  Coronary artery disease.

7.  Gastroparesis.

8.  Anxiety.

9.  Depression.

10.  Hypertension.

11.  Hyperlipidemia.

 

PAST SURGICAL HISTORY:  

1.  Right metatarsal amputation.

 

HOME MEDICATIONS:  The patient's med rec has not been completed and she does 
not know what medications she is taking, although according to the discharge 
summary from 19, the patient is takin.  Tylenol 650 mg p.o. q.6 hours p.r.n. pain.

2.  Aspirin 81 mg p.o. daily.

3.  Bethanechol 25 mg p.o. 4 times a day.

4.  Symbicort 160/4.5 mcg 2 puffs b.i.d.

5.  Calcitriol 0.25 mcg p.o. t.i.d.

6.  Gabapentin 200 mg p.o. t.i.d.

7.  Glargine 70 units subcu at bedtime.

8.  Methadone 5 mg p.o. q.8 hours p.r.n. pain.

9.  Omeprazole 40 mg p.o. daily.

10.  Promethazine 25 mg p.o. q.8 hours p.r.n.

11.  Sertraline 25 mg p.o. daily.

12.  Warfarin 6 mg p.o. daily.

13.  Albuterol MDI 2 puffs q.6 hours p.r.n.

14.  Losartan 100 mg p.o. daily.

15.  NovoLog sliding scale.

 

ALLERGIES:  DOXYCYCLINE, ERYTHROMYCIN, HEPARIN, REGLAN, NIACIN, REQUIP, 
METRONIDAZOLE, and SULFA.

 

FAMILY HISTORY:  The patient's father had an MI at 49.  The patient's mother 
had diabetes.  There is a strong family history of diabetes.

 

SOCIAL HISTORY:  The patient smokes half a pack of cigarettes per day.  Denies 
any alcohol or recreational drug use.  She is disabled and lives at home with 
her boyfriend.  Her boyfriend, Raymundo, will be her surrogate decision maker in 
the event she is unable to make her own decisions.

 

REVIEW OF SYSTEMS:  An 11-point review of systems was performed and all the 
pertinent positive and negative findings are in the HPI.  All other systems are 
negative.

 

                               PHYSICAL EXAMINATION

 

GENERAL:  Ms. Masters is a well-developed, well-nourished, overweight white 
female lying in bed, in no acute distress, though tearful.  She appears older 
than her stated age.

 

VITAL SIGNS:  Temp 98.8, heart rate 108, respiratory rate 16, oxygen 
saturations 95% on 5 L, blood pressure 137/93

 

HEENT:  Head is atraumatic, normocephalic.  Visual fields are grossly intact. 
Extraocular movements are intact.  Oral mucous membranes moist and without 
lesions.

 

NECK:  Full range of motion.  Trachea midline.

 

RESPIRATORY:  Symmetrical chest expansion.  No chest wall deformities.  Lungs 
clear to auscultation throughout.  No rhonchi, wheezes, or rubs.

 

CARDIOVASCULAR:  Regular rate and rhythm.  S1, S2 present.  No murmurs, rubs, 
or gallops.

 

ABDOMEN:  Soft, mildly tender to deep palpation of the right upper quadrant.  
Bowel sounds normoactive.

 

EXTREMITIES:  Skin warm and smooth bilaterally.  No edema.  No clubbing or 
cyanosis.  Pedal pulses 1+ bilaterally.  Bilateral upper legs are markedly 
tender to palpation, though are normal in appearance without any evidence of 
wounds, rashes, or deformity.

 

NEUROLOGIC:  Awake, alert, and oriented x4.  Moves all extremities.

 

SKIN:  Grossly intact.

 

 DIAGNOSTIC STUDIES AND LABORATORY DATA:  WBC 24.4, RBC 3.18, hemoglobin 9.1, 
hematocrit 29, platelets 411.  INR 5.36.  Sodium 132, potassium 3.8, chloride 93
, carbon dioxide 26, BUN 53, creatinine 6.87, glucose 161, lactic acid 0.9.  
Troponin 0.37.  .

 

EKG shows sinus tachycardia with a rate of 110, QTc 488, inverted T-waves in 
aVL which is consistent with previous EKGs on file and borderline ST elevation 
and depression, which are is consistent with previous EKGs.

 

ASSESSMENT AND PLAN:  Ms. Masters is a 38-year-old female with a complex past 
medical history including end-stage renal disease, on peritoneal dialysis; 
chronic obstructive pulmonary disease; chronic hypoxic respiratory failure; 
diabetes; peripheral arterial disease; coronary artery disease; and 
gastroparesis who presents to the emergency room today with complaints of leg 
pain and was found to have some lab abnormalities different from her baseline.  
The patient will be admitted for observation for:



1.  Intractable leg pain.  The etiology of this leg pain is unclear.  It does 
seem somewhat odd that the leg pain is only in her upper legs and 
circumferential.  It does sound like neuropathic pain, though the patient is 
very adamant that this is not neuropathic pain.  She has been complaining of 
this pain during recent admissions, so at this point, we will check an MRI of 
her lumbar spine to rule out any spinal abnormalities which may be contributing 
to this.  If the MRI does not show any abnormalities, it may be worthwhile to 
get a Doppler of bilateral legs.  I have ordered the patient morphine, she can 
have q.6 hours as well as Percocet and I will continue her on her usual 
medications including gabapentin and methadone.

2.  Nausea, vomiting, and abdominal pain.  The patient has vomited twice in the 
last 48 hours, so I am not particularly concerned about any sort of infectious 
process at this point.  I have ordered Zofran for nausea.  She does complain of 
abdominal pain when coughing which sounds more musculoskeletal rather than intra
-abdominal.  We will continue to monitor this and if her vomiting or abdominal 
pain become worse, reassess at that time.

3.  Elevated troponin.  The patient's baseline troponin is around 0.08 and 
today is 0.37.  I am still not completely convinced that this is accurate and I 
do think that this could be a laboratory error as the patient has no cardiac 
symptoms.  Her last echocardiogram was in 2018, so I think it is 
reasonable to repeat an echo at this point.  I will check 2 additional 
troponins and EKGs, though I am not particularly concerned about an acute 
coronary syndrome at this point.

4.  Leukocytosis and elevated CRP.  The patient's white blood count and CRP are 
elevated from her baseline.  She typically does have a high white count and CRP
, though these are higher than they have been in quite some time.  There is no 
evidence of sepsis or infection at this point.  The patient was recently 
treated for pneumonia during her last hospitalization and sent home with a 
course of cefpodoxime which she reports she has taken as directed.  Again, we 
will continue to monitor her.  I will recheck her labs tomorrow and a further 
plan can be determined at that time if necessary.

5.  Supratherapeutic INR.  The patient is unsure of her current Coumadin dosing 
as she reports that her boyfriend manages her medications, although it appears 
that she has likely been taking 6 mg of warfarin daily.  Her INR at this point 
is 5.36, so I will hold any warfarin at this point and recheck the INR tomorrow.

6.  End-stage renal disease, on peritoneal dialysis.  The patient can continue 
her usual peritoneal dialysis schedule.

7.  Chronic obstructive pulmonary disease and chronic hypoxic respiratory 
failure. The patient is on her baseline oxygen requirements.  There is no 
evidence to suggest any recurrent pneumonia at this point.  I will continue her 
on her usual medications.

8.  Coronary artery disease.  I will continue her aspirin.

9.  Diabetes.  I will continue her usual dose of glargine and place her on a 
sliding scale of lispro while here in the hospital.

10.  Chronic pain.  Continue methadone.

11.  Hypertension.  Continue losartan.

12.  Depression.  Continue sertraline.

13.  FEN.  The patient does not any require fluid resuscitation or electrolyte 
repletion at this time.  I have ordered a renal diet.

14.  Code status.  The patient will be a full code.

15.  DVT prophylaxis.  According to the DVT risk assessment, the patient scores 
a 5, putting her at highest risk.  Again, her INR is supratherapeutic at this 
point, so I will hold any warfarin at this time.

 

TIME SPENT:  Approximately 75 minutes were spent on this admission, greater 
than half of that time spent face-to-face with the patient obtaining my history
, performing my physical exam, and reviewing my plan of care.

 

This case has been reviewed with my attending, Dr. Hein, who is in agreement 
with the plan of care.

 

 ____________________________________ MIRIAM TORRES, NP

 

[id number] #: [id number]

SAEED

## 2019-03-05 NOTE — ED
Complex/Multi-Sys Presentation





- HPI Summary


HPI Summary: 





A 39 y/o female brought in by [facility] presents to [facility] with a chief 

complaint of vomiting since 03/04/19. She also c/o nausea, fevers, and chills 

starting 03/04/19. She reports that her temperature was 99.5. At triage her 

temperature was 97.8. She also c/o abdominal pain when coughing, her thighs 

being swollen, and since 03/01/19 having leg pain which she describes is worse 

than her neuropathy. She claims that she can hardly walk since her legs give 

out. Palpations and movement aggravate her pain. At triage she rated her pain 

as a 9/10 in severity. She reports that she was recently admitted and was 

discharged on 02/25/19 after having two blood transfusions over the course of a 

week. She reports that her INR improved to a 1.3, but that she didnt make it 

to her follow up appointment on 03/04/19 because she could not get out of her 

house. She reports taking abx, but reports that she has not been able to eat or 

drink since 03/02/19. Vital signs while in room  HR: 109bpm, BP: 175/70.





- History Of Current Complaint


Chief Complaint: EDGeneral


Time Seen by Provider: 03/05/19 18:20


Hx Obtained From: Patient, EMS


Onset/Duration: Sudden Onset, Lasting Days, Still Present


Timing: Constant, Days


Severity Currently: Severe


Severity Initially: Severe


Location: Pain At: - legs, abdomen


Character: Unable To Describe


Aggravating Factor(s): palpation, movement.


Alleviating Factor(s): nothing


Associated Signs And Symptoms: Positive: Cough, Nausea, Abdominal Pain, Fever - 

99.5 PTA, 97.8 at triage





- Allergies/Home Medications


Allergies/Adverse Reactions: 


 Allergies











Allergy/AdvReac Type Severity Reaction Status Date / Time


 


Adhesive Tape [Plastic Tape] Allergy Mild Blisters Verified 02/18/19 12:05


 


doxycycline Allergy  Unknown Verified 02/18/19 12:05





   Reaction  





   Details  


 


erythromycin base Allergy  See Comment Verified 02/18/19 12:05


 


heparin Allergy  See Comment Verified 02/18/19 12:05


 


metoclopramide [From Reglan] Allergy  Hives Verified 02/18/19 12:05


 


niacin Allergy  Rash Verified 02/18/19 12:05


 


ropinirole [From Requip] Allergy  Hives Verified 02/18/19 12:05


 


metronidazole [From Flagyl] AdvReac  Nausea And Verified 02/25/19 13:15





   Vomiting  


 


Sulfa (Sulfonamide AdvReac  Vomiting Verified 02/25/19 13:15





Antibiotics)     














PMH/Surg Hx/FS Hx/Imm Hx


Previously Healthy: No


Endocrine/Hematology History: Reports: Hx Anticoagulant Therapy - Aspirin., Hx 

Blood Transfusions, Hx Diabetes, Hx Thyroid Disease - nodule, Hx Anemia - hx of 

- reports had tranfusion in 2014 after mi


Cardiovascular History: Reports: Hx Angina, Hx Cardiac Arrest, Hx Cardiomegaly, 

Hx Coronary Artery Disease, Hx Deep Vein Thrombosis, Hx Hypercholesterolemia, 

Hx Hypertension, Hx Myocardial Infarction, Hx Peripheral Vascular Disease, 

Other Cardiovascular Problems/Disorders


   Denies: Hx Congestive Heart Failure, Hx Pacemaker/ICD, Hx Valvular Heart 

Disease


Respiratory History: Reports: Hx Asthma, Hx Chronic Obstructive Pulmonary 

Disease (COPD) - smoker, Hx Pneumonia, Hx Sleep Apnea, Other Respiratory 

Problems/Disorders - acute respipatory failure with hypoxia - dec 2016


   Denies: Hx Lung Cancer, Hx Pulmonary Embolism - pt says no, although 

documented in char


GI History: Reports: Hx Gall Bladder Disease, Hx Gastroesophageal Reflux Disease

, Other GI Disorders - GASTROPARESIS - TAKING OMEPRAZOLE, reglan and zofran


   Denies: Hx Gastrointestinal Bleed, Hx Ulcer, Hx Urosepsis


 History: Reports: Hx Acute Renal Failure, Hx Chronic Renal Failure - ESRD on 

PD, Hx Dialysis - PERITONEAL, Hx Kidney Stones, Hx Renal Disease - ESRD on home 

peritoneal dialysis , Other  Problems/Disorders


Musculoskeletal History: Reports: Hx Arthritis - back, hips, Hx Back Problems - 

Sciatica and Herniated L3 disk, Other Musculoskeletal History - partial 

amputation of toes/foot


Sensory History: Reports: Hx Contacts or Glasses, Hx Eye Prosthesis - L eye, Hx 

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


   Denies: Hx Hearing Aid


Opthamlomology History: Reports: Hx Contacts or Glasses, Hx Eye Prosthesis - L 

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


Neurological History: Reports: Other Neuro Impairments/Disorders - neuropathy


   Denies: Hx Transient Ischemic Attacks (TIA)


Psychiatric History: Reports: Hx Anxiety, Hx Depression, Hx Bipolar Disorder


   Denies: Hx Eating Disorder, Hx Panic Disorder, Hx Schizophrenia, Hx of 

Violent Episodes Against Others





- Cancer History


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


Hx Chemotherapy: No


Hx Radiation Therapy: No





- Surgical History


Surgery Procedure, Year, and Place: 2 stents in legs (r sfa bare metal stent)(r 

a/c popliteal bare metal stent) 4-24-17.  left eye removed, toes removed right, 

heart cath, abdominal catheters for dialysis, 6 times melanoma removed.


Hx Anesthesia Reactions: No





- Immunization History


Date of Tetanus Vaccine: utd


Date of Influenza Vaccine: 10/2017


Infectious Disease History: No


Infectious Disease History: Reports: Hx of Known/Suspected MRSA - MRSA R 1st toe

, History Other Infectious Disease - GANGRENE


   Denies: Hx Clostridium Difficile, Hx Hepatitis, Hx Human Immunodeficiency 

Virus (HIV), Hx Shingles, Hx Tuberculosis, Traveled Outside the [place] in Last 30 

Days





- Family History


Known Family History: Positive: Cardiac Disease - father MI at 43 y/o, 

Hypertension, Other - Positive for bipolar and depression. Completed suicide to 

sister.  





- Social History


Alcohol Use: None


Hx Substance Use: No


Substance Use Type: Reports: None


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


Hx Tobacco Use: Yes


Smoking Status (MU): Heavy Every Day Tobacco Smoker


Type: Cigarettes


Amount Used/How Often: 1 PPD


Length of Time of Smoking/Using Tobacco: 20 YEARS


Have You Smoked in the Last Year: Yes





Review of Systems


Positive: Fever - 99.5 PTA, 97.8 at triage, Chills


Positive: Cough


Positive: Abdominal Pain, Vomiting, Nausea


Positive: Edema


All Other Systems Reviewed And Are Negative: Yes





Physical Exam





- Summary


Physical Exam Summary: 





Appearance: The patient is well-nourished in no acute distress and in no acute 

pain.


 


Skin: The skin is warm and dry and skin color reflects adequate perfusion.





HEENT: The head is normocephalic and atraumatic. The pupils are equal and 

reactive. The conjunctivae are clear and without drainage. Nares are patent and 

without drainage. Mouth reveals moist mucous membranes and the throat is 

without erythema and exudate. The external ears are intact. The ear canals are 

patent and without drainage. The tympanic membranes are intact.


 


Neck: The neck is supple with full range of motion and non-tender. There are no 

carotid bruits. There is no neck vein distension.


 





Respiratory: Chest is non-tender. Lungs are clear to auscultation and breath 

sounds are symmetrical and equal.


 


Cardiovascular: Heart is regular rate and rhythm. There is no murmur or rub 

auscultated. There is no peripheral edema and pulses are symmetrical and equal.


 


Abdomen: The abdomen is soft and non-tender. There are normal bowel sounds 

heard in all four quadrants and there is no organomegaly palpated.


 


Musculoskeletal: There is no back tenderness noted. Extremities are non-tender 

with full range of motion. There is good capillary refill. There is no 

peripheral edema or calf tenderness elicited.


 


Neurological: Patient is alert and oriented to person, place and time. The 

patient has symmetrical motor strength in all four extremities. Cranial nerves 

are grossly intact. Deep tendon reflexes are symmetrical and equal in all four 

extremities.


 


Psychiatric: The patient has an appropriate affect and does not exhibit any 

anxiety or depression.


Triage Information Reviewed: Yes


Vital Signs On Initial Exam: 


 Initial Vitals











Temp Pulse Resp BP Pulse Ox


 


 97.8 F   111   20   175/70   100 


 


 03/05/19 18:16  03/05/19 18:16  03/05/19 18:16  03/05/19 18:16  03/05/19 18:16











Vital Signs Reviewed: Yes





Diagnostics





- Vital Signs


 Vital Signs











  Temp Pulse Resp BP Pulse Ox


 


 03/05/19 18:16  97.8 F  111  20  175/70  100














- Laboratory


Result Diagrams: 


 03/06/19 05:18





 03/06/19 05:18


Lab Statement: Any lab studies that have been ordered have been reviewed, and 

results considered in the medical decision making process.





Complex Multi-Symp Course/Dx


Course Of Treatment: Ms. Masters presented with an exacerbation of her chronic 

neuropathic pain in her bilateral thighs as well as vomiting the last couple of 

days with an inability to keep anything down.  She oftens has these symptoms 

when she has an infection or other stressor therefore a work-up was initiated 

with IV fluids held because of her ERSD status.  She was found to have a high 

indeterminant troponin which normally runs in the low indeterminant range. She 

also had a leukocytosis and CRP elevation. The hospitalist service was 

contacted for evaluation and admission.





- Diagnoses


Provider Diagnoses: 


 Diabetic neuropathy, Leukocytosis, unspecified








- Critical Care Time


Critical Care Time: 30-74 min





Discharge





- Sign-Out/Discharge


Documenting (check all that apply): Patient Departure





- Discharge Plan


Condition: Stable


Disposition: ADMITTED TO [facility]





- Billing Disposition and Condition


Condition: STABLE


Disposition: Admitted to [facility]





- Attestation Statements


Document Initiated by Emilye: Yes


Documenting Scribe: Deandre Ortega


Provider For Whom Selin is Documenting (Include Credential): Salomon Jackson MD


Scribe Attestation: 


I, Deandre Ortega, scribed for Salomon Jackson MD on 03/06/19 at 0933. 


Scribe Documentation Reviewed: Yes


Provider Attestation: 


The documentation as recorded by the Deandre cardenas accurately 

reflects the service I personally performed and the decisions made by me, 

Salomon Jackson MD


Status of Scribe Document: Viewed





Consult


Consult: 





1945 - Spoke with Dr. Hein about the pt's present condition who will be the 

accepting physician to [facility].

## 2019-03-06 LAB
ANION GAP SERPL CALC-SCNC: 17 MMOL/L (ref 2–11)
BASOPHILS # BLD AUTO: 0.2 10^3/UL (ref 0–0.2)
BUN SERPL-MCNC: 52 MG/DL (ref 6–24)
BUN/CREAT SERPL: 7.6 (ref 8–20)
CALCIUM SERPL-MCNC: 8.2 MG/DL (ref 8.6–10.3)
CHLORIDE SERPL-SCNC: 95 MMOL/L (ref 101–111)
CK SERPL-CCNC: 37 U/L (ref 10–223)
EOSINOPHIL # BLD AUTO: 0.6 10^3/UL (ref 0–0.6)
GLUCOSE SERPL-MCNC: 161 MG/DL (ref 70–100)
HCO3 SERPL-SCNC: 20 MMOL/L (ref 22–32)
HCT VFR BLD AUTO: 31 % (ref 35–47)
HGB BLD-MCNC: 9.5 G/DL (ref 12–16)
INR PPP/BLD: 5.65 (ref 0.77–1.02)
LYMPHOCYTES # BLD AUTO: 2.1 10^3/UL (ref 1–4.8)
MCH RBC QN AUTO: 29 PG (ref 27–31)
MCHC RBC AUTO-ENTMCNC: 31 G/DL (ref 31–36)
MCV RBC AUTO: 91 FL (ref 80–97)
MONOCYTES # BLD AUTO: 1.3 10^3/UL (ref 0–0.8)
NEUTROPHILS # BLD AUTO: 22.3 10^3/UL (ref 1.5–7.7)
NRBC # BLD AUTO: 0 10^3/UL
NRBC BLD QL AUTO: 0.1
PLATELET # BLD AUTO: 406 10^3/UL (ref 150–450)
POTASSIUM SERPL-SCNC: 4.1 MMOL/L (ref 3.5–5)
RBC # BLD AUTO: 3.34 10^6/UL (ref 4–5.4)
RBC UR QL AUTO: (no result)
SODIUM SERPL-SCNC: 132 MMOL/L (ref 135–145)
TROPONIN I SERPL-MCNC: 0.29 NG/ML (ref ?–0.04)
WBC # BLD AUTO: 26.3 10^3/UL (ref 3.5–10.8)
WBC UR QL AUTO: (no result)

## 2019-03-06 RX ADMIN — OXYCODONE HYDROCHLORIDE AND ACETAMINOPHEN PRN TAB: 5; 325 TABLET ORAL at 12:38

## 2019-03-06 RX ADMIN — GABAPENTIN SCH MG: 100 CAPSULE ORAL at 21:55

## 2019-03-06 RX ADMIN — CALCITRIOL SCH MCG: 0.25 CAPSULE ORAL at 21:56

## 2019-03-06 RX ADMIN — SERTRALINE HYDROCHLORIDE SCH MG: 25 TABLET ORAL at 09:36

## 2019-03-06 RX ADMIN — ALBUTEROL SULFATE PRN MG: 2.5 SOLUTION RESPIRATORY (INHALATION) at 20:08

## 2019-03-06 RX ADMIN — BETHANECHOL CHLORIDE SCH MG: 25 TABLET ORAL at 13:41

## 2019-03-06 RX ADMIN — BETHANECHOL CHLORIDE SCH MG: 25 TABLET ORAL at 21:55

## 2019-03-06 RX ADMIN — MOMETASONE FUROATE AND FORMOTEROL FUMARATE DIHYDRATE SCH: 200; 5 AEROSOL RESPIRATORY (INHALATION) at 07:55

## 2019-03-06 RX ADMIN — ASPIRIN SCH MG: 81 TABLET, COATED ORAL at 09:36

## 2019-03-06 RX ADMIN — CALCITRIOL SCH MCG: 0.25 CAPSULE ORAL at 13:41

## 2019-03-06 RX ADMIN — GABAPENTIN SCH MG: 100 CAPSULE ORAL at 13:41

## 2019-03-06 RX ADMIN — GABAPENTIN SCH MG: 100 CAPSULE ORAL at 09:36

## 2019-03-06 RX ADMIN — PIPERACILLIN AND TAZOBACTAM SCH MLS/HR: 3; .375 INJECTION, POWDER, LYOPHILIZED, FOR SOLUTION INTRAVENOUS; PARENTERAL at 19:34

## 2019-03-06 RX ADMIN — MORPHINE SULFATE PRN MG: 4 INJECTION INTRAVENOUS at 05:26

## 2019-03-06 RX ADMIN — METHADONE HYDROCHLORIDE PRN MG: 5 TABLET ORAL at 16:02

## 2019-03-06 RX ADMIN — INSULIN LISPRO SCH UNIT: 100 INJECTION, SOLUTION INTRAVENOUS; SUBCUTANEOUS at 13:40

## 2019-03-06 RX ADMIN — PANTOPRAZOLE SODIUM SCH MG: 40 TABLET, DELAYED RELEASE ORAL at 17:06

## 2019-03-06 RX ADMIN — OXYCODONE HYDROCHLORIDE AND ACETAMINOPHEN PRN TAB: 5; 325 TABLET ORAL at 19:33

## 2019-03-06 RX ADMIN — CALCITRIOL SCH MCG: 0.25 CAPSULE ORAL at 09:36

## 2019-03-06 RX ADMIN — MOMETASONE FUROATE AND FORMOTEROL FUMARATE DIHYDRATE SCH PUFF: 200; 5 AEROSOL RESPIRATORY (INHALATION) at 20:10

## 2019-03-06 RX ADMIN — ALBUTEROL SULFATE PRN MG: 2.5 SOLUTION RESPIRATORY (INHALATION) at 11:53

## 2019-03-06 RX ADMIN — INSULIN LISPRO SCH UNIT: 100 INJECTION, SOLUTION INTRAVENOUS; SUBCUTANEOUS at 17:06

## 2019-03-06 RX ADMIN — BETHANECHOL CHLORIDE SCH MG: 25 TABLET ORAL at 09:36

## 2019-03-06 RX ADMIN — BETHANECHOL CHLORIDE SCH MG: 25 TABLET ORAL at 15:59

## 2019-03-06 RX ADMIN — OXYCODONE HYDROCHLORIDE AND ACETAMINOPHEN PRN TAB: 5; 325 TABLET ORAL at 01:42

## 2019-03-06 RX ADMIN — LOSARTAN POTASSIUM SCH MG: 25 TABLET, FILM COATED ORAL at 09:36

## 2019-03-06 RX ADMIN — ONDANSETRON PRN MG: 2 INJECTION INTRAMUSCULAR; INTRAVENOUS at 13:07

## 2019-03-06 RX ADMIN — INSULIN LISPRO SCH UNIT: 100 INJECTION, SOLUTION INTRAVENOUS; SUBCUTANEOUS at 09:35

## 2019-03-06 RX ADMIN — INSULIN GLARGINE SCH UNITS: 100 INJECTION, SOLUTION SUBCUTANEOUS at 21:54

## 2019-03-06 NOTE — ECHO
Patient:      MAKAYLA RUBIN  

Med Rec#:     [id number]            :          1980          

Date:         2019            Age:          38y                 

Account#:     [id number]          Height:       165 cm / 65.0 in

Accession#:   [id number]           Weight:       93.4 kg / 205.9 lbs

Sex:          F                     BSA:          2

Room#:        436                   

Admit Date#:  2019          

Type:         Inpatient

 

Referring:    Miriam Torres

Reading:      Tennille Saucedo MD

Sonographer:  Amna Pichardo RN RDCS

CC:           Yeni Shanks MD

______________________________________________________________________

 

Transthoracic Echocardiogram

 

Indication:

Elevated troponin levels

BP:           132/63

HR:           96

Rhythm:       NSR

 

Findings     

History:

CAD, HTN, HLD, DM, PE, ESRD, COPD, smoker, PAD 

 

Technical Comments:

The study quality is fair.  The study is technically limited due to

patient body habitus.  The study is technically limited due to the

patient's history of COPD.  

 

Left Ventricle:

The left ventricular chamber size is normal. Moderate concentric left

ventricular hypertrophy is observed. Global left ventricular wall motion

and contractility are within normal limits. Left ventricular systolic

function is at the lower limits of normal. Base of the lateral wall

relatively hypokinetic on short axis and 4 chamber view, small focal

area. The estimated ejection fraction is 50-55%.  Abnormal left

ventricular diastolic function is observed. The patient was unable to

perform a Valsalva maneuver. 

 

Left Atrium:

The left atrium is mildly dilated. 

 

Right Ventricle:

The right ventricle is mildly dilated.  The right ventricular global

systolic function is low normal. 

 

Right Atrium:

The right atrium is mildly dilated.  

 

Aortic Valve:

The aortic valve structure is not well visualized. The aortic valve

leaflets are mildly thickened. Systolic excursion of the aortic valve is

normal. There is a trace of aortic regurgitation. Highest aortic valve

velocity was acquired with Pedoff in apical position. 

 

Mitral Valve:

There is mitral annular calcification. The mitral valve leaflets are

mildly thickened. There is a trace of mitral regurgitation. There is no

evidence of mitral stenosis. 

 

Tricuspid Valve:

The tricuspid valve leaflets are normal.  There is moderate tricuspid

regurgitation. The tricuspid regurgitant jet is directed toward the

septum. The right ventricular systolic pressure is estimated at 42 mmHg.

 There is evidence of mild to moderate pulmonary hypertension. There is

no tricuspid stenosis. 

 

Pulmonic Valve:

The pulmonic valve appears normal. There is a trace pulmonic

regurgitation.  There is no pulmonic stenosis.  

 

Pericardium:

There is no significant pericardial effusion. A pericardial fat pad is

visualized. 

 

Aorta:

There is no dilatation of the ascending aorta. There is no dilatation of

the aortic arch. There is no dilation of the aortic root. 

 

Pulmonary Artery:

The main pulmonary artery appears normal. 

 

Venous:

The venous system is not well visualized. The inferior vena cava is not

visualized. 

 

Conclusions

Moderate concentric left ventricular hypertrophy is observed.

Global left ventricular wall motion and contractility are within normal

limits.

Base of the lateral wall relatively hypokinetic on short axis and 4

chamber view, small focal area.

The estimated ejection fraction is 50-55%. 

Abnormal left ventricular diastolic function is observed.

The right ventricular global systolic function is low normal.

There is aortic valve sclerosis with trace aortic regurgitation.

There is a trace of mitral regurgitation.

There is moderate tricuspid regurgitation, eccentric jet directed toward

the septum.

There is evidence of mild to moderate pulmonary hypertension estimated

at 42 mmHg. 

Compared with prior echo of 18, EF is stable, focal area of

hypokinesis newly described, AI is new, MR is stable, TR has increased

from trace, elevated PA pressure newly documented.

 

Measurements     

Name                    Value         Normal Range            

RVIDd (AP) 2D           3.3 cm        (0.9 - 2.6)             

RVDdMajor (2D)          4.6 cm        (2.2 - 4.4)             

RAd ISD 4CH             5.3 cm        (3.4 - 4.9)             

RA (A4C)W               4 cm          (2.9 - 4.6)             

IVSd (2D)               1.4 cm        (0.6 - 1)               

LVPWd (2D)              1.4 cm        (0.6 - 1)               

LVIDd (2D)              5.3 cm        (3.6 - 5.4)             

LVIDs (2D)              4 cm          -                        

LVIDd (2D) index        2.65 cm/m2    -                        

LVIDs (2D) index        2 cm/m2       -                        

LV FS (2D)              25 %          (25 - 45)               

Aortic Annulus          1.9 cm        (1.4 - 2.6)             

Ao root diameter (2D)   3 cm          (2.1 - 3.5)             

Ascending Ao            2.6 cm        (2.1 - 3.4)             

Aortic arch             2.2 cm        (1.8 - 3.4)             

LA dimension (AP) 2D    4.5 cm        (2.3 - 3.8)             

LAd ISD 4CH             5.2 cm        (2.9 - 5.3)             

LA ISD 4CH W            4.6 cm        (2.5 - 4.5)             

 

Name                    Value         Normal Range            

LA ESV BP (A/L) index   39.8 ml/m2    -                        

 

Name                    Value         Normal Range            

MV E-wave Vmax          0.95 m/sec    -                        

MV deceleration time    208 msec      -                        

MV A-wave Vmax          0.94 m/sec    -                        

MV E:A ratio            1 ratio       -                        

LV septal e' Vmax       0.07 m/sec    -                        

LV lateral e' Vmax      0.08 m/sec    -                        

LV E:e' septal ratio    13.6 ratio    -                        

LV E:e' lateral ratio   11.9 ratio    -                        

 

Name                    Value         Normal Range            

AV Vmax                 2 m/sec       -                        

AV VTI                  33.1 cm       -                        

AV peak gradient        16 mmHg       -                        

AV mean gradient        8 mmHg        -                        

LVOT diameter           2 cm          -                        

LVOT Vmax               1.2 m/sec     -                        

LVOT VTI                20.3 cm       -                        

LVOT peak gradient      6 mmHg        -                        

LVOT mean gradient      3 mmHg        -                        

DOI (VTI)               0.61 ratio    -                        

DOI (Vmax)              0.6 ratio     -                        

ISHAN (continuity Vmax)   1.9 cm2       -                        

ISHAN (continuity VTI)    1.9 cm2       -                        

DEMETRI Vmax                1.2 m/sec     -                        

 

Name                    Value         Normal Range            

MV Vmax                 1.3 m/sec     -                        

MV VTI                  25.5 cm       -                        

MV peak gradient        6 mmHg        -                        

MV mean gradient        3 mmHg        -                        

MV PHT                  70 msec       -                        

MVA (PHT)               3.1 cm2       -                        

MVA (continuity VTI)    2.5 cm2       -                        

 

Name                    Value         Normal Range            

TR Vmax                 2.9 m/sec     -                        

TR peak gradient        34 mmHg       -                        

RAP                     8 mmHg        -                        

RVSP                    42 mmHg       -                        

 

Name                    Value         Normal Range            

PV Vmax                 1 m/sec       -                        

 

Electronically signed by: Tennille Saucedo MD on 2019 14:51:15

## 2019-03-06 NOTE — PN
Subjective


Date of Service: 03/06/19


Interval History: 





Seen with BF at bedside


Main complaint is b/l pain in thighs that is described as burning - reports 

swelling in b/l thighs x weeks to a month 


Feels fatigued 


Minimal cough 


No SOB 


Denies chest pain/discomfort 


No abdominal pain 


No N/V, LH











Objective


Active Medications: 








Acetaminophen (Tylenol Tab*)  650 mg PO Q6H PRN


   PRN Reason: PAIN


Albuterol (Ventolin 2.5 Mg/3 Ml Neb.Sol*)  2.5 mg INH Q4HR PRN


   PRN Reason: SOB/WHEEZING


Albuterol (Ventolin Hfa Inhaler*)  2 puff INH Q6H PRN


   PRN Reason: COUGH


Aspirin (Aspirin Ec Tab*)  81 mg PO DAILY [facility]


   Last Admin: 03/06/19 09:36 Dose:  81 mg


Bethanechol Chloride (Urecholine Tab*)  25 mg PO QID [facility]


   Last Admin: 03/06/19 09:36 Dose:  25 mg


Calcitriol (Rocaltrol  Cap*)  0.25 mcg PO TID [facility]


   Last Admin: 03/06/19 09:36 Dose:  0.25 mcg


Dextrose (D50w Syringe 50 Ml*)  12.5 gm IV PUSH .FOR FS < 60 - SS PRN


   PRN Reason: FS < 60


Gabapentin (Neurontin Cap(*))  200 mg PO TID [facility]


   Last Admin: 03/06/19 09:36 Dose:  200 mg


Vancomycin HCl 1,250 mg/ (Sodium Chloride)  250 mls @ 166.667 mls/hr IVPB ONCE 

[facility]; Protocol


   Stop: 03/06/19 23:59


Piperacillin Sod/Tazobactam (Sod 3.375 gm/ Sodium Chloride)  100 mls @ 200 mls/

hr IVPB ONCE ONE


   Stop: 03/06/19 11:33


Insulin Glargine (Lantus(*))  70 units SUBCUT BEDTIME [facility]


Insulin Human Lispro (Humalog*)  0 units SUBCUT AC [facility]; Protocol


   Last Admin: 03/06/19 09:35 Dose:  3 unit


Losartan Potassium (Cozaar Tab*)  100 mg PO DAILY [facility]


   Last Admin: 03/06/19 09:36 Dose:  100 mg


Methadone HCl (Dolophine Tab*)  5 mg PO Q8HR PRN


   PRN Reason: PAIN


Mometasone Furoate/Formoterol Fumar (Dulera 200/5 Mdi*)  2 puff INH BID [facility]; 

Protocol


   Last Admin: 03/06/19 07:55 Dose:  Not Given


Ondansetron HCl (Zofran Inj*)  4 mg IV Q4H PRN


   PRN Reason: NAUSEA/VOMITING


   Last Admin: 03/05/19 23:09 Dose:  4 mg


Oxycodone/Acetaminophen (Percocet 5/325 Tab*)  1 tab PO Q4H PRN


   PRN Reason: Pain


   Last Admin: 03/06/19 01:42 Dose:  1 tab


Pantoprazole Sodium (Protonix Tab*)  40 mg PO QPM [facility]


Pharmacy Consult (Vancomycin Per Pharmacy*)  1 note FOLLOW UP .VANC PER 

PHARMACY [facility]


Pharmacy Consult (Zosyn Per Pharmacy*)  1 note FOLLOW UP .ZOSYN PER PHARMACY [facility]


Promethazine HCl (Phenergan Tab*)  25 mg PO Q8H PRN


   PRN Reason: NAUSEA


Sertraline HCl (Zoloft*)  25 mg PO DAILY [facility]


   Last Admin: 03/06/19 09:36 Dose:  25 mg








 Vital Signs - 8 hr











  03/06/19 03/06/19 03/06/19





  03:40 04:24 05:25


 


Temperature 97.2 F  


 


Pulse Rate 106  


 


Respiratory 16 16 18





Rate   


 


Blood Pressure 132/63  





(mmHg)   


 


O2 Sat by Pulse 91  





Oximetry   














  03/06/19 03/06/19 03/06/19





  05:26 07:18 08:00


 


Temperature   


 


Pulse Rate   


 


Respiratory 20 12 





Rate   


 


Blood Pressure   





(mmHg)   


 


O2 Sat by Pulse   94





Oximetry   














  03/06/19 03/06/19





  09:23 09:36


 


Temperature 98.1 F 


 


Pulse Rate 102 


 


Respiratory 16 14





Rate  


 


Blood Pressure 124/51 





(mmHg)  


 


O2 Sat by Pulse 94 





Oximetry  











Oxygen Devices in Use Now: Simple Face Mask - 7L 


Appearance: obese, sleeping, easy to wake to voice, NAD


Eyes: No Scleral Icterus, PERRLA


Ears/Nose/Mouth/Throat: NL Teeth, Lips, Gums, Clear Oropharnyx


Neck: NL Appearance and Movements; NL JVP, Trachea Midline


Respiratory: Symmetrical Chest Expansion and Respiratory Effort, - - diffuse 

wheeze and scattered rhonchi throughout


Cardiovascular: - - tachy, RRR


Abdominal: NL Sounds; No Tenderness; No Distention, No Hepatosplenomegaly


Extremities: - - lower LE are small and wasting, uper thighs TTP and slightly 

tense, FROM distally, pulses present 


Neurological: Alert and Oriented x 3


Result Diagrams: 


 03/06/19 05:18





 03/06/19 05:18


Microbiology and Other Data: 


 Microbiology











 03/05/19 23:00 Nasal Screen MRSA (PCR) - Final





 Nasal    Mrsa Not Detected














Assess/Plan/Problems-Billing


Assessment: 





39 yo F complicated medical history including ESRD on PD, COPD with chronic 

resp failure on 5L o2, IDDM, chronic pain on methadone, unconfirmed CAD and 

recent admission at the end of February for PNA/Sepsis returning with b/l thigh 

pain found with elevated CRP/WBC and increased troponin 





- Patient Problems


(1) Pneumonia


Comment: 


Did not present with cough/SOB but CRP >200, WBC >20 (baseline elevated) CXR 

concerning for PNA although notably absent volume loss. Patient cannot stand 2/

2 pain to obtain CXR PA/Lat views. 


I am starting broad spectrum abx (vanco/zosyn) and follow WBC and CXR    





(2) Thigh pain


Comment: Add on CPK


Check LE b/l ultrasound


Can consider CTA with runoff given /o PAD and stenting    





(3) CAD (coronary artery disease)


Comment: Denies history of stents but reports "cardiac arrest" during dialysis 


Last myoview 2016 here was low risk 


Suspect elevated trop in setting of demand although not clear source of 

increased demand


EKG non ischemic, presented without any cardiac symptomatolgy 


consider stress in future    





(4) ESRD on peritoneal dialysis


Comment: 


-On peritoneal dialysis


- continue inpatient    





(5) IDDM (insulin dependent diabetes mellitus)


Comment: Last HbA1c 2017 - added on to labs now


continuue basal/bolus insulin    





(6) PAD (peripheral artery disease)


Comment: 


 - complicated by DVTs now on coumadin


 - reports b/l LE stenting 1 and 2 years ago at [facility] by Dr. Zavala (

sp?)


 - INR supratherapeutic


 - Hold coumadin tonight and follow INR in AM


   





(7) Supratherapeutic INR


Comment: 


supratherapeutic INR   





(8) DVT prophylaxis


Comment: supratherapeutic INR

## 2019-03-06 NOTE — PN
Progress Note





- Progress Note


Date of Service: 03/06/19


Note: 





Patient desaturated and became more somnolent shortly after morphine was 

administered.  Perked up prior to Narcan being given.  Glucose ordered.  States 

she was told she can't have an MRI due to metal.  Will order lumbar spine CT.

## 2019-03-07 LAB
ANION GAP SERPL CALC-SCNC: 15 MMOL/L (ref 2–11)
BASOPHILS # BLD AUTO: 0.2 10^3/UL (ref 0–0.2)
BUN SERPL-MCNC: 53 MG/DL (ref 6–24)
BUN/CREAT SERPL: 6.7 (ref 8–20)
CALCIUM SERPL-MCNC: 8.1 MG/DL (ref 8.6–10.3)
CHLORIDE SERPL-SCNC: 95 MMOL/L (ref 101–111)
CK SERPL-CCNC: 27 U/L (ref 10–223)
EOSINOPHIL # BLD AUTO: 0.1 10^3/UL (ref 0–0.6)
GLUCOSE SERPL-MCNC: 300 MG/DL (ref 70–100)
HCO3 SERPL-SCNC: 24 MMOL/L (ref 22–32)
HCT VFR BLD AUTO: 30 % (ref 35–47)
HGB BLD-MCNC: 9 G/DL (ref 12–16)
INR PPP/BLD: 4.52 (ref 0.77–1.02)
LYMPHOCYTES # BLD AUTO: 0.4 10^3/UL (ref 1–4.8)
MCH RBC QN AUTO: 28 PG (ref 27–31)
MCHC RBC AUTO-ENTMCNC: 30 G/DL (ref 31–36)
MCV RBC AUTO: 93 FL (ref 80–97)
MONOCYTES # BLD AUTO: 0.7 10^3/UL (ref 0–0.8)
NEUTROPHILS # BLD AUTO: 24.5 10^3/UL (ref 1.5–7.7)
NRBC # BLD AUTO: 0 10^3/UL
NRBC BLD QL AUTO: 0
PLATELET # BLD AUTO: 395 10^3/UL (ref 150–450)
POTASSIUM SERPL-SCNC: 4.2 MMOL/L (ref 3.5–5)
RBC # BLD AUTO: 3.18 10^6/UL (ref 4–5.4)
SODIUM SERPL-SCNC: 134 MMOL/L (ref 135–145)
WBC # BLD AUTO: 25.9 10^3/UL (ref 3.5–10.8)

## 2019-03-07 PROCEDURE — 3E1M39Z IRRIGATION OF PERITONEAL CAVITY USING DIALYSATE, PERCUTANEOUS APPROACH: ICD-10-PCS | Performed by: INTERNAL MEDICINE

## 2019-03-07 RX ADMIN — ASPIRIN SCH MG: 81 TABLET, COATED ORAL at 08:21

## 2019-03-07 RX ADMIN — SERTRALINE HYDROCHLORIDE SCH MG: 25 TABLET ORAL at 08:21

## 2019-03-07 RX ADMIN — MOMETASONE FUROATE AND FORMOTEROL FUMARATE DIHYDRATE SCH PUFF: 200; 5 AEROSOL RESPIRATORY (INHALATION) at 19:17

## 2019-03-07 RX ADMIN — BETHANECHOL CHLORIDE SCH MG: 25 TABLET ORAL at 12:39

## 2019-03-07 RX ADMIN — GABAPENTIN SCH MG: 100 CAPSULE ORAL at 08:21

## 2019-03-07 RX ADMIN — BETHANECHOL CHLORIDE SCH MG: 25 TABLET ORAL at 16:47

## 2019-03-07 RX ADMIN — MOMETASONE FUROATE AND FORMOTEROL FUMARATE DIHYDRATE SCH PUFF: 200; 5 AEROSOL RESPIRATORY (INHALATION) at 07:51

## 2019-03-07 RX ADMIN — CALCITRIOL SCH MCG: 0.25 CAPSULE ORAL at 22:28

## 2019-03-07 RX ADMIN — PIPERACILLIN AND TAZOBACTAM SCH MLS/HR: 3; .375 INJECTION, POWDER, LYOPHILIZED, FOR SOLUTION INTRAVENOUS; PARENTERAL at 19:57

## 2019-03-07 RX ADMIN — ALBUTEROL SULFATE PRN MG: 2.5 SOLUTION RESPIRATORY (INHALATION) at 19:17

## 2019-03-07 RX ADMIN — PANTOPRAZOLE SODIUM SCH MG: 40 TABLET, DELAYED RELEASE ORAL at 16:47

## 2019-03-07 RX ADMIN — LOSARTAN POTASSIUM SCH MG: 25 TABLET, FILM COATED ORAL at 08:21

## 2019-03-07 RX ADMIN — BETHANECHOL CHLORIDE SCH MG: 25 TABLET ORAL at 08:21

## 2019-03-07 RX ADMIN — CALCITRIOL SCH MCG: 0.25 CAPSULE ORAL at 12:39

## 2019-03-07 RX ADMIN — GABAPENTIN SCH MG: 100 CAPSULE ORAL at 12:39

## 2019-03-07 RX ADMIN — INSULIN GLARGINE SCH UNITS: 100 INJECTION, SOLUTION SUBCUTANEOUS at 22:28

## 2019-03-07 RX ADMIN — ALBUTEROL SULFATE PRN MG: 2.5 SOLUTION RESPIRATORY (INHALATION) at 14:55

## 2019-03-07 RX ADMIN — INSULIN LISPRO SCH: 100 INJECTION, SOLUTION INTRAVENOUS; SUBCUTANEOUS at 16:44

## 2019-03-07 RX ADMIN — Medication SCH TAB: at 22:27

## 2019-03-07 RX ADMIN — GABAPENTIN SCH MG: 100 CAPSULE ORAL at 22:28

## 2019-03-07 RX ADMIN — MOMETASONE FUROATE AND FORMOTEROL FUMARATE DIHYDRATE SCH: 200; 5 AEROSOL RESPIRATORY (INHALATION) at 08:00

## 2019-03-07 RX ADMIN — METHADONE HYDROCHLORIDE PRN MG: 5 TABLET ORAL at 08:22

## 2019-03-07 RX ADMIN — CALCITRIOL SCH MCG: 0.25 CAPSULE ORAL at 08:21

## 2019-03-07 RX ADMIN — Medication SCH TAB: at 10:28

## 2019-03-07 RX ADMIN — ALBUTEROL SULFATE PRN MG: 2.5 SOLUTION RESPIRATORY (INHALATION) at 07:49

## 2019-03-07 RX ADMIN — BETHANECHOL CHLORIDE SCH MG: 25 TABLET ORAL at 22:27

## 2019-03-07 RX ADMIN — INSULIN LISPRO SCH UNIT: 100 INJECTION, SOLUTION INTRAVENOUS; SUBCUTANEOUS at 08:22

## 2019-03-07 RX ADMIN — PIPERACILLIN AND TAZOBACTAM SCH MLS/HR: 3; .375 INJECTION, POWDER, LYOPHILIZED, FOR SOLUTION INTRAVENOUS; PARENTERAL at 08:21

## 2019-03-07 RX ADMIN — INSULIN LISPRO SCH UNIT: 100 INJECTION, SOLUTION INTRAVENOUS; SUBCUTANEOUS at 12:39

## 2019-03-07 NOTE — PN
Subjective


Date of Service: 03/07/19


Interval History: 





Pt c/o diarrhea and poor appetite. diarrhea improved after one tab imodium and 

probiotic. denies abd pain. No problems with peritoneal dialysis overnight


B/l thighs had been hurting x 1 week. Denies falling





Objective


Active Medications: 








Acetaminophen (Tylenol Tab*)  650 mg PO Q6H PRN


   PRN Reason: PAIN


Albuterol (Ventolin 2.5 Mg/3 Ml Neb.Sol*)  2.5 mg INH Q4HR PRN


   PRN Reason: SOB/WHEEZING


   Last Admin: 03/07/19 14:55 Dose:  2.5 mg


Albuterol (Ventolin Hfa Inhaler*)  2 puff INH Q6H PRN


   PRN Reason: COUGH


Aspirin (Aspirin Ec Tab*)  81 mg PO DAILY [facility]


   Last Admin: 03/07/19 08:21 Dose:  81 mg


Bethanechol Chloride (Urecholine Tab*)  25 mg PO QID [facility]


   Last Admin: 03/07/19 12:39 Dose:  25 mg


Calcitriol (Rocaltrol  Cap*)  0.25 mcg PO TID [facility]


   Last Admin: 03/07/19 12:39 Dose:  0.25 mcg


Dextrose (D50w Syringe 50 Ml*)  12.5 gm IV PUSH .FOR FS < 60 - SS PRN


   PRN Reason: FS < 60


Gabapentin (Neurontin Cap(*))  200 mg PO TID [facility]


   Last Admin: 03/07/19 12:39 Dose:  200 mg


Piperacillin Sod/Tazobactam (Sod 3.375 gm/ Sodium Chloride)  100 mls @ 25 mls/

hr IVPB Q12H [facility]


   Last Admin: 03/07/19 08:21 Dose:  25 mls/hr


Insulin Glargine (Lantus(*))  70 units SUBCUT BEDTIME [facility]


   Last Admin: 03/06/19 21:54 Dose:  70 units


Insulin Human Lispro (Humalog*)  0 units SUBCUT AC [facility]; Protocol


   Last Admin: 03/07/19 12:39 Dose:  3 unit


Lactobacillus Rhamnosus (Lactobacillus Acidophilus*)  1 tab PO BID [facility]


   Last Admin: 03/07/19 10:28 Dose:  1 tab


Losartan Potassium (Cozaar Tab*)  100 mg PO DAILY [facility]


   Last Admin: 03/07/19 08:21 Dose:  100 mg


Methadone HCl (Dolophine Tab*)  5 mg PO Q8HR PRN


   PRN Reason: PAIN


   Last Admin: 03/07/19 08:22 Dose:  5 mg


Mometasone Furoate/Formoterol Fumar (Dulera 200/5 Mdi*)  2 puff INH BID [facility]; 

Protocol


   Last Admin: 03/07/19 08:00 Dose:  Not Given


Ondansetron HCl (Zofran Inj*)  4 mg IV Q4H PRN


   PRN Reason: NAUSEA/VOMITING


   Last Admin: 03/06/19 13:07 Dose:  4 mg


Oxycodone/Acetaminophen (Percocet 5/325 Tab*)  1 tab PO Q4H PRN


   PRN Reason: Pain


   Last Admin: 03/06/19 19:33 Dose:  1 tab


Pantoprazole Sodium (Protonix Tab*)  40 mg PO QPM [facility]


   Last Admin: 03/06/19 17:06 Dose:  40 mg


Pharmacy Consult (Zosyn Per Pharmacy*)  1 note FOLLOW UP .ZOSYN PER PHARMACY [facility]


Promethazine HCl (Phenergan Tab*)  25 mg PO Q8H PRN


   PRN Reason: NAUSEA


Sertraline HCl (Zoloft*)  25 mg PO DAILY [facility]


   Last Admin: 03/07/19 08:21 Dose:  25 mg








 Vital Signs - 8 hr











  03/07/19 03/07/19 03/07/19





  07:51 08:21 08:22


 


Temperature  98.3 F 


 


Pulse Rate 105 110 


 


Respiratory 16 20 22





Rate   


 


Blood Pressure  152/88 





(mmHg)   


 


O2 Sat by Pulse 92 93 





Oximetry   














  03/07/19 03/07/19 03/07/19





  09:58 10:28 11:28


 


Temperature   98.0 F


 


Pulse Rate   141


 


Respiratory 20 22 20





Rate   


 


Blood Pressure   116/94





(mmHg)   


 


O2 Sat by Pulse   98





Oximetry   














  03/07/19 03/07/19 03/07/19





  12:20 12:39 14:56


 


Temperature   


 


Pulse Rate   110


 


Respiratory 20 20 18





Rate   


 


Blood Pressure   





(mmHg)   


 


O2 Sat by Pulse   95





Oximetry   














  03/07/19





  15:33


 


Temperature 


 


Pulse Rate 79


 


Respiratory 20





Rate 


 


Blood Pressure 





(mmHg) 


 


O2 Sat by Pulse 





Oximetry 











Oxygen Devices in Use Now: Nasal Cannula


Appearance: 39 yo F in nAD, AAOx3


Eyes: No Scleral Icterus, PERRLA


Ears/Nose/Mouth/Throat: NL Teeth, Lips, Gums, Mucous Membranes Moist


Neck: NL Appearance and Movements; NL JVP, Trachea Midline


Respiratory: Symmetrical Chest Expansion and Respiratory Effort, - - rhonchi at 

b/l bases


Cardiovascular: NL Sounds; No Murmurs; No JVD, RRR


Abdominal: - - large , protruberant, soft, NT, BS+, PD cath in place


Lymphatic: No Cervical Adenopathy


Extremities: - - thighs tended with palapation with bluish skin discoloration 

on lateral aspects b/l-blanchable, but delayed refill. r foot s/p TMA. b/l 

pedal pulses not palpable, no cyanosis,delayed refill b/l


Skin: No Nodules or Sclerosis


Neurological: Alert and Oriented x 3, NL Muscle Strength and Tone


Result Diagrams: 


 03/07/19 05:24





 03/07/19 05:24


Microbiology and Other Data: 


 Microbiology











 03/05/19 23:00 Nasal Screen MRSA (PCR) - Final





 Nasal    Mrsa Not Detected














Assess/Plan/Problems-Billing


Assessment: 





39 yo F complicated medical history including ESRD on PD, COPD with chronic 

resp failure on 5L 02, IDDM, chronic pain on methadone, unconfirmed CAD and 

recent admission at the end of February for PNA/Sepsis returning with b/l thigh 

pain found with elevated CRP/WBC and increased troponin 





- Patient Problems


(1) Thigh pain


Comment: CPK 32, concerning markedly elevated CRP. spoke with pt , MRI not 

possible due to bare metal stents in b/l LE's. Pt refuses CT myelogram and 

neurologically is not signidificantly impaires from baseline, as per pt. 

Suspect vascular problem. will get CTA abd aorta with runoff.


Will ask ID to see tomorrow   





(2) Pneumonia


SNOMED Code(s): [id number]


   Comment: Did not present with cough/SOB but CRP >200, WBC >20 (baseline 

elevated) CXR concerning for PNA although notably absent volume loss. Patient 

cannot stand 2/2 pain to obtain CXR PA/Lat views. 


Starting broad spectrum abx (vanco/zosyn) on 3/6/19 


today pt developed diarrhea will check C.diff   





(3) Supratherapeutic INR


Comment: 


coumadin held, INR to be checked daily   





(4) CAD (coronary artery disease)


Comment: Denies history of stents but reports "cardiac arrest" during dialysis 


Last myoview 2016 here was low risk 


Suspect elevated trop in setting of demand although not clear source of 

increased demand


EKG non ischemic, presented without any cardiac symptomatolgy 


Echo shows new are of wall motion abn, but pt denies CP, consider stress in 

future    





(5) IDDM (insulin dependent diabetes mellitus)


Comment: Last HbA1c 2017 - added on to labs now


cont basal/bolus insulin 


HbA1C 8.8   





(6) PAD (peripheral artery disease)


Current Visit: Yes   Comment: 


 - complicated by DVTs now on coumadin


 - reports b/l LE stenting 1 and 2 years ago at [facility] by Dr. Zavala (

sp?)


 - INR supratherapeutic


 - Hold coumadin tonight and follow INR in AM


   





(7) ESRD on peritoneal dialysis


Comment: 


-On peritoneal dialysis


- continue inpatient    





(8) DVT prophylaxis


Comment: supratherapeutic INR

## 2019-03-07 NOTE — PN
Progress Note





- Progress Note


Date of Service: 03/07/19


Note: 





Paged - patient calmy, SOB - Glucose normal.  Vitals ok.  On exam: she is clammy

, no CP, some SOB.  No increase in work of breathing.  LUng exam: crackles -  

Will d/c IVFs and monitor vitals closely.

## 2019-03-08 LAB
ALBUMIN SERPL BCG-MCNC: 2.8 G/DL (ref 3.2–5.2)
ALBUMIN/GLOB SERPL: 0.8 {RATIO} (ref 1–3)
ALP SERPL-CCNC: 117 U/L (ref 34–104)
ALT SERPL W P-5'-P-CCNC: 12 U/L (ref 7–52)
ANION GAP SERPL CALC-SCNC: 17 MMOL/L (ref 2–11)
AST SERPL-CCNC: 12 U/L (ref 13–39)
BASOPHILS # BLD AUTO: 0.3 10^3/UL (ref 0–0.2)
BUN SERPL-MCNC: 52 MG/DL (ref 6–24)
BUN/CREAT SERPL: 6 (ref 8–20)
CALCIUM SERPL-MCNC: 8.7 MG/DL (ref 8.6–10.3)
CHLORIDE SERPL-SCNC: 99 MMOL/L (ref 101–111)
EOSINOPHIL # BLD AUTO: 0 10^3/UL (ref 0–0.6)
GLOBULIN SER CALC-MCNC: 3.5 G/DL (ref 2–4)
GLUCOSE SERPL-MCNC: 168 MG/DL (ref 70–100)
HCO3 SERPL-SCNC: 23 MMOL/L (ref 22–32)
HCT VFR BLD AUTO: 29 % (ref 35–47)
HGB BLD-MCNC: 9.1 G/DL (ref 12–16)
INR PPP/BLD: 4.42 (ref 0.77–1.02)
LYMPHOCYTES # BLD AUTO: 0.4 10^3/UL (ref 1–4.8)
MAGNESIUM SERPL-MCNC: 2 MG/DL (ref 1.9–2.7)
MCH RBC QN AUTO: 29 PG (ref 27–31)
MCHC RBC AUTO-ENTMCNC: 31 G/DL (ref 31–36)
MCV RBC AUTO: 92 FL (ref 80–97)
MONOCYTES # BLD AUTO: 0.6 10^3/UL (ref 0–0.8)
MONOCYTES NFR FLD: 65 %
NEUTROPHILS # BLD AUTO: 25 10^3/UL (ref 1.5–7.7)
NRBC # BLD AUTO: 0 10^3/UL
NRBC BLD QL AUTO: 0.1
PLATELET # BLD AUTO: 423 10^3/UL (ref 150–450)
POTASSIUM SERPL-SCNC: 4.1 MMOL/L (ref 3.5–5)
PROT SERPL-MCNC: 6.3 G/DL (ref 6.4–8.9)
RBC # BLD AUTO: 3.17 10^6/UL (ref 4–5.4)
SODIUM SERPL-SCNC: 139 MMOL/L (ref 135–145)
TROPONIN I SERPL-MCNC: 0.18 NG/ML (ref ?–0.04)
WBC # BLD AUTO: 26.3 10^3/UL (ref 3.5–10.8)

## 2019-03-08 PROCEDURE — 5A1945Z RESPIRATORY VENTILATION, 24-96 CONSECUTIVE HOURS: ICD-10-PCS | Performed by: HOSPITALIST

## 2019-03-08 PROCEDURE — 0BH17EZ INSERTION OF ENDOTRACHEAL AIRWAY INTO TRACHEA, VIA NATURAL OR ARTIFICIAL OPENING: ICD-10-PCS | Performed by: HOSPITALIST

## 2019-03-08 PROCEDURE — 03HY32Z INSERTION OF MONITORING DEVICE INTO UPPER ARTERY, PERCUTANEOUS APPROACH: ICD-10-PCS | Performed by: INTERNAL MEDICINE

## 2019-03-08 RX ADMIN — MOMETASONE FUROATE AND FORMOTEROL FUMARATE DIHYDRATE SCH: 200; 5 AEROSOL RESPIRATORY (INHALATION) at 08:02

## 2019-03-08 RX ADMIN — MOMETASONE FUROATE AND FORMOTEROL FUMARATE DIHYDRATE SCH: 200; 5 AEROSOL RESPIRATORY (INHALATION) at 22:48

## 2019-03-08 RX ADMIN — PROPOFOL ONE MLS/HR: 10 INJECTION, EMULSION INTRAVENOUS at 13:16

## 2019-03-08 RX ADMIN — SERTRALINE HYDROCHLORIDE SCH MG: 25 TABLET ORAL at 07:38

## 2019-03-08 RX ADMIN — CHLORHEXIDINE GLUCONATE 0.12% ORAL RINSE SCH ML: 1.2 LIQUID ORAL at 17:26

## 2019-03-08 RX ADMIN — CALCITRIOL SCH: 0.25 CAPSULE ORAL at 22:48

## 2019-03-08 RX ADMIN — CALCITRIOL SCH: 0.25 CAPSULE ORAL at 12:49

## 2019-03-08 RX ADMIN — CALCITRIOL SCH: 0.25 CAPSULE ORAL at 09:45

## 2019-03-08 RX ADMIN — PROPOFOL ONE MLS/HR: 10 INJECTION, EMULSION INTRAVENOUS at 09:44

## 2019-03-08 RX ADMIN — BETHANECHOL CHLORIDE SCH MG: 25 TABLET ORAL at 17:26

## 2019-03-08 RX ADMIN — INSULIN LISPRO SCH UNIT: 100 INJECTION, SOLUTION INTRAVENOUS; SUBCUTANEOUS at 07:59

## 2019-03-08 RX ADMIN — GABAPENTIN SCH MG: 100 CAPSULE ORAL at 13:16

## 2019-03-08 RX ADMIN — CALCITRIOL SCH MCG: 0.25 CAPSULE ORAL at 09:45

## 2019-03-08 RX ADMIN — FONDAPARINUX SODIUM SCH MG: 2.5 INJECTION, SOLUTION SUBCUTANEOUS at 17:52

## 2019-03-08 RX ADMIN — PROPOFOL ONE MLS/HR: 10 INJECTION, EMULSION INTRAVENOUS at 21:55

## 2019-03-08 RX ADMIN — LOSARTAN POTASSIUM SCH MG: 25 TABLET, FILM COATED ORAL at 07:38

## 2019-03-08 RX ADMIN — BETHANECHOL CHLORIDE SCH MG: 25 TABLET ORAL at 09:45

## 2019-03-08 RX ADMIN — Medication SCH TAB: at 07:38

## 2019-03-08 RX ADMIN — ASPIRIN SCH MG: 81 TABLET, COATED ORAL at 07:38

## 2019-03-08 RX ADMIN — PANTOPRAZOLE SODIUM SCH MG: 40 TABLET, DELAYED RELEASE ORAL at 17:26

## 2019-03-08 RX ADMIN — CHLORHEXIDINE GLUCONATE 0.12% ORAL RINSE SCH ML: 1.2 LIQUID ORAL at 13:16

## 2019-03-08 RX ADMIN — BETHANECHOL CHLORIDE SCH MG: 25 TABLET ORAL at 13:17

## 2019-03-08 RX ADMIN — PROPOFOL ONE MLS/HR: 10 INJECTION, EMULSION INTRAVENOUS at 17:53

## 2019-03-08 RX ADMIN — INSULIN LISPRO SCH UNIT: 100 INJECTION, SOLUTION INTRAVENOUS; SUBCUTANEOUS at 17:26

## 2019-03-08 RX ADMIN — PIPERACILLIN AND TAZOBACTAM SCH MLS/HR: 3; .375 INJECTION, POWDER, LYOPHILIZED, FOR SOLUTION INTRAVENOUS; PARENTERAL at 08:18

## 2019-03-08 RX ADMIN — INSULIN LISPRO SCH: 100 INJECTION, SOLUTION INTRAVENOUS; SUBCUTANEOUS at 11:18

## 2019-03-08 RX ADMIN — PROPOFOL ONE MLS/HR: 10 INJECTION, EMULSION INTRAVENOUS at 06:32

## 2019-03-08 RX ADMIN — CHLORHEXIDINE GLUCONATE 0.12% ORAL RINSE SCH ML: 1.2 LIQUID ORAL at 20:10

## 2019-03-08 RX ADMIN — INSULIN GLARGINE SCH: 100 INJECTION, SOLUTION SUBCUTANEOUS at 21:54

## 2019-03-08 RX ADMIN — FENTANYL CITRATE SCH MLS/HR: 50 INJECTION INTRAVENOUS at 11:35

## 2019-03-08 RX ADMIN — GABAPENTIN SCH MG: 100 CAPSULE ORAL at 07:38

## 2019-03-08 RX ADMIN — PIPERACILLIN AND TAZOBACTAM SCH MLS/HR: 3; .375 INJECTION, POWDER, LYOPHILIZED, FOR SOLUTION INTRAVENOUS; PARENTERAL at 20:10

## 2019-03-08 NOTE — OP
Operative Report - Blank





- Operative Report


Date of Operation: 03/08/19


Note: 


ARTERIAL LINE (A-Line) PLACEMENT WITH ULTRASOUND GUIDANCE





Indication: Hemodynamic monitoring





I performed the entire procedure 





A time-out was completed verifying correct patient, procedure, site, positioning

, and special equipment if applicable. The patients left axillary artery was 

located via ultrasound.  The area prepped and draped in sterile fashion.  

Patient was running on fentanyl and propofol gtt, therefore, no local 

anesthetic was used.   An Arrow arterial line was introduced into the axillary 

artery. The catheter was threaded over the guide wire and the needle was 

removed with appropriate pulsatile blood return. The catheter was then sutured 

in place to the skin and a sterile dressing applied. Perfusion to the extremity 

distal to the point of catheter insertion was checked and found to be adequate. 


Estimated Blood Loss: <1 cc


The patient tolerated the procedure well and there were no complications.

## 2019-03-08 NOTE — HP
History of Present Illness





- History of Present Illness


Reason for Visit: INTUBATION/MECHANICAL VENTILATIION 


History of Present Illness: 








: RSI with etomidate and succinylcholine performed at the bedside for 

acute respiratory failure with hypoxia 








Review of Systems





- Review of Systems


Neurological: Positive: Other - UNABLE TO OBTAIN DUE TO PATIENT MENTAL STATUS 





- Medications/Allergies


Allergies/Adverse Reactions: 


 Allergies











Allergy/AdvReac Type Severity Reaction Status Date / Time


 


Adhesive Tape [Plastic Tape] Allergy Mild Blisters Verified 19 12:05


 


doxycycline Allergy  Unknown Verified 19 12:05





   Reaction  





   Details  


 


erythromycin base Allergy  See Comment Verified 19 12:05


 


heparin Allergy  See Comment Verified 19 12:05


 


metoclopramide [From Reglan] Allergy  Hives Verified 19 12:05


 


niacin Allergy  Rash Verified 19 12:05


 


ropinirole [From Requip] Allergy  Hives Verified 19 12:05


 


metronidazole [From Flagyl] AdvReac  Nausea And Verified 19 13:15





   Vomiting  


 


Sulfa (Sulfonamide AdvReac  Vomiting Verified 19 13:15





Antibiotics)     











Medications: 


 Current Medications





Acetaminophen (Tylenol Tab*)  650 mg PO Q6H PRN


   PRN Reason: PAIN


Albuterol (Ventolin 2.5 Mg/3 Ml Neb.Sol*)  2.5 mg INH Q4HR PRN


   PRN Reason: SOB/WHEEZING


   Last Admin: 19 19:17 Dose:  2.5 mg


Albuterol (Ventolin Hfa Inhaler*)  2 puff INH Q6H PRN


   PRN Reason: COUGH


Aspirin (Aspirin Ec Tab*)  81 mg PO DAILY [facility]


   Last Admin: 19 07:38 Dose:  81 mg


Bethanechol Chloride (Urecholine Tab*)  25 mg PO QID [facility]


   Last Admin: 19 17:26 Dose:  25 mg


Calcitriol (Rocaltrol  Cap*)  0.25 mcg PO TID [facility]


   Last Admin: 19 12:49 Dose:  Not Given


Chlorhexidine Gluconate (Peridex Mouth Wash 0.12%*)  15 ml TOPICAL Q4H [facility]


   Last Admin: 19 17:26 Dose:  15 ml


Dextrose (D50w Syringe 50 Ml*)  12.5 gm IV PUSH .FOR FS < 60 - SS PRN


   PRN Reason: FS < 60


Docusate Sodium (Colace Liq*)  100 mg G TUBE TID PRN


   PRN Reason: CONSTIPATION


Fondaparinux (Arixtra*)  2.5 mg SUBCUT Q24H [facility]


Gabapentin (Neurontin Cap(*))  200 mg PO TID [facility]


   Last Admin: 19 13:16 Dose:  200 mg


Piperacillin Sod/Tazobactam (Sod 3.375 gm/ Sodium Chloride)  100 mls @ 25 mls/

hr IVPB Q12H [facility]


   Last Admin: 19 08:18 Dose:  25 mls/hr


Propofol (Diprivan*)  100 mls @ 5.74 mls/hr IV .(Initial Rate) ONE; Protocol


   Stop: 19 21:36


   Last Admin: 19 13:16 Dose:  22.2 mls/hr


Fentanyl Citrate (Fentanyl Infusion Bag 50 Mcg/Ml 50 Ml)  2,500 mcg in 50 mls @ 

0.5 mls/hr IV Q24H [facility]; Protocol


   Last Admin: 19 11:35 Dose:  0.5 mls/hr


Insulin Glargine (Lantus(*))  70 units SUBCUT BEDTIME [facility]


   Last Admin: 19 22:28 Dose:  70 units


Insulin Human Lispro (Humalog*)  0 units SUBCUT AC [facility]; Protocol


   Last Admin: 19 17:26 Dose:  2 unit


Lactobacillus Rhamnosus (Lactobacillus Acidophilus*)  1 tab PO BID [facility]


   Last Admin: 19 07:38 Dose:  1 tab


Losartan Potassium (Cozaar Tab*)  100 mg PO DAILY [facility]


   Last Admin: 19 07:38 Dose:  100 mg


Methadone HCl (Dolophine Tab*)  5 mg PO Q8HR PRN


   PRN Reason: PAIN


   Last Admin: 19 08:22 Dose:  5 mg


Mometasone Furoate/Formoterol Fumar (Dulera 200/5 Mdi*)  2 puff INH BID [facility]; 

Protocol


   Last Admin: 19 08:02 Dose:  Not Given


Ondansetron HCl (Zofran Inj*)  4 mg IV Q4H PRN


   PRN Reason: NAUSEA/VOMITING


   Last Admin: 19 13:07 Dose:  4 mg


Oxycodone/Acetaminophen (Percocet 5/325 Tab*)  1 tab PO Q4H PRN


   PRN Reason: Pain


   Last Admin: 19 19:33 Dose:  1 tab


Pantoprazole Sodium (Protonix Tab*)  40 mg PO QPM [facility]


   Last Admin: 19 17:26 Dose:  40 mg


Pharmacy Consult (Zosyn Per Pharmacy*)  1 note FOLLOW UP .ZOSYN PER PHARMACY [facility]


Promethazine HCl (Phenergan Tab*)  25 mg PO Q8H PRN


   PRN Reason: NAUSEA


Senna (Senokot Tab*)  1 tab G TUBE BID PRN


   PRN Reason: CONSTIPATION


Sertraline HCl (Zoloft*)  25 mg PO DAILY [facility]


   Last Admin: 19 07:38 Dose:  25 mg











Exam





- Exam


Vital Signs: 


 Vital Signs (72 hours)











  19





  18:16 18:17 18:19


 


Temperature 97.8 F  


 


Pulse Rate 111 113 110


 


Respiratory 20  15





Rate   


 


Blood Pressure 175/70  175/70





(mmHg)   


 


O2 Sat by Pulse 100 100 99





Oximetry   














  19





  18:40 19:00 19:56


 


Temperature   98.8 F


 


Pulse Rate  106 108


 


Respiratory  9 16





Rate   


 


Blood Pressure   137/93





(mmHg)   


 


O2 Sat by Pulse 96 93 95





Oximetry   














  19





  20:00 21:01 21:19


 


Temperature   98.3 F


 


Pulse Rate   106


 


Respiratory 16 12 20





Rate   


 


Blood Pressure   117/33





(mmHg)   


 


O2 Sat by Pulse   93





Oximetry   














  19





  21:24 22:40 23:06


 


Temperature   99.1 F


 


Pulse Rate   108


 


Respiratory 16 22 18





Rate   


 


Blood Pressure   128/96





(mmHg)   


 


O2 Sat by Pulse   95





Oximetry   














  19





  23:07 01:42 03:40


 


Temperature   97.2 F


 


Pulse Rate   106


 


Respiratory 20 20 16





Rate   


 


Blood Pressure   132/63





(mmHg)   


 


O2 Sat by Pulse   91





Oximetry   














  19





  04:24 05:25 05:26


 


Temperature   


 


Pulse Rate   


 


Respiratory 16 18 20





Rate   


 


Blood Pressure   





(mmHg)   


 


O2 Sat by Pulse   





Oximetry   














  19





  07:18 08:00 09:23


 


Temperature   98.1 F


 


Pulse Rate   102


 


Respiratory 12  16





Rate   


 


Blood Pressure   124/51





(mmHg)   


 


O2 Sat by Pulse  94 94





Oximetry   














  19





  09:36 11:42 11:55


 


Temperature   


 


Pulse Rate   104


 


Respiratory 14 16 20





Rate   


 


Blood Pressure   





(mmHg)   


 


O2 Sat by Pulse   95





Oximetry   














  19





  12:05 12:38 13:41


 


Temperature 98.4 F  


 


Pulse Rate 102  


 


Respiratory 18 18 22





Rate   


 


Blood Pressure 125/49  





(mmHg)   


 


O2 Sat by Pulse 98  





Oximetry   














  19





  15:10 15:16 15:17


 


Temperature   


 


Pulse Rate   


 


Respiratory 20 20 20





Rate   


 


Blood Pressure   





(mmHg)   


 


O2 Sat by Pulse   





Oximetry   














  19





  16:02 17:29 17:59


 


Temperature  97.6 F 


 


Pulse Rate  104 


 


Respiratory 20 20 16





Rate   


 


Blood Pressure  107/42 





(mmHg)   


 


O2 Sat by Pulse  94 





Oximetry   














  19





  18:33 19:33 20:11


 


Temperature 97.3 F  


 


Pulse Rate 94  98


 


Respiratory 20 18 20





Rate   


 


Blood Pressure 109/82  





(mmHg)   


 


O2 Sat by Pulse 99  92





Oximetry   














  19





  21:55 22:21 23:41


 


Temperature   98.2 F


 


Pulse Rate   111


 


Respiratory 16 16 16





Rate   


 


Blood Pressure   147/54





(mmHg)   


 


O2 Sat by Pulse   90





Oximetry   














  19





  03:29 07:51 08:21


 


Temperature 97.6 F  98.3 F


 


Pulse Rate 113 105 110


 


Respiratory 12 16 20





Rate   


 


Blood Pressure 103/33  152/88





(mmHg)   


 


O2 Sat by Pulse 90 92 93





Oximetry   














  19





  08:22 09:58 10:28


 


Temperature   


 


Pulse Rate   


 


Respiratory 22 20 22





Rate   


 


Blood Pressure   





(mmHg)   


 


O2 Sat by Pulse   





Oximetry   














  19





  11:28 12:20 12:39


 


Temperature 98.0 F  


 


Pulse Rate 141  


 


Respiratory 20 20 20





Rate   


 


Blood Pressure 116/94  





(mmHg)   


 


O2 Sat by Pulse 98  





Oximetry   














  0319





  14:56 15:33 15:46


 


Temperature   97.5 F


 


Pulse Rate 110 79 143


 


Respiratory 18 20 





Rate   


 


Blood Pressure   77/30





(mmHg)   


 


O2 Sat by Pulse 95  





Oximetry   














  19





  17:32 19:20 20:00


 


Temperature   


 


Pulse Rate 99 110 


 


Respiratory  18 20





Rate   


 


Blood Pressure 101/37  





(mmHg)   


 


O2 Sat by Pulse  98 70





Oximetry   














  19





  20:19 20:27 22:28


 


Temperature 97.2 F  


 


Pulse Rate   


 


Respiratory  24 20





Rate   


 


Blood Pressure  103/56 





(mmHg)   


 


O2 Sat by Pulse   





Oximetry   














  19





  23:39 23:40 00:31


 


Temperature 97.2 F  


 


Pulse Rate 101 48 


 


Respiratory 20 88 18





Rate   


 


Blood Pressure 101/51  





(mmHg)   


 


O2 Sat by Pulse 70  





Oximetry   














  19





  03:24 04:14 04:16


 


Temperature 97.1 F  


 


Pulse Rate 93 112 114


 


Respiratory   





Rate   


 


Blood Pressure 105/40  191/176





(mmHg)   


 


O2 Sat by Pulse 89 91 100





Oximetry   














  19





  04:17 04:30 04:40


 


Temperature   


 


Pulse Rate 113 100 95


 


Respiratory   





Rate   


 


Blood Pressure 152/73  108/49





(mmHg)   


 


O2 Sat by Pulse 100 100 100





Oximetry   














  19





  04:45 04:52 05:01


 


Temperature   


 


Pulse Rate 94 91 91


 


Respiratory   





Rate   


 


Blood Pressure  92/50 136/44





(mmHg)   


 


O2 Sat by Pulse 100 100 100





Oximetry   














  19





  05:16 05:27 05:30


 


Temperature   96 F


 


Pulse Rate 88 90 90


 


Respiratory   18





Rate   


 


Blood Pressure 85/42 99/63 105/68





(mmHg)   


 


O2 Sat by Pulse 100 100 100





Oximetry   














  19





  06:01 06:16 06:25


 


Temperature   


 


Pulse Rate 88 80 80


 


Respiratory   





Rate   


 


Blood Pressure 111/81 87/48 90/44





(mmHg)   


 


O2 Sat by Pulse 99 90 89





Oximetry   














  19





  06:31 06:33 06:45


 


Temperature   


 


Pulse Rate 80 80 80


 


Respiratory   





Rate   


 


Blood Pressure 85/57 97/57 98/71





(mmHg)   


 


O2 Sat by Pulse 100 99 100





Oximetry   














  19





  07:00 07:01 07:15


 


Temperature   


 


Pulse Rate 81 82 81


 


Respiratory   





Rate   


 


Blood Pressure 116/63  123/56





(mmHg)   


 


O2 Sat by Pulse 100 100 100





Oximetry   














  19





  07:30 08:00 08:15


 


Temperature   


 


Pulse Rate 83 80 81


 


Respiratory  17 





Rate   


 


Blood Pressure 90/65 127/64 113/75





(mmHg)   


 


O2 Sat by Pulse 98 97 97





Oximetry   














  19





  08:31 08:44 08:45


 


Temperature  95.1 F 


 


Pulse Rate 82  82


 


Respiratory   





Rate   


 


Blood Pressure 120/67  144/73





(mmHg)   


 


O2 Sat by Pulse 98  98





Oximetry   














  19





  09:00 09:46 10:00


 


Temperature   


 


Pulse Rate 82 84 86


 


Respiratory 17  18





Rate   


 


Blood Pressure 117/82 107/62 





(mmHg)   


 


O2 Sat by Pulse 100 100 100





Oximetry   














  19





  10:01 10:26 10:31


 


Temperature   


 


Pulse Rate 86 83 84


 


Respiratory   





Rate   


 


Blood Pressure 113/55 126/63 123/74





(mmHg)   


 


O2 Sat by Pulse 100 98 98





Oximetry   














  19





  10:46 11:00 11:01


 


Temperature   


 


Pulse Rate 85 84 84


 


Respiratory  17 





Rate   


 


Blood Pressure 116/75 90/78 





(mmHg)   


 


O2 Sat by Pulse 98 99 99





Oximetry   














  19





  11:16 11:31 11:35


 


Temperature   


 


Pulse Rate 84 85 


 


Respiratory   17





Rate   


 


Blood Pressure 98/88 91/72 





(mmHg)   


 


O2 Sat by Pulse 100 97 





Oximetry   














  19





  11:45 11:47 12:00


 


Temperature   96 F


 


Pulse Rate 84 83 


 


Respiratory   18





Rate   


 


Blood Pressure 78/65 92/58 





(mmHg)   


 


O2 Sat by Pulse 100 100 





Oximetry   














  19





  12:01 12:16 12:31


 


Temperature   


 


Pulse Rate 84 85 90


 


Respiratory   





Rate   


 


Blood Pressure  105/54 97/58





(mmHg)   


 


O2 Sat by Pulse 100 100 97





Oximetry   














  19





  12:46 13:00 13:01


 


Temperature   


 


Pulse Rate 90  88


 


Respiratory  17 





Rate   


 


Blood Pressure 128/79  133/49





(mmHg)   


 


O2 Sat by Pulse 98  97





Oximetry   














  19





  13:11 13:16 13:31


 


Temperature   


 


Pulse Rate  87 80


 


Respiratory   





Rate   


 


Blood Pressure  126/70 69/39





(mmHg)   


 


O2 Sat by Pulse 100 96 99





Oximetry   














  19





  13:33 13:45 14:00


 


Temperature   


 


Pulse Rate 79 81 


 


Respiratory   20





Rate   


 


Blood Pressure 99/59 101/60 





(mmHg)   


 


O2 Sat by Pulse 99 96 





Oximetry   














  19





  14:01 14:16 14:30


 


Temperature   


 


Pulse Rate 80 81 78


 


Respiratory   





Rate   


 


Blood Pressure 125/48 115/67 109/45





(mmHg)   


 


O2 Sat by Pulse 100 98 97





Oximetry   














  19





  14:45 14:50 15:00


 


Temperature   


 


Pulse Rate 77 77 75


 


Respiratory   14





Rate   


 


Blood Pressure 101/59 109/70 97/60





(mmHg)   


 


O2 Sat by Pulse 97 97 97





Oximetry   














  19





  15:01 16:00 17:00


 


Temperature   


 


Pulse Rate 75 73 


 


Respiratory  15 16





Rate   


 


Blood Pressure  111/65 





(mmHg)   


 


O2 Sat by Pulse 98 98 





Oximetry   














  19





  17:01


 


Temperature 


 


Pulse Rate 74


 


Respiratory 





Rate 


 


Blood Pressure 





(mmHg) 


 


O2 Sat by Pulse 98





Oximetry 











General: Other - on sedation/analgesia 


HEENT: Atraumatic, PERRLA, Mucous membr. moist/pink


Lungs: Clear to auscultation


Cardiovascular: Normal S1, Normal S2


Abdomen: Soft, No tenderness, No masses


Extremities: Other - R toe amputation 


Neurological: Other - unable to access optimally due to sedation/analgesia 





Assessment/Plan





- Assessment/Plan


Assessment: 





TTE 19


Conclusions


Moderate concentric left ventricular hypertrophy is observed.


Global left ventricular wall motion and contractility are within normal


limits. 


Base of the lateral wall relatively hypokinetic on short axis and 4


chamber view, small focal area.


The estimated ejection fraction is 50-55%. 


Abnormal left ventricular diastolic function is observed.


The right ventricular global systolic function is low normal.


There is aortic valve sclerosis with trace aortic regurgitation.


There is a trace of mitral regurgitation.


There is moderate tricuspid regurgitation, eccentric jet directed toward


the septum.


There is evidence of mild to moderate pulmonary hypertension estimated


at 42 mmHg. 


Compared with prior echo of 18, EF is stable, focal area of


hypokinesis newly described, AI is new, MR is stable, TR has increased


from trace, elevated PA pressure newly documented.








[facility] IMAGING


Patient Name:MAKAYLA RUBIN                                                  

                             MR:[id number]                                     

                     : 1980











throughout its entire course with near-complete occlusion distally in the 


adductor hiatus. Severely atherosclerotic left popliteal artery in the mid 


aspect. Additional left popliteal artery severe stenosis distally prior to the 


trifurcation. 


 Left infrapopliteal arteries: Severely atherosclerotic left anterior tibial, 


peroneal, and posterior tibia arteries with dissipated posterior tibial artery 


in the mid shin. Continued anterior tibial and peroneal flow in the foot. 


   


 Lower thorax:  There is mild atherosclerotic calcification of the coronary 


arteries. 


 Liver: No mass. 


 Gallbladder and bile ducts: Normal. No calcified stones. No ductal dilation.  


 Pancreas: Normal. No mass. No ductal dilation.  


 Spleen: Normal. No splenomegaly.  


 Adrenals: Normal. No mass.  


 Kidneys and ureters: Normal. No mass.  


 Stomach and bowel: Normal. No obstruction. No mucosal thickening.  


 Appendix: No evidence of appendicitis.  


 Bladder:  The bladder is decompressed but otherwise normal. 


 Reproductive:  Uterus and ovaries are normal. 


 Intraperitoneal space:  Small volume ascites. 


 Lymph nodes: No lymphadenopathy. 


 Bones/joints: No acute fracture. No dislocation.  


 Soft tissues: Normal. No hernias. 





IMPRESSION: 


1. Moderate severe right common femoral artery stenosis. 


2. Severe bilateral popliteal artery stenosis, with a single left and 2 right 


areas of stenosis. 


3. Near-complete occlusion distal left superficial femoral artery stent. 


4. Findings secondary to peritoneal dialysis. 


5. Severely stenotic right renal artery. 





To contact [facility] with a general question: [facility] - [phone]


For direct physician to physician contact: Physician Hotline - [phone]


[facility] ([facility] Facility ID #[id number])





____________________________________________________________


<Electronically signed by Precious Saleh MD in OV>  19


Dictated By: Precious Saleh MD


Dictated Date/Time: 19


Transcribed Date/Time:  


Copy to:











CC:Kenneth Felton MD; Abeba Hein DO; Rosana Jolley MD; Yeni Shanks MD


Imaging - [facility]                                                          

  Imaging - [facility]                                                 

                     Imaging - [facility] 


[address]                                                                

  [address] [id number] [address] [address]


ph ([phone])                                                              

  ph ([phone])                                                            

                     ph ([phone]) 

















This report is only to be considered final once signed by the Provider(s) as 

displayed in the "<Electronically Signed by >" field (s). Absence of a 


signature indicates the report is in a draft status and still needs to be 

finalized. In the event this document was created by someone other than the 


signing Provider, the individual initiating the document will be listed in the 

"Entered by:" or "Dictated by:" fields.


                                                                               

                   2 of 2





39 yo F with multiple complicated comorbidities including ESRD on PD, CAD s/p 

stents, PVD s/p stents transferred to the ICU after requiring emergent 

intubation 





Plan: 





#  Acute encephalopathy


#  Acute hypoxemic respiratory failure 


#  acute ventilator dependance 


#  Sepsis 


#  leukocytosis 26 with neutrophilic predominance 


#  Stable anemia 


#  Hypercoagulable state INR 5.36-->4.42


#  ESRD on PD 


#  Troponin leak- improving levels


#  Sacral ulcer  


# chronic pain on methadone 


#  HTN 


#  New hypokinesis of LV (lateral) with EF 50-55%. 


#  diastolic dysfunction


#  RV dysfunction 


#  AV sclerosis 


#  moderate tricuspid regurgitation


#  PH (42 mmHg on TTE)


#  S1 impigment


#  PVD s/p stents 


- CTA: Moderate severe right common femoral artery stenosis. Severe bilateral 

popliteal artery stenosis, with a single left and 2 right 


areas of stenosis.  Near-complete occlusion distal left superficial femoral 

artery stent. 





                                                                              2 

of 2











- Bcx   NTD


- if leukocytosis progress or worsening clinical status, will mccray Cx


- S/p peritoneal  fluid analysis.  Grossly noninfectious.  Will await final 

cultures


- current vent setting Vt 400, PEEP 10


- ABG to eval vent/oxygenation with new vent changes 


- Hemodynamically stable 


- hold methadone for now.  Fentanyl gtt 


- sedation with propofol to maintain RASS 0- -1


- Elevated INR likely due to warfarin.  Hold warfarin for now.  Will need to 

start systemic A/C once INR <3


- discontinue bethanochol while alarcon is in place


- will give 1 x dose of vancomycin.  Continue with zosyn.  Will have pharmacy 

adjust according to PD


- plan to image for OM tomorrow- MRI vs CT w/o contrast given kidney disease.  

Although suspect MRI is better modality without contrast but patient has 

multiple stents.  Will discuss with radiology in AM to discuss compatibility of 

stents  


- continue with antihypertensives


- doppler BL LE for DP now 


- May need intervention for the occlusion in the LSFA 


- daily SBT/SWT 





CRITICAL CARE NEEDS: acute ventilator dependence, AMS





Critical care time 80 minutes





Full code


Prognosis guarded

## 2019-03-08 NOTE — CONS
CONSULTATION REPORT:

 

DATE OF CONSULT:  03/08/19

 

PHYSICIAN REQUESTING CONSULTATION:  Dr. Rosana Jolley.

 

CONSULTING SERVICE:  Infectious Disease.

 

ATTENDING PHYSICIAN:  Dr. Kenneth Felton * (dictated by Katie Rm NP).

 

REASON FOR CONSULT:  Elevated CRP and leg pain in a patient on peritoneal 
dialysis.

 

Please note that this entire history is obtained from the medical records as 
the patient is currently sedated and intubated.

 

IMPRESSION:

1.  Leucocytosis.  Unclear cause at this time.  The differential includes an 
undrained abscess, C. diff and peritonitis.  After reviewing the lumbar spine CT
, there are no overt signs of abscess.  C. diff testing is pending at this 
time.  Her peritoneal fluid shows 3 white blood cell counts, so I have low 
suspicion for peritonitis at this time. Could consider a CT myelogram, but the 
patient had refused this yesterday. Continue to follow her leukocytosis and CRP 
and continue Zosyn for now.

2.  Acute on chronic hypoxic respiratory failure.  The patient is currently 
intubated and uses 5 L via nasal cannula at baseline.

3.  Diabetes mellitus.

4.  Endstage renal disease secondary to diabetes mellitus on peritoneal 
dialysis.

5.  Coronary artery disease.

6.  Peripheral artery disease.

 

RECOMMENDATIONS:  Continue Zosyn, continue to follow CBC and CRP.

 

HISTORY OF PRESENT ILLNESS:  Ms. Masters is a 38-year-old female with past 
medical history significant for endstage renal disease on peritoneal dialysis, 
COPD with chronic hypoxic respiratory failure on 5 L of oxygen via nasal cannula
, diabetes mellitus, coronary artery disease, peripheral artery disease, 
gastroparesis, hypertension, hyperlipidemia, anxiety, and depression who 
presented to the emergency room with complaints of leg pain, abdominal pain, 
nausea, and vomiting on 03/05/19.  The patient's main complaint was bilateral 
leg pain and she was reporting that this pain was new to her.  She was 
reporting pain in her thighs that was circumferential around her upper legs 
that was constant, stabbing.  She also reported pain when moving her legs and 
they were painful to the touch.  She was having difficulty ambulating due to 
the pain.  She had tried heat and her home methadone and gabapentin, which were 
also not effective in relieving the pain.  She was reporting nausea and 
vomiting.  Due to her symptoms, she presented to the emergency room for further 
evaluation.

 

While in the emergency room, she was noted to have leukocytosis that was 
elevated above her baseline, supratherapeutic INR, elevated CRP, elevated 
troponin above her baseline elevation.  She states that the only thing that 
takes away her pain is morphine.  Due to her intractable pain, she was referred 
to the hospitalist service for admission.  



While in the hospital, she continued to have leukocytosis with a white blood 
cell count in the 20s, noted to be anemic, but appears to be at her baseline 
anemia.  She continues to have a supratherapeutic INR.  Her INR today is 4.42.  
She has an elevated BUN and creatinine, but she does have known endstage renal 
failure and is on peritoneal dialysis.  Her troponin has trended down during 
her stay.  Her CRP has continued to be elevated with last being 284.08 on 03/08/
19. Her lactic acid was 0.9 initially on admission and 2.6 on the morning of 03/
08/19. Chest x-ray showing bilateral airspace consolidation without volume loss
, concerning for pneumonia.  She is being treated for pneumonia on broad-
spectrum antibiotics.   During her hospitalization, she was denying abdominal 
pain and had improvement in diarrhea after one tablet of Imodium and 
probiotics. She reported no problems with her peritoneal dialysis.  She is 
continuing to complain of leg pain.  During her stay, she has had a 
transthoracic echocardiogram showing moderate concentric left ventricular 
hypertrophy, lateral wall hypokinetic on the short axis.  She had a lumbar 
spine CT showing no fracture, no evidence of endplate irregularities to suggest 
a dissection.  No paraspinal soft tissue swelling. Dependent changes noted in 
subcutaneous fat.  She had a venous Doppler study, bilateral lower extremities 
showing no DVT.  She had an aorta with runoff CTA on 03/07/19 showing moderate-
to-severe right common femoral artery stenosis, severe bilateral popliteal 
artery stenosis with a single left and two right areas of stenosis, near 
complete occlusion, distal left superficial femoral artery stent and a severely 
stenotic right renal artery.  She was refusing a CT myelogram and it was felt 
that neurologically, she was not significantly impaired from her baseline.  Her 
pain was suspected to be secondary to vascular problems.  Overnight, she 
developed increased workup breathing and was noted to have crackles.  She was 
clammy.  Her condition continued to deteriorate and she was noted to be hypoxic 
with oxygen saturations in the 70s, and she was intubated for acute on chronic 
hypoxic respiratory failure and transferred to the intensive care unit where 
she remains intubated.  She had a chest x-ray on 03/08/19 showing mild linear 
atelectasis at the right upper lung zone.  The lungs were otherwise grossly 
clear.  This was completed after she was intubated.

 

PAST MEDICAL HISTORY:

1.  Endstage renal disease, on peritoneal dialysis.

2.  COPD with chronic hypoxic respiratory failure on supplemental oxygen of 5 L 
via nasal cannula.

3.  Diabetes mellitus, type 2.

4.  Peripheral artery disease.

5.  GERD.

6.  Coronary artery disease, status post cardiac stenting.

7.  Gastroparesis.

8.  Anxiety and depression.

9.  Hypertension.

10.  Hyperlipidemia.

 

PAST SURGICAL HISTORY:  Status post a right metatarsal amputation.

 

MEDICATIONS:  Home medications:

1.  Bethanechol 25 mg by mouth 4 times daily.

2.  Aspirin 81 mg by mouth daily.

3.  Albuterol HFA inhaler 2 puff inhalation every 6 hours as needed for 
shortness of breath or wheeze.

4.  Albuterol 2.5 mg/3 mL nebulizer, 2.5 mg inhalation every 4 hours as needed 
for shortness of breath or wheeze.

5.  Acetaminophen 650 mg by mouth every 6 hours as needed for fever or pain.

6.  Omeprazole 40 mg by mouth every evening.

7.  Methadone 5 mg by mouth every 8 hours as needed for pain.

8.  Losartan 100 mg by mouth daily.

9.  Floranex 2 chewables by mouth daily.

10.  Lantus insulin 70 units subcutaneously at bedtime.

11.  NovoLog insulin 0 to10 units subcutaneously before meals as needed.

12.  Neurontin 20 mg by mouth 3 times daily.

13.  Calcitriol 0.25 mcg by mouth 3 times daily.

14.  Symbicort 160/4.5 2 puffs inhalation twice daily.

15.  Warfarin 6 mg by mouth daily.

16.  Sertraline 25 mg by mouth daily.

17.  Promethazine 25 mg by mouth every 8 hours as needed for nausea.

 

Hospital medications include:

1.  Acetaminophen 650 mg by mouth every 6 hours as needed for pain.

2.  Albuterol nebulizer 2.5 mg/3 mL 2 mg inhalation every 4 hours as needed for 
shortness of breath or wheeze.

3.  Albuterol HFA inhaler 2 puffs inhalation every 6 hours as needed for 
shortness of breath or wheeze.

4.  Aspirin 81 mg by mouth daily.

5.  Bethanechol 25 mg by mouth 4 times daily.

6.  Calcitriol 0.25 mcg by mouth 3 times daily.

7.  Chlorhexidine mouthwash 15 mL topical every 4 hours.

8.  Dextrose 12.5 g IV push for glucose less than 60.

9.  Colace 100 mg via G-tube 3 times daily as needed for constipation.

10.  Arixtra 2.5 mg subcutaneously every 24 hours.

11.  Gabapentin 200 mg by mouth 3 times daily.

12.  Lantus insulin 70 units subcutaneous at bedtime.

13.  Humalog insulin subcutaneously before meals, sliding scale.

14.  Lactobacillus 1 tablet by mouth twice daily.

15.  Losartan 100 mg by mouth daily.

16.  Methadone 5 mg by mouth every 8 hours as needed for pain.

17.  Dulera 200/5 MDI 2 puffs inhalation twice daily.

18.  Zofran 4 mg IV every 4 hours as needed for nausea.

19.  Percocet 5/325 one tablet by mouth every 4 hours as needed for pain.

20.  Protonix 40 mg by mouth every evening.

21.  Zosyn 3.375 g IV every 12 hours.

22.  Promethazine 25 mg by mouth every 8 hours as needed for nausea.

23.  Propofol drip 4.75 mL an hour IV.

24.  Senna 1 tablet via G-tube twice daily as needed for constipation.

25.  Sertraline 25 mg by mouth daily.

 

ALLERGIES:

1.  ADHESIVE TAPE; DOXYCYCLINE - unknown reaction.

2.  ERYTHROMYCIN.

3.  HEPARIN.

4.  REGLAN.

5.  NIACIN.

6.  REQUIP.

7.  FLAGYL; nausea or vomiting.

8.  SULFA, vomiting.

 

FAMILY HISTORY:  Father with a history of MI at age 49.  Mother, father and 
siblings with diabetes.  Grandfather with lung cancer.  Grandmother with 
ovarian cancer.

 

SOCIAL HISTORY:  She is a half a pack a day smoker.  She denies alcohol or 
recreational drug use previously.  Disabled, lives with her boyfriend.

 

REVIEW OF SYSTEMS:  Unable to perform as the patient is sedated and intubated.

 

PHYSICAL EXAM:  Vital Signs:  Temperature 96, blood pressure 133/49, heart rate 
88, respiratory rate 19, O2 sat 97% on the ventilator and tidal CO2 of 36.

 

General Appearance:  The patient is unresponsive and sedated.  She is 
chronically ill appearing.  HEENT:  Normocephalic, atraumatic.  Respiratory:  
The lungs are clear to auscultation bilateral anteriorly.  Cardiovascular:  
Regular rate and rhythm.  Abdomen:  Soft, does not appear to be tender with 
palpation, large.  There are bowel sounds present x4.  Extremities:  No lower 
extremity edema. Musculoskeletal:  There is no clubbing or cyanosis noted.  The 
patient is unable to determine strength as the patient is sedated.  Skin:  She 
is noted to have some purplish discoloration to bilateral lower extremities.  
Per nursing staff, she is noted to have an open area to her buttocks, but this 
was not visualized.

 

DIAGNOSTIC STUDIES/LABORATORY DATA:  White blood cell count 26.3, hemoglobin 9.1
, hematocrit 29, platelet count 423.  Creatinine 8.69.  Lactic acid 2.6.  CRP 
284.08. CK on 03/07/19 is 27.  She had blood cultures with no growth on day 2.  
Peritoneal fluid with 3 white blood cell count, 15 neutrophils, 20 lymphocytes, 
65 monocytes. It was colorless and clear.  She had a urinalysis showing 
leukocyte esterase 2+, wbc's 3+, rbc's 3+, squamous epithelial cells present 
and bacteria absent.

 

Please see impression and recommendations outlined above.

 

Thank you for asking us to see Ms. Masters in consultation.

 

TIME SPENT:  Time spent for this consultation was approximately 45 minutes.  
This includes physical examination and reviewing the patient's medical record.

 

Reviewed by KATIE RM, TATIANNA-ODETTE  03/10/19  1245

 



Seen, examined and discussed with INESSA Rm, I agree with her full 
note above. 

Impression: 

1. Leukoctosis with neutrophilic predominance; differential diagnosis in the 
setting of negative blood and peritoneal fluid cultures includes undrained 
abscess or Cdificile infection.  She has diarrhea, the Cdif test is pending.  
She had a CT of the abdomen and pelvis on the aorta study that did not show 
abscess.  There was initial concern for spine infection, a CT was unrevealing 
and she refused a CT myleogram but has not had weakness in the lower 
extremities either.  

2. ESRD on peritoneal dialysis.  

3. Type 2 DM

Recommendations: 

1. assuming Cdif test is negative we will follow her cell counts and condition 
here while in the ICU. 

[id number]/CPS #: [id number]

SAEED

## 2019-03-08 NOTE — ED
Progress





- Progress Note


Progress Note: 





I was called by hospitalist, Dr. Hein to intubate patient for respiratory 

distress,


Procedure note:


Endotracheal intubation,


Patient given etomidate and succinylcholine for RSI.


Using glide scope, vocal cord was visualized,


ET tube, 7.5m passed through the vocal cords under indirect vision using the 

Glidescope.


Lip line is 22 cm,


Tube position confirmed with using capnography, auscultation.


Chest x-ray postintubation did show tube in good position about 3 cm above the 

jose.


No complications.





Course/Dx





- Course


Course Of Treatment: Ms. Masters presented with an exacerbation of her chronic 

neuropathic pain in her bilateral thighs as well as vomiting the last couple of 

days with an inability to keep anything down.  She oftens has these symptoms 

when she has an infection or other stressor therefore a work-up was initiated 

with IV fluids held because of her ERSD status.  She was found to have a high 

indeterminant troponin which normally runs in the low indeterminant range. She 

also had a leukocytosis and CRP elevation. The hospitalist service was 

contacted for evaluation and admission.





- Diagnoses


Provider Diagnoses: 


 Diabetic neuropathy, Leukocytosis, unspecified








- Critical Care Time


Critical Care Time: 30-74 min





Discharge





- Sign-Out/Discharge


Documenting (check all that apply): Patient Departure





- Discharge Plan


Condition: Stable


Disposition: ADMITTED TO [facility]





- Billing Disposition and Condition


Condition: STABLE


Disposition: Admitted to [facility]

## 2019-03-08 NOTE — PN
Progress Note





- Progress Note


Date of Service: 03/08/19


Note: 





Paged to evaluate patient again clammy and more altered.  On arrival patient on 

4L NC, awakes to tactile stimuli but diaphoretic and minimally arousable. Sats 

began dropping despite increasing O2, CAT call, patient bagged and continued to 

dip into the 70's.  Dr. Morse came up to intubate patient for acute hypoxic 

respiratory failure.  Patient transferred to ICU. 





Intensivist updated 





ABG respiratory acidosis - minor vent changes down.

## 2019-03-09 LAB
ANION GAP SERPL CALC-SCNC: 16 MMOL/L (ref 2–11)
BUN SERPL-MCNC: 41 MG/DL (ref 6–24)
BUN/CREAT SERPL: 5.1 (ref 8–20)
CALCIUM SERPL-MCNC: 7.8 MG/DL (ref 8.6–10.3)
CHLORIDE SERPL-SCNC: 102 MMOL/L (ref 101–111)
GLUCOSE SERPL-MCNC: 153 MG/DL (ref 70–100)
HCO3 SERPL-SCNC: 26 MMOL/L (ref 22–32)
INR PPP/BLD: 4.85 (ref 0.77–1.02)
MAGNESIUM SERPL-MCNC: 1.7 MG/DL (ref 1.9–2.7)
POTASSIUM SERPL-SCNC: 3 MMOL/L (ref 3.5–5)
SODIUM SERPL-SCNC: 144 MMOL/L (ref 135–145)

## 2019-03-09 RX ADMIN — PIPERACILLIN AND TAZOBACTAM SCH MLS/HR: 3; .375 INJECTION, POWDER, LYOPHILIZED, FOR SOLUTION INTRAVENOUS; PARENTERAL at 20:03

## 2019-03-09 RX ADMIN — CHLORHEXIDINE GLUCONATE 0.12% ORAL RINSE SCH ML: 1.2 LIQUID ORAL at 20:03

## 2019-03-09 RX ADMIN — CHLORHEXIDINE GLUCONATE 0.12% ORAL RINSE SCH ML: 1.2 LIQUID ORAL at 10:21

## 2019-03-09 RX ADMIN — LOSARTAN POTASSIUM SCH MG: 25 TABLET, FILM COATED ORAL at 10:11

## 2019-03-09 RX ADMIN — MOMETASONE FUROATE AND FORMOTEROL FUMARATE DIHYDRATE SCH: 200; 5 AEROSOL RESPIRATORY (INHALATION) at 20:25

## 2019-03-09 RX ADMIN — INSULIN LISPRO SCH UNIT: 100 INJECTION, SOLUTION INTRAVENOUS; SUBCUTANEOUS at 14:21

## 2019-03-09 RX ADMIN — PANTOPRAZOLE SODIUM SCH: 40 TABLET, DELAYED RELEASE ORAL at 20:03

## 2019-03-09 RX ADMIN — FENTANYL CITRATE SCH: 50 INJECTION INTRAVENOUS at 14:00

## 2019-03-09 RX ADMIN — CHLORHEXIDINE GLUCONATE 0.12% ORAL RINSE SCH ML: 1.2 LIQUID ORAL at 04:50

## 2019-03-09 RX ADMIN — ASPIRIN SCH MG: 81 TABLET, COATED ORAL at 10:11

## 2019-03-09 RX ADMIN — FONDAPARINUX SODIUM SCH MG: 2.5 INJECTION, SOLUTION SUBCUTANEOUS at 14:00

## 2019-03-09 RX ADMIN — CHLORHEXIDINE GLUCONATE 0.12% ORAL RINSE SCH ML: 1.2 LIQUID ORAL at 14:00

## 2019-03-09 RX ADMIN — GABAPENTIN SCH MG: 100 CAPSULE ORAL at 20:17

## 2019-03-09 RX ADMIN — GABAPENTIN SCH MG: 100 CAPSULE ORAL at 10:11

## 2019-03-09 RX ADMIN — INSULIN GLARGINE SCH UNITS: 100 INJECTION, SOLUTION SUBCUTANEOUS at 20:53

## 2019-03-09 RX ADMIN — PIPERACILLIN AND TAZOBACTAM SCH MLS/HR: 3; .375 INJECTION, POWDER, LYOPHILIZED, FOR SOLUTION INTRAVENOUS; PARENTERAL at 08:30

## 2019-03-09 RX ADMIN — GABAPENTIN SCH MG: 100 CAPSULE ORAL at 00:42

## 2019-03-09 RX ADMIN — CHLORHEXIDINE GLUCONATE 0.12% ORAL RINSE SCH ML: 1.2 LIQUID ORAL at 07:30

## 2019-03-09 RX ADMIN — OXYCODONE HYDROCHLORIDE SCH MG: 5 SOLUTION ORAL at 21:13

## 2019-03-09 RX ADMIN — OXYCODONE HYDROCHLORIDE SCH MG: 5 SOLUTION ORAL at 10:11

## 2019-03-09 RX ADMIN — GABAPENTIN SCH MG: 100 CAPSULE ORAL at 14:00

## 2019-03-09 RX ADMIN — FENTANYL CITRATE SCH MLS/HR: 50 INJECTION INTRAVENOUS at 07:30

## 2019-03-09 RX ADMIN — SERTRALINE HYDROCHLORIDE SCH MG: 25 TABLET ORAL at 10:11

## 2019-03-09 RX ADMIN — INSULIN LISPRO SCH UNIT: 100 INJECTION, SOLUTION INTRAVENOUS; SUBCUTANEOUS at 09:12

## 2019-03-09 RX ADMIN — MOMETASONE FUROATE AND FORMOTEROL FUMARATE DIHYDRATE SCH: 200; 5 AEROSOL RESPIRATORY (INHALATION) at 09:09

## 2019-03-09 RX ADMIN — CALCITRIOL SCH MCG: 1 SOLUTION ORAL at 21:12

## 2019-03-09 RX ADMIN — CALCITRIOL SCH MCG: 0.25 CAPSULE ORAL at 14:00

## 2019-03-09 RX ADMIN — OXYCODONE HYDROCHLORIDE SCH MG: 5 SOLUTION ORAL at 16:26

## 2019-03-09 RX ADMIN — CHLORHEXIDINE GLUCONATE 0.12% ORAL RINSE SCH ML: 1.2 LIQUID ORAL at 00:42

## 2019-03-09 RX ADMIN — CALCITRIOL SCH MCG: 0.25 CAPSULE ORAL at 10:11

## 2019-03-09 RX ADMIN — CHLORHEXIDINE GLUCONATE 0.12% ORAL RINSE SCH ML: 1.2 LIQUID ORAL at 23:36

## 2019-03-09 RX ADMIN — INSULIN LISPRO SCH: 100 INJECTION, SOLUTION INTRAVENOUS; SUBCUTANEOUS at 17:45

## 2019-03-09 NOTE — PN
Date of Service: 19


Critical Care Services: 





38 F with multiple comorbidities including ESRD on HD, HF, PH, PVD, ICM 





: RSI with etomidate and succinylcholine performed at the bedside for 

acute respiratory failure with hypoxia 


: did not tolerate well with swt.  No fevers overnight.  Doppler + pulses 

peripherally 





Vital Signs: 











Temp Pulse Resp BP SpO2 FiO2


 


99.7 F 104 19 95/60 90 60


 


19 15:00 19 15:00 19 16:26 19 00:00 19 15:00  08:00











Physical Exam: 


General: on sedation/analgesia.  NAD


HEENT: Atraumatic, PERRLA, Mucous membr. moist/pink


Lungs: Clear to auscultation


Cardiovascular: Normal S1, Normal S2


Abdomen: Soft, No tenderness, No masses


Extremities: R toe amputation, chronic skin changes 


Neurological: unable to access optimally due to sedation/analgesia; nonfocal  





Fluid Balance (Past 24 Hours): 


I=     O=     Net 


 Intake & Output











 03/07/19 03/08/19 03/09/19 03/10/19





 06:59 06:59 06:59 07:59


 


Intake Total 3787 817 6621 37


 


Output Total   0 5


 


Balance 8505 898 1927 32


 


Weight   206 lb 2.369 oz 


 


Intake:    


 


  IV Fluids  10 1111 


 


    NS (0.9%)   741 


 


    Vancomycin   250 


 


    Zosyn   120 


 


  IVPB 255 100  


 


  Medicated IV   515 


 


    CC - Propofol/Diprivan   515 


 


  IV Narcotic Infusion   84 37


 


    Fentanyl   84 37


 


  Oral 948 0 0 


 


  Tube Feeding   179 


 


  Tube Feeding Flush Amount   40 


 


  NG Tube Irrigate Amount   50 


 


Output:    


 


  Urine   0 


 


  Alarcon    5


 


Other:    


 


  Date of Last Bowel   3/8/19 





  Movement    


 


  # Bowel Movements 1 7 2 


 


  Estimated Stool Amount Medium Small Medium 





 





ADLs: Meal  Record                                         Start:  19 21:

19


Freq:   DAILY@0900,1400,1800                               Status: Complete    

  


Protocol:                                                                      

  


 Created      19 21:19  System  (Rec: 19 21:19  System  TELE-M15)


 Document     19 09:00  [id number]  (Rec: 19 13:52  [id number]  TELE-C10)


 Document     19 14:00  [id number]  (Rec: 19 15:02  [id number]  TELE-C10)


 Document     19 18:00  [id number]  (Rec: 19 19:27  [id number]  TELE-C10)


 Document     19 09:00  [id number]  (Rec: 19 14:44  [id number]  TELE-C10)


 Document     19 14:00  [id number]  (Rec: 19 14:44  [id number]  TELE-C10)


 Document     19 18:00  [id number]  (Rec: 19 19:43  [id number]  TELE-C10)


ADLs: Meal  Record                                         Start:  19 07:

16


Freq:   09,13,18                                           Status: Active      

  


Protocol:                                                                      

  


 Created      19 07:16  [id number]  (Rec: 19 07:16  [id number]  ICU-C12)


 Document     19 09:00  [id number]  (Rec: 19 09:36  [id number]  ICU-C07)


 Document     19 13:00  [id number]  (Rec: 19 13:05  [id number]  ICU-C07)


 Document     19 18:00  [id number]  (Rec: 19 18:11  [id number]  ICU-C10)


Intake and Output                                          Start:  19 18:

19


Freq:                                                      Status: Complete    

  


Protocol:                                                                      

  


 Created      19 18:19  System  (Rec: 19 18:19  System  EDRM-C04)


Intake and Output                                          Start:  19 21:

19


Freq:   DAILY@0600,1400,2200                               Status: Complete    

  


Protocol:                                                                      

  


 Created      19 21:19  System  (Rec: 19 21:19  System  TELE-M15)


 Document     19 06:00  [id number]  (Rec: 19 06:47  [id number]  TELE-C03)


 Document     19 14:00  [id number]  (Rec: 19 15:02  [id number]  TELE-C10)


 Document     19 22:00  [id number]  (Rec: 19 22:07  [id number]  TELE-C10)


 Document     19 05:58  [id number]  (Rec: 19 05:59  [id number]  HOSP-C11)


 Document     19 14:00  [id number]  (Rec: 19 14:46  [id number]  TELE-C10)


 Document     19 22:00  [id number]  (Rec: 19 22:28  [id number]  TELE-C09)


Intake and Output                                          Start:  19 07:

16


Freq:   Q4HR                                               Status: Active      

  


Protocol:                                                                      

  


 Created      19 07:16  [id number]  (Rec: 19 07:16  [id number]  ICU-C12)


 Document     19 08:00  [id number]  (Rec: 19 08:21  [id number]  ICU-M35)


 Document     19 11:44  [id number]  (Rec: 19 11:44  [id number]  ICU-C07)


 Document     19 16:00  [id number]  (Rec: 19 18:06  [id number]  ICU-C10)


 Document     19 00:00  [id number]  (Rec: 19 00:48  [id number]  [facility]

)


 Document     19 06:07  [id number]  (Rec: 19 06:07  [id number]  [facility]

)


 Document     19 08:00  [id number]  (Rec: 19 12:31  [id number]  ICU-C16)








Labs: 


 Laboratory Results - last 24 hr











  19





  17:11 17:12 21:45


 


INR (Anticoag Therapy)   


 


Patient Temperature   


 


ABG pH   


 


ABG pH (Temp Correct)   


 


ABG pCO2   


 


ABG pCO2 (Temp Corrct   


 


ABG pO2   


 


ABG pO2 (Temp Correct   


 


ABG HCO3   


 


ABG O2 Saturation   


 


ABG Base Excess   


 


Respiration Rate   


 


O2 Delivery Device   


 


Ventilator Type   


 


Vent Mode   


 


FiO2   


 


Inspiratory Time   


 


PEEP   


 


Pressure Support   


 


Pressure Control   


 


EPAP   


 


IPAP   


 


BiPAP   


 


Sodium   


 


Potassium   


 


Chloride   


 


Carbon Dioxide   


 


Anion Gap   


 


BUN   


 


Creatinine   


 


Est GFR ( Amer)   


 


Est GFR (Non-Af Amer)   


 


BUN/Creatinine Ratio   


 


Glucose   


 


POC Glucose (mg/dL)  139 H  140 H  58 L


 


Calcium   


 


Phosphorus   


 


Magnesium   


 


Random Vancomycin   














  19





  22:30 23:03 03:19


 


INR (Anticoag Therapy)   


 


Patient Temperature   


 


ABG pH  7.29 L  


 


ABG pH (Temp Correct)   


 


ABG pCO2  53 H  


 


ABG pCO2 (Temp Corrct   


 


ABG pO2  72 L  


 


ABG pO2 (Temp Correct   


 


ABG HCO3  23.3  


 


ABG O2 Saturation  94.4  


 


ABG Base Excess  -1.9  


 


Respiration Rate   


 


O2 Delivery Device   


 


Ventilator Type   


 


Vent Mode   


 


FiO2   


 


Inspiratory Time   


 


PEEP   


 


Pressure Support   


 


Pressure Control   


 


EPAP   


 


IPAP   


 


BiPAP   


 


Sodium   


 


Potassium   


 


Chloride   


 


Carbon Dioxide   


 


Anion Gap   


 


BUN   


 


Creatinine   


 


Est GFR ( Amer)   


 


Est GFR (Non-Af Amer)   


 


BUN/Creatinine Ratio   


 


Glucose   


 


POC Glucose (mg/dL)   85  132 H


 


Calcium   


 


Phosphorus   


 


Magnesium   


 


Random Vancomycin   














  19





  06:56 06:56 08:27


 


INR (Anticoag Therapy)  4.85 H  


 


Patient Temperature   36.2 


 


ABG pH   7.31 L 


 


ABG pH (Temp Correct)   Not Reportable 


 


ABG pCO2   51 H 


 


ABG pCO2 (Temp Corrct   Not Reportable 


 


ABG pO2   67 L 


 


ABG pO2 (Temp Correct   Not Reportable 


 


ABG HCO3   23.9 


 


ABG O2 Saturation   94.5 


 


ABG Base Excess   -1.2 


 


Respiration Rate   16 


 


O2 Delivery Device   ventillator 


 


Ventilator Type   400 


 


Vent Mode   Simv 


 


FiO2   60 


 


Inspiratory Time   Not Reportable 


 


PEEP   8 


 


Pressure Support   Not Reportable 


 


Pressure Control   Not Reportable 


 


EPAP   Not Reportable 


 


IPAP   Not Reportable 


 


BiPAP   Not Reportable 


 


Sodium   


 


Potassium   


 


Chloride   


 


Carbon Dioxide   


 


Anion Gap   


 


BUN   


 


Creatinine   


 


Est GFR ( Amer)   


 


Est GFR (Non-Af Amer)   


 


BUN/Creatinine Ratio   


 


Glucose   


 


POC Glucose (mg/dL)    153 H


 


Calcium   


 


Phosphorus   


 


Magnesium   


 


Random Vancomycin   














  19





  10:53 14:09 14:38


 


INR (Anticoag Therapy)   


 


Patient Temperature   


 


ABG pH    7.33 L


 


ABG pH (Temp Correct)   


 


ABG pCO2    46 H


 


ABG pCO2 (Temp Corrct   


 


ABG pO2    74 L


 


ABG pO2 (Temp Correct   


 


ABG HCO3    23.4


 


ABG O2 Saturation    96.5


 


ABG Base Excess    -1.9


 


Respiration Rate   


 


O2 Delivery Device   


 


Ventilator Type   


 


Vent Mode   


 


FiO2   


 


Inspiratory Time   


 


PEEP   


 


Pressure Support   


 


Pressure Control   


 


EPAP   


 


IPAP   


 


BiPAP   


 


Sodium  144  


 


Potassium  3.0 L  


 


Chloride  102  


 


Carbon Dioxide  26  


 


Anion Gap  16 H  


 


BUN  41 H  


 


Creatinine  8.05 H  


 


Est GFR ( Amer)  6.8  


 


Est GFR (Non-Af Amer)  5.6  


 


BUN/Creatinine Ratio  5.1 L  


 


Glucose  153 H  


 


POC Glucose (mg/dL)   


 


Calcium  7.8 L  


 


Phosphorus  7.6 H  


 


Magnesium  1.7 L  


 


Random Vancomycin   22.1 











Studies: 


TTE 19


Conclusions


Moderate concentric left ventricular hypertrophy is observed.


Global left ventricular wall motion and contractility are within normal


limits. 


Base of the lateral wall relatively hypokinetic on short axis and 4


chamber view, small focal area.


The estimated ejection fraction is 50-55%. 


Abnormal left ventricular diastolic function is observed.


The right ventricular global systolic function is low normal.


There is aortic valve sclerosis with trace aortic regurgitation.


There is a trace of mitral regurgitation.


There is moderate tricuspid regurgitation, eccentric jet directed toward


the septum.


There is evidence of mild to moderate pulmonary hypertension estimated


at 42 mmHg. 


Compared with prior echo of 18, EF is stable, focal area of


hypokinesis newly described, AI is new, MR is stable, TR has increased


from trace, elevated PA pressure newly documented.








CTA [facility] IMAGING


Patient Name:MAKAYLA RUBIN                                                  

                             MR:[id number]                                     

                     : 1980











throughout its entire course with near-complete occlusion distally in the 


adductor hiatus. Severely atherosclerotic left popliteal artery in the mid 


aspect. Additional left popliteal artery severe stenosis distally prior to the 


trifurcation. 


 Left infrapopliteal arteries: Severely atherosclerotic left anterior tibial, 


peroneal, and posterior tibia arteries with dissipated posterior tibial artery 


in the mid shin. Continued anterior tibial and peroneal flow in the foot. 


   


 Lower thorax:  There is mild atherosclerotic calcification of the coronary 


arteries. 


 Liver: No mass. 


 Gallbladder and bile ducts: Normal. No calcified stones. No ductal dilation.  


 Pancreas: Normal. No mass. No ductal dilation.  


 Spleen: Normal. No splenomegaly.  


 Adrenals: Normal. No mass.  


 Kidneys and ureters: Normal. No mass.  


 Stomach and bowel: Normal. No obstruction. No mucosal thickening.  


 Appendix: No evidence of appendicitis.  


 Bladder:  The bladder is decompressed but otherwise normal. 


 Reproductive:  Uterus and ovaries are normal. 


 Intraperitoneal space:  Small volume ascites. 


 Lymph nodes: No lymphadenopathy. 


 Bones/joints: No acute fracture. No dislocation.  


 Soft tissues: Normal. No hernias. 





IMPRESSION: 


1. Moderate severe right common femoral artery stenosis. 


2. Severe bilateral popliteal artery stenosis, with a single left and 2 right 


areas of stenosis. 


3. Near-complete occlusion distal left superficial femoral artery stent. 


4. Findings secondary to peritoneal dialysis. 


5. Severely stenotic right renal artery. 





To contact [facility] with a general question: Operations Center - [phone]


For direct physician to physician contact: Physician Hotline - [phone]


[facility] ([facility] Facility ID #[id number])





____________________________________________________________


<Electronically signed by Precious Saleh MD in OV>  19


Dictated By: Precious Saleh MD


Dictated Date/Time: 19


Transcribed Date/Time:  


Copy to:











CC:Kenneth Felton MD; Abeba Hein DO; Rosana Jolley MD; Yeni Shanks MD


Imaging - [facility]                                                          

  Imaging - [facility] 


[address]                                                                

  [address] [address]


ph ([phone])                                                              

  ph ([phone])                                                            

                     ph ([phone]) 

















This report is only to be considered final once signed by the Provider(s) as 

displayed in the "<Electronically Signed by >" field (s). Absence of a 


signature indicates the report is in a draft status and still needs to be 

finalized. In the event this document was created by someone other than the 


signing Provider, the individual initiating the document will be listed in the 

"Entered by:" or "Dictated by:" fields.


                                                                               

                   2 of 2





Nutrition: 


TF- promote 





Impression: 


37 yo F with multiple complicated comorbidities including ESRD on PD, CAD s/p 

stents, PVD s/p stents transferred to the ICU after requiring emergent 

intubation 








#  Acute encephalopathy


#  Acute hypoxemic respiratory failure 


#  acute ventilator dependance 


#  Sepsis 


#  leukocytosis 26 with neutrophilic predominance 


#  Stable anemia 


#  Hypercoagulable state INR 5.36-->4.42


#  ESRD on PD 


#  Troponin leak- improving levels


#  Sacral ulcer  


# chronic pain on methadone 


#  HTN 


#  New hypokinesis of LV (lateral) with EF 50-55%. 


#  diastolic dysfunction


#  RV dysfunction 


#  AV sclerosis 


#  moderate tricuspid regurgitation


#  PH (42 mmHg on TTE)


#  S1 impigment


#  PVD s/p stents 


# CTA: Moderate severe right common femoral artery stenosis. Severe bilateral 

popliteal artery stenosis, with a single left and 2 right 


areas of stenosis.  Near-complete occlusion distal left superficial femoral 

artery stent. 





Plan: 





#  Acute encephalopathy


#  Acute hypoxemic respiratory failure 


#  acute ventilator dependance 


#  Sepsis 


#  leukocytosis 26 with neutrophilic predominance 


#  Stable anemia 


#  Hypercoagulable state INR 5.36-->4.42


#  ESRD on PD 


#  Troponin leak- improving levels


#  Sacral ulcer  


# chronic pain on methadone 


#  HTN 


#  New hypokinesis of LV (lateral) with EF 50-55%. 


#  diastolic dysfunction


#  RV dysfunction 


#  AV sclerosis 


#  moderate tricuspid regurgitation


#  PH (42 mmHg on TTE)


#  S1 impigment


#  PVD s/p stents 


- CTA: Moderate severe right common femoral artery stenosis. Severe bilateral 

popliteal artery stenosis, with a single left and 2 right 


areas of stenosis.  Near-complete occlusion distal left superficial femoral 

artery stent. 





                                                                              2 

of 2








- Bcx   NTD


- if leukocytosis progress or worsening clinical status, will mccray Cx


- S/p peritoneal  fluid analysis.  Grossly noninfectious.  Will await final 

cultures


- current vent setting Vt 400, PEEP 10


- vent/oxygenation optimized on current vent settings.  Note that SPO2 is much 

lower than normal values of SaO2(ABG)


- Hemodynamically stable 


- hold methadone for now.  Fentanyl gtt.  Started enteral oxycodone  


- sedation with propofol to maintain RASS 0- -1.  Will transition to precedex 


- Elevated INR likely due to warfarin.  Hold warfarin for now.  Will need to 

start systemic A/C once INR <3


- discontinue bethanochol while alarcon is in place


- will give 1 x dose of vancomycin.  Continue with zosyn.  Will have pharmacy 

adjust according to PD.  Follow up random vanc levels in the AM 


- plan to image for OM tomorrow- MRI vs CT w/o contrast given kidney disease.  

Although suspect MRI is better modality without contrast but patient has 

multiple stents.  Will discuss with radiology in AM to discuss compatibility of 

stents  


- hold antihypertensives


- May need intervention for the occlusion in the LSFA 


- daily SBT/SWT 





DVT ppx: Arixtra 


GI ppx: protonix 





CRITICAL CARE NEEDS: acute ventilator dependence, AMS





Critical care time 65 minutes





Full code


Prognosis guarded

## 2019-03-10 LAB
ANION GAP SERPL CALC-SCNC: 18 MMOL/L (ref 2–11)
BASOPHILS # BLD AUTO: 0.1 10^3/UL (ref 0–0.2)
BUN SERPL-MCNC: 42 MG/DL (ref 6–24)
BUN/CREAT SERPL: 5.1 (ref 8–20)
CALCIUM SERPL-MCNC: 8 MG/DL (ref 8.6–10.3)
CHLORIDE SERPL-SCNC: 101 MMOL/L (ref 101–111)
EOSINOPHIL # BLD AUTO: 0.1 10^3/UL (ref 0–0.6)
GLUCOSE SERPL-MCNC: 237 MG/DL (ref 70–100)
HCO3 SERPL-SCNC: 26 MMOL/L (ref 22–32)
HCT VFR BLD AUTO: 29 % (ref 35–47)
HGB BLD-MCNC: 9 G/DL (ref 12–16)
INR PPP/BLD: 3.36 (ref 0.77–1.02)
LYMPHOCYTES # BLD AUTO: 0.9 10^3/UL (ref 1–4.8)
MAGNESIUM SERPL-MCNC: 1.8 MG/DL (ref 1.9–2.7)
MCH RBC QN AUTO: 28 PG (ref 27–31)
MCHC RBC AUTO-ENTMCNC: 31 G/DL (ref 31–36)
MCV RBC AUTO: 90 FL (ref 80–97)
MONOCYTES # BLD AUTO: 0.7 10^3/UL (ref 0–0.8)
NEUTROPHILS # BLD AUTO: 23.7 10^3/UL (ref 1.5–7.7)
NRBC # BLD AUTO: 0 10^3/UL
NRBC BLD QL AUTO: 0.1
PLATELET # BLD AUTO: 454 10^3/UL (ref 150–450)
POTASSIUM SERPL-SCNC: 2.9 MMOL/L (ref 3.5–5)
RBC # BLD AUTO: 3.21 10^6/UL (ref 4–5.4)
SODIUM SERPL-SCNC: 145 MMOL/L (ref 135–145)
WBC # BLD AUTO: 25.5 10^3/UL (ref 3.5–10.8)

## 2019-03-10 RX ADMIN — CHLORHEXIDINE GLUCONATE 0.12% ORAL RINSE SCH ML: 1.2 LIQUID ORAL at 20:01

## 2019-03-10 RX ADMIN — POTASSIUM CHLORIDE SCH MLS/HR: 200 INJECTION, SOLUTION INTRAVENOUS at 12:01

## 2019-03-10 RX ADMIN — POTASSIUM CHLORIDE ONE MEQ: 1500 TABLET, EXTENDED RELEASE ORAL at 12:01

## 2019-03-10 RX ADMIN — POTASSIUM CHLORIDE SCH MLS/HR: 200 INJECTION, SOLUTION INTRAVENOUS at 17:28

## 2019-03-10 RX ADMIN — INSULIN LISPRO SCH UNITS: 100 INJECTION, SOLUTION INTRAVENOUS; SUBCUTANEOUS at 20:55

## 2019-03-10 RX ADMIN — POTASSIUM CHLORIDE SCH MLS/HR: 200 INJECTION, SOLUTION INTRAVENOUS at 23:41

## 2019-03-10 RX ADMIN — CHLORHEXIDINE GLUCONATE 0.12% ORAL RINSE SCH ML: 1.2 LIQUID ORAL at 23:43

## 2019-03-10 RX ADMIN — OXYCODONE HYDROCHLORIDE SCH: 5 SOLUTION ORAL at 17:22

## 2019-03-10 RX ADMIN — ASPIRIN SCH MG: 81 TABLET, COATED ORAL at 09:12

## 2019-03-10 RX ADMIN — FENTANYL CITRATE SCH: 50 INJECTION INTRAVENOUS at 12:34

## 2019-03-10 RX ADMIN — CALCITRIOL SCH MCG: 1 SOLUTION ORAL at 09:11

## 2019-03-10 RX ADMIN — CHLORHEXIDINE GLUCONATE 0.12% ORAL RINSE SCH ML: 1.2 LIQUID ORAL at 12:01

## 2019-03-10 RX ADMIN — POTASSIUM CHLORIDE ONE: 1500 TABLET, EXTENDED RELEASE ORAL at 14:13

## 2019-03-10 RX ADMIN — FENTANYL CITRATE SCH MLS/HR: 50 INJECTION INTRAVENOUS at 07:26

## 2019-03-10 RX ADMIN — CALCITRIOL SCH: 1 SOLUTION ORAL at 15:15

## 2019-03-10 RX ADMIN — CHLORHEXIDINE GLUCONATE 0.12% ORAL RINSE SCH ML: 1.2 LIQUID ORAL at 17:28

## 2019-03-10 RX ADMIN — Medication SCH NOTE: at 18:40

## 2019-03-10 RX ADMIN — GABAPENTIN SCH MG: 100 CAPSULE ORAL at 09:12

## 2019-03-10 RX ADMIN — PIPERACILLIN AND TAZOBACTAM SCH MLS/HR: 3; .375 INJECTION, POWDER, LYOPHILIZED, FOR SOLUTION INTRAVENOUS; PARENTERAL at 20:57

## 2019-03-10 RX ADMIN — MOMETASONE FUROATE AND FORMOTEROL FUMARATE DIHYDRATE SCH: 200; 5 AEROSOL RESPIRATORY (INHALATION) at 20:27

## 2019-03-10 RX ADMIN — POTASSIUM CHLORIDE SCH MLS/HR: 200 INJECTION, SOLUTION INTRAVENOUS at 22:31

## 2019-03-10 RX ADMIN — PIPERACILLIN AND TAZOBACTAM SCH MLS/HR: 3; .375 INJECTION, POWDER, LYOPHILIZED, FOR SOLUTION INTRAVENOUS; PARENTERAL at 08:40

## 2019-03-10 RX ADMIN — CHLORHEXIDINE GLUCONATE 0.12% ORAL RINSE SCH ML: 1.2 LIQUID ORAL at 08:40

## 2019-03-10 RX ADMIN — FONDAPARINUX SODIUM SCH MG: 2.5 INJECTION, SOLUTION SUBCUTANEOUS at 13:16

## 2019-03-10 RX ADMIN — SERTRALINE HYDROCHLORIDE SCH MG: 25 TABLET ORAL at 09:12

## 2019-03-10 RX ADMIN — POTASSIUM CHLORIDE SCH MLS/HR: 200 INJECTION, SOLUTION INTRAVENOUS at 14:11

## 2019-03-10 RX ADMIN — POTASSIUM CHLORIDE SCH MLS/HR: 200 INJECTION, SOLUTION INTRAVENOUS at 14:15

## 2019-03-10 RX ADMIN — INSULIN LISPRO SCH UNIT: 100 INJECTION, SOLUTION INTRAVENOUS; SUBCUTANEOUS at 08:40

## 2019-03-10 RX ADMIN — INSULIN LISPRO SCH: 100 INJECTION, SOLUTION INTRAVENOUS; SUBCUTANEOUS at 18:32

## 2019-03-10 RX ADMIN — GABAPENTIN SCH: 100 CAPSULE ORAL at 15:15

## 2019-03-10 RX ADMIN — POTASSIUM CHLORIDE SCH MLS/HR: 200 INJECTION, SOLUTION INTRAVENOUS at 20:12

## 2019-03-10 RX ADMIN — OXYCODONE HYDROCHLORIDE SCH MG: 5 SOLUTION ORAL at 09:11

## 2019-03-10 RX ADMIN — OXYCODONE HYDROCHLORIDE SCH MG: 5 SOLUTION ORAL at 03:29

## 2019-03-10 RX ADMIN — INSULIN GLARGINE SCH UNITS: 100 INJECTION, SOLUTION SUBCUTANEOUS at 20:55

## 2019-03-10 RX ADMIN — INSULIN LISPRO SCH UNIT: 100 INJECTION, SOLUTION INTRAVENOUS; SUBCUTANEOUS at 13:14

## 2019-03-10 RX ADMIN — CHLORHEXIDINE GLUCONATE 0.12% ORAL RINSE SCH ML: 1.2 LIQUID ORAL at 03:29

## 2019-03-10 RX ADMIN — MOMETASONE FUROATE AND FORMOTEROL FUMARATE DIHYDRATE SCH: 200; 5 AEROSOL RESPIRATORY (INHALATION) at 11:40

## 2019-03-10 NOTE — PN
Date of Service: 03/10/19


Critical Care Services: 


38 F with multiple comorbidities including ESRD on HD, HF, PH, PVD, ICM 





: RSI with etomidate and succinylcholine performed at the bedside for 

acute respiratory failure with hypoxia 


: did not tolerate well with swt.  No fevers overnight.  Doppler + pulses 

peripherally 


03/10:  Off propofol and fentanyl drip but not following commands 

appropriately.  No fever, 1 x vomiting





Vital Signs: 











Temp Pulse Resp BP SpO2 FiO2


 


97.6 F 96 16 95/60 97 60


 


03/10/19 16:36 03/10/19 17:01 03/10/19 17:00 19 00:00 03/10/19 17:01 03/10

/19 16:00











Physical Exam: 


General: on sedation/analgesia.  NAD


HEENT: Atraumatic, PERRLA, Mucous membr. moist/pink


Lungs: Clear to auscultation


Cardiovascular: Normal S1, Normal S2


Abdomen: Soft, No tenderness, No masses


Extremities: R toe amputation, chronic skin changes.  Ecchymosis in the medial 

and lateral aspect of upper thigh with notable hyperpigmentation noted today 


Neurological: unable to access optimally due to sedation/analgesia; nonfocal  





Fluid Balance (Past 24 Hours): 


I=     O=     Net 


 Intake & Output











 03/08/19 03/09/19 03/10/19 03/11/19





 05:59 05:59 06:59 06:59


 


Intake Total    


 


Output Total    


 


Balance    


 


Weight    


 


Intake:    


 


  IV Fluids    


 


    NS (0.9%)    


 


    Vancomycin    


 


    Zosyn    


 


  IVPB    


 


    Zosyn    


 


  Medicated IV    


 


    CC - Propofol/Diprivan    


 


  IV Narcotic Infusion    


 


    Fentanyl    


 


  Oral    


 


  Tube Feeding    


 


  Tube Feeding Flush Amount    


 


  NG Tube Irrigate Amount    


 


Output:    


 


  Urine    


 


  Alarcon    


 


Other:    


 


  Date of Last Bowel    3/10/19





  Movement    


 


  # Bowel Movements    1


 


  Estimated Stool Amount    Medium





 





ADLs: Meal  Record                                         Start:  19 21:

19


Freq:   DAILY@0900,1400,1800                               Status: Complete    

  


Protocol:                                                                      

  


 Created      19 21:19  System  (Rec: 19 21:19  System  TELE-M15)


 Document     19 09:00  [id number]  (Rec: 19 13:52  [id number]  TELE-C10)


 Document     19 14:00  RJC1095  (Rec: 19 15:02  [id number]  TELE-C10)


 Document     19 18:00  [id number]  (Rec: 19 19:27  [id number]  TELE-C10)


 Document     19 09:00  [id number]  (Rec: 19 14:44  [id number]  TELE-C10)


 Document     19 14:00  DYD6949  (Rec: 19 14:44  [id number]  TELE-C10)


 Document     19 18:00  LSF4417  (Rec: 19 19:43  [id number]  TELE-C10)


ADLs: Meal  Record                                         Start:  19 07:

16


Freq:   09,13,18                                           Status: Active      

  


Protocol:                                                                      

  


 Created      19 07:16  [id number]  (Rec: 19 07:16  [id number]  ICU-C12)


 Document     19 09:00  RLQ8991  (Rec: 19 09:36  [id number]  ICU-C07)


 Document     19 13:00  SIC9180  (Rec: 19 13:05  [id number]  ICU-C07)


 Document     19 18:00  YII7994  (Rec: 19 18:11  [id number]  ICU-C10)


Intake and Output                                          Start:  19 18:

19


Freq:                                                      Status: Complete    

  


Protocol:                                                                      

  


 Created      19 18:19  System  (Rec: 19 18:19  System  EDRM-C04)


Intake and Output                                          Start:  19 21:

19


Freq:   DAILY@0600,1400,2200                               Status: Complete    

  


Protocol:                                                                      

  


 Created      19 21:19  System  (Rec: 19 21:19  System  TELE-M15)


 Document     19 06:00  [id number]  (Rec: 19 06:47  WRW3931  TELE-C03)


 Document     19 14:00  SAF3358  (Rec: 19 15:02  IFB1291  TELE-C10)


 Document     19 22:00  ZVF1878  (Rec: 19 22:07  [id number]  TELE-C10)


 Document     19 05:58  MAX6298  (Rec: 19 05:59  [id number]  HOSP-C11)


 Document     19 14:00  NOP5897  (Rec: 19 14:46  RTY0760  TELE-C10)


 Document     19 22:00  KIG6841  (Rec: 19 22:28  [id number]  TELE-C09)


Intake and Output                                          Start:  19 07:

16


Freq:   QSHIFT                                             Status: Active      

  


Protocol:                                                                      

  


 Created      19 07:16  [id number]  (Rec: 19 07:16  [id number]  ICU-C12)


 Document     19 08:00  KZV3351  (Rec: 19 08:21  KOG7040  ICU-M35)


 Document     19 11:44  URM5801  (Rec: 19 11:44  [id number]  ICU-C07)


 Document     19 16:00  BCV5705  (Rec: 19 18:06  [id number]  ICU-C10)


 Document     19 00:00  JHM9252  (Rec: 19 00:48  WAW4984  ISDEMO-M03

)


 Document     19 06:07  YBC9480  (Rec: 19 06:07  [id number]  ISDEMO-M03

)


 Document     19 08:00  HTY2981  (Rec: 19 12:31  DPY8714  ICU-C16)


 Document     19 12:00  FYI0439  (Rec: 19 17:36  MYT3031  ICU-C16)


 Co-Sign      19 12:00  [id number]


 Document     19 16:00  MXS9375  (Rec: 19 18:03  OKV1631  ICU-C16)


 Document     19 21:15  CNN3960  (Rec: 03/10/19 03:56  [id number]  ICU-L03)


 Document     03/10/19 03:30  BXB9587  (Rec: 03/10/19 03:56  [id number]  ICU-L03)


 Document     03/10/19 08:00  WQF9699  (Rec: 03/10/19 11:39  [id number]  ICU-C16)








Labs: 


 Laboratory Results - last 24 hr











  19





  14:09 17:23 20:13


 


WBC   


 


RBC   


 


Hgb   


 


Hct   


 


MCV   


 


MCH   


 


MCHC   


 


RDW   


 


Plt Count   


 


MPV   


 


Neut % (Auto)   


 


Lymph % (Auto)   


 


Mono % (Auto)   


 


Eos % (Auto)   


 


Baso % (Auto)   


 


Absolute Neuts (auto)   


 


Absolute Lymphs (auto)   


 


Absolute Monos (auto)   


 


Absolute Eos (auto)   


 


Absolute Basos (auto)   


 


Absolute Nucleated RBC   


 


Nucleated RBC %   


 


ESR   


 


INR (Anticoag Therapy)   


 


Sodium   


 


Potassium   


 


Chloride   


 


Carbon Dioxide   


 


Anion Gap   


 


BUN   


 


Creatinine   


 


Est GFR ( Amer)   


 


Est GFR (Non-Af Amer)   


 


BUN/Creatinine Ratio   


 


Glucose   


 


POC Glucose (mg/dL)  218 H  129 H  153 H


 


Calcium   


 


Phosphorus   


 


Magnesium   


 


C-React Prot High Sens   


 


Random Vancomycin   














  03/10/19 03/10/19 03/10/19





  05:05 05:05 05:05


 


WBC   25.5 H 


 


RBC   3.21 L 


 


Hgb   9.0 L 


 


Hct   29 L 


 


MCV   90 


 


MCH   28 


 


MCHC   31 


 


RDW   17 H 


 


Plt Count   454 H 


 


MPV   8.8 


 


Neut % (Auto)   93.1 


 


Lymph % (Auto)   3.6 


 


Mono % (Auto)   2.7 


 


Eos % (Auto)   0.2 


 


Baso % (Auto)   0.4 


 


Absolute Neuts (auto)   23.7 H 


 


Absolute Lymphs (auto)   0.9 L 


 


Absolute Monos (auto)   0.7 


 


Absolute Eos (auto)   0.1 


 


Absolute Basos (auto)   0.1 


 


Absolute Nucleated RBC   0 


 


Nucleated RBC %   0.1 


 


ESR   


 


INR (Anticoag Therapy)    3.36 H


 


Sodium  145  


 


Potassium  2.9 L  


 


Chloride  101  


 


Carbon Dioxide  26  


 


Anion Gap  18 H  


 


BUN  42 H  


 


Creatinine  8.29 H  


 


Est GFR ( Amer)  6.5  


 


Est GFR (Non-Af Amer)  5.4  


 


BUN/Creatinine Ratio  5.1 L  


 


Glucose  237 H  


 


POC Glucose (mg/dL)   


 


Calcium  8.0 L  


 


Phosphorus  7.2 H  


 


Magnesium  1.8 L  


 


C-React Prot High Sens   


 


Random Vancomycin  Cancelled  














  03/10/19 03/10/19 03/10/19





  08:31 08:32 10:11


 


WBC   


 


RBC   


 


Hgb   


 


Hct   


 


MCV   


 


MCH   


 


MCHC   


 


RDW   


 


Plt Count   


 


MPV   


 


Neut % (Auto)   


 


Lymph % (Auto)   


 


Mono % (Auto)   


 


Eos % (Auto)   


 


Baso % (Auto)   


 


Absolute Neuts (auto)   


 


Absolute Lymphs (auto)   


 


Absolute Monos (auto)   


 


Absolute Eos (auto)   


 


Absolute Basos (auto)   


 


Absolute Nucleated RBC   


 


Nucleated RBC %   


 


ESR   


 


INR (Anticoag Therapy)   


 


Sodium   


 


Potassium   


 


Chloride   


 


Carbon Dioxide   


 


Anion Gap   


 


BUN   


 


Creatinine   


 


Est GFR ( Amer)   


 


Est GFR (Non-Af Amer)   


 


BUN/Creatinine Ratio   


 


Glucose   


 


POC Glucose (mg/dL)  176 H  179 H 


 


Calcium   


 


Phosphorus   


 


Magnesium   


 


C-React Prot High Sens   


 


Random Vancomycin    21.1














  03/10/19 03/10/19 03/10/19





  12:09 12:09 12:12


 


WBC   


 


RBC   


 


Hgb   


 


Hct   


 


MCV   


 


MCH   


 


MCHC   


 


RDW   


 


Plt Count   


 


MPV   


 


Neut % (Auto)   


 


Lymph % (Auto)   


 


Mono % (Auto)   


 


Eos % (Auto)   


 


Baso % (Auto)   


 


Absolute Neuts (auto)   


 


Absolute Lymphs (auto)   


 


Absolute Monos (auto)   


 


Absolute Eos (auto)   


 


Absolute Basos (auto)   


 


Absolute Nucleated RBC   


 


Nucleated RBC %   


 


ESR   126 H 


 


INR (Anticoag Therapy)   


 


Sodium   


 


Potassium   


 


Chloride   


 


Carbon Dioxide   


 


Anion Gap   


 


BUN   


 


Creatinine   


 


Est GFR ( Amer)   


 


Est GFR (Non-Af Amer)   


 


BUN/Creatinine Ratio   


 


Glucose   


 


POC Glucose (mg/dL)    169 H


 


Calcium   


 


Phosphorus   


 


Magnesium   


 


C-React Prot High Sens  201.62 H  


 


Random Vancomycin   











Studies: 


Patient Name:         MAKAYLA RUBIN                                         

                        Medical Record#: [id number]


Ordering Physician: Tj Leonardo MD                                           

                        Acct.#: [id number]


:     1980         Age: 38   Sex: F                                   

                        Location: INTENSIVE CARE UNIT


Exam Date: 03/10/19 0700                                                       

                        ADM Status: ADM IN


Order Information:                         CHEST AP OR PORT


Accession Number:                          [id number]


CPT:                                       [id number]


Indication: Acute. COPD.





Comparison: 2019





Technique: Upright AP 0555 hours





Report: Tip of the endotracheal tube 3.6 cm above the Merna. Nasogastric tube 

passes to


the stomach and outside the field-of-view caudally. Mild prominence of 

interstitial


markings and patchy pulmonary alveolar opacity most prominent at the LEFT mid 

to lower


lung zone and less prominent at the RIGHT lung base. Grossly clear pleural 

spaces.


Negative for pneumothorax.  Mild cardiomegaly. Unremarkable central pulmonary 

vasculature.








IMPRESSION: 


#. Bilateral pulmonary opacities which which are most suspicious for 

atelectasis given


relative volume loss compared with the 2019 exam. Inflammatory 

infiltrates also


possible.





R1F








Preliminary Imaging Read 


R1F                                                                        





____________________________________________________________


<Electronically signed by Kunal Proctor MD in OV>  03/10/19 0843


Dictated By: Kunal Proctor MD


Dictated Date/Time: 03/10/19 0843


Transcribed Date/Time: 03/10/19 0840


Copy to:











CC:Kenneth Felton MD; Tj Leonardo MD; Abeba Hein DO; Deandre Shelby MD; 

Yeni Shanks MD


Imaging - [facility]                                 Imaging - [facility] 

Care                                     [facility] 


[address]                                       [address] [place] [address] [address] [address]


ph ([phone])                                     ph ([phone])        

                                        ph ([phone]) 








This report is only to be considered final once signed by the Provider(s) as 

displayed in the "<Electronically Signed by >" field (s). Absence of a 


signature indicates the report is in a draft status and still needs to be 

finalized. In the event this document was created by someone other than the 


signing Provider, the individual initiating the document will be listed in the 

"Entered by:" or "Dictated by:" fields.


                                                                 [address] [place] [address] IMAGING


Patient Name:MAKAYLA RUBIN                                                  

                             MR:[id number]                                     

                     : 1980


Patient Name:                    MAKAYLA RUBIN                              

                                                                            

Medical Record#: [id number]


Ordering Physician: Rosana Jolley MD                                          

                                                                            

Acct.#: [id number]


:         1980                    Age: 38   Sex: F                    

                                                                            

Location: [address] [place] [address]/TELEMETRY


Exam Date: 19 154                                                       

                                                                            ADM 

Status: ADM IN


Order Information:                                               CTA ABD AORTA 

& RUNOFF


Accession Number:                                                [id number]


CPT:                                                             [id number]


EXAM: 


 CT Bilateral Angiogram of the Abdominal Aorta and Bilateral Lower Extremities 


(Run-off) With IV Contrast 





EXAM DATE/TIME: 


 3/7/2019 9:49 PM 





CLINICAL HISTORY: 


 38 years old, female; Screening exam; Purpose: R/O stenosis 





TECHNIQUE: 


 Bilateral CT angiogram of the abdominal aorta, pelvis and bilateral lower 


extremities with IV iodinated contrast. 


 All CT scans at this facility use at least one of these dose optimization 


techniques: automated exposure control; mA and/or kV adjustment per patient 


size (includes targeted exams where dose is matched to clinical indication); or 


iterative reconstruction. 


 Coronal and sagittal reformatted images were created and reviewed. 


 MIP reconstructed images were created and reviewed. 





CONTRAST: 


 Contrast Material: 125 ml of visipaque 320; Contrast Route: i.v 





COMPARISON: 


 A/P WO CT ABD/PEL W/O 2018 12:19 PM 





FINDINGS: 


 Tubes, catheters and devices:  Peritoneal dialysis catheter coils in the mid 


pelvis. 


 Aorta:  Moderate spare aortic atherosclerotic calcifications. No aortic 


dissection, aneurysm, or rupture. 


 Celiac trunk and mesenteric arteries:  Moderately atherosclerotic celiac, SMA, 


and TYLOR remain patent. 


 Renal arteries:  Severely stenotic right renal artery at the origin with 


continued distal flow. Moderately stenotic left renal artery proximally with 


continued distal flow. 


 Right iliac arteries:  Moderately atherosclerotic right common iliac artery 


causing mild stenosis. Mild atherosclerotic right external iliac artery without 


stenosis. 


 Right femoral/popliteal arteries:  Moderately atherosclerotic right common 


femoral artery causing moderate to severe stenosis. Right superficial femoral 


artery stent of the entire course which is patent. Moderately atherosclerotic 


right popliteal artery causing severe stenosis distally. 


 Right infrapopliteal arteries:  Severely atherosclerotic right anterior 


tibial, peroneal, and posterior tibial arteries with continued vascular flow 


into the foot. 


 Left iliac arteries:  Mildly atherosclerotic left common iliac artery causing 


no stenosis. Minimally atherosclerotic left external iliac artery without 


stenosis. 


 Left femoral/popliteal arteries:  Moderately atherosclerotic left common 


femoral artery causing mild stenosis. Left superficial femoral artery stent 


This report is only to be considered final once signed by the Provider(s) as 

displayed in the "<Electronically Signed by >" field (s). Absence of a 


signature indicates the report is in a draft status and still needs to be 

finalized. In the event this document was created by someone other than the 


signing Provider, the individual initiating the document will be listed in the 

"Entered by:" or "Dictated by:" fields.


                                                                               

                   1 of 2











throughout its entire course with near-complete occlusion distally in the 


adductor hiatus. Severely atherosclerotic left popliteal artery in the mid 


aspect. Additional left popliteal artery severe stenosis distally prior to the 


trifurcation. 


 Left infrapopliteal arteries: Severely atherosclerotic left anterior tibial, 


peroneal, and posterior tibia arteries with dissipated posterior tibial artery 


in the mid shin. Continued anterior tibial and peroneal flow in the foot. 


   


 Lower thorax:  There is mild atherosclerotic calcification of the coronary 


arteries. 


 Liver: No mass. 


 Gallbladder and bile ducts: Normal. No calcified stones. No ductal dilation.  


 Pancreas: Normal. No mass. No ductal dilation.  


 Spleen: Normal. No splenomegaly.  


 Adrenals: Normal. No mass.  


 Kidneys and ureters: Normal. No mass.  


 Stomach and bowel: Normal. No obstruction. No mucosal thickening.  


 Appendix: No evidence of appendicitis.  


 Bladder:  The bladder is decompressed but otherwise normal. 


 Reproductive:  Uterus and ovaries are normal. 


 Intraperitoneal space:  Small volume ascites. 


 Lymph nodes: No lymphadenopathy. 


 Bones/joints: No acute fracture. No dislocation.  


 Soft tissues: Normal. No hernias. 





IMPRESSION: 


1. Moderate severe right common femoral artery stenosis. 


2. Severe bilateral popliteal artery stenosis, with a single left and 2 right 


areas of stenosis. 


3. Near-complete occlusion distal left superficial femoral artery stent. 


4. Findings secondary to peritoneal dialysis. 


5. Severely stenotic right renal artery. 





To contact [facility] with a general question: [facility] - [phone]


For direct physician to physician contact: Physician Hotline - [phone]


[facility] ([facility] Facility ID #[id number])





____________________________________________________________


<Electronically signed by Precious Saleh MD in OV>  19


Dictated By: Precious Saleh MD


Dictated Date/Time: 19


Transcribed Date/Time:  


Copy to:











CC:Kenneth Felton MD; Abeba Hein DO; Rosana Jolley MD; Yeni Shanks MD


Imaging - [facility]                                                          

  Imaging - [facility]                                                 

                     Imaging [facility] 


[address]                                                                

  [address] [address]


ph ([phone])                                                              

  ph ([phone])                                                            

                     ph ([phone]) 

















This report is only to be considered final once signed by the Provider(s) as 

displayed in the "<Electronically Signed by >" field (s). Absence of a 


signature indicates the report is in a draft status and still needs to be 

finalized. In the event this document was created by someone other than the 


signing Provider, the individual initiating the document will be listed in the 

"Entered by:" or "Dictated by:" fields.


                                                                               

                   2 of 2








Nutrition: 


TF- promote 








Impression: 


39 yo F with multiple complicated comorbidities including ESRD on PD, CAD s/p 

stents, PVD s/p stents transferred to the ICU after requiring emergent 

intubation 








#  Acute encephalopathy


#  Acute hypoxemic respiratory failure 


#  acute ventilator dependance 


#  Sepsis 


#  leukocytosis 26 with neutrophilic predominance 


#  Stable anemia 


#  Hypercoagulable state INR 5.36-->4.42


#  ESRD on PD 


#  Hypokalemia, hypomagnesemia 


#  Troponin leak- improving levels


#  Sacral ulcer  


# chronic pain on methadone 


#  HTN 


#  New hypokinesis of LV (lateral) with EF 50-55%. 


#  diastolic dysfunction


#  RV dysfunction 


#  AV sclerosis 


#  moderate tricuspid regurgitation


#  PH (42 mmHg on TTE)


#  S1 impigment


#  PVD s/p stents 


# CTA: Moderate severe right common femoral artery stenosis. Severe bilateral 

popliteal artery stenosis, with a single left and 2 right 


areas of stenosis.  Near-complete occlusion distal left superficial femoral 

artery stent. 


Plan: 





- Bcx   NTD


- Since leukocytosis is persistent >20, will repeat BCx today.  Suspect this 

WBC elevation could be non-infectious and could be related to her LE PVD acute 

on chronic phenomenon.  No concern for OM in L/S-spine based on CT spine 


- Discontinue vancomycin for now 


- Continue zosyn for 5 total days of tx if cultures remain negative.  Otherwise

, tailor according to cultures from today


- S/p peritoneal  fluid analysis.  Grossly noninfectious.  Will await final 

cultures


- current vent setting Vt 400, PEEP 10


- vent/oxygenation optimized on current vent settings.  Note that SPO2 is much 

lower than normal values of SaO2(ABG)


- Hemodynamically stable 


- hold methadone for now.  Fentanyl gtt.  Started enteral oxycodone  


- sedation with propofol to maintain RASS 0- -1, now on hold.  Consider 

transition to precedex 


- Elevated INR likely due to warfarin.  Hold warfarin for now.  Restart 

Warfarin once INR <3


- discontinue bethanochol while alarcon is in place


- hold antihypertensives


- KCL and MgSO2 replacement IV 


- re-check K+ pending for tonight 


- May need intervention for the occlusion in the LSFA 


- daily SBT/SWT 





DVT ppx: INR >3


GI ppx: protonix 





CRITICAL CARE NEEDS: acute ventilator dependence, AMS





Critical care time 45 minutes





Full code


Prognosis guarded

## 2019-03-11 LAB
ANION GAP SERPL CALC-SCNC: 15 MMOL/L (ref 2–11)
BUN SERPL-MCNC: 45 MG/DL (ref 6–24)
BUN/CREAT SERPL: 5.5 (ref 8–20)
CALCIUM SERPL-MCNC: 9 MG/DL (ref 8.6–10.3)
CHLORIDE SERPL-SCNC: 103 MMOL/L (ref 101–111)
GLUCOSE SERPL-MCNC: 219 MG/DL (ref 70–100)
HCO3 SERPL-SCNC: 27 MMOL/L (ref 22–32)
HCT VFR BLD AUTO: 28 % (ref 35–47)
HGB BLD-MCNC: 8.6 G/DL (ref 12–16)
INR PPP/BLD: 2.77 (ref 0.77–1.02)
MAGNESIUM SERPL-MCNC: 2.2 MG/DL (ref 1.9–2.7)
MCH RBC QN AUTO: 28 PG (ref 27–31)
MCHC RBC AUTO-ENTMCNC: 31 G/DL (ref 31–36)
MCV RBC AUTO: 91 FL (ref 80–97)
PLATELET # BLD AUTO: 423 10^3/UL (ref 150–450)
PORPHYRINS RBC-MCNC: 47 MCG/DL (ref ?–80)
POTASSIUM SERPL-SCNC: 3.6 MMOL/L (ref 3.5–5)
RBC # BLD AUTO: 3.05 10^6/UL (ref 4–5.4)
SODIUM SERPL-SCNC: 145 MMOL/L (ref 135–145)
WBC # BLD AUTO: 22.5 10^3/UL (ref 3.5–10.8)

## 2019-03-11 RX ADMIN — ALBUTEROL SULFATE PRN MG: 2.5 SOLUTION RESPIRATORY (INHALATION) at 12:41

## 2019-03-11 RX ADMIN — INSULIN GLARGINE SCH UNITS: 100 INJECTION, SOLUTION SUBCUTANEOUS at 22:03

## 2019-03-11 RX ADMIN — PIPERACILLIN AND TAZOBACTAM SCH MLS/HR: 3; .375 INJECTION, POWDER, LYOPHILIZED, FOR SOLUTION INTRAVENOUS; PARENTERAL at 19:43

## 2019-03-11 RX ADMIN — CALCITRIOL SCH MCG: 1 SOLUTION ORAL at 00:41

## 2019-03-11 RX ADMIN — INSULIN LISPRO SCH: 100 INJECTION, SOLUTION INTRAVENOUS; SUBCUTANEOUS at 22:05

## 2019-03-11 RX ADMIN — SERTRALINE HYDROCHLORIDE SCH MG: 25 TABLET ORAL at 11:18

## 2019-03-11 RX ADMIN — PIPERACILLIN AND TAZOBACTAM SCH MLS/HR: 3; .375 INJECTION, POWDER, LYOPHILIZED, FOR SOLUTION INTRAVENOUS; PARENTERAL at 07:55

## 2019-03-11 RX ADMIN — ASPIRIN SCH MG: 81 TABLET, COATED ORAL at 11:18

## 2019-03-11 RX ADMIN — INSULIN LISPRO SCH UNITS: 100 INJECTION, SOLUTION INTRAVENOUS; SUBCUTANEOUS at 13:19

## 2019-03-11 RX ADMIN — MORPHINE SULFATE PRN MG: 4 INJECTION INTRAVENOUS at 22:05

## 2019-03-11 RX ADMIN — GABAPENTIN SCH MG: 100 CAPSULE ORAL at 00:41

## 2019-03-11 RX ADMIN — GABAPENTIN SCH: 100 CAPSULE ORAL at 15:41

## 2019-03-11 RX ADMIN — CALCITRIOL SCH MCG: 1 SOLUTION ORAL at 11:18

## 2019-03-11 RX ADMIN — Medication SCH NOTE: at 18:00

## 2019-03-11 RX ADMIN — INSULIN GLARGINE SCH UNITS: 100 INJECTION, SOLUTION SUBCUTANEOUS at 22:43

## 2019-03-11 RX ADMIN — GABAPENTIN SCH MG: 100 CAPSULE ORAL at 22:04

## 2019-03-11 RX ADMIN — ALBUTEROL SULFATE PRN MG: 2.5 SOLUTION RESPIRATORY (INHALATION) at 19:55

## 2019-03-11 RX ADMIN — INSULIN LISPRO SCH UNITS: 100 INJECTION, SOLUTION INTRAVENOUS; SUBCUTANEOUS at 07:53

## 2019-03-11 RX ADMIN — FENTANYL CITRATE SCH MLS/HR: 50 INJECTION INTRAVENOUS at 02:46

## 2019-03-11 RX ADMIN — GABAPENTIN SCH MG: 100 CAPSULE ORAL at 11:19

## 2019-03-11 RX ADMIN — ONDANSETRON PRN MG: 2 INJECTION INTRAMUSCULAR; INTRAVENOUS at 17:21

## 2019-03-11 RX ADMIN — CALCITRIOL SCH MCG: 1 SOLUTION ORAL at 22:03

## 2019-03-11 RX ADMIN — CHLORHEXIDINE GLUCONATE 0.12% ORAL RINSE SCH ML: 1.2 LIQUID ORAL at 02:46

## 2019-03-11 RX ADMIN — LOSARTAN POTASSIUM SCH MG: 25 TABLET, FILM COATED ORAL at 23:16

## 2019-03-11 RX ADMIN — CALCITRIOL SCH: 1 SOLUTION ORAL at 15:41

## 2019-03-11 RX ADMIN — POTASSIUM CHLORIDE SCH MLS/HR: 200 INJECTION, SOLUTION INTRAVENOUS at 00:56

## 2019-03-11 RX ADMIN — FENTANYL CITRATE SCH: 50 INJECTION INTRAVENOUS at 12:24

## 2019-03-11 RX ADMIN — OXYCODONE HYDROCHLORIDE SCH: 5 SOLUTION ORAL at 13:01

## 2019-03-11 RX ADMIN — INSULIN LISPRO SCH UNITS: 100 INJECTION, SOLUTION INTRAVENOUS; SUBCUTANEOUS at 18:00

## 2019-03-11 RX ADMIN — MOMETASONE FUROATE AND FORMOTEROL FUMARATE DIHYDRATE SCH: 200; 5 AEROSOL RESPIRATORY (INHALATION) at 19:38

## 2019-03-11 RX ADMIN — OXYCODONE HYDROCHLORIDE SCH MG: 5 SOLUTION ORAL at 17:11

## 2019-03-11 RX ADMIN — OXYCODONE HYDROCHLORIDE SCH MG: 5 SOLUTION ORAL at 06:20

## 2019-03-11 RX ADMIN — CHLORHEXIDINE GLUCONATE 0.12% ORAL RINSE SCH ML: 1.2 LIQUID ORAL at 07:53

## 2019-03-11 RX ADMIN — MOMETASONE FUROATE AND FORMOTEROL FUMARATE DIHYDRATE SCH: 200; 5 AEROSOL RESPIRATORY (INHALATION) at 07:09

## 2019-03-11 RX ADMIN — CHLORHEXIDINE GLUCONATE 0.12% ORAL RINSE SCH ML: 1.2 LIQUID ORAL at 11:19

## 2019-03-11 RX ADMIN — OXYCODONE HYDROCHLORIDE SCH MG: 5 SOLUTION ORAL at 00:41

## 2019-03-11 RX ADMIN — CHLORHEXIDINE GLUCONATE 0.12% ORAL RINSE SCH: 1.2 LIQUID ORAL at 15:41

## 2019-03-11 NOTE — PN
Progress Note





- Progress Note


Date of Service: 03/11/19


Note: 





Progress Note -- Critical Care





24 hour events/significant events: 


-no events overnight


-remains intubated; on fentanyl infusion


-awake, alert, follows commands, no distress noted


-not on pressors, hemodyn stable, NSR


-complaints of pain by patient, pointing to LE


-boyfriend at bedside


-minimal secretions noted from ETT





Tele: NSR





Vitals: 


 Vital Signs











Temp  96.9 F   03/11/19 04:00


 


Pulse  96   03/11/19 08:01


 


Resp  13   03/11/19 08:00


 


BP  95/60   03/09/19 00:00


 


Pulse Ox  100   03/11/19 08:01








 Intake & Output











 03/10/19 03/11/19 03/11/19





 18:59 06:59 18:59


 


Intake Total 15 1538 


 


Balance 15 1538 


 


Weight  84.7 kg 


 


Intake:   


 


  IV Fluids 15 252 


 


    NS (0.9%) 15 197 


 


    Zosyn  55 


 


  IVPB  542 


 


    NS (0.9%)  444 


 


    Zosyn  98 


 


  IV Narcotic Infusion  48 


 


    Fentanyl  48 


 


  Tube Feeding  636 


 


  Tube Feeding Flush Amount  60 


 


Other:   


 


  Date of Last Bowel 3/10/19  





  Movement   


 


  # Bowel Movements 1  


 


  Estimated Stool Amount Medium  











O2/Vent: SIMV 55% fio2





Infusions: fentanyl 125mcg/hr





Medications: 


Acetaminophen (Tylenol Tab*)  650 mg PO Q6H PRN


   PRN Reason: PAIN


Albuterol (Ventolin 2.5 Mg/3 Ml Neb.Sol*)  2.5 mg INH Q4HR PRN


   PRN Reason: SOB/WHEEZING


   Last Admin: 03/07/19 19:17 Dose:  2.5 mg


Albuterol (Ventolin Hfa Inhaler*)  2 puff INH Q6H PRN


   PRN Reason: COUGH


Aspirin (Aspirin Ec Tab*)  81 mg PO DAILY [facility]


   Last Admin: 03/10/19 09:12 Dose:  81 mg


Calcitriol (Calcitriol Soln 1 Mcg/Ml Oralsyr)  0.25 mcg FEED TUBE TID [facility]


   Last Admin: 03/11/19 00:41 Dose:  0.25 mcg


Chlorhexidine Gluconate (Peridex Mouth Wash 0.12%*)  15 ml TOPICAL Q4H [facility]


   Last Admin: 03/11/19 07:53 Dose:  15 ml


Dextrose (D50w Syringe 50 Ml*)  12.5 gm IV PUSH .FOR FS < 60 - SS PRN


   PRN Reason: FS < 60


Docusate Sodium (Colace Liq*)  100 mg G TUBE TID PRN


   PRN Reason: CONSTIPATION


Gabapentin (Neurontin Cap(*))  200 mg PO TID [facility]


   Last Admin: 03/11/19 00:41 Dose:  200 mg


Piperacillin Sod/Tazobactam (Sod 3.375 gm/ Sodium Chloride)  100 mls @ 25 mls/

hr IVPB Q12H [facility]


   Last Admin: 03/11/19 07:55 Dose:  25 mls/hr


Fentanyl Citrate (Fentanyl Infusion Bag 50 Mcg/Ml 50 Ml)  2,500 mcg in 50 mls @ 

0.5 mls/hr IV Q24H [facility]; Protocol


   Last Admin: 03/11/19 02:46 Dose:  0.5 mls/hr


Insulin Glargine (Lantus(*))  70 units SUBCUT BEDTIME [facility]


   Last Admin: 03/10/19 20:55 Dose:  70 units


Insulin Human Lispro (Humalog*)  0 units SUBCUT ACHS [facility]; Protocol


   Last Admin: 03/11/19 07:53 Dose:  6 units


Lansoprazole (Lansoprazole Susp* Oralsyr)  30 mg FEED TUBE QPM [facility]


   Last Admin: 03/10/19 18:40 Dose:  Not Given


Mometasone Furoate/Formoterol Fumar (Dulera 200/5 Mdi*)  2 puff INH BID [facility]; 

Protocol


   Last Admin: 03/11/19 07:09 Dose:  Not Given


Ondansetron HCl (Zofran Inj*)  4 mg IV Q4H PRN


   PRN Reason: NAUSEA/VOMITING


   Last Admin: 03/06/19 13:07 Dose:  4 mg


Ondansetron HCl (Zofran Inj*)  4 mg IV Q6H PRN


   PRN Reason: NAUSEA


Oxycodone HCl (Oxycodone Oral.Soln*)  10 mg PO Q6HR [facility]


   Last Admin: 03/11/19 06:20 Dose:  10 mg


Pharmacy Consult (Zosyn Per Pharmacy*)  1 note FOLLOW UP .ZOSYN PER PHARMACY [facility]


Pharmacy Profile Note (Coumadin Per Pharmacy*)  1 note FOLLOW UP 1700 [facility]


   Last Admin: 03/10/19 18:40 Dose:  1 note


Promethazine HCl (Phenergan Tab*)  25 mg PO Q8H PRN


   PRN Reason: NAUSEA


Senna (Senokot Tab*)  1 tab G TUBE BID PRN


   PRN Reason: CONSTIPATION


Sertraline HCl (Zoloft*)  25 mg PO DAILY [facility]


   Last Admin: 03/10/19 09:12 Dose:  25 mg








Physical Exam:


General: intubated, awake/alert, no diaphoresis


Head: normocephalic, atraumatic


HEENT: no pallor, no icterus, moist mucous membranes


Neck: soft, supple, no jvd


CVS: normal rate, regular, no murmur


Resp: bilateral air entry, no Rhales, mild scattered rhonchi+, no acc muscle use


Abdomen: soft, nontender, nondistended, BS+; PD cathetor+


Ext: pulses mby doppler+, warm; thigh induration and redness noted bialterally


Skin: intact


Neuro: awake, alert, moving all extremities, no gross focal deficit





Labs: 


 Laboratory Results - last 24 hr











  03/08/19 03/10/19 03/10/19





  13:00 12:09 12:09


 


WBC   


 


RBC   


 


Hgb   


 


Hct   


 


MCV   


 


MCH   


 


MCHC   


 


RDW   


 


Plt Count   


 


MPV   


 


ESR    126 H


 


INR (Anticoag Therapy)   


 


ABG pH   


 


ABG pCO2   


 


ABG pO2   


 


ABG HCO3   


 


ABG O2 Saturation   


 


ABG Base Excess   


 


Sodium   


 


Potassium   


 


Chloride   


 


Carbon Dioxide   


 


Anion Gap   


 


BUN   


 


Creatinine   


 


Est GFR ( Amer)   


 


Est GFR (Non-Af Amer)   


 


BUN/Creatinine Ratio   


 


Glucose   


 


POC Glucose (mg/dL)   


 


Calcium   


 


Phosphorus   


 


Magnesium   


 


C-React Prot High Sens   201.62 H 


 


Fluid Cell Count Rvw By    


 


Random Vancomycin   














  03/10/19 03/10/19 03/10/19





  12:12 18:15 20:20


 


WBC   


 


RBC   


 


Hgb   


 


Hct   


 


MCV   


 


MCH   


 


MCHC   


 


RDW   


 


Plt Count   


 


MPV   


 


ESR   


 


INR (Anticoag Therapy)   


 


ABG pH   


 


ABG pCO2   


 


ABG pO2   


 


ABG HCO3   


 


ABG O2 Saturation   


 


ABG Base Excess   


 


Sodium   


 


Potassium   


 


Chloride   


 


Carbon Dioxide   


 


Anion Gap   


 


BUN   


 


Creatinine   


 


Est GFR ( Amer)   


 


Est GFR (Non-Af Amer)   


 


BUN/Creatinine Ratio   


 


Glucose   


 


POC Glucose (mg/dL)  169 H  92  189 H


 


Calcium   


 


Phosphorus   


 


Magnesium   


 


C-React Prot High Sens   


 


Fluid Cell Count Rvw By   


 


Random Vancomycin   














  03/10/19 03/11/19 03/11/19





  20:21 04:35 04:35


 


WBC   


 


RBC   


 


Hgb   


 


Hct   


 


MCV   


 


MCH   


 


MCHC   


 


RDW   


 


Plt Count   


 


MPV   


 


ESR   


 


INR (Anticoag Therapy)   


 


ABG pH   


 


ABG pCO2   


 


ABG pO2   


 


ABG HCO3   


 


ABG O2 Saturation   


 


ABG Base Excess   


 


Sodium    145


 


Potassium  3.2 L   3.6


 


Chloride    103


 


Carbon Dioxide    27


 


Anion Gap    15 H


 


BUN    45 H


 


Creatinine    8.12 H


 


Est GFR ( Amer)    6.7


 


Est GFR (Non-Af Amer)    5.5


 


BUN/Creatinine Ratio    5.5 L


 


Glucose    219 H


 


POC Glucose (mg/dL)   


 


Calcium    9.0


 


Phosphorus   6.7 H 


 


Magnesium   2.2 


 


C-React Prot High Sens   


 


Fluid Cell Count Rvw By   


 


Random Vancomycin    19.6














  03/11/19 03/11/19 03/11/19





  04:35 09:54 09:54


 


WBC   22.5 H 


 


RBC   3.05 L 


 


Hgb   8.6 L 


 


Hct   28 L 


 


MCV   91 


 


MCH   28 


 


MCHC   31 


 


RDW   17 H 


 


Plt Count   423 


 


MPV   8.9 


 


ESR   


 


INR (Anticoag Therapy)  2.77 H  


 


ABG pH    7.34 L


 


ABG pCO2    52 H


 


ABG pO2    86


 


ABG HCO3    25.9


 


ABG O2 Saturation    96.6


 


ABG Base Excess    1.4


 


Sodium   


 


Potassium   


 


Chloride   


 


Carbon Dioxide   


 


Anion Gap   


 


BUN   


 


Creatinine   


 


Est GFR ( Amer)   


 


Est GFR (Non-Af Amer)   


 


BUN/Creatinine Ratio   


 


Glucose   


 


POC Glucose (mg/dL)   


 


Calcium   


 


Phosphorus   


 


Magnesium   


 


C-React Prot High Sens   


 


Fluid Cell Count Rvw By   


 


Random Vancomycin   














Imaging: 


cxr 3/11 - ett above jose, no sig infiltrate/congestion noted








Assessment: 38y F w/pmhx of DM, Hypothyroid?, HTN, CAD?, DVT, PVD s/p stents to 

b/l LE, COPD on 5L home o2, GERD/Gastroparesis, ESRD on PD,  active smoker, h/o 

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 

after complaints of nausea/vomiting, abd pain, left leg pain+. Intubated for 

hypoxic respiratory failure 3/8, suspected to be from pneumonia and pulmonary 

congestion/CHF exacerbation.





-Acute  on chronic hypercapneic and hypoxic resp failure, intubated 3/8


-Possible pulmonary congestion


-ESRD on PD


-CAD


-Severe PVD


-Metabolic acidosis


-Hyperkalemia


-H/o HIT on warfarin


-DM


COPD, on home o2


Hypothyroidism











Plan:


Neuro- awake, alert. 


-cont fentanyl, dec to 100mcg/hr before extubation; cont oxycodone 10mg q6h for 

pain


-chronic pain, will need methadone restarted again





CVS- BP stable, HR stable


-CXR improved congestion


-PD dialy ongoing


-Hg stable


-IV abx for pna?





Resp- intubated, on 55%. on SIMV, tolerating well. CPAP trial started now, 

doing well, no distress.


-ABG reviewed, previous ABGs with hypercapnea also


-CXR 3/11 no sig congestion/infltrates


-secretions minimal


-CPAP now, if tolerating, check NIF/VC for feasability of extubation


-no growth on cultures


-cont VAP bundle, asp prec


-Bronchodilators as needed





ID- afebrile. wbc 22, dec today. urien culture with VRE , similar to previous 

urine culture in february. No tx given. currently nontoxic. unclear if she is a 

colonizer at this time. holding abx. no other source of infection noted. 


-zosyn day 6 now, will cont 1 more day. d/c vanco.





GI- TF via OGT tolerating; change to GLucerna @ goal 65cc/hr. Hold feeds for 

weaning at this time. GI proph with ppi.





Renal- ESRD on PD. K okay, no acidosis. Cont as per renal, nephrology 

following. Seems volume status better.





Heme- hg stable. plt stable. INR 2-3, therapuetic. Cont warfarin for HIT. no 

bleeding noted.





Endo-Maintain BG<200, insulin protocol. Has not been on synthroid, not in home 

meds either.





Musculsk- pressure ulcer prophylaxis. Bedrest.


-noted severe PVD by studies. dopplerable pulses+, warm ext, no signs of 

ischemia distally. Previous intensivist ahs reached out to [facility], no 

acute need for transfer given known severe PVD. will need workup and management 

once stabilized from current medical situation.





Wounds- none


Nutrition- OGT feeds, changed to glucerna now





DVT prophylaxis: warfarin


GI prophylaxis: ppi


Central Line: yes


Arterial Line: yes


Quintanilla Cathetor: no





Disposition:  Patient requires Critical Care/ICU for respiratory failure





Patient clinical status: stable, critical





Code Status: full code





Total Critical Care time is 40 minutes, excluding procedures/teaching





Ronnie Valenzuela MD


Intensivist


(Electronically Signed)

## 2019-03-12 LAB
ANION GAP SERPL CALC-SCNC: 14 MMOL/L (ref 2–11)
BUN SERPL-MCNC: 42 MG/DL (ref 6–24)
BUN/CREAT SERPL: 5.5 (ref 8–20)
CALCIUM SERPL-MCNC: 8.4 MG/DL (ref 8.6–10.3)
CHLORIDE SERPL-SCNC: 100 MMOL/L (ref 101–111)
GLUCOSE SERPL-MCNC: 143 MG/DL (ref 70–100)
HCO3 SERPL-SCNC: 25 MMOL/L (ref 22–32)
HCT VFR BLD AUTO: 27 % (ref 35–47)
HGB BLD-MCNC: 8.1 G/DL (ref 12–16)
INR PPP/BLD: 3.46 (ref 0.77–1.02)
MCH RBC QN AUTO: 27 PG (ref 27–31)
MCHC RBC AUTO-ENTMCNC: 30 G/DL (ref 31–36)
MCV RBC AUTO: 92 FL (ref 80–97)
PLATELET # BLD AUTO: 383 10^3/UL (ref 150–450)
POTASSIUM SERPL-SCNC: 3.4 MMOL/L (ref 3.5–5)
RBC # BLD AUTO: 2.95 10^6/UL (ref 4–5.4)
SODIUM SERPL-SCNC: 139 MMOL/L (ref 135–145)
WBC # BLD AUTO: 20.9 10^3/UL (ref 3.5–10.8)

## 2019-03-12 RX ADMIN — ASPIRIN SCH MG: 81 TABLET, COATED ORAL at 07:57

## 2019-03-12 RX ADMIN — GABAPENTIN SCH MG: 100 CAPSULE ORAL at 21:01

## 2019-03-12 RX ADMIN — GABAPENTIN SCH MG: 100 CAPSULE ORAL at 07:57

## 2019-03-12 RX ADMIN — OXYCODONE HYDROCHLORIDE SCH MG: 5 SOLUTION ORAL at 17:42

## 2019-03-12 RX ADMIN — LOSARTAN POTASSIUM SCH MG: 25 TABLET, FILM COATED ORAL at 07:57

## 2019-03-12 RX ADMIN — OXYCODONE HYDROCHLORIDE SCH MG: 5 SOLUTION ORAL at 23:54

## 2019-03-12 RX ADMIN — INSULIN LISPRO SCH: 100 INJECTION, SOLUTION INTRAVENOUS; SUBCUTANEOUS at 21:05

## 2019-03-12 RX ADMIN — OXYCODONE HYDROCHLORIDE SCH MG: 5 SOLUTION ORAL at 11:19

## 2019-03-12 RX ADMIN — SERTRALINE HYDROCHLORIDE SCH MG: 25 TABLET ORAL at 07:58

## 2019-03-12 RX ADMIN — CALCITRIOL SCH MCG: 1 SOLUTION ORAL at 21:54

## 2019-03-12 RX ADMIN — INSULIN LISPRO SCH: 100 INJECTION, SOLUTION INTRAVENOUS; SUBCUTANEOUS at 17:23

## 2019-03-12 RX ADMIN — PIPERACILLIN AND TAZOBACTAM SCH MLS/HR: 3; .375 INJECTION, POWDER, LYOPHILIZED, FOR SOLUTION INTRAVENOUS; PARENTERAL at 07:52

## 2019-03-12 RX ADMIN — MOMETASONE FUROATE AND FORMOTEROL FUMARATE DIHYDRATE SCH PUFF: 200; 5 AEROSOL RESPIRATORY (INHALATION) at 20:07

## 2019-03-12 RX ADMIN — GABAPENTIN SCH MG: 100 CAPSULE ORAL at 14:11

## 2019-03-12 RX ADMIN — METHADONE HYDROCHLORIDE PRN MG: 5 TABLET ORAL at 17:43

## 2019-03-12 RX ADMIN — INSULIN LISPRO SCH: 100 INJECTION, SOLUTION INTRAVENOUS; SUBCUTANEOUS at 11:19

## 2019-03-12 RX ADMIN — PANTOPRAZOLE SODIUM SCH MG: 40 TABLET, DELAYED RELEASE ORAL at 07:57

## 2019-03-12 RX ADMIN — CALCITRIOL SCH: 1 SOLUTION ORAL at 07:58

## 2019-03-12 RX ADMIN — CALCITRIOL SCH MCG: 1 SOLUTION ORAL at 16:11

## 2019-03-12 RX ADMIN — ALUMINUM HYDROXIDE, MAGNESIUM HYDROXIDE, AND SIMETHICONE PRN ML: 200; 200; 20 SUSPENSION ORAL at 06:07

## 2019-03-12 RX ADMIN — OXYCODONE HYDROCHLORIDE SCH MG: 5 SOLUTION ORAL at 06:07

## 2019-03-12 RX ADMIN — HYDROMORPHONE HYDROCHLORIDE PRN MG: 1 INJECTION, SOLUTION INTRAMUSCULAR; INTRAVENOUS; SUBCUTANEOUS at 20:33

## 2019-03-12 RX ADMIN — ALBUTEROL SULFATE PRN MG: 2.5 SOLUTION RESPIRATORY (INHALATION) at 17:20

## 2019-03-12 RX ADMIN — OXYCODONE HYDROCHLORIDE SCH MG: 5 SOLUTION ORAL at 00:20

## 2019-03-12 RX ADMIN — HYDROMORPHONE HYDROCHLORIDE PRN MG: 1 INJECTION, SOLUTION INTRAMUSCULAR; INTRAVENOUS; SUBCUTANEOUS at 14:29

## 2019-03-12 RX ADMIN — HYDROMORPHONE HYDROCHLORIDE PRN MG: 1 INJECTION, SOLUTION INTRAMUSCULAR; INTRAVENOUS; SUBCUTANEOUS at 11:18

## 2019-03-12 RX ADMIN — PIPERACILLIN AND TAZOBACTAM SCH MLS/HR: 3; .375 INJECTION, POWDER, LYOPHILIZED, FOR SOLUTION INTRAVENOUS; PARENTERAL at 20:48

## 2019-03-12 RX ADMIN — HYDROMORPHONE HYDROCHLORIDE PRN MG: 1 INJECTION, SOLUTION INTRAMUSCULAR; INTRAVENOUS; SUBCUTANEOUS at 01:48

## 2019-03-12 RX ADMIN — MOMETASONE FUROATE AND FORMOTEROL FUMARATE DIHYDRATE SCH PUFF: 200; 5 AEROSOL RESPIRATORY (INHALATION) at 09:01

## 2019-03-12 RX ADMIN — HYDROMORPHONE HYDROCHLORIDE PRN MG: 1 INJECTION, SOLUTION INTRAMUSCULAR; INTRAVENOUS; SUBCUTANEOUS at 07:58

## 2019-03-12 RX ADMIN — METHADONE HYDROCHLORIDE PRN MG: 5 TABLET ORAL at 03:06

## 2019-03-12 RX ADMIN — INSULIN LISPRO SCH UNITS: 100 INJECTION, SOLUTION INTRAVENOUS; SUBCUTANEOUS at 08:24

## 2019-03-12 RX ADMIN — MORPHINE SULFATE PRN MG: 4 INJECTION INTRAVENOUS at 04:47

## 2019-03-12 RX ADMIN — Medication SCH NOTE: at 17:43

## 2019-03-12 RX ADMIN — INSULIN GLARGINE SCH: 100 INJECTION, SOLUTION SUBCUTANEOUS at 23:51

## 2019-03-12 NOTE — PN
Progress Note





- Progress Note


Date of Service: 03/12/19


Note: 





Progress Note -- Critical Care





24 hour events/significant events: 


-extubated to NC yesterday


-no events overnight


-complaints of pain+


-off fentanyl this morning


-not on pressors, hemodyn stable, NSR


-cough minimal, no sig sputum; no sob/cp/n/v/abd pain


-complaints of pain by patient, pointing to LE and back/legs


-boyfriend at bedside








Tele: NSR





Vitals: 


 Vital Signs











Temp  98.3 F   03/12/19 04:40


 


Pulse  114   03/12/19 09:01


 


Resp  13   03/12/19 10:00


 


BP  71/39   03/12/19 09:01


 


Pulse Ox  114   03/12/19 09:02








 Intake & Output











 03/11/19 03/12/19 03/12/19





 18:59 06:59 18:59


 


Intake Total  555.3 99


 


Output Total  0 0


 


Balance  555.3 99


 


Weight  87.135 kg 


 


Intake:   


 


  IV Fluids  335.3 


 


    NS (0.9%)  335.3 


 


  IVPB  220 


 


    Zosyn  220 


 


  IV Narcotic Infusion   99


 


    Fentanyl   99


 


  Oral   0


 


Output:   


 


  Urine  0 0


 


Other:   


 


  Date of Last Bowel 3/11/19  3/12/19





  Movement   


 


  # Bowel Movements 3  5


 


  Estimated Stool Amount Medium  Large














O2/Vent: NC 7





Infusions: heplock





Medications: 


Acetaminophen (Tylenol Tab*)  650 mg PO Q6H PRN


   PRN Reason: PAIN


Al Hydrox/Mg Hydrox/Simethicone (Maalox Plus*)  30 ml PO Q6H PRN


   PRN Reason: DYSPEPSIA


   Last Admin: 03/12/19 06:07 Dose:  30 ml


Albuterol (Ventolin 2.5 Mg/3 Ml Neb.Sol*)  2.5 mg INH Q4HR PRN


   PRN Reason: SOB/WHEEZING


   Last Admin: 03/11/19 19:55 Dose:  2.5 mg


Aspirin (Aspirin Ec Tab*)  [facility] PO [facility]ILY [facility]


   Last Admin: 03/12/19 07:57 Dose:  81 mg


Calcitriol (Calcitriol Soln 1 Mcg/Ml Oralsyr)  0.25 mcg FEED TUBE TID [facility]


   Last Admin: 03/12/19 07:58 Dose:  Not Given


Dextrose (D50w Syringe 50 Ml*)  12.5 gm IV PUSH .FOR FS < 60 - SS PRN


   PRN Reason: FS < 60


Gabapentin (Neurontin Cap(*))  200 mg PO TID [facility]


   Last Admin: 03/12/19 07:57 Dose:  200 mg


Hydromorphone HCl (Dilaudid Inj1s*)  1 mg IV Q3H PRN


   PRN Reason: PAIN - MODERATE TO SEVERE


   Last Admin: 03/12/19 07:58 Dose:  1 mg


Piperacillin Sod/Tazobactam (Sod 3.375 gm/ Sodium Chloride)  100 mls @ 25 mls/

hr IVPB Q12H [facility]


   Last Admin: 03/12/19 07:52 Dose:  25 mls/hr


Insulin Glargine (Lantus(*))  70 units SUBCUT [facility]


Insulin Human Lispro (Humalog*)  0 units SUBCUT ACHS [facility]; Protocol


   Last Admin: 03/12/19 08:24 Dose:  2 units


Losartan Potassium (Cozaar Tab*)  50 mg PO DAILY [facility]


   Last Admin: 03/12/19 07:57 Dose:  50 mg


Methadone HCl (Dolophine Tab*)  5 mg PO Q8H PRN


   PRN Reason: PAIN - MODERATE


   Last Admin: 03/12/19 03:06 Dose:  5 mg


Mometasone Furoate/Formoterol Fumar (Dulera 200/5 Mdi*)  2 puff INH BID [facility]; 

Protocol


   Last Admin: 03/12/19 09:01 Dose:  2 puff


Morphine Sulfate (Morphine Vial*)  0.5 mg IV Q6H PRN


   PRN Reason: PAIN


   Last Admin: 03/12/19 04:47 Dose:  0.5 mg


Ondansetron HCl (Zofran Inj*)  4 mg IV Q4H PRN


   PRN Reason: NAUSEA/VOMITING


   Last Admin: 03/11/19 17:21 Dose:  4 mg


Ondansetron HCl (Zofran Inj*)  4 mg IV Q6H PRN


   PRN Reason: NAUSEA


Oxycodone HCl (Oxycodone Oral.Soln*)  10 mg PO Q6HR [facility]


   Last Admin: 03/12/19 06:07 Dose:  10 mg


Pantoprazole Sodium (Protonix Tab*)  40 mg PO DAILY [facility]


   Last Admin: 03/12/19 07:57 Dose:  40 mg


Pharmacy Consult (Zosyn Per Pharmacy*)  1 note FOLLOW UP .[facility]YN [facility]ARMACY [facility]


Pharmacy Profile Note (Coumadin Per Pharmacy*)  1 note FOLLOW UP [facility]


   Last Admin: 03/11/19 18:00 Dose:  1 note


Promethazine HCl (Phenergan Tab*)  25 mg PO Q8H PRN


   PRN Reason: NAUSEA


Sertraline HCl (Zoloft*)  25 mg PO DAILY [facility]


   Last Admin: 03/12/19 07:58 Dose:  25 mg











Physical Exam:


General: awake/alert, no diaphoresis


Head: normocephalic, atraumatic


HEENT: no pallor, no icterus, moist mucous membranes


Neck: soft, supple, no jvd


CVS: normal rate, regular, no murmur


Resp: bilateral air entry, no Rhales, no rhonchi/wheeze, no acc muscle use


Abdomen: soft, nontender, nondistended, BS+; PD cathetor+


Ext: pulses by doppler+, warm; thigh swelling noted bialterally, stable


Skin: intact


Neuro: awake, alert, moving all extremities, no gross focal deficit





Labs: 


 Laboratory Results - last 24 hr











  03/07/19 03/08/19 03/11/19





  22:40 13:00 07:38


 


WBC   


 


RBC   


 


Hgb   


 


Hct   


 


MCV   


 


MCH   


 


MCHC   


 


RDW   


 


Plt Count   


 


MPV   


 


INR (Anticoag Therapy)   


 


Sodium   


 


Potassium   


 


Chloride   


 


Carbon Dioxide   


 


Anion Gap   


 


BUN   


 


Creatinine   


 


Est GFR ( Amer)   


 


Est GFR (Non-Af Amer)   


 


BUN/Creatinine Ratio   


 


Glucose   


 


POC Glucose (mg/dL)    214 H


 


Calcium   


 


RBC Total Porphyrins  47  


 


RBC Porphyrins Interp  See comment  


 


Plasma Total Porphyrins  <1.0  


 


Plasma Porphyrin Intrp  See comment  


 


Fluid Cell Count Rvw By    


 


Result Reviewed By  See comment  














  03/11/19 03/11/19 03/11/19





  11:45 17:44 21:41


 


WBC   


 


RBC   


 


Hgb   


 


Hct   


 


MCV   


 


MCH   


 


MCHC   


 


RDW   


 


Plt Count   


 


MPV   


 


INR (Anticoag Therapy)   


 


Sodium   


 


Potassium   


 


Chloride   


 


Carbon Dioxide   


 


Anion Gap   


 


BUN   


 


Creatinine   


 


Est GFR ( Amer)   


 


Est GFR (Non-Af Amer)   


 


BUN/Creatinine Ratio   


 


Glucose   


 


POC Glucose (mg/dL)  165 H  167 H  105 H


 


Calcium   


 


RBC Total Porphyrins   


 


RBC Porphyrins Interp   


 


Plasma Total Porphyrins   


 


Plasma Porphyrin Intrp   


 


Fluid Cell Count Rvw By   


 


Result Reviewed By   














  03/12/19 03/12/19 03/12/19





  05:00 05:00 05:00


 


WBC   20.9 H 


 


RBC   2.95 L 


 


Hgb   8.1 L 


 


Hct   27 L 


 


MCV   92 


 


MCH   27 


 


MCHC   30 L 


 


RDW   17 H 


 


Plt Count   383 


 


MPV   8.7 


 


INR (Anticoag Therapy)    3.46 H


 


Sodium  139  


 


Potassium  3.4 L  


 


Chloride  100 L  


 


Carbon Dioxide  25  


 


Anion Gap  14 H  


 


BUN  42 H  


 


Creatinine  7.66 H  


 


Est GFR ( Amer)  7.2  


 


Est GFR (Non-Af Amer)  5.9  


 


BUN/Creatinine Ratio  5.5 L  


 


Glucose  143 H  


 


POC Glucose (mg/dL)   


 


Calcium  8.4 L  


 


RBC Total Porphyrins   


 


RBC Porphyrins Interp   


 


Plasma Total Porphyrins   


 


Plasma Porphyrin Intrp   


 


Fluid Cell Count Rvw By   


 


Result Reviewed By   














  03/12/19





  07:52


 


WBC 


 


RBC 


 


Hgb 


 


Hct 


 


MCV 


 


MCH 


 


MCHC 


 


RDW 


 


Plt Count 


 


MPV 


 


INR (Anticoag Therapy) 


 


Sodium 


 


Potassium 


 


Chloride 


 


Carbon Dioxide 


 


Anion Gap 


 


BUN 


 


Creatinine 


 


Est GFR ( Amer) 


 


Est GFR (Non-Af Amer) 


 


BUN/Creatinine Ratio 


 


Glucose 


 


POC Glucose (mg/dL)  143 H


 


Calcium 


 


RBC Total Porphyrins 


 


RBC Porphyrins Interp 


 


Plasma Total Porphyrins 


 


Plasma Porphyrin Intrp 


 


Fluid Cell Count Rvw By 


 


Result Reviewed By 








 Microbiology











 03/08/19 13:00 Sterile Body Fluid Culture - Preliminary





 Peritoneal Fluid    No Growth Day 3





 Sterile Body Fluid Culture - Preliminary





    No Growth Day 3


 


 03/06/19 20:20 Urine Culture - Final





 Urine    Vre Enterococcus Faecium





    Tita Albicans











 





Imaging: 


cxr 3/11 - ett above jose, no sig infiltrate/congestion noted


cxr 3/12 - improving atelectasis








Assessment: 38y F w/pmhx of DM, Hypothyroid?, HTN, CAD?, DVT, PVD s/p stents to 

b/l LE, COPD on 5L home o2, GERD/Gastroparesis, ESRD on PD,  active smoker, h/o 

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 

after complaints of nausea/vomiting, abd pain, left leg pain+. Intubated for 

hypoxic respiratory failure 3/8, suspected to be from pneumonia and pulmonary 

congestion/CHF exacerbation.





-Acute  on chronic hypercapneic and hypoxic resp failure, intubated 3/8, 

extubated 3/11


- pulmonary congestion, improved


-ESRD on PD


-CAD


-Severe PVD


-Metabolic acidosis


-Hyperkalemia


-H/o HIT on warfarin


-DM


COPD, on home o2


Hypothyroidism











Plan:


Neuro- awake, alert. 


-off fentanyl; cont oxycodone 10mg q6h for pain, cont methadone 5mg q8h


-chronic pain, pain med consult





CVS- 


-BP stable, HR stable


-CXR improved congestion


-PD daily ongoing


-Hg stable


-IV abx for pna





Resp- 


-on NC 7L, baseline at home 5L; not much cough/secretions


-CXR 3/12 improving atelectasis


-no growth on cultures


-asp prec


-Bronchodilators as needed





ID- afebrile. wbc dec 20. urine culture with VRE , similar to previous urine 

culture in february. No tx given. currently nontoxic. unclear if she is a 

colonizer at this time. no other source of infection noted. 


-zosyn day 7 nowd/c today. No growth on cultures noted.


-diarrhea+, c.diff neg





GI- start renal diet. GI proph with ppi. Asp prec


-diarrhea+, flexiseal pulled out. c.diff neg. d/c abx today. Po diet. no 

bleeding.





Renal- ESRD on PD. K okay, no acidosis. Cont as per renal, nephrology following.





Heme- hg stable. plt stable. INR 3, therapuetic. Cont warfarin for HIT. no 

bleeding.





Endo-Maintain BG<200, insulin protocol. Has not been on synthroid, not in home 

meds either.


-cont lantus 70, lispro coverage


-cont gabapentin for peripheral neuropathy; still has ongoing burning





Musculsk- pressure ulcer prophylaxis. Bedrest.


-noted severe PVD by studies. dopplerable pulses+, warm ext, no signs of 

ischemia distally. Previous intensivist ahs reached out to [facility], no 

acute need for transfer given known severe PVD. will need workup and management 

once stabilized from current medical situation. if not outpatient , may even 

need inpatient transfer if vascular would like eval/intervention.





Wounds- none


Nutrition- renal po diet, asp prec





DVT prophylaxis: warfarin


GI prophylaxis: ppi


Central Line: no


Arterial Line: no


Quintanilla Cathetor: no





Disposition:  stable, can be transferred to medical floor





Patient clinical status: stable





Code Status: full code








Ronnie Valenzuela MD


Intensivist


(Electronically Signed)

## 2019-03-13 LAB
ANION GAP SERPL CALC-SCNC: 12 MMOL/L (ref 2–11)
BUN SERPL-MCNC: 43 MG/DL (ref 6–24)
BUN/CREAT SERPL: 5.6 (ref 8–20)
CALCIUM SERPL-MCNC: 9 MG/DL (ref 8.6–10.3)
CHLORIDE SERPL-SCNC: 96 MMOL/L (ref 101–111)
GLUCOSE SERPL-MCNC: 85 MG/DL (ref 70–100)
HCO3 SERPL-SCNC: 30 MMOL/L (ref 22–32)
HCT VFR BLD AUTO: 28 % (ref 35–47)
HGB BLD-MCNC: 8.7 G/DL (ref 12–16)
INR PPP/BLD: 4.08 (ref 0.77–1.02)
MCH RBC QN AUTO: 28 PG (ref 27–31)
MCHC RBC AUTO-ENTMCNC: 31 G/DL (ref 31–36)
MCV RBC AUTO: 90 FL (ref 80–97)
PLATELET # BLD AUTO: 427 10^3/UL (ref 150–450)
POTASSIUM SERPL-SCNC: 3.6 MMOL/L (ref 3.5–5)
RBC # BLD AUTO: 3.12 10^6/UL (ref 4–5.4)
SODIUM SERPL-SCNC: 138 MMOL/L (ref 135–145)
WBC # BLD AUTO: 20.4 10^3/UL (ref 3.5–10.8)

## 2019-03-13 PROCEDURE — 05HY33Z INSERTION OF INFUSION DEVICE INTO UPPER VEIN, PERCUTANEOUS APPROACH: ICD-10-PCS | Performed by: INTERNAL MEDICINE

## 2019-03-13 RX ADMIN — INSULIN GLARGINE SCH UNIT: 100 INJECTION, SOLUTION SUBCUTANEOUS at 21:49

## 2019-03-13 RX ADMIN — HYDROMORPHONE HYDROCHLORIDE PRN MG: 1 INJECTION, SOLUTION INTRAMUSCULAR; INTRAVENOUS; SUBCUTANEOUS at 15:00

## 2019-03-13 RX ADMIN — PANTOPRAZOLE SODIUM SCH MG: 40 TABLET, DELAYED RELEASE ORAL at 09:07

## 2019-03-13 RX ADMIN — ALUMINUM HYDROXIDE, MAGNESIUM HYDROXIDE, AND SIMETHICONE PRN ML: 200; 200; 20 SUSPENSION ORAL at 05:22

## 2019-03-13 RX ADMIN — MOMETASONE FUROATE AND FORMOTEROL FUMARATE DIHYDRATE SCH: 200; 5 AEROSOL RESPIRATORY (INHALATION) at 20:03

## 2019-03-13 RX ADMIN — LOSARTAN POTASSIUM SCH MG: 25 TABLET, FILM COATED ORAL at 09:06

## 2019-03-13 RX ADMIN — SERTRALINE HYDROCHLORIDE SCH MG: 25 TABLET ORAL at 09:04

## 2019-03-13 RX ADMIN — INSULIN LISPRO SCH: 100 INJECTION, SOLUTION INTRAVENOUS; SUBCUTANEOUS at 11:27

## 2019-03-13 RX ADMIN — GABAPENTIN SCH MG: 100 CAPSULE ORAL at 14:00

## 2019-03-13 RX ADMIN — Medication SCH: at 17:26

## 2019-03-13 RX ADMIN — HYDROMORPHONE HYDROCHLORIDE PRN MG: 1 INJECTION, SOLUTION INTRAMUSCULAR; INTRAVENOUS; SUBCUTANEOUS at 09:07

## 2019-03-13 RX ADMIN — INSULIN LISPRO SCH UNITS: 100 INJECTION, SOLUTION INTRAVENOUS; SUBCUTANEOUS at 21:50

## 2019-03-13 RX ADMIN — METHADONE HYDROCHLORIDE PRN MG: 5 TABLET ORAL at 01:48

## 2019-03-13 RX ADMIN — OXYCODONE HYDROCHLORIDE SCH MG: 5 SOLUTION ORAL at 18:09

## 2019-03-13 RX ADMIN — MOMETASONE FUROATE AND FORMOTEROL FUMARATE DIHYDRATE SCH PUFF: 200; 5 AEROSOL RESPIRATORY (INHALATION) at 09:06

## 2019-03-13 RX ADMIN — HYDROMORPHONE HYDROCHLORIDE PRN MG: 1 INJECTION, SOLUTION INTRAMUSCULAR; INTRAVENOUS; SUBCUTANEOUS at 01:55

## 2019-03-13 RX ADMIN — METHADONE HYDROCHLORIDE PRN MG: 5 TABLET ORAL at 10:29

## 2019-03-13 RX ADMIN — HYDROMORPHONE HYDROCHLORIDE PRN MG: 1 INJECTION, SOLUTION INTRAMUSCULAR; INTRAVENOUS; SUBCUTANEOUS at 05:22

## 2019-03-13 RX ADMIN — CALCITRIOL SCH MCG: 1 SOLUTION ORAL at 14:01

## 2019-03-13 RX ADMIN — HYDROMORPHONE HYDROCHLORIDE PRN MG: 1 INJECTION, SOLUTION INTRAMUSCULAR; INTRAVENOUS; SUBCUTANEOUS at 12:18

## 2019-03-13 RX ADMIN — CALCITRIOL SCH MCG: 1 SOLUTION ORAL at 21:46

## 2019-03-13 RX ADMIN — MUPIROCIN SCH APPLIC: 20 OINTMENT TOPICAL at 16:29

## 2019-03-13 RX ADMIN — ALBUTEROL SULFATE PRN MG: 2.5 SOLUTION RESPIRATORY (INHALATION) at 09:06

## 2019-03-13 RX ADMIN — INSULIN LISPRO SCH UNITS: 100 INJECTION, SOLUTION INTRAVENOUS; SUBCUTANEOUS at 12:28

## 2019-03-13 RX ADMIN — OXYCODONE HYDROCHLORIDE SCH MG: 5 SOLUTION ORAL at 06:18

## 2019-03-13 RX ADMIN — SODIUM THIOSULFATE SCH MLS/HR: 250 INJECTION, SOLUTION INTRAVENOUS at 19:41

## 2019-03-13 RX ADMIN — OXYCODONE HYDROCHLORIDE SCH MG: 5 SOLUTION ORAL at 11:57

## 2019-03-13 RX ADMIN — CALCITRIOL SCH MCG: 1 SOLUTION ORAL at 10:30

## 2019-03-13 RX ADMIN — ASPIRIN SCH MG: 81 TABLET, COATED ORAL at 09:04

## 2019-03-13 RX ADMIN — INSULIN LISPRO SCH: 100 INJECTION, SOLUTION INTRAVENOUS; SUBCUTANEOUS at 16:37

## 2019-03-13 RX ADMIN — OXYCODONE HYDROCHLORIDE SCH MG: 5 SOLUTION ORAL at 23:56

## 2019-03-13 RX ADMIN — HYDROMORPHONE HYDROCHLORIDE PRN MG: 1 INJECTION, SOLUTION INTRAMUSCULAR; INTRAVENOUS; SUBCUTANEOUS at 18:09

## 2019-03-13 RX ADMIN — GABAPENTIN SCH MG: 100 CAPSULE ORAL at 21:45

## 2019-03-13 RX ADMIN — GABAPENTIN SCH MG: 100 CAPSULE ORAL at 09:05

## 2019-03-13 NOTE — PN
Subjective


Interval History: 





Pt with persistent pain over legs. Denies dyspnea or chest pain. Reports she 

would never want placement in a MATT/SNF unless her boyfriend could live with 

her there. Lost access - pending midline. 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 

consulted Derm who recommended surgery consult for wedge biopsy. Dr. Thompson 

recommending sodium thiosulfate 25g IV, with close monitoring of BP and calcium 

levels. Will DC losartan.





Family History: Unchanged from Admission


Social History: Unchanged from Admission


Past Medical History: Unchanged from Admission





Objective


Active Medications: 








Acetaminophen (Tylenol Tab*)  650 mg PO Q6H PRN


   PRN Reason: PAIN


Al Hydrox/Mg Hydrox/Simethicone (Maalox Plus*)  30 ml PO Q6H PRN


   PRN Reason: DYSPEPSIA


   Last Admin: 03/13/19 05:22 Dose:  30 ml


Albuterol (Ventolin 2.5 Mg/3 Ml Neb.Sol*)  2.5 mg INH Q4HR PRN


   PRN Reason: SOB/WHEEZING


   Last Admin: 03/13/19 09:06 Dose:  2.5 mg


Aspirin (Aspirin Ec Tab*)  81 mg PO DAILY [facility]


   Last Admin: 03/13/19 09:04 Dose:  81 mg


Calcitriol (Calcitriol Soln 1 Mcg/Ml Oralsyr)  0.25 mcg FEED TUBE TID [facility]


   Last Admin: 03/13/19 14:01 Dose:  0.25 mcg


Dextrose (D50w Syringe 50 Ml*)  12.5 gm IV PUSH .FOR FS < 60 - SS PRN


   PRN Reason: FS < 60


Gabapentin (Neurontin Cap(*))  200 mg PO TID [facility]


   Last Admin: 03/13/19 14:00 Dose:  200 mg


Hydromorphone HCl (Dilaudid Inj1s*)  1 mg IV Q4H PRN


   PRN Reason: PAIN - SEVERE


Sodium Thiosulfate 25 gm/ (Sodium Chloride)  200 mls @ 200 mls/hr IV DAILY@1900 

[facility]


Insulin Glargine (Lantus(*))  70 units SUBCUT 2100 [facility]


   Last Admin: 03/12/19 23:51 Dose:  Not Given


Insulin Human Lispro (Humalog*)  0 units SUBCUT ACHS [facility]; Protocol


   Last Admin: 03/13/19 16:37 Dose:  Not Given


Methadone HCl (Dolophine Tab*)  5 mg PO Q8H PRN


   PRN Reason: PAIN - MODERATE


   Last Admin: 03/13/19 10:29 Dose:  5 mg


Mometasone Furoate/Formoterol Fumar (Dulera 200/5 Mdi*)  2 puff INH BID [facility]; 

Protocol


   Last Admin: 03/13/19 09:06 Dose:  2 puff


Mupirocin (Bactroban 2 % Oint*)  1 applic TOPICAL DAILY [facility]


   Last Admin: 03/13/19 16:29 Dose:  1 applic


Ondansetron HCl (Zofran Inj*)  4 mg IV Q6H PRN


   PRN Reason: NAUSEA


Pantoprazole Sodium (Protonix Tab*)  40 mg PO DAILY [facility]


   Last Admin: 03/13/19 09:07 Dose:  40 mg


Pharmacy Profile Note (Coumadin Per Pharmacy*)  1 note FOLLOW UP 1700 [facility]


   Last Admin: 03/13/19 17:26 Dose:  Not Given


Sertraline HCl (Zoloft*)  25 mg PO DAILY [facility]


   Last Admin: 03/13/19 09:04 Dose:  25 mg








 Vital Signs - 8 hr











  03/13/19 03/13/19 03/13/19





  11:28 11:51 11:57


 


Temperature 97.4 F  


 


Pulse Rate 43  


 


Respiratory 16 16 18





Rate   


 


Blood Pressure 117/24  





(mmHg)   


 


O2 Sat by Pulse 74  





Oximetry   














  03/13/19 03/13/19 03/13/19





  12:18 13:15 14:00


 


Temperature   


 


Pulse Rate   


 


Respiratory 16 18 16





Rate   


 


Blood Pressure   





(mmHg)   


 


O2 Sat by Pulse   





Oximetry   














  03/13/19 03/13/19 03/13/19





  15:00 15:53 16:05


 


Temperature   98.2 F


 


Pulse Rate   108


 


Respiratory 16 18 18





Rate   


 


Blood Pressure   89/39





(mmHg)   


 


O2 Sat by Pulse   87





Oximetry   














  03/13/19 03/13/19





  16:37 18:09


 


Temperature  


 


Pulse Rate  


 


Respiratory 18 18





Rate  


 


Blood Pressure  





(mmHg)  


 


O2 Sat by Pulse  





Oximetry  











Oxygen Devices in Use Now: Nasal Cannula


Appearance: not in acute distress, breathing comfortably while lying flat


Ears/Nose/Mouth/Throat: Clear Oropharnyx


Respiratory: - - clear anteriorly


Skin: - - focal areas of induration with petichea over thighs, painful to touch


Result Diagrams: 


 03/13/19 15:40





 03/13/19 15:40





Assess/Plan/Problems-Billing


Assessment: 





38W with complicated medical history including ESRD on PD, PAD s/p femoral 

stents on warfarin, COPD with chronic resp failure and active smoking on 5L O2, 

IDDM, chronic pain on methadone, unconfirmed CAD, and recent admission at the 

end of February for PNA, returning with b/l thigh pain found with indurated 

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 

thiosulfate IV. With persistent leukocytosis (chronic), supratherapeutic INR. 

Hospital course complicated by respiratory failure likely from PNA requiring 

intubation, extubated on 3/11.


 





- Patient Problems


(1) Thigh pain


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 Derm and Renal 

following. Previously thought pain could be from severe PAD.


- consult placed to surgery for biopsy


- starting on sodium tiosulfate 25g IV daily 


- follow BP and calcium levels closely   





(2) ESRD on peritoneal dialysis


Comment: On peritoneal dialysis.


- cont PD, appreciate Dr. Thompson following


- cont calcitriol


- monitor BMP   





(3) PAD (peripheral artery disease)


Comment: s/p stent and complicated by DVTs. CT Aorta with run off this 

admission with moderate-severe R common femoral artery stenosis, severe b/l 

popliteal artery stenosis, near complete occlusion of distal L superficial fem 

artery stent.


- cont to dose warfarin by INR   





(4) Acute respiratory failure with hypoxia


Comment: She is back to her baseline O2 requirement of 5L continuously. 

Extubated 3/11. Thought to be from PNA and volume overload causing pulm 

congestion.


- s/p 7 days of Zosyn


- cont Dulera with albuterol prn   





(5) Chronic pain


Comment: 


- continue methadone prn (home med) and gabapentin


- can have low-dose hydromorphone prn for severe pain


- STOP oxycodone


- pending pain consult   





(6) IDDM (insulin dependent diabetes mellitus)


Comment: HgbA1c 8.8%


- cont basal/bolus insulin    





(7) Essential hypertension


Comment: Low BPs this admission. 


- stop home losartan

## 2019-03-13 NOTE — CONSULT
Subjective


Date of Service: 03/13/19


Interval History: 





Ms. Masters is a 39 yo female with PMH significant for ESRD on PD, COPD with 

chronic hypoxic respiratory failure on 5L 02, IDDM, chronic pain on methadone, 

unconfirmed CAD and recent admission at the end of February for PNA/Sepsis who 

presented to the emergency room with complaints of bilateral thigh pain found 

with elevated CRP/WBC and elevated troponin.





Ms. Masters states that she had "firm" area to bilateral thighs on admission 

and that during her stay in the ICU she developed discoloration to bilateral 

thighs. She reports pain with palpation of these areas. 








Family History: Unchanged from Admission


Social History: Unchanged from Admission


Past Medical History: Unchanged from Admission





Review of Systems





- Measurements


Intake and Output: 


Intake and Output Last 24 Hours











 03/11/19 03/12/19 03/13/19 03/14/19





 06:59 06:59 06:59 06:59


 


Intake Total 1553 555.3 209 150


 


Output Total  0 0 


 


Balance 1553 555.3 209 150


 


Weight 186 lb 11.704 oz 192 lb 1.6 oz  


 


Intake:    


 


  IV Fluids 267 335.3 110 


 


    NS (0.9%) 212 335.3  


 


    Zosyn 55  110 


 


  IVPB 542 220  


 


    NS (0.9%) 444   


 


    Zosyn 98 220  


 


  IV Narcotic Infusion 48  99 


 


    Fentanyl 48  99 


 


  Oral   0 150


 


  Tube Feeding 636   


 


  Tube Feeding Flush Amount 60   


 


Output:    


 


  Urine  0 0 


 


Other:    


 


  Date of Last Bowel 3/10/19 3/11/19 3/12/19 





  Movement    


 


  # Bowel Movements 1 3 1 


 


  Estimated Stool Amount Medium Medium Small 














- Review of Systems


Constitutional Symptoms: 


   Negative: Fever, Other - Chills


Dermatology: Positive: Other - Discoloations and "firm" areas to bilateral 

thighs


Endocrinology: Positive: Diabetes Mellitus





Objective


Active Medications: 





Acetaminophen (Tylenol Tab*)  650 mg PO Q6H PRN Reason: PAIN


Al Hydrox/Mg Hydrox/Simethicone (Maalox Plus*)  30 ml PO Q6H PRN Reason: 

DYSPEPSIA


Albuterol (Ventolin 2.5 Mg/3 Ml Neb.Sol*)  2.5 mg INH Q4HR PRN Reason: SOB/

WHEEZING


Aspirin (Aspirin Ec Tab*)  81 mg PO DAILY [facility]


Calcitriol (Calcitriol Soln 1 Mcg/Ml Oralsyr)  0.25 mcg FEED TUBE TID [facility]


Dextrose (D50w Syringe 50 Ml*)  12.5 gm IV PUSH .FOR FS < 60 - SS PRN Reason: 

FS < 60


Gabapentin (Neurontin Cap(*))  200 mg PO TID [facility]


Hydromorphone HCl (Dilaudid Inj1s*)  1 mg IV Q3H PRN Reason: PAIN - MODERATE TO 

SEVERE


Insulin Glargine (Lantus(*))  70 units SUBCUT 2100 [facility]


Insulin Human Lispro (Humalog*)  0 units SUBCUT ACHS [facility]; Protocol


Losartan Potassium (Cozaar Tab*)  50 mg PO DAILY [facility]


Methadone HCl (Dolophine Tab*)  5 mg PO Q8H PRN Reason: PAIN - MODERATE


Mometasone Furoate/Formoterol Fumar (Dulera 200/5 Mdi*)  2 puff INH BID [facility]; 

Protocol


Ondansetron HCl (Zofran Inj*)  4 mg IV Q4H PRN Reason: NAUSEA/VOMITING


Ondansetron HCl (Zofran Inj*)  4 mg IV Q6H PRN Reason: NAUSEA


Oxycodone HCl (Oxycodone Oral.Soln*)  10 mg PO Q6HR [facility]


Pantoprazole Sodium (Protonix Tab*)  40 mg PO DAILY [facility]


Pharmacy Profile Note (Coumadin Per Pharmacy*)  1 note FOLLOW UP 1700 [facility]


Promethazine HCl (Phenergan Tab*)  25 mg PO Q8H PRN Reason: NAUSEA


Sertraline HCl (Zoloft*)  25 mg PO DAILY [facility]





 Vital Signs - 8 hr











  03/13/19 03/13/19 03/13/19





  06:18 06:27 08:00


 


Temperature   


 


Pulse Rate   


 


Respiratory 20 20 16





Rate   


 


Blood Pressure   





(mmHg)   


 


O2 Sat by Pulse   99





Oximetry   














  03/13/19 03/13/19 03/13/19





  08:51 09:05 09:07


 


Temperature 96.5 F  


 


Pulse Rate 90  


 


Respiratory 16 16 16





Rate   


 


Blood Pressure 101/73  





(mmHg)   


 


O2 Sat by Pulse 87  





Oximetry   














  03/13/19 03/13/19 03/13/19





  09:11 09:13 10:29


 


Temperature   


 


Pulse Rate 79 79 


 


Respiratory 18 18 16





Rate   


 


Blood Pressure   





(mmHg)   


 


O2 Sat by Pulse 99 99 





Oximetry   














  03/13/19 03/13/19 03/13/19





  10:30 10:31 11:28


 


Temperature   97.4 F


 


Pulse Rate   43


 


Respiratory 16 16 16





Rate   


 


Blood Pressure   117/24





(mmHg)   


 


O2 Sat by Pulse   74





Oximetry   














  03/13/19 03/13/19 03/13/19





  11:51 11:57 12:18


 


Temperature   


 


Pulse Rate   


 


Respiratory 16 18 16





Rate   


 


Blood Pressure   





(mmHg)   


 


O2 Sat by Pulse   





Oximetry   














  03/13/19 03/13/19





  13:15 14:00


 


Temperature  


 


Pulse Rate  


 


Respiratory 18 16





Rate  


 


Blood Pressure  





(mmHg)  


 


O2 Sat by Pulse  





Oximetry  











Oxygen Devices in Use Now: Nasal Cannula - 5L


Appearance: NAD, laying in bed


Ears/Nose/Mouth/Throat: Mucous Membranes Moist


Skin: - - See skin note below


Neurological: Alert and Oriented x 3


Result Diagrams: 


 03/17/19 08:00





 03/17/19 08:00


Microbiology and Other Data: 


 Microbiology











 03/05/19 23:00 Nasal Screen MRSA (PCR) - Final





 Nasal    Mrsa Not Detected














Skin Deviation Note





- Skin Deviation Findings





Buttocks - Excoriated skin with superficial open areas.


Right thigh - Area of purplish discoloration 5.5 cm x 2 cm. There is a larger 

area of induration, not measured. 


Left medial thigh - Areas of purplish discoloration 4.5 cm x 2 cm and 11.5 cm x 

2 cm. There is a larger area of induration, not measured. 





Assessment/Plan:


Ms. Masters is a 39 yo female with PMH significant for ESRD on PD, COPD with 

chronic hypoxic respiratory failure on 5L 02, IDDM, chronic pain on methadone, 

unconfirmed CAD and recent admission at the end of February for PNA/Sepsis who 

presented to the emergency room with complaints of bilateral thigh pain and was 

found with elevated CRP/WBC and increased troponin.





1. Buttocks with moisture associated skin injury. Suspect this is secondary to 

frequent loose stools. Apply barrier cream to allow for a layer of protection 

to the skin. Frequent turning and repositioning. Frequent checks for 

incontinence and provide incontinence care as needed. 


2. Bilateral thighs with areas of induration and purplish discoloration. There 

is pain with palpation of these areas. There are no open areas. Differential 

diagnosis includes deep tissue injury, but in the setting of a patient with 

ESRD there is also concern for calciphylaxis. Discussed findings with admitting 

hospitalist and the induration to the thighs were not present on admission. 

Concern for calciphylaxis discussed with primary hospitalist covering today. 


3. Diabetes Mellitus. HgA1C is 8.8. Continue to maintain good glycemic control. 


4. Diet. Renal diet.


5. Code Status. Full Code.


6. Disposition. Disposition per primary medicine team.





TIME SPENT: Time for this wound consultation was 30 minutes, and 20 minutes was 

spend with the patient and her  discussing past medical history, 

assessing, measuring, and photographing wounds. 











Wound Problem/Plan


Is Patient a Wound Clinic Patient: No


Attending: Yesica Duran

## 2019-03-14 LAB
ANION GAP SERPL CALC-SCNC: 23 MMOL/L (ref 2–11)
BUN SERPL-MCNC: 40 MG/DL (ref 6–24)
BUN/CREAT SERPL: 5.4 (ref 8–20)
CALCIUM SERPL-MCNC: 9 MG/DL (ref 8.6–10.3)
CHLORIDE SERPL-SCNC: 94 MMOL/L (ref 101–111)
GLUCOSE SERPL-MCNC: 110 MG/DL (ref 70–100)
HCO3 SERPL-SCNC: 25 MMOL/L (ref 22–32)
HCT VFR BLD AUTO: 28 % (ref 35–47)
HGB BLD-MCNC: 8.9 G/DL (ref 12–16)
INR PPP/BLD: 3.69 (ref 0.77–1.02)
MAGNESIUM SERPL-MCNC: 2.4 MG/DL (ref 1.9–2.7)
MCH RBC QN AUTO: 28 PG (ref 27–31)
MCHC RBC AUTO-ENTMCNC: 31 G/DL (ref 31–36)
MCV RBC AUTO: 89 FL (ref 80–97)
PLATELET # BLD AUTO: 457 10^3/UL (ref 150–450)
POTASSIUM SERPL-SCNC: 3.6 MMOL/L (ref 3.5–5)
RBC # BLD AUTO: 3.18 10^6/UL (ref 4–5.4)
SODIUM SERPL-SCNC: 142 MMOL/L (ref 135–145)
WBC # BLD AUTO: 24.5 10^3/UL (ref 3.5–10.8)

## 2019-03-14 RX ADMIN — ASPIRIN SCH MG: 81 TABLET, COATED ORAL at 09:01

## 2019-03-14 RX ADMIN — ALBUTEROL SULFATE PRN MG: 2.5 SOLUTION RESPIRATORY (INHALATION) at 22:28

## 2019-03-14 RX ADMIN — DEXTROSE MONOHYDRATE PRN GM: 25 INJECTION, SOLUTION INTRAVENOUS at 01:29

## 2019-03-14 RX ADMIN — LIDOCAINE HYDROCHLORIDE SCH APPLIC: 20 JELLY TOPICAL at 15:07

## 2019-03-14 RX ADMIN — MOMETASONE FUROATE AND FORMOTEROL FUMARATE DIHYDRATE SCH: 200; 5 AEROSOL RESPIRATORY (INHALATION) at 21:13

## 2019-03-14 RX ADMIN — GABAPENTIN SCH MG: 100 CAPSULE ORAL at 13:37

## 2019-03-14 RX ADMIN — HYDROMORPHONE HYDROCHLORIDE PRN MG: 1 INJECTION, SOLUTION INTRAMUSCULAR; INTRAVENOUS; SUBCUTANEOUS at 00:37

## 2019-03-14 RX ADMIN — LIDOCAINE HYDROCHLORIDE SCH APPLIC: 20 JELLY TOPICAL at 19:39

## 2019-03-14 RX ADMIN — OXYCODONE HYDROCHLORIDE SCH MG: 5 SOLUTION ORAL at 12:05

## 2019-03-14 RX ADMIN — CALCITRIOL SCH MCG: 1 SOLUTION ORAL at 08:59

## 2019-03-14 RX ADMIN — HYDROMORPHONE HYDROCHLORIDE PRN MG: 1 INJECTION, SOLUTION INTRAMUSCULAR; INTRAVENOUS; SUBCUTANEOUS at 18:07

## 2019-03-14 RX ADMIN — HYDROMORPHONE HYDROCHLORIDE PRN MG: 1 INJECTION, SOLUTION INTRAMUSCULAR; INTRAVENOUS; SUBCUTANEOUS at 04:51

## 2019-03-14 RX ADMIN — SERTRALINE HYDROCHLORIDE SCH MG: 25 TABLET ORAL at 09:01

## 2019-03-14 RX ADMIN — HYDROMORPHONE HYDROCHLORIDE PRN MG: 1 INJECTION, SOLUTION INTRAMUSCULAR; INTRAVENOUS; SUBCUTANEOUS at 08:54

## 2019-03-14 RX ADMIN — MOMETASONE FUROATE AND FORMOTEROL FUMARATE DIHYDRATE SCH: 200; 5 AEROSOL RESPIRATORY (INHALATION) at 09:24

## 2019-03-14 RX ADMIN — PANTOPRAZOLE SODIUM SCH MG: 40 TABLET, DELAYED RELEASE ORAL at 09:01

## 2019-03-14 RX ADMIN — INSULIN LISPRO SCH UNITS: 100 INJECTION, SOLUTION INTRAVENOUS; SUBCUTANEOUS at 20:56

## 2019-03-14 RX ADMIN — INSULIN LISPRO SCH UNITS: 100 INJECTION, SOLUTION INTRAVENOUS; SUBCUTANEOUS at 12:05

## 2019-03-14 RX ADMIN — GABAPENTIN SCH MG: 100 CAPSULE ORAL at 09:01

## 2019-03-14 RX ADMIN — MUPIROCIN SCH APPLIC: 20 OINTMENT TOPICAL at 09:00

## 2019-03-14 RX ADMIN — OXYCODONE HYDROCHLORIDE SCH MG: 5 SOLUTION ORAL at 06:12

## 2019-03-14 RX ADMIN — HYDROMORPHONE HYDROCHLORIDE PRN MG: 1 INJECTION, SOLUTION INTRAMUSCULAR; INTRAVENOUS; SUBCUTANEOUS at 21:08

## 2019-03-14 RX ADMIN — ALBUTEROL SULFATE PRN MG: 2.5 SOLUTION RESPIRATORY (INHALATION) at 10:37

## 2019-03-14 RX ADMIN — Medication SCH: at 17:06

## 2019-03-14 RX ADMIN — SODIUM THIOSULFATE SCH MLS/HR: 250 INJECTION, SOLUTION INTRAVENOUS at 20:16

## 2019-03-14 RX ADMIN — INSULIN LISPRO SCH: 100 INJECTION, SOLUTION INTRAVENOUS; SUBCUTANEOUS at 16:57

## 2019-03-14 RX ADMIN — CALCITRIOL SCH MCG: 1 SOLUTION ORAL at 13:37

## 2019-03-14 RX ADMIN — METHADONE HYDROCHLORIDE PRN MG: 5 TABLET ORAL at 03:02

## 2019-03-14 RX ADMIN — INSULIN LISPRO SCH UNITS: 100 INJECTION, SOLUTION INTRAVENOUS; SUBCUTANEOUS at 09:00

## 2019-03-14 RX ADMIN — CALCITRIOL SCH MCG: 1 SOLUTION ORAL at 20:58

## 2019-03-14 RX ADMIN — GABAPENTIN SCH MG: 100 CAPSULE ORAL at 20:57

## 2019-03-14 RX ADMIN — METHADONE HYDROCHLORIDE SCH MG: 10 TABLET ORAL at 18:42

## 2019-03-14 RX ADMIN — ALBUTEROL SULFATE PRN MG: 2.5 SOLUTION RESPIRATORY (INHALATION) at 01:50

## 2019-03-14 NOTE — PN
Subjective


Interval History: 





Started on IV sodium thiosulfate yesterday. Tolerated treatment well. Still 

with severe pain in legs - seen by pain management. 





Holding warfarin tonight given high INR and likely skin biopsy tomorrow. Intact 

PTH resulted 51.


 





Family History: Unchanged from Admission


Social History: Unchanged from Admission


Past Medical History: Unchanged from Admission





Objective


Active Medications: 








Acetaminophen (Tylenol Tab*)  650 mg PO Q6H PRN


   PRN Reason: PAIN


Al Hydrox/Mg Hydrox/Simethicone (Maalox Plus*)  30 ml PO Q6H PRN


   PRN Reason: DYSPEPSIA


   Last Admin: 03/13/19 05:22 Dose:  30 ml


Albuterol (Ventolin 2.5 Mg/3 Ml Neb.Sol*)  2.5 mg INH Q4HR PRN


   PRN Reason: SOB/WHEEZING


   Last Admin: 03/14/19 10:37 Dose:  2.5 mg


Aspirin (Aspirin Ec Tab*)  81 mg PO DAILY [facility]


   Last Admin: 03/14/19 09:01 Dose:  81 mg


Calcitriol (Calcitriol Soln 1 Mcg/Ml Oralsyr)  0.25 mcg FEED TUBE TID [facility]


   Last Admin: 03/14/19 13:37 Dose:  0.25 mcg


Dextrose (D50w Syringe 50 Ml*)  12.5 gm IV PUSH .FOR FS < 60 - SS PRN


   PRN Reason: FS < 60


   Last Admin: 03/14/19 01:29 Dose:  12.5 gm


Gabapentin (Neurontin Cap(*))  200 mg PO TID [facility]


   Last Admin: 03/14/19 13:37 Dose:  200 mg


Hydromorphone HCl (Dilaudid Inj1s*)  1.5 mg IV Q3H PRN


   PRN Reason: SEVERE PAIN


   Last Admin: 03/14/19 18:07 Dose:  1.5 mg


Sodium Thiosulfate 25 gm/ (Sodium Chloride)  200 mls @ 200 mls/hr IV DAILY@1900 

[facility]


   Last Admin: 03/13/19 19:41 Dose:  200 mls/hr


Insulin Human Lispro (Humalog*)  0 units SUBCUT ACHS [facility]; Protocol


   Last Admin: 03/14/19 16:57 Dose:  Not Given


Lidocaine HCl (Lidocaine 2% Jelly*)  1 applic TOPICAL TID [facility]


   Last Admin: 03/14/19 15:07 Dose:  1 applic


Methadone HCl (Dolophine Tab*)  10 mg PO Q8H [facility]


   Last Admin: 03/14/19 18:42 Dose:  10 mg


Mometasone Furoate/Formoterol Fumar (Dulera 200/5 Mdi*)  2 puff INH BID [facility]; 

Protocol


   Last Admin: 03/14/19 09:24 Dose:  Not Given


Mupirocin (Bactroban 2 % Oint*)  1 applic TOPICAL DAILY [facility]


   Last Admin: 03/14/19 09:00 Dose:  1 applic


Ondansetron HCl (Zofran Inj*)  4 mg IV Q6H PRN


   PRN Reason: NAUSEA


Pantoprazole Sodium (Protonix Tab*)  40 mg PO DAILY [facility]


   Last Admin: 03/14/19 09:01 Dose:  40 mg


Pharmacy Profile Note (Coumadin Per Pharmacy*)  1 note FOLLOW UP 1700 [facility]


   Last Admin: 03/14/19 17:06 Dose:  Not Given


Sertraline HCl (Zoloft*)  25 mg PO DAILY [facility]


   Last Admin: 03/14/19 09:01 Dose:  25 mg








 Vital Signs - 8 hr











  03/14/19 03/14/19 03/14/19





  11:39 12:05 12:10


 


Temperature   


 


Pulse Rate   118


 


Respiratory 16 18 16





Rate   


 


Blood Pressure   103/33





(mmHg)   


 


O2 Sat by Pulse   100





Oximetry   














  03/14/19 03/14/19 03/14/19





  12:36 13:37 14:06


 


Temperature   


 


Pulse Rate   


 


Respiratory 18 18 18





Rate   


 


Blood Pressure   





(mmHg)   


 


O2 Sat by Pulse   





Oximetry   














  03/14/19 03/14/19 03/14/19





  15:56 16:29 18:07


 


Temperature 97.7 F  


 


Pulse Rate 117  


 


Respiratory 20 16 16





Rate   


 


Blood Pressure   





(mmHg)   


 


O2 Sat by Pulse   





Oximetry   














  03/14/19





  18:42


 


Temperature 


 


Pulse Rate 


 


Respiratory 18





Rate 


 


Blood Pressure 





(mmHg) 


 


O2 Sat by Pulse 





Oximetry 











Oxygen Devices in Use Now: Nasal Cannula


Appearance: not in acute distress, nontoxic, conversant; partner Raymundo at 

bedside helping to clean patient


Ears/Nose/Mouth/Throat: Mucous Membranes Moist


Respiratory: - - clear anteriorly


Cardiovascular: RRR


Abdominal: NL Sounds; No Tenderness; No Distention


Skin: - - LE indurated violaceous rash unchanged


Neurological: Alert and Oriented x 3


Result Diagrams: 


 03/14/19 05:10





 03/14/19 05:10


Microbiology and Other Data: 


 Microbiology











 03/05/19 23:00 Nasal Screen MRSA (PCR) - Final





 Nasal    Mrsa Not Detected














Assess/Plan/Problems-Billing


Assessment: 





38W with complicated medical history including ESRD on PD, PAD s/p femoral 

stents on warfarin, COPD with chronic resp failure and active smoking on 5L O2, 

IDDM, chronic pain on methadone, unconfirmed CAD, and recent admission at the 

end of February for PNA, returning with b/l thigh pain found with indurated 

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 

thiosulfate IV. With persistent leukocytosis (chronic), supratherapeutic INR. 

Hospital course complicated by respiratory failure likely from PNA requiring 

intubation, extubated on 3/11.


 





- Patient Problems


(1) Thigh pain


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 Derm and Renal 

following. Previously thought pain could be from severe PAD.


- consult placed to surgery for biopsy


- cont sodium tiosulfate 25g IV daily (3/13 - )


- follow BP and calcium levels closely   





(2) Chronic pain


Comment: 


- appreciate consult from Dr. Rivera


- home methadone increased to 10mg q8h prn


- rescue with hydromorphone 1.5mg IV q3h prn


- cont home gabapentin   





(3) ESRD on peritoneal dialysis


Comment: On peritoneal dialysis.


- cont PD, appreciate Dr. Thompson following


- cont calcitriol


- monitor BMP   





(4) PAD (peripheral artery disease)


Comment: s/p stent and complicated by DVTs. CT Aorta with run off this 

admission with moderate-severe R common femoral artery stenosis, severe b/l 

popliteal artery stenosis, near complete occlusion of distal L superficial fem 

artery stent.


- cont to dose warfarin by INR   





(5) Acute respiratory failure with hypoxia


Comment: She is back to her baseline O2 requirement of 5L continuously. 

Extubated 3/11. Thought to be from PNA and volume overload causing pulm 

congestion.


- s/p 7 days of Zosyn


- cont Dulera with albuterol prn   





(6) IDDM (insulin dependent diabetes mellitus)


Comment: HgbA1c 8.8%


- cont basal/bolus insulin    





(7) Essential hypertension


Comment: Low BPs this admission. 


- stop home losartan

## 2019-03-14 NOTE — CONSULT
Consult


Consult: 





INPATIENT PAIN CONSULTATION


Adri Masters is a 38 year old female with DM. She has ESRD as a result of her 

diabetes and hypertension and is on peritoneal dialysis. She has longstanding 

diabetic peripheral neuropathy and takes Methadone for that. She also has COPD 

and is on O2. She was admitted to the hospital on March 5 with extreme leg 

pain. She had an elevated WBC and an elevated CRP. She was started on IV 

antibiotics for putative pneumonia. She developed respiratory failure and was 

intubated. She was moved to the ICU. She had a CTA of her aorta with runoff and 

it showed significant arterial disease in her lower extremities. She had 

lesions over her thighs. It was felt she had calciphylaxis and she has been 

started on IV sodium thiosulfate. She has continued to report extreme pain in 

her thighs with hypersensitivity. She is on IV dilaudid 2 mg Q 4 PRN and 

Methadone 5 mg Q 8 PRN. I am asked to see her in consult. 





PAST MEDICAL HISTORY: DM, ESRD, Peripheral Neuropathy, COPD, HTN, CAD





 Allergies











Allergy/AdvReac Type Severity Reaction Status Date / Time


 


Adhesive Tape [Plastic Tape] Allergy Mild Blisters Verified 02/18/19 12:05


 


chlorhexidine Allergy  Rash Verified 03/11/19 19:17


 


doxycycline Allergy  Unknown Verified 02/18/19 12:05





   Reaction  





   Details  


 


erythromycin base Allergy  See Comment Verified 02/18/19 12:05


 


heparin Allergy  See Comment Verified 02/18/19 12:05


 


metoclopramide [From Reglan] Allergy  Hives Verified 02/18/19 12:05


 


niacin Allergy  Rash Verified 02/18/19 12:05


 


ropinirole [From Requip] Allergy  Hives Verified 02/18/19 12:05


 


metronidazole [From Flagyl] AdvReac  Nausea And Verified 02/25/19 13:15





   Vomiting  


 


Sulfa (Sulfonamide AdvReac  Vomiting Verified 02/25/19 13:15





Antibiotics)     








 Current Medications





Acetaminophen (Tylenol Tab*)  650 mg PO Q6H PRN


   PRN Reason: PAIN


Al Hydrox/Mg Hydrox/Simethicone (Maalox Plus*)  30 ml PO Q6H PRN


   PRN Reason: DYSPEPSIA


   Last Admin: 03/13/19 05:22 Dose:  30 ml


Albuterol (Ventolin 2.5 Mg/3 Ml Neb.Sol*)  2.5 mg INH Q4HR PRN


   PRN Reason: SOB/WHEEZING


   Last Admin: 03/14/19 10:37 Dose:  2.5 mg


Aspirin (Aspirin Ec Tab*)  81 mg PO DAILY [facility]


   Last Admin: 03/14/19 09:01 Dose:  81 mg


Calcitriol (Calcitriol Soln 1 Mcg/Ml Oralsyr)  0.25 mcg FEED TUBE TID [facility]


   Last Admin: 03/14/19 13:37 Dose:  0.25 mcg


Dextrose (D50w Syringe 50 Ml*)  12.5 gm IV PUSH .FOR FS < 60 - SS PRN


   PRN Reason: FS < 60


   Last Admin: 03/14/19 01:29 Dose:  12.5 gm


Gabapentin (Neurontin Cap(*))  200 mg PO TID [facility]


   Last Admin: 03/14/19 13:37 Dose:  200 mg


Hydromorphone HCl (Dilaudid Inj1s*)  2 mg IV Q4H PRN


   PRN Reason: SEVERE PAIN


   Last Admin: 03/14/19 13:37 Dose:  2 mg


Sodium Thiosulfate 25 gm/ (Sodium Chloride)  200 mls @ 200 mls/hr IV DAILY@1900 

[facility]


   Last Admin: 03/13/19 19:41 Dose:  200 mls/hr


Insulin Human Lispro (Humalog*)  0 units SUBCUT ACHS [facility]; Protocol


   Last Admin: 03/14/19 16:57 Dose:  Not Given


Lidocaine HCl (Lidocaine 2% Jelly*)  1 applic TOPICAL TID [facility]


   Last Admin: 03/14/19 15:07 Dose:  1 applic


Methadone HCl (Dolophine Tab*)  5 mg PO Q8H PRN


   PRN Reason: PAIN - MODERATE


   Last Admin: 03/14/19 03:02 Dose:  5 mg


Mometasone Furoate/Formoterol Fumar (Dulera 200/5 Mdi*)  2 puff INH BID [facility]; 

Protocol


   Last Admin: 03/14/19 09:24 Dose:  Not Given


Mupirocin (Bactroban 2 % Oint*)  1 applic TOPICAL DAILY [facility]


   Last Admin: 03/14/19 09:00 Dose:  1 applic


Ondansetron HCl (Zofran Inj*)  4 mg IV Q6H PRN


   PRN Reason: NAUSEA


Pantoprazole Sodium (Protonix Tab*)  40 mg PO DAILY [facility]


   Last Admin: 03/14/19 09:01 Dose:  40 mg


Pharmacy Profile Note (Coumadin Per Pharmacy*)  1 note FOLLOW UP 1700 [facility]


   Last Admin: 03/14/19 17:06 Dose:  Not Given


Sertraline HCl (Zoloft*)  25 mg PO DAILY [facility]


   Last Admin: 03/14/19 09:01 Dose:  25 mg





SOCIAL HISTORY: Lives with her boyfriend and mother in a trailer in [place]. 2 

PENNY. Denies alcohol or smoking. 





 Vital Signs











Temp Pulse Resp BP Pulse Ox


 


 97.7 F   117   16   103/33   100 


 


 03/14/19 15:56  03/14/19 15:56  03/14/19 16:29  03/14/19 12:10  03/14/19 12:10








EXAM: 


LUNGS: Mostly clear


HEART: reg rhythm


ABDOMEN: Soft


EXTREMITIES: Firm lesions over both thighs. 


NEUROLOGIC: Decreased sensation. Hard to test strength due to pain. 





ASSESSMENT: 


1. Calciphylaxis


2. ESRD on PD





PLAN: I think she might do well with routine Methadone. I will order 10 mg Q 8. 

Will adjust her IV dilaudid to 1.5 mg IV q3 PRN. I will follow. Thanks.

## 2019-03-15 LAB
ANION GAP SERPL CALC-SCNC: 29 MMOL/L (ref 2–11)
BUN SERPL-MCNC: 41 MG/DL (ref 6–24)
BUN/CREAT SERPL: 5.7 (ref 8–20)
CALCIUM SERPL-MCNC: 9.2 MG/DL (ref 8.6–10.3)
CHLORIDE SERPL-SCNC: 93 MMOL/L (ref 101–111)
GLUCOSE SERPL-MCNC: 54 MG/DL (ref 70–100)
HCO3 SERPL-SCNC: 22 MMOL/L (ref 22–32)
HCT VFR BLD AUTO: 25 % (ref 33–41)
HGB BLD-MCNC: 7.9 G/DL (ref 12–16)
INR PPP/BLD: 2.87 (ref 0.77–1.02)
MAGNESIUM SERPL-MCNC: 2.2 MG/DL (ref 1.9–2.7)
MCH RBC QN AUTO: 28 PG (ref 27–31)
MCHC RBC AUTO-ENTMCNC: 31 G/DL (ref 31–36)
MCV RBC AUTO: 89 FL (ref 80–97)
PLATELET # BLD AUTO: 413 10^3/UL (ref 150–450)
POTASSIUM SERPL-SCNC: 3.7 MMOL/L (ref 3.5–5)
RBC # BLD AUTO: 2.86 10^6 /UL (ref 3.7–4.87)
SODIUM SERPL-SCNC: 144 MMOL/L (ref 135–145)
WBC # BLD AUTO: 21 10^3/UL (ref 3.5–10.8)

## 2019-03-15 RX ADMIN — METHADONE HYDROCHLORIDE SCH MG: 10 TABLET ORAL at 02:13

## 2019-03-15 RX ADMIN — CALCITRIOL SCH: 1 SOLUTION ORAL at 22:28

## 2019-03-15 RX ADMIN — ALBUTEROL SULFATE PRN MG: 2.5 SOLUTION RESPIRATORY (INHALATION) at 21:49

## 2019-03-15 RX ADMIN — HYDROMORPHONE HYDROCHLORIDE PRN MG: 1 INJECTION, SOLUTION INTRAMUSCULAR; INTRAVENOUS; SUBCUTANEOUS at 06:54

## 2019-03-15 RX ADMIN — HYDROMORPHONE HYDROCHLORIDE PRN MG: 1 INJECTION, SOLUTION INTRAMUSCULAR; INTRAVENOUS; SUBCUTANEOUS at 00:18

## 2019-03-15 RX ADMIN — MOMETASONE FUROATE AND FORMOTEROL FUMARATE DIHYDRATE SCH PUFF: 200; 5 AEROSOL RESPIRATORY (INHALATION) at 20:06

## 2019-03-15 RX ADMIN — ONDANSETRON PRN MG: 2 INJECTION INTRAMUSCULAR; INTRAVENOUS at 03:57

## 2019-03-15 RX ADMIN — PANTOPRAZOLE SODIUM SCH MG: 40 TABLET, DELAYED RELEASE ORAL at 09:11

## 2019-03-15 RX ADMIN — GABAPENTIN SCH MG: 100 CAPSULE ORAL at 14:35

## 2019-03-15 RX ADMIN — ONDANSETRON PRN MG: 2 INJECTION INTRAMUSCULAR; INTRAVENOUS at 22:26

## 2019-03-15 RX ADMIN — ALBUTEROL SULFATE PRN MG: 2.5 SOLUTION RESPIRATORY (INHALATION) at 10:35

## 2019-03-15 RX ADMIN — LIDOCAINE HYDROCHLORIDE SCH APPLIC: 20 JELLY TOPICAL at 15:01

## 2019-03-15 RX ADMIN — MOMETASONE FUROATE AND FORMOTEROL FUMARATE DIHYDRATE SCH: 200; 5 AEROSOL RESPIRATORY (INHALATION) at 10:36

## 2019-03-15 RX ADMIN — INSULIN LISPRO SCH: 100 INJECTION, SOLUTION INTRAVENOUS; SUBCUTANEOUS at 17:59

## 2019-03-15 RX ADMIN — Medication SCH: at 17:34

## 2019-03-15 RX ADMIN — LIDOCAINE HYDROCHLORIDE SCH APPLIC: 20 JELLY TOPICAL at 20:20

## 2019-03-15 RX ADMIN — METHADONE HYDROCHLORIDE SCH MG: 10 TABLET ORAL at 19:25

## 2019-03-15 RX ADMIN — CALCITRIOL SCH MCG: 1 SOLUTION ORAL at 09:12

## 2019-03-15 RX ADMIN — ASPIRIN SCH MG: 81 TABLET, COATED ORAL at 09:09

## 2019-03-15 RX ADMIN — INSULIN LISPRO SCH: 100 INJECTION, SOLUTION INTRAVENOUS; SUBCUTANEOUS at 09:29

## 2019-03-15 RX ADMIN — CALCITRIOL SCH: 1 SOLUTION ORAL at 17:46

## 2019-03-15 RX ADMIN — GABAPENTIN SCH MG: 100 CAPSULE ORAL at 20:20

## 2019-03-15 RX ADMIN — CALCITRIOL SCH MCG: 1 SOLUTION ORAL at 22:28

## 2019-03-15 RX ADMIN — INSULIN LISPRO SCH: 100 INJECTION, SOLUTION INTRAVENOUS; SUBCUTANEOUS at 22:58

## 2019-03-15 RX ADMIN — GABAPENTIN SCH MG: 100 CAPSULE ORAL at 09:11

## 2019-03-15 RX ADMIN — HYDROMORPHONE HYDROCHLORIDE PRN MG: 1 INJECTION, SOLUTION INTRAMUSCULAR; INTRAVENOUS; SUBCUTANEOUS at 10:10

## 2019-03-15 RX ADMIN — HYDROMORPHONE HYDROCHLORIDE PRN MG: 1 INJECTION, SOLUTION INTRAMUSCULAR; INTRAVENOUS; SUBCUTANEOUS at 14:35

## 2019-03-15 RX ADMIN — HYDROMORPHONE HYDROCHLORIDE PRN MG: 1 INJECTION, SOLUTION INTRAMUSCULAR; INTRAVENOUS; SUBCUTANEOUS at 21:31

## 2019-03-15 RX ADMIN — HYDROMORPHONE HYDROCHLORIDE PRN MG: 1 INJECTION, SOLUTION INTRAMUSCULAR; INTRAVENOUS; SUBCUTANEOUS at 03:53

## 2019-03-15 RX ADMIN — LIDOCAINE HYDROCHLORIDE SCH APPLIC: 20 JELLY TOPICAL at 09:10

## 2019-03-15 RX ADMIN — SERTRALINE HYDROCHLORIDE SCH MG: 25 TABLET ORAL at 09:09

## 2019-03-15 RX ADMIN — MUPIROCIN SCH: 20 OINTMENT TOPICAL at 11:17

## 2019-03-15 RX ADMIN — INSULIN LISPRO SCH: 100 INJECTION, SOLUTION INTRAVENOUS; SUBCUTANEOUS at 13:21

## 2019-03-15 RX ADMIN — SODIUM THIOSULFATE SCH MLS/HR: 250 INJECTION, SOLUTION INTRAVENOUS at 20:38

## 2019-03-15 RX ADMIN — METHADONE HYDROCHLORIDE SCH MG: 10 TABLET ORAL at 09:08

## 2019-03-15 NOTE — CONS
CC:  primary care doctor; Dr. David Baker *

 

SURGICAL CONSULTATION REPORT:

 

DATE OF CONSULT:  03/15/19

 

HISTORY OF PRESENT ILLNESS:  I was contacted by the hospitalist service to 
evaluate Ms. Adri Masters for possible biopsy to rule out calciphylaxis or 
rule in calciphylaxis.  The patient has been admitted with significant leg pain
, led to some respiratory compromise requiring intubation and ICU care.  The 
patient ultimately came out to the floor where she has been followed.  She 
continues to have significant pain at the legs bilaterally, anterior thighs, 
and treated for the possibility of calciphylaxis with sodium thiosulfate.  Leg 
lesions were discussed with the dermatology group locally, who recommended 
biopsy.

 

PAST MEDICAL HISTORY:  Reviewed.

 

PHYSICAL EXAM:  The patient is afebrile.  Heart rate is variable between 80s 
and 120.  Mean arterial blood pressure has been in the 50s.  The patient is 
sleepy, but does ask questions and answers appropriately.  Bilateral lower 
extremities reveal hyperpigmented, indurated areas on the anterior thighs that 
are significantly tender to the touch without crepitance and without 
cellulitis.  No open wounds.

 

DIAGNOSTIC STUDIES/LAB DATA:  Labs reviewed show persistently elevated white 
blood cell count, INR of 2.9 today which shows improvement.  No imaging 
available.

 

IMPRESSION:  End-stage renal disease.  The patient with painful nonulcerating 
leg lesions that possibly could be consistent with calciphylaxis with the 
recommendation by the current providers to have the patient undergo biopsy to 
establish diagnosis.

 

I believe this is a strong likelihood of diagnosis and agree with the current 
treatment options.  This would require a trip to the operating room for a 
biopsy under anesthesia as these are very painful and deep tissue biopsy is 
required.  I discussed with the patient the risks of bleeding as well as 
infection and I believe we should not perform this procedure until the patient'
s INR is normalized.  We will hold off the procedure until Monday or Tuesday 
next week when we see INR normalizes.

 

 [id number]/[facility] #: [id number]

SAEED

## 2019-03-15 NOTE — PN
Subjective


Interval History: 





Pt with improvement in pain control. She is motivated to get out of bed to 

chair today. She states that she would be amenable to SNF or [facility] if her cat 

could visit - seems more amenable to exploring this option. Unsure if these 

facilities could administer IV sodium thiosulfate. 





Family History: Unchanged from Admission


Social History: Unchanged from Admission


Past Medical History: Unchanged from Admission





Objective


Active Medications: 








Acetaminophen (Tylenol Tab*)  650 mg PO Q6H PRN


   PRN Reason: PAIN


Al Hydrox/Mg Hydrox/Simethicone (Maalox Plus*)  30 ml PO Q6H PRN


   PRN Reason: DYSPEPSIA


   Last Admin: 03/13/19 05:22 Dose:  30 ml


Albuterol (Ventolin 2.5 Mg/3 Ml Neb.Sol*)  2.5 mg INH Q4HR PRN


   PRN Reason: SOB/WHEEZING


   Last Admin: 03/15/19 10:35 Dose:  2.5 mg


Aspirin (Aspirin Ec Tab*)  81 mg PO DAILY [facility]


   Last Admin: 03/15/19 09:09 Dose:  81 mg


Calcitriol (Calcitriol Soln 1 Mcg/Ml Oralsyr)  0.25 mcg PO TID [facility]


Dextrose (D50w Syringe 50 Ml*)  12.5 gm IV PUSH .FOR FS < 60 - SS PRN


   PRN Reason: FS < 60


   Last Admin: 03/14/19 01:29 Dose:  12.5 gm


Gabapentin (Neurontin Cap(*))  200 mg PO TID [facility]


   Last Admin: 03/15/19 14:35 Dose:  200 mg


Hydromorphone HCl (Dilaudid Inj1s*)  1.5 mg IV Q3H PRN


   PRN Reason: SEVERE PAIN


   Last Admin: 03/15/19 14:35 Dose:  1.5 mg


Sodium Thiosulfate 25 gm/ (Sodium Chloride)  200 mls @ 200 mls/hr IV DAILY@1900 

[facility]


   Last Admin: 03/14/19 20:16 Dose:  200 mls/hr


Insulin Human Lispro (Humalog*)  0 units SUBCUT ACHS [facility]; Protocol


   Last Admin: 03/15/19 17:59 Dose:  Not Given


Lidocaine HCl (Lidocaine 2% Jelly*)  1 applic TOPICAL TID [facility]


   Last Admin: 03/15/19 15:01 Dose:  1 applic


Methadone HCl (Dolophine Tab*)  10 mg PO Q8H [facility]


   Last Admin: 03/15/19 09:08 Dose:  10 mg


Mometasone Furoate/Formoterol Fumar (Dulera 200/5 Mdi*)  2 puff INH BID [facility]; 

Protocol


   Last Admin: 03/15/19 10:36 Dose:  Not Given


Mupirocin (Bactroban 2 % Oint*)  1 applic TOPICAL DAILY [facility]


   Last Admin: 03/15/19 11:17 Dose:  Not Given


Ondansetron HCl (Zofran Inj*)  4 mg IV Q6H PRN


   PRN Reason: NAUSEA


   Last Admin: 03/15/19 03:57 Dose:  4 mg


Pantoprazole Sodium (Protonix Tab*)  40 mg PO DAILY [facility]


   Last Admin: 03/15/19 09:11 Dose:  40 mg


Pharmacy Profile Note (Coumadin Per Pharmacy*)  1 note FOLLOW UP 1700 [facility]


   Last Admin: 03/15/19 17:34 Dose:  Not Given


Sertraline HCl (Zoloft*)  25 mg PO DAILY [facility]


   Last Admin: 03/15/19 09:09 Dose:  25 mg








 Vital Signs - 8 hr











  03/15/19 03/15/19 03/15/19





  10:37 11:15 11:18


 


Temperature  97.3 F 


 


Pulse Rate 79 120 


 


Respiratory 18 22 16





Rate   


 


Blood Pressure   





(mmHg)   


 


O2 Sat by Pulse 99 94 





Oximetry   














  03/15/19 03/15/19 03/15/19





  14:35 15:27 17:58


 


Temperature  97.8 F 


 


Pulse Rate  99 


 


Respiratory 16 20 16





Rate   


 


Blood Pressure  135/40 





(mmHg)   


 


O2 Sat by Pulse  89 





Oximetry   











Oxygen Devices in Use Now: Nasal Cannula


Appearance: appears comfortable, Raymundo at bedside comforting patient; she is 

friendly and conversant


Respiratory: - - clear anteriorly


Cardiovascular: RRR


Abdominal: NL Sounds; No Tenderness; No Distention


Skin: - - unchanged violaceous indurated plaque over thighs


Result Diagrams: 


 03/15/19 05:10





 03/15/19 05:10





Assess/Plan/Problems-Billing


Assessment: 





38W with complicated medical history including ESRD on PD, PAD s/p femoral 

stents on warfarin, COPD with chronic resp failure and active smoking on 5L O2, 

IDDM, chronic pain on methadone, unconfirmed CAD, and recent admission at the 

end of February for PNA, returning with b/l thigh pain found with indurated 

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 

thiosulfate IV. With persistent leukocytosis (chronic), supratherapeutic INR. 

Hospital course complicated by respiratory failure likely from PNA requiring 

intubation, extubated on 3/11.


 





- Patient Problems


(1) Thigh pain


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 Derm and Renal 

following. Previously thought pain could be from severe PAD.


- consult placed to surgery for biopsy - will do when INR improves


- cont sodium tiosulfate 25g IV daily (3/13 - )


- follow BP and calcium levels closely


- appreciate dermatology recs (paper chart)   





(2) Chronic pain


Comment: 


- appreciate consult from Dr. Rivera


- home methadone increased to 10mg q8h prn


- rescue with hydromorphone 1.5mg IV q3h prn


- cont home gabapentin   





(3) ESRD on peritoneal dialysis


Comment: On peritoneal dialysis.


- cont PD, appreciate Dr. Thompson following


- cont calcitriol


- monitor BMP   





(4) PAD (peripheral artery disease)


Comment: s/p stent and complicated by DVTs. CT Aorta with run off this 

admission with moderate-severe R common femoral artery stenosis, severe b/l 

popliteal artery stenosis, near complete occlusion of distal L superficial fem 

artery stent.


- cont to dose warfarin by INR   





(5) Acute respiratory failure with hypoxia


Comment: She is back to her baseline O2 requirement of 5L continuously. 

Extubated 3/11. Thought to be from PNA and volume overload causing pulm 

congestion.


- s/p 7 days of Zosyn


- cont Dulera with albuterol prn   





(6) IDDM (insulin dependent diabetes mellitus)


Comment: HgbA1c 8.8%


- cont basal/bolus insulin    





(7) Essential hypertension


Comment: Low BPs this admission. 


- stop home losartan   


Status and Disposition: 








Remains admitted. Pt will need evaluation by PT for discharge planning. Will 

need to find plan for administering IV sodium thiosulfate after discharge.

## 2019-03-16 LAB
ANION GAP SERPL CALC-SCNC: 37 MMOL/L (ref 2–11)
BUN SERPL-MCNC: 39 MG/DL (ref 6–24)
BUN/CREAT SERPL: 5.8 (ref 8–20)
CALCIUM SERPL-MCNC: 9.3 MG/DL (ref 8.6–10.3)
CHLORIDE SERPL-SCNC: 89 MMOL/L (ref 101–111)
GLUCOSE SERPL-MCNC: 121 MG/DL (ref 70–100)
HCO3 SERPL-SCNC: 19 MMOL/L (ref 22–32)
HCT VFR BLD AUTO: 26 % (ref 33–41)
HGB BLD-MCNC: 8 G/DL (ref 12–16)
INR PPP/BLD: 1.82 (ref 0.77–1.02)
MAGNESIUM SERPL-MCNC: 2.1 MG/DL (ref 1.9–2.7)
MCH RBC QN AUTO: 27 PG (ref 27–31)
MCHC RBC AUTO-ENTMCNC: 31 G/DL (ref 31–36)
MCV RBC AUTO: 88 FL (ref 80–97)
PLATELET # BLD AUTO: 523 10^3/UL (ref 150–450)
POTASSIUM SERPL-SCNC: 3.8 MMOL/L (ref 3.5–5)
RBC # BLD AUTO: 2.92 10^6 /UL (ref 3.7–4.87)
SODIUM SERPL-SCNC: 145 MMOL/L (ref 135–145)
WBC # BLD AUTO: 24 10^3/UL (ref 3.5–10.8)

## 2019-03-16 RX ADMIN — GABAPENTIN SCH MG: 100 CAPSULE ORAL at 20:27

## 2019-03-16 RX ADMIN — INSULIN LISPRO SCH UNITS: 100 INJECTION, SOLUTION INTRAVENOUS; SUBCUTANEOUS at 20:55

## 2019-03-16 RX ADMIN — METHADONE HYDROCHLORIDE SCH MG: 10 TABLET ORAL at 09:48

## 2019-03-16 RX ADMIN — LIDOCAINE HYDROCHLORIDE SCH APPLIC: 20 JELLY TOPICAL at 08:46

## 2019-03-16 RX ADMIN — ASPIRIN SCH MG: 81 TABLET, COATED ORAL at 08:30

## 2019-03-16 RX ADMIN — SODIUM THIOSULFATE SCH MLS/HR: 250 INJECTION, SOLUTION INTRAVENOUS at 20:55

## 2019-03-16 RX ADMIN — CALCITRIOL SCH MCG: 1 SOLUTION ORAL at 08:31

## 2019-03-16 RX ADMIN — METHADONE HYDROCHLORIDE SCH MG: 10 TABLET ORAL at 03:20

## 2019-03-16 RX ADMIN — SEVELAMER CARBONATE SCH MG: 800 TABLET, FILM COATED ORAL at 20:28

## 2019-03-16 RX ADMIN — GABAPENTIN SCH MG: 100 CAPSULE ORAL at 08:30

## 2019-03-16 RX ADMIN — MUPIROCIN SCH APPLIC: 20 OINTMENT TOPICAL at 08:46

## 2019-03-16 RX ADMIN — HYDROMORPHONE HYDROCHLORIDE PRN MG: 1 INJECTION, SOLUTION INTRAMUSCULAR; INTRAVENOUS; SUBCUTANEOUS at 12:01

## 2019-03-16 RX ADMIN — ONDANSETRON PRN MG: 2 INJECTION INTRAMUSCULAR; INTRAVENOUS at 12:09

## 2019-03-16 RX ADMIN — HYDROMORPHONE HYDROCHLORIDE PRN MG: 1 INJECTION, SOLUTION INTRAMUSCULAR; INTRAVENOUS; SUBCUTANEOUS at 20:30

## 2019-03-16 RX ADMIN — ONDANSETRON PRN MG: 2 INJECTION INTRAMUSCULAR; INTRAVENOUS at 20:55

## 2019-03-16 RX ADMIN — ALBUTEROL SULFATE PRN MG: 2.5 SOLUTION RESPIRATORY (INHALATION) at 14:50

## 2019-03-16 RX ADMIN — LIDOCAINE HYDROCHLORIDE SCH APPLIC: 20 JELLY TOPICAL at 14:05

## 2019-03-16 RX ADMIN — MOMETASONE FUROATE AND FORMOTEROL FUMARATE DIHYDRATE SCH PUFF: 200; 5 AEROSOL RESPIRATORY (INHALATION) at 08:05

## 2019-03-16 RX ADMIN — HYDROMORPHONE HYDROCHLORIDE PRN MG: 1 INJECTION, SOLUTION INTRAMUSCULAR; INTRAVENOUS; SUBCUTANEOUS at 05:17

## 2019-03-16 RX ADMIN — ALBUTEROL SULFATE PRN MG: 2.5 SOLUTION RESPIRATORY (INHALATION) at 21:48

## 2019-03-16 RX ADMIN — HYDROMORPHONE HYDROCHLORIDE PRN MG: 1 INJECTION, SOLUTION INTRAMUSCULAR; INTRAVENOUS; SUBCUTANEOUS at 15:06

## 2019-03-16 RX ADMIN — METHADONE HYDROCHLORIDE SCH MG: 10 TABLET ORAL at 22:01

## 2019-03-16 RX ADMIN — CALCITRIOL SCH MCG: 1 SOLUTION ORAL at 14:06

## 2019-03-16 RX ADMIN — LIDOCAINE HYDROCHLORIDE SCH APPLIC: 20 JELLY TOPICAL at 22:00

## 2019-03-16 RX ADMIN — MOMETASONE FUROATE AND FORMOTEROL FUMARATE DIHYDRATE SCH PUFF: 200; 5 AEROSOL RESPIRATORY (INHALATION) at 20:27

## 2019-03-16 RX ADMIN — PANTOPRAZOLE SODIUM SCH MG: 40 TABLET, DELAYED RELEASE ORAL at 08:30

## 2019-03-16 RX ADMIN — METHADONE HYDROCHLORIDE SCH MG: 10 TABLET ORAL at 17:09

## 2019-03-16 RX ADMIN — SERTRALINE HYDROCHLORIDE SCH MG: 25 TABLET ORAL at 08:30

## 2019-03-16 RX ADMIN — SODIUM THIOSULFATE SCH: 250 INJECTION, SOLUTION INTRAVENOUS at 19:31

## 2019-03-16 RX ADMIN — HYDROMORPHONE HYDROCHLORIDE PRN MG: 1 INJECTION, SOLUTION INTRAMUSCULAR; INTRAVENOUS; SUBCUTANEOUS at 00:46

## 2019-03-16 RX ADMIN — HYDROMORPHONE HYDROCHLORIDE PRN MG: 1 INJECTION, SOLUTION INTRAMUSCULAR; INTRAVENOUS; SUBCUTANEOUS at 08:31

## 2019-03-16 RX ADMIN — INSULIN LISPRO SCH UNITS: 100 INJECTION, SOLUTION INTRAVENOUS; SUBCUTANEOUS at 08:30

## 2019-03-16 RX ADMIN — INSULIN LISPRO SCH: 100 INJECTION, SOLUTION INTRAVENOUS; SUBCUTANEOUS at 12:00

## 2019-03-16 RX ADMIN — ONDANSETRON PRN MG: 2 INJECTION INTRAMUSCULAR; INTRAVENOUS at 05:16

## 2019-03-16 RX ADMIN — GABAPENTIN SCH MG: 100 CAPSULE ORAL at 14:05

## 2019-03-16 RX ADMIN — INSULIN LISPRO SCH UNITS: 100 INJECTION, SOLUTION INTRAVENOUS; SUBCUTANEOUS at 17:10

## 2019-03-16 NOTE — PN
Progress Note





- Progress Note


Date of Service: 03/16/19


Note: 





INPATIENT PAIN-PROGRESS NOTE


I met with Adri. She seems more comfortable on routine Methadone but she 

still notes pain is severe, though she does allow she is in slightly less pain 

than she was on Thursday. She hopes to have biopsy on Monday. She was able to 

transfer with a walker and CG/min assist. 





 Current Medications





Acetaminophen (Tylenol Tab*)  650 mg PO Q6H PRN


   PRN Reason: PAIN


   Last Admin: 03/16/19 14:04 Dose:  650 mg


Al Hydrox/Mg Hydrox/Simethicone (Maalox Plus*)  30 ml PO Q6H PRN


   PRN Reason: DYSPEPSIA


   Last Admin: 03/13/19 05:22 Dose:  30 ml


Albuterol (Ventolin 2.5 Mg/3 Ml Neb.Sol*)  2.5 mg INH Q4HR PRN


   PRN Reason: SOB/WHEEZING


   Last Admin: 03/16/19 14:50 Dose:  2.5 mg


Aspirin (Aspirin Ec Tab*)  81 mg PO DAILY [facility]


   Last Admin: 03/16/19 08:30 Dose:  81 mg


Calcitriol (Calcitriol Soln 1 Mcg/Ml Oralsyr)  0.25 mcg PO TID [facility]


   Last Admin: 03/16/19 14:06 Dose:  0.25 mcg


Dextrose (D50w Syringe 50 Ml*)  12.5 gm IV PUSH .FOR FS < 60 - SS PRN


   PRN Reason: FS < 60


   Last Admin: 03/14/19 01:29 Dose:  12.5 gm


Gabapentin (Neurontin Cap(*))  200 mg PO TID [facility]


   Last Admin: 03/16/19 14:05 Dose:  200 mg


Hydromorphone HCl (Dilaudid Inj1s*)  1.5 mg IV Q3H PRN


   PRN Reason: SEVERE PAIN


   Last Admin: 03/16/19 15:06 Dose:  1.5 mg


Sodium Thiosulfate 25 gm/ (Sodium Chloride)  200 mls @ 200 mls/hr IV DAILY@1900 

[facility]


   Last Admin: 03/15/19 20:38 Dose:  200 mls/hr


Insulin Human Lispro (Humalog*)  0 units SUBCUT ACHS [facility]; Protocol


   Last Admin: 03/16/19 12:00 Dose:  Not Given


Lidocaine HCl (Lidocaine 2% Jelly*)  1 applic TOPICAL TID [facility]


   Last Admin: 03/16/19 14:05 Dose:  1 applic


Methadone HCl (Dolophine Tab*)  10 mg PO Q8H [facility]


   Last Admin: 03/16/19 09:48 Dose:  10 mg


Mometasone Furoate/Formoterol Fumar (Dulera 200/5 Mdi*)  2 puff INH BID [facility]; 

Protocol


   Last Admin: 03/16/19 08:05 Dose:  2 puff


Mupirocin (Bactroban 2 % Oint*)  1 applic TOPICAL DAILY [facility]


   Last Admin: 03/16/19 08:46 Dose:  1 applic


Ondansetron HCl (Zofran Inj*)  4 mg IV Q6H PRN


   PRN Reason: NAUSEA


   Last Admin: 03/16/19 12:09 Dose:  4 mg


Pantoprazole Sodium (Protonix Tab*)  40 mg PO DAILY [facility]


   Last Admin: 03/16/19 08:30 Dose:  40 mg


Pharmacy Profile Note (Coumadin Per Pharmacy*)  1 note FOLLOW UP 1700 [facility]


   Last Admin: 03/15/19 17:34 Dose:  Not Given


Sertraline HCl (Zoloft*)  25 mg PO DAILY [facility]


   Last Admin: 03/16/19 08:30 Dose:  25 mg


Warfarin Sodium (Coumadin Tab(*))  2 mg PO 1700 [facility]


   Stop: 03/16/19 17:01


 Vital Signs











Temp Pulse Resp BP Pulse Ox


 


 97.3 F   126   16   133/59   98 


 


 03/16/19 12:01  03/16/19 14:50  03/16/19 15:06  03/16/19 12:01  03/16/19 14:50








EXAM: 


LUNGS: Clear


HEART: regular


ABDOMEN: Soft


EXTREMITIES: Lesions on legs about the same








ASSESSMENT: 


1. Calciphylaxis


2. ESRD on PD








PLAN: Increase Methadone to 10 mg PO Q6H and leave IV Dilaudid where it is. I'

ll check to see how she is doing Monday

## 2019-03-16 NOTE — PN
Hospitalist Progress Note


Date of Service: 03/16/19





I was called for an episode of BRBPR.  Blood is mixed in with stool and noted 

by RN to be bright red, unable to quantify.  BP is 118/52, HR is 125 and has 

been in the 120s all day. No abdominal pain reported.


A/P: Likely lower GI bleed in the setting of anticoagulation


Hemodynamically stable with good IV access (has a midline and a 20g); hold off 

on fluids since bp is stable and esrd  


Check stat CBC, will transfuse for goal >8 (h/o CAD) 


Can consider ffp and vitamin K if hgb dropping 


Already on protonix


Coumadin already on hold (for biopsy), am INR was 1.8.

## 2019-03-16 NOTE — PN
Subjective


Date of Service: 03/16/19


Interval History: 





Pt reports feeling very tired.c/o severe pain in the thighs


Family History: Unchanged from Admission


Social History: Unchanged from Admission


Past Medical History: Unchanged from Admission





Objective


Active Medications: 








Acetaminophen (Tylenol Tab*)  650 mg PO Q6H PRN


   PRN Reason: PAIN


   Last Admin: 03/16/19 14:04 Dose:  650 mg


Al Hydrox/Mg Hydrox/Simethicone (Maalox Plus*)  30 ml PO Q6H PRN


   PRN Reason: DYSPEPSIA


   Last Admin: 03/13/19 05:22 Dose:  30 ml


Albuterol (Ventolin 2.5 Mg/3 Ml Neb.Sol*)  2.5 mg INH Q4HR PRN


   PRN Reason: SOB/WHEEZING


   Last Admin: 03/16/19 14:50 Dose:  2.5 mg


Aspirin (Aspirin Ec Tab*)  81 mg PO DAILY [facility]


   Last Admin: 03/16/19 08:30 Dose:  81 mg


Dextrose (D50w Syringe 50 Ml*)  12.5 gm IV PUSH .FOR FS < 60 - SS PRN


   PRN Reason: FS < 60


   Last Admin: 03/14/19 01:29 Dose:  12.5 gm


Gabapentin (Neurontin Cap(*))  200 mg PO TID [facility]


   Last Admin: 03/16/19 14:05 Dose:  200 mg


Hydromorphone HCl (Dilaudid Inj1s*)  1.5 mg IV Q3H PRN


   PRN Reason: SEVERE PAIN


   Last Admin: 03/16/19 15:06 Dose:  1.5 mg


Sodium Thiosulfate 25 gm/ (Sodium Chloride)  200 mls @ 200 mls/hr IV DAILY@1900 

[facility]


   Last Admin: 03/15/19 20:38 Dose:  200 mls/hr


Insulin Human Lispro (Humalog*)  0 units SUBCUT ACHS [facility]; Protocol


   Last Admin: 03/16/19 12:00 Dose:  Not Given


Lidocaine HCl (Lidocaine 2% Jelly*)  1 applic TOPICAL TID [facility]


   Last Admin: 03/16/19 14:05 Dose:  1 applic


Methadone HCl (Dolophine Tab*)  10 mg PO Q6H [facility]


Mometasone Furoate/Formoterol Fumar (Dulera 200/5 Mdi*)  2 puff INH BID [facility]; 

Protocol


   Last Admin: 03/16/19 08:05 Dose:  2 puff


Mupirocin (Bactroban 2 % Oint*)  1 applic TOPICAL DAILY [facility]


   Last Admin: 03/16/19 08:46 Dose:  1 applic


Ondansetron HCl (Zofran Inj*)  4 mg IV Q6H PRN


   PRN Reason: NAUSEA


   Last Admin: 03/16/19 12:09 Dose:  4 mg


Pantoprazole Sodium (Protonix Tab*)  40 mg PO DAILY [facility]


   Last Admin: 03/16/19 08:30 Dose:  40 mg


Sertraline HCl (Zoloft*)  25 mg PO DAILY [facility]


   Last Admin: 03/16/19 08:30 Dose:  25 mg


Sevelamer Carbonate (Renvela Tab*)  800 mg PO TID [facility]








 Vital Signs - 8 hr











  03/16/19 03/16/19 03/16/19





  08:05 08:30 08:31


 


Temperature   


 


Pulse Rate 121  


 


Respiratory 22 20 20





Rate   


 


Blood Pressure   





(mmHg)   


 


O2 Sat by Pulse 100  





Oximetry   














  03/16/19 03/16/19 03/16/19





  08:36 09:37 09:48


 


Temperature 97.6 F  


 


Pulse Rate 71  


 


Respiratory 16 20 20





Rate   


 


Blood Pressure 139/51  





(mmHg)   


 


O2 Sat by Pulse 77  





Oximetry   














  03/16/19 03/16/19 03/16/19





  10:27 12:00 12:01


 


Temperature   97.3 F


 


Pulse Rate   111


 


Respiratory 16 20 16





Rate   


 


Blood Pressure   133/59





(mmHg)   


 


O2 Sat by Pulse   87





Oximetry   














  03/16/19 03/16/19 03/16/19





  14:05 14:50 15:06


 


Temperature   


 


Pulse Rate  126 


 


Respiratory 16 22 16





Rate   


 


Blood Pressure   





(mmHg)   


 


O2 Sat by Pulse  98 





Oximetry   











Oxygen Devices in Use Now: High Flow Nasal Cannula, Large Volume Nebulizer


Eyes: No Scleral Icterus


Neck: NL Appearance and Movements; NL JVP


Respiratory: Symmetrical Chest Expansion and Respiratory Effort


Cardiovascular: NL Sounds; No Murmurs; No JVD


Abdominal: NL Sounds; No Tenderness; No Distention


Extremities: - - non ulcerating rash purple noted on bilateral thighs


Skin: - - described above


Neurological: Alert and Oriented x 3


Result Diagrams: 


 03/16/19 05:17





 03/16/19 05:17


Microbiology and Other Data: 


 Microbiology











 03/05/19 23:00 Nasal Screen MRSA (PCR) - Final





 Nasal    Mrsa Not Detected














Assess/Plan/Problems-Billing


Assessment: 





38W with complicated medical history including ESRD on PD, PAD s/p femoral 

stents on warfarin, COPD with chronic resp failure and active smoking on 5L O2, 

IDDM, chronic pain on methadone, unconfirmed CAD, and recent admission at the 

end of February for PNA, returning with b/l thigh pain found with indurated 

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 

thiosulfate IV. With persistent leukocytosis (chronic), supratherapeutic INR. 

Hospital course complicated by respiratory failure likely from PNA requiring 

intubation, extubated on 3/11.


 





- Patient Problems


(1) Thigh pain


Current Visit: Yes   Status: Acute   Comment: Concern for calciphylaxis given 

physical exam findings. 


-Previously thought pain could be from severe PAD.


-Seen by Dr Hogan.Plan for biopsy on Mon/Tue when inr normalizes


- cont sodium tiosulfate 25g IV daily (3/13 - )


- appreciate dermatology recs (paper chart)


-will hold coumadin and follow inr   





(2) Calciphylaxis


Current Visit: Yes   Status: Acute   Code(s): E83.59 - OTHER DISORDERS OF 

CALCIUM METABOLISM   SNOMED Code(s): 241558006


   Comment: plan per above


biopsy pending


sodium thiosulfate started


Will stop vit d analogs, calcitriol


Coumadin predisposes and risk fac for calciphylaxis


coumadin necrosis also in differential


will wait on biopsy


poor candidate for coumadin if biopsy pos


would benefit from transitioning to noac/ eliquis


will check pth,ca phos


will add sevelemer ( non ca phos binder)


will stop calcitriol


if pth >300, can start cinacalcet


need to discuss with vascular about biopsy about switching to eliquis.   





(3) Chronic pain


Current Visit: Yes   Status: Acute   Code(s): G89.29 - OTHER CHRONIC PAIN   

SNOMED Code(s): 53038432


   Comment: 


- appreciate consult from Dr. Rivera


- home methadone increased to 10mg q8h prn


- rescue with hydromorphone 1.5mg IV q3h prn


- cont home gabapentin   





(4) ESRD on peritoneal dialysis


Current Visit: Yes   Status: Chronic   Priority: High   Code(s): N18.6 - END 

STAGE RENAL DISEASE; Z99.2 - DEPENDENCE ON RENAL DIALYSIS   SNOMED Code(s): 

53258153


   Comment: On peritoneal dialysis.


- cont PD, 


-plan per above


- monitor BMP   





(5) IDDM (insulin dependent diabetes mellitus)


Current Visit: Yes   Status: Chronic   Priority: High   Code(s): E11.9 - TYPE 2 

DIABETES MELLITUS WITHOUT COMPLICATIONS; Z79.4 - LONG TERM (CURRENT) USE OF 

INSULIN   SNOMED Code(s): 97073488


   Comment: HgbA1c 8.8%


- cont basal/bolus insulin    





(6) PAD (peripheral artery disease)


Current Visit: Yes   Status: Chronic   Code(s): I73.9 - PERIPHERAL VASCULAR 

DISEASE, UNSPECIFIED   SNOMED Code(s): 658633340


   Comment: s/p stent and complicated by DVTs. CT Aorta with run off this 

admission with moderate-severe R common femoral artery stenosis, severe b/l 

popliteal artery stenosis, near complete occlusion of distal L superficial fem 

artery stent.


-coumadin held for biopsy


-consider transition to noac if calciphylaxis pos   


Status and Disposition: 








Remains admitted. Pt will need evaluation by PT for discharge planning. Will 

need to find plan for administering IV sodium thiosulfate after discharge.

## 2019-03-17 LAB
ANION GAP SERPL CALC-SCNC: 39 MMOL/L (ref 2–11)
BASOPHILS # BLD AUTO: 0.1 10^3/UL (ref 0–0.2)
BASOPHILS # BLD AUTO: 0.1 10^3/UL (ref 0–0.2)
BUN SERPL-MCNC: 41 MG/DL (ref 6–24)
BUN/CREAT SERPL: 6.5 (ref 8–20)
CALCIUM SERPL-MCNC: 9.3 MG/DL (ref 8.6–10.3)
CHLORIDE SERPL-SCNC: 87 MMOL/L (ref 101–111)
EOSINOPHIL # BLD AUTO: 0.1 10^3/UL (ref 0–0.6)
EOSINOPHIL # BLD AUTO: 0.2 10^3/UL (ref 0–0.6)
GLUCOSE SERPL-MCNC: 268 MG/DL (ref 70–100)
HCO3 SERPL-SCNC: 17 MMOL/L (ref 22–32)
HCT VFR BLD AUTO: 23 % (ref 33–41)
HCT VFR BLD AUTO: 24 % (ref 33–41)
HCT VFR BLD AUTO: 25 % (ref 33–41)
HGB BLD-MCNC: 7.5 G/DL (ref 12–16)
HGB BLD-MCNC: 7.8 G/DL (ref 12–16)
HGB BLD-MCNC: 7.8 G/DL (ref 12–16)
INR PPP/BLD: 1.55 (ref 0.77–1.02)
LYMPHOCYTES # BLD AUTO: 1 10^3/UL (ref 1–4.8)
LYMPHOCYTES # BLD AUTO: 1.6 10^3/UL (ref 1–4.8)
MAGNESIUM SERPL-MCNC: 2.1 MG/DL (ref 1.9–2.7)
MCH RBC QN AUTO: 28 PG (ref 27–31)
MCH RBC QN AUTO: 28 PG (ref 27–31)
MCH RBC QN AUTO: 29 PG (ref 27–31)
MCHC RBC AUTO-ENTMCNC: 32 G/DL (ref 31–36)
MCHC RBC AUTO-ENTMCNC: 32 G/DL (ref 31–36)
MCHC RBC AUTO-ENTMCNC: 33 G/DL (ref 31–36)
MCV RBC AUTO: 87 FL (ref 80–97)
MCV RBC AUTO: 88 FL (ref 80–97)
MCV RBC AUTO: 89 FL (ref 80–97)
MONOCYTES # BLD AUTO: 0.8 10^3/UL (ref 0–0.8)
MONOCYTES # BLD AUTO: 0.9 10^3/UL (ref 0–0.8)
NEUTROPHILS # BLD AUTO: 14.5 10^3/UL (ref 1.5–7.7)
NEUTROPHILS # BLD AUTO: 18 10^3/UL (ref 1.5–7.7)
NRBC # BLD AUTO: 0 10^3/UL
NRBC # BLD AUTO: 0 10^3/UL
NRBC BLD QL AUTO: 0
NRBC BLD QL AUTO: 0.1
PLATELET # BLD AUTO: 476 10^3/UL (ref 150–450)
PLATELET # BLD AUTO: 491 10^3/UL (ref 150–450)
PLATELET # BLD AUTO: 508 10^3/UL (ref 150–450)
POTASSIUM SERPL-SCNC: 3.8 MMOL/L (ref 3.5–5)
RBC # BLD AUTO: 2.63 10^6 /UL (ref 3.7–4.87)
RBC # BLD AUTO: 2.72 10^6 /UL (ref 3.7–4.87)
RBC # BLD AUTO: 2.78 10^6 /UL (ref 3.7–4.87)
SODIUM SERPL-SCNC: 143 MMOL/L (ref 135–145)
WBC # BLD AUTO: 17.2 10^3/UL (ref 3.5–10.8)
WBC # BLD AUTO: 18.9 10^3/UL (ref 3.5–10.8)
WBC # BLD AUTO: 19.9 10^3/UL (ref 3.5–10.8)

## 2019-03-17 PROCEDURE — 30233L1 TRANSFUSION OF NONAUTOLOGOUS FRESH PLASMA INTO PERIPHERAL VEIN, PERCUTANEOUS APPROACH: ICD-10-PCS | Performed by: HOSPITALIST

## 2019-03-17 PROCEDURE — 30233N1 TRANSFUSION OF NONAUTOLOGOUS RED BLOOD CELLS INTO PERIPHERAL VEIN, PERCUTANEOUS APPROACH: ICD-10-PCS | Performed by: HOSPITALIST

## 2019-03-17 RX ADMIN — SODIUM THIOSULFATE SCH MLS/HR: 250 INJECTION, SOLUTION INTRAVENOUS at 20:21

## 2019-03-17 RX ADMIN — ALBUTEROL SULFATE PRN MG: 2.5 SOLUTION RESPIRATORY (INHALATION) at 13:17

## 2019-03-17 RX ADMIN — PANTOPRAZOLE SODIUM SCH MG: 40 INJECTION, POWDER, FOR SOLUTION INTRAVENOUS at 07:51

## 2019-03-17 RX ADMIN — MUPIROCIN SCH APPLIC: 20 OINTMENT TOPICAL at 11:05

## 2019-03-17 RX ADMIN — HYDROMORPHONE HYDROCHLORIDE PRN MG: 1 INJECTION, SOLUTION INTRAMUSCULAR; INTRAVENOUS; SUBCUTANEOUS at 21:08

## 2019-03-17 RX ADMIN — GABAPENTIN SCH MG: 100 CAPSULE ORAL at 07:54

## 2019-03-17 RX ADMIN — HYDROMORPHONE HYDROCHLORIDE PRN MG: 1 INJECTION, SOLUTION INTRAMUSCULAR; INTRAVENOUS; SUBCUTANEOUS at 08:11

## 2019-03-17 RX ADMIN — METHADONE HYDROCHLORIDE SCH: 10 TABLET ORAL at 18:29

## 2019-03-17 RX ADMIN — METHADONE HYDROCHLORIDE SCH MG: 10 TABLET ORAL at 09:59

## 2019-03-17 RX ADMIN — ONDANSETRON PRN MG: 2 INJECTION INTRAMUSCULAR; INTRAVENOUS at 03:57

## 2019-03-17 RX ADMIN — INSULIN LISPRO SCH: 100 INJECTION, SOLUTION INTRAVENOUS; SUBCUTANEOUS at 16:55

## 2019-03-17 RX ADMIN — HYDROMORPHONE HYDROCHLORIDE PRN MG: 1 INJECTION, SOLUTION INTRAMUSCULAR; INTRAVENOUS; SUBCUTANEOUS at 14:33

## 2019-03-17 RX ADMIN — SEVELAMER CARBONATE SCH MG: 800 TABLET, FILM COATED ORAL at 14:26

## 2019-03-17 RX ADMIN — OXYMETAZOLINE HCL SCH: 0.05 SPRAY NASAL at 10:00

## 2019-03-17 RX ADMIN — LIDOCAINE HYDROCHLORIDE SCH: 20 JELLY TOPICAL at 21:42

## 2019-03-17 RX ADMIN — MOMETASONE FUROATE AND FORMOTEROL FUMARATE DIHYDRATE SCH PUFF: 200; 5 AEROSOL RESPIRATORY (INHALATION) at 19:25

## 2019-03-17 RX ADMIN — IPRATROPIUM BROMIDE AND ALBUTEROL SULFATE PRN NEB: .5; 3 SOLUTION RESPIRATORY (INHALATION) at 22:25

## 2019-03-17 RX ADMIN — GABAPENTIN SCH MG: 100 CAPSULE ORAL at 21:17

## 2019-03-17 RX ADMIN — HYDROMORPHONE HYDROCHLORIDE PRN MG: 1 INJECTION, SOLUTION INTRAMUSCULAR; INTRAVENOUS; SUBCUTANEOUS at 04:51

## 2019-03-17 RX ADMIN — OXYMETAZOLINE HCL SCH SPR: 0.05 SPRAY NASAL at 01:41

## 2019-03-17 RX ADMIN — ALBUTEROL SULFATE PRN MG: 2.5 SOLUTION RESPIRATORY (INHALATION) at 04:02

## 2019-03-17 RX ADMIN — PANTOPRAZOLE SODIUM SCH MLS/HR: 40 INJECTION, POWDER, FOR SOLUTION INTRAVENOUS at 12:46

## 2019-03-17 RX ADMIN — SERTRALINE HYDROCHLORIDE SCH MG: 25 TABLET ORAL at 07:54

## 2019-03-17 RX ADMIN — LIDOCAINE HYDROCHLORIDE SCH APPLIC: 20 JELLY TOPICAL at 10:00

## 2019-03-17 RX ADMIN — PANTOPRAZOLE SODIUM SCH MG: 40 INJECTION, POWDER, FOR SOLUTION INTRAVENOUS at 01:42

## 2019-03-17 RX ADMIN — HYDROMORPHONE HYDROCHLORIDE PRN MG: 1 INJECTION, SOLUTION INTRAMUSCULAR; INTRAVENOUS; SUBCUTANEOUS at 10:58

## 2019-03-17 RX ADMIN — ONDANSETRON PRN MG: 2 INJECTION INTRAMUSCULAR; INTRAVENOUS at 21:15

## 2019-03-17 RX ADMIN — ONDANSETRON PRN MG: 2 INJECTION INTRAMUSCULAR; INTRAVENOUS at 08:18

## 2019-03-17 RX ADMIN — GABAPENTIN SCH MG: 100 CAPSULE ORAL at 14:08

## 2019-03-17 RX ADMIN — MOMETASONE FUROATE AND FORMOTEROL FUMARATE DIHYDRATE SCH PUFF: 200; 5 AEROSOL RESPIRATORY (INHALATION) at 08:08

## 2019-03-17 RX ADMIN — HYDROMORPHONE HYDROCHLORIDE PRN MG: 1 INJECTION, SOLUTION INTRAMUSCULAR; INTRAVENOUS; SUBCUTANEOUS at 01:42

## 2019-03-17 RX ADMIN — METHADONE HYDROCHLORIDE SCH MG: 10 TABLET ORAL at 21:17

## 2019-03-17 RX ADMIN — INSULIN LISPRO SCH UNITS: 100 INJECTION, SOLUTION INTRAVENOUS; SUBCUTANEOUS at 08:12

## 2019-03-17 RX ADMIN — INSULIN LISPRO SCH UNITS: 100 INJECTION, SOLUTION INTRAVENOUS; SUBCUTANEOUS at 21:51

## 2019-03-17 RX ADMIN — ASPIRIN SCH MG: 81 TABLET, COATED ORAL at 07:54

## 2019-03-17 RX ADMIN — SEVELAMER CARBONATE SCH MG: 800 TABLET, FILM COATED ORAL at 09:59

## 2019-03-17 RX ADMIN — OXYMETAZOLINE HCL SCH: 0.05 SPRAY NASAL at 20:58

## 2019-03-17 RX ADMIN — INSULIN LISPRO SCH: 100 INJECTION, SOLUTION INTRAVENOUS; SUBCUTANEOUS at 11:53

## 2019-03-17 RX ADMIN — LIDOCAINE HYDROCHLORIDE SCH APPLIC: 20 JELLY TOPICAL at 14:08

## 2019-03-17 RX ADMIN — METHADONE HYDROCHLORIDE SCH MG: 10 TABLET ORAL at 03:24

## 2019-03-17 NOTE — PN
Hospitalist Progress Note


Date of Service: 03/17/19





Called for hemoptysis.  I came to see Adri, she is indeed coughing up bright 

red blood and blood streaked sputum with clots. She otherwise had no 

complaints.  Denies shortness of breath, chest pain. She had had a nosebleed 

earlier in the evening.  


BP is 142/80s, HR 120s.


Alert, no distress, breathing comfortably.  Lungs have a few scattered wheezes, 

good air movement.  Tachycardic.  


A: I suspect the hemoptysis is related to the nosebleed a few hours ago, but 

given her history and comorbidities, certainly concern for a pulmonary or 

cardiac origin.  will get a stat cxr to further evaluate. 


P: stat CXR, protonix, afrin, 1U PRBCs, 1U FFP. 


Will transfer to ICU for more frequent vitals and ability for bronch/endoscopy 

if she decompensates

## 2019-03-17 NOTE — PN
Subjective


Date of Service: 03/17/19


Interval History: 





Transferred to ICU overnight.


Rectal bleeding and then noted to have hemoptysis.On talking to the pt reports 

that she vomitted blood mixed with food and was not coughing up blood.Reports 

that she has been nauseous.Had blood in stool but pt thinks it was sec to 

severe constipation.Denies any abd pain this am except feeling nauseous


Family History: Unchanged from Admission


Social History: Unchanged from Admission


Past Medical History: Unchanged from Admission





Objective


Active Medications: 








Acetaminophen (Tylenol Tab*)  650 mg PO Q6H PRN


   PRN Reason: PAIN


   Last Admin: 03/16/19 14:04 Dose:  650 mg


Al Hydrox/Mg Hydrox/Simethicone (Maalox Plus*)  30 ml PO Q6H PRN


   PRN Reason: DYSPEPSIA


   Last Admin: 03/13/19 05:22 Dose:  30 ml


Albuterol (Ventolin 2.5 Mg/3 Ml Neb.Sol*)  2.5 mg INH Q4HR PRN


   PRN Reason: SOB/WHEEZING


   Last Admin: 03/17/19 13:17 Dose:  2.5 mg


Aspirin (Aspirin Ec Tab*)  81 mg PO DAILY [facility]


   Last Admin: 03/17/19 07:54 Dose:  81 mg


Dextrose (D50w Syringe 50 Ml*)  12.5 gm IV PUSH .FOR FS < 60 - SS PRN


   PRN Reason: FS < 60


   Last Admin: 03/14/19 01:29 Dose:  12.5 gm


Gabapentin (Neurontin Cap(*))  200 mg PO TID [facility]


   Last Admin: 03/17/19 14:08 Dose:  200 mg


Hydromorphone HCl (Dilaudid Inj1s*)  1.5 mg IV Q3H PRN


   PRN Reason: SEVERE PAIN


   Last Admin: 03/17/19 14:33 Dose:  1.5 mg


Sodium Thiosulfate 25 gm/ (Sodium Chloride)  200 mls @ 200 mls/hr IV DAILY@2000 

[facility]


   Last Admin: 03/16/19 20:55 Dose:  200 mls/hr


Pantoprazole Sodium (Protonix Iv Bag*)  80 mg in 250 mls @ 25 mls/hr IVPB Q10H 

[facility]


   Last Admin: 03/17/19 12:46 Dose:  25 mls/hr


Insulin Human Lispro (Humalog*)  0 units SUBCUT ACHS [facility]; Protocol


   Last Admin: 03/17/19 11:53 Dose:  Not Given


Lidocaine HCl (Lidocaine 2% Jelly*)  1 applic TOPICAL TID [facility]


   Last Admin: 03/17/19 14:08 Dose:  1 applic


Methadone HCl (Dolophine Tab*)  10 mg PO Q6H [facility]


   Last Admin: 03/17/19 09:59 Dose:  10 mg


Mometasone Furoate/Formoterol Fumar (Dulera 200/5 Mdi*)  2 puff INH BID [facility]; 

Protocol


   Last Admin: 03/17/19 08:08 Dose:  2 puff


Mupirocin (Bactroban 2 % Oint*)  1 applic TOPICAL DAILY [facility]


   Last Admin: 03/17/19 11:05 Dose:  1 applic


Ondansetron HCl (Zofran Inj*)  4 mg IV Q6H PRN


   PRN Reason: NAUSEA


   Last Admin: 03/17/19 08:18 Dose:  4 mg


Oxymetazoline HCl (Afrin 0.05% Nasal Spray*)  2 spray BOTH NARES BID [facility]


   Last Admin: 03/17/19 10:00 Dose:  Not Given


Sertraline HCl (Zoloft*)  25 mg PO DAILY [facility]


   Last Admin: 03/17/19 07:54 Dose:  25 mg


Sevelamer Carbonate (Renvela Tab*)  800 mg PO TID [facility]


   Last Admin: 03/17/19 14:26 Dose:  800 mg








 Vital Signs - 8 hr











  03/17/19 03/17/19 03/17/19





  08:00 08:04 08:11


 


Temperature   


 


Pulse Rate 112  


 


Respiratory 20  14





Rate   


 


Blood Pressure   





(mmHg)   


 


O2 Sat by Pulse 100 99 





Oximetry   














  03/17/19 03/17/19 03/17/19





  09:00 09:01 09:59


 


Temperature   


 


Pulse Rate 112 115 


 


Respiratory 20 18 21





Rate   


 


Blood Pressure  113/47 





(mmHg)   


 


O2 Sat by Pulse 98 100 





Oximetry   














  03/17/19 03/17/19 03/17/19





  10:00 10:01 10:10


 


Temperature   98.8 F


 


Pulse Rate 112 115 


 


Respiratory 23 15 





Rate   


 


Blood Pressure  137/94 





(mmHg)   


 


O2 Sat by Pulse 75 87 





Oximetry   














  03/17/19 03/17/19 03/17/19





  10:58 11:00 11:01


 


Temperature   


 


Pulse Rate  116 113


 


Respiratory 17 15 20





Rate   


 


Blood Pressure   124/78





(mmHg)   


 


O2 Sat by Pulse  98 96





Oximetry   














  03/17/19 03/17/19 03/17/19





  12:00 12:01 13:00


 


Temperature   


 


Pulse Rate  116 


 


Respiratory 20 16 24





Rate   


 


Blood Pressure  120/41 





(mmHg)   


 


O2 Sat by Pulse  86 





Oximetry   














  03/17/19 03/17/19 03/17/19





  13:01 13:12 13:15


 


Temperature  98.3 F 


 


Pulse Rate 118  115


 


Respiratory 15  20





Rate   


 


Blood Pressure 110/69  117/67





(mmHg)   


 


O2 Sat by Pulse 90  85





Oximetry   














  03/17/19 03/17/19 03/17/19





  13:19 14:00 14:01


 


Temperature   


 


Pulse Rate 115 119 118


 


Respiratory 18 17 20





Rate   


 


Blood Pressure  89/62 





(mmHg)   


 


O2 Sat by Pulse 97 88 92





Oximetry   














  03/17/19 03/17/19 03/17/19





  14:16 14:19 14:33


 


Temperature   


 


Pulse Rate 122 123 


 


Respiratory 29 17 18





Rate   


 


Blood Pressure 108/83 90/66 





(mmHg)   


 


O2 Sat by Pulse 85 88 





Oximetry   














  03/17/19 03/17/19 03/17/19





  14:40 15:00 15:02


 


Temperature   


 


Pulse Rate 123 117 119


 


Respiratory 20 15 13





Rate   


 


Blood Pressure   91/63





(mmHg)   


 


O2 Sat by Pulse 89 87 87





Oximetry   











Oxygen Devices in Use Now: Nasal Cannula - 5L


Eyes: No Scleral Icterus


Respiratory: Symmetrical Chest Expansion and Respiratory Effort, - - bilateral 

scattered wheezes


Cardiovascular: NL Sounds; No Murmurs; No JVD


Extremities: - - purple non ulcerated skin lesions on bilateral thighs


Neurological: Alert and Oriented x 3


Result Diagrams: 


 03/17/19 08:00





 03/17/19 08:00


Microbiology and Other Data: 


 Microbiology











 03/05/19 23:00 Nasal Screen MRSA (PCR) - Final





 Nasal    Mrsa Not Detected














Assess/Plan/Problems-Billing


Assessment: 





38W with complicated medical history including ESRD on PD, PAD s/p femoral 

stents on warfarin, COPD with chronic resp failure and active smoking on 5L O2, 

IDDM, chronic pain on methadone, unconfirmed CAD, and recent admission at the 

end of February for PNA, returning with b/l thigh pain found with indurated 

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 

thiosulfate IV. With persistent leukocytosis (chronic), supratherapeutic INR. 

Hospital course complicated by respiratory failure likely from PNA requiring 

intubation, extubated on 3/11.


 





- Patient Problems


(1) Thigh pain


Current Visit: Yes   Status: Acute   Comment: Concern for calciphylaxis given 

physical exam findings. 


-Previously thought pain could be from severe PAD.


-Seen by Dr Hogan.Plan for biopsy on Mon/Tue when inr normalizes


- cont sodium tiosulfate 25g IV daily (3/13 - )


- appreciate dermatology recs (paper chart)


-coumadin held and follow inr   





(2) Calciphylaxis


Current Visit: Yes   Status: Acute   Code(s): E83.59 - OTHER DISORDERS OF 

CALCIUM METABOLISM   SNOMED Code(s): 881566583


   Comment: plan per above


biopsy pending


sodium thiosulfate started


Will stop vit d analogs, calcitriol


Coumadin predisposes and risk fac for calciphylaxis


coumadin necrosis also in differential


will wait on biopsy


poor candidate for coumadin if biopsy pos


would benefit from transitioning to noac/ eliquis


will check pth,ca phos


will add sevelemer ( non ca phos binder)


will stop calcitriol


if pth >300, can start cinacalcet


need to discuss with vascular about biopsy about switching to eliquis.   





(3) Chronic pain


Current Visit: Yes   Status: Acute   Code(s): G89.29 - OTHER CHRONIC PAIN   

SNOMED Code(s): 11078254


   Comment: 


- appreciate consult from Dr. Rivera


- home methadone increased to 10mg q8h prn


- rescue with hydromorphone 1.5mg IV q3h prn


- cont home gabapentin   





(4) ESRD on peritoneal dialysis


Current Visit: Yes   Status: Chronic   Priority: High   Code(s): N18.6 - END 

STAGE RENAL DISEASE; Z99.2 - DEPENDENCE ON RENAL DIALYSIS   SNOMED Code(s): 

02954324


   Comment: On peritoneal dialysis.


- cont PD, 


-plan per above


- monitor BMP   





(5) IDDM (insulin dependent diabetes mellitus)


Current Visit: Yes   Status: Chronic   Priority: High   Code(s): E11.9 - TYPE 2 

DIABETES MELLITUS WITHOUT COMPLICATIONS; Z79.4 - LONG TERM (CURRENT) USE OF 

INSULIN   SNOMED Code(s): 04835360


   Comment: HgbA1c 8.8%


- cont basal/bolus insulin    





(6) PAD (peripheral artery disease)


Current Visit: Yes   Status: Chronic   Code(s): I73.9 - PERIPHERAL VASCULAR 

DISEASE, UNSPECIFIED   SNOMED Code(s): 594583938


   Comment: s/p stent and complicated by DVTs. CT Aorta with run off this 

admission with moderate-severe R common femoral artery stenosis, severe b/l 

popliteal artery stenosis, near complete occlusion of distal L superficial fem 

artery stent.


-coumadin held for biopsy


-consider transition to noac if calciphylaxis pos   





(7) GI bleed


Current Visit: Yes   Status: Acute   Code(s): K92.2 - GASTROINTESTINAL 

HEMORRHAGE, UNSPECIFIED   SNOMED Code(s): 05197554


   Comment: Transferred to ICU


Mid drop in hb


s/p 1 unit PRBC,FFP,protonix


hb currently stable


will start on ppi drip and can transition to po ppi in 24h based on symptoms.If 

hb falls, will consult GI


Coumadin on hold


will repeat cbc and pt inr later in the day   


Status and Disposition: 








Remains admitted. Pt will need evaluation by PT for discharge planning. Will 

need to find plan for administering IV sodium thiosulfate after discharge.

## 2019-03-18 LAB
ANION GAP SERPL CALC-SCNC: 39 MMOL/L (ref 2–11)
BASOPHILS # BLD AUTO: 0.1 10^3/UL (ref 0–0.2)
BUN SERPL-MCNC: 42 MG/DL (ref 6–24)
BUN/CREAT SERPL: 6.5 (ref 8–20)
CALCIUM SERPL-MCNC: 9.2 MG/DL (ref 8.6–10.3)
CHLORIDE SERPL-SCNC: 88 MMOL/L (ref 101–111)
EOSINOPHIL # BLD AUTO: 0 10^3/UL (ref 0–0.6)
GLUCOSE SERPL-MCNC: 189 MG/DL (ref 70–100)
HCO3 SERPL-SCNC: 17 MMOL/L (ref 22–32)
HCT VFR BLD AUTO: 25 % (ref 33–41)
HGB BLD-MCNC: 7.5 G/DL (ref 12–16)
INR PPP/BLD: 1.68 (ref 0.77–1.02)
LYMPHOCYTES # BLD AUTO: 0.9 10^3/UL (ref 1–4.8)
MAGNESIUM SERPL-MCNC: 2.1 MG/DL (ref 1.9–2.7)
MCH RBC QN AUTO: 27 PG (ref 27–31)
MCHC RBC AUTO-ENTMCNC: 31 G/DL (ref 31–36)
MCV RBC AUTO: 89 FL (ref 80–97)
MONOCYTES # BLD AUTO: 0.7 10^3/UL (ref 0–0.8)
NEUTROPHILS # BLD AUTO: 22.5 10^3/UL (ref 1.5–7.7)
NRBC # BLD AUTO: 0 10^3/UL
NRBC BLD QL AUTO: 0.1
PLATELET # BLD AUTO: 492 10^3/UL (ref 150–450)
POTASSIUM SERPL-SCNC: 4.3 MMOL/L (ref 3.5–5)
RBC # BLD AUTO: 2.77 10^6 /UL (ref 3.7–4.87)
SODIUM SERPL-SCNC: 144 MMOL/L (ref 135–145)
WBC # BLD AUTO: 24.2 10^3/UL (ref 3.5–10.8)

## 2019-03-18 PROCEDURE — 0JBM0ZX EXCISION OF LEFT UPPER LEG SUBCUTANEOUS TISSUE AND FASCIA, OPEN APPROACH, DIAGNOSTIC: ICD-10-PCS | Performed by: SURGERY

## 2019-03-18 RX ADMIN — DEXTROSE MONOHYDRATE PRN GM: 25 INJECTION, SOLUTION INTRAVENOUS at 19:55

## 2019-03-18 RX ADMIN — HYDROMORPHONE HYDROCHLORIDE PRN MG: 1 INJECTION, SOLUTION INTRAMUSCULAR; INTRAVENOUS; SUBCUTANEOUS at 18:05

## 2019-03-18 RX ADMIN — HYDROMORPHONE HYDROCHLORIDE PRN MG: 1 INJECTION, SOLUTION INTRAMUSCULAR; INTRAVENOUS; SUBCUTANEOUS at 02:16

## 2019-03-18 RX ADMIN — GABAPENTIN SCH MG: 100 CAPSULE ORAL at 08:07

## 2019-03-18 RX ADMIN — IPRATROPIUM BROMIDE AND ALBUTEROL SULFATE PRN NEB: .5; 3 SOLUTION RESPIRATORY (INHALATION) at 08:20

## 2019-03-18 RX ADMIN — ONDANSETRON PRN MG: 2 INJECTION INTRAMUSCULAR; INTRAVENOUS at 08:07

## 2019-03-18 RX ADMIN — MOMETASONE FUROATE AND FORMOTEROL FUMARATE DIHYDRATE SCH PUFF: 200; 5 AEROSOL RESPIRATORY (INHALATION) at 08:20

## 2019-03-18 RX ADMIN — OXYMETAZOLINE HCL SCH SPR: 0.05 SPRAY NASAL at 08:09

## 2019-03-18 RX ADMIN — ASPIRIN SCH MG: 81 TABLET, COATED ORAL at 08:07

## 2019-03-18 RX ADMIN — IPRATROPIUM BROMIDE AND ALBUTEROL SULFATE PRN NEB: .5; 3 SOLUTION RESPIRATORY (INHALATION) at 20:36

## 2019-03-18 RX ADMIN — INSULIN LISPRO SCH: 100 INJECTION, SOLUTION INTRAVENOUS; SUBCUTANEOUS at 08:08

## 2019-03-18 RX ADMIN — SODIUM THIOSULFATE SCH MLS/HR: 250 INJECTION, SOLUTION INTRAVENOUS at 20:04

## 2019-03-18 RX ADMIN — METHADONE HYDROCHLORIDE SCH: 10 TABLET ORAL at 00:32

## 2019-03-18 RX ADMIN — SEVELAMER CARBONATE SCH MG: 800 TABLET, FILM COATED ORAL at 08:07

## 2019-03-18 RX ADMIN — PANTOPRAZOLE SODIUM SCH MLS/HR: 40 INJECTION, POWDER, FOR SOLUTION INTRAVENOUS at 08:33

## 2019-03-18 RX ADMIN — LIDOCAINE HYDROCHLORIDE SCH APPLIC: 20 JELLY TOPICAL at 08:33

## 2019-03-18 RX ADMIN — HYDROMORPHONE HYDROCHLORIDE PRN MG: 1 INJECTION, SOLUTION INTRAMUSCULAR; INTRAVENOUS; SUBCUTANEOUS at 08:08

## 2019-03-18 RX ADMIN — INSULIN LISPRO SCH UNITS: 100 INJECTION, SOLUTION INTRAVENOUS; SUBCUTANEOUS at 14:05

## 2019-03-18 RX ADMIN — HYDROMORPHONE HYDROCHLORIDE PRN MG: 1 INJECTION, SOLUTION INTRAMUSCULAR; INTRAVENOUS; SUBCUTANEOUS at 14:40

## 2019-03-18 RX ADMIN — LIDOCAINE HYDROCHLORIDE SCH APPLIC: 20 JELLY TOPICAL at 14:06

## 2019-03-18 RX ADMIN — SERTRALINE HYDROCHLORIDE SCH MG: 25 TABLET ORAL at 08:07

## 2019-03-18 RX ADMIN — HYDROMORPHONE HYDROCHLORIDE PRN MG: 1 INJECTION, SOLUTION INTRAMUSCULAR; INTRAVENOUS; SUBCUTANEOUS at 11:21

## 2019-03-18 RX ADMIN — PANTOPRAZOLE SODIUM SCH MLS/HR: 40 INJECTION, POWDER, FOR SOLUTION INTRAVENOUS at 00:28

## 2019-03-18 RX ADMIN — INSULIN LISPRO SCH: 100 INJECTION, SOLUTION INTRAVENOUS; SUBCUTANEOUS at 16:44

## 2019-03-18 RX ADMIN — INSULIN LISPRO SCH: 100 INJECTION, SOLUTION INTRAVENOUS; SUBCUTANEOUS at 21:40

## 2019-03-18 RX ADMIN — MOMETASONE FUROATE AND FORMOTEROL FUMARATE DIHYDRATE SCH PUFF: 200; 5 AEROSOL RESPIRATORY (INHALATION) at 20:36

## 2019-03-18 RX ADMIN — METHADONE HYDROCHLORIDE SCH MG: 10 TABLET ORAL at 16:56

## 2019-03-18 RX ADMIN — METHADONE HYDROCHLORIDE SCH: 10 TABLET ORAL at 04:50

## 2019-03-18 RX ADMIN — SEVELAMER CARBONATE SCH: 800 TABLET, FILM COATED ORAL at 00:32

## 2019-03-18 RX ADMIN — MUPIROCIN SCH: 20 OINTMENT TOPICAL at 08:39

## 2019-03-18 RX ADMIN — GABAPENTIN SCH MG: 100 CAPSULE ORAL at 14:05

## 2019-03-18 RX ADMIN — SEVELAMER CARBONATE SCH MG: 800 TABLET, FILM COATED ORAL at 14:05

## 2019-03-18 RX ADMIN — METHADONE HYDROCHLORIDE SCH MG: 10 TABLET ORAL at 10:32

## 2019-03-18 NOTE — OP
CC:  Dr. David Baker; Primary Care Doctor *

 

DATE OF OPERATION:  03/18/19 - ROOM #451

 

DATE OF BRITH:  [birth year]

 

SURGEON:  Charles Hogan MD

 

ASSISTANT:  None.

 

ANESTHESIOLOGIST:  Dr. Ku.

 

ANESTHESIA:  Local MAC.

 

PRE-OP DIAGNOSIS:  Painful skin lesion, rule out calciphylaxis.

 

POST-OP DIAGNOSIS:  Painful skin lesion, rule out calciphylaxis.

 

OPERATIVE PROCEDURE:  Incisional biopsy of left thigh lesion.

 

SPECIMEN:  Deep skin biopsy.

 

DESCRIPTION OF PROCEDURE:  Ms. Masters is a 38-year-old female with end-stage 
renal disease, with painful bilateral lower extremity skin changes with 
ulceration that is being treated and worked up for possibility of 
calciphylaxis.  She had been on Coumadin.  This was held and it was recommended 
by the Dermatology and hospitalist service to perform biopsies.  The case was 
discussed with Nephrology.

 

The patient was marked, brought to the operating room and on the patient's bed, 
her left thigh was prepped and draped in a standard surgical fashion.  Time-out 
was performed.

 

A lidocaine was injected for a local block overlying the indurated area 
adjacent to a necrotic appearing ulcer.  A 2 cm incision was made in an 
elliptical fashion taking skin down to deep subcutaneous tissue, but not to the 
muscle fascia.  This was passed off and not oriented.  The wound was irrigated 
and reapproximated with 3- 0 chromic sutures followed by sterile dressing.  The 
patient tolerated the procedure well.

 

 [id number]/CPS #: [id number]

MTDD

## 2019-03-18 NOTE — BRIEFOPN
Brief Operative Note





- Surgery


Procedures: 


Procedure Note





pre op dx: painful skin lesion


Post op dx: same





Procedure: incisional biopsy L thigh





Surgeon : Samira


asst: none





rogelio: local mac





specimen: deep skin biopsy

## 2019-03-18 NOTE — PN
Subjective


Interval History: 





Pt had LE lesion biopsy by surgery today. Tolerated procedure well.





Raymundo (pt's partner) reports her LE pain is improved. 





No further episodes of bloody cough/emesis or BM. Suspicious that pt's nose 

bleed was actual cause of symptoms and not new GI bleed. Will tx out of ICU 

given stable Hgb.





Family History: Unchanged from Admission


Social History: Unchanged from Admission


Past Medical History: Unchanged from Admission





Objective


Active Medications: 








Acetaminophen (Tylenol Tab*)  650 mg PO Q6H PRN


   PRN Reason: PAIN


   Last Admin: 03/16/19 14:04 Dose:  650 mg


Al Hydrox/Mg Hydrox/Simethicone (Maalox Plus*)  30 ml PO Q6H PRN


   PRN Reason: DYSPEPSIA


   Last Admin: 03/13/19 05:22 Dose:  30 ml


Albuterol (Ventolin 2.5 Mg/3 Ml Neb.Sol*)  2.5 mg INH Q4HR PRN


   PRN Reason: SOB/WHEEZING


   Last Admin: 03/17/19 13:17 Dose:  2.5 mg


Albuterol/Ipratropium (Duoneb (Albuterol 2.5 Mg/Ipratropium 0.5 Mg))  1 neb INH 

Q4H PRN


   PRN Reason: SOB/WHEEZING


   Last Admin: 03/18/19 20:36 Dose:  1 neb


Aspirin (Aspirin Ec Tab*)  81 mg PO DAILY [facility]


   Last Admin: 03/18/19 08:07 Dose:  81 mg


Dextrose (D50w Syringe 50 Ml*)  12.5 gm IV PUSH .FOR FS < 60 - SS PRN


   PRN Reason: FS < 60


   Last Admin: 03/18/19 19:55 Dose:  12.5 gm


Gabapentin (Neurontin Cap(*))  200 mg PO TID [facility]


   Last Admin: 03/18/19 14:05 Dose:  200 mg


Hydromorphone HCl (Dilaudid Inj1s*)  1.5 mg IV Q3H PRN


   PRN Reason: SEVERE PAIN


   Last Admin: 03/18/19 18:05 Dose:  1.5 mg


Sodium Thiosulfate 25 gm/ (Sodium Chloride)  200 mls @ 200 mls/hr IV DAILY@2000 

[facility]


   Last Admin: 03/18/19 20:04 Dose:  200 mls/hr


Insulin Human Lispro (Humalog*)  0 units SUBCUT ACHS [facility]; Protocol


   Last Admin: 03/18/19 21:40 Dose:  Not Given


Lidocaine HCl (Lidocaine 2% Jelly*)  1 applic TOPICAL TID [facility]


   Last Admin: 03/18/19 14:06 Dose:  1 applic


Methadone HCl (Dolophine Tab*)  10 mg PO Q6H [facility]


   Last Admin: 03/18/19 16:56 Dose:  10 mg


Mometasone Furoate/Formoterol Fumar (Dulera 200/5 Mdi*)  2 puff INH BID [facility]; 

Protocol


   Last Admin: 03/18/19 20:36 Dose:  2 puff


Mupirocin (Bactroban 2 % Oint*)  1 applic TOPICAL DAILY [facility]


   Last Admin: 03/18/19 08:39 Dose:  Not Given


Ondansetron HCl (Zofran Inj*)  4 mg IV Q6H PRN


   PRN Reason: NAUSEA


   Last Admin: 03/18/19 08:07 Dose:  4 mg


Oxymetazoline HCl (Afrin 0.05% Nasal Spray*)  2 spray BOTH NARES BID [facility]


   Last Admin: 03/18/19 08:09 Dose:  2 spr


Sertraline HCl (Zoloft*)  25 mg PO DAILY [facility]


   Last Admin: 03/18/19 08:07 Dose:  25 mg


Sevelamer Carbonate (Renvela Tab*)  800 mg PO TID [facility]


   Last Admin: 03/18/19 14:05 Dose:  800 mg








 Vital Signs - 8 hr











  03/18/19 03/18/19 03/18/19





  14:00 14:40 14:41


 


Pulse Rate 168  


 


Respiratory 16 23 23





Rate   


 


O2 Sat by Pulse   





Oximetry   














  03/18/19 03/18/19 03/18/19





  15:52 16:56 18:05


 


Pulse Rate   


 


Respiratory 18 20 16





Rate   


 


O2 Sat by Pulse 91  





Oximetry   














  03/18/19 03/18/19 03/18/19





  18:56 19:05 20:39


 


Pulse Rate   122


 


Respiratory 16 18 20





Rate   


 


O2 Sat by Pulse   92





Oximetry   














  03/18/19





  20:44


 


Pulse Rate 


 


Respiratory 





Rate 


 


O2 Sat by Pulse 92





Oximetry 











Oxygen Devices in Use Now: Nasal Cannula


Appearance: comfortable, calm, conversant


Respiratory: Clear to Auscultation


Cardiovascular: RRR


Extremities: - - LE lesions unchanged


Result Diagrams: 


 03/18/19 04:43





 03/18/19 04:43


Microbiology and Other Data: 


 Microbiology











 03/05/19 23:00 Nasal Screen MRSA (PCR) - Final





 Nasal    Mrsa Not Detected














Assess/Plan/Problems-Billing


Assessment: 





38W with complicated medical history including ESRD on PD, PAD s/p femoral 

stents on warfarin, COPD with chronic resp failure and active smoking on 5L O2, 

IDDM, chronic pain on methadone, unconfirmed CAD, and recent admission at the 

end of February for PNA, returning with b/l thigh pain found with indurated 

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 

thiosulfate IV. Hospital course complicated by respiratory failure likely from 

PNA requiring intubation, extubated on 3/11.


 





- Patient Problems


(1) Calciphylaxis


Comment: s/p skin biopsy by surgery (Dr. Waters) on 3/18. Warfarin necrosis also 

in ddx - would be poor candidate for warfarin.


- cont IV sodium thiosulfate (3/13 - )


- would benefit from transitioning to noac/ eliquis


- if pth >300, can start cinacalcet


- need to discuss with vascular about biopsy about switching to eliquis.   





(2) Chronic pain


Comment: 


- appreciate consult from Dr. Rivera


- home methadone increased to 10mg q8h prn


- rescue with hydromorphone 1.5mg IV q3h prn


- cont home gabapentin   





(3) ESRD on peritoneal dialysis


Comment: On peritoneal dialysis.


- cont PD 


- given calciphlyaxis concern, stopped vit D analogues/calcitriol, started 

sevelemer


- monitor BMP   





(4) PAD (peripheral artery disease)


Comment: s/p stent and complicated by DVTs. CT Aorta with run off this 

admission with moderate-severe R common femoral artery stenosis, severe b/l 

popliteal artery stenosis, near complete occlusion of distal L superficial fem 

artery stent.


-coumadin held for biopsy


-consider transition to noac if calciphylaxis pos   





(5) Acute respiratory failure with hypoxia


Comment: She is back to her baseline O2 requirement of 5L continuously. 

Extubated 3/11. Thought to be from PNA and volume overload causing pulm 

congestion.


- s/p 7 days of Zosyn


- cont Dulera with albuterol prn   





(6) IDDM (insulin dependent diabetes mellitus)


Comment: HgbA1c 8.8%


- cont basal/bolus insulin    





(7) Essential hypertension


Comment: Low BPs this admission. 


- stop home losartan   





(8) GI bleed


Comment: Likely this is clots from recent nose bleed. Hgb stable.


- tx out of ICU


- switch IV PPI to PO BID and monitor


- off AC   


Status and Disposition: 








Remains admitted. Pt will need evaluation by PT for discharge planning. Will 

need to find plan for administering IV sodium thiosulfate after discharge.

## 2019-03-19 LAB
ANION GAP SERPL CALC-SCNC: 40 MMOL/L (ref 2–11)
BASOPHILS # BLD AUTO: 0.1 10^3/UL (ref 0–0.2)
BUN SERPL-MCNC: 38 MG/DL (ref 6–24)
BUN/CREAT SERPL: 6.1 (ref 8–20)
CALCIUM SERPL-MCNC: 9.6 MG/DL (ref 8.6–10.3)
CHLORIDE SERPL-SCNC: 88 MMOL/L (ref 101–111)
EOSINOPHIL # BLD AUTO: 0 10^3/UL (ref 0–0.6)
GLUCOSE SERPL-MCNC: 250 MG/DL (ref 70–100)
HCO3 SERPL-SCNC: 16 MMOL/L (ref 22–32)
HCT VFR BLD AUTO: 25 % (ref 33–41)
HGB BLD-MCNC: 7.8 G/DL (ref 12–16)
LYMPHOCYTES # BLD AUTO: 0.5 10^3/UL (ref 1–4.8)
MAGNESIUM SERPL-MCNC: 2.2 MG/DL (ref 1.9–2.7)
MCH RBC QN AUTO: 28 PG (ref 27–31)
MCHC RBC AUTO-ENTMCNC: 32 G/DL (ref 31–36)
MCV RBC AUTO: 88 FL (ref 80–97)
MONOCYTES # BLD AUTO: 0.6 10^3/UL (ref 0–0.8)
NEUTROPHILS # BLD AUTO: 27.6 10^3/UL (ref 1.5–7.7)
NRBC # BLD AUTO: 0.1 10^3/UL
NRBC BLD QL AUTO: 0.2
PLATELET # BLD AUTO: 538 10^3/UL (ref 150–450)
POTASSIUM SERPL-SCNC: 4.5 MMOL/L (ref 3.5–5)
RBC # BLD AUTO: 2.81 10^6 /UL (ref 3.7–4.87)
SODIUM SERPL-SCNC: 144 MMOL/L (ref 135–145)
WBC # BLD AUTO: 28.7 10^3/UL (ref 3.5–10.8)

## 2019-03-19 PROCEDURE — 5A09457 ASSISTANCE WITH RESPIRATORY VENTILATION, 24-96 CONSECUTIVE HOURS, CONTINUOUS POSITIVE AIRWAY PRESSURE: ICD-10-PCS | Performed by: HOSPITALIST

## 2019-03-19 RX ADMIN — INSULIN LISPRO SCH UNITS: 100 INJECTION, SOLUTION INTRAVENOUS; SUBCUTANEOUS at 21:45

## 2019-03-19 RX ADMIN — DEXTROSE MONOHYDRATE PRN GM: 25 INJECTION, SOLUTION INTRAVENOUS at 13:44

## 2019-03-19 RX ADMIN — ASPIRIN SCH: 81 TABLET, COATED ORAL at 08:19

## 2019-03-19 RX ADMIN — LIDOCAINE HYDROCHLORIDE SCH: 20 JELLY TOPICAL at 14:31

## 2019-03-19 RX ADMIN — MIDODRINE HYDROCHLORIDE SCH MG: 5 TABLET ORAL at 21:46

## 2019-03-19 RX ADMIN — ACETYLCYSTEINE SCH MG: 200 INHALANT RESPIRATORY (INHALATION) at 08:25

## 2019-03-19 RX ADMIN — LIDOCAINE HYDROCHLORIDE SCH APPLIC: 20 JELLY TOPICAL at 10:45

## 2019-03-19 RX ADMIN — GABAPENTIN SCH: 100 CAPSULE ORAL at 00:20

## 2019-03-19 RX ADMIN — METHADONE HYDROCHLORIDE SCH: 10 TABLET ORAL at 00:21

## 2019-03-19 RX ADMIN — INSULIN LISPRO SCH UNITS: 100 INJECTION, SOLUTION INTRAVENOUS; SUBCUTANEOUS at 17:31

## 2019-03-19 RX ADMIN — SERTRALINE HYDROCHLORIDE SCH: 25 TABLET ORAL at 08:20

## 2019-03-19 RX ADMIN — MUPIROCIN SCH: 20 OINTMENT TOPICAL at 10:46

## 2019-03-19 RX ADMIN — OXYMETAZOLINE HCL SCH: 0.05 SPRAY NASAL at 00:20

## 2019-03-19 RX ADMIN — SEVELAMER CARBONATE SCH: 800 TABLET, FILM COATED ORAL at 00:42

## 2019-03-19 RX ADMIN — CINACALCET HYDROCHLORIDE SCH MG: 30 TABLET, COATED ORAL at 21:49

## 2019-03-19 RX ADMIN — INSULIN LISPRO SCH: 100 INJECTION, SOLUTION INTRAVENOUS; SUBCUTANEOUS at 12:30

## 2019-03-19 RX ADMIN — SEVELAMER CARBONATE SCH: 800 TABLET, FILM COATED ORAL at 08:20

## 2019-03-19 RX ADMIN — METHADONE HYDROCHLORIDE SCH: 10 TABLET ORAL at 08:18

## 2019-03-19 RX ADMIN — PANTOPRAZOLE SODIUM SCH MG: 40 TABLET, DELAYED RELEASE ORAL at 21:45

## 2019-03-19 RX ADMIN — SEVELAMER CARBONATE SCH MG: 800 TABLET, FILM COATED ORAL at 21:46

## 2019-03-19 RX ADMIN — IPRATROPIUM BROMIDE AND ALBUTEROL SULFATE SCH NEB: .5; 3 SOLUTION RESPIRATORY (INHALATION) at 19:52

## 2019-03-19 RX ADMIN — ACETYLCYSTEINE SCH MG: 200 INHALANT RESPIRATORY (INHALATION) at 12:40

## 2019-03-19 RX ADMIN — IPRATROPIUM BROMIDE AND ALBUTEROL SULFATE SCH: .5; 3 SOLUTION RESPIRATORY (INHALATION) at 10:01

## 2019-03-19 RX ADMIN — IPRATROPIUM BROMIDE AND ALBUTEROL SULFATE SCH NEB: .5; 3 SOLUTION RESPIRATORY (INHALATION) at 08:23

## 2019-03-19 RX ADMIN — SEVELAMER CARBONATE SCH: 800 TABLET, FILM COATED ORAL at 13:33

## 2019-03-19 RX ADMIN — PANTOPRAZOLE SODIUM SCH: 40 TABLET, DELAYED RELEASE ORAL at 08:19

## 2019-03-19 RX ADMIN — IPRATROPIUM BROMIDE AND ALBUTEROL SULFATE SCH NEB: .5; 3 SOLUTION RESPIRATORY (INHALATION) at 12:40

## 2019-03-19 RX ADMIN — METHADONE HYDROCHLORIDE SCH: 10 TABLET ORAL at 00:20

## 2019-03-19 RX ADMIN — APIXABAN SCH MG: 2.5 TABLET, FILM COATED ORAL at 21:45

## 2019-03-19 RX ADMIN — OXYMETAZOLINE HCL SCH: 0.05 SPRAY NASAL at 08:19

## 2019-03-19 RX ADMIN — INSULIN LISPRO SCH: 100 INJECTION, SOLUTION INTRAVENOUS; SUBCUTANEOUS at 08:18

## 2019-03-19 RX ADMIN — LIDOCAINE HYDROCHLORIDE SCH: 20 JELLY TOPICAL at 00:18

## 2019-03-19 RX ADMIN — METHADONE HYDROCHLORIDE SCH MG: 10 TABLET ORAL at 17:32

## 2019-03-19 RX ADMIN — MOMETASONE FUROATE AND FORMOTEROL FUMARATE DIHYDRATE SCH: 200; 5 AEROSOL RESPIRATORY (INHALATION) at 08:25

## 2019-03-19 RX ADMIN — METHADONE HYDROCHLORIDE SCH: 10 TABLET ORAL at 16:03

## 2019-03-19 RX ADMIN — LIDOCAINE HYDROCHLORIDE SCH: 20 JELLY TOPICAL at 22:19

## 2019-03-19 RX ADMIN — OXYMETAZOLINE HCL SCH: 0.05 SPRAY NASAL at 21:59

## 2019-03-19 NOTE — PN
Hospitalist Progress Note








S:Called to assess pt by RN, first around 2250 as pt was poorly responsive to 

verbal stimuli with RN and noted with constricted pupil. 


O: Pt was somewhat lethargic but able to respond verbally and able to follow 

commands. She has been on methadone 10mg q6 (last 1700) and dilaudid 1.5mg IV 

q3 hr prn (last 1800). She attested to some pain in her foot but otherwise was 

asleep on arrival. 


A: She is lethargic with contribution from her opioid medications. 


P: dilauldid IV prns stopped. Will hold off on narcan for now given patient 

arousable to verbal stimuli and has notably been hard to control her pain 

especially with this new concern for potential calciphylaxis  (she also notably 

leaves frequently leaves AMA)








S:Called to assess pt by RN, second time around 2320 as pt had increased oxygen 

requirements from her baseline 6L. 


O: Requiring 10L oxymask and initially Sat'ing in mid 80s. mentation is similar 

to above. Upon sitting up she is able to expectorate some yellow sputum and her 

saturations improve. Stat CXR obtained and no acute change. Lungs are diffusely 

rhonchorous. 


A/P: I discussed with Daughter-in-Law at bedside possibility that Adri would 

be more alert and able to clear sputum better if some narcan was administered 

but the DIL strenously objected. Given improvement in Saturations will continue 

to monitor for now with the reduced opioid administration schedule (of note her 

methadone had also been subsequently changed to q8 by KISHA Carvalho). She has 

leukocytosis and continued tachycardia but no fevers. She is s/p 7 days zosyn 

course. May benefit from steroids for acute COPD exacerbation.

## 2019-03-19 NOTE — PN
Hospitalist Progress Note











ABG obtained: pH 7.07 pCO2 54, PO2 76 HCO3 13.3; Serum Bicarb 16, Anion Gap 40. 

delta-delta 2. 


respiratory acidosis with 2ndary metabolic acidosis and additional metabolic 

alkalosis.





Pt is being transferred to ICU for initiation of Bipap.

## 2019-03-19 NOTE — PN
Date of Service: 19 - RECONSULTATION NOTE


Critical Care Services: 


38W with complicated medical history including ESRD on PD, PAD s/p femoral 

stents on warfarin, COPD with chronic resp failure and active smoking on 5L O2, 

IDDM, chronic pain on methadone, unconfirmed CAD, and recent admission at the 

end of February for PNA, returning with b/l thigh pain found with indurated 

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 

thiosulfate IV. With persistent leukocytosis (chronic), supratherapeutic INR. 

Hospital course complicated by respiratory failure likely from PNA requiring 

intubation, extubated on 3/11.





overnight events/current complains:  Patient underwent incisional biopsy of 

left thigh lesion on 19 by Dr. Charles Hogan pathology consistent with 

caciphylaxis.  Overnight hospitalist was asked to evaluate patient around 2250 

due to mental status changes- poorly responsive to verbal stimuli with 

constricted pupil.  Symptoms were attributed to overzealous opiate usage due to 

acute post surgical pain with concomitant acute acidosis- with acute metabolic 

and respiratory derangements.  She was sent to the ICU for BIPAP need. 


Vital Signs: 











Temp Pulse Resp BP SpO2 FiO2


 


97.9 F 109 14 103/91 99 60


 


19 07:59 19 10:01 19 10:01 19 09:00 19 10:01  10:01











Physical Exam: 


Gen: NAD, on BIPAP 





HEENT:NCAT, EOMI, no scleral icterus, neck supple





Lungs:air entry bilaterally, no wheezes 





Cardiac:  +S1, S2, RRR





Abdomen:SNTND, +bowel sounds





Extremities:no edema, + ecchymosis 





Neuro:AAOX3, non-focal 





Fluid Balance (Past 24 Hours): 


I=     O=     Net 


 Intake & Output











 19





 06:59 06:59 06:59 06:59


 


Intake Total 1178 670 344 


 


Output Total 80   


 


Balance 1098 670 344 


 


Weight 190 lb 14.4 oz 191 lb 12.835 oz  


 


Intake:    


 


  IV Fluids 200 200 244 


 


    Sodium Thiosulfate 200   


 


    protonix   244 


 


    thiosulfate  200  


 


  IVPB  350  


 


    protonix  350  


 


  Oral 720 120 100 


 


  Packed Cells 258   


 


Output:    


 


  Urine 20   


 


  Estimated Blood Loss 60   


 


Other:    


 


  # Bowel Movements 1   


 


  Estimated Stool Amount Small   





 





ADLs: Meal  Record                                         Start:  19 21:

19


Freq:   DAILY@0900,1400,1800                               Status: Complete    

  


Protocol:                                                                      

  


 Created      19 21:19  System  (Rec: 19 21:19  System  TELE-M15)


 Document     19 09:00  [id number]  (Rec: 19 13:52  [id number]  TELE-C10)


 Document     19 14:00  JFO5867  (Rec: 19 15:02  EKW8275  TELE-C10)


 Document     19 18:00  VOL3423  (Rec: 19 19:27  [id number]  TELE-C10)


 Document     19 09:00  PHY1454  (Rec: 19 14:44  IQD0674  TELE-C10)


 Document     19 14:00  XXT0991  (Rec: 19 14:44  HBX7195  TELE-C10)


 Document     19 18:00  OVW7960  (Rec: 19 19:43  MMO1461  TELE-C10)


ADLs: Meal  Record                                         Start:  19 07:

16


Freq:   09,13,18                                           Status: Active      

  


Protocol:                                                                      

  


 Created      19 07:16  [id number]  (Rec: 19 07:16  [id number]  ICU-C12)


 Document     19 09:00  IUI4822  (Rec: 19 09:36  LDL0426  ICU-C07)


 Document     19 13:00  SWM0995  (Rec: 19 13:05  MNC7278  ICU-C07)


 Document     19 18:00  QFE9569  (Rec: 19 18:11  VKH1747  ICU-C10)


 Document     19 09:00  EFP7404  (Rec: 19 09:43  BWJ1133  ICU-C15)


 Document     19 18:00  VLO8196  (Rec: 19 18:42  AMJ3861  TELE-C01)


 Document     19 09:00  SUK8533  (Rec: 19 15:25  VXY6213  TELE-C03)


 Document     19 13:00  ZTA8199  (Rec: 19 15:26  ODU9242  TELE-C03)


 Document     19 18:00  MCS3434  (Rec: 19 22:15  WSC3501  TELE-C01)


 Document     19 09:00  BWA7567  (Rec: 19 15:03  RPB2078  TELE-C05)


 Document     19 13:00  EOP4150  (Rec: 19 15:04  VRX3197  TELE-C05)


 Document     19 18:00  OXM0368  (Rec: 19 21:56  JDH3396  TELE-C08)


 Document     03/15/19 09:00  BVI1700  (Rec: 03/15/19 16:41  UAB3044  TELE-C11)


 Document     03/15/19 13:00  YQD6873  (Rec: 03/15/19 21:33  YWP7081  TELE-C06)


 Document     03/15/19 18:00  UNX7004  (Rec: 03/15/19 21:34  DEP6598  TELE-C01)


 Document     19 09:00  VNP0592  (Rec: 19 10:37  KBD6553  TELE-C13)


 Document     19 13:00  DTP4500  (Rec: 19 14:35  UZO0047  TELE-C13)


 Document     19 18:00  EEA7664  (Rec: 19 22:27  XBS5688  TELE-C09)


 Document     19 09:00  UPF2397  (Rec: 19 09:26  VMR7900  ICU-C16)


 Document     19 14:52  SMB2419  (Rec: 19 14:52  KCT0476  ICU-C16)


 Document     19 18:00  QFY9380  (Rec: 19 22:29  TQO1774  ICU-C25)


 Document     19 09:00  FBA4393  (Rec: 19 10:34  CEB8575  ICU-M33)


 Document     19 14:42  HWI6705  (Rec: 19 14:42  HWD3956  ICU-M33)


ADLs: Meal  Record                                         Start:  19 15:

52


Freq:                                                      Status: Complete    

  


Protocol:                                                                      

  


 Created      19 15:52  WEY4778  (Rec: 19 15:52  [id number]  TELE-C08)


Intake and Output                                          Start:  19 18:

19


Freq:                                                      Status: Complete    

  


Protocol:                                                                      

  


 Created      19 18:19  System  (Rec: 19 18:19  System  EDRM-C04)


Intake and Output                                          Start:  19 21:

19


Freq:   DAILY@0600,1400,2200                               Status: Complete    

  


Protocol:                                                                      

  


 Created      19 21:19  System  (Rec: 19 21:19  System  TELE-M15)


 Document     19 06:00  RIV3185  (Rec: 19 06:47  DOD3626  TELE-C03)


 Document     19 14:00  BNN7150  (Rec: 19 15:02  UYY7108  TELE-C10)


 Document     19 22:00  KKU4734  (Rec: 19 22:07  COM3900  TELE-C10)


 Document     19 05:58  BOQ4197  (Rec: 19 05:59  QYD0011  HOSP-C11)


 Document     19 14:00  MQG3908  (Rec: 19 14:46  PVN8848  TELE-C10)


 Document     19 22:00  MNW1985  (Rec: 19 22:28  DUK5624  TELE-C09)


Intake and Output                                          Start:  19 07:

16


Freq:   QSHIFT                                             Status: Active      

  


Protocol:                                                                      

  


 Created      19 07:16  KCE1407  (Rec: 19 07:16  MIU4462  ICU-C12)


 Document     19 08:00  MGY1310  (Rec: 19 08:21  QPX9935  ICU-M35)


 Document     19 11:44  HQD7831  (Rec: 19 11:44  ZXC9235  ICU-C07)


 Document     19 16:00  JTI1001  (Rec: 19 18:06  YPX1046  ICU-C10)


 Document     19 00:00  XVW7106  (Rec: 19 00:48  MOZ6018  ISDEMO-M03

)


 Document     19 06:07  FQM3943  (Rec: 19 06:07  KNY3825  ISDEMO-M03

)


 Document     19 08:00  XJI0387  (Rec: 19 12:31  DMK0779  ICU-C16)


 Document     19 12:00  BNE0265  (Rec: 19 17:36  FRT9831  ICU-C16)


 Co-Sign      19 12:00  VTR0857


 Document     19 16:00  NPA3604  (Rec: 19 18:03  BJH0242  ICU-C16)


 Document     19 21:15  URW5155  (Rec: 03/10/19 03:56  XGH8656  ICU-L03)


 Document     03/10/19 03:30  OBQ4978  (Rec: 03/10/19 03:56  CGK8034  ICU-L03)


 Document     03/10/19 08:00  NLZ2051  (Rec: 03/10/19 11:39  YWD1982  ICU-C16)


 Document     19 00:58  LQC9775  (Rec: 19 00:58  OQS5813  ISDEMO-M03

)


 Document     19 08:00  AKO1370  (Rec: 19 12:22  PDE8008  ICU-C16)


 Document     19 20:00  CVE9017  (Rec: 19 20:12  GXB4416  ICU-C15)


 Document     19 08:00  RTH9305  (Rec: 19 09:42  EIE8282  ICU-C15)


 Document     19 10:58  OZJ2419  (Rec: 19 10:58  MRE9466  ICU-C15)


 Document     19 20:00  XHW3091  (Rec: 19 22:42  IXE6297  TELE-C01)


 Document     19 08:00  QPN9813  (Rec: 19 10:57  UPP9742  TELE-C03)


 Document     19 20:00  NNH8687  (Rec: 19 22:16  HQE4556  TELE-C01)


 Document     19 08:00  ZWV5783  (Rec: 19 15:02  EIQ1355  TELE-C05)


 Document     19 22:00  HAX7728  (Rec: 19 22:40  MFG1873  TELE-C08)


 Document     03/15/19 08:00  NHV9682  (Rec: 03/15/19 09:55  QQK8395  TELE-M02)


 Document     03/15/19 20:00  QBZ6130  (Rec: 03/15/19 22:54  ZRD8126  TELE-C01)


 Document     19 08:00  RPD4957  (Rec: 19 10:31  VRM0884  TELE-C13)


 Document     19 20:00  DXM2802  (Rec: 19 22:27  WVD5702  TELE-C09)


 Document     19 05:36  VUY9252  (Rec: 19 05:37  HHU1435  ICU-C25)


 Document     19 20:00  WUT4040  (Rec: 19 02:00  SLP9168  ICU-C25)


 Document     19 08:00  YEP6267  (Rec: 19 11:01  APB9834  ICU-L03)








Labs: 


 Laboratory Results - last 24 hr











  19





  04:43 06:00 12:54


 


WBC   


 


RBC   


 


Hgb   


 


Hct   


 


MCV   


 


MCH   


 


MCHC   


 


RDW   


 


Plt Count   


 


MPV   


 


Neut % (Auto)   


 


Lymph % (Auto)   


 


Mono % (Auto)   


 


Eos % (Auto)   


 


Baso % (Auto)   


 


Absolute Neuts (auto)   


 


Absolute Lymphs (auto)   


 


Absolute Monos (auto)   


 


Absolute Eos (auto)   


 


Absolute Basos (auto)   


 


Absolute Nucleated RBC   


 


Nucleated RBC %   


 


INR (Anticoag Therapy)   1.68 H 


 


Patient Temperature   


 


ABG pH   


 


ABG pH (Temp Correct)   


 


ABG pCO2   


 


ABG pCO2 (Temp Corrct   


 


ABG pO2   


 


ABG pO2 (Temp Correct   


 


ABG HCO3   


 


ABG O2 Saturation   


 


ABG Base Excess   


 


Respiration Rate   


 


O2 Delivery Device   


 


Ventilator Type   


 


Vent Mode   


 


FiO2   


 


Inspiratory Time   


 


PEEP   


 


Pressure Support   


 


Pressure Control   


 


EPAP   


 


IPAP   


 


BiPAP   


 


Sodium  144  


 


Potassium  4.3  


 


Chloride  88 L  


 


Carbon Dioxide  17 L  


 


Anion Gap  39 H  


 


BUN  42 H  


 


Creatinine  6.43 H  


 


Est GFR ( Amer)  8.8  


 


Est GFR (Non-Af Amer)  7.2  


 


BUN/Creatinine Ratio  6.5 L  


 


Glucose  189 H  


 


POC Glucose (mg/dL)    159 H


 


Lactic Acid   


 


Calcium  9.2  


 


Phosphorus  8.1 H  


 


Magnesium  2.1  


 


C-Reactive Protein   














  19





  16:28 19:34 21:18


 


WBC   


 


RBC   


 


Hgb   


 


Hct   


 


MCV   


 


MCH   


 


MCHC   


 


RDW   


 


Plt Count   


 


MPV   


 


Neut % (Auto)   


 


Lymph % (Auto)   


 


Mono % (Auto)   


 


Eos % (Auto)   


 


Baso % (Auto)   


 


Absolute Neuts (auto)   


 


Absolute Lymphs (auto)   


 


Absolute Monos (auto)   


 


Absolute Eos (auto)   


 


Absolute Basos (auto)   


 


Absolute Nucleated RBC   


 


Nucleated RBC %   


 


INR (Anticoag Therapy)   


 


Patient Temperature   


 


ABG pH   


 


ABG pH (Temp Correct)   


 


ABG pCO2   


 


ABG pCO2 (Temp Corrct   


 


ABG pO2   


 


ABG pO2 (Temp Correct   


 


ABG HCO3   


 


ABG O2 Saturation   


 


ABG Base Excess   


 


Respiration Rate   


 


O2 Delivery Device   


 


Ventilator Type   


 


Vent Mode   


 


FiO2   


 


Inspiratory Time   


 


PEEP   


 


Pressure Support   


 


Pressure Control   


 


EPAP   


 


IPAP   


 


BiPAP   


 


Sodium   


 


Potassium   


 


Chloride   


 


Carbon Dioxide   


 


Anion Gap   


 


BUN   


 


Creatinine   


 


Est GFR ( Amer)   


 


Est GFR (Non-Af Amer)   


 


BUN/Creatinine Ratio   


 


Glucose   


 


POC Glucose (mg/dL)  62 L  53 L  242 H


 


Lactic Acid   


 


Calcium   


 


Phosphorus   


 


Magnesium   


 


C-Reactive Protein   














  19





  22:29 23:29 04:25


 


WBC   


 


RBC   


 


Hgb   


 


Hct   


 


MCV   


 


MCH   


 


MCHC   


 


RDW   


 


Plt Count   


 


MPV   


 


Neut % (Auto)   


 


Lymph % (Auto)   


 


Mono % (Auto)   


 


Eos % (Auto)   


 


Baso % (Auto)   


 


Absolute Neuts (auto)   


 


Absolute Lymphs (auto)   


 


Absolute Monos (auto)   


 


Absolute Eos (auto)   


 


Absolute Basos (auto)   


 


Absolute Nucleated RBC   


 


Nucleated RBC %   


 


INR (Anticoag Therapy)   


 


Patient Temperature   


 


ABG pH   


 


ABG pH (Temp Correct)   


 


ABG pCO2   


 


ABG pCO2 (Temp Corrct   


 


ABG pO2   


 


ABG pO2 (Temp Correct   


 


ABG HCO3   


 


ABG O2 Saturation   


 


ABG Base Excess   


 


Respiration Rate   


 


O2 Delivery Device   


 


Ventilator Type   


 


Vent Mode   


 


FiO2   


 


Inspiratory Time   


 


PEEP   


 


Pressure Support   


 


Pressure Control   


 


EPAP   


 


IPAP   


 


BiPAP   


 


Sodium   


 


Potassium   


 


Chloride   


 


Carbon Dioxide   


 


Anion Gap   


 


BUN   


 


Creatinine   


 


Est GFR ( Amer)   


 


Est GFR (Non-Af Amer)   


 


BUN/Creatinine Ratio   


 


Glucose   


 


POC Glucose (mg/dL)  259 H  265 H  224 H


 


Lactic Acid   


 


Calcium   


 


Phosphorus   


 


Magnesium   


 


C-Reactive Protein   














  19





  04:43 04:43 06:30


 


WBC   28.7 H 


 


RBC   2.81 L 


 


Hgb   7.8 L 


 


Hct   25 L 


 


MCV   88 


 


MCH   28 


 


MCHC   32 


 


RDW   17 H 


 


Plt Count   538 H 


 


MPV   8.3 


 


Neut % (Auto)   96.1 


 


Lymph % (Auto)   1.6 


 


Mono % (Auto)   2.1 


 


Eos % (Auto)   0 


 


Baso % (Auto)   0.2 


 


Absolute Neuts (auto)   27.6 H 


 


Absolute Lymphs (auto)   0.5 L 


 


Absolute Monos (auto)   0.6 


 


Absolute Eos (auto)   0 


 


Absolute Basos (auto)   0.1 


 


Absolute Nucleated RBC   0.1 


 


Nucleated RBC %   0.2 


 


INR (Anticoag Therapy)   


 


Patient Temperature    Not Reportable


 


ABG pH    7.07 L*


 


ABG pH (Temp Correct)    Not Reportable


 


ABG pCO2    54 H


 


ABG pCO2 (Temp Corrct    Not Reportable


 


ABG pO2    76 L


 


ABG pO2 (Temp Correct    Not Reportable


 


ABG HCO3    13.3 L


 


ABG O2 Saturation    94.0


 


ABG Base Excess    -14.7 L


 


Respiration Rate    Not Reportable


 


O2 Delivery Device    oxymask


 


Ventilator Type    Not Reportable


 


Vent Mode    Not Reportable


 


FiO2    Not Reportable


 


Inspiratory Time    Not Reportable


 


PEEP    Not Reportable


 


Pressure Support    Not Reportable


 


Pressure Control    Not Reportable


 


EPAP    Not Reportable


 


IPAP    Not Reportable


 


BiPAP    Not Reportable


 


Sodium  144  


 


Potassium  4.5  


 


Chloride  88 L  


 


Carbon Dioxide  16 L  


 


Anion Gap  40 H  


 


BUN  38 H  


 


Creatinine  6.28 H  


 


Est GFR ( Amer)  9.0  


 


Est GFR (Non-Af Amer)  7.4  


 


BUN/Creatinine Ratio  6.1 L  


 


Glucose  250 H  


 


POC Glucose (mg/dL)   


 


Lactic Acid   


 


Calcium  9.6  


 


Phosphorus  8.7 H  


 


Magnesium  2.2  


 


C-Reactive Protein   














  19





  07:29 07:55 07:55


 


WBC   


 


RBC   


 


Hgb   


 


Hct   


 


MCV   


 


MCH   


 


MCHC   


 


RDW   


 


Plt Count   


 


MPV   


 


Neut % (Auto)   


 


Lymph % (Auto)   


 


Mono % (Auto)   


 


Eos % (Auto)   


 


Baso % (Auto)   


 


Absolute Neuts (auto)   


 


Absolute Lymphs (auto)   


 


Absolute Monos (auto)   


 


Absolute Eos (auto)   


 


Absolute Basos (auto)   


 


Absolute Nucleated RBC   


 


Nucleated RBC %   


 


INR (Anticoag Therapy)   


 


Patient Temperature   


 


ABG pH   


 


ABG pH (Temp Correct)   


 


ABG pCO2   


 


ABG pCO2 (Temp Corrct   


 


ABG pO2   


 


ABG pO2 (Temp Correct   


 


ABG HCO3   


 


ABG O2 Saturation   


 


ABG Base Excess   


 


Respiration Rate   


 


O2 Delivery Device   


 


Ventilator Type   


 


Vent Mode   


 


FiO2   


 


Inspiratory Time   


 


PEEP   


 


Pressure Support   


 


Pressure Control   


 


EPAP   


 


IPAP   


 


BiPAP   


 


Sodium   


 


Potassium   


 


Chloride   


 


Carbon Dioxide   


 


Anion Gap   


 


BUN   


 


Creatinine   


 


Est GFR ( Amer)   


 


Est GFR (Non-Af Amer)   


 


BUN/Creatinine Ratio   


 


Glucose   


 


POC Glucose (mg/dL)  123 H  


 


Lactic Acid   0.5 


 


Calcium   


 


Phosphorus   


 


Magnesium   


 


C-Reactive Protein    187.85 H











Studies: 


Patient Name:         MAKAYLA RUBIN                                         

                        Medical Record#: [id number]


Ordering Physician: Luis A Landeros MD                                               

                        Acct.#: [id number]


:     1980         Age: 38   Sex: F                                   

                        Location: [address] [place] [address]/TELEMETRY


Exam Date: 19                                                       

                        ADM Status: ADM IN


Order Information:                         CHEST AP OR PORT


Accession Number:                          [id number]


CPT:                                       08119


HISTORY: hypoxia





COMPARISONS: 2019





VIEWS: 1: frontal AP view of the chest at 11:39 PM





FINDINGS:


LINES AND TUBES: None.


CARDIOMEDIASTINAL SILHOUETTE: The cardiomediastinal silhouette is normal for 

portable


technique.


PLEURA: The costophrenic angles are sharp. No pleural abnormalities are noted.


LUNG PARENCHYMA: There is a diffuse reticular pattern with indistinct pulmonary 

vessels.


ABDOMEN: The upper abdomen is clear. There is no subphrenic gas.


BONES AND SOFT TISSUES: No bone or soft tissue abnormalities are noted.





IMPRESSION: PULMONARY INTERSTITIAL EDEMA.





R1F








Preliminary Imaging Read 


R1F                                                                        





____________________________________________________________


<Electronically signed by Ad Nguyen MD in OV>  19


Dictated By: Ad Nguyen MD


Dictated Date/Time: 19


Transcribed Date/Time: 19


Copy to:











CC:Joaquín Rivera MD; Kit Jaquez MD; Charles Hogan MD; Reinaldo Akbar MD

; Kenneth Felton MD; Ronnie Valenzuela MD; Luis A Landeros MD; Vandana Vences MD; Abeba Hein DO

; Yeni Shanks MD


Imaging - [facility]                                 Imaging - [facility]                                     Imaging - [facility] 


[address]                                       [address] [place] [id number]


ph ([phone])                                     ph ([phone])        

                                        ph ([phone]) 








This report is only to be considered final once signed by the Provider(s) as 

displayed in the "<Electronically Signed by >" field (s). Absence of a 


signature indicates the report is in a draft status and still needs to be 

finalized. In the event this document was created by someone other than the 


signing Provider, the individual initiating the document will be listed in the 

"Entered by:" or "Dictated by:" fields.


                                                                 1 of 2











Nutrition: 


renal diet 





Impression: 


38W with complicated medical history including ESRD on PD, PAD s/p femoral 

stents on warfarin, COPD with chronic resp failure and active smoking on 5L O2, 

IDDM, chronic pain on methadone, unconfirmed CAD, and recent admission at the 

end of February for PNA, returning with b/l thigh pain found with indurated 

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 

thiosulfate IV. With persistent leukocytosis (chronic), supratherapeutic INR. 

Hospital course complicated by respiratory failure likely from PNA requiring 

intubation, extubated on 3/11.





#  Acute mixed acidosis- acute uncompensated primary respiratory acidosis with 

metabolic acidosis with high anion gap  





Plan: 





# Acute respiratory failure due to mixed acid base derangements


#  Acute mixed acidosis- acute uncompensated primary respiratory acidosis with 

metabolic acidosis with high anion gap  


- agree with BIPAP 


- repeat ABG at 1300


- started 150 meq bicarb x 1   L at 75cc/h.  Will assess for fluid overload


- minimized opiates 





# Hypotension


will start midodrine 





# Hypoglycemia


s/p D50 amps


start oral intake 





# CAD


# PVD with multiple LE stents with in-stent stensosis


# DVT


-discontinue ASA after today


-start plavix tomorrow 


-start eliquis


-caution with bleeding due to recent hx/o GI bleed 





# Caciphylaxis


- on NaThiosulfate 25 gm daily--> will decrease daily as per PD needs


- PTH mgmt as below 


- no warfarin and avoid subcutaneous injections


- no vit D, calcium, iron supplementation 


- wound care





# Hyperparathyroidism


PTH (intact) 85.8pmol/L/ 809.09pg/mL


-goal to bring PTH <300


-check PTH weekly 


-Started cinacalcet 90 mg BID 





# ESRD on PD


- nephro recs 





# Leukocytosis 


persistent with acute increase 17-->24-->28


suspect reactive 


afebrile


hemodynamically stable 


no overt suggestion of acute PNA on CXR 


recent completion of course of zosyn


-will hold off on abx


-culture if febrile





# Anemia


stable H/H 


-Pending Iron panel and retic count to determine if pt will benefit with EPO 





# Thrombocytosis


likely reactive 


patient on ASA 





# chronic pain 


-consult from Dr Rivera pending 


- decrease methadone to previous dose


- prn dilaudid and continue with gabapenin








#  GIB


thought to be due to recent bleed from nasal orgin 





Critical Care Time: 75 minutes

## 2019-03-20 LAB
ANION GAP SERPL CALC-SCNC: 37 MMOL/L (ref 2–11)
BUN SERPL-MCNC: 40 MG/DL (ref 6–24)
BUN/CREAT SERPL: 6.1 (ref 8–20)
CALCIUM SERPL-MCNC: 8.5 MG/DL (ref 8.6–10.3)
CHLORIDE SERPL-SCNC: 85 MMOL/L (ref 101–111)
FERRITIN SERPL IA-MCNC: 430.8 NG/ML (ref 11–307)
GLUCOSE SERPL-MCNC: 243 MG/DL (ref 70–100)
HCO3 SERPL-SCNC: 18 MMOL/L (ref 22–32)
HCT VFR BLD AUTO: 19 % (ref 33–41)
HCT VFR BLD AUTO: 22 % (ref 33–41)
HGB BLD-MCNC: 7 G/DL (ref 12–16)
IRON SERPL-MCNC: < 17 UG/DL (ref 50–212)
POTASSIUM SERPL-SCNC: 3.8 MMOL/L (ref 3.5–5)
RBC # BLD AUTO: 2.16 10^6/UL (ref 3.7–4.87)
RETICULOCYTE PRODUCTION INDEX: 0.3 %
RETICULOCYTE PRODUCTION INDEX: 0.7 % (ref 0.5–1.5)
SODIUM SERPL-SCNC: 140 MMOL/L (ref 135–145)
TIBC SERPL-MCNC: 126 MCG/DL (ref 250–450)
TRANSFERRIN SERPL-MCNC: 90 MG/DL (ref 203–362)

## 2019-03-20 RX ADMIN — MOMETASONE FUROATE AND FORMOTEROL FUMARATE DIHYDRATE SCH: 200; 5 AEROSOL RESPIRATORY (INHALATION) at 02:23

## 2019-03-20 RX ADMIN — HYDROMORPHONE HYDROCHLORIDE PRN MG: 1 INJECTION, SOLUTION INTRAMUSCULAR; INTRAVENOUS; SUBCUTANEOUS at 21:08

## 2019-03-20 RX ADMIN — IPRATROPIUM BROMIDE AND ALBUTEROL SULFATE SCH: .5; 3 SOLUTION RESPIRATORY (INHALATION) at 07:47

## 2019-03-20 RX ADMIN — CINACALCET HYDROCHLORIDE SCH MG: 30 TABLET, COATED ORAL at 08:29

## 2019-03-20 RX ADMIN — EPOETIN ALFA-EPBX SCH UNIT: 4000 INJECTION, SOLUTION INTRAVENOUS; SUBCUTANEOUS at 18:13

## 2019-03-20 RX ADMIN — LIDOCAINE HYDROCHLORIDE SCH APPLIC: 20 JELLY TOPICAL at 21:41

## 2019-03-20 RX ADMIN — MIDODRINE HYDROCHLORIDE SCH MG: 5 TABLET ORAL at 08:31

## 2019-03-20 RX ADMIN — APIXABAN SCH MG: 2.5 TABLET, FILM COATED ORAL at 08:30

## 2019-03-20 RX ADMIN — SODIUM THIOSULFATE SCH MLS/HR: 250 INJECTION, SOLUTION INTRAVENOUS at 22:02

## 2019-03-20 RX ADMIN — MOMETASONE FUROATE AND FORMOTEROL FUMARATE DIHYDRATE SCH PUFF: 200; 5 AEROSOL RESPIRATORY (INHALATION) at 20:20

## 2019-03-20 RX ADMIN — APIXABAN SCH MG: 2.5 TABLET, FILM COATED ORAL at 21:32

## 2019-03-20 RX ADMIN — INSULIN LISPRO SCH UNITS: 100 INJECTION, SOLUTION INTRAVENOUS; SUBCUTANEOUS at 08:28

## 2019-03-20 RX ADMIN — CINACALCET HYDROCHLORIDE SCH MG: 30 TABLET, COATED ORAL at 21:32

## 2019-03-20 RX ADMIN — HYDROMORPHONE HYDROCHLORIDE PRN MG: 1 INJECTION, SOLUTION INTRAMUSCULAR; INTRAVENOUS; SUBCUTANEOUS at 14:35

## 2019-03-20 RX ADMIN — METHADONE HYDROCHLORIDE SCH MG: 10 TABLET ORAL at 17:09

## 2019-03-20 RX ADMIN — WATER SCH NOTE: 100 INJECTION, SOLUTION INTRAVENOUS at 19:01

## 2019-03-20 RX ADMIN — SODIUM BICARBONATE TAB 650 MG SCH: 650 TAB at 14:36

## 2019-03-20 RX ADMIN — ONDANSETRON PRN MG: 2 INJECTION INTRAMUSCULAR; INTRAVENOUS at 23:04

## 2019-03-20 RX ADMIN — IPRATROPIUM BROMIDE AND ALBUTEROL SULFATE SCH NEB: .5; 3 SOLUTION RESPIRATORY (INHALATION) at 01:57

## 2019-03-20 RX ADMIN — PANTOPRAZOLE SODIUM SCH MG: 40 TABLET, DELAYED RELEASE ORAL at 08:30

## 2019-03-20 RX ADMIN — ONDANSETRON PRN MG: 2 INJECTION INTRAMUSCULAR; INTRAVENOUS at 09:26

## 2019-03-20 RX ADMIN — SODIUM BICARBONATE TAB 650 MG SCH MG: 650 TAB at 17:11

## 2019-03-20 RX ADMIN — LIDOCAINE HYDROCHLORIDE SCH: 20 JELLY TOPICAL at 17:08

## 2019-03-20 RX ADMIN — MOMETASONE FUROATE AND FORMOTEROL FUMARATE DIHYDRATE SCH: 200; 5 AEROSOL RESPIRATORY (INHALATION) at 10:27

## 2019-03-20 RX ADMIN — IPRATROPIUM BROMIDE AND ALBUTEROL SULFATE PRN NEB: .5; 3 SOLUTION RESPIRATORY (INHALATION) at 20:20

## 2019-03-20 RX ADMIN — IPRATROPIUM BROMIDE AND ALBUTEROL SULFATE PRN NEB: .5; 3 SOLUTION RESPIRATORY (INHALATION) at 13:55

## 2019-03-20 RX ADMIN — SEVELAMER CARBONATE SCH MG: 800 TABLET, FILM COATED ORAL at 21:31

## 2019-03-20 RX ADMIN — MIDODRINE HYDROCHLORIDE SCH MG: 5 TABLET ORAL at 21:31

## 2019-03-20 RX ADMIN — INSULIN LISPRO SCH: 100 INJECTION, SOLUTION INTRAVENOUS; SUBCUTANEOUS at 11:59

## 2019-03-20 RX ADMIN — FENTANYL SCH MCG: 50 PATCH TRANSDERMAL at 17:42

## 2019-03-20 RX ADMIN — SEVELAMER CARBONATE SCH MG: 800 TABLET, FILM COATED ORAL at 08:30

## 2019-03-20 RX ADMIN — INSULIN LISPRO SCH UNITS: 100 INJECTION, SOLUTION INTRAVENOUS; SUBCUTANEOUS at 17:44

## 2019-03-20 RX ADMIN — SERTRALINE HYDROCHLORIDE SCH MG: 25 TABLET ORAL at 08:30

## 2019-03-20 RX ADMIN — CLOPIDOGREL SCH MG: 75 TABLET, FILM COATED ORAL at 08:29

## 2019-03-20 RX ADMIN — MUPIROCIN SCH: 20 OINTMENT TOPICAL at 08:31

## 2019-03-20 RX ADMIN — SEVELAMER CARBONATE SCH: 800 TABLET, FILM COATED ORAL at 14:36

## 2019-03-20 RX ADMIN — ONDANSETRON PRN MG: 2 INJECTION INTRAMUSCULAR; INTRAVENOUS at 06:03

## 2019-03-20 RX ADMIN — SODIUM BICARBONATE TAB 650 MG SCH MG: 650 TAB at 21:31

## 2019-03-20 RX ADMIN — METHADONE HYDROCHLORIDE SCH MG: 10 TABLET ORAL at 05:50

## 2019-03-20 RX ADMIN — PANTOPRAZOLE SODIUM SCH MG: 40 TABLET, DELAYED RELEASE ORAL at 21:32

## 2019-03-20 RX ADMIN — OXYMETAZOLINE HCL SCH: 0.05 SPRAY NASAL at 08:31

## 2019-03-20 RX ADMIN — OXYMETAZOLINE HCL SCH SPR: 0.05 SPRAY NASAL at 21:37

## 2019-03-20 RX ADMIN — LIDOCAINE HYDROCHLORIDE SCH: 20 JELLY TOPICAL at 08:31

## 2019-03-20 NOTE — PN
Date of Service: 03/20/19


Critical Care Services: 





38W with complicated medical history including ESRD on PD, PAD s/p femoral 

stents on warfarin, COPD with chronic resp failure and active smoking on 5L O2, 

IDDM, chronic pain on methadone, unconfirmed CAD, and recent admission at the 

end of February for PNA, returning with b/l thigh pain found with indurated 

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 

thiosulfate IV. With persistent leukocytosis (chronic), supratherapeutic INR. 

Hospital course complicated by respiratory failure likely from PNA requiring 

intubation, extubated on 3/11.





03/19:  Patient underwent incisional biopsy of left thigh lesion on 03/18/19 by 

Dr. Charles Hogan pathology consistent with caciphylaxis.  Overnight hospitalist 

was asked to evaluate patient around 2250 due to mental status changes- poorly 

responsive to verbal stimuli with constricted pupil.  Symptoms were attributed 

to overzealous opiate usage due to acute post surgical pain with concomitant 

acute acidosis- with acute metabolic and respiratory derangements.  She was 

sent to the ICU for BIPAP need. 


03/20: Patient off BIPAP.  Complains of ongoing pain and nausea.  Episodes of 

somnolence but easily aroused.  





Vital Signs: 











Temp Pulse Resp BP SpO2 FiO2


 


97.4 F 105 14 124/79 92 50


 


03/20/19 04:00 03/20/19 13:59 03/20/19 14:35 03/20/19 13:01 03/20/19 13:59 03/20 /19 07:38











Physical Exam: 


Gen: NAD, laying bed 





HEENT:NCAT, EOMI, no scleral icterus, neck supple





Lungs:air entry bilaterally, no wheezes 





Cardiac:  +S1, S2, RRR





Abdomen:SNTND, +bowel sounds





Extremities:no edema, + generalized ecchymosis/livedo reticularis 





Neuro:somnolent but easily aroused, non-focal 





Fluid Balance (Past 24 Hours): 


I=     O=     Net 


 Intake & Output











 03/18/19 03/19/19 03/20/19 03/21/19





 06:59 06:59 06:59 06:59


 


Intake Total 670 344 791 240


 


Balance 670 344 791 240


 


Weight 191 lb 12.835 oz  197 lb 5.019 oz 


 


Intake:    


 


  IV Fluids 200 244 791 


 


    Sodium bicarb   791 


 


    protonix  244  


 


    thiosulfate 200   


 


  IVPB 350   


 


    protonix 350   


 


  Oral 120 100 0 240


 


Other:    


 


  Date of Last Bowel    03/20/2019





  Movement    


 


  # Bowel Movements    2


 


  Estimated Stool Amount    Small





 





ADLs: Meal  Record                                         Start:  03/05/19 21:

19


Freq:   DAILY@0900,1400,1800                               Status: Complete    

  


Protocol:                                                                      

  


 Created      03/05/19 21:19  System  (Rec: 03/05/19 21:19  System  TELE-M15)


 Document     03/06/19 09:00  [id number]  (Rec: 03/06/19 13:52  [id number]  TELE-C10)


 Document     03/06/19 14:00  OQT5273  (Rec: 03/06/19 15:02  [id number]  TELE-C10)


 Document     03/06/19 18:00  AIF7683  (Rec: 03/06/19 19:27  LXA4570  TELE-C10)


 Document     03/07/19 09:00  UCC9302  (Rec: 03/07/19 14:44  WOG7830  TELE-C10)


 Document     03/07/19 14:00  VSC0340  (Rec: 03/07/19 14:44  IQR3447  TELE-C10)


 Document     03/07/19 18:00  VUZ2808  (Rec: 03/07/19 19:43  GHD7767  TELE-C10)


ADLs: Meal  Record                                         Start:  03/08/19 07:

16


Freq:   09,13,18                                           Status: Active      

  


Protocol:                                                                      

  


 Created      03/08/19 07:16  [id number]  (Rec: 03/08/19 07:16  HCJ1289  ICU-C12)


 Document     03/08/19 09:00  BLU4589  (Rec: 03/08/19 09:36  CUT7707  ICU-C07)


 Document     03/08/19 13:00  VJE6338  (Rec: 03/08/19 13:05  MUI7692  ICU-C07)


 Document     03/08/19 18:00  FDN8620  (Rec: 03/08/19 18:11  NBW6571  ICU-C10)


 Document     03/12/19 09:00  KDJ0638  (Rec: 03/12/19 09:43  DGY0446  ICU-C15)


 Document     03/12/19 18:00  XVB4416  (Rec: 03/12/19 18:42  NGZ5442  TELE-C01)


 Document     03/13/19 09:00  IXO1703  (Rec: 03/13/19 15:25  KAO2255  TELE-C03)


 Document     03/13/19 13:00  PDR0394  (Rec: 03/13/19 15:26  GPG6461  TELE-C03)


 Document     03/13/19 18:00  SYL7009  (Rec: 03/13/19 22:15  TSK8989  TELE-C01)


 Document     03/14/19 09:00  XRE9976  (Rec: 03/14/19 15:03  IUX3365  TELE-C05)


 Document     03/14/19 13:00  JYB8963  (Rec: 03/14/19 15:04  WOT1085  TELE-C05)


 Document     03/14/19 18:00  USD8005  (Rec: 03/14/19 21:56  VDF0000  TELE-C08)


 Document     03/15/19 09:00  SPY9958  (Rec: 03/15/19 16:41  CWI3391  TELE-C11)


 Document     03/15/19 13:00  INM7338  (Rec: 03/15/19 21:33  ZOV9843  TELE-C06)


 Document     03/15/19 18:00  WBP7477  (Rec: 03/15/19 21:34  HDH4014  TELE-C01)


 Document     03/16/19 09:00  ZJH4305  (Rec: 03/16/19 10:37  UOA3384  TELE-C13)


 Document     03/16/19 13:00  WDN1854  (Rec: 03/16/19 14:35  EJX5587  TELE-C13)


 Document     03/16/19 18:00  PSM4198  (Rec: 03/16/19 22:27  UUG7065  TELE-C09)


 Document     03/17/19 09:00  DKY0668  (Rec: 03/17/19 09:26  MFO4623  ICU-C16)


 Document     03/17/19 14:52  IZC6559  (Rec: 03/17/19 14:52  DIG1250  ICU-C16)


 Document     03/17/19 18:00  ORV2034  (Rec: 03/17/19 22:29  EXU5416  ICU-C25)


 Document     03/18/19 09:00  GXI0602  (Rec: 03/18/19 10:34  DOG3214  ICU-M33)


 Document     03/18/19 14:42  TXD2892  (Rec: 03/18/19 14:42  UPL8531  ICU-M33)


 Document     03/19/19 21:39  UXV0973  (Rec: 03/19/19 21:39  JAC6280  ICU-M34)


 Document     03/20/19 09:00  PHI2765  (Rec: 03/20/19 10:28  ONM2720  ICU-C07)


ADLs: Meal  Record                                         Start:  03/18/19 15:

52


Freq:                                                      Status: Complete    

  


Protocol:                                                                      

  


 Created      03/18/19 15:52  SOM9667  (Rec: 03/18/19 15:52  SQH5197  TELE-C08)


Intake and Output                                          Start:  03/05/19 18:

19


Freq:                                                      Status: Complete    

  


Protocol:                                                                      

  


 Created      03/05/19 18:19  System  (Rec: 03/05/19 18:19  System  EDRM-C04)


Intake and Output                                          Start:  03/05/19 21:

19


Freq:   DAILY@0600,1400,2200                               Status: Complete    

  


Protocol:                                                                      

  


 Created      03/05/19 21:19  System  (Rec: 03/05/19 21:19  System  TELE-M15)


 Document     03/06/19 06:00  GOV7465  (Rec: 03/06/19 06:47  XRA1949  TELE-C03)


 Document     03/06/19 14:00  MSU7839  (Rec: 03/06/19 15:02  NUY2081  TELE-C10)


 Document     03/06/19 22:00  VVQ9956  (Rec: 03/06/19 22:07  DBC9067  TELE-C10)


 Document     03/07/19 05:58  NJP4923  (Rec: 03/07/19 05:59  WNW8153  HOSP-C11)


 Document     03/07/19 14:00  TNJ2941  (Rec: 03/07/19 14:46  PEY1206  TELE-C10)


 Document     03/07/19 22:00  ABV3483  (Rec: 03/07/19 22:28  IJS0177  TELE-C09)


Intake and Output                                          Start:  03/08/19 07:

16


Freq:   QSHIFT                                             Status: Active      

  


Protocol:                                                                      

  


 Created      03/08/19 07:16  PNZ8645  (Rec: 03/08/19 07:16  XSH0640  ICU-C12)


 Document     03/08/19 08:00  KNZ5292  (Rec: 03/08/19 08:21  VZE4765  ICU-M35)


 Document     03/08/19 11:44  OBM8233  (Rec: 03/08/19 11:44  TUH2267  ICU-C07)


 Document     03/08/19 16:00  MVC5976  (Rec: 03/08/19 18:06  ZUL5128  ICU-C10)


 Document     03/09/19 00:00  ZRQ2968  (Rec: 03/09/19 00:48  QGJ1204  ISDEMO-M03

)


 Document     03/09/19 06:07  PHS4791  (Rec: 03/09/19 06:07  QPT5330  ISDEMO-M03

)


 Document     03/09/19 08:00  QFR2915  (Rec: 03/09/19 12:31  AWK1426  ICU-C16)


 Document     03/09/19 12:00  KYK9175  (Rec: 03/09/19 17:36  LNO8354  ICU-C16)


 Co-Sign      03/09/19 12:00  LWJ3314


 Document     03/09/19 16:00  XKW4495  (Rec: 03/09/19 18:03  VZX2539  ICU-C16)


 Document     03/09/19 21:15  NFR5156  (Rec: 03/10/19 03:56  PJV7897  ICU-L03)


 Document     03/10/19 03:30  ZQR5902  (Rec: 03/10/19 03:56  YDL0449  ICU-L03)


 Document     03/10/19 08:00  PDL1278  (Rec: 03/10/19 11:39  GIB2220  ICU-C16)


 Document     03/11/19 00:58  ODB4639  (Rec: 03/11/19 00:58  DGU8181  [facility]

)


 Document     03/11/19 08:00  ATE1017  (Rec: 03/11/19 12:22  GZO7163  ICU-C16)


 Document     03/11/19 20:00  VOC4568  (Rec: 03/11/19 20:12  JFA6575  ICU-C15)


 Document     03/12/19 08:00  HJQ3563  (Rec: 03/12/19 09:42  UIN6801  ICU-C15)


 Document     03/12/19 10:58  FLM1480  (Rec: 03/12/19 10:58  LQO3779  ICU-C15)


 Document     03/12/19 20:00  AWN4619  (Rec: 03/12/19 22:42  FVM3513  TELE-C01)


 Document     03/13/19 08:00  DLU6671  (Rec: 03/13/19 10:57  KDH9379  TELE-C03)


 Document     03/13/19 20:00  QKG6746  (Rec: 03/13/19 22:16  UPB4320  TELE-C01)


 Document     03/14/19 08:00  CXY7654  (Rec: 03/14/19 15:02  LNE1413  TELE-C05)


 Document     03/14/19 22:00  KIW6986  (Rec: 03/14/19 22:40  DLX2992  TELE-C08)


 Document     03/15/19 08:00  JQV2990  (Rec: 03/15/19 09:55  OMP3116  TELE-M02)


 Document     03/15/19 20:00  HXG7017  (Rec: 03/15/19 22:54  JXD3426  TELE-C01)


 Document     03/16/19 08:00  RRU2588  (Rec: 03/16/19 10:31  SKS7739  TELE-C13)


 Document     03/16/19 20:00  BKT9697  (Rec: 03/16/19 22:27  XMG6574  TELE-C09)


 Document     03/17/19 05:36  RLQ0987  (Rec: 03/17/19 05:37  QYV4793  ICU-C25)


 Document     03/17/19 20:00  OXV8896  (Rec: 03/18/19 02:00  STR3753  ICU-C25)


 Document     03/18/19 08:00  UUY8348  (Rec: 03/18/19 11:01  JFV1530  ICU-L03)


 Document     03/19/19 20:00  LNY8872  (Rec: 03/19/19 21:32  LVM2367  ICU-C06)


 Document     03/20/19 08:00  DSZ1615  (Rec: 03/20/19 10:28  UPU5722  ICU-C07)








Labs: 


 Laboratory Results - last 24 hr











  03/19/19 03/19/19 03/20/19





  17:25 21:38 00:14


 


RBC (Retic)   


 


Hgb   


 


Hct   


 


HCT (Retic)   


 


Retic Count, Calc   


 


Corrected Retic Count   


 


Retic Shift Factor   


 


Retic Production Index   


 


Immature Retic Fraction   


 


Mean Retic Volume   


 


VBG pH   


 


VBG pCO2   


 


VBG pO2   


 


VBG HCO3   


 


VBG O2 Saturation   


 


VBG Base Excess   


 


Sodium   


 


Potassium   


 


Chloride   


 


Carbon Dioxide   


 


Anion Gap   


 


BUN   


 


Creatinine   


 


Est GFR ( Amer)   


 


Est GFR (Non-Af Amer)   


 


BUN/Creatinine Ratio   


 


Glucose   


 


POC Glucose (mg/dL)  238 H  139 H  195 H


 


Calcium   


 


Iron   


 


TIBC   


 


% Saturation   


 


Unsat Iron Binding   


 


Transferrin   


 


Ferritin   














  03/20/19 03/20/19 03/20/19





  05:45 05:45 05:45


 


RBC (Retic)    2.16 L


 


Hgb   


 


Hct   


 


HCT (Retic)    19 L


 


Retic Count, Calc    1.7 H


 


Corrected Retic Count    0.7


 


Retic Shift Factor    2.5


 


Retic Production Index    0.30


 


Immature Retic Fraction    0.48


 


Mean Retic Volume    106.8


 


VBG pH   


 


VBG pCO2   


 


VBG pO2   


 


VBG HCO3   


 


VBG O2 Saturation   


 


VBG Base Excess   


 


Sodium  140  


 


Potassium  3.8  


 


Chloride  85 L  


 


Carbon Dioxide  18 L  


 


Anion Gap  37 H  


 


BUN  40 H  


 


Creatinine  6.54 H  


 


Est GFR ( Amer)  8.6  


 


Est GFR (Non-Af Amer)  7.1  


 


BUN/Creatinine Ratio  6.1 L  


 


Glucose  243 H  


 


POC Glucose (mg/dL)   


 


Calcium  8.5 L  


 


Iron   < 17 L 


 


TIBC   126 L 


 


% Saturation   13 L 


 


Unsat Iron Binding   < 111 


 


Transferrin   90 L 


 


Ferritin   430.8 H 














  03/20/19 03/20/19 03/20/19





  07:35 09:10 09:10


 


RBC (Retic)   


 


Hgb   7.0 L 


 


Hct   22 L 


 


HCT (Retic)   


 


Retic Count, Calc   


 


Corrected Retic Count   


 


Retic Shift Factor   


 


Retic Production Index   


 


Immature Retic Fraction   


 


Mean Retic Volume   


 


VBG pH    7.23 L


 


VBG pCO2    48


 


VBG pO2    54.0 H


 


VBG HCO3    18.6 L


 


VBG O2 Saturation    80.7 H


 


VBG Base Excess    -7.5 L


 


Sodium   


 


Potassium   


 


Chloride   


 


Carbon Dioxide   


 


Anion Gap   


 


BUN   


 


Creatinine   


 


Est GFR ( Amer)   


 


Est GFR (Non-Af Amer)   


 


BUN/Creatinine Ratio   


 


Glucose   


 


POC Glucose (mg/dL)  213 H  


 


Calcium   


 


Iron   


 


TIBC   


 


% Saturation   


 


Unsat Iron Binding   


 


Transferrin   


 


Ferritin   














  03/20/19





  11:57


 


RBC (Retic) 


 


Hgb 


 


Hct 


 


HCT (Retic) 


 


Retic Count, Calc 


 


Corrected Retic Count 


 


Retic Shift Factor 


 


Retic Production Index 


 


Immature Retic Fraction 


 


Mean Retic Volume 


 


VBG pH 


 


VBG pCO2 


 


VBG pO2 


 


VBG HCO3 


 


VBG O2 Saturation 


 


VBG Base Excess 


 


Sodium 


 


Potassium 


 


Chloride 


 


Carbon Dioxide 


 


Anion Gap 


 


BUN 


 


Creatinine 


 


Est GFR ( Amer) 


 


Est GFR (Non-Af Amer) 


 


BUN/Creatinine Ratio 


 


Glucose 


 


POC Glucose (mg/dL)  88


 


Calcium 


 


Iron 


 


TIBC 


 


% Saturation 


 


Unsat Iron Binding 


 


Transferrin 


 


Ferritin 











Nutrition: 


renal diet 





Impression: 


38W with complicated medical history including ESRD on PD, PAD s/p femoral 

stents on warfarin, COPD with chronic resp failure and active smoking on 5L O2, 

IDDM, chronic pain on methadone, unconfirmed CAD, and recent admission at the 

end of February for PNA, returning with b/l thigh pain found with indurated 

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 

thiosulfate IV. With persistent leukocytosis (chronic), supratherapeutic INR. 

Hospital course complicated by respiratory failure likely from PNA requiring 

intubation, extubated on 3/11 now being managed in the ICU for mixed acid base 

derrangements acutely 





Plan: 





# Acute respiratory failure due to mixed acid base derangements


#  Acute mixed acidosis- acute uncompensated primary respiratory acidosis with 

metabolic acidosis with high anion gap  


- improved clinically but VBG consistent with acidosis 


- agree with BIPAP prn and nocturnally 


- repeat VBG via midline draw in AM 


- S/P 150 meq bicarb x 1L at 75cc/h 3/19


- started on bicarb orally 


- minimize opiates 





# Hypotension


improved on midodrine 





# Hypoglycemia


improved s/p D50 amps yesterday


Continuous to have poor oral intake 





# CAD


# PVD with multiple LE stents with in-stent stensosis


# DVT


-started plavix 03/20


-start eliquis 03/20


-caution with bleeding due to recent hx/o GI bleed 





# Caciphylaxis


- on NaThiosulfate 25 gm daily--> will decrease daily as per PD needs


- PTH mgmt as below 


- no warfarin and avoid subcutaneous injections


- no vit D, calcium, iron supplementation 


- wound care





# Hyperparathyroidism


PTH (intact) 85.8pmol/L/ 809.09pg/mL


-goal to bring PTH <300


-check PTH weekly 


-Started cinacalcet 90 mg BID 





# ESRD on PD


- nephro recs 





# Leukocytosis 


persistent with acute increase 17-->24-->28


suspect reactive 


afebrile


hemodynamically stable 


no overt suggestion of acute PNA on CXR 


recent completion of course of zosyn


-will hold off on abx


-culture if febrile





# Anemia of chronic disease 


stable H/H 


- will discuss with nephro about EPO 





# Thrombocytosis


likely reactive 


patient on ASA 





# chronic pain 


# mood d/o 


-consult from Dr Rivera pending 


- On methadone 10 mg q12h


- will start fentanyl patch 50 mcg/hour


- prn dilaudid and continue with gabapenin


- on sertraline, will increase to 50 mg daily 








#  GIB


thought to be due to recent bleed from nasal orgin 





Critical Care Time: 45 minutes

## 2019-03-20 NOTE — CONSULT
Palliative / Hospice Consult


Ordering Provider: Tj Leonardo - PCP-Rimma





- Subjective


Code Status: Full Code


Advance Directives Location: No Advance Directives





- History or Present Illness


History or Present Illness: 





39 yo female with ESRD on dialysis and COPD on 5 liter O2 presents to ER with 

leg pain and abd pain. Pt was admitted due to level of pain and some abnormal 

lab values. While on the floor pt experienced SOB was intubated and transferred 

to the ICU(3/8) extubated transferred back to the floor and has had 2 more 

trips back to the ICU where she is currently. PMH is significant for CHF 

diastolic, DM, CAD, chronic pain, diabetic neuropathy on methadone and 

gabapentin, severe PAD, GERD, gastroparesis, hyperlipidemia, anxiety, 

depression and HTN. 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 Spoke with 

pt and her significant other about quality of life wh pt expressed isn't great 

right now. Depression screen is positive pt reports not much improvement with 

Zoloft. We discussed MOLST but pt wants to be full code even when she expressed 

how much discomfort/pain she is in. All information is from pt and medical 

record. At home she gets up to use the bathroom, goes from bed to chair. Pt is 

tobacco user/ no etoh/ no drug use. Reviewed case with pt's nurse.


Lab Values: 


 Abnormal Lab Results











  03/19/19 03/19/19 03/19/19





  13:39 14:05 17:25


 


RBC (Retic)   


 


Hgb   


 


Hct   


 


HCT (Retic)   


 


Retic Count, Calc   


 


Corrected Retic Count   


 


Retic Shift Factor   


 


Retic Production Index   


 


Immature Retic Fraction   


 


Mean Retic Volume   


 


VBG pH   


 


VBG pCO2   


 


VBG pO2   


 


VBG HCO3   


 


VBG O2 Saturation   


 


VBG Base Excess   


 


Sodium   


 


Potassium   


 


Chloride   


 


Carbon Dioxide   


 


Anion Gap   


 


BUN   


 


Creatinine   


 


Est GFR ( Amer)   


 


Est GFR (Non-Af Amer)   


 


BUN/Creatinine Ratio   


 


Glucose   


 


POC Glucose (mg/dL)  52 L  73  238 H


 


Calcium   


 


Iron   


 


TIBC   


 


% Saturation   


 


Unsat Iron Binding   


 


Transferrin   


 


Ferritin   














  03/19/19 03/20/19 03/20/19





  21:38 00:14 05:45


 


RBC (Retic)   


 


Hgb   


 


Hct   


 


HCT (Retic)   


 


Retic Count, Calc   


 


Corrected Retic Count   


 


Retic Shift Factor   


 


Retic Production Index   


 


Immature Retic Fraction   


 


Mean Retic Volume   


 


VBG pH   


 


VBG pCO2   


 


VBG pO2   


 


VBG HCO3   


 


VBG O2 Saturation   


 


VBG Base Excess   


 


Sodium    140


 


Potassium    3.8


 


Chloride    85 L


 


Carbon Dioxide    18 L


 


Anion Gap    37 H


 


BUN    40 H


 


Creatinine    6.54 H


 


Est GFR ( Amer)    8.6


 


Est GFR (Non-Af Amer)    7.1


 


BUN/Creatinine Ratio    6.1 L


 


Glucose    243 H


 


POC Glucose (mg/dL)  139 H  195 H 


 


Calcium    8.5 L


 


Iron   


 


TIBC   


 


% Saturation   


 


Unsat Iron Binding   


 


Transferrin   


 


Ferritin   














  03/20/19 03/20/19 03/20/19





  05:45 05:45 07:35


 


RBC (Retic)   2.16 L 


 


Hgb   


 


Hct   


 


HCT (Retic)   19 L 


 


Retic Count, Calc   1.7 H 


 


Corrected Retic Count   0.7 


 


Retic Shift Factor   2.5 


 


Retic Production Index   0.30 


 


Immature Retic Fraction   0.48 


 


Mean Retic Volume   106.8 


 


VBG pH   


 


VBG pCO2   


 


VBG pO2   


 


VBG HCO3   


 


VBG O2 Saturation   


 


VBG Base Excess   


 


Sodium   


 


Potassium   


 


Chloride   


 


Carbon Dioxide   


 


Anion Gap   


 


BUN   


 


Creatinine   


 


Est GFR ( Amer)   


 


Est GFR (Non-Af Amer)   


 


BUN/Creatinine Ratio   


 


Glucose   


 


POC Glucose (mg/dL)    213 H


 


Calcium   


 


Iron  < 17 L  


 


TIBC  126 L  


 


% Saturation  13 L  


 


Unsat Iron Binding  < 111  


 


Transferrin  90 L  


 


Ferritin  430.8 H  














  03/20/19 03/20/19 03/20/19





  09:10 09:10 11:57


 


RBC (Retic)   


 


Hgb  7.0 L  


 


Hct  22 L  


 


HCT (Retic)   


 


Retic Count, Calc   


 


Corrected Retic Count   


 


Retic Shift Factor   


 


Retic Production Index   


 


Immature Retic Fraction   


 


Mean Retic Volume   


 


VBG pH   7.23 L 


 


VBG pCO2   48 


 


VBG pO2   54.0 H 


 


VBG HCO3   18.6 L 


 


VBG O2 Saturation   80.7 H 


 


VBG Base Excess   -7.5 L 


 


Sodium   


 


Potassium   


 


Chloride   


 


Carbon Dioxide   


 


Anion Gap   


 


BUN   


 


Creatinine   


 


Est GFR ( Amer)   


 


Est GFR (Non-Af Amer)   


 


BUN/Creatinine Ratio   


 


Glucose   


 


POC Glucose (mg/dL)    88


 


Calcium   


 


Iron   


 


TIBC   


 


% Saturation   


 


Unsat Iron Binding   


 


Transferrin   


 


Ferritin   








 Laboratory Last Values











WBC  28.7 10^3/uL (3.5-10.8)  H  03/19/19  04:43    


 


RBC  2.81 10^6 /uL (3.70-4.87)  L  03/19/19  04:43    


 


RBC (Retic)  2.16 10^6/uL (3.70-4.87)  L  03/20/19  05:45    


 


Hgb  7.0 g/dL (12.0-16.0)  L  03/20/19  09:10    


 


Hct  22 % (33-41)  L  03/20/19  09:10    


 


HCT (Retic)  19 % (33-41)  L  03/20/19  05:45    


 


MCV  88 fL (80-97)   03/19/19  04:43    


 


MCH  28 pg (27-31)   03/19/19  04:43    


 


MCHC  32 g/dL (31-36)   03/19/19  04:43    


 


RDW  17 % (10.5-15)  H  03/19/19  04:43    


 


Plt Count  538 10^3/uL (150-450)  H  03/19/19  04:43    


 


MPV  8.3 fL (7.4-10.4)   03/19/19  04:43    


 


Neut % (Auto)  96.1 %  03/19/19  04:43    


 


Lymph % (Auto)  1.6 %  03/19/19  04:43    


 


Mono % (Auto)  2.1 %  03/19/19  04:43    


 


Eos % (Auto)  0 %  03/19/19  04:43    


 


Baso % (Auto)  0.2 %  03/19/19  04:43    


 


Absolute Neuts (auto)  27.6 10^3/ul (1.5-7.7)  H  03/19/19  04:43    


 


Absolute Lymphs (auto)  0.5 10^3/ul (1.0-4.8)  L  03/19/19  04:43    


 


Absolute Monos (auto)  0.6 10^3/ul (0-0.8)   03/19/19  04:43    


 


Absolute Eos (auto)  0 10^3/ul (0-0.6)   03/19/19  04:43    


 


Absolute Basos (auto)  0.1 10^3/ul (0-0.2)   03/19/19  04:43    


 


Absolute Nucleated RBC  0.1 10^3/ul  03/19/19  04:43    


 


Nucleated RBC %  0.2   03/19/19  04:43    


 


ESR  126 mm/Hr (0-20)  H  03/10/19  12:09    


 


Retic Count, Calc  1.7 % (0.5-1.5)  H  03/20/19  05:45    


 


Corrected Retic Count  0.7 % (0.5-1.5)   03/20/19  05:45    


 


Retic Shift Factor  2.5   03/20/19  05:45    


 


Retic Production Index  0.30   03/20/19  05:45    


 


Immature Retic Fraction  0.48   03/20/19  05:45    


 


Mean Retic Volume  106.8   03/20/19  05:45    


 


INR (Anticoag Therapy)  1.68  (0.77-1.02)  H  03/18/19  06:00    


 


APTT  55.6 seconds (26.0-36.3)  H  03/05/19  19:13    


 


Patient Temperature  Not Reportable   03/19/19  06:30    


 


ABG pH  7.07  (7.35-7.45)  L*  03/19/19  06:30    


 


ABG pH (Temp Correct)  Not Reportable   03/19/19  06:30    


 


ABG pCO2  54 mmHg (35-45)  H  03/19/19  06:30    


 


ABG pCO2 (Temp Corrct  Not Reportable   03/19/19  06:30    


 


ABG pO2  76 mmHg ()  L  03/19/19  06:30    


 


ABG pO2 (Temp Correct  Not Reportable   03/19/19  06:30    


 


ABG HCO3  13.3 mmol/L (19-31)  L  03/19/19  06:30    


 


ABG O2 Saturation  94.0 % (94.0-98.0)   03/19/19  06:30    


 


ABG Base Excess  -14.7 mmol/L (-2.0-2.0)  L  03/19/19  06:30    


 


VBG pH  7.23  (7.32-7.43)  L  03/20/19  09:10    


 


VBG pCO2  48 mmHg (41-51)   03/20/19  09:10    


 


VBG pO2  54.0 mmHg (35-45)  H  03/20/19  09:10    


 


VBG HCO3  18.6 mmol/L (24-28)  L  03/20/19  09:10    


 


VBG O2 Saturation  80.7 % (70-80)  H  03/20/19  09:10    


 


VBG Base Excess  -7.5 mmol/L (0.0-4.0)  L  03/20/19  09:10    


 


Respiration Rate  Not Reportable   03/19/19  06:30    


 


O2 Delivery Device  oxymask   03/19/19  06:30    


 


Ventilator Type  Not Reportable   03/19/19  06:30    


 


Vent Mode  Not Reportable   03/19/19  06:30    


 


FiO2  Not Reportable   03/19/19  06:30    


 


Inspiratory Time  Not Reportable   03/19/19  06:30    


 


PEEP  Not Reportable   03/19/19  06:30    


 


Pressure Support  Not Reportable   03/19/19  06:30    


 


Pressure Control  Not Reportable   03/19/19  06:30    


 


EPAP  Not Reportable   03/19/19  06:30    


 


IPAP  Not Reportable   03/19/19  06:30    


 


BiPAP  Not Reportable   03/19/19  06:30    


 


Sodium  140 mmol/L (135-145)   03/20/19  05:45    


 


Potassium  3.8 mmol/L (3.5-5.0)   03/20/19  05:45    


 


Chloride  85 mmol/L (101-111)  L  03/20/19  05:45    


 


Carbon Dioxide  18 mmol/L (22-32)  L  03/20/19  05:45    


 


Anion Gap  37 mmol/L (2-11)  H  03/20/19  05:45    


 


BUN  40 mg/dL (6-24)  H  03/20/19  05:45    


 


Creatinine  6.54 mg/dL (0.51-0.95)  H  03/20/19  05:45    


 


Est GFR ( Amer)  8.6  (>60)   03/20/19  05:45    


 


Est GFR (Non-Af Amer)  7.1  (>60)   03/20/19  05:45    


 


BUN/Creatinine Ratio  6.1  (8-20)  L  03/20/19  05:45    


 


Glucose  243 mg/dL ()  H  03/20/19  05:45    


 


POC Glucose (mg/dL)  88 mg/dL ()   03/20/19  11:57    


 


Hemoglobin A1c  8.8 % (4.0-5.6)  H  03/07/19  05:24    


 


Lactic Acid  0.5 mmol/L (0.5-2.0)   03/19/19  07:55    


 


Calcium  8.5 mg/dL (8.6-10.3)  L  03/20/19  05:45    


 


Phosphorus  8.7 mg/dL (2.5-5.0)  H  03/19/19  04:43    


 


Magnesium  2.2 mg/dL (1.9-2.7)   03/19/19  04:43    


 


Iron  < 17 ug/dL ()  L  03/20/19  05:45    


 


TIBC  126 mcg/dL (250-450)  L  03/20/19  05:45    


 


% Saturation  13 % (15-55)  L  03/20/19  05:45    


 


Unsat Iron Binding  < 111 ug/dL  03/20/19  05:45    


 


Transferrin  90 mg/dL (203-362)  L  03/20/19  05:45    


 


Ferritin  430.8 ng/mL ()  H  03/20/19  05:45    


 


Total Bilirubin  0.30 mg/dL (0.2-1.0)   03/08/19  05:49    


 


AST  12 U/L (13-39)  L  03/08/19  05:49    


 


ALT  12 U/L (7-52)   03/08/19  05:49    


 


Alkaline Phosphatase  117 U/L ()  H  03/08/19  05:49    


 


Total Creatine Kinase  27 U/L ()   03/07/19  05:24    


 


Troponin I  0.18 ng/mL (<0.04)  H*  03/08/19  05:49    


 


C-Reactive Protein  187.85 mg/L (<8.01)  H  03/19/19  07:55    


 


C-React Prot High Sens  201.62 mg/L (<2.00)  H  03/10/19  12:09    


 


B-Natriuretic Peptide  > 1300 pg/mL (<=100)  H  03/19/19  07:55    


 


Total Protein  6.3 g/dL (6.4-8.9)  L  03/08/19  05:49    


 


Albumin  2.8 g/dL (3.2-5.2)  L  03/08/19  05:49    


 


Globulin  3.5 g/dL (2-4)   03/08/19  05:49    


 


Albumin/Globulin Ratio  0.8  (1-3)  L  03/08/19  05:49    


 


25-OH Vitamin D Total  < 7.0 ng/mL (20-50)  L  03/17/19  08:00    


 


PTH Intact  85.8 pmol/L (1.3-9.3)  H  03/17/19  08:00    


 


Calcium (PTH Intact)  8.9 mg/dL (8.6-10.3)   03/17/19  08:00    


 


RBC Total Porphyrins  47 mcg/dL (<80)   03/07/19  22:40    


 


RBC Porphyrins Interp  See comment   03/07/19  22:40    


 


Plasma Total Porphyrins  <1.0 mcg/dL (<=1.0)   03/07/19  22:40    


 


Plasma Porphyrin Intrp  See comment   03/07/19  22:40    


 


Urine Color  Yellow   03/06/19  20:20    


 


Urine Appearance  Turbid   03/06/19  20:20    


 


Urine pH  5.0  (5-9)   03/06/19  20:20    


 


Ur Specific Gravity  1.027  (1.010-1.030)   03/06/19  20:20    


 


Urine Protein  2+(100 mg/dl)  (Negative)  A  03/06/19  20:20    


 


Urine Ketones  Trace  (Negative)  A  03/06/19  20:20    


 


Urine Blood  Negative  (Negative)   03/06/19  20:20    


 


Urine Nitrate  Negative  (Negative)   03/06/19  20:20    


 


Urine Bilirubin  Negative  (Negative)   03/06/19  20:20    


 


Urine Urobilinogen  Negative  (Negative)   03/06/19  20:20    


 


Ur Leukocyte Esterase  2+  (Negative)  A  03/06/19  20:20    


 


Urine WBC (Auto)  3+(>20/hpf)  (Absent)  A  03/06/19  20:20    


 


Urine RBC (Auto)  3+(>10/hpf)  (Absent)  A  03/06/19  20:20    


 


Ur Squamous Epith Cells  Present  (Absent)  A  03/06/19  20:20    


 


Urine Bacteria  Absent  (Absent)   03/06/19  20:20    


 


Urine Glucose  2+(150 mg/dl)  (Negative)  A  03/06/19  20:20    


 


Fluid Source  Peritonial  fluid   03/08/19  13:00    


 


Fluid Volume  3 mL  03/08/19  13:00    


 


Fluid Color  Colorless   03/08/19  13:00    


 


Fluid Appearance  Clear   03/08/19  13:00    


 


Fluid WBC  3 /mcL (0-347539)  03/08/19  13:00    


 


Fluid RBC  0 /mcL  03/08/19  13:00    


 


Fluid Tot Cell Count  20   03/08/19  13:00    


 


Fluid Neutrophils  15 %  03/08/19  13:00    


 


Fluid Lymphocytes  20 %  03/08/19  13:00    


 


Fluid Monocytes  65 %  03/08/19  13:00    


 


Fluid Other Cells  1   03/08/19  13:00    


 


Fluid Cell Count Rvw By     03/08/19  13:00    


 


Random Vancomycin  19.6 mcg/mL  03/11/19  04:35    


 


Result Reviewed By  See comment   03/07/19  22:40    


 


Blood Type  O Positive   03/17/19  00:00    


 


Antibody Screen  Negative   03/17/19  00:00    


 


Crossmatch  See Detail   03/17/19  00:00    














- Objective


Active Medications: 








Acetaminophen (Tylenol Tab*)  650 mg PO Q6H PRN


   PRN Reason: PAIN


   Last Admin: 03/16/19 14:04 Dose:  650 mg


Al Hydrox/Mg Hydrox/Simethicone (Maalox Plus*)  30 ml PO Q6H PRN


   PRN Reason: DYSPEPSIA


   Last Admin: 03/13/19 05:22 Dose:  30 ml


Albuterol (Ventolin 2.5 Mg/3 Ml Neb.Sol*)  2.5 mg INH Q4HR PRN


   PRN Reason: SOB/WHEEZING


   Last Admin: 03/17/19 13:17 Dose:  2.5 mg


Albuterol/Ipratropium (Duoneb (Albuterol 2.5 Mg/Ipratropium 0.5 Mg))  1 neb INH 

[id number] AWAKE PRN


   PRN Reason: SOB/WHEEZING


Apixaban (Eliquis*)  2.5 mg PO BID [facility]


   Last Admin: 03/20/19 08:30 Dose:  2.5 mg


Cinacalcet (Sensipar Tab*)  90 mg PO BID [facility]


   Last Admin: 03/20/19 08:29 Dose:  90 mg


Clopidogrel Bisulfate (Plavix Tab*)  75 mg PO DAILY [facility]


   Last Admin: 03/20/19 08:29 Dose:  75 mg


Dextrose (D50w Syringe 50 Ml*)  25 gm IV PUSH .FOR FS < 60 - SS PRN


   PRN Reason: FS < 60


Hydromorphone HCl (Dilaudid Inj1s*)  2 mg IV SLOW PU Q4H PRN


   PRN Reason: PAIN


Sodium Thiosulfate 25 gm/ (Sodium Chloride)  200 mls @ 200 mls/hr IV SuMoWeFr@

2000 [facility]


Insulin Human Lispro (Humalog*)  0 units SUBCUT ACHS [facility]; Protocol


   Last Admin: 03/20/19 11:59 Dose:  Not Given


Lidocaine HCl (Lidocaine 2% Jelly*)  1 applic TOPICAL TID [facility]


   Last Admin: 03/20/19 08:31 Dose:  Not Given


Methadone HCl (Dolophine Tab*)  10 mg PO Q12H [facility]


   Last Admin: 03/20/19 05:50 Dose:  10 mg


Midodrine (Midodrine (Nf))  10 mg PO BID [facility]; Protocol


   Last Admin: 03/20/19 08:31 Dose:  10 mg


Mometasone Furoate/Formoterol Fumar (Dulera 200/5 Mdi*)  2 puff INH BID [facility]; 

Protocol


   Last Admin: 03/20/19 10:27 Dose:  Not Given


Mupirocin (Bactroban 2 % Oint*)  1 applic TOPICAL DAILY [facility]


   Last Admin: 03/20/19 08:31 Dose:  Not Given


Ondansetron HCl (Zofran Inj*)  4 mg IV Q6H PRN


   PRN Reason: NAUSEA


   Last Admin: 03/20/19 09:26 Dose:  4 mg


Oxymetazoline HCl (Afrin 0.05% Nasal Spray*)  2 spray BOTH NARES BID [facility]


   Last Admin: 03/20/19 08:31 Dose:  Not Given


Pantoprazole Sodium (Protonix Tab*)  40 mg PO BID [facility]


   Last Admin: 03/20/19 08:30 Dose:  40 mg


Sertraline HCl (Zoloft*)  25 mg PO DAILY [facility]


   Last Admin: 03/20/19 08:30 Dose:  25 mg


Sevelamer Carbonate (Renvela Tab*)  800 mg PO TID [facility]


   Last Admin: 03/20/19 08:30 Dose:  800 mg


Sodium Bicarbonate (Sodium Bicarbonate (Antacid)*)  1,300 mg PO QID [facility]








Vital Signs: 


Vital Signs:











Temp Pulse Resp BP Pulse Ox


 


 97.4 F   105   13   124/79   99 


 


 03/20/19 04:00  03/20/19 13:01  03/20/19 13:01  03/20/19 13:01  03/20/19 13:01











Patient Weight: 


 





Weight                           89.5 kg








Intake and Output: 


 Intake & Output











 03/18/19 03/19/19 03/20/19 03/21/19





 06:59 06:59 06:59 06:59


 


Intake Total 670 344 791 240


 


Balance 670 344 791 240


 


Weight 87 kg  89.5 kg 


 


Intake:    


 


  IV Fluids 200 244 791 


 


    Sodium bicarb   791 


 


    protonix  244  


 


    thiosulfate 200   


 


  IVPB 350   


 


    protonix 350   


 


  Oral 120 100 0 240


 


Other:    


 


  Date of Last Bowel    03/20/2019





  Movement    


 


  # Bowel Movements    2


 


  Estimated Stool Amount    Small





 





ADLs: Meal  Record                                         Start:  03/05/19 21:

19


Freq:   DAILY@0900,1400,1800                               Status: Complete    

  


Protocol:                                                                      

  


 Created      03/05/19 21:19  System  (Rec: 03/05/19 21:19  System  TELE-M15)


 Document     03/06/19 09:00  [id number]  (Rec: 03/06/19 13:52  [id number]  TELE-C10)


 Document     03/06/19 14:00  [id number]  (Rec: 03/06/19 15:02  [id number]  TELE-C10)


 Document     03/06/19 18:00  [id number]  (Rec: 03/06/19 19:27  [id number]  TELE-C10)


 Document     03/07/19 09:00  [id number]  (Rec: 03/07/19 14:44  [id number]  TELE-C10)


 Document     03/07/19 14:00  [id number]  (Rec: 03/07/19 14:44  [id number]  TELE-C10)


 Document     03/07/19 18:00  [id number]  (Rec: 03/07/19 19:43  [id number]  TELE-C10)


ADLs: Meal  Record                                         Start:  03/08/19 07:

16


Freq:   09,13,18                                           Status: Active      

  


Protocol:                                                                      

  


 Created      03/08/19 07:16  [id number]  (Rec: 03/08/19 07:16  [id number]  ICU-C12)


 Document     03/08/19 09:00  [id number]  (Rec: 03/08/19 09:36  [id number]  ICU-C07)


 Document     03/08/19 13:00  [id number]  (Rec: 03/08/19 13:05  [id number]  ICU-C07)


 Document     03/08/19 18:00  [id number]  (Rec: 03/08/19 18:11  [id number]  ICU-C10)


 Document     03/12/19 09:00  [id number]  (Rec: 03/12/19 09:43  [id number]  ICU-C15)


 Document     03/12/19 18:00  [id number]  (Rec: 03/12/19 18:42  [id number]  TELE-C01)


 Document     03/13/19 09:00  [id number]  (Rec: 03/13/19 15:25  [id number]  TELE-C03)


 Document     03/13/19 13:00  [id number]  (Rec: 03/13/19 15:26  [id number]  TELE-C03)


 Document     03/13/19 18:00  [id number]  (Rec: 03/13/19 22:15  [id number]  TELE-C01)


 Document     03/14/19 09:00  [id number]  (Rec: 03/14/19 15:03  [id number]  TELE-C05)


 Document     03/14/19 13:00  [id number]  (Rec: 03/14/19 15:04  [id number]  TELE-C05)


 Document     03/14/19 18:00  [id number]  (Rec: 03/14/19 21:56  [id number]  TELE-C08)


 Document     03/15/19 09:00  [id number]  (Rec: 03/15/19 16:41  [id number]  TELE-C11)


 Document     03/15/19 13:00  [id number]  (Rec: 03/15/19 21:33  [id number]  TELE-C06)


 Document     03/15/19 18:00  [id number]  (Rec: 03/15/19 21:34  [id number]  TELE-C01)


 Document     03/16/19 09:00  [id number]  (Rec: 03/16/19 10:37  [id number]  TELE-C13)


 Document     03/16/19 13:00  [id number]  (Rec: 03/16/19 14:35  [id number]  TELE-C13)


 Document     03/16/19 18:00  [id number]  (Rec: 03/16/19 22:27  [id number]  TELE-C09)


 Document     03/17/19 09:00  [id number]  (Rec: 03/17/19 09:26  [id number]  ICU-C16)


 Document     03/17/19 14:52  [id number]  (Rec: 03/17/19 14:52  [id number]  ICU-C16)


 Document     03/17/19 18:00  [id number]  (Rec: 03/17/19 22:29  [id number]  ICU-C25)


 Document     03/18/19 09:00  [id number]  (Rec: 03/18/19 10:34  [id number]  ICU-M33)


 Document     03/18/19 14:42  [id number]  (Rec: 03/18/19 14:42  [id number]  ICU-M33)


 Document     03/19/19 21:39  [id number]  (Rec: 03/19/19 21:39  [id number]  ICU-M34)


 Document     03/20/19 09:00  [id number]  (Rec: 03/20/19 10:28  [id number]  ICU-C07)


ADLs: Meal  Record                                         Start:  03/18/19 15:

52


Freq:                                                      Status: Complete    

  


Protocol:                                                                      

  


 Created      03/18/19 15:52  [id number]  (Rec: 03/18/19 15:52  BCP0974  TELE-C08)


Intake and Output                                          Start:  03/05/19 18:

19


Freq:                                                      Status: Complete    

  


Protocol:                                                                      

  


 Created      03/05/19 18:19  System  (Rec: 03/05/19 18:19  System  EDRM-C04)


Intake and Output                                          Start:  03/05/19 21:

19


Freq:   DAILY@0600,1400,2200                               Status: Complete    

  


Protocol:                                                                      

  


 Created      03/05/19 21:19  System  (Rec: 03/05/19 21:19  System  TELE-M15)


 Document     03/06/19 06:00  [id number]  (Rec: 03/06/19 06:47  TWY1587  TELE-C03)


 Document     03/06/19 14:00  URZ3799  (Rec: 03/06/19 15:02  PEX0844  TELE-C10)


 Document     03/06/19 22:00  TFC9432  (Rec: 03/06/19 22:07  IXO1884  TELE-C10)


 Document     03/07/19 05:58  CBI1648  (Rec: 03/07/19 05:59  FOM3940  HOSP-C11)


 Document     03/07/19 14:00  WSQ6680  (Rec: 03/07/19 14:46  KOQ3987  TELE-C10)


 Document     03/07/19 22:00  BVZ1441  (Rec: 03/07/19 22:28  RTF2294  TELE-C09)


Intake and Output                                          Start:  03/08/19 07:

16


Freq:   QSHIFT                                             Status: Active      

  


Protocol:                                                                      

  


 Created      03/08/19 07:16  [id number]  (Rec: 03/08/19 07:16  ZIS9676  ICU-C12)


 Document     03/08/19 08:00  ZSY6468  (Rec: 03/08/19 08:21  JMM3220  ICU-M35)


 Document     03/08/19 11:44  ELZ2895  (Rec: 03/08/19 11:44  BHM7579  ICU-C07)


 Document     03/08/19 16:00  OJG9297  (Rec: 03/08/19 18:06  LSM2450  ICU-C10)


 Document     03/09/19 00:00  RUL3823  (Rec: 03/09/19 00:48  WBQ3253  ISDEMO-M03

)


 Document     03/09/19 06:07  GVF9521  (Rec: 03/09/19 06:07  PSD0109  ISDEMO-M03

)


 Document     03/09/19 08:00  CBJ6466  (Rec: 03/09/19 12:31  OFX7904  ICU-C16)


 Document     03/09/19 12:00  IMZ9310  (Rec: 03/09/19 17:36  PEN1868  ICU-C16)


 Co-Sign      03/09/19 12:00  [id number]


 Document     03/09/19 16:00  BCH3634  (Rec: 03/09/19 18:03  LKO1932  ICU-C16)


 Document     03/09/19 21:15  DXX5592  (Rec: 03/10/19 03:56  DPZ8756  ICU-L03)


 Document     03/10/19 03:30  AVS8193  (Rec: 03/10/19 03:56  TCY1194  ICU-L03)


 Document     03/10/19 08:00  IXC6708  (Rec: 03/10/19 11:39  QKX8069  ICU-C16)


 Document     03/11/19 00:58  VQO2276  (Rec: 03/11/19 00:58  ZDI1836  ISDEMO-M03

)


 Document     03/11/19 08:00  AMG3971  (Rec: 03/11/19 12:22  UNB7901  ICU-C16)


 Document     03/11/19 20:00  URM1565  (Rec: 03/11/19 20:12  BNV1089  ICU-C15)


 Document     03/12/19 08:00  VVF8209  (Rec: 03/12/19 09:42  JWD2614  ICU-C15)


 Document     03/12/19 10:58  IOM3101  (Rec: 03/12/19 10:58  CMG2207  ICU-C15)


 Document     03/12/19 20:00  RQI2056  (Rec: 03/12/19 22:42  GKU2953  TELE-C01)


 Document     03/13/19 08:00  INJ5899  (Rec: 03/13/19 10:57  XEB9906  TELE-C03)


 Document     03/13/19 20:00  SRA8098  (Rec: 03/13/19 22:16  QBF7531  TELE-C01)


 Document     03/14/19 08:00  NGO9751  (Rec: 03/14/19 15:02  KWX3173  TELE-C05)


 Document     03/14/19 22:00  EAI5086  (Rec: 03/14/19 22:40  LWX2781  TELE-C08)


 Document     03/15/19 08:00  FRN2317  (Rec: 03/15/19 09:55  YAA3275  TELE-M02)


 Document     03/15/19 20:00  SIP4665  (Rec: 03/15/19 22:54  JOT6423  TELE-C01)


 Document     03/16/19 08:00  KNJ9933  (Rec: 03/16/19 10:31  EZS5409  TELE-C13)


 Document     03/16/19 20:00  TII3182  (Rec: 03/16/19 22:27  DEY1300  TELE-C09)


 Document     03/17/19 05:36  NDG3309  (Rec: 03/17/19 05:37  VBK7183  ICU-C25)


 Document     03/17/19 20:00  FSM2366  (Rec: 03/18/19 02:00  OCP7909  ICU-C25)


 Document     03/18/19 08:00  PNI1652  (Rec: 03/18/19 11:01  OLC8709  ICU-L03)


 Document     03/19/19 20:00  PEN6732  (Rec: 03/19/19 21:32  IDP0918  ICU-C06)


 Document     03/20/19 08:00  AOX9980  (Rec: 03/20/19 10:28  TJF5066  ICU-C07)








Eyes: No Scleral Icterus


Ears/Nose/Mouth/Throat: Mucous Membranes Moist


Neck: NL Appearance and Movements; NL JVP


Cardiovascular: RRR


Respiratory: Symmetrical Chest Expansion and Respiratory Effort - slight wheeze 

on left side


Abdominal: NL Sounds; No Tenderness; No Distention


Extremities: - - LE lesions unchanged





- Assessment


Assessment: 





39 yo female with multiple complex medical problems who is eligible for hospice 

but not interested 





- Plan


Consult Plan (MU): Other - Hospice eligible but not interested


Plan: 





Long discussion with pt and her boyfriend, She realizes she will have to go to 

SNF for rehab but wants eventually to go back home. Did explain that it would 

depend on PT evaluation and whether or not she can show improvement.  Her 

boyfriend says she does have 24hr caretaker coverage. Also talked about adding 

[facility] or [facility] if in an eligible area once discharged from [facility]. Wonder if changing 

her anti-depression med might help. Still experiencing pain from the 

calciphylaxus affected areas. I mentioned hospice but pt was not interested at 

this time. Pt is not always completely coherent. KPS 30% PPS 40%. Discussed my 

impression with intensivist and charge nurse.





- Time On Unit


Date of Evaluation: 03/20/19


Hospice Consult Time in: 12:00


Hospice Consult Time Out: 01:30


Hospice Consult Time Total: -630


> 50% of Time Spend In Counseling or Coordinating Care: Yes

## 2019-03-21 LAB
ANION GAP SERPL CALC-SCNC: 36 MMOL/L (ref 2–11)
BUN SERPL-MCNC: 40 MG/DL (ref 6–24)
BUN/CREAT SERPL: 6 (ref 8–20)
CALCIUM SERPL-MCNC: 7.7 MG/DL (ref 8.6–10.3)
CHLORIDE SERPL-SCNC: 87 MMOL/L (ref 101–111)
GLUCOSE SERPL-MCNC: 313 MG/DL (ref 70–100)
HCO3 SERPL-SCNC: 20 MMOL/L (ref 22–32)
POTASSIUM SERPL-SCNC: 3.9 MMOL/L (ref 3.5–5)
SODIUM SERPL-SCNC: 143 MMOL/L (ref 135–145)

## 2019-03-21 RX ADMIN — PROCHLORPERAZINE EDISYLATE PRN ML: 5 INJECTION INTRAMUSCULAR; INTRAVENOUS at 00:43

## 2019-03-21 RX ADMIN — MIDODRINE HYDROCHLORIDE SCH MG: 5 TABLET ORAL at 17:38

## 2019-03-21 RX ADMIN — CINACALCET HYDROCHLORIDE SCH MG: 30 TABLET, COATED ORAL at 21:38

## 2019-03-21 RX ADMIN — OXYMETAZOLINE HCL SCH SPR: 0.05 SPRAY NASAL at 09:29

## 2019-03-21 RX ADMIN — PANTOPRAZOLE SODIUM SCH MG: 40 TABLET, DELAYED RELEASE ORAL at 09:29

## 2019-03-21 RX ADMIN — SEVELAMER CARBONATE SCH MG: 800 TABLET, FILM COATED ORAL at 21:38

## 2019-03-21 RX ADMIN — CINACALCET HYDROCHLORIDE SCH MG: 30 TABLET, COATED ORAL at 09:28

## 2019-03-21 RX ADMIN — SEVELAMER CARBONATE SCH MG: 800 TABLET, FILM COATED ORAL at 14:28

## 2019-03-21 RX ADMIN — HYDROMORPHONE HYDROCHLORIDE PRN MG: 1 INJECTION, SOLUTION INTRAMUSCULAR; INTRAVENOUS; SUBCUTANEOUS at 08:10

## 2019-03-21 RX ADMIN — LIDOCAINE HYDROCHLORIDE SCH APPLIC: 20 JELLY TOPICAL at 22:16

## 2019-03-21 RX ADMIN — WATER SCH NOTE: 100 INJECTION, SOLUTION INTRAVENOUS at 06:57

## 2019-03-21 RX ADMIN — OXYMETAZOLINE HCL SCH: 0.05 SPRAY NASAL at 22:15

## 2019-03-21 RX ADMIN — SEVELAMER CARBONATE SCH MG: 800 TABLET, FILM COATED ORAL at 09:28

## 2019-03-21 RX ADMIN — INSULIN LISPRO SCH UNITS: 100 INJECTION, SOLUTION INTRAVENOUS; SUBCUTANEOUS at 22:16

## 2019-03-21 RX ADMIN — SODIUM BICARBONATE TAB 650 MG SCH MG: 650 TAB at 09:28

## 2019-03-21 RX ADMIN — MOMETASONE FUROATE AND FORMOTEROL FUMARATE DIHYDRATE SCH PUFF: 200; 5 AEROSOL RESPIRATORY (INHALATION) at 08:15

## 2019-03-21 RX ADMIN — METHADONE HYDROCHLORIDE SCH MG: 10 TABLET ORAL at 17:37

## 2019-03-21 RX ADMIN — LIDOCAINE HYDROCHLORIDE SCH: 20 JELLY TOPICAL at 14:28

## 2019-03-21 RX ADMIN — HYDROMORPHONE HYDROCHLORIDE PRN MG: 1 INJECTION, SOLUTION INTRAMUSCULAR; INTRAVENOUS; SUBCUTANEOUS at 03:03

## 2019-03-21 RX ADMIN — ONDANSETRON PRN MG: 2 INJECTION INTRAMUSCULAR; INTRAVENOUS at 11:50

## 2019-03-21 RX ADMIN — APIXABAN SCH MG: 2.5 TABLET, FILM COATED ORAL at 21:38

## 2019-03-21 RX ADMIN — MUPIROCIN SCH: 20 OINTMENT TOPICAL at 09:31

## 2019-03-21 RX ADMIN — ONDANSETRON PRN MG: 2 INJECTION INTRAMUSCULAR; INTRAVENOUS at 18:02

## 2019-03-21 RX ADMIN — MIDODRINE HYDROCHLORIDE SCH MG: 5 TABLET ORAL at 09:28

## 2019-03-21 RX ADMIN — INSULIN LISPRO SCH: 100 INJECTION, SOLUTION INTRAVENOUS; SUBCUTANEOUS at 00:34

## 2019-03-21 RX ADMIN — METHADONE HYDROCHLORIDE SCH MG: 10 TABLET ORAL at 04:46

## 2019-03-21 RX ADMIN — INSULIN LISPRO SCH UNITS: 100 INJECTION, SOLUTION INTRAVENOUS; SUBCUTANEOUS at 13:03

## 2019-03-21 RX ADMIN — INSULIN LISPRO SCH: 100 INJECTION, SOLUTION INTRAVENOUS; SUBCUTANEOUS at 17:49

## 2019-03-21 RX ADMIN — HYDROMORPHONE HYDROCHLORIDE PRN MG: 1 INJECTION, SOLUTION INTRAMUSCULAR; INTRAVENOUS; SUBCUTANEOUS at 11:49

## 2019-03-21 RX ADMIN — SODIUM BICARBONATE SCH MLS/HR: 84 INJECTION, SOLUTION INTRAVENOUS at 17:37

## 2019-03-21 RX ADMIN — OXYMETAZOLINE HCL SCH: 0.05 SPRAY NASAL at 09:31

## 2019-03-21 RX ADMIN — LIDOCAINE HYDROCHLORIDE SCH APPLIC: 20 JELLY TOPICAL at 09:29

## 2019-03-21 RX ADMIN — SODIUM BICARBONATE TAB 650 MG SCH MG: 650 TAB at 21:38

## 2019-03-21 RX ADMIN — SODIUM BICARBONATE TAB 650 MG SCH MG: 650 TAB at 14:26

## 2019-03-21 RX ADMIN — HYDROMORPHONE HYDROCHLORIDE PRN MG: 1 INJECTION, SOLUTION INTRAMUSCULAR; INTRAVENOUS; SUBCUTANEOUS at 15:49

## 2019-03-21 RX ADMIN — INSULIN LISPRO SCH: 100 INJECTION, SOLUTION INTRAVENOUS; SUBCUTANEOUS at 22:16

## 2019-03-21 RX ADMIN — HYDROMORPHONE HYDROCHLORIDE PRN MG: 1 INJECTION, SOLUTION INTRAMUSCULAR; INTRAVENOUS; SUBCUTANEOUS at 22:27

## 2019-03-21 RX ADMIN — CLOPIDOGREL SCH MG: 75 TABLET, FILM COATED ORAL at 09:29

## 2019-03-21 RX ADMIN — MIDODRINE HYDROCHLORIDE SCH MG: 5 TABLET ORAL at 21:38

## 2019-03-21 RX ADMIN — WATER SCH NOTE: 100 INJECTION, SOLUTION INTRAVENOUS at 18:58

## 2019-03-21 RX ADMIN — APIXABAN SCH MG: 2.5 TABLET, FILM COATED ORAL at 09:29

## 2019-03-21 RX ADMIN — PANTOPRAZOLE SODIUM SCH MG: 40 TABLET, DELAYED RELEASE ORAL at 22:19

## 2019-03-21 RX ADMIN — SODIUM BICARBONATE TAB 650 MG SCH MG: 650 TAB at 17:38

## 2019-03-21 RX ADMIN — MOMETASONE FUROATE AND FORMOTEROL FUMARATE DIHYDRATE SCH PUFF: 200; 5 AEROSOL RESPIRATORY (INHALATION) at 19:41

## 2019-03-21 NOTE — PN
Progress Note





- Progress Note


Date of Service: 03/21/19


Note: 





Patient vomiting frequently throughout the night.  Will hold off on BiPAP due 

to this.  Will do neuro checks q2 hours to make sure mentation remains intake.

## 2019-03-21 NOTE — PN
Date of Service: 03/21/19


Critical Care Services: 





38W with complicated medical history including ESRD on PD, PAD s/p femoral 

stents on warfarin, COPD with chronic resp failure and active smoking on 5L O2, 

IDDM, chronic pain on methadone, unconfirmed CAD, and recent admission at the 

end of February for PNA, returning with b/l thigh pain found with indurated 

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 

thiosulfate IV. With persistent leukocytosis (chronic), supratherapeutic INR. 

Hospital course complicated by respiratory failure likely from PNA requiring 

intubation, extubated on 3/11.





03/19:  Patient underwent incisional biopsy of left thigh lesion on 03/18/19 by 

Dr. Charles Hogan pathology consistent with caciphylaxis.  Overnight hospitalist 

was asked to evaluate patient around 2250 due to mental status changes- poorly 

responsive to verbal stimuli with constricted pupil.  Symptoms were attributed 

to overzealous opiate usage due to acute post surgical pain with concomitant 

acute acidosis- with acute metabolic and respiratory derangements.  She was 

sent to the ICU for BIPAP need. 


03/20: Patient off BIPAP.  Complains of ongoing pain and nausea.  Episodes of 

somnolence but easily aroused.  


03/21: patient awake and answering questions appropriately.  Reports pain but 

improved controlled compared to previously.  Did not use BIPAP last night due 

to vomiting.  Denies fever, chills, further vomiting 


Vital Signs: 











Temp Pulse Resp BP SpO2 FiO2


 


98 F 96 16 99/59 100 40


 


03/21/19 12:00 03/21/19 14:01 03/21/19 15:49 03/21/19 14:01 03/21/19 14:01 03/21 /19 12:00











Physical Exam: 


Gen: NAD, laying bed 





HEENT:NCAT, EOMI, no scleral icterus, neck supple





Lungs:air entry bilaterally, no wheezes 





Cardiac:  +S1, S2, RRR





Abdomen:SNTND, +bowel sounds





Extremities:no edema, + generalized ecchymosis/livedo reticularis 





Neuro:AAOX 3, non-focal 





Fluid Balance (Past 24 Hours): 


I=     O=     Net 


 Intake & Output











 03/19/19 03/20/19 03/21/19 03/22/19





 06:59 06:59 06:59 06:59


 


Intake Total 344 791 690 0


 


Output Total    60


 


Balance 344 791 690 -60


 


Weight  197 lb 5.019 oz 191 lb 3.2 oz 


 


Intake:    


 


  IV Fluids 244 791 200 


 


    Sodium bicarb  791  


 


    protonix 244   


 


    thiosulfate   200 


 


  Oral 100 0 490 0


 


  Tube Feeding    0


 


  Tube Feeding Flush Amount    0


 


  Packed Cells    0


 


  NG Tube Irrigate Amount    0


 


Output:    


 


  Urine    0


 


  Estimated Blood Loss    60


 


Other:    


 


  Date of Last Bowel   03/20/2019 





  Movement    


 


  # Bowel Movements   3 0


 


  Estimated Stool Amount   Medium 





 





ADLs: Meal  Record                                         Start:  03/05/19 21:

19


Freq:   DAILY@0900,1400,1800                               Status: Complete    

  


Protocol:                                                                      

  


 Created      03/05/19 21:19  System  (Rec: 03/05/19 21:19  System  TELE-M15)


 Document     03/06/19 09:00  [id number]  (Rec: 03/06/19 13:52  CIO2127  TELE-C10)


 Document     03/06/19 14:00  JAJ1301  (Rec: 03/06/19 15:02  SXI2004  TELE-C10)


 Document     03/06/19 18:00  JND7242  (Rec: 03/06/19 19:27  AHP3862  TELE-C10)


 Document     03/07/19 09:00  XJC7979  (Rec: 03/07/19 14:44  OYY9264  TELE-C10)


 Document     03/07/19 14:00  TPU0890  (Rec: 03/07/19 14:44  AFL6841  TELE-C10)


 Document     03/07/19 18:00  DEU1917  (Rec: 03/07/19 19:43  VXG4749  TELE-C10)


ADLs: Meal  Record                                         Start:  03/08/19 07:

16


Freq:   09,13,18                                           Status: Active      

  


Protocol:                                                                      

  


 Created      03/08/19 07:16  AGU0075  (Rec: 03/08/19 07:16  BOU4329  ICU-C12)


 Document     03/08/19 09:00  VAU5806  (Rec: 03/08/19 09:36  TSP8753  ICU-C07)


 Document     03/08/19 13:00  XLO1313  (Rec: 03/08/19 13:05  TMW8248  ICU-C07)


 Document     03/08/19 18:00  WVL6122  (Rec: 03/08/19 18:11  AGV3981  ICU-C10)


 Document     03/12/19 09:00  NMN0361  (Rec: 03/12/19 09:43  PRU3137  ICU-C15)


 Document     03/12/19 18:00  GDO3130  (Rec: 03/12/19 18:42  YFF6183  TELE-C01)


 Document     03/13/19 09:00  GRC1711  (Rec: 03/13/19 15:25  WPT2353  TELE-C03)


 Document     03/13/19 13:00  XUC0781  (Rec: 03/13/19 15:26  SOB5064  TELE-C03)


 Document     03/13/19 18:00  GPQ7016  (Rec: 03/13/19 22:15  JUD3137  TELE-C01)


 Document     03/14/19 09:00  KRR1115  (Rec: 03/14/19 15:03  GQQ8180  TELE-C05)


 Document     03/14/19 13:00  FZB5966  (Rec: 03/14/19 15:04  EDQ1621  TELE-C05)


 Document     03/14/19 18:00  OKN5734  (Rec: 03/14/19 21:56  ZJJ1536  TELE-C08)


 Document     03/15/19 09:00  UIY3247  (Rec: 03/15/19 16:41  IJI5871  TELE-C11)


 Document     03/15/19 13:00  VZL0496  (Rec: 03/15/19 21:33  GHH2013  TELE-C06)


 Document     03/15/19 18:00  YNM7257  (Rec: 03/15/19 21:34  JUE6447  TELE-C01)


 Document     03/16/19 09:00  LPN5164  (Rec: 03/16/19 10:37  BJF3537  TELE-C13)


 Document     03/16/19 13:00  PFI1212  (Rec: 03/16/19 14:35  LPE3085  TELE-C13)


 Document     03/16/19 18:00  TQW1008  (Rec: 03/16/19 22:27  HHJ0501  TELE-C09)


 Document     03/17/19 09:00  FTS2321  (Rec: 03/17/19 09:26  YDX0083  ICU-C16)


 Document     03/17/19 14:52  VSS8370  (Rec: 03/17/19 14:52  BJS6681  ICU-C16)


 Document     03/17/19 18:00  PGX2356  (Rec: 03/17/19 22:29  CPO2569  ICU-C25)


 Document     03/18/19 09:00  VQF5235  (Rec: 03/18/19 10:34  UNZ1967  ICU-M33)


 Document     03/18/19 14:42  RIJ0908  (Rec: 03/18/19 14:42  OWE1748  ICU-M33)


 Document     03/19/19 21:39  AUQ4467  (Rec: 03/19/19 21:39  TQM0651  ICU-M34)


 Document     03/20/19 09:00  EBY7213  (Rec: 03/20/19 10:28  XXQ8890  ICU-C07)


 Document     03/20/19 18:00  FIU6649  (Rec: 03/20/19 18:30  LPO4812  ICU-C07)


 Document     03/21/19 09:00  PSH1923  (Rec: 03/21/19 14:30  PVX2758  ICU-C07)


ADLs: Meal  Record                                         Start:  03/18/19 15:

52


Freq:                                                      Status: Complete    

  


Protocol:                                                                      

  


 Created      03/18/19 15:52  JYP0904  (Rec: 03/18/19 15:52  UVH6875  TELE-C08)


Intake and Output                                          Start:  03/05/19 18:

19


Freq:                                                      Status: Complete    

  


Protocol:                                                                      

  


 Created      03/05/19 18:19  System  (Rec: 03/05/19 18:19  System  EDRM-C04)


Intake and Output                                          Start:  03/05/19 21:

19


Freq:   DAILY@0600,1400,2200                               Status: Complete    

  


Protocol:                                                                      

  


 Created      03/05/19 21:19  System  (Rec: 03/05/19 21:19  System  TELE-M15)


 Document     03/06/19 06:00  HDG4471  (Rec: 03/06/19 06:47  TTE5214  TELE-C03)


 Document     03/06/19 14:00  UFS3403  (Rec: 03/06/19 15:02  PNX8053  TELE-C10)


 Document     03/06/19 22:00  TUK7634  (Rec: 03/06/19 22:07  GWP7098  TELE-C10)


 Document     03/07/19 05:58  ETW9308  (Rec: 03/07/19 05:59  RCF4875  HOSP-C11)


 Document     03/07/19 14:00  KJF2067  (Rec: 03/07/19 14:46  NJG3914  TELE-C10)


 Document     03/07/19 22:00  ICM4463  (Rec: 03/07/19 22:28  FGQ0047  TELE-C09)


Intake and Output                                          Start:  03/08/19 07:

16


Freq:   QSHIFT                                             Status: Active      

  


Protocol:                                                                      

  


 Created      03/08/19 07:16  FHO2465  (Rec: 03/08/19 07:16  HPI3844  ICU-C12)


 Document     03/08/19 08:00  KUW0986  (Rec: 03/08/19 08:21  CPE8249  ICU-M35)


 Document     03/08/19 11:44  XHM4674  (Rec: 03/08/19 11:44  MYX9356  ICU-C07)


 Document     03/08/19 16:00  QWF8879  (Rec: 03/08/19 18:06  DUX6329  ICU-C10)


 Document     03/09/19 00:00  KJP7329  (Rec: 03/09/19 00:48  MDE9149  ISDEMO-M03

)


 Document     03/09/19 06:07  ZWT6602  (Rec: 03/09/19 06:07  XGJ4751  ISDEMO-M03

)


 Document     03/09/19 08:00  YMV8990  (Rec: 03/09/19 12:31  LPB8715  ICU-C16)


 Document     03/09/19 12:00  PVQ2928  (Rec: 03/09/19 17:36  GRV3804  ICU-C16)


 Co-Sign      03/09/19 12:00  DST2872


 Document     03/09/19 16:00  BVX4046  (Rec: 03/09/19 18:03  UOQ4203  ICU-C16)


 Document     03/09/19 21:15  BRZ6403  (Rec: 03/10/19 03:56  PJX4359  ICU-L03)


 Document     03/10/19 03:30  VIN2658  (Rec: 03/10/19 03:56  UHT5014  ICU-L03)


 Document     03/10/19 08:00  MPR5551  (Rec: 03/10/19 11:39  PDR2802  ICU-C16)


 Document     03/11/19 00:58  KDA7232  (Rec: 03/11/19 00:58  CJG5625  ISDEMO-M03

)


 Document     03/11/19 08:00  JLF3700  (Rec: 03/11/19 12:22  KEA1863  ICU-C16)


 Document     03/11/19 20:00  SJQ6428  (Rec: 03/11/19 20:12  IGE5492  ICU-C15)


 Document     03/12/19 08:00  WJM4465  (Rec: 03/12/19 09:42  MYC0848  ICU-C15)


 Document     03/12/19 10:58  XCX4947  (Rec: 03/12/19 10:58  MMF6402  ICU-C15)


 Document     03/12/19 20:00  QYF1091  (Rec: 03/12/19 22:42  VPL1063  TELE-C01)


 Document     03/13/19 08:00  HXW0550  (Rec: 03/13/19 10:57  YVA4860  TELE-C03)


 Document     03/13/19 20:00  TNR1319  (Rec: 03/13/19 22:16  TSW4763  TELE-C01)


 Document     03/14/19 08:00  RGE7521  (Rec: 03/14/19 15:02  EUY9760  TELE-C05)


 Document     03/14/19 22:00  TYC2406  (Rec: 03/14/19 22:40  TKF8581  TELE-C08)


 Document     03/15/19 08:00  MZR2236  (Rec: 03/15/19 09:55  HER3789  TELE-M02)


 Document     03/15/19 20:00  FHJ3357  (Rec: 03/15/19 22:54  ZZL6226  TELE-C01)


 Document     03/16/19 08:00  PHG1185  (Rec: 03/16/19 10:31  KHV8372  TELE-C13)


 Document     03/16/19 20:00  LXN3672  (Rec: 03/16/19 22:27  FXC0389  TELE-C09)


 Document     03/17/19 05:36  ZLO0425  (Rec: 03/17/19 05:37  MLM4990  ICU-C25)


 Document     03/17/19 20:00  KUJ8645  (Rec: 03/18/19 02:00  QGS1121  ICU-C25)


 Document     03/18/19 08:00  EDF5933  (Rec: 03/18/19 11:01  GRC9227  ICU-L03)


 Document     03/19/19 20:00  XCA8703  (Rec: 03/19/19 21:32  TAY1231  ICU-C06)


 Document     03/20/19 08:00  SPM0453  (Rec: 03/20/19 10:28  NNN5138  ICU-C07)


 Document     03/20/19 21:00  ROX9782  (Rec: 03/20/19 23:38  LVU9012  ICU-C06)


 Document     03/21/19 07:55  IVP1441  (Rec: 03/21/19 08:01  GLA5903  ICU-C07)








Labs: 


 Laboratory Results - last 24 hr











  03/20/19 03/21/19 03/21/19





  17:13 00:40 04:55


 


VBG pH   


 


VBG pCO2   


 


VBG pO2   


 


VBG HCO3   


 


VBG O2 Saturation   


 


VBG Base Excess   


 


Sodium    143


 


Potassium    3.9


 


Chloride    87 L


 


Carbon Dioxide    20 L


 


Anion Gap    36 H


 


BUN    40 H


 


Creatinine    6.72 H


 


Est GFR ( Amer)    8.3


 


Est GFR (Non-Af Amer)    6.9


 


BUN/Creatinine Ratio    6.0 L


 


Glucose    313 H


 


POC Glucose (mg/dL)  203 H  221 H 


 


Calcium    7.7 L














  03/21/19 03/21/19





  04:55 12:54


 


VBG pH  7.18 L 


 


VBG pCO2  56 H 


 


VBG pO2  41.0 


 


VBG HCO3  17.6 L 


 


VBG O2 Saturation  57.2 L 


 


VBG Base Excess  -8.0 L 


 


Sodium  


 


Potassium  


 


Chloride  


 


Carbon Dioxide  


 


Anion Gap  


 


BUN  


 


Creatinine  


 


Est GFR ( Amer)  


 


Est GFR (Non-Af Amer)  


 


BUN/Creatinine Ratio  


 


Glucose  


 


POC Glucose (mg/dL)   137 H


 


Calcium  











Nutrition: 


renal diet 





Impression: 


38W with complicated medical history including ESRD on PD, PAD s/p femoral 

stents on warfarin, COPD with chronic resp failure and active smoking on 5L O2, 

IDDM, chronic pain on methadone, unconfirmed CAD, and recent admission at the 

end of February for PNA, returning with b/l thigh pain found with indurated 

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 

thiosulfate IV. With persistent leukocytosis (chronic), supratherapeutic INR. 

Hospital course complicated by respiratory failure likely from PNA requiring 

intubation, extubated on 3/11 now being managed in the ICU for mixed acid base 

derrangements acutely 





Plan: 


# Acute respiratory failure due to mixed acid base derangements


#  Acute mixed acidosis- acute uncompensated primary respiratory acidosis with 

metabolic acidosis with high anion gap  


- improved clinically but VBG consistent with acidosis 


- agree with BIPAP prn and nocturnally 


- repeat VBG via midline draw in AM 


- S/P 150 meq bicarb x 1L at 75cc/h 3/19


- will continue with oral bicarb 2600 mg QID + NaHCO3 150MEQ 75 cc x 2 L 03/21





# Hypotension


improved on midodrine -->increase to 10 mg QID 





# Hypoglycemia


improved 





# CAD


# PVD with multiple LE stents with in-stent stensosis


# DVT


-started plavix 03/20


-startED eliquis 03/20


-caution with bleeding due to recent hx/o GI bleed 





# Caciphylaxis


- on NaThiosulfate 25 gm MWFSu as per PD needs


- PTH mgmt as below 


- no warfarin and avoid subcutaneous injections


- no vit D, calcium, iron supplementation 


- wound care





# Hyperparathyroidism


PTH (intact) 85.8pmol/L/ 809.09pg/mL


-goal to bring PTH <300


-check PTH weekly - due on monday 


-Cinacalcet 90 mg BID 





# ESRD on PD


- nephro recs 





# Leukocytosis 


persistent with acute increase 17-->24-->28


suspect reactive 


afebrile


hemodynamically stable 


no overt suggestion of acute PNA on CXR 


recent completion of course of zosyn


-will hold off on abx


-culture if febrile





# Anemia of chronic disease 


stable H/H 


- will discuss with nephro about EPO 





# Thrombocytosis


likely reactive 


patient on ASA 





# chronic pain 


# mood d/o 


-consult from Dr Rivera pending 


- On methadone 10 mg q12h


- will start fentanyl patch 50 mcg/hour


- prn dilaudid and continue with gabapenin


- on sertraline, will increase to 50 mg daily 





#  Nausea 


likely associated with thiosulfate infusion


- Marinol on days of Thiosulfate infusion - Yu, M, W, F 


- prn zofran and ativan  





#  GIB


thought to be due to recent bleed from nasal orgin 





Critical Care Time: 45 minutes

## 2019-03-22 LAB
ANION GAP SERPL CALC-SCNC: 33 MMOL/L (ref 2–11)
BASOPHILS # BLD AUTO: 0.1 10^3/UL (ref 0–0.2)
BASOPHILS # BLD AUTO: 0.1 10^3/UL (ref 0–0.2)
BUN SERPL-MCNC: 37 MG/DL (ref 6–24)
BUN/CREAT SERPL: 5.9 (ref 8–20)
CALCIUM SERPL-MCNC: 6.9 MG/DL (ref 8.6–10.3)
CHLORIDE SERPL-SCNC: 86 MMOL/L (ref 101–111)
EOSINOPHIL # BLD AUTO: 0 10^3/UL (ref 0–0.6)
EOSINOPHIL # BLD AUTO: 0.1 10^3/UL (ref 0–0.6)
GLUCOSE SERPL-MCNC: 272 MG/DL (ref 70–100)
HCO3 SERPL-SCNC: 24 MMOL/L (ref 22–32)
HCT VFR BLD AUTO: 20 % (ref 33–41)
HCT VFR BLD AUTO: 21 % (ref 33–41)
HGB BLD-MCNC: 6.3 G/DL (ref 12–16)
HGB BLD-MCNC: 6.7 G/DL (ref 12–16)
INR PPP/BLD: 1.4 (ref 0.77–1.02)
LYMPHOCYTES # BLD AUTO: 1.1 10^3/UL (ref 1–4.8)
LYMPHOCYTES # BLD AUTO: 1.2 10^3/UL (ref 1–4.8)
MAGNESIUM SERPL-MCNC: 1.8 MG/DL (ref 1.9–2.7)
MCH RBC QN AUTO: 27 PG (ref 27–31)
MCH RBC QN AUTO: 28 PG (ref 27–31)
MCHC RBC AUTO-ENTMCNC: 32 G/DL (ref 31–36)
MCHC RBC AUTO-ENTMCNC: 32 G/DL (ref 31–36)
MCV RBC AUTO: 83 FL (ref 80–97)
MCV RBC AUTO: 85 FL (ref 80–97)
MONOCYTES # BLD AUTO: 0.9 10^3/UL (ref 0–0.8)
MONOCYTES # BLD AUTO: 1 10^3/UL (ref 0–0.8)
NEUTROPHILS # BLD AUTO: 16.1 10^3/UL (ref 1.5–7.7)
NEUTROPHILS # BLD AUTO: 18 10^3/UL (ref 1.5–7.7)
NRBC # BLD AUTO: 0 10^3/UL
NRBC # BLD AUTO: 0.1 10^3/UL
NRBC BLD QL AUTO: 0.2
NRBC BLD QL AUTO: 0.4
PLATELET # BLD AUTO: 454 10^3/UL (ref 150–450)
PLATELET # BLD AUTO: 544 10^3/UL (ref 150–450)
POTASSIUM SERPL-SCNC: 3.6 MMOL/L (ref 3.5–5)
RBC # BLD AUTO: 2.39 10^6 /UL (ref 3.7–4.87)
RBC # BLD AUTO: 2.43 10^6 /UL (ref 3.7–4.87)
SODIUM SERPL-SCNC: 143 MMOL/L (ref 135–145)
WBC # BLD AUTO: 18.4 10^3/UL (ref 3.5–10.8)
WBC # BLD AUTO: 20.2 10^3/UL (ref 3.5–10.8)

## 2019-03-22 RX ADMIN — MIDODRINE HYDROCHLORIDE SCH MG: 5 TABLET ORAL at 22:13

## 2019-03-22 RX ADMIN — WATER SCH NOTE: 100 INJECTION, SOLUTION INTRAVENOUS at 19:00

## 2019-03-22 RX ADMIN — HYDROMORPHONE HYDROCHLORIDE PRN MG: 1 INJECTION, SOLUTION INTRAMUSCULAR; INTRAVENOUS; SUBCUTANEOUS at 01:47

## 2019-03-22 RX ADMIN — OXYMETAZOLINE HCL SCH: 0.05 SPRAY NASAL at 09:16

## 2019-03-22 RX ADMIN — PANTOPRAZOLE SODIUM SCH MG: 40 TABLET, DELAYED RELEASE ORAL at 09:15

## 2019-03-22 RX ADMIN — METHADONE HYDROCHLORIDE SCH MG: 10 TABLET ORAL at 17:52

## 2019-03-22 RX ADMIN — HYDROMORPHONE HYDROCHLORIDE PRN MG: 1 INJECTION, SOLUTION INTRAMUSCULAR; INTRAVENOUS; SUBCUTANEOUS at 12:07

## 2019-03-22 RX ADMIN — WATER SCH NOTE: 100 INJECTION, SOLUTION INTRAVENOUS at 12:20

## 2019-03-22 RX ADMIN — SEVELAMER CARBONATE SCH MG: 800 TABLET, FILM COATED ORAL at 16:19

## 2019-03-22 RX ADMIN — INSULIN LISPRO SCH UNITS: 100 INJECTION, SOLUTION INTRAVENOUS; SUBCUTANEOUS at 16:30

## 2019-03-22 RX ADMIN — MIDODRINE HYDROCHLORIDE SCH MG: 5 TABLET ORAL at 09:15

## 2019-03-22 RX ADMIN — OXYMETAZOLINE HCL SCH: 0.05 SPRAY NASAL at 22:11

## 2019-03-22 RX ADMIN — SODIUM THIOSULFATE SCH MLS/HR: 250 INJECTION, SOLUTION INTRAVENOUS at 22:04

## 2019-03-22 RX ADMIN — EPOETIN ALFA-EPBX SCH UNIT: 4000 INJECTION, SOLUTION INTRAVENOUS; SUBCUTANEOUS at 18:50

## 2019-03-22 RX ADMIN — HYDROMORPHONE HYDROCHLORIDE PRN MG: 1 INJECTION, SOLUTION INTRAMUSCULAR; INTRAVENOUS; SUBCUTANEOUS at 16:19

## 2019-03-22 RX ADMIN — SODIUM BICARBONATE TAB 650 MG SCH MG: 650 TAB at 14:03

## 2019-03-22 RX ADMIN — DULOXETINE HYDROCHLORIDE SCH MG: 30 CAPSULE, DELAYED RELEASE ORAL at 09:15

## 2019-03-22 RX ADMIN — CINACALCET HYDROCHLORIDE SCH MG: 30 TABLET, COATED ORAL at 09:15

## 2019-03-22 RX ADMIN — SEVELAMER CARBONATE SCH MG: 800 TABLET, FILM COATED ORAL at 09:15

## 2019-03-22 RX ADMIN — LIDOCAINE HYDROCHLORIDE SCH: 20 JELLY TOPICAL at 22:12

## 2019-03-22 RX ADMIN — DRONABINOL SCH MG: 2.5 CAPSULE ORAL at 22:07

## 2019-03-22 RX ADMIN — MOMETASONE FUROATE AND FORMOTEROL FUMARATE DIHYDRATE SCH PUFF: 200; 5 AEROSOL RESPIRATORY (INHALATION) at 20:00

## 2019-03-22 RX ADMIN — ONDANSETRON PRN MG: 2 INJECTION INTRAMUSCULAR; INTRAVENOUS at 01:11

## 2019-03-22 RX ADMIN — HYDROMORPHONE HYDROCHLORIDE PRN MG: 1 INJECTION, SOLUTION INTRAMUSCULAR; INTRAVENOUS; SUBCUTANEOUS at 07:55

## 2019-03-22 RX ADMIN — MUPIROCIN SCH: 20 OINTMENT TOPICAL at 09:16

## 2019-03-22 RX ADMIN — CLOPIDOGREL SCH MG: 75 TABLET, FILM COATED ORAL at 09:15

## 2019-03-22 RX ADMIN — ONDANSETRON PRN MG: 2 INJECTION INTRAMUSCULAR; INTRAVENOUS at 09:26

## 2019-03-22 RX ADMIN — PANTOPRAZOLE SODIUM SCH MLS/HR: 40 INJECTION, POWDER, FOR SOLUTION INTRAVENOUS at 23:27

## 2019-03-22 RX ADMIN — CINACALCET HYDROCHLORIDE SCH MG: 30 TABLET, COATED ORAL at 22:06

## 2019-03-22 RX ADMIN — MOMETASONE FUROATE AND FORMOTEROL FUMARATE DIHYDRATE SCH PUFF: 200; 5 AEROSOL RESPIRATORY (INHALATION) at 08:36

## 2019-03-22 RX ADMIN — PROCHLORPERAZINE EDISYLATE PRN ML: 5 INJECTION INTRAMUSCULAR; INTRAVENOUS at 05:07

## 2019-03-22 RX ADMIN — APIXABAN SCH MG: 2.5 TABLET, FILM COATED ORAL at 09:15

## 2019-03-22 RX ADMIN — ONDANSETRON PRN MG: 2 INJECTION INTRAMUSCULAR; INTRAVENOUS at 22:34

## 2019-03-22 RX ADMIN — MIDODRINE HYDROCHLORIDE SCH MG: 5 TABLET ORAL at 17:52

## 2019-03-22 RX ADMIN — INSULIN LISPRO SCH UNITS: 100 INJECTION, SOLUTION INTRAVENOUS; SUBCUTANEOUS at 22:04

## 2019-03-22 RX ADMIN — HYDROMORPHONE HYDROCHLORIDE PRN MG: 1 INJECTION, SOLUTION INTRAMUSCULAR; INTRAVENOUS; SUBCUTANEOUS at 21:16

## 2019-03-22 RX ADMIN — SEVELAMER CARBONATE SCH MG: 800 TABLET, FILM COATED ORAL at 22:06

## 2019-03-22 RX ADMIN — METHADONE HYDROCHLORIDE SCH MG: 10 TABLET ORAL at 04:51

## 2019-03-22 RX ADMIN — SODIUM BICARBONATE SCH MLS/HR: 84 INJECTION, SOLUTION INTRAVENOUS at 07:55

## 2019-03-22 RX ADMIN — SODIUM BICARBONATE TAB 650 MG SCH MG: 650 TAB at 22:13

## 2019-03-22 RX ADMIN — DRONABINOL SCH MG: 2.5 CAPSULE ORAL at 14:03

## 2019-03-22 RX ADMIN — MIDODRINE HYDROCHLORIDE SCH MG: 5 TABLET ORAL at 14:03

## 2019-03-22 RX ADMIN — SODIUM BICARBONATE TAB 650 MG SCH MG: 650 TAB at 17:51

## 2019-03-22 RX ADMIN — LIDOCAINE HYDROCHLORIDE SCH APPLIC: 20 JELLY TOPICAL at 14:04

## 2019-03-22 RX ADMIN — SODIUM BICARBONATE TAB 650 MG SCH MG: 650 TAB at 09:16

## 2019-03-22 RX ADMIN — INSULIN LISPRO SCH: 100 INJECTION, SOLUTION INTRAVENOUS; SUBCUTANEOUS at 12:26

## 2019-03-22 RX ADMIN — INSULIN LISPRO SCH UNITS: 100 INJECTION, SOLUTION INTRAVENOUS; SUBCUTANEOUS at 08:27

## 2019-03-22 RX ADMIN — LIDOCAINE HYDROCHLORIDE SCH APPLIC: 20 JELLY TOPICAL at 08:27

## 2019-03-22 NOTE — PN
*** AMENDED REPORT NOW INCLUDES DATE OF SERVICE - ESIGNED BEFORE ADJUSTMENT ***



PROGRESS NOTE:

 

DATE OF SERVICE:  03/22/19

 

HISTORY:  I was asked to go over yesterday to speak with Ms. Masters about her 
diagnosis of calciphylaxis.  She had been having some significant pain to her 
legs. There had been noticed somewhat skin discoloration, a biopsy was produced
, which demonstrated calciphylaxis.  She had had sodium thiosulfate initiated 
to help with this condition.  She noticed an increase in her discomfort, which 
is not atypical with use of sodium thiosulfate in the early course therapy.  In 
addition, she has developed some hypotension, which is also not uncommon.  I 
discussed with her the etiology of this, which was related to a long-term 
problem with phosphate management.  I also discussed with her the fact that 
this was a separate vascular disease from the atherosclerotic disease that she 
has also been demonstrated to have.  We reviewed her alternatives, which 
included cessation of dialysis and converting to hemodialysis while continuing 
on sodium thiosulfate.  She rejected this as she had previously had an episode 
of cardiac arrest while on hemodialysis and had been intolerant of hemodialysis 
a couple of times when we tried it in the past.  Lastly, transferal to a 
dialysis unit associated with a nursing home that would allow peritoneal 
dialysis.  I understand that one is available in [place]. She could then 
continue on sodium thiosulfate treatment as well as receiving wound care for 
her skin wounds, which are likely to breakdown.  She elected that course if she 
understands the potential for significant complications including pain and skin 
wound infection.  Dr. Torres was in attendance during most of this discussion, 
which took approximately 40 minutes.

 

 [id number]/CPS #: [id number]

SAEED

## 2019-03-22 NOTE — PN
Date of Service: 03/22/19 - TRANSFER SUMMARY 


Critical Care Services: 


38W with complicated medical history including ESRD on PD, PAD s/p femoral 

stents on warfarin, COPD with chronic resp failure and active smoking on 5L O2, 

IDDM, chronic pain on methadone, unconfirmed CAD, and recent admission at the 

end of February for PNA, returning with b/l thigh pain found with indurated 

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 

thiosulfate IV. With persistent leukocytosis (chronic), supratherapeutic INR. 

Hospital course complicated by respiratory failure likely from PNA requiring 

intubation, extubated on 3/11.





03/19:  Patient underwent incisional biopsy of left thigh lesion on 03/18/19 by 

Dr. Charles Hogan pathology consistent with caciphylaxis.  Overnight hospitalist 

was asked to evaluate patient around 2250 due to mental status changes- poorly 

responsive to verbal stimuli with constricted pupil.  Symptoms were attributed 

to overzealous opiate usage due to acute post surgical pain with concomitant 

acute acidosis- with acute metabolic and respiratory derangements.  She was 

sent to the ICU for BIPAP need. 


03/20: Patient off BIPAP.  Complains of ongoing pain and nausea.  Episodes of 

somnolence but easily aroused.  


03/21: patient awake and answering questions appropriately.  Reports pain but 

improved controlled compared to previously.  Did not use BIPAP due to vomiting.

  





03/22: Patient had another run of nausea/vomiting in the night.  She refused to 

use the BIPAP as a result.  Her pain appears to be fairly controlled on the 

current regimen.  + BM, no fever.  On PD.  





Vital Signs: 











Temp Pulse Resp BP SpO2 FiO2


 


97 F 90 16 110/85 99 40


 


03/22/19 08:00 03/22/19 10:01 03/22/19 10:01 03/22/19 10:00 03/22/19 10:01 03/22 /19 08:00











Physical Exam: 


Gen: NAD, laying bed 





HEENT:NCAT, EOMI, no scleral icterus, neck supple





Lungs:air entry bilaterally, no wheezes 





Cardiac:  +S1, S2, RRR





Abdomen:SNTND, +bowel sounds





Extremities:no edema, + generalized ecchymosis/livedo reticularis 





Neuro:AAOX 3, non-focal 


Fluid Balance (Past 24 Hours): 


I=     O=     Net 


 Intake & Output











 03/20/19 03/21/19 03/22/19 03/23/19





 06:59 06:59 06:59 06:59


 


Intake Total  120


 


Output Total   60 


 


Balance  120


 


Weight 197 lb 5.019 oz 191 lb 3.2 oz 187 lb 2.759 oz 


 


Intake:    


 


  IV Fluids 791 200 934 


 


    Sodium bicarb 791  934 


 


    thiosulfate  200  


 


  Oral 0 490 220 120


 


  Tube Feeding   0 


 


  Tube Feeding Flush Amount   0 


 


  Packed Cells   0 


 


  NG Tube Irrigate Amount   0 


 


Output:    


 


  Urine   0 


 


  Estimated Blood Loss   60 


 


Other:    


 


  Date of Last Bowel  03/20/2019 t 





  Movement    


 


  # Bowel Movements  3 0 


 


  Estimated Stool Amount  Medium  Medium





 





ADLs: Meal  Record                                         Start:  03/05/19 21:

19


Freq:   DAILY@0900,1400,1800                               Status: Complete    

  


Protocol:                                                                      

  


 Created      03/05/19 21:19  System  (Rec: 03/05/19 21:19  System  TELE-M15)


 Document     03/06/19 09:00  [id number]  (Rec: 03/06/19 13:52  [id number]  TELE-C10)


 Document     03/06/19 14:00  KIN2119  (Rec: 03/06/19 15:02  TIJ4717  TELE-C10)


 Document     03/06/19 18:00  XOW8895  (Rec: 03/06/19 19:27  UMM7489  TELE-C10)


 Document     03/07/19 09:00  LBG5547  (Rec: 03/07/19 14:44  KAU8044  TELE-C10)


 Document     03/07/19 14:00  QFX2364  (Rec: 03/07/19 14:44  EXQ2880  TELE-C10)


 Document     03/07/19 18:00  DMO8421  (Rec: 03/07/19 19:43  VPC2472  TELE-C10)


ADLs: Meal  Record                                         Start:  03/08/19 07:

16


Freq:   09,13,18                                           Status: Active      

  


Protocol:                                                                      

  


 Created      03/08/19 07:16  [id number]  (Rec: 03/08/19 07:16  RMD9872  ICU-C12)


 Document     03/08/19 09:00  WVM0591  (Rec: 03/08/19 09:36  QGK0107  ICU-C07)


 Document     03/08/19 13:00  YAI9902  (Rec: 03/08/19 13:05  PMV1885  ICU-C07)


 Document     03/08/19 18:00  KBD7320  (Rec: 03/08/19 18:11  RKB1468  ICU-C10)


 Document     03/12/19 09:00  QMS1347  (Rec: 03/12/19 09:43  SBA2826  ICU-C15)


 Document     03/12/19 18:00  QZM7433  (Rec: 03/12/19 18:42  ALV2986  TELE-C01)


 Document     03/13/19 09:00  ZYE9495  (Rec: 03/13/19 15:25  PMX6270  TELE-C03)


 Document     03/13/19 13:00  TFI7143  (Rec: 03/13/19 15:26  POS4870  TELE-C03)


 Document     03/13/19 18:00  YTW2664  (Rec: 03/13/19 22:15  AOY6917  TELE-C01)


 Document     03/14/19 09:00  BML9430  (Rec: 03/14/19 15:03  VFA1019  TELE-C05)


 Document     03/14/19 13:00  OAP8248  (Rec: 03/14/19 15:04  AEC5997  TELE-C05)


 Document     03/14/19 18:00  EPD9204  (Rec: 03/14/19 21:56  MAM2584  TELE-C08)


 Document     03/15/19 09:00  HWX8259  (Rec: 03/15/19 16:41  BBE5368  TELE-C11)


 Document     03/15/19 13:00  VNR8131  (Rec: 03/15/19 21:33  KPV9126  TELE-C06)


 Document     03/15/19 18:00  SPL3972  (Rec: 03/15/19 21:34  PKP7591  TELE-C01)


 Document     03/16/19 09:00  ILI8372  (Rec: 03/16/19 10:37  GZU4215  TELE-C13)


 Document     03/16/19 13:00  EBY9705  (Rec: 03/16/19 14:35  FUP9011  TELE-C13)


 Document     03/16/19 18:00  ADF3107  (Rec: 03/16/19 22:27  QIW3692  TELE-C09)


 Document     03/17/19 09:00  DBD7799  (Rec: 03/17/19 09:26  BRB1535  ICU-C16)


 Document     03/17/19 14:52  HJD9724  (Rec: 03/17/19 14:52  WPX2746  ICU-C16)


 Document     03/17/19 18:00  EQR9840  (Rec: 03/17/19 22:29  JTU9804  ICU-C25)


 Document     03/18/19 09:00  OXX8084  (Rec: 03/18/19 10:34  SCN7887  ICU-M33)


 Document     03/18/19 14:42  OET0526  (Rec: 03/18/19 14:42  VQN2197  ICU-M33)


 Document     03/19/19 21:39  DAS2490  (Rec: 03/19/19 21:39  YFJ9416  ICU-M34)


 Document     03/20/19 09:00  SRK7782  (Rec: 03/20/19 10:28  WIV2109  ICU-C07)


 Document     03/20/19 18:00  DQO9049  (Rec: 03/20/19 18:30  JUW9052  ICU-C07)


 Document     03/21/19 09:00  JYZ7611  (Rec: 03/21/19 14:30  NHR0026  ICU-C07)


 Document     03/21/19 18:00  YKN1637  (Rec: 03/21/19 18:22  XBC5066  ICU-C07)


 Document     03/22/19 09:00  XRM4889  (Rec: 03/22/19 10:57  PHC9854  ICU-C07)


ADLs: Meal  Record                                         Start:  03/18/19 15:

52


Freq:                                                      Status: Complete    

  


Protocol:                                                                      

  


 Created      03/18/19 15:52  ZLM4053  (Rec: 03/18/19 15:52  VGG3497  TELE-C08)


Intake and Output                                          Start:  03/05/19 18:

19


Freq:                                                      Status: Complete    

  


Protocol:                                                                      

  


 Created      03/05/19 18:19  System  (Rec: 03/05/19 18:19  System  EDRM-C04)


Intake and Output                                          Start:  03/05/19 21:

19


Freq:   DAILY@0600,1400,2200                               Status: Complete    

  


Protocol:                                                                      

  


 Created      03/05/19 21:19  System  (Rec: 03/05/19 21:19  System  TELE-M15)


 Document     03/06/19 06:00  EPJ3383  (Rec: 03/06/19 06:47  GAV5770  TELE-C03)


 Document     03/06/19 14:00  XWZ2062  (Rec: 03/06/19 15:02  LYH7182  TELE-C10)


 Document     03/06/19 22:00  ORB5756  (Rec: 03/06/19 22:07  PVK6910  TELE-C10)


 Document     03/07/19 05:58  JWD7208  (Rec: 03/07/19 05:59  QTZ2000  HOSP-C11)


 Document     03/07/19 14:00  BQY9565  (Rec: 03/07/19 14:46  BBL2062  TELE-C10)


 Document     03/07/19 22:00  TYK0500  (Rec: 03/07/19 22:28  LCH7328  TELE-C09)


Intake and Output                                          Start:  03/08/19 07:

16


Freq:   QSHIFT                                             Status: Active      

  


Protocol:                                                                      

  


 Created      03/08/19 07:16  YCM1314  (Rec: 03/08/19 07:16  PJG3478  ICU-C12)


 Document     03/08/19 08:00  XVP4871  (Rec: 03/08/19 08:21  MFY8808  ICU-M35)


 Document     03/08/19 11:44  AEI6944  (Rec: 03/08/19 11:44  BUT0036  ICU-C07)


 Document     03/08/19 16:00  HSE7233  (Rec: 03/08/19 18:06  LRV1767  ICU-C10)


 Document     03/09/19 00:00  HZT3852  (Rec: 03/09/19 00:48  VOJ8364  ISDEMO-M03

)


 Document     03/09/19 06:07  VJO4556  (Rec: 03/09/19 06:07  GKM8119  ISDEMO-M03

)


 Document     03/09/19 08:00  LVF6343  (Rec: 03/09/19 12:31  ADE6142  ICU-C16)


 Document     03/09/19 12:00  OUM7955  (Rec: 03/09/19 17:36  LWC8795  ICU-C16)


 Co-Sign      03/09/19 12:00  UNU2634


 Document     03/09/19 16:00  SJE8575  (Rec: 03/09/19 18:03  WQD5612  ICU-C16)


 Document     03/09/19 21:15  CJF9212  (Rec: 03/10/19 03:56  SMP4320  ICU-L03)


 Document     03/10/19 03:30  GYP0155  (Rec: 03/10/19 03:56  CXF9390  ICU-L03)


 Document     03/10/19 08:00  OFJ8992  (Rec: 03/10/19 11:39  VBH8247  ICU-C16)


 Document     03/11/19 00:58  MIS0555  (Rec: 03/11/19 00:58  XBD5233  ISDEMO-M03

)


 Document     03/11/19 08:00  SGY3142  (Rec: 03/11/19 12:22  UCM3019  ICU-C16)


 Document     03/11/19 20:00  SRE3046  (Rec: 03/11/19 20:12  UOT3245  ICU-C15)


 Document     03/12/19 08:00  RQP6294  (Rec: 03/12/19 09:42  IAP3827  ICU-C15)


 Document     03/12/19 10:58  TIE1452  (Rec: 03/12/19 10:58  DGM0901  ICU-C15)


 Document     03/12/19 20:00  DFH1547  (Rec: 03/12/19 22:42  AHZ8165  TELE-C01)


 Document     03/13/19 08:00  KCV7012  (Rec: 03/13/19 10:57  NNZ9833  TELE-C03)


 Document     03/13/19 20:00  KQF6272  (Rec: 03/13/19 22:16  APO9305  TELE-C01)


 Document     03/14/19 08:00  HHS3520  (Rec: 03/14/19 15:02  XAM9888  TELE-C05)


 Document     03/14/19 22:00  CLM7672  (Rec: 03/14/19 22:40  BFN1289  TELE-C08)


 Document     03/15/19 08:00  KHV2301  (Rec: 03/15/19 09:55  MRB7307  TELE-M02)


 Document     03/15/19 20:00  HXV6438  (Rec: 03/15/19 22:54  LIR8580  TELE-C01)


 Document     03/16/19 08:00  VGE3725  (Rec: 03/16/19 10:31  YZA9970  TELE-C13)


 Document     03/16/19 20:00  JYU1856  (Rec: 03/16/19 22:27  FPQ0155  TELE-C09)


 Document     03/17/19 05:36  OQV2633  (Rec: 03/17/19 05:37  XPL8076  ICU-C25)


 Document     03/17/19 20:00  OFR4631  (Rec: 03/18/19 02:00  JPO1623  ICU-C25)


 Document     03/18/19 08:00  JSX8379  (Rec: 03/18/19 11:01  AIE4771  ICU-L03)


 Document     03/19/19 20:00  BHZ2345  (Rec: 03/19/19 21:32  WAR2204  ICU-C06)


 Document     03/20/19 08:00  JLU3871  (Rec: 03/20/19 10:28  ASQ3824  ICU-C07)


 Document     03/20/19 21:00  UEI4536  (Rec: 03/20/19 23:38  IVO1363  ICU-C06)


 Document     03/21/19 07:55  NOZ9686  (Rec: 03/21/19 08:01  SCF3174  ICU-C07)


 Document     03/21/19 21:30  RZV0346  (Rec: 03/21/19 23:01  ZMP8458  ICU-C07)


 Document     03/22/19 05:45  FFG8220  (Rec: 03/22/19 05:45  DUN7346  ICU-C07)


 Document     03/22/19 08:00  QTH5683  (Rec: 03/22/19 08:01  VYY7243  ICU-C20)








Labs: 


 Laboratory Results - last 24 hr











  03/21/19 03/22/19 03/22/19





  12:54 05:03 05:03


 


WBC    20.2 H


 


RBC    2.43 L


 


Hgb    6.7 L


 


Hct    21 L


 


MCV    85


 


MCH    28


 


MCHC    32


 


RDW    17 H


 


Plt Count    544 H


 


MPV    8.0


 


Neut % (Auto)    89.0


 


Lymph % (Auto)    5.6


 


Mono % (Auto)    4.9


 


Eos % (Auto)    0.2


 


Baso % (Auto)    0.3


 


Absolute Neuts (auto)    18.0 H


 


Absolute Lymphs (auto)    1.1


 


Absolute Monos (auto)    1.0 H


 


Absolute Eos (auto)    0


 


Absolute Basos (auto)    0.1


 


Absolute Nucleated RBC    0


 


Nucleated RBC %    0.2


 


VBG pH   


 


VBG pCO2   


 


VBG pO2   


 


VBG HCO3   


 


VBG O2 Saturation   


 


VBG Base Excess   


 


Sodium   143 


 


Potassium   3.6 


 


Chloride   86 L 


 


Carbon Dioxide   24 


 


Anion Gap   33 H 


 


BUN   37 H 


 


Creatinine   6.26 H 


 


Est GFR ( Amer)   9.0 


 


Est GFR (Non-Af Amer)   7.5 


 


BUN/Creatinine Ratio   5.9 L 


 


Glucose   272 H 


 


POC Glucose (mg/dL)  137 H  


 


Calcium   6.9 L 


 


Phosphorus   5.8 H 


 


Magnesium   1.8 L 














  03/22/19





  05:03


 


WBC 


 


RBC 


 


Hgb 


 


Hct 


 


MCV 


 


MCH 


 


MCHC 


 


RDW 


 


Plt Count 


 


MPV 


 


Neut % (Auto) 


 


Lymph % (Auto) 


 


Mono % (Auto) 


 


Eos % (Auto) 


 


Baso % (Auto) 


 


Absolute Neuts (auto) 


 


Absolute Lymphs (auto) 


 


Absolute Monos (auto) 


 


Absolute Eos (auto) 


 


Absolute Basos (auto) 


 


Absolute Nucleated RBC 


 


Nucleated RBC % 


 


VBG pH  7.22 L


 


VBG pCO2  68 H


 


VBG pO2  39.0


 


VBG HCO3  22.6 L


 


VBG O2 Saturation  54.6 L


 


VBG Base Excess  -1.5 L


 


Sodium 


 


Potassium 


 


Chloride 


 


Carbon Dioxide 


 


Anion Gap 


 


BUN 


 


Creatinine 


 


Est GFR ( Amer) 


 


Est GFR (Non-Af Amer) 


 


BUN/Creatinine Ratio 


 


Glucose 


 


POC Glucose (mg/dL) 


 


Calcium 


 


Phosphorus 


 


Magnesium 











Nutrition: 


renal diet 





Impression: 


38W with complicated medical history including ESRD on PD, PAD s/p femoral 

stents on warfarin, COPD with chronic resp failure and active smoking on 5L O2, 

IDDM, chronic pain on methadone, unconfirmed CAD, and recent admission at the 

end of February for PNA, returning with b/l thigh pain found with indurated 

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 

thiosulfate IV. With persistent leukocytosis (chronic), supratherapeutic INR. 

Hospital course complicated by respiratory failure likely from PNA requiring 

intubation, extubated on 3/11 now being managed in the ICU for mixed acid base 

derrangements acutely 





Plan: 





# Acute respiratory failure due to mixed acid base derangements


# Acute mixed acidosis- acute uncompensated primary respiratory acidosis with 

metabolic acidosis with high anion gap  


- improved clinically but VBG consistent with acidosis 


- agree with BIPAP prn and nocturnally 


- repeat VBG via midline draw in AM 


- S/P 150 meq bicarb x 1L at 75cc/h 3/19


- improving HCO3 and base deficit with supplemental NaHCO3


- will continue with oral bicarb 2600 mg QID 


- Discontinue IV bicarb today 





# Hypotension


improved with increase in midodrine 10 mg QID 





# Hypoglycemia


improved 





# CAD


# PVD with multiple LE stents with in-stent stensosis


# DVT


-started plavix 03/20


-started eliquis 03/20


-caution with bleeding due to recent hx/o GI bleed 





# Caciphylaxis


- on NaThiosulfate 25 gm MWFSu as per PD needs


- PTH mgmt as below 


- no warfarin and avoid subcutaneous injections


- no vit D, calcium, iron supplementation 


- wound care





# Hyperparathyroidism


PTH (intact) 85.8pmol/L/ 809.09pg/mL


-goal to bring PTH <300


-check PTH weekly - due on monday 


-Cinacalcet 90 mg BID 





# ESRD on PD


- nephro recs 





# Leukocytosis 


improved 17-->24-->28-->20


suspect reactive 


afebrile


hemodynamically stable 


no overt suggestion of acute PNA on CXR 


recent completion of course of zosyn


-will hold off on abx


-culture if febrile





# Anemia of chronic disease 


# Anemia with acute drop in hemoglobin 


stable H/H 


- started EPO three times weekly


- 1 unit PRBc 03/22





# Thrombocytosis


likely reactive 


patient on ASA 





# chronic pain 


# mood d/o 


-consult from Dr Rivera pending 


- On methadone 10 mg q12h


- Started on fentanyl patch 50 mcg/hour


- prn dilaudid and continue with gabapenin


- Sertraline discontinued and started on Cymbalta 





#  Nausea 


likely associated with thiosulfate infusion vs gastroparesis 


- Marinol 5 mg TID 


- prn zofran and ativan  





#  GIB


thought to be due to recent bleed from nasal orgin 





Critical Care Time: 45 minutes

## 2019-03-22 NOTE — PN
Progress Note





- Progress Note


Date of Service: 03/22/19


Note: 





Patient was on Eliquis, H/H: 6.3,  Two units of blood ordered and will also 

order KCentra.  Continue PPI drip.

## 2019-03-22 NOTE — PN
Hospitalist Progress Note


Date of Service: 03/22/19





Patient examined due to large volume maroon stool in patient's depends and 

commode. Patient denies abdominal pain, N/V. Patient is menstruating. Patient 

was recently started on Eliquis and Plavix. Patient had no hemorrhoids, 

witnessed a large volume of loose maroon stool. Patient's eliquis and plavix 

was stopped. Patient was transferred to ICU, 2 IVs were ordered, 2u were put on 

hold, Protonix drip was started. Q6H H/H ordered, this case was discussed with 

nocturnist. This case was discussed with Dr. Jaime Green of GI who will see 

the patient in consult and recommended conservative treatment if possible due 

to patient being a very poor endoscopy candidate.

## 2019-03-23 LAB
ALBUMIN SERPL BCG-MCNC: 2.2 G/DL (ref 3.2–5.2)
ANION GAP SERPL CALC-SCNC: 33 MMOL/L (ref 2–11)
BASOPHILS # BLD AUTO: 0.1 10^3/UL (ref 0–0.2)
BUN SERPL-MCNC: 38 MG/DL (ref 6–24)
BUN/CREAT SERPL: 6.6 (ref 8–20)
CALCIUM SERPL-MCNC: 6.2 MG/DL (ref 8.6–10.3)
CHLORIDE SERPL-SCNC: 86 MMOL/L (ref 101–111)
EOSINOPHIL # BLD AUTO: 0.1 10^3/UL (ref 0–0.6)
GLUCOSE SERPL-MCNC: 295 MG/DL (ref 70–100)
HCO3 SERPL-SCNC: 25 MMOL/L (ref 22–32)
HCT VFR BLD AUTO: 22 % (ref 33–41)
HCT VFR BLD AUTO: 24 % (ref 33–41)
HCT VFR BLD AUTO: 24 % (ref 33–41)
HGB BLD-MCNC: 7.1 G/DL (ref 12–16)
HGB BLD-MCNC: 7.8 G/DL (ref 12–16)
HGB BLD-MCNC: 8 G/DL (ref 12–16)
LYMPHOCYTES # BLD AUTO: 0.9 10^3/UL (ref 1–4.8)
MAGNESIUM SERPL-MCNC: 1.7 MG/DL (ref 1.9–2.7)
MCH RBC QN AUTO: 28 PG (ref 27–31)
MCHC RBC AUTO-ENTMCNC: 33 G/DL (ref 31–36)
MCV RBC AUTO: 84 FL (ref 80–97)
MONOCYTES # BLD AUTO: 0.8 10^3/UL (ref 0–0.8)
NEUTROPHILS # BLD AUTO: 16.1 10^3/UL (ref 1.5–7.7)
NRBC # BLD AUTO: 0.1 10^3/UL
NRBC BLD QL AUTO: 0.3
PLATELET # BLD AUTO: 362 10^3/UL (ref 150–450)
POTASSIUM SERPL-SCNC: 3.3 MMOL/L (ref 3.5–5)
RBC # BLD AUTO: 2.85 10^6 /UL (ref 3.7–4.87)
SODIUM SERPL-SCNC: 144 MMOL/L (ref 135–145)
WBC # BLD AUTO: 18 10^3/UL (ref 3.5–10.8)

## 2019-03-23 RX ADMIN — CALCITRIOL SCH ML: 1 SOLUTION ORAL at 21:24

## 2019-03-23 RX ADMIN — MIDODRINE HYDROCHLORIDE SCH MG: 5 TABLET ORAL at 21:25

## 2019-03-23 RX ADMIN — SODIUM BICARBONATE TAB 650 MG SCH MG: 650 TAB at 21:25

## 2019-03-23 RX ADMIN — HYDROMORPHONE HYDROCHLORIDE PRN MG: 1 INJECTION, SOLUTION INTRAMUSCULAR; INTRAVENOUS; SUBCUTANEOUS at 17:09

## 2019-03-23 RX ADMIN — DRONABINOL SCH MG: 2.5 CAPSULE ORAL at 14:37

## 2019-03-23 RX ADMIN — PROCHLORPERAZINE EDISYLATE PRN ML: 5 INJECTION INTRAMUSCULAR; INTRAVENOUS at 05:55

## 2019-03-23 RX ADMIN — PANTOPRAZOLE SODIUM SCH MLS/HR: 40 INJECTION, POWDER, FOR SOLUTION INTRAVENOUS at 10:14

## 2019-03-23 RX ADMIN — CALCIUM SCH MG: 500 TABLET ORAL at 21:24

## 2019-03-23 RX ADMIN — INSULIN LISPRO SCH UNITS: 100 INJECTION, SOLUTION INTRAVENOUS; SUBCUTANEOUS at 09:12

## 2019-03-23 RX ADMIN — LIDOCAINE HYDROCHLORIDE SCH: 20 JELLY TOPICAL at 14:48

## 2019-03-23 RX ADMIN — IPRATROPIUM BROMIDE AND ALBUTEROL SULFATE PRN NEB: .5; 3 SOLUTION RESPIRATORY (INHALATION) at 16:24

## 2019-03-23 RX ADMIN — MIDODRINE HYDROCHLORIDE SCH MG: 5 TABLET ORAL at 18:03

## 2019-03-23 RX ADMIN — MIDODRINE HYDROCHLORIDE SCH MG: 5 TABLET ORAL at 14:39

## 2019-03-23 RX ADMIN — LIDOCAINE HYDROCHLORIDE SCH APPLIC: 20 JELLY TOPICAL at 08:17

## 2019-03-23 RX ADMIN — MIDODRINE HYDROCHLORIDE SCH MG: 5 TABLET ORAL at 10:09

## 2019-03-23 RX ADMIN — IPRATROPIUM BROMIDE AND ALBUTEROL SULFATE PRN NEB: .5; 3 SOLUTION RESPIRATORY (INHALATION) at 04:13

## 2019-03-23 RX ADMIN — MOMETASONE FUROATE AND FORMOTEROL FUMARATE DIHYDRATE SCH: 200; 5 AEROSOL RESPIRATORY (INHALATION) at 19:30

## 2019-03-23 RX ADMIN — CINACALCET HYDROCHLORIDE SCH: 30 TABLET, COATED ORAL at 09:28

## 2019-03-23 RX ADMIN — INSULIN LISPRO SCH: 100 INJECTION, SOLUTION INTRAVENOUS; SUBCUTANEOUS at 21:57

## 2019-03-23 RX ADMIN — PROCHLORPERAZINE EDISYLATE PRN MG: 5 INJECTION INTRAMUSCULAR; INTRAVENOUS at 18:04

## 2019-03-23 RX ADMIN — PROCHLORPERAZINE EDISYLATE PRN MG: 5 INJECTION INTRAMUSCULAR; INTRAVENOUS at 12:44

## 2019-03-23 RX ADMIN — PANTOPRAZOLE SODIUM SCH MLS/HR: 40 INJECTION, POWDER, FOR SOLUTION INTRAVENOUS at 20:31

## 2019-03-23 RX ADMIN — METHADONE HYDROCHLORIDE SCH MG: 10 TABLET ORAL at 17:09

## 2019-03-23 RX ADMIN — CALCIUM SCH MG: 500 TABLET ORAL at 14:37

## 2019-03-23 RX ADMIN — FENTANYL SCH MCG: 50 PATCH TRANSDERMAL at 17:07

## 2019-03-23 RX ADMIN — LIDOCAINE HYDROCHLORIDE SCH: 20 JELLY TOPICAL at 21:26

## 2019-03-23 RX ADMIN — HYDROMORPHONE HYDROCHLORIDE PRN MG: 1 INJECTION, SOLUTION INTRAMUSCULAR; INTRAVENOUS; SUBCUTANEOUS at 21:38

## 2019-03-23 RX ADMIN — CALCITRIOL SCH ML: 1 SOLUTION ORAL at 10:35

## 2019-03-23 RX ADMIN — INSULIN LISPRO SCH UNITS: 100 INJECTION, SOLUTION INTRAVENOUS; SUBCUTANEOUS at 18:03

## 2019-03-23 RX ADMIN — HYDROMORPHONE HYDROCHLORIDE PRN MG: 1 INJECTION, SOLUTION INTRAMUSCULAR; INTRAVENOUS; SUBCUTANEOUS at 12:44

## 2019-03-23 RX ADMIN — ONDANSETRON PRN MG: 2 INJECTION INTRAMUSCULAR; INTRAVENOUS at 10:20

## 2019-03-23 RX ADMIN — SODIUM BICARBONATE TAB 650 MG SCH MG: 650 TAB at 14:39

## 2019-03-23 RX ADMIN — HYDROMORPHONE HYDROCHLORIDE PRN MG: 1 INJECTION, SOLUTION INTRAMUSCULAR; INTRAVENOUS; SUBCUTANEOUS at 03:56

## 2019-03-23 RX ADMIN — HYDROMORPHONE HYDROCHLORIDE PRN MG: 1 INJECTION, SOLUTION INTRAMUSCULAR; INTRAVENOUS; SUBCUTANEOUS at 08:11

## 2019-03-23 RX ADMIN — SODIUM BICARBONATE TAB 650 MG SCH MG: 650 TAB at 18:03

## 2019-03-23 RX ADMIN — INSULIN LISPRO SCH: 100 INJECTION, SOLUTION INTRAVENOUS; SUBCUTANEOUS at 13:45

## 2019-03-23 RX ADMIN — MUPIROCIN SCH APPLIC: 20 OINTMENT TOPICAL at 13:44

## 2019-03-23 RX ADMIN — PANTOPRAZOLE SODIUM SCH: 40 INJECTION, POWDER, FOR SOLUTION INTRAVENOUS at 07:04

## 2019-03-23 RX ADMIN — SODIUM BICARBONATE TAB 650 MG SCH MG: 650 TAB at 10:09

## 2019-03-23 RX ADMIN — ONDANSETRON PRN MG: 2 INJECTION INTRAMUSCULAR; INTRAVENOUS at 17:09

## 2019-03-23 RX ADMIN — OXYMETAZOLINE HCL SCH: 0.05 SPRAY NASAL at 10:01

## 2019-03-23 RX ADMIN — ONDANSETRON PRN MG: 2 INJECTION INTRAMUSCULAR; INTRAVENOUS at 04:30

## 2019-03-23 RX ADMIN — DULOXETINE HYDROCHLORIDE SCH MG: 30 CAPSULE, DELAYED RELEASE ORAL at 08:02

## 2019-03-23 RX ADMIN — WATER SCH NOTE: 100 INJECTION, SOLUTION INTRAVENOUS at 19:17

## 2019-03-23 RX ADMIN — WATER SCH NOTE: 100 INJECTION, SOLUTION INTRAVENOUS at 07:10

## 2019-03-23 RX ADMIN — METHADONE HYDROCHLORIDE SCH MG: 10 TABLET ORAL at 05:55

## 2019-03-23 RX ADMIN — SEVELAMER CARBONATE SCH: 800 TABLET, FILM COATED ORAL at 09:29

## 2019-03-23 RX ADMIN — DRONABINOL SCH MG: 2.5 CAPSULE ORAL at 21:25

## 2019-03-23 RX ADMIN — DRONABINOL SCH MG: 2.5 CAPSULE ORAL at 08:02

## 2019-03-23 RX ADMIN — MOMETASONE FUROATE AND FORMOTEROL FUMARATE DIHYDRATE SCH PUFF: 200; 5 AEROSOL RESPIRATORY (INHALATION) at 09:32

## 2019-03-23 NOTE — PN
Subjective


Date of Service: 03/23/19


Interval History: 





Patient is complaining of severe pain in B/L Legs in thighs and sacrum at areas 

of ulcers. Patient feels moderately SOB, worse with exertion without cough or 

wheezing. Patient has intermittent cramping abdominal pain in the RLQ which is 

intermittent and not worsened with movement or palpation. Patient has not had 

any more rectal bleeding and denies dysuria or hematuria. Patient denies CP, F/C

, N/V, Nosebleeding. 


Family History: Unchanged from Admission


Social History: Unchanged from Admission


Past Medical History: Unchanged from Admission





Objective


Active Medications: 








Acetaminophen (Tylenol Tab*)  650 mg PO Q6H PRN


   PRN Reason: PAIN


   Last Admin: 03/16/19 14:04 Dose:  650 mg


Al Hydrox/Mg Hydrox/Simethicone (Maalox Plus*)  30 ml PO Q6H PRN


   PRN Reason: DYSPEPSIA


   Last Admin: 03/13/19 05:22 Dose:  30 ml


Albuterol (Ventolin 2.5 Mg/3 Ml Neb.Sol*)  2.5 mg INH Q4HR PRN


   PRN Reason: SOB/WHEEZING


   Last Admin: 03/17/19 13:17 Dose:  2.5 mg


Albuterol/Ipratropium (Duoneb (Albuterol 2.5 Mg/Ipratropium 0.5 Mg))  1 neb INH 

[id number] AWAKE PRN


   PRN Reason: SOB/WHEEZING


   Last Admin: 03/23/19 04:13 Dose:  1 neb


Calcitriol (Calcitriol Oral.Sol*)  0.25 mcg PO Q12HR Counts include 234 beds at the [facility]


   Last Admin: 03/23/19 10:35 Dose:  0.25 ml


Calcium Carbonate (Calcium Carbonate Tab*)  1,250 mg PO TID Counts include 234 beds at the [facility]


Dextrose (D50w Syringe 50 Ml*)  25 gm IV PUSH .FOR FS < 60 - SS PRN


   PRN Reason: FS < 60


Dronabinol (Marinol Cap*)  5 mg PO TID Counts include 234 beds at the [facility]


   Last Admin: 03/23/19 08:02 Dose:  5 mg


Duloxetine HCl (Cymbalta Cap*)  30 mg PO DAILY Counts include 234 beds at the [facility]


   Last Admin: 03/23/19 08:02 Dose:  30 mg


Epoetin Yan (Retacrit)  4,000 unit SUBCUT MoWeFr Counts include 234 beds at the [facility]


   Last Admin: 03/22/19 18:50 Dose:  4,000 unit


Fentanyl (Duragesic  Patch 50 Mcg/Hr*)  50 mcg TRANSDERM Q72H Counts include 234 beds at the [facility]


   Last Admin: 03/20/19 17:42 Dose:  50 mcg


Hydromorphone HCl (Dilaudid Inj1s*)  2 mg IV SLOW PU Q4H PRN


   PRN Reason: PAIN


   Last Admin: 03/23/19 12:44 Dose:  2 mg


Sodium Thiosulfate 25 gm/ (Sodium Chloride)  200 mls @ 200 mls/hr IV SuMoWeFr@

2000 Counts include 234 beds at the [facility]


   Last Admin: 03/22/19 22:04 Dose:  200 mls/hr


Pantoprazole Sodium (Protonix Iv Bag*)  80 mg in 250 mls @ 25 mls/hr IVPB Q10H 

Counts include 234 beds at the [facility]


   Last Admin: 03/23/19 10:14 Dose:  25 mls/hr


Potassium Chloride (Potassium Chloride 20 Meq/100 Ml Ivpremix*)  20 meq in 100 

mls @ 50 mls/hr IV ONCE ONE


   Stop: 03/23/19 16:59


Insulin Human Lispro (Humalog*)  0 units SUBCUT ACHS Counts include 234 beds at the [facility]; Protocol


   Last Admin: 03/23/19 09:12 Dose:  6 units


Lidocaine HCl (Lidocaine 2% Jelly*)  1 applic TOPICAL TID Counts include 234 beds at the [facility]


   Last Admin: 03/23/19 08:17 Dose:  1 applic


Lorazepam (Ativan Inj*)  1 mg IV PUSH Q6H PRN


   PRN Reason: ANXIETY OR NAUSEA


   Last Admin: 03/23/19 11:12 Dose:  1 mg


Methadone HCl (Dolophine Tab*)  10 mg PO Q12H Counts include 234 beds at the [facility]


   Last Admin: 03/23/19 05:55 Dose:  10 mg


Midodrine (Midodrine (Nf))  10 mg PO 1000,1400,1800,2200 Counts include 234 beds at the [facility]; Protocol


   Last Admin: 03/23/19 10:09 Dose:  10 mg


Mometasone Furoate/Formoterol Fumar (Dulera 200/5 Mdi*)  2 puff INH BID Counts include 234 beds at the [facility]; 

Protocol


   Last Admin: 03/23/19 09:32 Dose:  2 puff


Mupirocin (Bactroban 2 % Oint*)  1 applic TOPICAL DAILY Counts include 234 beds at the [facility]


   Last Admin: 03/22/19 09:16 Dose:  Not Given


Ondansetron HCl (Zofran Inj*)  8 mg IV Q6H PRN


   PRN Reason: NAUSEA


   Last Admin: 03/23/19 10:20 Dose:  8 mg


Pharmacy Profile Note (Fentanyl Patch Check Q Shift)  1 note N/A 0700,1900 Counts include 234 beds at the [facility]


   Last Admin: 03/23/19 07:10 Dose:  1 note


Prochlorperazine Edisylate (Compazine Inj*)  5 mg IV Q6H PRN


   PRN Reason: NAUSEA/VOMITING


   Last Admin: 03/23/19 12:44 Dose:  5 mg


Sodium Bicarbonate (Sodium Bicarbonate (Antacid)*)  2,600 mg PO 1000,1400,1800,

2200 Counts include 234 beds at the [facility]


   Last Admin: 03/23/19 10:09 Dose:  2,600 mg








 Vital Signs - 8 hr











  03/23/19 03/23/19 03/23/19





  05:16 05:31 05:46


 


Temperature   


 


Pulse Rate 97 93 87


 


Respiratory 7 16 19





Rate   


 


Blood Pressure  156/82 150/60





(mmHg)   


 


O2 Sat by Pulse 95 98 100





Oximetry   














  03/23/19 03/23/19 03/23/19





  05:55 06:00 06:01


 


Temperature   


 


Pulse Rate  96 95


 


Respiratory 26 11 23





Rate   


 


Blood Pressure   120/57





(mmHg)   


 


O2 Sat by Pulse  92 96





Oximetry   














  03/23/19 03/23/19 03/23/19





  06:16 06:31 06:46


 


Temperature   


 


Pulse Rate 95 89 83


 


Respiratory 12 18 14





Rate   


 


Blood Pressure 124/81 106/68 136/46





(mmHg)   


 


O2 Sat by Pulse 97 94 96





Oximetry   














  03/23/19 03/23/19 03/23/19





  06:48 07:00 07:01


 


Temperature   


 


Pulse Rate 84 83 83


 


Respiratory 14 15 11





Rate   


 


Blood Pressure 136/46  140/36





(mmHg)   


 


O2 Sat by Pulse 96 99 99





Oximetry   














  03/23/19 03/23/19 03/23/19





  07:15 07:31 07:46


 


Temperature   


 


Pulse Rate 94 93 94


 


Respiratory 16 20 20





Rate   


 


Blood Pressure 133/114  147/53





(mmHg)   


 


O2 Sat by Pulse 96 93 92





Oximetry   














  03/23/19 03/23/19 03/23/19





  08:00 08:01 08:11


 


Temperature 98.8 F  


 


Pulse Rate 88 88 


 


Respiratory 14 13 16





Rate   


 


Blood Pressure  152/51 





(mmHg)   


 


O2 Sat by Pulse 96 95 





Oximetry   














  03/23/19 03/23/19 03/23/19





  08:31 09:00 09:01


 


Temperature   


 


Pulse Rate 93  87


 


Respiratory 10 13 10





Rate   


 


Blood Pressure 175/50  130/41





(mmHg)   


 


O2 Sat by Pulse 96  94





Oximetry   














  03/23/19 03/23/19 03/23/19





  09:30 09:33 10:00


 


Temperature   


 


Pulse Rate 92 94 


 


Respiratory 17 16 15





Rate   


 


Blood Pressure   





(mmHg)   


 


O2 Sat by Pulse  99 





Oximetry   














  03/23/19 03/23/19 03/23/19





  10:01 10:31 11:00


 


Temperature   


 


Pulse Rate 93 97 


 


Respiratory 17 9 9





Rate   


 


Blood Pressure 143/57 119/50 





(mmHg)   


 


O2 Sat by Pulse 94 98 





Oximetry   














  03/23/19 03/23/19 03/23/19





  11:01 11:12 11:30


 


Temperature   


 


Pulse Rate 108  96


 


Respiratory 19 95 10





Rate   


 


Blood Pressure 155/134  113/66





(mmHg)   


 


O2 Sat by Pulse 94  91





Oximetry   














  03/23/19 03/23/19 03/23/19





  12:00 12:01 12:30


 


Temperature   


 


Pulse Rate 93 93 88


 


Respiratory 12 6 4





Rate   


 


Blood Pressure   101/73





(mmHg)   


 


O2 Sat by Pulse 95 92 89





Oximetry   














  03/23/19 03/23/19 03/23/19





  12:44 13:00 13:03


 


Temperature   


 


Pulse Rate  93 91


 


Respiratory 10 8 3





Rate   


 


Blood Pressure   103/63





(mmHg)   


 


O2 Sat by Pulse  90 87





Oximetry   











Oxygen Devices in Use Now: Nasal Cannula


Appearance: Patient is a 37yo female who appears older than stated age and is 

sitting in the bed in NAD.


Eyes: No Scleral Icterus, PERRLA


Ears/Nose/Mouth/Throat: NL Teeth, Lips, Gums, Clear Oropharnyx, Mucous 

Membranes Moist


Neck: NL Appearance and Movements; NL JVP, Trachea Midline


Respiratory: Symmetrical Chest Expansion and Respiratory Effort, Clear to 

Auscultation


Cardiovascular: NL Sounds; No Murmurs; No JVD, RRR, - - Trace edema. Pulses 

trace in B/L LE. 


Abdominal: NL Sounds; No Tenderness; No Distention, No Hepatosplenomegaly


Lymphatic: No Cervical Adenopathy


Extremities: No Edema, No Clubbing, Cyanosis


Skin: - - Dusky areas on Legs and sacrum with flaking of the skin 

superficially. Possible pressure wounds on B/L gluteal areas.


Neurological: Alert and Oriented x 3, NL Sensation, NL Muscle Strength and Tone

, - - Strabismus, CN II-XII otherwise intact. 


Result Diagrams: 


 03/23/19 12:51





 03/23/19 06:05


Microbiology and Other Data: 


 Microbiology











 03/05/19 23:00 Nasal Screen MRSA (PCR) - Final





 Nasal    Mrsa Not Detected














Assess/Plan/Problems-Billing


Assessment: 





38W with complicated medical history including ESRD on PD, PAD s/p femoral 

stents on warfarin, COPD with chronic resp failure and active smoking on 5L O2, 

IDDM, chronic pain on methadone, unconfirmed CAD, and recent admission at the 

end of February for PNA, returning with b/l thigh pain found with indurated 

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 

thiosulfate IV. Hospital course complicated by respiratory failure likely from 

PNA requiring intubation, extubated on 3/11. Patient then developed GI bleeding 

on 3/22 which is improving. 


 





- Patient Problems


(1) Acute respiratory failure with hypoxia


Current Visit: Yes   Status: Acute   Code(s): J96.01 - ACUTE RESPIRATORY 

FAILURE WITH HYPOXIA   SNOMED Code(s): 53416166


   Comment: 


- She is back to her baseline O2 requirement of 5L continuously. Extubated 3/

11. Thought to be from PNA and volume overload causing pulm congestion.


- S/P 7 days of Zosyn


- cont Dulera with albuterol prn


- Fluid overloaded today causing wheezing and rales on exam, will remove with 

dialysis   





(2) Calciphylaxis


Current Visit: Yes   Status: Acute   Code(s): E83.59 - OTHER DISORDERS OF 

CALCIUM METABOLISM   SNOMED Code(s): 434761001


   Comment: 


- S/P skin biopsy by surgery (Dr. Hogan) on 3/18. 


- Warfarin can exacerbate calciphylaxis, 


- cont IV sodium thiosulfate (3/13 - )


- Calcium low today at 6.4, discussed with Dr. Baker of Nephrology who 

recommended short term courses of calcitriol and calcium carbonate, watch 

closely, check albumin. Corrects to 7.4 based on most recent albumin.


- Phosphorus WNL, Hold Sensipar and Sevelamer   





(3) PAD (peripheral artery disease)


Current Visit: Yes   Status: Chronic   Code(s): I73.9 - PERIPHERAL VASCULAR 

DISEASE, UNSPECIFIED   SNOMED Code(s): 868096758


   Comment: 


- S/P bare metal stenting and complicated by DVTs. CT Aorta with run off this 

admission with moderate-severe R common femoral artery stenosis, severe b/l 

popliteal artery stenosis, near complete occlusion of distal L superficial fem 

artery stent.


- Anticoagulation held at this time for GI bleeding.


- Resume with caution, consider lower dosing on NOAC due to ESRD and bleeding 

with possible monitoring of Anti factor Xa monitoring   





(4) GI bleed


Current Visit: Yes   Status: Acute   Code(s): K92.2 - GASTROINTESTINAL 

HEMORRHAGE, UNSPECIFIED   SNOMED Code(s): 78330573


   Comment: 


- Acute Lower GI Bleeding, Appreciate Gi consult


- PPI drip, hold anticoagulation


- Hemoglobin 7.8 S/P 2u, up from 6.3


- Differential includes ischemic colitis that occured from hypotension in 

setting of thiosulfate infusion, Calciphylaxis affecting Gi tract with sloughing

, diverticular bleed, or upper GI bleed with fast transit.


- Q6H H/H, hemodynamically stable


- Advance diet


- Poor endoscopy candidate, conservative managment.    





(5) ESRD on peritoneal dialysis


Current Visit: Yes   Status: Chronic   Priority: High   Code(s): N18.6 - END 

STAGE RENAL DISEASE; Z99.2 - DEPENDENCE ON RENAL DIALYSIS   SNOMED Code(s): 

49498442


   Comment: 


- On peritoneal dialysis.


- Continue PD, Replace K+ Gently


- Discussed with Dr. Baker, Start short term calcium/vitamin D in setting of 

hypocalcamia.


- Monitor BMP, Phos, Mag   





(6) IDDM (insulin dependent diabetes mellitus)


Current Visit: Yes   Status: Chronic   Priority: High   Code(s): E11.9 - TYPE 2 

DIABETES MELLITUS WITHOUT COMPLICATIONS; Z79.4 - LONG TERM (CURRENT) USE OF 

INSULIN   SNOMED Code(s): 23867363


   Comment: 


- HgbA1c 8.8%


- Cont basal/bolus insulin    





(7) DVT prophylaxis


Current Visit: Yes   Status: Acute   Code(s): AKM2622 -    SNOMED Code(s): 

729238651


   Comment: 


- None in setting of PVD with thrombosis, Calciphylaxis, and GI bleeding.    


Status and Disposition: 








Remains admitted. Will need to find plan for administering IV sodium 

thiosulfate after discharge. Patient's condition is guarded.

## 2019-03-23 NOTE — CONS
CONSULTATION REPORT:

 

DATE OF CONSULT:  03/23/19

 

REQUESTING PROVIDER:  SHELLEY Chris

 

REASON FOR CONSULTATION:  Hematochezia, anemia.

 

HISTORY OF PRESENT ILLNESS:  This is a 38-year-old female with complicated medical history including 
end-stage renal disease, on peritoneal dialysis; COPD, on 5 liters of oxygen at baseline; severe diab
etes mellitus; coronary artery disease, and chronic pain, who initially presented [facility] with bilateral leg pain.  Her course was complicated with sepsis secondary to pneumonia, and u
ltimately a diagnosis of calciphylaxis was made with skin wedge biopsy during the hospitalization.  S
he has been started on sodium thiosulfate.  She states about 5 to 6 days ago, she had 1 episode where
 she had some nasal bleeding, vaginal bleeding, in addition some dark clots during a bowel movement. 
 She states at baseline she is constipated and has difficulties moving her bowel secondary to her chr
onic pain and opioid regimen.  The patient is a relatively poor historian who does wax and wane durin
g the conversation secondary to analgesics, but is able to answer direct questions thoughtfully and w
ith clear answers.  Her significant other is at the bedside.  She admits to generalized abdominal cra
mping but no distinct pain.  She denies any current hematemesis, however, does admit to nausea and vo
miting that is mostly at her baseline.  She admits to bilateral lower extremity pain and shortness of
 breath.  She states that she has not had a bowel movement this morning, but did have a movement or 2
 over the evening.  The HPI was limited by patient's status.  Additional  history was obtained from [facility] medical record, the hospitalist and also nursing staff.  The nursing staff states that she had 1 t
o 2 movement overnight with some scant bleeding less than the previous shift, and this morning no bow
el movement yet.  Review of vitals indicate that she had multiple days of hypotension prior to this. 
 In addition, she has this new diagnosis of calciphylaxis.  She had a previous upper endoscopy at [facility], which she states showed gastritis but no distinct ulceration; this was about 1 year ago.  She has
 never had a colonoscopy.  She is currently on high-flow oxygen at 10 L with a saturation of 91%.

 

PAST MEDICAL HISTORY:

1.  End-stage renal disease, on peritoneal dialysis.

2.  COPD, on supplemental oxygen of 5 L at baseline.

3.  Diabetes mellitus.

4.  Severe peripheral arterial disease.

5.  Coronary artery disease.

6.  Gastroparesis.

7.  Anxiety and depression.

8.  Hypertension.

9.  Hyperlipidemia.

 

PAST SURGICAL HISTORY:  Right metatarsal amputation.

 

HOME MEDICATIONS:  Include:

1.  Tylenol.

2.  Aspirin.

3.  Bethanechol.

4.  Symbicort.

5.  Calcitriol.

6.  Gabapentin.

7.  Insulin glargine.

8.  Methadone.

9.  Omeprazole.

10.  Promethazine.

11.  Sertraline.

12.  Warfarin.

13.  Albuterol.

14.  Losartan.

15.  NovoLog.

 

ALLERGIES:  Include DOXYCYCLINE, ERYTHROMYCIN, HEPARIN, REGLAN, NIACIN, REQUIP, METRONIDAZOLE, and HARRIS
LFA.

 

FAMILY HISTORY:  Denies any family history of GI or liver disease.  Has a strong cardiac disease and 
diabetes in the family.  Her father had an MI at age 49.

 

SOCIAL HISTORY:  Prior to hospitalization, patient was an active smoker, half a pack a day.  Denies a
ny alcohol or recreational use.

 

REVIEW OF SYSTEMS:  Remainder of the 14-point review of systems is grossly negative, but limited by t
he patient's mental status.

 

PHYSICAL EXAMINATION:  Vital Signs:  Blood pressure 130/41, 91% on 10 L, heart rate 86, respiratory r
ate 10 to 16, T-max 98.6 in 24 hours.  General:  Chronically ill- appearing female who will wax and w
ane through the conservation secondary to opioid administration.  HEENT:  Conjunctivae are pink.  Scl
erae anicteric.  Pupils slightly constricted.  Cardiovascular:  Regular rate and rhythm, S1, S2. Pulm
onary:  Diminished bilaterally.  Fair effort with trace rales.  Abdomen:  Soft, nontender, nondistend
ed.  Bowel sounds positive.  Extremities:  Bilateral thighs with purple to black lesions with scatter
ed ecchymoses throughout.

 

DIAGNOSTIC STUDIES/LABORATORY DATA:  Baseline hemoglobin appears to be in the 8 to 9 range.  She had 
slight downtrending over the last 4 to 5 days with a lennox that was reached on 03/22/19 at 6.3.  She 
was given 2 units of PRBCs and has improved to 8.0.  Before this she was 6.7, received 1 unit and had
 continued downtrending. Platelet count is 362, INR is 1.40.  BUN 38, creatinine 5.79.

 

Chest x-ray today with pulmonary congestion.

 

ASSESSMENT AND PLAN:  This is a 38-year-old female with end-stage renal disease, on peritoneal dialys
is; severe chronic obstructive pulmonary disease; recent sepsis secondary to pneumonia with respirato
ry failure, who now has calciphylaxis and an episode of hematochezia.

 

1.  Acute blood loss anemia, appears to be multifactorial.  She had episodes of documented hypotensio
n for significant portion of multiple days recently.  Ischemic colitis would be fairly high in differ
ential.  Disseminated intravascular coagulation is also possible given that she had associated bleedi
ng from other membranous sources including vaginal and nasal sources.  Currently the bleeding appears
 to have slowed or even stopped.  She was given Kcentra appropriately.  Her anticoagulation was start
ed 5 to 6 days ago.  She is high risk from a GI point of view for anticoagulation.  I discussed brief
ly that the best approach would be to perform a colonoscopy to evaluate her lower GI tract.  However,
 I think the suspicion for ischemic colitis is probably fairly high in the differential and the treat
ment for that would be conservative with avoidance of hypotension and the difficulty would be with he
r pulmonary status, sedation would be difficult to close to impossible without intubation and I am no
t sure if she will be able to come off the ventilator if she was intubated.  I also offered a nonseda
fabian to low sedated flexible sigmoidoscopy or colonoscopy as she tolerated, but she states she does no
t believe that she would tolerate this and would prefer not to do so.  She would prefer observation a
t this point.  If full anticoagulation is desired for her lower extremity condition, I think colonosc
opy would be more indicated in that standpoint, at this point, I think conservative management and ob
servation is prudent.  If colonoscopy is eventually desired, would contact our service to arrange for
 the best possible plan in terms of potential sedation versus non- sedation and Anesthesia assistance
 if needed.  I would continue the Protonix drip at this time given her critical illness in the ICU.  
Certainly peptic ulcer disease would be in differential as well.  If no bleeding over the next few da
ys, can transition to b.i.d. appropriately.  We will continue to monitor H and H at every 6 hours, ke
ep 2 units of PRBCs on hold.

2.  Calciphylaxis, being managed by the primary service and Nephrology, severe, on sodium thiosulfate
.  There have been reports of mucosal injury from calciphylaxis and this may be another potential pos
sibility for her bleeding source.

3.  Severe chronic obstructive pulmonary disease, on 10 L of oxygen per primary service.

4.  Severe peripheral arterial disease, per primary service.

 

 [id number]/CPS #: [id number]

## 2019-03-24 LAB
ANION GAP SERPL CALC-SCNC: 25 MMOL/L (ref 2–11)
APTT PPP: 37.9 SECONDS (ref 26–36.3)
BASOPHILS # BLD AUTO: 0.1 10^3/UL (ref 0–0.2)
BUN SERPL-MCNC: 35 MG/DL (ref 6–24)
BUN/CREAT SERPL: 5.7 (ref 8–20)
CALCIUM SERPL-MCNC: 6.9 MG/DL (ref 8.6–10.3)
CHLORIDE SERPL-SCNC: 91 MMOL/L (ref 101–111)
EOSINOPHIL # BLD AUTO: 0.1 10^3/UL (ref 0–0.6)
GLUCOSE SERPL-MCNC: 208 MG/DL (ref 70–100)
HCO3 SERPL-SCNC: 28 MMOL/L (ref 22–32)
HCT VFR BLD AUTO: 23 % (ref 33–41)
HCT VFR BLD AUTO: 23 % (ref 33–41)
HCT VFR BLD AUTO: 33 % (ref 33–41)
HGB BLD-MCNC: 10.6 G/DL (ref 12–16)
HGB BLD-MCNC: 7.5 G/DL (ref 12–16)
HGB BLD-MCNC: 7.6 G/DL (ref 12–16)
INR PPP/BLD: 1.57 (ref 0.77–1.02)
LDH SERPL L TO P-CCNC: 225 U/L (ref 140–271)
LYMPHOCYTES # BLD AUTO: 1 10^3/UL (ref 1–4.8)
MAGNESIUM SERPL-MCNC: 1.7 MG/DL (ref 1.9–2.7)
MCH RBC QN AUTO: 28 PG (ref 27–31)
MCHC RBC AUTO-ENTMCNC: 33 G/DL (ref 31–36)
MCV RBC AUTO: 85 FL (ref 80–97)
MONOCYTES # BLD AUTO: 0.8 10^3/UL (ref 0–0.8)
NEUTROPHILS # BLD AUTO: 16.4 10^3/UL (ref 1.5–7.7)
NRBC # BLD AUTO: 0 10^3/UL
NRBC BLD QL AUTO: 0.1
PLATELET # BLD AUTO: 373 10^3/UL (ref 150–450)
POTASSIUM SERPL-SCNC: 3.7 MMOL/L (ref 3.5–5)
RBC # BLD AUTO: 2.71 10^6 /UL (ref 3.7–4.87)
SODIUM SERPL-SCNC: 144 MMOL/L (ref 135–145)
WBC # BLD AUTO: 18.5 10^3/UL (ref 3.5–10.8)

## 2019-03-24 RX ADMIN — LIDOCAINE HYDROCHLORIDE SCH APPLIC: 20 JELLY TOPICAL at 21:32

## 2019-03-24 RX ADMIN — CALCITRIOL SCH ML: 1 SOLUTION ORAL at 08:28

## 2019-03-24 RX ADMIN — IPRATROPIUM BROMIDE AND ALBUTEROL SULFATE PRN NEB: .5; 3 SOLUTION RESPIRATORY (INHALATION) at 20:52

## 2019-03-24 RX ADMIN — ONDANSETRON PRN MG: 2 INJECTION INTRAMUSCULAR; INTRAVENOUS at 08:25

## 2019-03-24 RX ADMIN — SODIUM BICARBONATE TAB 650 MG SCH MG: 650 TAB at 08:29

## 2019-03-24 RX ADMIN — LIDOCAINE HYDROCHLORIDE SCH: 20 JELLY TOPICAL at 08:31

## 2019-03-24 RX ADMIN — MUPIROCIN SCH: 20 OINTMENT TOPICAL at 08:56

## 2019-03-24 RX ADMIN — INSULIN LISPRO SCH UNITS: 100 INJECTION, SOLUTION INTRAVENOUS; SUBCUTANEOUS at 12:29

## 2019-03-24 RX ADMIN — INSULIN LISPRO SCH UNITS: 100 INJECTION, SOLUTION INTRAVENOUS; SUBCUTANEOUS at 08:28

## 2019-03-24 RX ADMIN — ONDANSETRON PRN MG: 2 INJECTION INTRAMUSCULAR; INTRAVENOUS at 21:48

## 2019-03-24 RX ADMIN — CALCITRIOL SCH ML: 1 SOLUTION ORAL at 21:32

## 2019-03-24 RX ADMIN — SODIUM THIOSULFATE SCH MLS/HR: 250 INJECTION, SOLUTION INTRAVENOUS at 20:47

## 2019-03-24 RX ADMIN — CALCIUM SCH MG: 500 TABLET ORAL at 08:30

## 2019-03-24 RX ADMIN — DRONABINOL SCH MG: 2.5 CAPSULE ORAL at 08:30

## 2019-03-24 RX ADMIN — HYDROMORPHONE HYDROCHLORIDE PRN MG: 1 INJECTION, SOLUTION INTRAMUSCULAR; INTRAVENOUS; SUBCUTANEOUS at 13:33

## 2019-03-24 RX ADMIN — MIDODRINE HYDROCHLORIDE SCH MG: 5 TABLET ORAL at 13:34

## 2019-03-24 RX ADMIN — MIDODRINE HYDROCHLORIDE SCH: 5 TABLET ORAL at 23:08

## 2019-03-24 RX ADMIN — PANTOPRAZOLE SODIUM SCH: 40 INJECTION, POWDER, FOR SOLUTION INTRAVENOUS at 02:23

## 2019-03-24 RX ADMIN — DULOXETINE HYDROCHLORIDE SCH MG: 30 CAPSULE, DELAYED RELEASE ORAL at 08:25

## 2019-03-24 RX ADMIN — MIDODRINE HYDROCHLORIDE SCH MG: 5 TABLET ORAL at 08:34

## 2019-03-24 RX ADMIN — PROCHLORPERAZINE EDISYLATE PRN MG: 5 INJECTION INTRAMUSCULAR; INTRAVENOUS at 22:43

## 2019-03-24 RX ADMIN — SODIUM BICARBONATE TAB 650 MG SCH MG: 650 TAB at 13:34

## 2019-03-24 RX ADMIN — PANTOPRAZOLE SODIUM SCH MLS/HR: 40 INJECTION, POWDER, FOR SOLUTION INTRAVENOUS at 23:16

## 2019-03-24 RX ADMIN — LIDOCAINE HYDROCHLORIDE SCH: 20 JELLY TOPICAL at 14:02

## 2019-03-24 RX ADMIN — MOMETASONE FUROATE AND FORMOTEROL FUMARATE DIHYDRATE SCH: 200; 5 AEROSOL RESPIRATORY (INHALATION) at 20:53

## 2019-03-24 RX ADMIN — WATER SCH NOTE: 100 INJECTION, SOLUTION INTRAVENOUS at 19:21

## 2019-03-24 RX ADMIN — DRONABINOL SCH MG: 2.5 CAPSULE ORAL at 13:34

## 2019-03-24 RX ADMIN — WATER SCH NOTE: 100 INJECTION, SOLUTION INTRAVENOUS at 07:05

## 2019-03-24 RX ADMIN — METHADONE HYDROCHLORIDE SCH MG: 10 TABLET ORAL at 05:44

## 2019-03-24 RX ADMIN — ALBUTEROL SULFATE PRN MG: 2.5 SOLUTION RESPIRATORY (INHALATION) at 06:01

## 2019-03-24 RX ADMIN — PANTOPRAZOLE SODIUM SCH MLS/HR: 40 INJECTION, POWDER, FOR SOLUTION INTRAVENOUS at 07:05

## 2019-03-24 RX ADMIN — MOMETASONE FUROATE AND FORMOTEROL FUMARATE DIHYDRATE SCH PUFF: 200; 5 AEROSOL RESPIRATORY (INHALATION) at 08:56

## 2019-03-24 RX ADMIN — HYDROMORPHONE HYDROCHLORIDE PRN MG: 1 INJECTION, SOLUTION INTRAMUSCULAR; INTRAVENOUS; SUBCUTANEOUS at 09:27

## 2019-03-24 RX ADMIN — IPRATROPIUM BROMIDE AND ALBUTEROL SULFATE PRN NEB: .5; 3 SOLUTION RESPIRATORY (INHALATION) at 08:56

## 2019-03-24 RX ADMIN — ALBUTEROL SULFATE PRN MG: 2.5 SOLUTION RESPIRATORY (INHALATION) at 16:47

## 2019-03-24 RX ADMIN — METHADONE HYDROCHLORIDE SCH MG: 10 TABLET ORAL at 16:47

## 2019-03-24 RX ADMIN — SODIUM BICARBONATE TAB 650 MG SCH MG: 650 TAB at 16:47

## 2019-03-24 RX ADMIN — PANTOPRAZOLE SODIUM SCH: 40 INJECTION, POWDER, FOR SOLUTION INTRAVENOUS at 11:51

## 2019-03-24 RX ADMIN — INSULIN LISPRO SCH UNITS: 100 INJECTION, SOLUTION INTRAVENOUS; SUBCUTANEOUS at 20:50

## 2019-03-24 RX ADMIN — MIDODRINE HYDROCHLORIDE SCH MG: 5 TABLET ORAL at 16:47

## 2019-03-24 RX ADMIN — INSULIN LISPRO SCH UNITS: 100 INJECTION, SOLUTION INTRAVENOUS; SUBCUTANEOUS at 16:46

## 2019-03-24 RX ADMIN — PANTOPRAZOLE SODIUM SCH MLS/HR: 40 INJECTION, POWDER, FOR SOLUTION INTRAVENOUS at 19:00

## 2019-03-24 RX ADMIN — SODIUM BICARBONATE TAB 650 MG SCH: 650 TAB at 23:13

## 2019-03-24 RX ADMIN — DRONABINOL SCH MG: 2.5 CAPSULE ORAL at 21:32

## 2019-03-24 NOTE — PN
Subjective


Date of Service: 03/24/19


Interval History: 


HOSPITALIST PROGRESS NOTE


Patient seen and examined at bedside. Care reviewed and d/w Maya Juan RN.


She denies abdominal pain, N/V; appetite is poor. As per RN, had only a "smear 

of blood" last night, no further overt GI bleed. 


As per her MARIUSZ, she has had right 2nd and 3rd finger pain for days and she also 

noted discoloration. She thinks leg lesions are unchanged.


Family History: Unchanged from Admission


Social History: Unchanged from Admission


Past Medical History: Unchanged from Admission





Objective


Active Medications: 








Acetaminophen (Tylenol Tab*)  650 mg PO Q6H PRN


   PRN Reason: PAIN


   Last Admin: 03/16/19 14:04 Dose:  650 mg


Al Hydrox/Mg Hydrox/Simethicone (Maalox Plus*)  30 ml PO Q6H PRN


   PRN Reason: DYSPEPSIA


   Last Admin: 03/13/19 05:22 Dose:  30 ml


Albuterol (Ventolin 2.5 Mg/3 Ml Neb.Sol*)  2.5 mg INH Q4HR PRN


   PRN Reason: SOB/WHEEZING


   Last Admin: 03/24/19 06:01 Dose:  2.5 mg


Albuterol/Ipratropium (Duoneb (Albuterol 2.5 Mg/Ipratropium 0.5 Mg))  1 neb INH 

[id number] AWAKE PRN


   PRN Reason: SOB/WHEEZING


   Last Admin: 03/23/19 16:24 Dose:  1 neb


Calcitriol (Calcitriol Oral.Sol*)  0.25 mcg PO Q12HR [facility]


   Stop: 03/24/19 21:01


   Last Admin: 03/24/19 08:28 Dose:  0.25 ml


Calcium Carbonate (Calcium Carbonate Tab*)  1,250 mg PO TID [facility]


   Stop: 03/24/19 09:01


   Last Admin: 03/24/19 08:30 Dose:  1,250 mg


Dextrose (D50w Syringe 50 Ml*)  25 gm IV PUSH .FOR FS < 60 - SS PRN


   PRN Reason: FS < 60


   Last Admin: 03/23/19 21:07 Dose:  25 gm


Dronabinol (Marinol Cap*)  5 mg PO TID [facility]


   Last Admin: 03/24/19 08:30 Dose:  5 mg


Duloxetine HCl (Cymbalta Cap*)  30 mg PO DAILY [facility]


   Last Admin: 03/23/19 08:02 Dose:  30 mg


Epoetin Yan (Retacrit)  4,000 unit SUBCUT MoWeFr [facility]


   Last Admin: 03/22/19 18:50 Dose:  4,000 unit


Fentanyl (Duragesic  Patch 50 Mcg/Hr*)  50 mcg TRANSDERM Q72H [facility]


   Last Admin: 03/23/19 17:07 Dose:  50 mcg


Hydromorphone HCl (Dilaudid Inj1s*)  2 mg IV SLOW PU Q4H PRN


   PRN Reason: PAIN


   Last Admin: 03/23/19 21:38 Dose:  2 mg


Sodium Thiosulfate 25 gm/ (Sodium Chloride)  200 mls @ 200 mls/hr IV SuMoWeFr@

2000 [facility]


   Last Admin: 03/22/19 22:04 Dose:  200 mls/hr


Pantoprazole Sodium (Protonix Iv Bag*)  80 mg in 250 mls @ 25 mls/hr IVPB Q10H 

[facility]


   Last Admin: 03/24/19 07:05 Dose:  25 mls/hr


Insulin Human Lispro (Humalog*)  0 units SUBCUT ACHS [facility]; Protocol


   Last Admin: 03/24/19 08:28 Dose:  3 units


Lidocaine HCl (Lidocaine 2% Jelly*)  1 applic TOPICAL TID [facility]


   Last Admin: 03/24/19 08:31 Dose:  Not Given


Lorazepam (Ativan Inj*)  1 mg IV PUSH Q6H PRN


   PRN Reason: ANXIETY OR NAUSEA


   Last Admin: 03/23/19 11:12 Dose:  1 mg


Methadone HCl (Dolophine Tab*)  10 mg PO Q12H [facility]


   Last Admin: 03/24/19 05:44 Dose:  10 mg


Midodrine (Midodrine (Nf))  10 mg PO 1000,1400,1800,2200 [facility]; Protocol


   Last Admin: 03/24/19 08:34 Dose:  10 mg


Mometasone Furoate/Formoterol Fumar (Dulera 200/5 Mdi*)  2 puff INH BID [facility]; 

Protocol


   Last Admin: 03/23/19 19:30 Dose:  Not Given


Mupirocin (Bactroban 2 % Oint*)  1 applic TOPICAL DAILY [facility]


   Last Admin: 03/23/19 13:44 Dose:  1 applic


Ondansetron HCl (Zofran Inj*)  8 mg IV Q6H PRN


   PRN Reason: NAUSEA


   Last Admin: 03/24/19 08:25 Dose:  8 mg


Pharmacy Profile Note (Fentanyl Patch Check Q Shift)  1 note N/A 0700,1900 [facility]


   Last Admin: 03/24/19 07:05 Dose:  1 note


Prochlorperazine Edisylate (Compazine Inj*)  5 mg IV Q6H PRN


   PRN Reason: NAUSEA/VOMITING


   Last Admin: 03/23/19 18:04 Dose:  5 mg


Sodium Bicarbonate (Sodium Bicarbonate (Antacid)*)  2,600 mg PO 1000,1400,1800,

2200 [facility]


   Last Admin: 03/24/19 08:29 Dose:  2,600 mg








 Vital Signs - 8 hr











  03/24/19 03/24/19 03/24/19





  01:00 01:01 01:31


 


Temperature   


 


Pulse Rate  98 99


 


Respiratory 10 10 9





Rate   


 


Blood Pressure  159/55 145/57





(mmHg)   


 


O2 Sat by Pulse  94 93





Oximetry   














  03/24/19 03/24/19 03/24/19





  02:00 02:01 02:31


 


Temperature   


 


Pulse Rate  101 97


 


Respiratory 12 7 11





Rate   


 


Blood Pressure   





(mmHg)   


 


O2 Sat by Pulse  94 94





Oximetry   














  03/24/19 03/24/19 03/24/19





  03:00 03:01 03:31


 


Temperature   


 


Pulse Rate 98 100 95


 


Respiratory 10 24 13





Rate   


 


Blood Pressure  109/64 132/43





(mmHg)   


 


O2 Sat by Pulse 89 91 93





Oximetry   














  03/24/19 03/24/19 03/24/19





  03:51 04:00 04:01


 


Temperature 98.2 F  


 


Pulse Rate  97 97


 


Respiratory  14 15





Rate   


 


Blood Pressure   





(mmHg)   


 


O2 Sat by Pulse  88 95





Oximetry   














  03/24/19 03/24/19 03/24/19





  04:31 05:00 05:02


 


Temperature   


 


Pulse Rate 99 99 100


 


Respiratory 20 12 16





Rate   


 


Blood Pressure 155/54  





(mmHg)   


 


O2 Sat by Pulse 85 88 96





Oximetry   














  03/24/19 03/24/19 03/24/19





  05:31 05:44 06:00


 


Temperature   


 


Pulse Rate 97  96


 


Respiratory 13 17 20





Rate   


 


Blood Pressure 146/46  





(mmHg)   


 


O2 Sat by Pulse 95  91





Oximetry   














  03/24/19 03/24/19 03/24/19





  06:01 06:02 06:31


 


Temperature   


 


Pulse Rate 95 97 100


 


Respiratory 19 16 10





Rate   


 


Blood Pressure 123/47  





(mmHg)   


 


O2 Sat by Pulse 89 98 97





Oximetry   














  03/24/19 03/24/19 03/24/19





  07:00 07:01 07:31


 


Temperature   


 


Pulse Rate 100 100 107


 


Respiratory 22 17 14





Rate   


 


Blood Pressure  135/40 142/58





(mmHg)   


 


O2 Sat by Pulse 87 86 93





Oximetry   














  03/24/19





  08:01


 


Temperature 


 


Pulse Rate 102


 


Respiratory 18





Rate 


 


Blood Pressure 106/61





(mmHg) 


 


O2 Sat by Pulse 84





Oximetry 











Oxygen Devices in Use Now: Nasal Cannula - 12 liters


Appearance: Chronically ill appearing lady, appears older than stated age, 

lying in bed in NAD.


Eyes: No Scleral Icterus


Ears/Nose/Mouth/Throat: Mucous Membranes Moist


Neck: Trachea Midline


Respiratory: Symmetrical Chest Expansion and Respiratory Effort, Clear to 

Auscultation


Cardiovascular: RRR - Normal S1 and S2


Abdominal: - - Obese, soft, NT, NG, NR, BS+ hypoactive


Extremities: - - Trace bilateral LE edema with diminshed DP pulses. Right 3rd 

thing with tip cyanosis and palpable nodule.


Skin: - - Dusky lesions to her thighs and sacral area with skin disquamation


Neurological: Alert and Oriented x 3, - - ROTH


Result Diagrams: 


 03/24/19 05:40





 03/24/19 05:40





Assess/Plan/Problems-Billing


Assessment: 


39 yo F with complicated medical history including ESRD on PD, PAD s/p femoral 

stents on warfarin, COPD with chronic resp failure and active smoking on 5L O2 

at baseline, insulin dependent DM, chronic pain on methadone, probable CAD, and 

recent admission at the end of February for PNA, returning with bilateral thigh 

pain found to have indurated raised lesions over thighs compatible with 

calciphylaxis now on sodium thiosulfate IV. Hospital course complicated by 

respiratory failure likely from PNA requiring intubation, extubated on 3/11. 

Patient then developed GI bleeding on 3/22 which is improving. 


 





- Patient Problems


(1) GI bleed


Comment: - Source is unclear, but suspect ischemic colitis in the setting of 

hypotension and known vascular disease.


- GI input appreciated - high risk for bleeding with anticoagulation from a GI 

POV.


- Continue PPI drip for possible upper GI source.


- H/H 7.6/23.


- Clear liquid diet as tolerated.   





(2) Anemia


Comment: - Multifactorial - anemia of CKD, iron deficiency, AOCD.


- Has received 4 PRBCs so far during this admission.


- Continue Epoetin.   





(3) Bleeding disorder


Comment: - Concern for DIC with her h/o epistaxis, vaginal bleeding (in the 

setting of Warfarin use), now GI bleed (off Warfarin).


- PT/PTT are prolonged, d-dimer elevated, but fibrinogen is also high.


- No signs of active bleeding now, platelets are normal.


- Check cryofibrinogen.


   





(4) Vasculitis


Comment: - Her right 3rd finger discoloration and palpable nodule suggests 

vasculitis, especially cryoglobulinemia.


- Will check hepatitis serology, CLEMENTINE, ANCA, ESR, PCR, cryoglobulins, 

cryofibrinogen.


- It does not have the papulopustular appearance of Osler nodes, but will check 

blood cultures and if positive, transesophageal echocardiogram.   





(5) Acute respiratory failure with hypoxia


Comment: - Acute on chronic hypoxemic respiratory failure.


- She was back to her baseline O2 requirement of 5L continuously, extubated 3/

11. Thought to be from PNA and volume overload causing pulm congestion.


- Completed 7 days of Zosyn.


- Continue Dulera and bronchodilators.   





(6) Calciphylaxis


Comment: - S/p skin biopsy on 3/18 with pathology confirmation. 


- Continue IV sodium thiosulfate (daily on 03/13/ 14, 15, 16, 17, 18; then 

changed to SuMoWeFr - 03/20, 22) - 8 infusions so far.


- Continue to monitor for hypotension.   





(7) Secondary hyperparathyroidism of renal origin


Comment: - Phos 4.7, calcium 6.2 (corrected 7.6), 25-vitamin D <7.0, PTH 85.


- Phosphorus is normal.


- Corrected Calcium is >7.5.


- Continue Calcitriol.


- Repeat Ca, phos, PTH tomorrow - depending on levels may add Cinacalcet.   





(8) PAD (peripheral artery disease)


Comment: - S/p bare metal stenting and complicated by DVTs. CT Aorta with run 

off this admission with moderate-severe R common femoral artery stenosis, 

severe b/l popliteal artery stenosis, near complete occlusion of distal L 

superficial fem artery stent.


- Anticoagulation held at this time for GI bleeding.


- Unclear when will be able to resume it, but as Warfarin can worse 

calciphylaxis, consider lower dose Apixaban due to ESRD and bleeding with 

possible monitoring of factor Xa level.   





(9) ESRD on peritoneal dialysis


Comment: - Continue peritoneal dialysis.   





(10) IDDM (insulin dependent diabetes mellitus)


Comment: - HgbA1c 8.8%


- Hypoglycemia last night asymptomatic (19 on one finger, 60 on another, but 

102 on blood draw).


- Continue Lispro SS.    





(11) DVT prophylaxis


Comment: - Pharmacological prophylaxis contraindicated in the setting of GI 

bleed.


- Mechanical prophylaxis contraindicated in the setting of tender LE lesions 

due to calciphylaxis.   





(12) Full code status





Status and Disposition: 


Inpatient. 70 minutes critical care time.

## 2019-03-25 VITALS — DIASTOLIC BLOOD PRESSURE: 75 MMHG | SYSTOLIC BLOOD PRESSURE: 132 MMHG

## 2019-03-25 LAB
ALBUMIN SERPL BCG-MCNC: 2 G/DL (ref 3.2–5.2)
ALBUMIN/GLOB SERPL: 0.8 {RATIO} (ref 1–3)
ALP SERPL-CCNC: 182 U/L (ref 34–104)
ALT SERPL W P-5'-P-CCNC: 11 U/L (ref 7–52)
ANION GAP SERPL CALC-SCNC: 31 MMOL/L (ref 2–11)
AST SERPL-CCNC: 10 U/L (ref 13–39)
BUN SERPL-MCNC: 35 MG/DL (ref 6–24)
BUN/CREAT SERPL: 5.6 (ref 8–20)
CALCIUM SERPL-MCNC: 7.1 MG/DL (ref 8.6–10.3)
CHLORIDE SERPL-SCNC: 91 MMOL/L (ref 101–111)
GLOBULIN SER CALC-MCNC: 2.6 G/DL (ref 2–4)
GLUCOSE SERPL-MCNC: 243 MG/DL (ref 70–100)
HCO3 SERPL-SCNC: 27 MMOL/L (ref 22–32)
MAGNESIUM SERPL-MCNC: 1.6 MG/DL (ref 1.9–2.7)
POTASSIUM SERPL-SCNC: 3.6 MMOL/L (ref 3.5–5)
PROT SERPL-MCNC: 4.6 G/DL (ref 6.4–8.9)
SODIUM SERPL-SCNC: 149 MMOL/L (ref 135–145)

## 2019-03-25 RX ADMIN — MUPIROCIN SCH: 20 OINTMENT TOPICAL at 08:54

## 2019-03-25 RX ADMIN — MOMETASONE FUROATE AND FORMOTEROL FUMARATE DIHYDRATE SCH: 200; 5 AEROSOL RESPIRATORY (INHALATION) at 08:46

## 2019-03-25 RX ADMIN — HYDROMORPHONE HYDROCHLORIDE PRN MG: 1 INJECTION, SOLUTION INTRAMUSCULAR; INTRAVENOUS; SUBCUTANEOUS at 03:33

## 2019-03-25 RX ADMIN — MIDODRINE HYDROCHLORIDE SCH MG: 5 TABLET ORAL at 08:35

## 2019-03-25 RX ADMIN — IPRATROPIUM BROMIDE AND ALBUTEROL SULFATE PRN NEB: .5; 3 SOLUTION RESPIRATORY (INHALATION) at 14:40

## 2019-03-25 RX ADMIN — SODIUM BICARBONATE TAB 650 MG SCH MG: 650 TAB at 16:12

## 2019-03-25 RX ADMIN — IPRATROPIUM BROMIDE AND ALBUTEROL SULFATE PRN NEB: .5; 3 SOLUTION RESPIRATORY (INHALATION) at 08:46

## 2019-03-25 RX ADMIN — METHADONE HYDROCHLORIDE SCH MG: 10 TABLET ORAL at 05:38

## 2019-03-25 RX ADMIN — DULOXETINE HYDROCHLORIDE SCH MG: 30 CAPSULE, DELAYED RELEASE ORAL at 08:35

## 2019-03-25 RX ADMIN — DRONABINOL SCH MG: 2.5 CAPSULE ORAL at 08:35

## 2019-03-25 RX ADMIN — HYDROMORPHONE HYDROCHLORIDE PRN MG: 1 INJECTION, SOLUTION INTRAMUSCULAR; INTRAVENOUS; SUBCUTANEOUS at 14:20

## 2019-03-25 RX ADMIN — HYDROMORPHONE HYDROCHLORIDE PRN MG: 1 INJECTION, SOLUTION INTRAMUSCULAR; INTRAVENOUS; SUBCUTANEOUS at 08:35

## 2019-03-25 RX ADMIN — MIDODRINE HYDROCHLORIDE SCH MG: 5 TABLET ORAL at 14:20

## 2019-03-25 RX ADMIN — LIDOCAINE HYDROCHLORIDE SCH: 20 JELLY TOPICAL at 14:20

## 2019-03-25 RX ADMIN — PANTOPRAZOLE SODIUM SCH MLS/HR: 40 INJECTION, POWDER, FOR SOLUTION INTRAVENOUS at 08:43

## 2019-03-25 RX ADMIN — INSULIN LISPRO SCH UNITS: 100 INJECTION, SOLUTION INTRAVENOUS; SUBCUTANEOUS at 11:45

## 2019-03-25 RX ADMIN — SODIUM BICARBONATE TAB 650 MG SCH MG: 650 TAB at 08:35

## 2019-03-25 RX ADMIN — LIDOCAINE HYDROCHLORIDE SCH: 20 JELLY TOPICAL at 10:34

## 2019-03-25 RX ADMIN — PROCHLORPERAZINE EDISYLATE PRN MG: 5 INJECTION INTRAMUSCULAR; INTRAVENOUS at 08:43

## 2019-03-25 RX ADMIN — DRONABINOL SCH MG: 2.5 CAPSULE ORAL at 14:20

## 2019-03-25 RX ADMIN — INSULIN LISPRO SCH UNITS: 100 INJECTION, SOLUTION INTRAVENOUS; SUBCUTANEOUS at 08:27

## 2019-03-25 RX ADMIN — WATER SCH NOTE: 100 INJECTION, SOLUTION INTRAVENOUS at 07:12

## 2019-03-25 NOTE — PN
PROGRESS NOTE:

 

DATE OF VISIT:  03/25/19

 

SERVICE:  [facility].

 

SUBJECTIVE:  The patient was seen and examined at bedside.  The patient reports 
feeling very tired.  Reports that she does not feel significantly better than 
last week; however, reports that the pain in her extremities is little bit 
better. Vitals and labs have been reviewed.

 

PHYSICAL EXAMINATION:  HEENT:  NC/AT.  Heart:  S1, S2 present.  Tachycardic at 
the time of exam.  Lungs:  Decreased breath sounds bilaterally.  Clear to 
auscultation. Abdomen:  Soft.  Extremities noted to have edema with 
calciphylaxis lesions on bilateral thighs.

 

ASSESSMENT AND PLAN:  A 38-year-old female with complicated medical history 
including end-stage renal disease, on PD; peripheral arterial disease, status 
post femoral stents, on warfarin; chronic obstructive pulmonary disease; 
chronic respiratory failure; active smoking; insulin-dependent diabetes; recent 
admission in February for pneumonia, came into the hospital with bilateral 
thigh pain with diagnosis of calciphylaxis confirmed on biopsy.  Hospital 
course complicated by respiratory failure secondary to pneumonia requiring 
intubation, extubated on 03/11/19, and gastrointestinal bleed on 03/22/19 which 
is currently improving.

1.  Gastrointestinal bleed.  Plan per primary team.  The patient is on a PPI 
drip, clear liquids as tolerated.  Has been seen by GI.

2.  Calciphylaxis.  The patient is on sodium thiosulfate.  The patient noted to 
be hypotensive, which is multifactorial; however, as sodium thiosulfate can 
cause hypotension as well, the patient is on sodium thiosulfate 3 times a week 
currently. The patient being on Coumadin would be a risk factor for 
calciphylaxis, was transitioned to Eliquis, currently on hold in the setting of 
gastrointestinal bleed.

3.  End-stage renal disease, on peritoneal dialysis.  Discussed with PD nurse 
on current PD orders.  The patient is using the cycler at night.  PD treatment 
adjusted every day based on the patient's labs and clinical condition and blood 
pressure parameters.

4.  Recommend holding calcitriol.

 

We will follow with primary team.

 

 [id number]/CPS #: [id number]

SAEED

## 2019-03-25 NOTE — DS
*** ADDENDUM NOW INCLUDED ON THIS REPORT ***



CC:  Dr. Yeni Shanks; Dr. Baker; Dr. Fofana, [facility]; Dr. Murillo, 
intensivist, [facility] *

 

TRANSFER/DISCHARGE SUMMARY:

 

DATE OF ADMISSION:  03/05/19

 

DATE OF TRANSFER:  03/25/19

 

PRIMARY CARE PROVIDER:  Dr. Yeni Shanks.

 

NEPHROLOGIST:  Dr. Baker.

 

VASCULAR SURGEON:  Dr. Fofana at [facility].

 

ACCEPTING PHYSICIAN:  Dr. Murillo, intensivist at [facility].

 

HOSPITAL COURSE:  Ms. Masters is a 38-year-old lady with a complex medical 
history that includes end-stage renal disease, on peritoneal dialysis; COPD, on 
5 L of oxygen at baseline; type 2 diabetes, insulin-dependent; peripheral 
arterial disease; GERD; coronary artery disease; diabetic gastroparesis; 
hypertension; hyperlipidemia, who presented to our emergency room on 03/05/19 
secondary to leg pain.  [place] [facility] [place]25
[place]19 secondary to COPD exacerbation with pneumonia.  She returned to the 
emergency room on 03/05/19 with multiple complaints, but the most prominent one 
was bilateral leg pain that she described as new to her.  The patient was 
admitted for further evaluation and workup.

 

She had a venous Doppler study that showed no evidence of DVT and an aorta with 
runoff CTA that showed moderate severe right common femoral artery stenosis, 
severe bilateral popliteal artery stenosis with a single left and 2 right areas 
of stenosis, near complete occlusion of the distal left superficial femoral 
artery stent, and severely stenotic right renal artery.  The patient continued 
to complain of pain and she developed bluish skin discoloration on both thighs.
  She had skin biopsy performed, which showed calciphylaxis.  She was seen in 
consultation by Nephrology and the recommendation was for sodium thiosulfate 
infusions.  The patient has received 9 infusions so far, the last one on 

03/24/19.

 

In the meantime, during the hospital stay, the patient had worsening of her 
respiratory status with acute on chronic respiratory failure with hypoxia 
requiring intubation on 03/07/19.  The patient was in the intensive care unit 
and there was concern for healthcare-associated pneumonia.  She had progressive 
improvement of her respiratory status and she was extubated on 03/11/19.  At 
that point in time, the intensivist had reached out to [facility], but at 
that point it was felt there was no acute need for intervention since she has 
no severe peripheral vascular disease.  The plan at that time was for management
, also stabilize from this current medical crisis.

 

The patient has been in and out of the intensive care unit multiple times for 
different reasons.

 

On 03/20/19, the patient was seen by Palliative Care due to her complex 
condition, but at this point, she is not on palliative care mindset and 
declines any conversations about hospice.

 

The patient developed epistaxis and at that point the impression was that 
secondary to supratherapeutic INR.  The patient had been transitioned to 
apixaban while in the hospital and her epistaxis appeared to have improved.  
After having her skin biopsy on 03/18/19, the patient had acute mental status 
changes, she was poorly responsive and that was felt to be secondary to opiate 
use.  This time, she was transferred to ICU and required BiPAP, but was not 
intubated.  The patient was once again transferred to the medical floor and 
then developed maroon stools prompting transfer to ICU once again.  She was 
seen in consultation by Gastroenterology and the impression was that her GI 
bleed could be secondary to ischemic colitis as the patient has had documented 
hypotension on her prior ICU stays.  There was concern for a DIC as she was 
also having nasal and vaginal bleed before.  She received Kcentra and the 
bleeding stopped.  GI discussed with the patient the possibility of performing 
a colonoscopy, but he felt that with her pulmonary status, sedation would be 
difficult to attain without intubation.  He offered a nonsedated to low sedated 
flex sig, but the patient declined and at that point, the plan was for 
conservative management and observation.  She has been on a Protonix drip given 
her critical illness in the ICU and there was possibility of an upper GI bleed 
too, but the plan is to transition her to a PPI b.i.d. in the next few days.  
Her lowest hemoglobin was 6.3 from a baseline of 9.  She received a total of 4 
PRBCs during the hospital stay plus the Kcentra as described above.

 

Due to her diabetes, the patient is almost blind and on 03/24/19, her family 
member noted that she had right third finger with a purple tip.  The patient 
states that she has had several days of right second and third finger pain, but 
was not aware of the discoloration.  Her pulses are not palpable, but they are 
identifiable with Doppler, but her capillary refill is poor.  A right upper 
extremity arterial duplex revealed occlusion of the ulnar artery, recommended a 
CT angiogram of the right upper extremity for further evaluation.  This study 
showed segmental stenosis of the right brachial artery with suggestion of 
distal occlusion.  No definitive luminal opacification evident at the radial or 
ulnar arteries of the forearm with severe Bruner calcification limiting 
assessment.  The case was discussed with Radiology, who recommended an 
angiogram that we cannot perform in our facility followed by Vascular Surgery 
evaluation, that service is also not available in our hospital, reason why we 
reached out to [facility] once again.  The case was discussed with vascular 
surgeon, Dr. Fofana and with the intensivist on-call, Dr. Murillo and he 
accepted the patient in transfer.

 

PHYSICAL EXAMINATION:  Vital Signs:  Temperature 97.4, heart rate is 107, 
respiratory rate is 10, oxygen saturation is 92% on 6 L, blood pressure is 100/
57. General:  The patient is a pleasant lady, lying in bed, appears much older 
than stated age.  HEENT:  Pupils are equal.  Moist mucous membranes.  CVS:  
Normal S1, S2.  Regular rate and rhythm.  Chest: Breath sounds present 
bilaterally, diminished in bases, but no added sounds. Abdomen is obese, soft, 
nontender. Bowel sounds are present.  There is a peritoneal dialysis catheter 
on the right. Extremities:  The patient has a purple discoloration rash 
extending from her thighs up to inguinal area and around to her buttock area 
with skin desquamation.  The patient is status post right TMA.  No palpable 
pulses, but pulses are present with Doppler, tibial posterior and popliteal.  
Her right hand, the pulses are not palpable, but they are identifiable with 
Doppler.  She has purple discoloration on the right third finger distal phalanx 
with pain on palpation.  Neuro:  She is alert and oriented x3.  Able to move 
all 4 extremities.

 

DISCHARGE DIAGNOSES:

1.  Calciphylaxis with significant cutaneous lesions, especially on the lower 
extremities.

2.  Acute on chronic hypoxemic respiratory failure.

3.  Probable hospital-acquired pneumonia requiring intubation.

4.  Acute encephalopathy.

5.  Sepsis secondary to healthcare-associated pneumonia.

6.  Anemia secondary to iron deficiency, renal disease, chronic disease and 
blood loss.

7.  Supratherapeutic INR.

8.  Troponin elevation secondary to demand ischemia.

9.  Gastrointestinal bleed, likely secondary to ischemic colitis.

10.  Severe peripheral vascular disease with right ulnar and radial occlusions.

11.  End-stage renal disease, on peritoneal dialysis.

12.  Coronary artery disease.

13.  Severe peripheral vascular disease, status post stent to the femoral 
artery.

14.  History of heparin-induced thrombocytopenia, on prior therapy with 
warfarin and then transitioned to apixaban and now off anticoagulation due to 
her gastrointestinal bleed.

15.  Insulin-dependent diabetes.

16.  Chronic obstructive pulmonary disease, on home O2.

17.  Hypothyroidism.

18.  Secondary hyperparathyroidism secondary to renal disease.

19.  Leukocytosis.

20.  Hypokalemia.

21.  Hypomagnesemia.

22.  Hyperphosphatemia.

 

CURRENT MEDICATION LIST:

1.  Acetaminophen 650 mg p.o. q.6 hours p.r.n. pain or fever.

2.  Maalox Plus 30 mL p.o. q.6 hours p.r.n. dyspepsia.

3.  Albuterol 2.5 mg inhaled q.4 hours p.r.n. shortness of breath.

4.  Dextrose 25 g IV push for fingersticks less than 60.

5.  Dronabinol 5 mg p.o. t.i.d.

6.  Duloxetine 30 mg p.o. daily.

7.  Epoetin 4000 units subcutaneously on Mondays, Wednesdays and Fridays.

8.  Fentanyl patch 50 mcg topical q.72 hours.

9.  Hydromorphone 2 mg IV q.4 hours p.r.n. pain.

10.  Lispro sliding scale.

11.  Lidocaine 2% jelly topical to lower extremity lesions.

12.  Lorazepam 1 mg IV push q.6 hours p.r.n. anxiety.

13.  Methadone 10 mg p.o. q.12 hours.

14.  Midodrine 10 mg p.o. at 1000, 1400, 1800 and 2200.

15.  Dulera 200/5 two puffs inhaled b.i.d.

16.  Mupirocin 2% ointment topical daily to PD catheter exit site.

17.  Ondansetron 8 mg IV q.6 hours p.r.n. nausea, vomiting.

18.  Protonix drip.

19.  Compazine 5 mg IV q.6 hours p.r.n. nausea.

20.  Sodium bicarb 2600 mg p.o. at 1000, 1400, 1800 and 2200.

21.  Sodium thiosulfate 25 g IV on Sundays, Mondays, Wednesdays and Fridays at 
2000.

 

DIET:  Clear liquid diet.

 

ACTIVITIES:  As tolerated.

 

DISPOSITION:  To [facility] for higher level of care including 
angiogram and Vascular Surgery evaluation.

 

CONDITION AT THE TIME OF TRANSFER:  Guarded.

 

STATUS WHILE IN THE HOSPITAL:  Inpatient.

 

Please keep in mind that this is a summarized version of this patient's very 
complex and prolonged hospital stay.  If you need more information, please feel 
free to call me at [phone] or please obtain full medical records.

 

TIME SPENT:  Approximately 65 minutes was spent to complete this discharge.

 

ADDENDUM:  The vascular surgeon at [facility] had recommended heparin drip 
for the patient, but as per documentation in our record, the patient has a 
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 
was HIT.  I discussed the case with Nephrology, and fondaparinux is not 
dialyzable; so Dr. Baker felt that it would not be safe on this patient.  
After discussion with Nephrology and pharmacy, the patient could be started on 
argatroban drip, but at this time she already has a bed at [facility] and 
she will be transferred momentarily.  Since she had a recent GI bleed and I 
will not be able to monitor her closely here, the plan at this time is to 
transfer her to [facility] and let the vascular surgeon decide what will be 
the best management regarding his therapeutic approach.  The patient has had 
symptoms for a couple of days already and she has pulses identified on Doppler, 
so I will let the vascular surgeon decide how he wants to proceed with 
anticoagulation.

 



 [id number]/CPS #: [id number]

KEON- [id number] #: [id number]

SAEED

## 2019-03-25 NOTE — DS
DISCHARGE SUMMARY:

 

ADDENDUM:  The vascular surgeon at [facility] had recommended heparin drip 
for the patient, but as per documentation in our record, the patient has a 
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 
was HIT.  I discussed the case with Nephrology, and fondaparinux is not 
dialyzable; so Dr. Baker felt that it would not be safe on this patient.  
After discussion with Nephrology and pharmacy, the patient could be started on 
argatroban drip, but at this time she already has a bed at [facility] and 
she will be transferred momentarily.  Since she had a recent GI bleed and I 
will not be able to monitor her closely here, the plan at this time is to 
transfer her to [facility] and let the vascular surgeon decide what will be 
the best management regarding his therapeutic approach.  The patient has had 
symptoms for a couple of days already and she has pulses identified on Doppler, 
so I will let the vascular surgeon decide how he wants to proceed with 
anticoagulation.

 

 [id number]/CPS #: [id number]

SAEED

## 2019-03-25 NOTE — PN
Subjective


Date of Service: 03/25/19


Family History: Unchanged from Admission


Social History: Unchanged from Admission


Past Medical History: Unchanged from Admission





Objective


Active Medications: 








Acetaminophen (Tylenol Tab*)  650 mg PO Q6H PRN


   PRN Reason: PAIN


   Last Admin: 03/16/19 14:04 Dose:  650 mg


Al Hydrox/Mg Hydrox/Simethicone (Maalox Plus*)  30 ml PO Q6H PRN


   PRN Reason: DYSPEPSIA


   Last Admin: 03/13/19 05:22 Dose:  30 ml


Albuterol (Ventolin 2.5 Mg/3 Ml Neb.Sol*)  2.5 mg INH Q4HR PRN


   PRN Reason: SOB/WHEEZING


   Last Admin: 03/24/19 16:47 Dose:  2.5 mg


Albuterol/Ipratropium (Duoneb (Albuterol 2.5 Mg/Ipratropium 0.5 Mg))  1 neb INH 

[id number] AWAKE PRN


   PRN Reason: SOB/WHEEZING


   Last Admin: 03/24/19 20:52 Dose:  1 neb


Dextrose (D50w Syringe 50 Ml*)  25 gm IV PUSH .FOR FS < 60 - SS PRN


   PRN Reason: FS < 60


   Last Admin: 03/23/19 21:07 Dose:  25 gm


Dronabinol (Marinol Cap*)  5 mg PO TID [facility]


   Last Admin: 03/25/19 08:35 Dose:  5 mg


Duloxetine HCl (Cymbalta Cap*)  30 mg PO DAILY [facility]


   Last Admin: 03/25/19 08:35 Dose:  30 mg


Epoetin Yan (Retacrit)  4,000 unit SUBCUT MoWeFr [facility]


   Last Admin: 03/22/19 18:50 Dose:  4,000 unit


Fentanyl (Duragesic  Patch 50 Mcg/Hr*)  50 mcg TRANSDERM Q72H [facility]


   Last Admin: 03/23/19 17:07 Dose:  50 mcg


Hydromorphone HCl (Dilaudid Inj1s*)  2 mg IV SLOW PU Q4H PRN


   PRN Reason: PAIN


   Last Admin: 03/25/19 08:35 Dose:  2 mg


Sodium Thiosulfate 25 gm/ (Sodium Chloride)  200 mls @ 200 mls/hr IV SuMoWeFr@

2000 [facility]


   Last Admin: 03/24/19 20:47 Dose:  200 mls/hr


Pantoprazole Sodium (Protonix Iv Bag*)  80 mg in 250 mls @ 25 mls/hr IVPB Q10H 

[facility]


   Last Admin: 03/24/19 23:16 Dose:  25 mls/hr


Insulin Human Lispro (Humalog*)  0 units SUBCUT ACHS [facility]; Protocol


   Last Admin: 03/25/19 08:27 Dose:  2 units


Lidocaine HCl (Lidocaine 2% Jelly*)  1 applic TOPICAL TID [facility]


   Last Admin: 03/24/19 21:32 Dose:  1 applic


Lorazepam (Ativan Inj*)  1 mg IV PUSH Q6H PRN


   PRN Reason: ANXIETY OR NAUSEA


   Last Admin: 03/23/19 11:12 Dose:  1 mg


Methadone HCl (Dolophine Tab*)  10 mg PO Q12H [facility]


   Last Admin: 03/25/19 05:38 Dose:  10 mg


Midodrine (Midodrine (Nf))  10 mg PO 1000,1400,1800,2200 [facility]; Protocol


   Last Admin: 03/25/19 08:35 Dose:  10 mg


Mometasone Furoate/Formoterol Fumar (Dulera 200/5 Mdi*)  2 puff INH BID [facility]; 

Protocol


   Last Admin: 03/24/19 20:53 Dose:  Not Given


Mupirocin (Bactroban 2 % Oint*)  1 applic TOPICAL DAILY [facility]


   Last Admin: 03/24/19 08:56 Dose:  Not Given


Ondansetron HCl (Zofran Inj*)  8 mg IV Q6H PRN


   PRN Reason: NAUSEA


   Last Admin: 03/24/19 21:48 Dose:  8 mg


Pharmacy Profile Note (Fentanyl Patch Check Q Shift)  1 note N/A 0700,1900 [facility]


   Last Admin: 03/25/19 07:12 Dose:  1 note


Prochlorperazine Edisylate (Compazine Inj*)  5 mg IV Q6H PRN


   PRN Reason: NAUSEA/VOMITING


   Last Admin: 03/24/19 22:43 Dose:  5 mg


Sodium Bicarbonate (Sodium Bicarbonate (Antacid)*)  2,600 mg PO 1000,1400,1800,

2200 [facility]


   Last Admin: 03/25/19 08:35 Dose:  2,600 mg








 Vital Signs - 8 hr











  03/25/19 03/25/19 03/25/19





  01:00 01:01 01:31


 


Temperature   


 


Pulse Rate  99 104


 


Respiratory 12 12 13





Rate   


 


Blood Pressure  163/66 181/60





(mmHg)   


 


O2 Sat by Pulse   75





Oximetry   














  03/25/19 03/25/19 03/25/19





  02:00 02:01 02:30


 


Temperature   


 


Pulse Rate  110 106


 


Respiratory 8 8 10





Rate   


 


Blood Pressure  136/58 114/76





(mmHg)   


 


O2 Sat by Pulse  99 96





Oximetry   














  03/25/19 03/25/19 03/25/19





  03:00 03:05 03:18


 


Temperature   


 


Pulse Rate 110 112 


 


Respiratory 17 11 17





Rate   


 


Blood Pressure  141/70 





(mmHg)   


 


O2 Sat by Pulse 94 98 





Oximetry   














  03/25/19 03/25/19 03/25/19





  03:24 03:30 03:33


 


Temperature 97.1 F  


 


Pulse Rate  110 


 


Respiratory  18 22





Rate   


 


Blood Pressure  138/62 





(mmHg)   


 


O2 Sat by Pulse  98 





Oximetry   














  03/25/19 03/25/19 03/25/19





  04:00 04:30 05:00


 


Temperature   


 


Pulse Rate 113 107 107


 


Respiratory 14 14 10





Rate   


 


Blood Pressure 132/62 139/80 151/65





(mmHg)   


 


O2 Sat by Pulse 100 93 94





Oximetry   














  03/25/19 03/25/19 03/25/19





  05:30 05:38 05:51


 


Temperature   


 


Pulse Rate 102  


 


Respiratory 7 14 14





Rate   


 


Blood Pressure 137/65  





(mmHg)   


 


O2 Sat by Pulse 92  





Oximetry   














  03/25/19 03/25/19 03/25/19





  06:00 06:02 06:30


 


Temperature   


 


Pulse Rate 107 106 103


 


Respiratory 7 5 9





Rate   


 


Blood Pressure 165/66  159/60





(mmHg)   


 


O2 Sat by Pulse 99 97 96





Oximetry   














  03/25/19 03/25/19 03/25/19





  07:00 07:01 07:30


 


Temperature   


 


Pulse Rate 98 97 101


 


Respiratory 9 9 9





Rate   


 


Blood Pressure 144/66  151/67





(mmHg)   


 


O2 Sat by Pulse 93 93 88





Oximetry   














  03/25/19 03/25/19 03/25/19





  08:00 08:01 08:35


 


Temperature   


 


Pulse Rate 101 100 


 


Respiratory 9 8 14





Rate   


 


Blood Pressure 127/74  





(mmHg)   


 


O2 Sat by Pulse 95 95 





Oximetry   











Oxygen Devices in Use Now: Nasal Cannula


Extremities: - - Able to find right radial and ulnar pulses with doppler


Result Diagrams: 


 03/24/19 13:56





 03/25/19 05:50


Microbiology and Other Data: 


 Microbiology











 03/05/19 23:00 Nasal Screen MRSA (PCR) - Final





 Nasal    Mrsa Not Detected














Assess/Plan/Problems-Billing


Assessment: 


37 yo F with complicated medical history including ESRD on PD, PAD s/p femoral 

stents on warfarin, COPD with chronic resp failure and active smoking on 5L O2 

at baseline, insulin dependent DM, chronic pain on methadone, probable CAD, and 

recent admission at the end of February for PNA, returning with bilateral thigh 

pain found to have indurated raised lesions over thighs compatible with 

calciphylaxis now on sodium thiosulfate IV. Hospital course complicated by 

respiratory failure likely from PNA requiring intubation, extubated on 3/11. 

Patient then developed GI bleeding on 3/22 which is improving. 


 





- Patient Problems


(1) GI bleed


Comment: - Source is unclear, but suspect ischemic colitis in the setting of 

hypotension and known vascular disease.


- GI input appreciated - high risk for bleeding with anticoagulation from a GI 

POV.


- Continue PPI drip for possible upper GI source.


- H/H 7.6/23.


- Clear liquid diet as tolerated.   





(2) Anemia


Comment: - Multifactorial - anemia of CKD, iron deficiency, AOCD.


- Has received 4 PRBCs so far during this admission.


- Continue Epoetin.   





(3) Bleeding disorder


Comment: - Concern for DIC with her h/o epistaxis, vaginal bleeding (in the 

setting of Warfarin use), now GI bleed (off Warfarin).


- PT/PTT are prolonged, d-dimer elevated, but fibrinogen is also high.


- No signs of active bleeding now, platelets are normal.


- Check cryofibrinogen.


   





(4) Vasculitis


Comment: - Her right 3rd finger discoloration and palpable nodule suggests 

vasculitis, especially cryoglobulinemia.


- Will check hepatitis serology, CLEMENTINE, ANCA, ESR, PCR, cryoglobulins, 

cryofibrinogen.


- It does not have the papulopustular appearance of Osler nodes, but will check 

blood cultures and if positive, transesophageal echocardiogram.   





(5) Acute respiratory failure with hypoxia


Comment: - Acute on chronic hypoxemic respiratory failure.


- She was back to her baseline O2 requirement of 5L continuously, extubated 3/

11. Thought to be from PNA and volume overload causing pulm congestion.


- Completed 7 days of Zosyn.


- Continue Dulera and bronchodilators.   





(6) Calciphylaxis


Comment: - S/p skin biopsy on 3/18 with pathology confirmation. 


- Continue IV sodium thiosulfate (daily on 03/13/ 14, 15, 16, 17, 18; then 

changed to SuMoWeFr - 03/20, 22) - 8 infusions so far.


- Continue to monitor for hypotension.   





(7) Secondary hyperparathyroidism of renal origin


Comment: - Phos 4.7, calcium 6.2 (corrected 7.6), 25-vitamin D <7.0, PTH 85.


- Phosphorus is normal.


- Corrected Calcium is >7.5.


- Continue Calcitriol.


- Repeat Ca, phos, PTH tomorrow - depending on levels may add Cinacalcet.   





(8) PAD (peripheral artery disease)


Comment: - S/p bare metal stenting and complicated by DVTs. CT Aorta with run 

off this admission with moderate-severe R common femoral artery stenosis, 

severe b/l popliteal artery stenosis, near complete occlusion of distal L 

superficial fem artery stent.


- Anticoagulation held at this time for GI bleeding.


- Unclear when will be able to resume it, but as Warfarin can worse 

calciphylaxis, consider lower dose Apixaban due to ESRD and bleeding with 

possible monitoring of factor Xa level.   





(9) ESRD on peritoneal dialysis


Comment: - Continue peritoneal dialysis.   





(10) IDDM (insulin dependent diabetes mellitus)


Comment: - HgbA1c 8.8%


- Hypoglycemia last night asymptomatic (19 on one finger, 60 on another, but 

102 on blood draw).


- Continue Lispro SS.    





(11) DVT prophylaxis


Comment: - Pharmacological prophylaxis contraindicated in the setting of GI 

bleed.


- Mechanical prophylaxis contraindicated in the setting of tender LE lesions 

due to calciphylaxis.   





(12) Full code status





Status and Disposition: 


Inpatient. 70 minutes critical care time.

## 2019-03-26 ENCOUNTER — HOSPITAL ENCOUNTER (INPATIENT)
Dept: HOSPITAL 25 - ED | Age: 39
LOS: 11 days | DRG: 246 | End: 2019-04-06
Attending: INTERNAL MEDICINE | Admitting: HOSPITALIST
Payer: COMMERCIAL

## 2019-03-26 DIAGNOSIS — E11.22: ICD-10-CM

## 2019-03-26 DIAGNOSIS — E78.5: ICD-10-CM

## 2019-03-26 DIAGNOSIS — K31.84: ICD-10-CM

## 2019-03-26 DIAGNOSIS — I25.10: ICD-10-CM

## 2019-03-26 DIAGNOSIS — Z87.442: ICD-10-CM

## 2019-03-26 DIAGNOSIS — I96: ICD-10-CM

## 2019-03-26 DIAGNOSIS — N25.81: ICD-10-CM

## 2019-03-26 DIAGNOSIS — M16.0: ICD-10-CM

## 2019-03-26 DIAGNOSIS — Z89.421: ICD-10-CM

## 2019-03-26 DIAGNOSIS — E11.51: ICD-10-CM

## 2019-03-26 DIAGNOSIS — Z88.1: ICD-10-CM

## 2019-03-26 DIAGNOSIS — E11.52: ICD-10-CM

## 2019-03-26 DIAGNOSIS — D63.1: ICD-10-CM

## 2019-03-26 DIAGNOSIS — Z86.718: ICD-10-CM

## 2019-03-26 DIAGNOSIS — E11.40: ICD-10-CM

## 2019-03-26 DIAGNOSIS — Z86.74: ICD-10-CM

## 2019-03-26 DIAGNOSIS — Z88.8: ICD-10-CM

## 2019-03-26 DIAGNOSIS — N18.6: ICD-10-CM

## 2019-03-26 DIAGNOSIS — Z99.2: ICD-10-CM

## 2019-03-26 DIAGNOSIS — F41.9: ICD-10-CM

## 2019-03-26 DIAGNOSIS — E83.59: ICD-10-CM

## 2019-03-26 DIAGNOSIS — K55.9: Primary | ICD-10-CM

## 2019-03-26 DIAGNOSIS — E87.2: ICD-10-CM

## 2019-03-26 DIAGNOSIS — J44.9: ICD-10-CM

## 2019-03-26 DIAGNOSIS — H54.62: ICD-10-CM

## 2019-03-26 DIAGNOSIS — F31.9: ICD-10-CM

## 2019-03-26 DIAGNOSIS — G47.30: ICD-10-CM

## 2019-03-26 DIAGNOSIS — K21.9: ICD-10-CM

## 2019-03-26 DIAGNOSIS — J96.21: ICD-10-CM

## 2019-03-26 DIAGNOSIS — I25.2: ICD-10-CM

## 2019-03-26 DIAGNOSIS — I46.9: ICD-10-CM

## 2019-03-26 DIAGNOSIS — Z95.828: ICD-10-CM

## 2019-03-26 DIAGNOSIS — E78.00: ICD-10-CM

## 2019-03-26 DIAGNOSIS — I12.0: ICD-10-CM

## 2019-03-26 DIAGNOSIS — Z86.14: ICD-10-CM

## 2019-03-26 DIAGNOSIS — M54.30: ICD-10-CM

## 2019-03-26 DIAGNOSIS — F17.210: ICD-10-CM

## 2019-03-26 DIAGNOSIS — M51.26: ICD-10-CM

## 2019-03-26 DIAGNOSIS — E11.43: ICD-10-CM

## 2019-03-26 DIAGNOSIS — Z90.01: ICD-10-CM

## 2019-03-26 DIAGNOSIS — Z85.820: ICD-10-CM

## 2019-03-26 LAB
ALBUMIN SERPL BCG-MCNC: 2.4 G/DL (ref 3.2–5.2)
ALBUMIN/GLOB SERPL: 0.7 {RATIO} (ref 1–3)
ALP SERPL-CCNC: 181 U/L (ref 34–104)
ALT SERPL W P-5'-P-CCNC: 11 U/L (ref 7–52)
ANION GAP SERPL CALC-SCNC: 27 MMOL/L (ref 2–11)
AST SERPL-CCNC: 8 U/L (ref 13–39)
BASOPHILS # BLD AUTO: 0.1 10^3/UL (ref 0–0.2)
BUN SERPL-MCNC: 47 MG/DL (ref 6–24)
BUN/CREAT SERPL: 6.7 (ref 8–20)
CALCIUM SERPL-MCNC: 8 MG/DL (ref 8.6–10.3)
CHLORIDE SERPL-SCNC: 92 MMOL/L (ref 101–111)
EOSINOPHIL # BLD AUTO: 0.1 10^3/UL (ref 0–0.6)
GLOBULIN SER CALC-MCNC: 3.5 G/DL (ref 2–4)
GLUCOSE SERPL-MCNC: 173 MG/DL (ref 70–100)
HCO3 SERPL-SCNC: 24 MMOL/L (ref 22–32)
HCT VFR BLD AUTO: 23 % (ref 33–41)
HGB BLD-MCNC: 7.6 G/DL (ref 12–16)
INR PPP/BLD: 1.85 (ref 0.77–1.02)
LYMPHOCYTES # BLD AUTO: 1.2 10^3/UL (ref 1–4.8)
MCH RBC QN AUTO: 28 PG (ref 27–31)
MCHC RBC AUTO-ENTMCNC: 33 G/DL (ref 31–36)
MCV RBC AUTO: 85 FL (ref 80–97)
MONOCYTES # BLD AUTO: 0.8 10^3/UL (ref 0–0.8)
NEUTROPHILS # BLD AUTO: 18.8 10^3/UL (ref 1.5–7.7)
NRBC # BLD AUTO: 0 10^3/UL
NRBC BLD QL AUTO: 0
PLATELET # BLD AUTO: 392 10^3/UL (ref 150–450)
POLYCHROMASIA BLD QL SMEAR: (no result)
POTASSIUM SERPL-SCNC: 4.9 MMOL/L (ref 3.5–5)
PROT SERPL-MCNC: 5.9 G/DL (ref 6.4–8.9)
RBC # BLD AUTO: 2.73 10^6 /UL (ref 3.7–4.87)
SODIUM SERPL-SCNC: 143 MMOL/L (ref 135–145)
WBC # BLD AUTO: 21 10^3/UL (ref 3.5–10.8)

## 2019-03-26 PROCEDURE — 71045 X-RAY EXAM CHEST 1 VIEW: CPT

## 2019-03-26 PROCEDURE — 80061 LIPID PANEL: CPT

## 2019-03-26 PROCEDURE — C1751 CATH, INF, PER/CENT/MIDLINE: HCPCS

## 2019-03-26 PROCEDURE — 74018 RADEX ABDOMEN 1 VIEW: CPT

## 2019-03-26 PROCEDURE — 81003 URINALYSIS AUTO W/O SCOPE: CPT

## 2019-03-26 PROCEDURE — 99284 EMERGENCY DEPT VISIT MOD MDM: CPT

## 2019-03-26 PROCEDURE — 85027 COMPLETE CBC AUTOMATED: CPT

## 2019-03-26 PROCEDURE — 81015 MICROSCOPIC EXAM OF URINE: CPT

## 2019-03-26 PROCEDURE — 87186 SC STD MICRODIL/AGAR DIL: CPT

## 2019-03-26 PROCEDURE — 83605 ASSAY OF LACTIC ACID: CPT

## 2019-03-26 PROCEDURE — 84100 ASSAY OF PHOSPHORUS: CPT

## 2019-03-26 PROCEDURE — 80048 BASIC METABOLIC PNL TOTAL CA: CPT

## 2019-03-26 PROCEDURE — G0257 UNSCHED DIALYSIS ESRD PT HOS: HCPCS

## 2019-03-26 PROCEDURE — 94002 VENT MGMT INPAT INIT DAY: CPT

## 2019-03-26 PROCEDURE — P9040 RBC LEUKOREDUCED IRRADIATED: HCPCS

## 2019-03-26 PROCEDURE — 36415 COLL VENOUS BLD VENIPUNCTURE: CPT

## 2019-03-26 PROCEDURE — 87493 C DIFF AMPLIFIED PROBE: CPT

## 2019-03-26 PROCEDURE — P9047 ALBUMIN (HUMAN), 25%, 50ML: HCPCS

## 2019-03-26 PROCEDURE — 80202 ASSAY OF VANCOMYCIN: CPT

## 2019-03-26 PROCEDURE — 83735 ASSAY OF MAGNESIUM: CPT

## 2019-03-26 PROCEDURE — 86922 COMPATIBILITY TEST ANTIGLOB: CPT

## 2019-03-26 PROCEDURE — 87077 CULTURE AEROBIC IDENTIFY: CPT

## 2019-03-26 PROCEDURE — 86850 RBC ANTIBODY SCREEN: CPT

## 2019-03-26 PROCEDURE — 87205 SMEAR GRAM STAIN: CPT

## 2019-03-26 PROCEDURE — 90945 DIALYSIS ONE EVALUATION: CPT

## 2019-03-26 PROCEDURE — 87086 URINE CULTURE/COLONY COUNT: CPT

## 2019-03-26 PROCEDURE — 36600 WITHDRAWAL OF ARTERIAL BLOOD: CPT

## 2019-03-26 PROCEDURE — 82803 BLOOD GASES ANY COMBINATION: CPT

## 2019-03-26 PROCEDURE — 85025 COMPLETE CBC W/AUTO DIFF WBC: CPT

## 2019-03-26 PROCEDURE — 87150 DNA/RNA AMPLIFIED PROBE: CPT

## 2019-03-26 PROCEDURE — 82947 ASSAY GLUCOSE BLOOD QUANT: CPT

## 2019-03-26 PROCEDURE — 94003 VENT MGMT INPAT SUBQ DAY: CPT

## 2019-03-26 PROCEDURE — 94640 AIRWAY INHALATION TREATMENT: CPT

## 2019-03-26 PROCEDURE — 94660 CPAP INITIATION&MGMT: CPT

## 2019-03-26 PROCEDURE — 86901 BLOOD TYPING SEROLOGIC RH(D): CPT

## 2019-03-26 PROCEDURE — 89051 BODY FLUID CELL COUNT: CPT

## 2019-03-26 PROCEDURE — 87040 BLOOD CULTURE FOR BACTERIA: CPT

## 2019-03-26 PROCEDURE — 80053 COMPREHEN METABOLIC PANEL: CPT

## 2019-03-26 PROCEDURE — 86900 BLOOD TYPING SEROLOGIC ABO: CPT

## 2019-03-26 PROCEDURE — 82272 OCCULT BLD FECES 1-3 TESTS: CPT

## 2019-03-26 PROCEDURE — 87070 CULTURE OTHR SPECIMN AEROBIC: CPT

## 2019-03-26 PROCEDURE — 84145 PROCALCITONIN (PCT): CPT

## 2019-03-26 PROCEDURE — 85610 PROTHROMBIN TIME: CPT

## 2019-03-26 PROCEDURE — 93005 ELECTROCARDIOGRAM TRACING: CPT

## 2019-03-26 PROCEDURE — 93306 TTE W/DOPPLER COMPLETE: CPT

## 2019-03-26 RX ADMIN — HYDROMORPHONE HYDROCHLORIDE ONE MG: 1 INJECTION, SOLUTION INTRAMUSCULAR; INTRAVENOUS; SUBCUTANEOUS at 23:22

## 2019-03-26 NOTE — ED
GI/ HPI





- HPI Summary


HPI Summary: 


Patient is a 37 y/o female who presents to the ED c/o rectal bleeding. She was 

at [facility] for necrosis of her right hand, Pt c/o her hand being cold and 

having a black tip. Patient felt she was not getting the care she needed and 

left AMA at 18:30 today. She was diagnosed with calciphylaxis and is here today 

for pain management. Patient also c/o rectal bleeding, melena, rectal pain, 

mild nausea, and pain to her buttocks, thighs, and low back. She denies any 

SOB. She states that she was told she might have another GI bleed. Her pain is 

rated a 10/10 in severity and is described as aching. As per medical records, 

she visits the ED frequently and often leaves AMA. PMHx DM, anemia, ESRD on 

peritoneal dialysis. She is tachycardic in the room with a HR of 113 bpm.





- History of Current Complaint


Chief Complaint: EDGeneral


Time Seen by Provider: 03/26/19 22:05


Stated Complaint: RECTAL BLEEDING PER PT


Hx Obtained From: Patient, Medical Records


Hx Last Menstrual Period: just finished


Onset/Duration: Still Present


Timing: Constant


Current Severity: Severe


Pain Intensity: 10


Location of Pain: Rectal, Other - buttocks, thighs, low back


Pain Characteristics: Aching


Associated Signs and Symptoms: Positive: Back Pain, Rectal Pain, Other: - 

rectal bleeding





- Additional Pertinent History


Primary Care Physician: LINDA





- Allergy/Home Medications


Allergies/Adverse Reactions: 


 Allergies











Allergy/AdvReac Type Severity Reaction Status Date / Time


 


Adhesive Tape [Plastic Tape] Allergy Mild Blisters Verified 02/18/19 12:05


 


chlorhexidine Allergy  Rash Verified 03/11/19 19:17


 


doxycycline Allergy  Unknown Verified 02/18/19 12:05





   Reaction  





   Details  


 


erythromycin base Allergy  See Comment Verified 02/18/19 12:05


 


heparin Allergy  See Comment Verified 02/18/19 12:05


 


metoclopramide [From Reglan] Allergy  Hives Verified 02/18/19 12:05


 


niacin Allergy  Rash Verified 02/18/19 12:05


 


ropinirole [From Requip] Allergy  Hives Verified 02/18/19 12:05


 


metronidazole [From Flagyl] AdvReac  Nausea And Verified 02/25/19 13:15





   Vomiting  














PMH/Surg Hx/FS Hx/Imm Hx


Endocrine/Hematology History: Reports: Hx Anticoagulant Therapy - Aspirin., Hx 

Blood Transfusions, Hx Diabetes, Hx Thyroid Disease - nodule, Hx Anemia - hx of 

- reports had tranfusion in 2014 after mi


Cardiovascular History: Reports: Hx Angina, Hx Cardiac Arrest, Hx Cardiomegaly, 

Hx Coronary Artery Disease, Hx Deep Vein Thrombosis, Hx Hypercholesterolemia, 

Hx Hypertension, Hx Myocardial Infarction, Hx Peripheral Vascular Disease, 

Other Cardiovascular Problems/Disorders


   Denies: Hx Congestive Heart Failure, Hx Pacemaker/ICD, Hx Valvular Heart 

Disease


Respiratory History: Reports: Hx Asthma, Hx Chronic Obstructive Pulmonary 

Disease (COPD) - smoker, Hx Pneumonia, Hx Sleep Apnea, Other Respiratory 

Problems/Disorders - acute respipatory failure with hypoxia - dec 2016


   Denies: Hx Lung Cancer, Hx Pulmonary Embolism - pt says no, although 

documented in char


GI History: Reports: Hx Gall Bladder Disease, Hx Gastroesophageal Reflux Disease

, Other GI Disorders - GASTROPARESIS - TAKING OMEPRAZOLE, reglan and zofran


   Denies: Hx Gastrointestinal Bleed, Hx Ulcer, Hx Urosepsis


 History: Reports: Hx Acute Renal Failure, Hx Chronic Renal Failure - ESRD on 

PD, Hx Dialysis - PERITONEAL, Hx Kidney Stones, Hx Renal Disease - ESRD on home 

peritoneal dialysis , Other  Problems/Disorders


Musculoskeletal History: Reports: Hx Arthritis - back, hips, Hx Back Problems - 

Sciatica and Herniated L3 disk, Other Musculoskeletal History - partial 

amputation of toes/foot


Sensory History: Reports: Hx Contacts or Glasses, Hx Eye Prosthesis - L eye, Hx 

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


   Denies: Hx Hearing Aid


Opthamlomology History: Reports: Hx Contacts or Glasses, Hx Eye Prosthesis - L 

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


Neurological History: Reports: Other Neuro Impairments/Disorders - neuropathy


   Denies: Hx Transient Ischemic Attacks (TIA)


Psychiatric History: Reports: Hx Anxiety, Hx Depression, Hx Bipolar Disorder


   Denies: Hx Eating Disorder, Hx Panic Disorder, Hx Schizophrenia, Hx of 

Violent Episodes Against Others





- Cancer History


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


Hx Chemotherapy: No


Hx Radiation Therapy: No





- Surgical History


Surgery Procedure, Year, and Place: 2 stents in legs (r sfa bare metal stent)(r 

a/c popliteal bare metal stent) 4-24-17.  left eye removed, toes removed right, 

heart cath, abdominal catheters for dialysis, 6 times melanoma removed.


Hx Anesthesia Reactions: No





- Immunization History


Date of Tetanus Vaccine: utd


Date of Influenza Vaccine: 10/2017


Infectious Disease History: No


Infectious Disease History: Reports: Hx of Known/Suspected MRSA - MRSA R 1st toe

, History Other Infectious Disease - GANGRENE


   Denies: Hx Clostridium Difficile, Hx Hepatitis, Hx Human Immunodeficiency 

Virus (HIV), Hx Shingles, Hx Tuberculosis, Traveled Outside the [place] in Last 30 

Days





- Family History


Known Family History: Positive: Cardiac Disease - father MI at 41 y/o, 

Hypertension, Other - Positive for bipolar and depression. Completed suicide to 

sister.  





- Social History


Alcohol Use: None


Hx Substance Use: No


Substance Use Type: Reports: None


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


Hx Tobacco Use: Yes


Smoking Status (MU): Heavy Every Day Tobacco Smoker


Type: Cigarettes


Amount Used/How Often: 1 PPD


Length of Time of Smoking/Using Tobacco: 20 YEARS


Have You Smoked in the Last Year: Yes





Review of Systems


Negative: Shortness Of Breath


Positive: Nausea - mild, Other - rectal bleeding and pain, melena


Positive: Myalgia - buttocks, thighs, low back, Other - right hand cold and 

black


All Other Systems Reviewed And Are Negative: Yes





Physical Exam





- Summary


Physical Exam Summary: 


Appearance: well appearing, no pain distress


Skin: warm, dry, mild pallor, right hand pallorous, blackout scars all over 

bilateral legs consistent with her diagnosis of calciphylaxis, large area of 

calciphylaxis on sacrum


Head/face: normal


Eyes: EOMI, VIOLETTA


ENT: mucous membranes moist


Neck: supple, non-tender


Respiratory: breath sounds present, diffuse wheezes


Cardiovascular: tachycardic but regular rhythm, pulses symmetrical 


Abdomen: non-tender, soft, PT catheter


Bowel Sounds: present


Musculoskeletal: normal, strength/ROM intact


Neuro: normal, sensory motor intact, A&Ox3


Rectal: black melanous stool


Triage Information Reviewed: Yes


Vital Signs On Initial Exam: 


 Initial Vitals











Temp Pulse Resp BP Pulse Ox


 


 98.3 F   110   16   164/86   100 


 


 03/26/19 19:50  03/26/19 19:50  03/26/19 19:50  03/26/19 19:50  03/26/19 19:50











Vital Signs Reviewed: Yes





Diagnostics





- Vital Signs


 Vital Signs











  Temp Pulse Resp BP Pulse Ox


 


 03/26/19 19:50  98.3 F  110  16  164/86  100














- Laboratory


Result Diagrams: 


 03/26/19 23:14





 03/26/19 23:14


Lab Statement: Any lab studies that have been ordered have been reviewed, and 

results considered in the medical decision making process.





GIGU Course/Dx





- Course


Course Of Treatment: Nurse's notes reviewed.  This patient was transferred to 

[facility] yesterday and left AMA from there today, immediately 

returning here.  She states that she has had a rule out of vascular occlusion 

in her right upper extremity.  She may be suffering from calciphylaxis of her 

digit.  She has extensive calciphylaxis of her backside and legs.  She is end-

stage and both COPD and ESRD.  She also has GI bleed has been on blood 

thinners.  She will require readmission.  I've talked to her at length about 

considering hospice care given her extremely poor prognosis.  She still has not 

considered this and is difficult to have a conversation with regarding it.





- Diagnoses


Differential Diagnoses - Female: Other - Vascular occlusion, calciphylaxis, 

chronic pain, infection, ischemic colitis


Provider Diagnoses: 


 GI bleed, ESRD on dialysis, COPD (chronic obstructive pulmonary disease)








- Physician Notifications


Discussed Care Of Patient With: Marquis Mercado


Time Discussed With Above Provider: 23:15


Instructed by Provider To: Admit As Inpatient





- Critical Care Time


Critical Care Time: 30-74 min - CCT is EXCLUSIVE of separately billable 

procedures.





Discharge





- Sign-Out/Discharge


Documenting (check all that apply): Patient Departure - Admit


Patient Received Moderate/Deep Sedation with Procedure: No





- Discharge Plan


Condition: Stable


Disposition: ADMITTED TO [facility]





- Billing Disposition and Condition


Condition: STABLE


Disposition: Admitted to [facility]





- Attestation Statements


Document Initiated by Scribe: Yes


Documenting Scribe: Pauly López


Provider For Whom Scribe is Documenting (Include Credential): Abdoul Rodriguez MD


Scribe Attestation: 


Pauly RICARDO scribed for Abdoul Rodriguez MD on 03/27/19 at 0300. 


Scribe Documentation Reviewed: Yes


Provider Attestation: 


The documentation as recorded by the Pauly cardenas accurately reflects the 

service I personally performed and the decisions made by me, Abdoul Rodriguez MD


Status of Scribe Document: Viewed

## 2019-03-26 NOTE — XMS REPORT
Continuity of Care Document (CCD)

 Created on:2019



Patient:Adri Rubin

Sex:Female

:1980

External Reference #:[id number]





Demographics







 Address  [address]



   [address]



   [place] [address]

 

 Home Phone  [phone]

 

 Email Address  [email or web address]

 

 Preferred Language  en

 

 Marital Status  Not  or 

 

 [employer] Affiliation  Unknown

 

 Race  White

 

 Ethnic Group  Not  or 









Author







 Name  Angie Wyman









Support







 Name  Relationship  Address  Phone

 

 Raymundo Solorzano  Unavailable  Unavailable  [phone]

 

 Louise Rubin  Unavailable  Unavailable  [phone]









Care Team Providers







 Name  Role  Phone

 

 Yeni Shanks MD  Primary Care Physician  Unavailable









Payers







 Date  Identification Numbers  Payment Provider  Subscriber

 

 Effective: 2014  Policy Number: [id number]  [place]  Adri Rubin









 Group Number: [id number]  [address]

 

 PayID: [id number]  [place] [address]









 Expires: 2017  Policy Number: [id number]  Medicaid  Louise Rubin









 Group Name: 1 [id number]  [address]

 

 PayID: [id number]  [place] [address]







Advance Directives







 Description

 

 No Information Available







Problems







 Date  Description  Provider  Status

 

 Onset: 06/10/2015  Lumbosacral spondylosis without  Michael Mendez M.D.  
Active



   myelopathy    

 

 Onset: 06/10/2015  Diabetic polyneuropathy  Michael Mendez M.D.  Active

 

 Onset: 2017  Disorder of lung  Lily Pantoja MD  Active

 

 Onset: 2017  Lymphadenopathy  Lily Pantoja MD  Active

 

 Onset: 2017  Disturbance in sleep behavior  Lily Pantoja MD  Active

 

 Onset: 2017  Tobacco user  Lily Pantoja MD  Active

 

 Onset: 2017  Morbid obesity  Lily Pantoja MD  Active







Family History







 Date  Family Member(s)  Observation  Comments

 

   General  Crohn's Disease  

 

   General  Diabetes  

 

   General  Heart Disease  

 

   Father  Coronary Artery Disease (CAD)  

 

   Mother  Diabetes  

 

   Mother  Crohn's Disease  







Social History







 Type  Date  Description  Comments

 

 Birth Sex    Unknown  

 

 Marital Status    Single  

 

 Lives With    Male Partner  

 

 Occupation    Disabled  

 

 ETOH Use    Denies alcohol use  

 

 Recreational Drug Use    Denies Drug Use  

 

 Tobacco Use  Start: Unknown  Patient is a current smoker,  



     smokes every day  

 

 Tobacco Use  Start: Unknown  Heavy tobacco smoker (more than  



     10 cigarettes/day)  

 

 Smoking Status  Reviewed: 19  Heavy tobacco smoker (more than  



     10 cigarettes/day)  

 

 Exercise Type/Frequency    Does not exercise  







Allergies, Adverse Reactions, Alerts







 Date  Description  Reaction  Status  Severity  Comments

 

 06/10/2015  Morphine  does not work  Active    

 

 06/10/2015  Codeine  Nausea and Vomiting  Active    

 

 06/10/2015  Augmentin  Urticaria  Active    

 

 2016  Tape    Active    

 

 2016  Bactrim    Active    

 

 2017  Heparin    Active    excessive clotting

 

 2017  Flagyl    Active    stomach upset,vomiting,



           and abd pain.







Medications







 Medication  Date  Status  Form  Strength  Qnty  SIG  Indications  Ordering



                 Provider

 

 Ciprofloxacin  20  Active  Tablets  500mg  14tab  1 Tab PO    [place]



 HCL  19        s  Q 12H    B.



                 EcKISHA graves

 

 Gabapentin  20  Active  Capsules  300mg    1 by    Unknown



   17          mouth    



             three    



             times a    



             day    

 

 Albuterol  20  Active  Nebulizer  (2.5mg/3M    1 vial    Unknown



 Sulfate  17      L) 0.083%    via    



             nebulize    



             r 4    



             times    



             daily as    



             needed    

 

 Novolog Mix  06/10/20  Active  Supn  (70-30)10  1unit  sliding    Michael CRUZ



 70/30 Prefilled  15      0Unit/ML  s  scale    Frances Mendez M.D.

 

 Omeprazole    Active  Capsules  20mg    1 by    Unknown



   00    DR      mouth    



             every    



             day    

 

 Lantus Solostar    Active  Solution  100Unit/M    80 u hs    Unknown



   00    Pen-Inject  L        

 

 Calcitriol    Active  Capsules  0.25mcg    1 by    Unknown



   00          mouth    



             every    



             day    

 

 Losartan    Active  Tablets  100mg    1 by    Unknown



 Potassium  00          mouth    



             every    



             day    

 

 Aspir-Low    Active  Tablets DR  81mg    1 by    Unknown



   00          mouth    



             every    



             day    

 

 Coumadin    Active  Tablets  2.5    4 po    Unknown



   00          daily 2    



             days a    



             week and    



             3 tabs    



             po on    



             thee    



             other    



             days    

 

 Methadone HCL    Active  Tablets  5mg    Take One    Unknown



   00          To Two    



             Tablets    



             By Mouth    



             Every 6    



             Hours as    



             Needed    



             For Pain    



              Max    

 

 Promethazine HCL    Active  Tablets  25mg    1 by    Unknown



   00          mouth    



             every 8    



             hours as    



             needed    



             nausea    



             with    



             headache    

 

 Sertraline HCL    Active  Tablets  25mg    1 by    Unknown



   00          mouth    



             every    



             day    

 

 Bethanechol    Active  Tablets  25mg    take 3    Unknown



 Chloride  00          tablets    



             by mouth    



             4 times    



             a day    

 

 Probiotic    Active  Capsules      1 by    Unknown



   00          mouth    



             every    



             day    

 

                 

 

 Levofloxacin  09/15/20  Hx  Tablets  500mg  14tab  1 by  I73.9  Ivan



   17 -        s  mouth    Nik,



   10/24/20          every    M.D.



   17          day    

 

 Hydromorphone  20  Hx  Tablets  2mg  60tab  take    Ivan



 HCL  17 -        s  one-two    Nik,



   20          every    M.D.



   17          four    



             hours as    



             needed    



             for    



             breaktho    



             ugh pain    

 

 Dilaudid  20  Hx  Tablets  2mg  30tab  1 tabs  M86.171  Ivan



   17 -        s  by mouth    Nik,



   20          every 6    M.D.



   17          hours as    



             needed    



             pain    

 

 Levofloxacin  20  Hx  Tablets  500mg  11tab  1 tab by    Ivan



   17 -        s  mouth q    Nik,



   20          48 hrs    M.D.



   17              

 

 Metronidazole  20  Hx  Tablets  500mg  63tab  1 tab po    Ivan



   17 -        s  tid x21    Nik,



   20          days    M.D.



   17              

 

 Hydromorphone  20  Hx  Tablets  2mg  60tab  1 tab po    Ivan



 HCL  17 -        s  q6h prn    Nik,



   20          pain    M.D.



   17              

 

 Cyclobenzaprine  20  Hx  Tablets  10mg    one by    Unknown



 HCL  17 -          mouth    



   Unknown          three    



             times a    



             day as    



             needed    



             spasm    

 

 Zofran  20  Hx  Tablets  4mg    1 tab by    Unknown



   17 -          mouth    



   Unknown          every 6    



             hours as    



             needed    



             nausea    

 

 Fish Oil  20  Hx  Capsules  1000mg    1 by    Unknown



 Concentrate  17 -          mouth    



   10/24/20          twice a    



   17          day    

 

 Percocet  10/17/20  Hx  Tablets  5-325mg  15tab  1-2 tabs    Yesica Sun



   16 -        s  by mouth    MD Roger



   20          every 4    



   17          hours as    



             needed    



             for pain    

 

 Cephalexin  10/03/20  Hx  Tablets  250mg  20tab  1 Tablet  L03.313  Charles CLARK



   16 -        s  Every 6    MD Samira,



   Unknown          HRS For    [facility]



             5 Days    

 

 Oxycodone HCL  20  Hx  Tablets  5mg  30tab  1 tab by    Lali



   16 -        s  mouth    Wale,



   20          every    NP



   16          4-6hrs    



             as    



             needed    



             pain    

 

 Bactrim DS  20  Hx  Tablets  800-160mg  30tab  1 by    Ivan



   16 -        s  mouth    Nik,



   20          twice a    M.D.



   16          day    

 

 Bactrim DS  20  Hx  Tablets  800-160mg  40tab  1 by  E10.621  Ivan



   16 -        s  mouth    Nik,



   20          twice a    M.D.



   16          day    

 

 Sertraline HCL  06/10/20  Hx  Tablets  50mg  90tab  1 /2    Michael CRUZ



   15 -        s  by mouth    [place]ack,



   Unknown          every    M.D.



             day    

 

 Valacyclovir HCL  20  Hx  Tablets  500mg  90tab  Take 1    Varghese



   15 -        s  Tablet    KISHA Turner



   06/10/20          Daily    



   15              

 

 Gabapentin    Hx  Capsules  300mg    take 1    Unknown



   00 -          capsule    



   06/10/20          by mouth    



   15          twice a    



             day    

 

 Mupirocin    Hx  Ointment  2%        Unknown



    -              



   06/10/20              



   15              

 

 Promethazine HCL    Hx  Syrup  6.25mg/5M    prn    Unknown



   00 -      L        



   Unknown              

 

 Amlodipine    Hx  Tablets  10mg    1 by    Unknown



 Besylate  00 -          mouth    



   Unknown          every    



             day    

 

 Bisoprolol    Hx  Tablets  10mg    1 by    Unknown



 Fumarate  00 -          mouth    



   Unknown          every    



             day    

 

 Torsemide    Hx  Tablets  20mg    1 by    Unknown



   00 -          mouth    



   10/24/20          bid    



   17              

 

 Atorvastatin    Hx  Tablets  80mg    1 by    Unknown



 Calcium  00 -          mouth    



   Unknown          every    



             day    

 

 Ciprofloxacin    Hx  Tablets  500mg    1 by    Unknown



 HCL  00 -          mouth    



   Unknown          twice a    



             day    

 

 Magnesium    Hx  Capsules  400mg    1 by    Unknown



   00 -          mouth    



   Unknown          bid    

 

 Clindamycin HCL    Hx  Capsules          Unknown



   00 -              



   Unknown              

 

 Eliquis    Hx  Tablets  5mg    1 by    Unknown



   00 -          mouth    



   20          twice a    



   17          day    

 

 Bactrim DS    Hx  Tablets  800-160mg    1 by    Unknown



   00 -          mouth    



   20          twice a    



   17          day    

 

 Chantix    Hx  Tablets  0.5mg    as    Unknown



   00 -          directed    



   10/24/20              



   17              

 

 Doxycycline    Hx  Tablets  100mg    Take One    Unknown



 Hyclate  00 -          Tablet    



   Unknown          By Mouth    



             Twice A    



             Day    







Immunizations







 Description

 

 No Information Available







Vital Signs







 Date  Vital  Result  Comment

 

 2019  2:14pm  Height  65 inches  5'5"









 Weight  210.00 lb  

 

 Heart Rate  76 /min  

 

 BP Systolic  140 mmHg  

 

 BP Systolic Sitting  82 mmHg  

 

 Respiratory Rate  18 /min  

 

 Body Temperature  99.0 F  

 

 BMI (Body Mass Index)  34.9 kg/m2  









 10/25/2017  2:58pm  Heart Rate  96 /min  









 BP Systolic Sitting  118 mmHg  

 

 BP Diastolic Sitting  88 mmHg  

 

 Respiratory Rate  20 /min  

 

 Pain Level  0  









 09/15/2017  9:42am  Heart Rate  96 /min  









 BP Systolic  108 mmHg  

 

 BP Diastolic  70 mmHg  

 

 Body Temperature  96.9 F  









 2017  2:47pm  Height  65 inches  5'5"









 Heart Rate  106 /min  

 

 BP Systolic Sitting  104 mmHg  

 

 BP Diastolic Sitting  68 mmHg  

 

 Respiratory Rate  12 /min  no respiratory distress

 

 Body Temperature  99.8 F  

 

 Pain Level  5  

 

 O2 % BldC Oximetry  95 %  









 2017 11:51am  Height  65 inches  5'5"









 Heart Rate  93 /min  

 

 BP Systolic  110 mmHg  

 

 BP Diastolic  88 mmHg  

 

 Respiratory Rate  20 /min  

 

 Pain Level  7  

 

 O2 % BldC Oximetry  90 %  no difficulties breathing.









 2017  9:22am  Height  65 inches  5'5"









 Weight  202.00 lb  

 

 Heart Rate  106 /min  

 

 BP Systolic  160 mmHg  

 

 BP Diastolic  100 mmHg  

 

 Body Temperature  97.6 F  

 

 BMI (Body Mass Index)  33.6 kg/m2  









 2017  3:09pm  Height  65 inches  5'5"









 Weight  202.00 lb  

 

 Heart Rate  68 /min  

 

 BP Systolic Sitting  120 mmHg  

 

 BP Diastolic Sitting  68 mmHg  

 

 Respiratory Rate  18 /min  

 

 Pain Level  2  

 

 BMI (Body Mass Index)  33.6 kg/m2  









 2017  3:43pm  Height  65 inches  5'5"









 Weight  203.00 lb  

 

 Heart Rate  78 /min  

 

 BP Systolic  116 mmHg  

 

 BP Diastolic  62 mmHg  

 

 Respiratory Rate  16 /min  

 

 Pain Level  1  

 

 BMI (Body Mass Index)  33.8 kg/m2  









 2017  3:15pm  Height  65 inches  5'5"









 Heart Rate  116 /min  has appt to see cardiologist soon

 

 BP Systolic  102 mmHg  

 

 BP Diastolic  82 mmHg  

 

 Respiratory Rate  20 /min  

 

 Pain Level  6  









 2017 10:54am  Height  65 inches  5'5"









 Heart Rate  76 /min  

 

 BP Systolic  134 mmHg  

 

 BP Diastolic  82 mmHg  

 

 Respiratory Rate  16 /min  

 

 Body Temperature  97.9 F  

 

 Pain Level  7  









 2017  1:58pm  Height  65 inches  5'5"









 Weight  200.00 lb  patient stated

 

 Heart Rate  120 /min  

 

 BP Systolic Sitting  114 mmHg  

 

 BP Diastolic Sitting  66 mmHg  

 

 Respiratory Rate  16 /min  

 

 Pain Level  5  

 

 BMI (Body Mass Index)  33.3 kg/m2  









 2017  2:05pm  Height  65 inches  5'5"









 Weight  240.00 lb  

 

 BP Systolic  114 mmHg  

 

 BP Diastolic  80 mmHg  

 

 Body Temperature  97.8 F  

 

 BMI (Body Mass Index)  39.9 kg/m2  









 2017  2:31pm  Height  65 inches  5'5"









 Weight  240.00 lb  

 

 Heart Rate  110 /min  

 

 BP Systolic Sitting  80 mmHg  

 

 BP Diastolic Sitting  48 mmHg  

 

 Respiratory Rate  19 /min  

 

 Pain Level  3  

 

 O2 % BldC Oximetry  95 %  Ra

 

 BMI (Body Mass Index)  39.9 kg/m2  









 2017  1:32pm  Heart Rate  66 /min  









 BP Systolic  108 mmHg  

 

 BP Diastolic  68 mmHg  

 

 Respiratory Rate  18 /min  

 

 Body Temperature  97.4 F  









 2017  2:52pm  Height  65 inches  5'5"









 Weight  240.00 lb  

 

 Heart Rate  91 /min  

 

 BP Systolic  132 mmHg  

 

 BP Diastolic  88 mmHg  

 

 Respiratory Rate  14 /min  

 

 O2 % BldC Oximetry  96 %  

 

 BMI (Body Mass Index)  39.9 kg/m2  









 11/10/2016  2:01pm  Height  65 inches  5'5"









 Weight  240.00 lb  

 

 Heart Rate  80 /min  

 

 Respiratory Rate  18 /min  

 

 Body Temperature  98.7 F  

 

 BMI (Body Mass Index)  39.9 kg/m2  









 10/27/2016 12:27pm  Heart Rate  84 /min  









 Respiratory Rate  18 /min  

 

 Body Temperature  98.9 F  









 10/17/2016  2:04pm  Height  65 inches  5'5"









 Weight  240.00 lb  

 

 Heart Rate  96 /min  

 

 BP Systolic Sitting  128 mmHg  

 

 BP Diastolic Sitting  78 mmHg  

 

 Respiratory Rate  18 /min  

 

 Body Temperature  97.9 F  

 

 BMI (Body Mass Index)  39.9 kg/m2  









 10/03/2016 11:52am  Heart Rate  90 /min  









 BP Systolic  140 mmHg  

 

 BP Diastolic  90 mmHg  

 

 Respiratory Rate  16 /min  

 

 Body Temperature  97.7 F  









 2016 11:10am  Height  65 inches  5'5"









 Weight  245.00 lb  

 

 BMI (Body Mass Index)  40.8 kg/m2  









 2016  3:09pm  Height  65 inches  5'5"









 Weight  245.00 lb  

 

 Heart Rate  83 /min  

 

 BP Systolic  144 mmHg  

 

 BP Diastolic  87 mmHg  

 

 BMI (Body Mass Index)  40.8 kg/m2  









 2016  1:02pm  Height  65 inches  5'5"









 Weight  245.00 lb  

 

 Pain Level  0  

 

 BMI (Body Mass Index)  40.8 kg/m2  









 2016  4:19pm  Height  65 inches  5'5"









 Weight  245.00 lb  

 

 Body Temperature  97.9 F  

 

 Pain Level  0  

 

 BMI (Body Mass Index)  40.8 kg/m2  









 2016  4:00pm  Height  65 inches  5'5"









 Weight  245.00 lb  

 

 Body Temperature  98.6 F  

 

 Pain Level  0  

 

 BMI (Body Mass Index)  40.8 kg/m2  









 2016 11:03am  Weight  245.00 lb  

 

 2016  9:38am  Height  65 inches  5'5"









 Weight  245.00 lb  

 

 Heart Rate  78 /min  

 

 BP Systolic Sitting  130 mmHg  

 

 BP Diastolic Sitting  90 mmHg  

 

 BMI (Body Mass Index)  40.8 kg/m2  









 06/10/2015  9:24am  Height  65 inches  5'5"









 Weight  234.00 lb  

 

 Heart Rate  82 /min  

 

 BP Systolic Sitting  140 mmHg  

 

 BP Diastolic Sitting  90 mmHg  

 

 Pain Level  5  back/trent leg

 

 BMI (Body Mass Index)  38.9 kg/m2  







Results







 Test  Date  Facility  Test  Result  H/L  Range  Note

 

 Laboratory test  2019  [facility]  Lactic Acid  1.7 mmol/L  N
  0.5-2.0  1



 finding    [address]          



     [place] [address] [phone]          

 

 CBC Auto Diff  2017  [facility]  White Blood  12.0 10^3/uL  
High  3.5-10.8  



     [address]  Count        



     [place] [address] [phone]          









 Red Blood Count  4.44 10^6/uL  N  4.0-5.4  

 

 Hemoglobin  13.3 g/dL  N  12.0-16.0  

 

 Hematocrit  41 %  N  35-47  

 

 Mean Corpuscular Volume  92 fL  N  80-97  

 

 Mean Corpuscular Hemoglobin  30 pg  N  27-31  

 

 Mean Corpuscular HGB Conc  32 g/dL  N  31-36  

 

 Red Cell Distribution Width  17 %  High  10.5-15  

 

 Platelet Count  267 10^3/uL  N  150-450  

 

 Mean Platelet Volume  9 um3  N  7.4-10.4  

 

 Abs Neutrophils  9.7 10^3/uL  High  1.5-7.7  

 

 Abs Lymphocytes  1.2 10^3/uL  N  1.0-4.8  

 

 Abs Monocytes  0.6 10^3/uL  N  0-0.8  

 

 Abs Eosinophils  0.4 10^3/uL  N  0-0.6  

 

 Abs Basophils  0.1 10^3/uL  N  0-0.2  

 

 Abs Nucleated RBC  0 10^3/uL  N    

 

 Granulocyte %  80.4 %  N  38-83  

 

 Lymphocyte %  10.0 %  Low  25-47  

 

 Monocyte %  5.3 %  N  1-9  

 

 Eosinophil %  3.1 %  N  0-6  

 

 Basophil %  1.2 %  N  0-2  

 

 Nucleated Red Blood Cells %  0  N    









 Inr/Protime  2017  [facility]  Inr  0.90  N  0.89-1.11  



     [address]          



     [place] [address] [phone]          

 

 Laboratory test  2017  [facility]  Partial  30.6  N  26.0-
36.3  



 finding    [address]  Thrombo Time  seconds      



     [place] [address]  PTT        



     [phone]          

 

 Comp Metabolic  2017  [facility]  Sodium  128 mmol/L  Low  133
-145  



 Panel    [address]          



     [place] [address] [phone]          









 Potassium  5.1 mmol/L  High  3.5-5.0  

 

 Chloride  97 mmol/L  Low  101-111  

 

 Co2 Carbon Dioxide  22 mmol/L  N  22-32  

 

 Anion Gap  9 mmol/L  N  2-11  

 

 Glucose  432 mg/dL  High    

 

 Blood Urea Nitrogen  42 mg/dL  High  6-24  

 

 Creatinine  4.69 mg/dL  High  0.51-0.95  

 

 BUN/Creatinine Ratio  9.0  N  8-20  

 

 Calcium  8.8 mg/dL  N  8.6-10.3  

 

 Total Protein  6.4 g/dL  N  6.4-8.9  

 

 Albumin  3.2 g/dL  N  3.2-5.2  

 

 Globulin  3.2 g/dL  N  2-4  

 

 Albumin/Globulin Ratio  1.0  N  1-3  

 

 Total Bilirubin  0.40 mg/dL  N  0.2-1.0  

 

 Alkaline Phosphatase  102 U/L  N    

 

 Alt  9 U/L  N  7-52  

 

 Ast  9 U/L  Low  13-39  

 

 Egfr Non-  10.5  N  >60  

 

 Egfr   13.5  N  >60  2









 Laboratory test  2017  [facility]  C Reactive  6.19 mg/L  
High  < 5.00  3



 finding    [address]  Protein        



     [place] [address]          



     [phone]          









 Lactic Acid  1.5 mmol/L  N  0.5-2.0  4









 Basic Metabolic Panel  10/12/2016  [facility]  Sodium  136 mmol/L  
N  133-145  



     [address]          



     [place], [place] [id number] [phone]          









 Potassium  4.7 mmol/L  N  3.5-5.0  

 

 Chloride  105 mmol/L  N  101-111  

 

 Co2 Carbon Dioxide  25 mmol/L  N  22-32  

 

 Anion Gap  6 mmol/L  N  2-11  

 

 Glucose  231 mg/dL  High    

 

 Blood Urea Nitrogen  36 mg/dL  High  6-24  

 

 Creatinine  3.87 mg/dL  High  0.51-0.95  

 

 BUN/Creatinine Ratio  9.3  N  8-20  

 

 Calcium  9.0 mg/dL  N  8.6-10.3  

 

 Egfr Non-  13.2  N  >60  

 

 Egfr   16.9  N  >60  5









 Laboratory test  10/12/2016  [facility]  Point of Care  245 mg/dL  
High    6



 finding    [address]  Glucose        



     [place] [address]          



     [phone]          

 

 Laboratory test  10/12/2016  [facility]  Point of Care  178 mg/dL  
High    7



 finding    [address]  Glucose        



     [place] [address]          



     [phone]          

 

 Laboratory test  10/12/2016  [facility]  Point of Care  190 mg/dL  
High    8



 finding    [address]  Glucose        



     [place], [place] [address] [phone]          

 

 Laboratory test  10/05/2016  [facility]  Point of Care  220 mg/dL  
High    9



 finding    [address]  Glucose        



     [place] [address]          



     [phone]          

 

 Laboratory test  10/05/2016  [facility]  Potassium  5.9  High  3.5-
5.0  10, 11



 finding    [address]    mmol/L      



     [place], [place] [id number] [phone]          









 HCG Pregnancy  0.88 mIU/mL  N    12









 Wound  2016  [facility]  Wound/Misc  SEE RESULT      13



 Culture/Sensi    [address]  Culture-Gram  BELOW      



     [place] [address]  Stain        



     [phone]          

 

 Laboratory test  2016  [facility]  Anaerobic  SEE RESULT      
14



 finding    [address]  Culture  BELOW      



     [place] [address]          



     [phone]          

 

 Laboratory test  2016  [facility]  Surgical  SEE RESULT      
15



 finding    [address]  Pathology  BELOW      



     [place] [address]          



     [phone]          

 

 Laboratory test  2016  [facility]  Point of Care  280 mg/dL  
High  74-1  16



 finding    [address]  Glucose      06  



     [place] [address]          



     [phone]          

 

 Laboratory test  2016  [facility]  HCG Pregnancy  0.66  N    
17



 finding    [address]    mIU/mL      



     [place] [address]          



     [phone]          

 

 Laboratory test  2016  [facility]  Point of Care  242 mg/dL  
High  74-1  18



 finding    [address]  Glucose      06  



     [place] [address]          



     [phone]          









 1  [facility] Severe Sepsis and Septic Shock Management Bundle Measure



   requires all lactic acids initially measuring >2.0 mmol/L be



   repeated.

 

 2  *******Because ethnic data is not always readily available,



   this report includes an eGFR for both -Americans and



   non- Americans.****



   The National Kidney Disease Education Program (NKDEP) does



   not endorse the use of the MDRD equation for patients that



   are not between the ages of 18 and 70, are pregnant, have



   extremes of body size, muscle mass, or nutritional status,



   or are non- or non-.



   According to the National Kidney Foundation, irrespective of



   diagnosis, the stage of the disease is based on the level of



   kidney function:



   Stage Description                      GFR(mL/min/1.73 m(2))



   1     Kidney damage with normal or decreased GFR       90



   2     Kidney damage with mild decrease in GFR          60-89



   3     Moderate decrease in GFR                         30-59



   4     Severe decrease in GFR                           15-29



   5     Kidney failure                       <15 (or dialysis)

 

 3  Acute inflammation:  >10.00

 

 4  [facility] Severe Sepsis and Septic Shock Management Bundle Measure



   requires all lactic acids initially measuring >2.0 mmol/L be



   repeated.

 

 5  *******Because ethnic data is not always readily available,



   this report includes an eGFR for both -Americans and



   non- Americans.****



   The National Kidney Disease Education Program (NKDEP) does



   not endorse the use of the MDRD equation for patients that



   are not between the ages of 18 and 70, are pregnant, have



   extremes of body size, muscle mass, or nutritional status,



   or are non- or non-.



   According to the National Kidney Foundation, irrespective of



   diagnosis, the stage of the disease is based on the level of



   kidney function:



   Stage Description                      GFR(mL/min/1.73 m(2))



   1     Kidney damage with normal or decreased GFR       90



   2     Kidney damage with mild decrease in GFR          60-89



   3     Moderate decrease in GFR                         30-59



   4     Severe decrease in GFR                           15-29



   5     Kidney failure                       <15 (or dialysis)

 

 6  : [id number] ELAINA LONG

 

 7  : [id number] GEMINI OLMOS

 

 8  : [id number] HALIE MONTANA

 

 9  : [id number] Fco Escalera

 

 10  Comment: on arrival preop

 

 11  Comment: on arrival preop

 

 12  <5.0 Negative



   



   5.0 - 25.0  Indeterminate (Repeat testing recommended after



   72 hours)



   >25.0  Positive



   



   Perimenopausal women can display HCG levels of up to 20



   mIU/mL

 

 13  SEE RESULT BELOW



   -----------------------------------------------------------------------------
---------------



   Name:  ADRI RUBIN OTTONIEL                : 1980    Attend Dr: Ivan Zaragoza MD



   Acct:  [id number]  Unit: [id number]  AGE: 35            Location:  OR



   Re16                        SEX: F             Status:    REG [facility]



   -----------------------------------------------------------------------------
---------------



   



   SPEC: [id number]         ROLAN:       [facility] DR: Ivan Zaragoza MD



   REQ:  [id number]              RECD:   16-0



   STATUS: RES              BETHEL DR: Gudelia Staff,Doctor



   David Baker MD



   _



   SOURCE: WOUND          SPDESC:



   ORDERED:  Anaerobic Cult, Culture   Stain



   



   -----------------------------------------------------------------------------
---------------



   Procedure                         Result                         Reported   
        Site



   -----------------------------------------------------------------------------
---------------



   Anaerobic Culture



   PENDING



   



   Wound/Misc Gram Stain  Final                                     16-
1350      ML



   No Neutrophils Observed



   



   No Organisms Seen



   



   Wound/Misc Culture



   PENDING



   



   -----------------------------------------------------------------------------
---------------



   * ML - MAIN LAB ([facility])



   .



   



   



   



   



   



   



   



   



   



   



   



   



   



   



   



   



   



   



   ** END OF REPORT **



   



   * ML=Testing performed at Main Lab



   DEPARTMENT OF PATHOLOGY,  [address] [place] [address]



   Phone # [phone]      Fax #[phone]



   Ashish Beltre M.D. Director     [facility] # [id number]

 

 14  SEE RESULT BELOW



   -----------------------------------------------------------------------------
---------------



   Name:  ADRI RUBIN                : 1980    Attend Dr: Ivan Zaragoza MD



   Acct:  [id number]  Unit: [id number]  AGE: 35            Location:  OR



   Re16                        SEX: F             Status:    REG SDC



   -----------------------------------------------------------------------------
---------------



   



   SPEC: [id number]         ROLAN:       SUBM DR: Ivan Zaragoza MD



   REQ:  [id number]              RECD:   



   STATUS: COMP             OTHR DR: Gudelia Staff,Doctor



   David Baker MD



   _



   SOURCE: WOUND          SPDESC:



   ORDERED:  Anaerobic Cult, Culture   Stain



   



   -----------------------------------------------------------------------------
---------------



   Procedure                         Result                         Reported   
        Site



   -----------------------------------------------------------------------------
---------------



   Anaerobic Culture  Final                                         16-
924      ML



   No Growth Day 4



   



   Wound/Misc Gram Stain  Final                                     16-
1350      ML



   No Neutrophils Observed



   



   No Organisms Seen



   



   Wound/Misc Culture  Final                                        16-
24      ML



   No Growth Day 4



   



   -----------------------------------------------------------------------------
---------------



   * ML - MAIN LAB ([facility])



   .



   



   



   



   



   



   



   



   



   



   



   



   



   



   



   



   



   



   



   ** END OF REPORT **



   



   * ML=Testing performed at Main Lab



   DEPARTMENT OF PATHOLOGY,  [address] [place] [address]



   Phone # [phone]      Fax #[phone]



   Ashish Beltre M.D. Director     [facility] # [id number]

 

 15  SEE RESULT BELOW



   -----------------------------------------------------------------------------
---------------



   Name:  ADRI RUBIN                : 1980    Attend Dr: Ivan Zaragoza MD



   Acct:  [id number]  Unit: [id number]  AGE: 35            Location:  OR



   Re16                        SEX: F             Status:    REG [facility]



   -----------------------------------------------------------------------------
---------------



   



   SPEC: [id number]             ROLAN:       [facility] DR: Ivan Zaragoza MD



   REQ:  [id number]             RECD: 



   STATUS: SOUT



   _



   ORDERED:  Decal, LEVEL IV



   



   FINAL DIAGNOSIS



   



   



   Right great toe, amputation:



   -- Acral skin and subcutaneous tissue with chronic ischemic changes and 
focal ulceration.



   -- Chronic osteomyelitis



   -- Skin, subcutaneous tissue and bone at surgical margin viable.



   



   



   



   



   PRE-OPERATIVE DIAGNOSIS



   



   Type 2 diabetes mellitus with foot ulcer.



   



   GROSS DESCRIPTION



   



   The specimen is received in formalin labeled, Right Great Toe, and consists 
of a 4.6 x 3.5 x



   3.3 cm disarticulated digit.  The margin of resection displays glistening 
smooth tan-white



   articular surface and tan-pink soft tissue.  The skin margin appears viable.
  There is a 1.8



   x 0.1 cm tan-red linear defect on the plantar surface, 0.9 cm from the skin 
margin.  The



   surrounding skin is irregular and mottled tan-red.  The remaining skin is 
smooth to wrinkled



   tan-white.  Received separately in the same container is a 2.0 x 1.4 x 1.4 
cm tan-pink



   irregular bone fragment with attached soft tissue.  The fragment is 
partially surfaced by



   glistening smooth articular surface.  Representative sections are submitted 
in cassettes A



   and B to include lesion and inked skin margin in cassette A and bone 
following



   decalcification in cassette B.



   



   MICROSCOPIC DESCRIPTION



   



   



   Signed __________(signature on file)___________ Ashish Beltre MD  1152



   



   -----------------------------------------------------------------------------
---------------



   



   



   



   ** END OF REPORT **



   



   * ML=Testing performed at Main Lab



   DEPARTMENT OF PATHOLOGY,  [address] [place] [address]



   Phone # [phone]      Fax #[phone]



   Ashish Beltre M.D. Director     [facility] # [id number]

 

 16  : [id number] Escalera

 

 17  <5.0 Negative



   



   5.0 - 25.0  Indeterminate (Repeat testing recommended after



   72 hours)



   >25.0  Positive



   



   Perimenopausal women can display HCG levels of up to 20



   mIU/mL

 

 18  : [id number] GROSS COREY







Procedures







 Date  Code  Description  Status

 

 2019  43791  ECHO Transthorasic Realtime 2D W Doppler & Color Flow Hosp  
Completed

 

 2019  34660  EKG, Interpretation Only  Completed

 

 2019  75657  I&D Of Abscess Complicated  Completed

 

 2018  63635  ECHO Transthorasic Realtime 2D W Doppler & Color Flow Hosp  
Completed

 

 2017  79338  EKG, Interpretation Only  Completed

 

 2017  38291  Walking Cast  Completed

 

 2017  34299  Amputation Foot Transmetatarsal  Completed

 

 2017  55115  Amputation Foot Transmetatarsal  Completed

 

 2017  43127  EKG, Interpretation Only  Completed

 

 2017  17236  EKG, Interpretation Only  Completed

 

 2017  92240  Nerve Conduction 05-06 Studies  Completed

 

 2016  16237  ECHO Transthorasic Realtime 2D W Doppler & Color Flow Hosp  
Completed

 

 11/10/2016  72361  Removal Of Tunneled Central Venous Cath W/O Subcutaneous  
Completed



     Port/  

 

 10/12/2016  93978  Lap W/Insert Intraperitoneal Cannula Or Catheter Permanent  
Completed

 

 2016  22014  EKG, Interpretation Only  Completed

 

 2016  80619  Treadmill Interp/Report Only  Completed

 

 2016  55925  Stress Test Supervsn W/Out I/R  Completed

 

 2016  57520  EKG, Interpretation Only  Completed

 

 2016  77535  Amputation Toe MP JT  Completed

 

 2016  69903  Amputation Toe MP JT  Completed

 

 2016  75864  EKG, Interpretation Only  Completed

 

 2015  78960  EKG, Interpretation Only  Completed







Encounters







 Type  Date  Location  Provider  Dx  Diagnosis

 

 Office Visit  2019  [facility]  Zenon Wills  J96.01  Acute 
respiratory



   9:06a  martha Starr MD    failure with



     Hospitalists      hypoxia









 J18.9  Pneumonia, unspecified organism

 

 E11.42  Type 2 diabetes mellitus with diabetic polyneuropathy

 

 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

 

 N18.6  End stage renal disease

 

 Z99.2  Dependence on renal dialysis

 

 D63.1  Anemia in chronic kidney disease









 Office Visit  2019  [facility] G[facility] Wills  J44.0  Chronic



   9:05a  martha Starr MD    obstructive



     Hospitalists      pulmon disease w



           acute lower resp



           infct









 J18.9  Pneumonia, unspecified organism

 

 J44.1  Chronic obstructive pulmonary disease w (acute) exacerbation

 

 E11.42  Type 2 diabetes mellitus with diabetic polyneuropathy

 

 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

 

 N18.6  End stage renal disease

 

 Z99.2  Dependence on renal dialysis

 

 D63.1  Anemia in chronic kidney disease









 Office Visit  2019  9:05a  [facility]  Teresita  J96.01  Acute 
respiratory



     Assoc,pc  Randy, DO    failure with



     Hospitalists      hypoxia









 J18.9  Pneumonia, unspecified organism

 

 E11.42  Type 2 diabetes mellitus with diabetic polyneuropathy

 

 D63.1  Anemia in chronic kidney disease

 

 G89.29  Other chronic pain

 

 R79.1  Abnormal coagulation profile

 

 N18.6  End stage renal disease

 

 Z99.2  Dependence on renal dialysis









 Office Visit  2019  9:04a  [facility]  Neal  J44.0  Chronic



     Assoc,martha Mathew M.D.    obstructive



     Hospitalists      pulmon disease w



           acute lower resp



           infct









 G89.29  Other chronic pain

 

 E11.65  Type 2 diabetes mellitus with hyperglycemia

 

 N18.6  End stage renal disease

 

 Z99.2  Dependence on renal dialysis

 

 F17.200  Nicotine dependence, unspecified, uncomplicated









 Office Visit  2019  9:04a  [facility]  Deandre Shelby  J96.01  Acute 
respiratory



       M.D.    failure with



           hypoxia









 R41.82  Altered mental status, unspecified

 

 Z99.2  Dependence on renal dialysis

 

 N18.6  End stage renal disease









 Office Visit  2019  9:03a  [facility]  Js  J44.1  Chronic



     Assoc,pc  SHELLEY Mayers    obstructive



     Hospitalists      pulmonary disease



           w (acute)



           exacerbation









 I95.9  Hypotension, unspecified

 

 M54.5  Low back pain









 Office Visit  2019  8:44a  [facility]  Miriam Torres NP  R53.1  
Weakness



     Assoc,pc Hospitalists      









 G89.29  Other chronic pain

 

 N18.6  End stage renal disease

 

 Z99.2  Dependence on renal dialysis









 Office Visit  2019  8:43a  [facility]  Linda Cordero  R53.1  
Weakness



     Assoc,martha Rm NP    



     Hospitalists      









 J96.11  Chronic respiratory failure with hypoxia

 

 J44.9  Chronic obstructive pulmonary disease, unspecified

 

 N18.6  End stage renal disease

 

 Z99.2  Dependence on renal dialysis

 

 G89.29  Other chronic pain









 Office Visit  2019  9:22a  [facility]  Marielle Skelton,  R07.9  Chest pain
,



     Assoc,pc  N.P.    unspecified



     Hospitalists      









 A41.9  Sepsis, unspecified organism

 

 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

 

 N18.6  End stage renal disease

 

 J44.9  Chronic obstructive pulmonary disease, unspecified

 

 J96.11  Chronic respiratory failure with hypoxia

 

 I73.9  Peripheral vascular disease, unspecified

 

 E11.51  Type 2 diabetes w diabetic peripheral angiopath w/o gangrene

 

 Z99.2  Dependence on renal dialysis









 Office Visit  2019  [facility]  Abeba  A04.72  Enterocolitis d/t



   9:35a  Assoc,pc  Root, DO    Clostridium



     Hospitalists      difficile, not spcf



           as recur









 J18.9  Pneumonia, unspecified organism

 

 J44.1  Chronic obstructive pulmonary disease w (acute) exacerbation

 

 N18.6  End stage renal disease

 

 Z99.2  Dependence on renal dialysis

 

 J96.11  Chronic respiratory failure with hypoxia

 

 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

 

 Z79.4  Long term (current) use of insulin









 Office Visit  2019  9:35a  [facility]  J18.9  Pneumonia,



     Assoc,[facility] [place],    unspecified



     Hospitalists  NP    organism









 J96.21  Acute and chronic respiratory failure with hypoxia

 

 E11.649  Type 2 diabetes mellitus with hypoglycemia without coma

 

 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

 

 N18.6  End stage renal disease

 

 A04.72  Enterocolitis d/t Clostridium difficile, not spcf as recur

 

 I73.9  Peripheral vascular disease, unspecified

 

 I12.0  Hyp chr kidney disease w stage 5 chr kidney disease or Esrd









 Office Visit  2019  9:35a  [facility]  J18.9  Pneumonia,



     Assoc,pc  [facility] [place],    unspecified



     Hospitalists  NP    organism









 J96.21  Acute and chronic respiratory failure with hypoxia

 

 E11.649  Type 2 diabetes mellitus with hypoglycemia without coma

 

 N18.6  End stage renal disease

 

 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

 

 I73.9  Peripheral vascular disease, unspecified

 

 I12.0  Hyp chr kidney disease w stage 5 chr kidney disease or Esrd

 

 A04.72  Enterocolitis d/t Clostridium difficile, not spcf as recur









 Office Visit  2019  9:34a  [facility]  J18.9  Pneumonia,



     Assoc,pc  [facility] [place],    unspecified



     Hospitalists  NP    organism









 J96.11  Chronic respiratory failure with hypoxia

 

 E11.649  Type 2 diabetes mellitus with hypoglycemia without coma

 

 J44.9  Chronic obstructive pulmonary disease, unspecified

 

 I73.9  Peripheral vascular disease, unspecified

 

 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

 

 N18.6  End stage renal disease

 

 Z99.2  Dependence on renal dialysis

 

 I12.0  Hyp chr kidney disease w stage 5 chr kidney disease or Esrd

 

 A04.72  Enterocolitis d/t Clostridium difficile, not spcf as recur

 

 L02.31  Cutaneous abscess of buttock









 Office Visit  2019  9:34a  [facility]  Kaylee  J18.9  Pneumonia,



     Assoc,[facility] [place],    unspecified



     Hospitalists  NP    organism









 A41.9  Sepsis, unspecified organism

 

 E11.649  Type 2 diabetes mellitus with hypoglycemia without coma

 

 J96.11  Chronic respiratory failure with hypoxia

 

 J44.9  Chronic obstructive pulmonary disease, unspecified

 

 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

 

 I73.9  Peripheral vascular disease, unspecified

 

 I12.0  Hyp chr kidney disease w stage 5 chr kidney disease or Esrd

 

 N18.6  End stage renal disease

 

 A04.72  Enterocolitis d/t Clostridium difficile, not spcf as recur









 [facility]  J[facility]  Pneumonia,



     Assoc,martha Carrasco MD    unspecified



     Hospitalists      organism









 E87.2  Acidosis

 

 N18.6  End stage renal disease

 

 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

 

 J96.11  Chronic respiratory failure with hypoxia

 

 J44.1  Chronic obstructive pulmonary disease w (acute) exacerbation

 

 I73.9  Peripheral vascular disease, unspecified

 

 E11.51  Type 2 diabetes w diabetic peripheral angiopath w/o gangrene









 [facility]  2019  2[facility]49p  [facility]  J18.9  Pneumonia,



     Assoc,martha Carrasco MD    unspecified



     Hospitalists      organism









 J44.9  Chronic obstructive pulmonary disease, unspecified

 

 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

 

 N18.6  End stage renal disease

 

 I73.9  Peripheral vascular disease, unspecified

 

 E11.51  Type 2 diabetes w diabetic peripheral angiopath w/o gangrene

 

 Z99.2  Dependence on renal dialysis









 Office Visit  2019  3:27p  [facility]  L02.31  Cutaneous



     Assoc,pc  MD Danilo    abscess of



     Hospitalists      buttock









 J44.9  Chronic obstructive pulmonary disease, unspecified

 

 N18.6  End stage renal disease

 

 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

 

 J96.10  Chronic respiratory failure, unsp w hypoxia or hypercapnia









 Office Visit  2019  7:00a  S[facility]  Ryan FRIEDMAN  L02.31  Cutaneous



     [facility]  [place]    abscess of



     C[facility]      buttock









 K60.3  Anal fistula









 Office Visit  2019  3:27p  [facility]  R07.9  Chest pain,



     Assoc,pc  MD Danilo    unspecified



     Hospitalists      









 R06.02  Shortness of breath

 

 J44.9  Chronic obstructive pulmonary disease, unspecified

 

 N18.6  End stage renal disease

 

 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

 

 R03.0  Elevated blood-pressure reading, w/o diagnosis of htn

 

 I73.9  Peripheral vascular disease, unspecified

 

 D72.829  Elevated white blood cell count, unspecified

 

 E11.51  Type 2 diabetes w diabetic peripheral angiopath w/o gangrene

 

 J96.10  Chronic respiratory failure, unsp w hypoxia or hypercapnia

 

 Z99.2  Dependence on renal dialysis

 

 F17.210  Nicotine dependence, cigarettes, uncomplicated

 

 Z79.4  Long term (current) use of insulin









 Office Visit  2018 10:37a  [facility]  R10.9  Unspecified



     Assoc,pc  KISHA Carvalho    abdominal pain



     Hospitalists      









 R50.9  Fever, unspecified

 

 N18.6  End stage renal disease

 

 Z99.2  Dependence on renal dialysis

 

 E11.43  Type 2 diabetes w diabetic autonomic (poly)neuropathy

 

 K31.84  Gastroparesis

 

 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

 

 Z79.4  Long term (current) use of insulin









 Office Visit  2018 10:36a  [facility]  Marielle Skelton,  R10.9  
Unspecified



     Assoc,pc  N.P.    abdominal pain



     Hospitalists      









 R50.9  Fever, unspecified

 

 D72.829  Elevated white blood cell count, unspecified

 

 E87.79  Other fluid overload

 

 E11.9  Type 2 diabetes mellitus without complications

 

 Z99.2  Dependence on renal dialysis

 

 Z79.4  Long term (current) use of insulin

 

 N18.6  End stage renal disease









 Office Visit  2018  [facility]  Dahiana  J96.21  Acute and chronic



   9:16a  Assoc,martha Preston NP    respiratory



     Hospitalists      failure with



           hypoxia









 R07.9  Chest pain, unspecified

 

 N18.6  End stage renal disease

 

 Z99.2  Dependence on renal dialysis









 Office Visit  01/15/2018  6:52a  [facility]  Virginie  R11.2  Nausea with



     Assoc,martha Barbour D.O.    vomiting,



     Hospitalists      unspecified









 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

 

 N18.6  End stage renal disease

 

 I10  Essential (primary) hypertension









 Office Visit  2017 10:18a  [facility]  Js  R10.13  Epigastric 
pain



     Assoc,pc  [place], [place]    



     Hospitalists      









 D72.829  Elevated white blood cell count, unspecified

 

 E11.9  Type 2 diabetes mellitus without complications

 

 Z79.4  Long term (current) use of insulin









 Office Visit  2017 10:18a  [facility]  Js  R10.13  Epigastric 
pain



     Assoc,martha Mayers, [place]    



     Hospitalists      









 D72.829  Elevated white blood cell count, unspecified

 

 E11.9  Type 2 diabetes mellitus without complications

 

 Z79.4  Long term (current) use of insulin









 Office Visit  2017 10:17a  [facility]  Marielle Costa,  R10.13  
Epigastric pain



     Assoc,pc  N.P.    



     Hospitalists      









 D72.829  Elevated white blood cell count, unspecified

 

 E11.9  Type 2 diabetes mellitus without complications

 

 Z79.4  Long term (current) use of insulin









 Office Visit  2017  8:02a  [facility]  Neal  J96.01  Acute 
respiratory



     Assoc,martha Mathew M.D.    failure with



     Hospitalists      hypoxia









 J18.1  Lobar pneumonia, unspecified organism

 

 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

 

 Z99.2  Dependence on renal dialysis









 Office Visit  12/10/2017  [facility]  Abdirizak SNOWDEN  J96.01  Acute respiratory



   8:01a  Assoc,martha Jean Baptiste D.O.    failure with



     Hospitalists      hypoxia









 J18.1  Lobar pneumonia, unspecified organism

 

 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

 

 Z99.2  Dependence on renal dialysis









 Office Visit  2017  8:01a  [facility]  Virginie  J96.01  Acute 
respiratory



     Assoc,martha Barbour D.O.    failure with



     Hospitalists      hypoxia









 J18.1  Lobar pneumonia, unspecified organism

 

 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

 

 Z99.2  Dependence on renal dialysis









 Office Visit  2017  8:00a  [facility]  Virginie  J18.1  Lobar 
pneumonia,



     Assoc,martha Barbour D.O.    unspecified



     Hospitalists      organism









 J96.01  Acute respiratory failure with hypoxia

 

 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

 

 Z99.2  Dependence on renal dialysis









 Office Visit  2017  [facility]  Fred Frankenberg  J18.1  Lobar 
pneumonia,



   7:59a  Assocmartha II, M.D.    unspecified



     Hospitalists      organism









 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

 

 N18.6  End stage renal disease

 

 A41.9  Sepsis, unspecified organism









 Office Visit  10/25/2017  2:45p  Orthopedic  Ivan  M79.671  Pain in right



     [facility] Cecil Zaragoza M.D.    foot



     [facility] [place]      

 

 Office Visit  09/15/2017  9:00a  Orthopedic  Ivan  I73.9  Peripheral



     [facility]  MARCIA Zaragoza    vascular disease,



     C.M.A.      unspecified









 M86.171  Other acute osteomyelitis, right ankle and foot

 

 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









 [facility]  E11.52  Type 2 
diabetes



   12:53p  martha Starr II, M.D.    w diabetic



     Hospitalists      peripheral



           angiopathy w



           gangrene









 E11.69  Type 2 diabetes mellitus with other specified complication

 

 K31.84  Gastroparesis

 

 L08.9  Local infection of the skin and subcutaneous tissue, unsp









 Office Visit  2017  3:03p  [facility]  Virginie  R10.84  Generalized



     Assocmartha D.O.    abdominal pain



     Hospitalists      









 R11.2  Nausea with vomiting, unspecified

 

 E11.9  Type 2 diabetes mellitus without complications

 

 Z79.4  Long term (current) use of insulin









 Office Visit  2017  1:59p  [facility]  Rosana Jolley,  L97.512  Non-
prs



     Assmartha hopper M.D.    chronic ulcer



     Hospitalists      ot prt right



           foot w fat



           layer exposed









 E11.9  Type 2 diabetes mellitus without complications

 

 N18.6  End stage renal disease

 

 Z79.4  Long term (current) use of insulin









 Office Visit  2017  3:14p  [facility]  M79.671  Pain 
in



     Infectious  [place], M.D.    right foot



     Diseases      









 Z89.421  Acquired absence of other right toe(s)

 

 E11.40  Type 2 diabetes mellitus with diabetic neuropathy, unsp









 Office Visit  2017  3:02p  [facility]  Kenneth ALCANTARA  R60.0  
Localized edema



     Infectious  MARCIA Tapia    



     Diseases      









 M79.671  Pain in right foot

 

 Z89.421  Acquired absence of other right toe(s)

 

 N18.6  End stage renal disease

 

 E10.22  Type 1 diabetes mellitus w diabetic chronic kidney disease









 Office Visit  2017  1:59p  [facility]  Brigdia  L97.512  Non-prs



     Assmartha hopper M.D.    chronic ulcer



     Hospitalists      oth prt right



           foot w fat



           layer exposed









 E11.9  Type 2 diabetes mellitus without complications

 

 N18.6  End stage renal disease

 

 Z79.4  Long term (current) use of insulin









 Office Visit  2017  3:30p  [facility]  I73.9  Peripheral



     [facility]  MARCIA Hinton    vascular disease,



           unspecified









 E11.9  Type 2 diabetes mellitus without complications









 Office Visit  2017  [facility]  L97.512  Non-prs



   1:58p  Assoc,SHELLEY Oglesby    chronic



     Hospitalists      ulcer oth



           prt right



           foot w fat



           layer



           exposed









 E11.9  Type 2 diabetes mellitus without complications

 

 N18.6  End stage renal disease

 

 Z79.4  Long term (current) use of insulin









 Office Visit  2017  4:17p  [place] YOUNG  I70.201  Unsp athscl native



     Card[facility]  MD Mac,    arteries of



     [facility]    extremities, right



           leg









 I25.10  Athscl heart disease of native coronary artery w/o ang pctrs









 [facility]  L08.9  Local infection



 Visit  9:44a  Assocmartha PA    of the skin and



     Hospitalists      subcutaneous



           tissue, unsp









 I73.9  Peripheral vascular disease, unspecified

 

 E11.8  Type 2 diabetes mellitus with unspecified complications

 

 I10  Essential (primary) hypertension









 [facility]  [facility]  L08.9  Local infection



 Visit  9:44a  Assmartha hopper PA    of the skin and



     Hospitalists      subcutaneous



           tissue, unsp









 I73.9  Peripheral vascular disease, unspecified

 

 E11.8  Type 2 diabetes mellitus with unspecified complications

 

 I10  Essential (primary) hypertension









 Office Visit  2017  [facility]  Kenneth ALCANTARA  T81.31xA  Disruption of



   10:16a  For Carlos Tapia M.D.    external



     Diseases      operation



           (surgical)



           wound, NEC, init









 T81.4xxA  Infection following a procedure, initial encounter

 

 Z89.421  Acquired absence of other right toe(s)

 

 E11.40  Type 2 diabetes mellitus with diabetic neuropathy, unsp

 

 E11.51  Type 2 diabetes w diabetic peripheral angiopath w/o gangrene









 Office  2017  [facility]  Ruben  L08.9  Local infection



 Visit  9:43a  martha Starr PA    of the skin and



     Hospitalists      subcutaneous



           tissue, uns









 I73.9  Peripheral vascular disease, unspecified

 

 E11.8  Type 2 diabetes mellitus with unspecified complications

 

 I10  Essential (primary) hypertension









 Office Visit  2017  [facility]  Hugo  L08.9  Local infection of



   9:42a  martha Starr, N.P.    the skin and



     Hospitalists      subcutaneous



           tissue, unsp









 I73.9  Peripheral vascular disease, unspecified

 

 E11.8  Type 2 diabetes mellitus with unspecified complications

 

 I10  Essential (primary) hypertension









 Office Visit  2017  3:38p  [facility]  Eugene Ordonezko,  R11.2  Nausea 
with



     martha Starr M.D.    vomiting,



     Hospitalists      unspecified









 E11.9  Type 2 diabetes mellitus without complications

 

 Z79.4  Long term (current) use of insulin

 

 N18.6  End stage renal disease









 Office Visit  2017  [facility]  Kenneth ALCANTARA  M86.671  Other chronic



   9:40a  For Carlos Tapia M.D.    osteomyelitis,



     Diseases      right ankle and



           foot









 Z89.421  Acquired absence of other right toe(s)

 

 E11.69  Type 2 diabetes mellitus with other specified complication

 

 E11.40  Type 2 diabetes mellitus with diabetic neuropathy, unsp









 Office Visit  2017  [facility]  M86.171  Other acute



   10:38a  Assocmartha NP    osteomyelitis,



     Hospitalists      right ankle and



           foot









 N18.6  End stage renal disease

 

 I73.9  Peripheral vascular disease, unspecified

 

 Z99.2  Dependence on renal dialysis









 Office Visit  2017  [facility]  M86.171  Other acute



   10:37a  Assocmartha NP    osteomyelitis,



     Hospitalists      right ankle and



           foot









 N18.6  End stage renal disease

 

 I73.9  Peripheral vascular disease, unspecified

 

 Z99.2  Dependence on renal dialysis









 Office Visit  2017  [facility]  Kenneth QUINTON  M86.671  Other chronic



   9:35a  For Infectious  MARCIA Tapia    osteomyelitis,



     Diseases      right ankle and



           foot









 I96  Gangrene, not elsewhere classified

 

 Z89.421  Acquired absence of other right toe(s)

 

 R19.7  Diarrhea, unspecified

 

 N18.5  Chronic kidney disease, stage 5









 Office Visit  2017  [facility]  M86.171  Other acute



   10:37a  Assward,martha Diaz NP    osteomyelitis,



     Hospitalists      right ankle and



           foot









 N18.6  End stage renal disease

 

 I73.9  Peripheral vascular disease, unspecified

 

 Z99.2  Dependence on renal dialysis









 Office Visit  2017  [facility]  M86.171  Other acute



   10:36a  Assmartha hopper NP    osteomyelitis,



     Hospitalists      right ankle and



           foot









 N18.6  End stage renal disease

 

 I73.9  Peripheral vascular disease, unspecified

 

 Z99.2  Dependence on renal dialysis









 Office Visit  2017  [facility]  M86.171  Other acute



   10:35a  Assoc,martha Odom MD    osteomyelitis,



     Hospitalists      right ankle and



           foot









 N18.6  End stage renal disease

 

 I73.9  Peripheral vascular disease, unspecified

 

 Z99.2  Dependence on renal dialysis









 Office Visit  2017  Orthopedic  Ivan  M86.671  Other chronic



   2:15p  Services Of  MARCIA Zaragoza    osteomyelitis, right



     C.M.A.      ankle and foot

 

 Office Visit  2017  Orthopedic  Ivan  M86.671  Other chronic



   2:30p  Services Of Cecil Zaragoza M.D.    osteomyelitis, right



     [place]      ankle and foot

 

 Office Visit  2017  Surgical  Charles ALCAZAR03.031  Cellulitis of right



   1:30p  [facility]  MD Samira,    toe



     Cma  FACS    









 E11.8  Type 2 diabetes mellitus with unspecified complications

 

 E11.52  Type 2 diabetes w diabetic peripheral angiopathy w gangrene

 

 E11.40  Type 2 diabetes mellitus with diabetic neuropathy, unsp









 Office Visit  2017  2:15p  Wound Care  Chrisite Adams,  E11.52  Type 2 
diabetes w



     Center [facility]  DNP, RN, FNP-BC    diabetic



           peripheral



           angiopathy w



           gangrene









 L97.514  Non-prs chronic ulcer oth prt right foot w necrosis of bone

 

 L97.419  Non-prs chr ulcer of right heel and midfoot w unsp severt

 

 M86.371  Chronic multifocal osteomyelitis, right ankle and foot

 

 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

 

 N18.6  End stage renal disease

 

 E11.42  Type 2 diabetes mellitus with diabetic polyneuropathy









 Office Visit  2017  [facility]  Ivan  M86.9  Osteomyelitis,



   12:39p  Assocmartha M.D.    unspecified



     Hospitalists      









 E11.8  Type 2 diabetes mellitus with unspecified complications

 

 E78.5  Hyperlipidemia, unspecified









 Office Visit  2017  9:55a  [facility] Mechelle ALCANTARA  E11.52  Type 2 
diabetes



     Infectious  MARCIA Tapia    w diabetic



     Diseases      peripheral



           angiopathy w



           gangrene









 L03.031  Cellulitis of right toe

 

 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

 

 N18.6  End stage renal disease









 Office Visit  2017  7:54a  Orthopedic  Lizzie Brewer,  E11.22  Type 2 
diabetes



     Services Of  PA    mellitus w



     C.M.A.      diabetic



           chronic kidney



           disease









 N18.6  End stage renal disease

 

 S91.104A  Unsp opn wnd right lesser toe(s) w/o damage to nail, init









 Office Visit  2017  [facility]  Ivan  M86.9  Osteomyelitis,



   12:39p  Assmartha hopper M.D.    unspecified



     Hospitalists      









 E11.8  Type 2 diabetes mellitus with unspecified complications

 

 E78.5  Hyperlipidemia, unspecified

 

 I10  Essential (primary) hypertension









 Office Visit  2017  [facility]  Hugo  M86.9  Osteomyelitis,



   12:37p  Assocmartha, N.P.    unspecified



     Hospitalists      









 E11.8  Type 2 diabetes mellitus with unspecified complications

 

 E78.5  Hyperlipidemia, unspecified

 

 I10  Essential (primary) hypertension









 Office Visit  2017  2:45p  Pul[facility] And  Lily  R59.0  Localized



     Sleep Services Of  MD Kit    enlarged lymph



     Cma      nodes









 G47.9  Sleep disorder, unspecified

 

 F17.210  Nicotine dependence, cigarettes, uncomplicated

 

 E66.01  Morbid (severe) obesity due to excess calories

 

 J98.11  Atelectasis









 Office Visit  2016  Neurohospitalist  Ajay  M21.372  Foot drop, left



   2:16p  Clinic  MARCIA Silva    foot

 

 Office Visit  2016  [facility]  Ivan  J96.01  Acute



   2:55p  Assoc,martha Qureshi M.D.    respiratory



           failure with



           hypoxia









 N18.6  End stage renal disease

 

 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

 

 Z79.4  Long term (current) use of insulin









 Office Visit  2016  3:14p  [facility]  Lily  R59.0  Localized



     Sleep Services Of  MD Kit    enlarged lymph



     Cma      nodes









 G47.9  Sleep disorder, unspecified

 

 F17.210  Nicotine dependence, cigarettes, uncomplicated

 

 J98.11  Atelectasis









 Office Visit  2016  [facility]  Abdirizak STEVENSaurabh  J96.01  Acute respiratory



   2:55p  Assoc,martha Jean Baptiste D.O.    failure with



     Hospitalists      hypoxia









 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

 

 N18.6  End stage renal disease

 

 Z79.4  Long term (current) use of insulin









 Office Visit  2016  [facility]  Sandoval  J96.01  Acute respiratory



   2:54p  Assoc,martha Odom MD    failure with



     Hospitalists      hypoxia









 N18.6  End stage renal disease

 

 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

 

 Z79.4  Long term (current) use of insulin









 Office Visit  12/15/2016  [facility]  Abdirizak SNOWDEN  J96.01  Acute respiratory



   2:53p  Assoc,martha Jean Baptiste D.O.    failure with



     Hospitalists      hypoxia









 R53.1  Weakness

 

 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

 

 N18.6  End stage renal disease









 [facility]  Ac[facility]



   2:53p  Assoc,martha Odom MD    failure with



     Hospitalists      hypoxia









 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

 

 N18.6  End stage renal disease

 

 Z79.4  Long term (current) use of insulin









 Office Visit  2016  Neurohospitalist  Ajay  M21.372  Foot drop,



   2:15p  Huong Silva M.D.    left foot

 

 Office Visit  2016  [facility]  Abeba Hein,  R53.1  Weakness



   2:52p  Assoc,pc Hospitalists  DO    









 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

 

 N18.6  End stage renal disease

 

 Z79.4  Long term (current) use of insulin









 Office  2016  Neurohospitalist  Ajay  M21.372  Foot drop, left



 Visit  2:14p  Clinic  MARCIA Silva    foot

 

 [facility]  R73.9  Hyperglycemia,



 Visit  9:45a  Assoc,pc Hospitalists  Frankenberg II,    unspecified



       M.D.    









 R07.89  Other chest pain

 

 N18.6  End stage renal disease

 

 I10  Essential (primary) hypertension









 Office Visit  10/03/2016 11:45a  [facility]  Charles CLARK  N18.6  End 
stage renal



     Of Cecil Hogan MD,    disease



       FACS    









 L03.311  Cellulitis of abdominal wall

 

 L03.313  Cellulitis of chest wall









 [facility]  2016  1:00p  [facility]  J20.9  Acute 
bronchitis,



     Assmartha hopper M.D.    unspecified



     Hospitalists      









 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

 

 N18.6  End stage renal disease

 

 I10  Essential (primary) hypertension









 Office Visit  2016  [facility]  Sandoval  L03.311  Cellulitis of



   3:29p  Assocmartha MD    abdominal wall



     Hospitalists      









 T85.71xA  Infect/inflm reaction due to periton dialysis catheter, init

 

 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

 

 Z79.4  Long term (current) use of insulin









 [facility] [place]  L03.311  
Cellulitis of



   3:28p  martha Starr II, M.D.    abdominal wall



     Hospitalists      









 T85.71xA  Infect/inflm reaction due to periton dialysis catheter, init

 

 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

 

 Z79.4  Long term (current) use of insulin









 Office Visit  2016  [facility]  Marylin  R07.9  Chest pain,



   9:34a  Assoc,martha Kaplan NP    unspecified



     Hospitalists      









 N18.6  End stage renal disease

 

 E11.8  Type 2 diabetes mellitus with unspecified complications

 

 Z79.4  Long term (current) use of insulin









 Office Visit  2016  [facility]  Linda Cordero  R07.9  Chest pain,



   9:28a  Assoc,martha Rm NP    unspecified



     Hospitalists      









 N18.6  End stage renal disease

 

 E11.8  Type 2 diabetes mellitus with unspecified complications









 Office Visit  2016  1:57p  [facility]  Fred Frankenberg  R07.89  
Other chest



     Assoc,martha TRINIDAD M.D.    pain



     Hospitalists      









 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

 

 N18.6  End stage renal disease

 

 Z79.4  Long term (current) use of insulin









 Office Visit  2016  Orthopedic  Vian  E11.621  Type 2 diabetes



   3:30p  Services Of ZANE Zaragoza M.D.    mellitus with foot



           ulcer

 

 Office Visit  2016  [facility]  Hugo  E11.8  Type 2 diabetes



   2:49p  Assoc,martha Goldberg,    mellitus with



     Hospitalists  N.P.    unspecified



           complications









 T14.8  Other injury of unspecified body region

 

 N18.6  End stage renal disease

 

 I10  Essential (primary) hypertension









 Office Visit  2016 10:37a  [facility]  [place]  T14.8  Other injury of



     Assoc,SHELLEY Tsai    unspecified body



     Hospitalists      region









 N18.6  End stage renal disease

 

 E11.8  Type 2 diabetes mellitus with unspecified complications

 

 I10  Essential (primary) hypertension









 Office Visit  2016  [facility]  Marylin  T14.8  Other injury of



   2:48p  Assoc,martha Kaplan NP    unspecified body



     Hospitalists      region









 N18.6  End stage renal disease

 

 E11.8  Type 2 diabetes mellitus with unspecified complications

 

 I10  Essential (primary) hypertension









 Office Visit  2016  2:12p  [facility]  Kenneth ALCANTARA  L97.519  Non-
prs



     Infectious  MARCIA Tapia    chronic ulcer



     Diseases      oth prt right



           foot w unsp



           severity









 G62.9  Polyneuropathy, unspecified

 

 N18.6  End stage renal disease

 

 Z99.2  Dependence on renal dialysis

 

 I25.10  Athscl heart disease of native coronary artery w/o ang pctrs









 Office Visit  2016  2:48p  [facility]  Chanel VILLAR  T14.8  Other injury 
of



     Assoc,pc  Roger, N.P.    unspecified body



     Hospitalists      region









 N18.6  End stage renal disease

 

 E11.8  Type 2 diabetes mellitus with unspecified complications

 

 I10  Essential (primary) hypertension









 Office Visit  2016  [facility]  T14.8  Other injury of



   2:47p  Assocmartha NSaurabhPSaurabh    unspecified body



     Hospitalists      region









 N18.6  End stage renal disease

 

 E11.8  Type 2 diabetes mellitus with unspecified complications

 

 I10  Essential (primary) hypertension









 Office Visit  2016 11:00a  Orthopedic  Ivan  E10.621  Type 1 diabetes



     Services Of Cecil Zaragoza M.D.    mellitus with



     [facility] [place]      foot ulcer









 L97.513  Non-prs chronic ulcer oth prt right foot w necros muscle









 Office Visit  2016  Orthopedic  Ivan Zaragoza,  E10.621  Type 1



   9:10a  Services Of [facility] CARLOS HARRIS.QUINTON    diabetes



     [place]      mellitus with



           foot ulcer

 

 Office Visit  10/16/2015  [facility]  Vinod Frank[place]  R53.81  Other 
malaise



   3:34p  Assocmartha II, M.D.    



     Hospitalists      









 E11.29  Type 2 diabetes mellitus w oth diabetic kidney complication

 

 G47.34  Idio sleep related nonobstructive alveolar hypoventilation









 Office Visit  06/10/2015  9:40a  Neurosurgery  Michael CRUZ  721.3  Spondylosis



     Services Of Cecil Mendez M.D.    Lumbar W/O



           Myelopathy









 357.2  Polyneuropathy In Diabetes

 

 250.00  Diabetes Mellitus W/O Compl Type II Or Unspec Controlled









 Office Visit  2015  8:07a  [facility]  Marylin Kaplan,  585.6  End 
Stage



     Assoc,martha  NP    Renal Disease



     Hospitalists      









 414.00  Coronary Atherosclerosis Unspec Type Vessel Native/Graft

 

 362.10  Retinopathy Background Unspec

 

 250.00  Diabetes Mellitus W/O Compl Type II Or Unspec Controlled









 Office Visit  2015  8:06a  [facility],  585.6  End 
Stage



     Assoc,martha  N.P.    Renal Disease



     Hospitalists      









 414.00  Coronary Atherosclerosis Unspec Type Vessel Native/Graft

 

 362.10  Retinopathy Background Unspec

 

 250.00  Diabetes Mellitus W/O Compl Type II Or Unspec Controlled









 Office Visit  2015  7:45a  [facility]  Brigida  789.00  Pain 
Abdominal



     Assocmartha M.D.    Unspec Site



     Hospitalists      









 585.6  End Stage Renal Disease

 

 250.00  Diabetes Mellitus W/O Compl Type II Or Unspec Controlled









 Office Visit  02/10/2015  8:26a  [facility]  Karsten Tan,  786.50  
Pain Chest



     Assoc,pc  M.D.    Unspec



     Hospitalists      









 585.6  End Stage Renal Disease

 

 250.00  Diabetes Mellitus W/O Compl Type II Or Unspec Controlled

 

 401.9  Hypertension Unspec









 Office Visit  2015  8:25a  [facility]  Chanel VILLAR  786.50  Pain Chest



     Assoc,martha Duran, N.P.    Unspec



     Hospitalists      









 585.6  End Stage Renal Disease

 

 250.00  Diabetes Mellitus W/O Compl Type II Or Unspec Controlled

 

 401.9  Hypertension Unspec







Plan of Treatment

Future Appointment(s):2019  1:30 pm - Lily Pantoja MD at Pulmonology 
And Sleep [facility]2019 - [place] Benedict, NPL02.31 
Cutaneous abscess of buttockFollow up:NEXT WEEK WITH JOSEFINA CHAMORRO48.01 
Encounter for change or removal of surgical wound dressing

## 2019-03-27 RX ADMIN — LOSARTAN POTASSIUM SCH MG: 25 TABLET, FILM COATED ORAL at 10:04

## 2019-03-27 RX ADMIN — ALBUTEROL SULFATE PRN MG: 2.5 SOLUTION RESPIRATORY (INHALATION) at 08:25

## 2019-03-27 RX ADMIN — SODIUM BICARBONATE TAB 650 MG SCH MG: 650 TAB at 10:01

## 2019-03-27 RX ADMIN — ASPIRIN SCH MG: 81 TABLET, COATED ORAL at 10:02

## 2019-03-27 RX ADMIN — WATER SCH: 100 INJECTION, SOLUTION INTRAVENOUS at 05:54

## 2019-03-27 RX ADMIN — DRONABINOL SCH MG: 2.5 CAPSULE ORAL at 10:03

## 2019-03-27 RX ADMIN — LIDOCAINE HYDROCHLORIDE SCH APPLIC: 20 JELLY TOPICAL at 15:02

## 2019-03-27 RX ADMIN — MOMETASONE FUROATE AND FORMOTEROL FUMARATE DIHYDRATE SCH: 200; 5 AEROSOL RESPIRATORY (INHALATION) at 19:36

## 2019-03-27 RX ADMIN — INSULIN LISPRO SCH UNIT: 100 INJECTION, SOLUTION INTRAVENOUS; SUBCUTANEOUS at 02:42

## 2019-03-27 RX ADMIN — CALCITRIOL SCH MCG: 0.25 CAPSULE ORAL at 10:02

## 2019-03-27 RX ADMIN — BETHANECHOL CHLORIDE SCH MG: 25 TABLET ORAL at 10:02

## 2019-03-27 RX ADMIN — MIDODRINE HYDROCHLORIDE SCH MG: 5 TABLET ORAL at 10:04

## 2019-03-27 RX ADMIN — BETHANECHOL CHLORIDE SCH MG: 25 TABLET ORAL at 17:38

## 2019-03-27 RX ADMIN — GABAPENTIN SCH MG: 100 CAPSULE ORAL at 13:11

## 2019-03-27 RX ADMIN — DULOXETINE HYDROCHLORIDE SCH MG: 30 CAPSULE, DELAYED RELEASE ORAL at 10:04

## 2019-03-27 RX ADMIN — INSULIN LISPRO SCH: 100 INJECTION, SOLUTION INTRAVENOUS; SUBCUTANEOUS at 08:35

## 2019-03-27 RX ADMIN — INSULIN LISPRO SCH UNIT: 100 INJECTION, SOLUTION INTRAVENOUS; SUBCUTANEOUS at 13:13

## 2019-03-27 RX ADMIN — LIDOCAINE HYDROCHLORIDE SCH: 20 JELLY TOPICAL at 15:02

## 2019-03-27 RX ADMIN — SODIUM BICARBONATE TAB 650 MG SCH MG: 650 TAB at 17:38

## 2019-03-27 RX ADMIN — MIDODRINE HYDROCHLORIDE SCH MG: 5 TABLET ORAL at 17:38

## 2019-03-27 RX ADMIN — BETHANECHOL CHLORIDE SCH MG: 25 TABLET ORAL at 21:49

## 2019-03-27 RX ADMIN — LIDOCAINE HYDROCHLORIDE SCH APPLIC: 20 JELLY TOPICAL at 21:49

## 2019-03-27 RX ADMIN — GABAPENTIN SCH MG: 100 CAPSULE ORAL at 20:50

## 2019-03-27 RX ADMIN — INSULIN LISPRO SCH UNIT: 100 INJECTION, SOLUTION INTRAVENOUS; SUBCUTANEOUS at 20:49

## 2019-03-27 RX ADMIN — FENTANYL SCH MCG: 50 PATCH TRANSDERMAL at 15:00

## 2019-03-27 RX ADMIN — METHADONE HYDROCHLORIDE SCH MG: 10 TABLET ORAL at 20:51

## 2019-03-27 RX ADMIN — HYDROMORPHONE HYDROCHLORIDE PRN MG: 1 INJECTION, SOLUTION INTRAMUSCULAR; INTRAVENOUS; SUBCUTANEOUS at 11:02

## 2019-03-27 RX ADMIN — HYDROMORPHONE HYDROCHLORIDE PRN MG: 1 INJECTION, SOLUTION INTRAMUSCULAR; INTRAVENOUS; SUBCUTANEOUS at 07:43

## 2019-03-27 RX ADMIN — MOMETASONE FUROATE AND FORMOTEROL FUMARATE DIHYDRATE SCH PUFF: 200; 5 AEROSOL RESPIRATORY (INHALATION) at 08:22

## 2019-03-27 RX ADMIN — WATER SCH NOTE: 100 INJECTION, SOLUTION INTRAVENOUS at 19:26

## 2019-03-27 RX ADMIN — MUPIROCIN SCH APPLIC: 20 OINTMENT TOPICAL at 10:01

## 2019-03-27 RX ADMIN — DRONABINOL SCH MG: 2.5 CAPSULE ORAL at 13:10

## 2019-03-27 RX ADMIN — MIDODRINE HYDROCHLORIDE SCH MG: 5 TABLET ORAL at 13:10

## 2019-03-27 RX ADMIN — MUPIROCIN SCH APPLIC: 20 OINTMENT TOPICAL at 21:47

## 2019-03-27 RX ADMIN — LIDOCAINE HYDROCHLORIDE SCH APPLIC: 20 JELLY TOPICAL at 10:04

## 2019-03-27 RX ADMIN — HYDROMORPHONE HYDROCHLORIDE ONE MG: 1 INJECTION, SOLUTION INTRAMUSCULAR; INTRAVENOUS; SUBCUTANEOUS at 01:55

## 2019-03-27 RX ADMIN — INSULIN LISPRO SCH: 100 INJECTION, SOLUTION INTRAVENOUS; SUBCUTANEOUS at 18:08

## 2019-03-27 RX ADMIN — EPOETIN ALFA-EPBX SCH UNIT: 4000 INJECTION, SOLUTION INTRAVENOUS; SUBCUTANEOUS at 10:04

## 2019-03-27 RX ADMIN — SODIUM BICARBONATE TAB 650 MG SCH MG: 650 TAB at 13:09

## 2019-03-27 RX ADMIN — CALCITRIOL SCH MCG: 0.25 CAPSULE ORAL at 13:10

## 2019-03-27 RX ADMIN — DRONABINOL SCH MG: 2.5 CAPSULE ORAL at 20:52

## 2019-03-27 RX ADMIN — FENTANYL SCH: 50 PATCH TRANSDERMAL at 04:18

## 2019-03-27 RX ADMIN — SODIUM BICARBONATE TAB 650 MG SCH MG: 650 TAB at 22:35

## 2019-03-27 RX ADMIN — PANTOPRAZOLE SODIUM SCH MG: 40 TABLET, DELAYED RELEASE ORAL at 17:38

## 2019-03-27 RX ADMIN — BETHANECHOL CHLORIDE SCH MG: 25 TABLET ORAL at 13:09

## 2019-03-27 RX ADMIN — HYDROMORPHONE HYDROCHLORIDE PRN MG: 1 INJECTION, SOLUTION INTRAMUSCULAR; INTRAVENOUS; SUBCUTANEOUS at 15:02

## 2019-03-27 RX ADMIN — MIDODRINE HYDROCHLORIDE SCH MG: 5 TABLET ORAL at 21:48

## 2019-03-27 RX ADMIN — CALCITRIOL SCH MCG: 0.25 CAPSULE ORAL at 21:48

## 2019-03-27 RX ADMIN — HYDROMORPHONE HYDROCHLORIDE PRN MG: 1 INJECTION, SOLUTION INTRAMUSCULAR; INTRAVENOUS; SUBCUTANEOUS at 03:51

## 2019-03-27 RX ADMIN — SODIUM BICARBONATE TAB 650 MG SCH: 650 TAB at 22:54

## 2019-03-27 RX ADMIN — GABAPENTIN SCH MG: 100 CAPSULE ORAL at 10:02

## 2019-03-27 NOTE — ADMNOTE
Subjective


Date of Service: 19


Interval History: 





HISTORY AND PHYSICAL





PCP: Rimma





CC: leg pain





HPI: Patient is 38 year old woman with multiple medical problems, just 

discharged from this hospital on 3/25. On 3/25 she was transferred directly to 

[facility] for evaluation of right hand, 3rd digit ischemia, which 

presented with a painful blue distal phalanx. During her hospital stay from 3/5-

3/25 she was treated for PVD, diagnosed with calciphylaxis, began infusions of 

sodium thiosulfate, was intubated for acute on chronic respiratory failure.  

The calciphylaxis causes ulceration and pain in low back, buttocks, thighs, 

left calf, feet. The patient also has a chronic GI bleed, was seen by Dr Green regarding this. There was discussion of chronic ischemic colitis, but 

colonoscopy deferred due to concern of risk vs benefit.  She has been 

anticoagulated with warfarin, which led to epistaxis when INR >3, so she was 

switched to an DOAC.  She continues to have anemia and heme positive stool on 

the DOAC. Indication for anticoagulation relates to previous PVD surgery, not a-

fib, DVT, or PE.  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





Patient states that at [facility], she had a CTA of the RT upper 

extremity, a plain film of RT 3rd finger, and a doppler U/S. She was told no 

intervention was needed. She felt the nurses were causing pain, being rough 

with her, so she left against medical advice around 1830 this evening, and came 

directly back to [facility].


Family History: Findings - Father  age 49 of MI, mother has diabetes


Social History: Findings - Disabled, lives with male partner Raymundo, who is HCP, 

smokes 1/2 PPD, no alcohol or drug use


Past Medical History: Findings - ESRD on peritoneal dialysis, anemia of renal 

disease and blood loss, severe COPD, Type 2 diabetes, GERD, CAD, hypertension, 

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 

stent to RT SFA, stent to R brachial artery, LT eye enucleation, RT foor 

transmetatarsal amp





Review of Systems





- Measurements


Intake and Output: 


Intake and Output Last 24 Hours











 19





 06:59 06:59 06:59 06:59


 


Intake Total   0 120


 


Balance   0 120


 


Weight   91.172 kg 


 


Intake:    


 


  Oral   0 120














- Review of Systems


Constitutional Symptoms: Positive: Fatigue


   Negative: Weight Gain


Dermatology: Positive: Rash, Skin Lesions


HEENT: Positive: Normal


Eyes: Positive: Other - nearly blind


   Negative: Change in Vision


Thyroid: Positive: Normal


Pulmonary: Positive: Shortness of Breath, COPD


Cardiology: 


   Negative: Chest Pain


Gastroenterology: Positive: Abdominal Pain, Nausea, Vomiting


Genital - Urinary: Positive: Other - anuric nearly


Musculoskeletal: Positive: Low Back Pain, Sciatica


Endocrinology: Positive: Family Hx Endocrine Disorders, Diabetes Mellitus


Hematologic/Lymphatic: Positive: Anemia, Use of Anticoagulant


Neurology: Positive: Normal


   Negative: Change in Vision


Psychiatry: Positive: Depressed Mood





Objective


Active Medications: 





Current Meds:


Acetaminophen (Tylenol Tab*)  650 mg PO Q6H PRN


   PRN Reason: Fever/Pain


Al Hydrox/Mg Hydrox/Simethicone (Maalox Plus*)  30 ml PO Q6H PRN


   PRN Reason: DYSPEPSIA


Albuterol (Ventolin 2.5 Mg/3 Ml Neb.Sol*)  2.5 mg INH Q4HR PRN


   PRN Reason: SOB/WHEEZING


   Last Admin: 19 08:25 Dose:  2.5 mg


Aspirin (Aspirin Ec Tab*)  81 mg PO DAILY [facility]


   Last Admin: 19 10:02 Dose:  81 mg


Bethanechol Chloride (Urecholine Tab*)  25 mg PO QID [facility]


   Last Admin: 19 10:02 Dose:  25 mg


Calcitriol (Rocaltrol  Cap*)  0.25 mcg PO TID [facility]


   Last Admin: 19 10:02 Dose:  0.25 mcg


Dextrose (D50w Syringe 50 Ml*)  12.5 gm IV PUSH .FOR FS < 60 - SS PRN


   PRN Reason: FS < 60


Dronabinol (Marinol Cap*)  5 mg PO TID [facility]


   Last Admin: 19 10:03 Dose:  5 mg


Duloxetine HCl (Cymbalta Cap*)  30 mg PO DAILY [facility]


   Last Admin: 19 10:04 Dose:  30 mg


Epoetin Yan (Retacrit)  4,000 unit SUBCUT MOWEFR [facility]


   Last Admin: 19 10:04 Dose:  4,000 unit


Fentanyl (Duragesic  Patch 50 Mcg/Hr*)  50 mcg TRANSDERM Q72H [facility]


   Last Admin: 19 04:18 Dose:  Not Given


Gabapentin (Neurontin Cap(*))  200 mg PO TID [facility]


   Last Admin: 19 10:02 Dose:  200 mg


Hydromorphone HCl (Dilaudid Inj1s*)  1 mg IV SLOW PU Q3H PRN


   PRN Reason: PAIN


   Last Admin: 19 07:43 Dose:  1 mg


Sodium Thiosulfate 25 gm/ (Sodium Chloride)  200 mls @ 200 mls/hr IV SuMoWeFr 

[facility]


Insulin Human Lispro (Humalog*)  0 units SUBCUT ACHS [facility]; Protocol


   Last Admin: 19 08:35 Dose:  Not Given


Lidocaine HCl (Lidocaine 2% Jelly*)  1 applic TOPICAL TID [facility]


   Last Admin: 19 10:04 Dose:  1 applic


Lorazepam (Ativan Inj*)  1 mg IV PUSH Q6H PRN


   PRN Reason: ANXIETY


Losartan Potassium (Cozaar Tab*)  25 mg PO DAILY [facility]


   Last Admin: 19 10:04 Dose:  25 mg


Methadone HCl (Dolophine Tab*)  10 mg PO Q12HR [facility]


   Last Admin: 19 10:10 Dose:  10 mg


Midodrine (Midodrine (Nf))  10 mg PO QID [facility]; Protocol


   Last Admin: 19 10:04 Dose:  10 mg


Mometasone Furoate/Formoterol Fumar (Dulera 200/5 Mdi*)  2 puff INH BID [facility]


   Last Admin: 19 08:22 Dose:  2 puff


Mupirocin (Bactroban 2 % Oint*)  1 applic TOPICAL BID [facility]


   Last Admin: 19 10:01 Dose:  1 applic


Ondansetron HCl (Zofran Inj*)  8 mg IV Q6H PRN


   PRN Reason: NAUSEA


Pantoprazole Sodium (Protonix Tab*)  40 mg PO QPM [facility]


Prochlorperazine Edisylate (Compazine Inj*)  5 mg IV Q6H PRN


   PRN Reason: NAUSEA


Sodium Bicarbonate (Sodium Bicarbonate (Antacid)*)  2,600 mg PO QID [facility]


   Last Admin: 19 10:01 Dose:  2,600 mg








 Vital Signs - 8 hr











  19





  02:59 03:28 03:51


 


Temperature 37.0 C 36.8 C 


 


Pulse Rate 115 112 


 


Respiratory 14 20 20





Rate   


 


Blood Pressure 155/88 145/52 





(mmHg)   


 


O2 Sat by Pulse 98 99 





Oximetry   














  19





  05:53 07:23 07:43


 


Temperature  36.3 C 


 


Pulse Rate  116 


 


Respiratory 17 16 19





Rate   


 


Blood Pressure  147/54 





(mmHg)   


 


O2 Sat by Pulse  90 





Oximetry   














  19





  08:27 10:02 10:03


 


Temperature   


 


Pulse Rate 110  


 


Respiratory 20 16 16





Rate   


 


Blood Pressure   





(mmHg)   


 


O2 Sat by Pulse 96  





Oximetry   














  19





  10:10


 


Temperature 


 


Pulse Rate 


 


Respiratory 16





Rate 


 


Blood Pressure 





(mmHg) 


 


O2 Sat by Pulse 





Oximetry 











Oxygen Devices in Use Now: Nasal Cannula


Appearance: alert, no distress


Eyes: No Scleral Icterus, - - strabismus


Ears/Nose/Mouth/Throat: - - dry mouth, poor dentition


Neck: NL Appearance and Movements; NL JVP


Respiratory: Clear to Auscultation, - - diminished BS


Cardiovascular: NL Sounds; No Murmurs; No JVD


Abdominal: No Hepatosplenomegaly, - - PD catheter in place umbilicus, distended

, +BS, no masses


Lymphatic: No Cervical Adenopathy


Extremities: No Edema


Skin: - - large areas of superficial ulcer on buttocks, upper thighs, LT calf, 

RT forefoot amputated


Neurological: Alert and Oriented x 3


Lines/Tubes/Other Access: Clean, Dry and Intact Peripheral IV


Nutrition: Taking PO's


Result Diagrams: 


 19 23:14





 19 23:14





Assess/Plan/Problems-Billing


Assessment: 


38 year old with multiple medical problems including ESRD, on PD, calciphylaxis

, chronic GI bleed, diffuse PVD








- Patient Problems


(1) Calciphylaxis


Current Visit: Yes   Status: Acute   Priority: High   Code(s): E83.59 - OTHER 

DISORDERS OF CALCIUM METABOLISM   SNOMED Code(s): 106962582


   Comment: - S/p skin biopsy on 3/18 with pathology confirmation. 


- Will restart IV sodium thiosulfate infusions


- Continue to monitor for hypotension.


- Will discuss case with Dr. Baker again   





(2) GI bleed


Current Visit: No   Status: Acute   Priority: High   Code(s): K92.2 - 

GASTROINTESTINAL HEMORRHAGE, UNSPECIFIED   SNOMED Code(s): 29346147


   Comment: - Source is unclear, but we suspect ischemic colitis with known 

vascular disease.


- hemoglobin stable and partially related to ESRD


- Will stop anticoagulation, no clear indiation at this point.   





(3) ESRD on peritoneal dialysis


Current Visit: No   Status: Chronic   Priority: Medium   Code(s): N18.6 - END 

STAGE RENAL DISEASE; Z99.2 - DEPENDENCE ON RENAL DIALYSIS   SNOMED Code(s): 

94784658


   Comment: - Continue peritoneal dialysis.


- Will need nephrology consultation.


- iron levels low 10 days ago, may benefit from IV iron infusions   





(4) PAD (peripheral artery disease)


Current Visit: No   Status: Chronic   Priority: Medium   Code(s): I73.9 - 

PERIPHERAL VASCULAR DISEASE, UNSPECIFIED   SNOMED Code(s): 029464527


   Comment: - Recent CT Aorta with run off this admission with moderate-severe 

R common femoral artery stenosis, severe b/l popliteal artery stenosis, near 

complete occlusion of distal L superficial fem artery stent.


- No DVT seen on review of last 2 years of LE doppler tests, but there is 

reported history of DVT per recent records, patient unaware


- If PE occurs, could restart renal-dose Eliquis, or have SC fondaparinux if 

needed, but at this point risk of anticoagulation outweighs benefits


- RT 3rd finger ischemia concerning, will obtain [facility] records.   


Status and Disposition: 





inpatient

## 2019-03-27 NOTE — CONSULT
Consult


Consult: 





INPATIENT PAIN CONSULTATION


Adri Masters is known to me from her last admission. She has ESRD and 

diabetes and is on peritoneal dialysis. She has longstanding diabetic 

peripheral neuropathy and was on Methadone for that prior to this year. She was 

admitted to [facility] on March 5 of this year with extreme pain in her legs. She was 

diagnosed with calciphylaxis. I saw her on March 14. At that time I put her on 

routine Methadone  10 mg PO Q8H and IV dilaudid. SHe went to the ICU for a GI 

bleed, and her methadone was cut back after she developed confusion. She was 

transferred to [facility] on March 25 for a vascular surgery 

opinion regarding blood flow to her LEs. She left AMA and returned to [facility] 

yesterday. She had been advised by Dr. Baker to switch to HD from PD but 

refused. She is in significant pain. She has been seen by palliative care but 

told them she is not ready to consider hospice. I am asked to see her for pain 

control. She is on Methadone, 10 mg BID, Fentanyl transdermal, 50 mcg/hr and 

Dilaudid 1 mg IV Q3 PRN, Marinol, Gabapentin. Her prognosis is thought to be 

poor. 





PAST MEDICAL HISTORY: ESRD, DM, PVD, multiple vascular procedures including 

stents and angioplaties; calciphylaxis, GERD, GI Bleed





 Allergies











Allergy/AdvReac Type Severity Reaction Status Date / Time


 


Adhesive Tape [Plastic Tape] Allergy Mild Blisters Verified 02/18/19 12:05


 


chlorhexidine Allergy  Rash Verified 03/11/19 19:17


 


doxycycline Allergy  Unknown Verified 02/18/19 12:05





   Reaction  





   Details  


 


erythromycin base Allergy  See Comment Verified 02/18/19 12:05


 


heparin Allergy  See Comment Verified 02/18/19 12:05


 


metoclopramide [From Reglan] Allergy  Hives Verified 02/18/19 12:05


 


niacin Allergy  Rash Verified 02/18/19 12:05


 


ropinirole [From Requip] Allergy  Hives Verified 02/18/19 12:05


 


metronidazole [From Flagyl] AdvReac  Nausea And Verified 02/25/19 13:15





   Vomiting  








 Current Medications





Acetaminophen (Tylenol Tab*)  650 mg PO Q6H PRN


   PRN Reason: Fever/Pain


Al Hydrox/Mg Hydrox/Simethicone (Maalox Plus*)  30 ml PO Q6H PRN


   PRN Reason: DYSPEPSIA


Albuterol (Ventolin 2.5 Mg/3 Ml Neb.Sol*)  2.5 mg INH Q4HR PRN


   PRN Reason: SOB/WHEEZING


   Last Admin: 03/27/19 08:25 Dose:  2.5 mg


Aspirin (Aspirin Ec Tab*)  81 mg PO DAILY [facility]


   Last Admin: 03/27/19 10:02 Dose:  81 mg


Bethanechol Chloride (Urecholine Tab*)  25 mg PO QID [facility]


   Last Admin: 03/27/19 17:38 Dose:  25 mg


Calcitriol (Rocaltrol  Cap*)  0.25 mcg PO TID [facility]


   Last Admin: 03/27/19 13:10 Dose:  0.25 mcg


Dextrose (D50w Syringe 50 Ml*)  12.5 gm IV PUSH .FOR FS < 60 - SS PRN


   PRN Reason: FS < 60


Dronabinol (Marinol Cap*)  5 mg PO TID [facility]


   Last Admin: 03/27/19 13:10 Dose:  5 mg


Duloxetine HCl (Cymbalta Cap*)  30 mg PO DAILY [facility]


   Last Admin: 03/27/19 10:04 Dose:  30 mg


Epoetin Yan (Retacrit)  4,000 unit SUBCUT MOWEFR [facility]


   Last Admin: 03/27/19 10:04 Dose:  4,000 unit


Fentanyl (Duragesic  Patch 50 Mcg/Hr*)  50 mcg TRANSDERM Q72H [facility]


   Last Admin: 03/27/19 15:00 Dose:  50 mcg


Gabapentin (Neurontin Cap(*))  200 mg PO TID [facility]


   Last Admin: 03/27/19 13:11 Dose:  200 mg


Hydromorphone HCl (Dilaudid Inj1s*)  1 mg IV SLOW PU Q3H PRN


   PRN Reason: PAIN


   Last Admin: 03/27/19 15:02 Dose:  1 mg


Sodium Thiosulfate 25 gm/ (Sodium Chloride)  200 mls @ 200 mls/hr IV SuMoWeFr 

[facility]


Insulin Human Lispro (Humalog*)  0 units SUBCUT ACHS [facility]; Protocol


   Last Admin: 03/27/19 18:08 Dose:  Not Given


Lidocaine HCl (Lidocaine 2% Jelly*)  1 applic TOPICAL TID [facility]


   Last Admin: 03/27/19 15:02 Dose:  Not Given


Lorazepam (Ativan Inj*)  1 mg IV PUSH Q6H PRN


   PRN Reason: ANXIETY


Losartan Potassium (Cozaar Tab*)  25 mg PO DAILY [facility]


   Last Admin: 03/27/19 10:04 Dose:  25 mg


Methadone HCl (Dolophine Tab*)  10 mg PO Q12HR [facility]


   Last Admin: 03/27/19 10:10 Dose:  10 mg


Midodrine (Midodrine (Nf))  10 mg PO QID [facility]; Protocol


   Last Admin: 03/27/19 17:38 Dose:  10 mg


Mometasone Furoate/Formoterol Fumar (Dulera 200/5 Mdi*)  2 puff INH BID [facility]


   Last Admin: 03/27/19 19:36 Dose:  Not Given


Mupirocin (Bactroban 2 % Oint*)  1 applic TOPICAL BID [facility]


   Last Admin: 03/27/19 10:01 Dose:  1 applic


Ondansetron HCl (Zofran Inj*)  8 mg IV Q6H PRN


   PRN Reason: NAUSEA


Pantoprazole Sodium (Protonix Tab*)  40 mg PO QPM [facility]


   Last Admin: 03/27/19 17:38 Dose:  40 mg


Pharmacy Profile Note (Fentanyl Patch Check Q Shift)  1 note FOLLOW UP 0700,

1900 [facility]


   Last Admin: 03/27/19 19:26 Dose:  1 note


Prochlorperazine Edisylate (Compazine Inj*)  5 mg IV Q6H PRN


   PRN Reason: NAUSEA


Sodium Bicarbonate (Sodium Bicarbonate (Antacid)*)  2,600 mg PO QID [facility]


   Last Admin: 03/27/19 17:38 Dose:  2,600 mg





SOCIAL HISTORY: Non smoker, no alcohol. Lives with her boyfriend





 Vital Signs











Temp Pulse Resp BP Pulse Ox


 


 97.3 F   114   20   110/81   96 


 


 03/27/19 07:23  03/27/19 13:05  03/27/19 17:38  03/27/19 13:05  03/27/19 08:27








EXAM:


LUNGS: Coarse BS


HEART: reg rhythm


ABDOMEN: Soft


EXTREMITIES: Wounds over her legs





ASSESSMENT:


1. Calciphylaxis


2. ESRD on PD


3. Chronic diabetic neuropathy





PLAN: Given her poor prognosis, will liberalize her dilaudid. Not sure if 

Methadone helping, but does not need both methadone and fentanyl (2 different 

[place]). Will add oxycodone, and decrease methadone. I will follow

## 2019-03-27 NOTE — CONSULT
Palliative / Hospice Consult


Ordering Provider: Rosana Jolley - PCP-Rimma





- Subjective


Code Status: Full Code


Advance Directives Location: No Advance Directives





- History or Present Illness


History or Present Illness: 





37yo female with DM, ESRD on PD, COPD on 5liters, calciphylax presented back to 

hospital since being transferred to [facility] for evaluation of R hand 3rd 

digit ischemia. Pt came back to [facility] for pain control. PMH is significant for GI 

bleed secondary to suspected chronic ischemic colitis, anemia, CAD, HTN, 

hyperlipidemia, HIT, diastolic CHF, chronic pain, diabetic neuropathy on 

metadone and gabapentin, severe PAD, GERD, anxiety and depression. H/H 7.6/23, 

BUN/Cr 47/7.04 egfr 6.5, alb 2.4, tprot 5.9. Pt is a smoker, non drug user & no 

etoh. All history is from medical records and family pt was sleepy after her 

dilaudid.


Lab Values: 


 Abnormal Lab Results











  03/26/19 03/26/19 03/26/19





  23:13 23:14 23:14


 


WBC   21.0 H 


 


RBC   2.73 L 


 


Hgb   7.6 L 


 


Hct   23 L 


 


MCV   85 


 


MCH   28 


 


MCHC   33 


 


RDW   17 H 


 


Plt Count   392 


 


MPV   8.2 


 


Neut % (Auto)   89.7 


 


Lymph % (Auto)   5.8 


 


Mono % (Auto)   3.9 


 


Eos % (Auto)   0.3 


 


Baso % (Auto)   0.3 


 


Absolute Neuts (auto)   18.8 H 


 


Absolute Lymphs (auto)   1.2 


 


Absolute Monos (auto)   0.8 


 


Absolute Eos (auto)   0.1 


 


Absolute Basos (auto)   0.1 


 


Absolute Nucleated RBC   0 


 


Nucleated RBC %   0 


 


Polychromasia   1+ 


 


Anisocytosis   2+ 


 


Target Cells   1+ 


 


INR (Anticoag Therapy)  1.85 H  


 


Sodium    143


 


Potassium    4.9


 


Chloride    92 L


 


Carbon Dioxide    24


 


Anion Gap    27 H


 


BUN    47 H


 


Creatinine    7.04 H


 


Est GFR ( Amer)    7.9


 


Est GFR (Non-Af Amer)    6.5


 


BUN/Creatinine Ratio    6.7 L


 


Glucose    173 H


 


POC Glucose (mg/dL)   


 


Calcium    8.0 L


 


Total Bilirubin    0.30


 


AST    8 L


 


ALT    11


 


Alkaline Phosphatase    181 H


 


Total Protein    5.9 L


 


Albumin    2.4 L


 


Globulin    3.5


 


Albumin/Globulin Ratio    0.7 L














  03/27/19 03/27/19 03/27/19





  02:36 07:54 11:08


 


WBC   


 


RBC   


 


Hgb   


 


Hct   


 


MCV   


 


MCH   


 


MCHC   


 


RDW   


 


Plt Count   


 


MPV   


 


Neut % (Auto)   


 


Lymph % (Auto)   


 


Mono % (Auto)   


 


Eos % (Auto)   


 


Baso % (Auto)   


 


Absolute Neuts (auto)   


 


Absolute Lymphs (auto)   


 


Absolute Monos (auto)   


 


Absolute Eos (auto)   


 


Absolute Basos (auto)   


 


Absolute Nucleated RBC   


 


Nucleated RBC %   


 


Polychromasia   


 


Anisocytosis   


 


Target Cells   


 


INR (Anticoag Therapy)   


 


Sodium   


 


Potassium   


 


Chloride   


 


Carbon Dioxide   


 


Anion Gap   


 


BUN   


 


Creatinine   


 


Est GFR ( Amer)   


 


Est GFR (Non-Af Amer)   


 


BUN/Creatinine Ratio   


 


Glucose   


 


POC Glucose (mg/dL)  174 H  101 H  151 H


 


Calcium   


 


Total Bilirubin   


 


AST   


 


ALT   


 


Alkaline Phosphatase   


 


Total Protein   


 


Albumin   


 


Globulin   


 


Albumin/Globulin Ratio   








 Laboratory Last Values











WBC  21.0 10^3/uL (3.5-10.8)  H  03/26/19  23:14    


 


RBC  2.73 10^6 /uL (3.70-4.87)  L  03/26/19  23:14    


 


Hgb  7.6 g/dL (12.0-16.0)  L  03/26/19  23:14    


 


Hct  23 % (33-41)  L  03/26/19  23:14    


 


MCV  85 fL (80-97)   03/26/19  23:14    


 


MCH  28 pg (27-31)   03/26/19  23:14    


 


MCHC  33 g/dL (31-36)   03/26/19  23:14    


 


RDW  17 % (10.5-15)  H  03/26/19  23:14    


 


Plt Count  392 10^3/uL (150-450)   03/26/19  23:14    


 


MPV  8.2 fL (7.4-10.4)   03/26/19  23:14    


 


Neut % (Auto)  89.7 %  03/26/19  23:14    


 


Lymph % (Auto)  5.8 %  03/26/19  23:14    


 


Mono % (Auto)  3.9 %  03/26/19  23:14    


 


Eos % (Auto)  0.3 %  03/26/19  23:14    


 


Baso % (Auto)  0.3 %  03/26/19  23:14    


 


Absolute Neuts (auto)  18.8 10^3/ul (1.5-7.7)  H  03/26/19  23:14    


 


Absolute Lymphs (auto)  1.2 10^3/ul (1.0-4.8)   03/26/19  23:14    


 


Absolute Monos (auto)  0.8 10^3/ul (0-0.8)   03/26/19  23:14    


 


Absolute Eos (auto)  0.1 10^3/ul (0-0.6)   03/26/19  23:14    


 


Absolute Basos (auto)  0.1 10^3/ul (0-0.2)   03/26/19  23:14    


 


Absolute Nucleated RBC  0 10^3/ul  03/26/19  23:14    


 


Nucleated RBC %  0   03/26/19  23:14    


 


Polychromasia  1+   03/26/19  23:14    


 


Anisocytosis  2+   03/26/19  23:14    


 


Target Cells  1+   03/26/19  23:14    


 


INR (Anticoag Therapy)  1.85  (0.77-1.02)  H  03/26/19  23:13    


 


Sodium  143 mmol/L (135-145)   03/26/19  23:14    


 


Potassium  4.9 mmol/L (3.5-5.0)   03/26/19  23:14    


 


Chloride  92 mmol/L (101-111)  L  03/26/19  23:14    


 


Carbon Dioxide  24 mmol/L (22-32)   03/26/19  23:14    


 


Anion Gap  27 mmol/L (2-11)  H  03/26/19  23:14    


 


BUN  47 mg/dL (6-24)  H  03/26/19  23:14    


 


Creatinine  7.04 mg/dL (0.51-0.95)  H  03/26/19  23:14    


 


Est GFR ( Amer)  7.9  (>60)   03/26/19  23:14    


 


Est GFR (Non-Af Amer)  6.5  (>60)   03/26/19  23:14    


 


BUN/Creatinine Ratio  6.7  (8-20)  L  03/26/19  23:14    


 


Glucose  173 mg/dL ()  H  03/26/19  23:14    


 


POC Glucose (mg/dL)  151 mg/dL ()  H  03/27/19  11:08    


 


Calcium  8.0 mg/dL (8.6-10.3)  L  03/26/19  23:14    


 


Total Bilirubin  0.30 mg/dL (0.2-1.0)   03/26/19  23:14    


 


AST  8 U/L (13-39)  L  03/26/19  23:14    


 


ALT  11 U/L (7-52)   03/26/19  23:14    


 


Alkaline Phosphatase  181 U/L ()  H  03/26/19  23:14    


 


Total Protein  5.9 g/dL (6.4-8.9)  L  03/26/19  23:14    


 


Albumin  2.4 g/dL (3.2-5.2)  L  03/26/19  23:14    


 


Globulin  3.5 g/dL (2-4)   03/26/19  23:14    


 


Albumin/Globulin Ratio  0.7  (1-3)  L  03/26/19  23:14    














- Objective


Active Medications: 








Acetaminophen (Tylenol Tab*)  650 mg PO Q6H PRN


   PRN Reason: Fever/Pain


Al Hydrox/Mg Hydrox/Simethicone (Maalox Plus*)  30 ml PO Q6H PRN


   PRN Reason: DYSPEPSIA


Albuterol (Ventolin 2.5 Mg/3 Ml Neb.Sol*)  2.5 mg INH Q4HR PRN


   PRN Reason: SOB/WHEEZING


   Last Admin: 03/27/19 08:25 Dose:  2.5 mg


Aspirin (Aspirin Ec Tab*)  81 mg PO DAILY [facility]


   Last Admin: 03/27/19 10:02 Dose:  81 mg


Bethanechol Chloride (Urecholine Tab*)  25 mg PO QID [facility]


   Last Admin: 03/27/19 13:09 Dose:  25 mg


Calcitriol (Rocaltrol  Cap*)  0.25 mcg PO TID [facility]


   Last Admin: 03/27/19 13:10 Dose:  0.25 mcg


Dextrose (D50w Syringe 50 Ml*)  12.5 gm IV PUSH .FOR FS < 60 - SS PRN


   PRN Reason: FS < 60


Dronabinol (Marinol Cap*)  5 mg PO TID [facility]


   Last Admin: 03/27/19 13:10 Dose:  5 mg


Duloxetine HCl (Cymbalta Cap*)  30 mg PO DAILY [facility]


   Last Admin: 03/27/19 10:04 Dose:  30 mg


Epoetin Yan (Retacrit)  4,000 unit SUBCUT MOWEFR [facility]


   Last Admin: 03/27/19 10:04 Dose:  4,000 unit


Fentanyl (Duragesic  Patch 50 Mcg/Hr*)  50 mcg TRANSDERM Q72H [facility]


   Last Admin: 03/27/19 15:00 Dose:  50 mcg


Gabapentin (Neurontin Cap(*))  200 mg PO TID [facility]


   Last Admin: 03/27/19 13:11 Dose:  200 mg


Hydromorphone HCl (Dilaudid Inj1s*)  1 mg IV SLOW PU Q3H PRN


   PRN Reason: PAIN


   Last Admin: 03/27/19 15:02 Dose:  1 mg


Sodium Thiosulfate 25 gm/ (Sodium Chloride)  200 mls @ 200 mls/hr IV SuMoWeFr 

[facility]


Insulin Human Lispro (Humalog*)  0 units SUBCUT ACHS [facility]; Protocol


   Last Admin: 03/27/19 13:13 Dose:  1 unit


Lidocaine HCl (Lidocaine 2% Jelly*)  1 applic TOPICAL TID [facility]


   Last Admin: 03/27/19 15:02 Dose:  Not Given


Lorazepam (Ativan Inj*)  1 mg IV PUSH Q6H PRN


   PRN Reason: ANXIETY


Losartan Potassium (Cozaar Tab*)  25 mg PO DAILY [facility]


   Last Admin: 03/27/19 10:04 Dose:  25 mg


Methadone HCl (Dolophine Tab*)  10 mg PO Q12HR [facility]


   Last Admin: 03/27/19 10:10 Dose:  10 mg


Midodrine (Midodrine (Nf))  10 mg PO QID [facility]; Protocol


   Last Admin: 03/27/19 13:10 Dose:  10 mg


Mometasone Furoate/Formoterol Fumar (Dulera 200/5 Mdi*)  2 puff INH BID [facility]


   Last Admin: 03/27/19 08:22 Dose:  2 puff


Mupirocin (Bactroban 2 % Oint*)  1 applic TOPICAL BID [facility]


   Last Admin: 03/27/19 10:01 Dose:  1 applic


Ondansetron HCl (Zofran Inj*)  8 mg IV Q6H PRN


   PRN Reason: NAUSEA


Pantoprazole Sodium (Protonix Tab*)  40 mg PO QPM [facility]


Pharmacy Profile Note (Fentanyl Patch Check Q Shift)  1 note FOLLOW UP 0700,

1900 [facility]


   Last Admin: 03/27/19 05:54 Dose:  Not Given


Prochlorperazine Edisylate (Compazine Inj*)  5 mg IV Q6H PRN


   PRN Reason: NAUSEA


Sodium Bicarbonate (Sodium Bicarbonate (Antacid)*)  2,600 mg PO QID [facility]


   Last Admin: 03/27/19 13:09 Dose:  2,600 mg








Vital Signs: 


Vital Signs:











Temp Pulse Resp BP Pulse Ox


 


 97.3 F   114   16   110/81   96 


 


 03/27/19 07:23  03/27/19 13:05  03/27/19 15:02  03/27/19 13:05  03/27/19 08:27











Patient Weight: 


 





Weight                           91.172 kg








Intake and Output: 


 Intake & Output











 03/25/19 03/26/19 03/27/19 03/28/19





 06:59 06:59 06:59 06:59


 


Intake Total   0 820


 


Balance   0 820


 


Weight   91.172 kg 


 


Intake:    


 


  Oral   0 820





 





ADLs: Meal  Record                                         Start:  03/27/19 02:

57


Freq:   DAILY@0900,1400,1800                               Status: Active      

  


Protocol:                                                                      

  


 Created      03/27/19 02:57  System  (Rec: 03/27/19 02:57  System  MED-C15)


 Document     03/27/19 09:00  [id number]  (Rec: 03/27/19 09:34  [id number]  MED-C09)


 Document     03/27/19 13:48  [id number]  (Rec: 03/27/19 13:49  [id number]  MED-C11)


Intake and Output                                          Start:  03/26/19 19:

56


Freq:                                                      Status: Active      

  


Protocol:                                                                      

  


 Created      03/26/19 19:56  System  (Rec: 03/26/19 19:56  System  [id number])


Intake and Output                                          Start:  03/27/19 02:

57


Freq:   DAILY@0600,1400,2200                               Status: Active      

  


Protocol:                                                                      

  


 Created      03/27/19 02:57  System  (Rec: 03/27/19 02:57  System  MED-C15)


 Document     03/27/19 04:48  [id number]  (Rec: 03/27/19 04:48  [id number]  MED-C09)








Head: Normal


Eyes: No Scleral Icterus, - - strabismus


Ears/Nose/Mouth/Throat: - - dry mouth, poor dentition


Neck: NL Appearance and Movements; NL JVP


Abdominal: - - PD catheter in place umbilicus, distended, +BS, no masses





- Assessment


Assessment: 





39 yo female with multiple medical problems who is eligible for hospice but she 

is not interested





- Plan


Consult Plan (MU): Palliative


Plan: 





Spoke with pt, boyfriend Raymundo (HCP) and Raymundo's sister. Pt didn't contribute 

much due to just getting her dilaudid dose. Sister of HCP was asking about a 

clinical trial on [employer] [id number] instead of using Thiosulfate. I discussed 

pt's poor prognosis and quality of life but both Raymundo and her sister reiterated 

that pt is a fighter and doesn't want to consider hospice. They said she was 

even considering changing to hemodialysis from the peritoneal. Stressed that 

hospice is excellent for symptom control but they declined. KPS 30% PPS 30%





- Time On Unit


Date of Evaluation: 03/27/19


Hospice Consult Time in: 03:30


Hospice Consult Time Out: 04:30


Hospice Consult Time Total: 60


> 50% of Time Spend In Counseling or Coordinating Care: Yes

## 2019-03-27 NOTE — CONSULT
Subjective


Date of Service: 19


Interval History: 





Ms. Masters is a 37 yo female with PMH significant for DM2, PVD, ESRD on PD, 

anemia, HTN, HLD, gastroparesis, and severe COPD who was originally admitted to 

[facility] from 3/5/19 until 3/25/19 for bilateral thigh pain, and was found to have 

calciphylaxis. She was transferred to [facility] on 3/25/19 for right 3rd finger 

ischemia. She signed out AMA from [facility] and represented to [facility] and was 

readmitted. She presented to the hospital with multiple skin lesions in the 

setting of calciphylaxis.





Patient seen and examined at bedside.





Family History: Findings - Father  age 49 of MI, mother has diabetes


Social History: Findings - Disabled, lives with male partner Raymundo, who is HCP, 

smokes 1/2 PPD, no alcohol or drug use


Past Medical History: Findings - ESRD on peritoneal dialysis, anemia of renal 

disease and blood loss, severe COPD, Type 2 diabetes, GERD, CAD, hypertension, 

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 

stent to RT SFA, stent to R brachial artery, LT eye enucleation, RT foor 

transmetatarsal amp





Review of Systems





- Measurements


Intake and Output: 


Intake and Output Last 24 Hours











 19





 06:59 06:59 06:59 06:59


 


Intake Total   0 120


 


Balance   0 120


 


Weight   201 lb 


 


Intake:    


 


  Oral   0 120














- Review of Systems


Constitutional Symptoms: 


   Negative: Fever, Other - Chills


Dermatology: Positive: Other - Open areas to bilateral thighs and back


Endocrinology: Positive: Diabetes Mellitus





Objective


Active Medications: 





Acetaminophen (Tylenol Tab*)  650 mg PO Q6H PRN Reason: Fever/Pain


Al Hydrox/Mg Hydrox/Simethicone (Maalox Plus*)  30 ml PO Q6H PRN Reason: 

DYSPEPSIA


Albuterol (Ventolin 2.5 Mg/3 Ml Neb.Sol*)  2.5 mg INH Q4HR PRN Reason: SOB/

WHEEZING


Aspirin (Aspirin Ec Tab*)  81 mg PO DAILY [facility]


Bethanechol Chloride (Urecholine Tab*)  25 mg PO QID [facility]


Calcitriol (Rocaltrol  Cap*)  0.25 mcg PO TID [facility]


Dextrose (D50w Syringe 50 Ml*)  12.5 gm IV PUSH .FOR FS < 60 - SS PRN Reason: 

FS < 60


Dronabinol (Marinol Cap*)  5 mg PO TID [facility]


Duloxetine HCl (Cymbalta Cap*)  30 mg PO DAILY [facility]


Epoetin Yan (Retacrit)  4,000 unit SUBCUT MOWEFR [facility]


Fentanyl (Duragesic  Patch 50 Mcg/Hr*)  50 mcg TRANSDERM Q72H [facility]


Gabapentin (Neurontin Cap(*))  200 mg PO TID [facility]


Hydromorphone HCl (Dilaudid Inj1s*)  1 mg IV SLOW PU Q3H PRN Reason: PAIN


Sodium Thiosulfate 25 gm/ (Sodium Chloride)  200 mls @ 200 mls/hr IV SuMoWeFr 

[facility]


Insulin Human Lispro (Humalog*)  0 units SUBCUT ACHS [facility]; Protocol


Lidocaine HCl (Lidocaine 2% Jelly*)  1 applic TOPICAL TID [facility]


Lorazepam (Ativan Inj*)  1 mg IV PUSH Q6H PRN Reason: ANXIETY


Losartan Potassium (Cozaar Tab*)  25 mg PO DAILY [facility]


Methadone HCl (Dolophine Tab*)  10 mg PO Q12HR [facility]


Midodrine (Midodrine (Nf))  10 mg PO QID [facility]; Protocol


Mometasone Furoate/Formoterol Fumar (Dulera 200/5 Mdi*)  2 puff INH BID [facility]


Mupirocin (Bactroban 2 % Oint*)  1 applic TOPICAL BID [facility]


Ondansetron HCl (Zofran Inj*)  8 mg IV Q6H PRN Reason: NAUSEA


Pantoprazole Sodium (Protonix Tab*)  40 mg PO QPM [facility]


Pharmacy Profile Note (Fentanyl Patch Check Q Shift)  1 note FOLLOW UP 0700,

1900 [facility]


Prochlorperazine Edisylate (Compazine Inj*)  5 mg IV Q6H PRN Reason: NAUSEA


Sodium Bicarbonate (Sodium Bicarbonate (Antacid)*)  2,600 mg PO QID [facility]





 Vital Signs - 8 hr











  19





  03:51 05:53 07:23


 


Temperature   97.3 F


 


Pulse Rate   116


 


Respiratory 20 17 16





Rate   


 


Blood Pressure   147/54





(mmHg)   


 


O2 Sat by Pulse   90





Oximetry   














  19





  07:43 08:27 10:02


 


Temperature   


 


Pulse Rate  110 


 


Respiratory 19 20 16





Rate   


 


Blood Pressure   





(mmHg)   


 


O2 Sat by Pulse  96 





Oximetry   














  19





  10:03 10:10 11:02


 


Temperature   


 


Pulse Rate   


 


Respiratory 16 16 20





Rate   


 


Blood Pressure   





(mmHg)   


 


O2 Sat by Pulse   





Oximetry   











Oxygen Devices in Use Now: Nasal Cannula


Appearance: NAD, laying in bed


Ears/Nose/Mouth/Throat: Mucous Membranes Moist


Skin: - - See skin note below


Neurological: Alert and Oriented x 3


Result Diagrams: 


 19 00:20





 19 08:53





Skin Deviation Note





- Skin Deviation Findings





Right medial thigh - The skin is dry with purplish discoloration. There are 

areas of induration. 


Left lateral thigh - The distal and more anterior lesion, measures 3 cm x 2 cm 

x 0.1 cm. The wound base is red and pink. The surrounding skin is intact, no 

drainage noted. The distal and more lateral lesion, measures 7.8 cm x 3 cm. The 

wound base is a black eschar. The surrounding skin is a purplish discoloration. 

No drainage noted. The proximal and lateral lesion, measures 8.5 cm x 1.8 cm x 

0.1 cm. The wound base is pink/red and black eschar. There is no drainage 

noted. The surrounding skin is a purplish discoloration. (The most proximal 

wound partially seen in the picture is seen and described below).


Left lateral upper thigh/hip - Lesion measures 20 cm x 9.5 cm. The wound base 

is a black eschar. The surrounding skin is intact with a purplish discoloration 

and areas of induration. No drainage noted. 


Left medial thigh - The proximal and more anterior lesion, measures 2 cm x 3.3 

cm. This lesion is boggy and appears to be fluid filled. The surrounding skin 

is intact. No drainage noted. The lower more distal and medial lesion has a 

healing incision near the top from a biopsy. There are 2 sutures intact. This 

lesion measures 6.5 cm x 6.3 cm. The surrounding skin is intact with purplish 

discoloration. The area has induration. 


Left medial lower leg - There is purplish discoloration and area of induration 

distal to the knee. 


Right lateral thigh - Most proximal lesion, measures 2.5 cm x 1.5 cm x 0.1 cm. 

The proximal and more lateral lesion, measures 4 cm x 1.2 cm x 0.1 cm. Center 

lateral lesion, measures 2.5 cm x 2.5 cm x 0.1 cm. The wound bed is pink. The 

surrounding skin is intact with purplish discoloration. Distal cluster of 

lesions, measuring 5 cm x 6.5 cm. The wound beds are a dark eschar. The 

surrounding skin is intact with a purplish discoloration and areas of 

induration. 


Right lateral hip -The proximal and more anterior lesion, measures 2.5 cm x 4.5 

cm x 0.1 cm. The wound base is dry and red with a small amount of black eschar. 

The larger and more lateral lesion, measures 13.3 cm x 2.7 cm x 0.1 cm. The 

wound base is dry and red with a small amount of black and yellow dry eschar. 

There is no drainage. The surrounding skin is intact with purplish 

discoloration.  


Sacrum/lower back - Lesion measures 13.5 cm x 12.5 cm. The wound base is mostly 

dry black eschar. There is some superficial open areas surrounding the majority 

of the eschar, this tissue is pink. There is no drainage notes. The surrounding 

skin is intact. 


Buttocks - Right buttock open area, measures 2 cm x 2 cm x 0.1 cm. The wound 

base is pink. The surrounding skin is intact. No drainage noted. Left buttock 

open area, measures 2.7 cm x 1 cm x 0.1 cm. The wound base is pink. The 

surrounding skin is intact. No drainage noted. 





Assessment/Plan:


Ms. Masters is a 38 year old female with multiple medical problems including 

ESRD on PD, calciphylaxis, chronic GI bleed, diffuse PVD, and DM2 who presented 

to the hospital with multiple lesions from calciphylaxis.





1. Multiple lesions to LEs and buttocks. These are secondary to calciphylaxis. 

Recommend leaving the areas open to air. 


2. Excoriated buttocks. Secondary to frequent loose stools. This area is 

improving from last week. Apply barrier cream to the buttocks. She can use her 

own Calazime if she wants.


3. Calciphylaxis. Management per primary medicine team and nephrology.


4. Diabetes Mellitus, type 2. Maintain good glycemic control to allow for wound 

healing.


5. Diet. Consistent carbohydrate, renal diet.


6. Code Status. Full code.


7. Disposition. Disposition per primary medicine team.





TIME SPENT: Time for this wound consultation was 30 minutes and 20 minutes was 

spent with the patient discussing past medical history, assessing, measuring 

and photographing the wounds. 











Wound Problem/Plan


Is Patient a Wound Clinic Patient: No


Attending: Yesica Duran

## 2019-03-27 NOTE — PN
Subjective


Date of Service: 19


Family History: Findings - Father  age 49 of [place], mother has diabetes


Social History: Findings - Disabled, lives with male partner Raymundo, who is HCP, 

smokes 1/2 PPD, no alcohol or drug use


Past Medical History: Findings - ESRD on peritoneal dialysis, anemia of renal 

disease and blood loss, severe COPD, Type 2 diabetes, GERD, CAD, hypertension, 

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 

stent to RT SFA, stent to R brachial artery, LT eye enucleation, RT foor 

transmetatarsal amp





Objective


Active Medications: 








Acetaminophen (Tylenol Tab*)  650 mg PO Q6H PRN


   PRN Reason: Fever/Pain


Al Hydrox/Mg Hydrox/Simethicone (Maalox Plus*)  30 ml PO Q6H PRN


   PRN Reason: DYSPEPSIA


Albuterol (Ventolin 2.5 Mg/3 Ml Neb.Sol*)  2.5 mg INH Q4HR PRN


   PRN Reason: SOB/WHEEZING


   Last Admin: 19 08:25 Dose:  2.5 mg


Aspirin (Aspirin Ec Tab*)  81 mg PO DAILY [facility]


   Last Admin: 19 10:02 Dose:  81 mg


Bethanechol Chloride (Urecholine Tab*)  25 mg PO QID [facility]


   Last Admin: 19 13:09 Dose:  25 mg


Calcitriol (Rocaltrol  Cap*)  0.25 mcg PO TID [facility]


   Last Admin: 19 13:10 Dose:  0.25 mcg


Dextrose (D50w Syringe 50 Ml*)  12.5 gm IV PUSH .FOR FS < 60 - SS PRN


   PRN Reason: FS < 60


Dronabinol (Marinol Cap*)  5 mg PO TID [facility]


   Last Admin: 19 13:10 Dose:  5 mg


Duloxetine HCl (Cymbalta Cap*)  30 mg PO DAILY [facility]


   Last Admin: 19 10:04 Dose:  30 mg


Epoetin Yan (Retacrit)  4,000 unit SUBCUT MOWEFR [facility]


   Last Admin: 19 10:04 Dose:  4,000 unit


Fentanyl (Duragesic  Patch 50 Mcg/Hr*)  50 mcg TRANSDERM Q72H [facility]


   Last Admin: 19 04:18 Dose:  Not Given


Gabapentin (Neurontin Cap(*))  200 mg PO TID [facility]


   Last Admin: 19 13:11 Dose:  200 mg


Hydromorphone HCl (Dilaudid Inj1s*)  1 mg IV SLOW PU Q3H PRN


   PRN Reason: PAIN


   Last Admin: 19 11:02 Dose:  1 mg


Sodium Thiosulfate 25 gm/ (Sodium Chloride)  200 mls @ 200 mls/hr IV SuMoWeFr 

[facility]


Insulin Human Lispro (Humalog*)  0 units SUBCUT ACHS [facility]; Protocol


   Last Admin: 19 13:13 Dose:  1 unit


Lidocaine HCl (Lidocaine 2% Jelly*)  1 applic TOPICAL TID [facility]


   Last Admin: 19 10:04 Dose:  1 applic


Lorazepam (Ativan Inj*)  1 mg IV PUSH Q6H PRN


   PRN Reason: ANXIETY


Losartan Potassium (Cozaar Tab*)  25 mg PO DAILY [facility]


   Last Admin: 19 10:04 Dose:  25 mg


Methadone HCl (Dolophine Tab*)  10 mg PO Q12HR [facility]


   Last Admin: 19 10:10 Dose:  10 mg


Midodrine (Midodrine (Nf))  10 mg PO QID [facility]; Protocol


   Last Admin: 19 13:10 Dose:  10 mg


Mometasone Furoate/Formoterol Fumar (Dulera 200/5 Mdi*)  2 puff INH BID [facility]


   Last Admin: 19 08:22 Dose:  2 puff


Mupirocin (Bactroban 2 % Oint*)  1 applic TOPICAL BID [facility]


   Last Admin: 19 10:01 Dose:  1 applic


Ondansetron HCl (Zofran Inj*)  8 mg IV Q6H PRN


   PRN Reason: NAUSEA


Pantoprazole Sodium (Protonix Tab*)  40 mg PO QPM [facility]


Pharmacy Profile Note (Fentanyl Patch Check Q Shift)  1 note FOLLOW UP 0700,

1900 [facility]


   Last Admin: 19 05:54 Dose:  Not Given


Prochlorperazine Edisylate (Compazine Inj*)  5 mg IV Q6H PRN


   PRN Reason: NAUSEA


Sodium Bicarbonate (Sodium Bicarbonate (Antacid)*)  2,600 mg PO QID [facility]


   Last Admin: 19 13:09 Dose:  2,600 mg








 Vital Signs - 8 hr











  19





  07:23 07:43 08:27


 


Temperature 97.3 F  


 


Pulse Rate 116  110


 


Respiratory 16 19 20





Rate   


 


Blood Pressure 147/54  





(mmHg)   


 


O2 Sat by Pulse 90  96





Oximetry   














  19





  09:39 10:02 10:03


 


Temperature   


 


Pulse Rate   


 


Respiratory 18 16 16





Rate   


 


Blood Pressure   





(mmHg)   


 


O2 Sat by Pulse   





Oximetry   














  19





  10:10 11:02 13:05


 


Temperature   


 


Pulse Rate   114


 


Respiratory 16 20 





Rate   


 


Blood Pressure   110/81





(mmHg)   


 


O2 Sat by Pulse   





Oximetry   














  19





  13:10 13:11


 


Temperature  


 


Pulse Rate  


 


Respiratory 16 16





Rate  


 


Blood Pressure  





(mmHg)  


 


O2 Sat by Pulse  





Oximetry  











Oxygen Devices in Use Now: Nasal Cannula


Appearance: 37 yo F  in NAD, AAOx3


Eyes: No Scleral Icterus, PERRLA


Ears/Nose/Mouth/Throat: NL Teeth, Lips, Gums, Mucous Membranes Moist


Neck: NL Appearance and Movements; NL JVP, Trachea Midline


Respiratory: Symmetrical Chest Expansion and Respiratory Effort, - - crackles 

at b/l bases


Cardiovascular: NL Sounds; No Murmurs; No JVD, RRR


Abdominal: NL Sounds; No Tenderness; No Distention, - - peritoneal dialysis 

ongoing during exam


Lymphatic: No Cervical Adenopathy


Extremities: No Clubbing, Cyanosis, - - s/p R TMA-stump healed


Skin: - - large necrotic ban like lesio at approx 10 cm in width that begins on 

one thigh and cirumferentialy crosses via bcak to another thigh. R calf 

superficial lesions due to calcifilaxis. R 3+4 fingers with mild bluish hue to 

them, no cyanosis


Neurological: Alert and Oriented x 3, NL Muscle Strength and Tone


Result Diagrams: 


 19 23:14





 19 23:14





Assess/Plan/Problems-Billing


Assessment: 


38 year old with multiple medical problems including ESRD, on PD, calciphylaxis

, chronic GI bleed, diffuse PVD








- Patient Problems


(1) GI bleed


Comment: - Source is unclear,  ischemic colitis was suspected on 3/22/19 in 

this pt  with known vascular disease.


- hemoglobin stable and partially related to ESRD


-poor endoscopy candidate


-no signs or symptoms of acuet bleed today.


   





(2) PAD (peripheral artery disease)


Comment: - Recent CT Aorta with run off this admission with moderate-severe R 

common femoral artery stenosis, severe b/l popliteal artery stenosis, near 

complete occlusion of distal L superficial fem artery stent.


- as per dopplers of b/l UE's obtained at [facility] on 3/26/19  pt had good biphasic 

waves of b/l radial and ulnar arteries, but significant stenosis of all 5 

fingers on R likely due to calcifilaxis. No other tx option apart for sodium 

thiosulfate is avaliable at this point. D/w DR. Baker   





(3) Calciphylaxis


Comment: - S/p skin biopsy on 3/18 with pathology confirmation. 


- cont  IV sodium thiosulfate infusions


- Continue to monitor for hypotension.


   





(4) Acidosis


Comment: due to renal disease, cont sodium bicarb supplementation PO   





(5) Acute respiratory failure with hypoxia


Comment: - Acute on chronic hypoxemic respiratory failure.


- She was back to her baseline O2 requirement of 5L continuously, extubated 3/

11. Thought to be from PNA and volume overload causing pulm congestion.


- Completed 7 days of Zosyn.


- Continue Dulera and bronchodilators.   





(6) Bleeding disorder


Comment: - Concern for DIC with her h/o epistaxis, vaginal bleeding (in the 

setting of Warfarin use),  and  GI bleed . today no active GI bleeding noted. 

Pt had maroon stool yesterday.


- PT/PTT are prolonged, d-dimer elevated, but fibrinogen is also high.


- No signs of active bleeding now (but stool at [facility] heme+), platelets are 

normal.





   





(7) Secondary hyperparathyroidism of renal origin


Comment: - Phos 4.7, calcium 6.2 (corrected 7.6), 25-vitamin D <7.0, PTH 85.


- Phosphorus is normal.


- Corrected Calcium is >7.5.


- Continue Calcitriol.   





(8) ESRD on peritoneal dialysis


Comment: - Continue peritoneal dialysis.


   





(9) IDDM (insulin dependent diabetes mellitus)


Comment: - HgbA1c 8.8%


- Continue Lispro SS. 


-holding Lantus   





(10) DVT prophylaxis


Comment: 


 - On coumadin


   INR 2-3 goal   


Status and Disposition: 





inpatient

## 2019-03-28 RX ADMIN — ALBUTEROL SULFATE SCH: 2.5 SOLUTION RESPIRATORY (INHALATION) at 08:25

## 2019-03-28 RX ADMIN — ALBUTEROL SULFATE SCH MG: 2.5 SOLUTION RESPIRATORY (INHALATION) at 03:03

## 2019-03-28 RX ADMIN — LOSARTAN POTASSIUM SCH MG: 25 TABLET, FILM COATED ORAL at 09:21

## 2019-03-28 RX ADMIN — METHADONE HYDROCHLORIDE SCH MG: 10 TABLET ORAL at 21:20

## 2019-03-28 RX ADMIN — WATER SCH NOTE: 100 INJECTION, SOLUTION INTRAVENOUS at 19:00

## 2019-03-28 RX ADMIN — MIDODRINE HYDROCHLORIDE SCH MG: 5 TABLET ORAL at 17:14

## 2019-03-28 RX ADMIN — ALBUTEROL SULFATE SCH MG: 2.5 SOLUTION RESPIRATORY (INHALATION) at 08:12

## 2019-03-28 RX ADMIN — DRONABINOL SCH MG: 2.5 CAPSULE ORAL at 09:19

## 2019-03-28 RX ADMIN — MUPIROCIN SCH: 20 OINTMENT TOPICAL at 12:17

## 2019-03-28 RX ADMIN — HYDROMORPHONE HYDROCHLORIDE PRN MG: 1 INJECTION, SOLUTION INTRAMUSCULAR; INTRAVENOUS; SUBCUTANEOUS at 22:33

## 2019-03-28 RX ADMIN — HYDROMORPHONE HYDROCHLORIDE PRN MG: 1 INJECTION, SOLUTION INTRAMUSCULAR; INTRAVENOUS; SUBCUTANEOUS at 14:11

## 2019-03-28 RX ADMIN — HYDROMORPHONE HYDROCHLORIDE PRN MG: 1 INJECTION, SOLUTION INTRAMUSCULAR; INTRAVENOUS; SUBCUTANEOUS at 20:23

## 2019-03-28 RX ADMIN — METHADONE HYDROCHLORIDE SCH MG: 10 TABLET ORAL at 09:19

## 2019-03-28 RX ADMIN — WATER SCH NOTE: 100 INJECTION, SOLUTION INTRAVENOUS at 12:52

## 2019-03-28 RX ADMIN — HYDROMORPHONE HYDROCHLORIDE PRN MG: 1 INJECTION, SOLUTION INTRAMUSCULAR; INTRAVENOUS; SUBCUTANEOUS at 08:35

## 2019-03-28 RX ADMIN — GABAPENTIN SCH MG: 100 CAPSULE ORAL at 09:21

## 2019-03-28 RX ADMIN — LIDOCAINE HYDROCHLORIDE SCH: 20 JELLY TOPICAL at 17:05

## 2019-03-28 RX ADMIN — INSULIN LISPRO SCH: 100 INJECTION, SOLUTION INTRAVENOUS; SUBCUTANEOUS at 11:19

## 2019-03-28 RX ADMIN — BETHANECHOL CHLORIDE SCH: 25 TABLET ORAL at 15:58

## 2019-03-28 RX ADMIN — INSULIN LISPRO SCH: 100 INJECTION, SOLUTION INTRAVENOUS; SUBCUTANEOUS at 15:57

## 2019-03-28 RX ADMIN — LIDOCAINE HYDROCHLORIDE SCH APPLIC: 20 JELLY TOPICAL at 09:18

## 2019-03-28 RX ADMIN — MUPIROCIN SCH: 20 OINTMENT TOPICAL at 22:00

## 2019-03-28 RX ADMIN — ALBUTEROL SULFATE PRN MG: 2.5 SOLUTION RESPIRATORY (INHALATION) at 23:02

## 2019-03-28 RX ADMIN — MIDODRINE HYDROCHLORIDE SCH MG: 5 TABLET ORAL at 09:21

## 2019-03-28 RX ADMIN — DRONABINOL SCH MG: 2.5 CAPSULE ORAL at 21:21

## 2019-03-28 RX ADMIN — GABAPENTIN SCH MG: 100 CAPSULE ORAL at 14:05

## 2019-03-28 RX ADMIN — INSULIN LISPRO SCH UNIT: 100 INJECTION, SOLUTION INTRAVENOUS; SUBCUTANEOUS at 21:19

## 2019-03-28 RX ADMIN — BETHANECHOL CHLORIDE SCH MG: 25 TABLET ORAL at 17:15

## 2019-03-28 RX ADMIN — MOMETASONE FUROATE AND FORMOTEROL FUMARATE DIHYDRATE SCH PUFF: 200; 5 AEROSOL RESPIRATORY (INHALATION) at 08:12

## 2019-03-28 RX ADMIN — SODIUM BICARBONATE TAB 650 MG SCH MG: 650 TAB at 09:19

## 2019-03-28 RX ADMIN — ALBUTEROL SULFATE SCH: 2.5 SOLUTION RESPIRATORY (INHALATION) at 10:46

## 2019-03-28 RX ADMIN — MOMETASONE FUROATE AND FORMOTEROL FUMARATE DIHYDRATE SCH PUFF: 200; 5 AEROSOL RESPIRATORY (INHALATION) at 19:50

## 2019-03-28 RX ADMIN — LIDOCAINE HYDROCHLORIDE SCH: 20 JELLY TOPICAL at 22:03

## 2019-03-28 RX ADMIN — SODIUM BICARBONATE TAB 650 MG SCH MG: 650 TAB at 21:20

## 2019-03-28 RX ADMIN — DRONABINOL SCH MG: 2.5 CAPSULE ORAL at 14:06

## 2019-03-28 RX ADMIN — SODIUM BICARBONATE TAB 650 MG SCH: 650 TAB at 15:58

## 2019-03-28 RX ADMIN — INSULIN LISPRO SCH UNIT: 100 INJECTION, SOLUTION INTRAVENOUS; SUBCUTANEOUS at 17:15

## 2019-03-28 RX ADMIN — ASPIRIN SCH MG: 81 TABLET, COATED ORAL at 09:22

## 2019-03-28 RX ADMIN — HYDROMORPHONE HYDROCHLORIDE PRN MG: 1 INJECTION, SOLUTION INTRAMUSCULAR; INTRAVENOUS; SUBCUTANEOUS at 01:15

## 2019-03-28 RX ADMIN — GABAPENTIN SCH MG: 100 CAPSULE ORAL at 21:21

## 2019-03-28 RX ADMIN — CALCITRIOL SCH MCG: 0.25 CAPSULE ORAL at 09:18

## 2019-03-28 RX ADMIN — ALBUTEROL SULFATE PRN MG: 2.5 SOLUTION RESPIRATORY (INHALATION) at 00:59

## 2019-03-28 RX ADMIN — BETHANECHOL CHLORIDE SCH MG: 25 TABLET ORAL at 09:19

## 2019-03-28 RX ADMIN — MIDODRINE HYDROCHLORIDE SCH MG: 5 TABLET ORAL at 21:20

## 2019-03-28 RX ADMIN — PROCHLORPERAZINE EDISYLATE PRN MG: 5 INJECTION INTRAMUSCULAR; INTRAVENOUS at 10:46

## 2019-03-28 RX ADMIN — DULOXETINE HYDROCHLORIDE SCH MG: 30 CAPSULE, DELAYED RELEASE ORAL at 09:21

## 2019-03-28 RX ADMIN — ONDANSETRON PRN MG: 2 INJECTION INTRAMUSCULAR; INTRAVENOUS at 04:55

## 2019-03-28 RX ADMIN — HYDROMORPHONE HYDROCHLORIDE PRN MG: 1 INJECTION, SOLUTION INTRAMUSCULAR; INTRAVENOUS; SUBCUTANEOUS at 18:28

## 2019-03-28 RX ADMIN — WATER SCH NOTE: 100 INJECTION, SOLUTION INTRAVENOUS at 08:23

## 2019-03-28 RX ADMIN — HYDROMORPHONE HYDROCHLORIDE PRN MG: 1 INJECTION, SOLUTION INTRAMUSCULAR; INTRAVENOUS; SUBCUTANEOUS at 04:55

## 2019-03-28 RX ADMIN — SODIUM BICARBONATE TAB 650 MG SCH MG: 650 TAB at 17:14

## 2019-03-28 RX ADMIN — PANTOPRAZOLE SODIUM SCH MG: 40 TABLET, DELAYED RELEASE ORAL at 17:15

## 2019-03-28 RX ADMIN — MIDODRINE HYDROCHLORIDE SCH: 5 TABLET ORAL at 15:58

## 2019-03-28 RX ADMIN — BETHANECHOL CHLORIDE SCH MG: 25 TABLET ORAL at 21:20

## 2019-03-28 NOTE — PN
Hospitalist Progress Note


Date of Service: 03/28/19


HOSPITALIST ADDENDUM


Called by RN because patient was c/o dyspnea.


Seen at bedside, lying in bed in NAD, getting nebulizer treatment. On her usual 

5 liters of O2.


CVS: normal S1 and S2, RRR


Chest: BS+ bilaterally with diffuse wheezing.


CxR: to my read, no acute pulmonary disease.


A/P: - continue bronchodilators.

## 2019-03-28 NOTE — PN
Subjective


Date of Service: 19


Interval History: 





Pt was noted to have 02 sat at 80%, sounded congested, lethargic. CXR at night 

showed pulm edema. D/w nephrology: pt does not make urine, but dilaysate will 

be changed to take more fluid off.


will transfer pt to ICU. she refuses BIPAP. will start Vapotherm.


Family History: Findings - Father  age 49 of [place], mother has diabetes


Social History: Findings - Disabled, lives with male partner Raymundo, who is HCP, 

smokes 1/2 PPD, no alcohol or drug use


Past Medical History: Findings - ESRD on peritoneal dialysis, anemia of renal 

disease and blood loss, severe COPD, Type 2 diabetes, GERD, CAD, hypertension, 

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 

stent to RT SFA, stent to R brachial artery, LT eye enucleation, RT foor 

transmetatarsal amp





Objective


Active Medications: 








Acetaminophen (Tylenol Tab*)  650 mg PO Q6H PRN


   PRN Reason: Fever/Pain


Al Hydrox/Mg Hydrox/Simethicone (Maalox Plus*)  30 ml PO Q6H PRN


   PRN Reason: DYSPEPSIA


Albuterol (Ventolin 2.5 Mg/3 Ml Neb.Sol*)  2.5 mg INH Q2HR PRN


   PRN Reason: SOB/WHEEZING


Aspirin (Aspirin Ec Tab*)  81 mg PO DAILY [facility]


   Last Admin: 19 09:22 Dose:  81 mg


Bethanechol Chloride (Urecholine Tab*)  25 mg PO QID [facility]


   Last Admin: 19 09:19 Dose:  25 mg


Dextrose (D50w Syringe 50 Ml*)  12.5 gm IV PUSH .FOR FS < 60 - SS PRN


   PRN Reason: FS < 60


Dronabinol (Marinol Cap*)  5 mg PO TID [facility]


   Last Admin: 19 09:19 Dose:  5 mg


Duloxetine HCl (Cymbalta Cap*)  30 mg PO DAILY [facility]


   Last Admin: 19 09:21 Dose:  30 mg


Epoetin Yan (Retacrit)  4,000 unit SUBCUT MOWEFR [facility]


   Last Admin: 19 10:04 Dose:  4,000 unit


Fentanyl (Duragesic  Patch 50 Mcg/Hr*)  50 mcg TRANSDERM Q72H [facility]


   Last Admin: 19 15:00 Dose:  50 mcg


Gabapentin (Neurontin Cap(*))  200 mg PO TID [facility]


   Last Admin: 19 09:21 Dose:  200 mg


Hydromorphone HCl (Dilaudid Inj1s*)  2 mg IV SLOW PU Q2H PRN


   PRN Reason: PAIN - [facility]


   Last Admin: 19 08:35 Dose:  2 mg


Sodium Thiosulfate 25 gm/ (Sodium Chloride)  200 mls @ 200 mls/hr IV SuMoWeFr 

[facility]


Insulin Human Lispro (Humalog*)  0 units SUBCUT ACHS [facility]; Protocol


   Last Admin: 19 11:19 Dose:  Not Given


Lidocaine HCl (Lidocaine 2% Jelly*)  1 applic TOPICAL TID [facility]


   Last Admin: 19 09:18 Dose:  1 applic


Lorazepam (Ativan Inj*)  1 mg IV PUSH Q6H PRN


   PRN Reason: ANXIETY


Losartan Potassium (Cozaar Tab*)  25 mg PO DAILY [facility]


   Last Admin: 19 09:21 Dose:  25 mg


Methadone HCl (Dolophine Tab*)  5 mg PO Q12HR [facility]


   Last Admin: 19 09:19 Dose:  5 mg


Midodrine (Midodrine (Nf))  10 mg PO QID [facility]; Protocol


   Last Admin: 19 09:21 Dose:  10 mg


Mometasone Furoate/Formoterol Fumar (Dulera 200/5 Mdi*)  2 puff INH BID [facility]


   Last Admin: 19 08:12 Dose:  2 puff


Mupirocin (Bactroban 2 % Oint*)  1 applic TOPICAL BID [facility]


   Last Admin: 19 21:47 Dose:  1 applic


Ondansetron HCl (Zofran Inj*)  8 mg IV Q6H PRN


   PRN Reason: NAUSEA


   Last Admin: 19 04:55 Dose:  8 mg


Oxycodone HCl (Roxycodone Tab*)  10 mg PO Q4H PRN


   PRN Reason: PAIN - MODERATE TO SEVERE


Pantoprazole Sodium (Protonix Tab*)  40 mg PO QPM [facility]


   Last Admin: 19 17:38 Dose:  40 mg


Pharmacy Profile Note (Fentanyl Patch Check Q Shift)  1 note FOLLOW UP 0700,

1900 [facility]


   Last Admin: 19 08:23 Dose:  1 note


Prochlorperazine Edisylate (Compazine Inj*)  5 mg IV Q6H PRN


   PRN Reason: NAUSEA


   Last Admin: 19 10:46 Dose:  5 mg


Sodium Bicarbonate (Sodium Bicarbonate (Antacid)*)  2,600 mg PO QID [facility]


   Last Admin: 19 09:19 Dose:  2,600 mg








 Vital Signs - 8 hr











  19





  04:32 04:55 05:55


 


Temperature   


 


Pulse Rate 74  


 


Respiratory 18 21 18





Rate   


 


Blood Pressure   





(mmHg)   


 


O2 Sat by Pulse 97  





Oximetry   














  19





  07:37 08:15 08:30


 


Temperature 98.1 F  


 


Pulse Rate 123 111 


 


Respiratory 20 20 





Rate   


 


Blood Pressure 112/21  112/40





(mmHg)   


 


O2 Sat by Pulse  96 97





Oximetry   














  19





  08:35 09:19 09:21


 


Temperature   


 


Pulse Rate   


 


Respiratory 20 18 18





Rate   


 


Blood Pressure   





(mmHg)   


 


O2 Sat by Pulse   





Oximetry   














  19





  11:15


 


Temperature 


 


Pulse Rate 


 


Respiratory 18





Rate 


 


Blood Pressure 





(mmHg) 


 


O2 Sat by Pulse 





Oximetry 











Oxygen Devices in Use Now: High Flow Nasal Cannula


Appearance: 37 yo F , tachypneic, uses accessory muscles, AAOx3


Eyes: No Scleral Icterus, PERRLA


Ears/Nose/Mouth/Throat: NL Teeth, Lips, Gums, Mucous Membranes Moist


Neck: NL Appearance and Movements; NL JVP


Respiratory: Symmetrical Chest Expansion and Respiratory Effort, - - rales b/l


Cardiovascular: NL Sounds; No Murmurs; No JVD, RRR


Abdominal: NL Sounds; No Tenderness; No Distention, - - PD cath in place


Lymphatic: No Cervical Adenopathy


Extremities: No Clubbing, Cyanosis, - - s/p L TMA


Skin: - - necrotic calcifilaxis lesions on b/l thighs, flank, fingers R hand 

cyanotic


Neurological: Alert and Oriented x 3, NL Muscle Strength and Tone


Result Diagrams: 


 19 23:14





 19 23:14


Microbiology and Other Data: 


 Microbiology











 19 21:58 Stool Occult Blood (JESSICA) - Final





 Stool 














Assess/Plan/Problems-Billing


Assessment: 


38 year old with multiple medical problems including ESRD, on PD, calciphylaxis

, chronic GI bleed, diffuse PVD








- Patient Problems


(1) Acute respiratory failure with hypoxia


Comment: - Acute on chronic hypoxemic respiratory failure.


- She was back to her baseline O2 requirement of 5L continuously, extubated 3/

11. Now back with pulm edema, will transfer to ICU on Vapotherm. Pt refuses 

BIPAP. at the beginning of the month she was thought to have  PNA and volume 

overload causing pulm congestion.


- Completed 7 days of Zosyn.


- Continue Dulera and bronchodilators.   





(2) GI bleed


Comment: - Source is unclear,  ischemic colitis was suspected on 3/22/19 in 

this pt  with known vascular disease.


- hemoglobin stable and partially related to ESRD


-poor endoscopy candidate


-no signs or symptoms of acuet bleed today.


   





(3) PAD (peripheral artery disease)


Comment: - Recent CT Aorta with run off this admission with moderate-severe R 

common femoral artery stenosis, severe b/l popliteal artery stenosis, near 

complete occlusion of distal L superficial fem artery stent.


- as per dopplers of b/l UE's obtained at [facility] on 3/26/19  pt had good biphasic 

waves of b/l radial and ulnar arteries, but significant stenosis of all 5 

fingers on R likely due to calcifilaxis. No other tx option apart for sodium 

thiosulfate is avaliable at this point. D/w DR. Baker. WAs on Coumadin for PAD

, stopped due to GI bleed   





(4) Calciphylaxis


Comment: - S/p skin biopsy on 3/18 with pathology confirmation. 


- cont  IV sodium thiosulfate infusions


- Continue to monitor for hypotension.


   





(5) Acidosis


Comment: due to renal disease, cont sodium bicarb supplementation PO   





(6) Bleeding disorder


Comment: - Concern for DIC with her h/o epistaxis, vaginal bleeding (in the 

setting of Warfarin use),  and  GI bleed . today no active GI bleeding noted. 

Pt had maroon stool 3/26.


- PT/PTT are prolonged, d-dimer elevated, but fibrinogen is also high.


- No signs of active bleeding now (but stool at [facility] heme+), platelets are 

normal.


-coumadin stopped 





   





(7) Secondary hyperparathyroidism of renal origin


Comment: - Phos 4.7, calcium 6.2 (corrected 7.6), 25-vitamin D <7.0, PTH 85.


- Phosphorus is normal.


- Corrected Calcium is >7.5.


- Calcitriol stopped after d/w nephrology.   





(8) ESRD on peritoneal dialysis


Comment: - Continue peritoneal dialysis. Nephrology  will try to take off more 

fluid today


   





(9) IDDM (insulin dependent diabetes mellitus)


Comment: - HgbA1c 8.8%


- Continue Lispro SS. 


-holding Lantus   





(10) DVT prophylaxis


Comment: no anticoagulants due to bleeding


Pt has h/o HIT


SCD's   


Status and Disposition: 





inpatient

## 2019-03-28 NOTE — PN
PROGRESS NOTE:

 

DATE OF VISIT:  03/28/19

 

SUBJECTIVE:  The patient was seen and examined at bedside.  The patient appears 
more exhausted today having some difficulty breathing.  Reports that she did 
not sleep last night.  Pain in her lower extremity is improved for the patient.
  Vitals and labs have been reviewed.

 

PHYSICAL EXAMINATION:  HEENT:  NC/AT.  Heart:  S1, S2 present.  Tachycardic at 
the time of exam.  Lungs:  Noted to have bilateral crackles.  Abdomen:  Soft, 
nontender.  No rebound, no guarding.  Extremities:  Noted to have some edema, 
lesions consistent with calciphylaxis on bilateral thighs.  The patient's 
digits appear less gangrenous compared to last week.

 

ASSESSMENT AND PLAN:

1.  End-stage renal disease, on peritoneal dialysis.  The patient is noted to 
be volume overloaded, will switch her to 4.25% PD solution followed by 2.5% 
solution on the cycle for extra UF, can also increase this further to 4.25% 
based on her response for a short period of time if needed.  I discussed PD 
orders with the dialysis nurse.

2.  Calciphylaxis, confirmed on skin biopsy.  Continue sodium thiosulfate.  The 
patient does become hypotensive with 6 times a week use, at this time is 
getting it every 3 times a week.

3.  Will also use PD prescription above, as the patient has been very sensitive 
and has become hypotensive. We will see how she does with the above PD 
prescription and can adjust further.

4.  Other plan per the primary medical team.

 

 [id number]/[facility] #: [id number]

MTDD

## 2019-03-29 LAB
ANION GAP SERPL CALC-SCNC: 33 MMOL/L (ref 2–11)
BUN SERPL-MCNC: 41 MG/DL (ref 6–24)
BUN/CREAT SERPL: 6.2 (ref 8–20)
CALCIUM SERPL-MCNC: 8.7 MG/DL (ref 8.6–10.3)
CHLORIDE SERPL-SCNC: 83 MMOL/L (ref 101–111)
GLUCOSE SERPL-MCNC: 859 MG/DL (ref 70–100)
HCO3 SERPL-SCNC: 21 MMOL/L (ref 22–32)
POTASSIUM SERPL-SCNC: 4.2 MMOL/L (ref 3.5–5)
SODIUM SERPL-SCNC: 137 MMOL/L (ref 135–145)

## 2019-03-29 PROCEDURE — 5A1955Z RESPIRATORY VENTILATION, GREATER THAN 96 CONSECUTIVE HOURS: ICD-10-PCS | Performed by: INTERNAL MEDICINE

## 2019-03-29 PROCEDURE — 0BH17EZ INSERTION OF ENDOTRACHEAL AIRWAY INTO TRACHEA, VIA NATURAL OR ARTIFICIAL OPENING: ICD-10-PCS | Performed by: INTERNAL MEDICINE

## 2019-03-29 RX ADMIN — ALBUTEROL SULFATE PRN MG: 2.5 SOLUTION RESPIRATORY (INHALATION) at 09:05

## 2019-03-29 RX ADMIN — EPINEPHRINE SCH MLS/HR: 0.1 INJECTION, SOLUTION INTRAVENOUS at 16:15

## 2019-03-29 RX ADMIN — MUPIROCIN SCH APPLIC: 20 OINTMENT TOPICAL at 10:57

## 2019-03-29 RX ADMIN — MOMETASONE FUROATE AND FORMOTEROL FUMARATE DIHYDRATE SCH PUFF: 200; 5 AEROSOL RESPIRATORY (INHALATION) at 08:13

## 2019-03-29 RX ADMIN — ALBUTEROL SULFATE PRN MG: 2.5 SOLUTION RESPIRATORY (INHALATION) at 11:50

## 2019-03-29 RX ADMIN — WATER SCH NOTE: 100 INJECTION, SOLUTION INTRAVENOUS at 07:32

## 2019-03-29 RX ADMIN — PROCHLORPERAZINE EDISYLATE PRN MG: 5 INJECTION INTRAMUSCULAR; INTRAVENOUS at 09:20

## 2019-03-29 RX ADMIN — ALBUTEROL SULFATE PRN MG: 2.5 SOLUTION RESPIRATORY (INHALATION) at 03:59

## 2019-03-29 RX ADMIN — GABAPENTIN SCH: 100 CAPSULE ORAL at 15:06

## 2019-03-29 RX ADMIN — LIDOCAINE HYDROCHLORIDE SCH: 20 JELLY TOPICAL at 08:10

## 2019-03-29 RX ADMIN — DRONABINOL SCH: 2.5 CAPSULE ORAL at 15:06

## 2019-03-29 RX ADMIN — INSULIN HUMAN SCH MLS/HR: 100 INJECTION, SOLUTION PARENTERAL at 20:54

## 2019-03-29 RX ADMIN — BETHANECHOL CHLORIDE SCH MG: 25 TABLET ORAL at 08:09

## 2019-03-29 RX ADMIN — ONDANSETRON PRN MG: 2 INJECTION INTRAMUSCULAR; INTRAVENOUS at 03:01

## 2019-03-29 RX ADMIN — ONDANSETRON PRN MG: 2 INJECTION INTRAMUSCULAR; INTRAVENOUS at 12:29

## 2019-03-29 RX ADMIN — LIDOCAINE HYDROCHLORIDE SCH: 20 JELLY TOPICAL at 15:06

## 2019-03-29 RX ADMIN — ASPIRIN SCH MG: 81 TABLET, COATED ORAL at 08:08

## 2019-03-29 RX ADMIN — LIDOCAINE HYDROCHLORIDE SCH APPLIC: 20 JELLY TOPICAL at 10:57

## 2019-03-29 RX ADMIN — SODIUM BICARBONATE TAB 650 MG SCH MG: 650 TAB at 08:09

## 2019-03-29 RX ADMIN — HYDROMORPHONE HYDROCHLORIDE PRN MG: 1 INJECTION, SOLUTION INTRAMUSCULAR; INTRAVENOUS; SUBCUTANEOUS at 11:43

## 2019-03-29 RX ADMIN — MIDODRINE HYDROCHLORIDE SCH: 5 TABLET ORAL at 15:06

## 2019-03-29 RX ADMIN — EPINEPHRINE SCH MLS/HR: 0.1 INJECTION, SOLUTION INTRAVENOUS at 23:00

## 2019-03-29 RX ADMIN — HYDROMORPHONE HYDROCHLORIDE PRN MG: 1 INJECTION, SOLUTION INTRAMUSCULAR; INTRAVENOUS; SUBCUTANEOUS at 08:07

## 2019-03-29 RX ADMIN — DRONABINOL SCH MG: 2.5 CAPSULE ORAL at 08:07

## 2019-03-29 RX ADMIN — INSULIN LISPRO SCH: 100 INJECTION, SOLUTION INTRAVENOUS; SUBCUTANEOUS at 15:06

## 2019-03-29 RX ADMIN — MIDODRINE HYDROCHLORIDE SCH MG: 5 TABLET ORAL at 08:07

## 2019-03-29 RX ADMIN — LOSARTAN POTASSIUM SCH MG: 25 TABLET, FILM COATED ORAL at 08:21

## 2019-03-29 RX ADMIN — EPOETIN ALFA-EPBX SCH UNIT: 4000 INJECTION, SOLUTION INTRAVENOUS; SUBCUTANEOUS at 08:09

## 2019-03-29 RX ADMIN — MUPIROCIN SCH: 20 OINTMENT TOPICAL at 08:10

## 2019-03-29 RX ADMIN — EPINEPHRINE SCH MLS/HR: 0.1 INJECTION, SOLUTION INTRAVENOUS at 19:23

## 2019-03-29 RX ADMIN — GABAPENTIN SCH MG: 100 CAPSULE ORAL at 08:08

## 2019-03-29 RX ADMIN — INSULIN LISPRO SCH: 100 INJECTION, SOLUTION INTRAVENOUS; SUBCUTANEOUS at 17:56

## 2019-03-29 RX ADMIN — METHADONE HYDROCHLORIDE SCH MG: 10 TABLET ORAL at 08:06

## 2019-03-29 RX ADMIN — PROPOFOL SCH MLS/HR: 10 INJECTION, EMULSION INTRAVENOUS at 17:48

## 2019-03-29 RX ADMIN — HYDROMORPHONE HYDROCHLORIDE PRN MG: 1 INJECTION, SOLUTION INTRAMUSCULAR; INTRAVENOUS; SUBCUTANEOUS at 05:55

## 2019-03-29 RX ADMIN — PANTOPRAZOLE SODIUM SCH: 40 TABLET, DELAYED RELEASE ORAL at 18:05

## 2019-03-29 RX ADMIN — MIDODRINE HYDROCHLORIDE SCH: 5 TABLET ORAL at 18:05

## 2019-03-29 RX ADMIN — SODIUM BICARBONATE TAB 650 MG SCH: 650 TAB at 18:05

## 2019-03-29 RX ADMIN — DULOXETINE HYDROCHLORIDE SCH MG: 30 CAPSULE, DELAYED RELEASE ORAL at 08:08

## 2019-03-29 RX ADMIN — INSULIN LISPRO SCH UNIT: 100 INJECTION, SOLUTION INTRAVENOUS; SUBCUTANEOUS at 09:20

## 2019-03-29 RX ADMIN — PROPOFOL SCH MLS/HR: 10 INJECTION, EMULSION INTRAVENOUS at 23:44

## 2019-03-29 RX ADMIN — WATER SCH NOTE: 100 INJECTION, SOLUTION INTRAVENOUS at 19:23

## 2019-03-29 RX ADMIN — SODIUM BICARBONATE TAB 650 MG SCH: 650 TAB at 15:06

## 2019-03-29 RX ADMIN — BETHANECHOL CHLORIDE SCH: 25 TABLET ORAL at 15:06

## 2019-03-29 RX ADMIN — HYDROMORPHONE HYDROCHLORIDE PRN MG: 1 INJECTION, SOLUTION INTRAMUSCULAR; INTRAVENOUS; SUBCUTANEOUS at 03:40

## 2019-03-29 NOTE — PN
Subjective


Date of Service: 19


Interval History: 





Pt 's pain is fairly controlled . Still on Vapotherm  to control SOB.


Family History: Findings - Father  age 49 of MI, mother has diabetes


Social History: Findings - Disabled, lives with male partner Raymundo, who is HCP, 

smokes 1/2 PPD, no alcohol or drug use


Past Medical History: Findings - ESRD on peritoneal dialysis, anemia of renal 

disease and blood loss, severe COPD, Type 2 diabetes, GERD, CAD, hypertension, 

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 

stent to RT SFA, stent to R brachial artery, LT eye enucleation, RT foor 

transmetatarsal amp





Objective


Active Medications: 








Acetaminophen (Tylenol Tab*)  650 mg PO Q6H PRN


   PRN Reason: Fever/Pain


Al Hydrox/Mg Hydrox/Simethicone (Maalox Plus*)  30 ml PO Q6H PRN


   PRN Reason: DYSPEPSIA


Albuterol (Ventolin 2.5 Mg/3 Ml Neb.Sol*)  2.5 mg INH Q2HR PRN


   PRN Reason: SOB/WHEEZING


   Last Admin: 19 09:05 Dose:  2.5 mg


Aspirin (Aspirin Ec Tab*)  81 mg PO DAILY [facility]


   Last Admin: 19 08:08 Dose:  81 mg


Bethanechol Chloride (Urecholine Tab*)  25 mg PO QID [facility]


   Last Admin: 19 08:09 Dose:  25 mg


Dextrose (D50w Syringe 50 Ml*)  12.5 gm IV PUSH .FOR FS < 60 - SS PRN


   PRN Reason: FS < 60


Dronabinol (Marinol Cap*)  5 mg PO TID [facility]


   Last Admin: 19 08:07 Dose:  5 mg


Duloxetine HCl (Cymbalta Cap*)  30 mg PO DAILY [facility]


   Last Admin: 19 08:08 Dose:  30 mg


Epoetin Yan (Retacrit)  4,000 unit SUBCUT MOWEFR [facility]


   Last Admin: 19 08:09 Dose:  4,000 unit


Fentanyl (Duragesic  Patch 50 Mcg/Hr*)  50 mcg TRANSDERM Q72H [facility]


   Last Admin: 19 15:00 Dose:  50 mcg


Gabapentin (Neurontin Cap(*))  200 mg PO TID [facility]


   Last Admin: 19 08:08 Dose:  200 mg


Hydromorphone HCl (Dilaudid Inj1s*)  2 mg IV SLOW PU Q2H PRN


   PRN Reason: PAIN - SEVERE


   Last Admin: 19 08:07 Dose:  2 mg


Sodium Thiosulfate 25 gm/ (Sodium Chloride)  200 mls @ 200 mls/hr IV SuMoWeFr 

[facility]


   Last Admin: 19 09:21 Dose:  200 mls/hr


Insulin Glargine (Lantus(*))  10 units SUBCUT Q24H [facility]


   Last Admin: 19 09:21 Dose:  10 unit


Insulin Human Lispro (Humalog*)  0 units SUBCUT ACHS [facility]; Protocol


   Last Admin: 19 09:20 Dose:  2 unit


Lidocaine HCl (Lidocaine 2% Jelly*)  1 applic TOPICAL TID [facility]


   Last Admin: 19 10:57 Dose:  1 applic


Lorazepam (Ativan Inj*)  1 mg IV PUSH Q6H PRN


   PRN Reason: ANXIETY


Losartan Potassium (Cozaar Tab*)  25 mg PO DAILY [facility]


   Last Admin: 19 08:21 Dose:  25 mg


Methadone HCl (Dolophine Tab*)  5 mg PO Q12HR [facility]


   Stop: 19 23:59


   Last Admin: 19 08:06 Dose:  5 mg


Midodrine (Midodrine (Nf))  10 mg PO QID [facility]; Protocol


   Last Admin: 19 08:07 Dose:  10 mg


Mometasone Furoate/Formoterol Fumar (Dulera 200/5 Mdi*)  2 puff INH BID [facility]


   Last Admin: 19 08:13 Dose:  2 puff


Mupirocin (Bactroban 2 % Oint*)  1 applic TOPICAL BID [facility]


   Last Admin: 19 10:57 Dose:  1 applic


Ondansetron HCl (Zofran Inj*)  8 mg IV Q6H PRN


   PRN Reason: NAUSEA


   Last Admin: 19 03:01 Dose:  8 mg


Oxycodone HCl (Roxycodone Tab*)  10 mg PO Q4H PRN


   PRN Reason: PAIN - MODERATE TO SEVERE


   Last Admin: 19 17:51 Dose:  10 mg


Pantoprazole Sodium (Protonix Tab*)  40 mg PO QPM [facility]


   Last Admin: 19 17:15 Dose:  40 mg


Pharmacy Profile Note (Fentanyl Patch Check Q Shift)  1 note FOLLOW UP 0700,

1900 [facility]


   Last Admin: 19 07:32 Dose:  1 note


Prochlorperazine Edisylate (Compazine Inj*)  5 mg IV Q6H PRN


   PRN Reason: NAUSEA


   Last Admin: 19 09:20 Dose:  5 mg


Sodium Bicarbonate (Sodium Bicarbonate (Antacid)*)  2,600 mg PO QID [facility]


   Last Admin: 19 08:09 Dose:  2,600 mg








 Vital Signs - 8 hr











  19





  03:40 03:59 04:00


 


Temperature   98.6 F


 


Pulse Rate  134 


 


Respiratory 19 18 19





Rate   


 


Blood Pressure   





(mmHg)   


 


O2 Sat by Pulse  89 





Oximetry   














  19





  04:01 05:00 05:01


 


Temperature   


 


Pulse Rate 130  137


 


Respiratory 19 20 20





Rate   


 


Blood Pressure 127/79  112/86





(mmHg)   


 


O2 Sat by Pulse 89  96





Oximetry   














  19





  05:55 06:00 06:01


 


Temperature   


 


Pulse Rate   132


 


Respiratory 26 24 24





Rate   


 


Blood Pressure   98/56





(mmHg)   


 


O2 Sat by Pulse   





Oximetry   














  19





  07:00 07:01 08:06


 


Temperature   


 


Pulse Rate 127 126 


 


Respiratory 19 19 16





Rate   


 


Blood Pressure  118/55 





(mmHg)   


 


O2 Sat by Pulse 92 92 





Oximetry   














  19





  08:07 08:15 09:05


 


Temperature   


 


Pulse Rate  124 126


 


Respiratory 16 18 20





Rate   


 


Blood Pressure   





(mmHg)   


 


O2 Sat by Pulse  97 90





Oximetry   











Oxygen Devices in Use Now: High Flow Heated Nasal Cannula


Appearance: 39 yo F in nAD, aAOx3


Eyes: No Scleral Icterus, PERRLA


Ears/Nose/Mouth/Throat: NL Teeth, Lips, Gums, Mucous Membranes Moist


Neck: NL Appearance and Movements; NL JVP, Trachea Midline


Respiratory: Symmetrical Chest Expansion and Respiratory Effort, - - diffuse 

crackles throughout b/l lungs


Cardiovascular: NL Sounds; No Murmurs; No JVD, RRR, - - tachy


Abdominal: NL Sounds; No Tenderness; No Distention, No Hepatosplenomegaly, - - 

PD cath in place


Extremities: No Edema


Skin: - - nectoric rash from one thigh though b/l flanks to the other thigh. 

Also superficial ulceration on left calf. R fingers cyanotic


Neurological: Alert and Oriented x 3, NL Muscle Strength and Tone


Result Diagrams: 


 19 23:14





 19 23:14


Microbiology and Other Data: 


 Microbiology











 19 21:58 Stool Occult Blood (JESSICA) - Final





 Stool 














Assess/Plan/Problems-Billing


Assessment: 


38 year old with multiple medical problems including ESRD, on PD, calciphylaxis

, chronic GI bleed, diffuse PVD








- Patient Problems


(1) Acute respiratory failure with hypoxia


Comment: - Acute on chronic hypoxemic respiratory failure recurred on 3/28/19 

due to pulm edema.


- She was back to her baseline O2 requirement of 5L continuously, extubated 3/

11. 


In ICU on Vapotherm. Pt refuses BIPAP. at the beginning of the month she was 

thought to have  PNA and volume overload causing pulm congestion.


- Completed 7 days of Zosyn at beginning of hospital stay.


- Continue Dulera and bronchodilators.   





(2) GI bleed


Comment: - Source is unclear,  ischemic colitis was suspected on 3/22/19 in 

this pt  with known vascular disease.


- hemoglobin stable , low, and partially related to ESRD


-poor endoscopy candidate


-no signs or symptoms of acute bleed today.


   





(3) PAD (peripheral artery disease)


Comment: - Recent CT Aorta with run off this admission with moderate-severe R 

common femoral artery stenosis, severe b/l popliteal artery stenosis, near 

complete occlusion of distal L superficial fem artery stent.


- as per dopplers of b/l UE's obtained at [facility] on 3/26/19  pt had good biphasic 

waves of b/l radial and ulnar arteries, but significant stenosis of all 5 

fingers on R likely due to calcifilaxis. No other tx option apart for sodium 

thiosulfate is avaliable at this point. D/w DR. Baker. WAs on Coumadin for PAD

, stopped due to GI bleed   





(4) Calciphylaxis


Comment: - S/p skin biopsy on 3/18 with pathology confirmation. 


- cont  IV sodium thiosulfate infusions


- Continue to monitor for hypotension.


-very poor prognosis, pt is aware, wants to cont all treatment avaliable. 

Palliative care following.





   





(5) Acidosis


Comment: due to renal disease, cont sodium bicarb supplementation PO   





(6) Bleeding disorder


Comment: - Concern for DIC with her h/o epistaxis, vaginal bleeding (in the 

setting of Warfarin use),  and  GI bleed . today no active GI bleeding noted. 

Pt had maroon stool 3/26.


- PT/PTT are prolonged, d-dimer elevated, but fibrinogen is also high.


- No signs of active bleeding now (but stool at [facility] heme+), platelets are 

normal.


-coumadin stopped 





   





(7) Secondary hyperparathyroidism of renal origin


Comment: - Phos 4.7, calcium 6.2 (corrected 7.6), 25-vitamin D <7.0, PTH 85.


- Phosphorus is normal.


- Corrected Calcium is >7.5.


- Calcitriol stopped after d/w nephrology.   





(8) ESRD on peritoneal dialysis


Comment: - Continue peritoneal dialysis. Nephrology  will try to take off more 

fluid today


   





(9) IDDM (insulin dependent diabetes mellitus)


Comment: - HgbA1c 8.8%


- Continue Lispro SS. 


-holding Lantus


-dialysis fluid can cause false elevation pt's fingerstick. Asked RN to get 

peripheral blood sample from PICC to check sugars   





(10) Pain management


Comment: as per DR. Rivera   





(11) DVT prophylaxis


Comment: no anticoagulants due to bleeding


Pt has h/o HIT


SCD's   


Status and Disposition: 





inpatient

## 2019-03-29 NOTE — PN
Progress Note





- Progress Note


Date of Service: 03/29/18


Note: 


CARDIAC ARREST WITH ASYSTOLE





Patient experienced episode of O2 desaturation earlier and was placed on BIPAP 

with immediate increase in SpO2 to 97%About 20 minutes later, patient 

unexpectedly developed asystolic cardiac arrest was intubated and resuscitated 

with epinephrine and bicarbonate with ROSC - subsequently has two more episodes 

of asystolic cardiac arrest, resuscitated with epi and bicarb with ROSC - 

currently on an epinephrine drip at 20 mcg/min - has peripheral pulses but we 

are unable to get a measurable blood pressure. Patient has extensive arterial 

calcification and stents in both sides of the groin. 





The patient's boyfriend (healthcare proxy) is present at the bedside and he has 

decided to make patient a DNR, which was done.

## 2019-03-29 NOTE — PRO
PROCEDURE NOTE:

 

DATE OF PROCEDURE:  03/29/19

 

PROCEDURE:  Endotracheal intubation.

 

INDICATIONS:  This is a 38-year-old white female with renal failure and calcific arterial disease, wh
o suffered an asystolic cardiac arrest in the afternoon of 03/29/19 and required intubation.

 

DESCRIPTION OF PROCEDURE:  Size 8 cuffed endotracheal tube was inserted under videoscopic guidance.  
Placement in airways was verified by exhaled CO2 detection. The patient did experience return of spon
taneous circulation and a chest x-ray showed placement of the tube in the trachea and the tip of the 
tube was retracted 2 cm.  The patient tolerated the procedure and there were no obvious complications
 from the intubation.  However, prognosis is poor as the patient is on an epinephrine drip at 20 mcg 
per minute.

 

 [id number]/[facility] #: [id number]

## 2019-03-29 NOTE — PN
Progress Note





- Progress Note


Date of Service: 03/29/19


Note: 





Early afternoon pt's 02 sats dropped to 70% and pt agreed to BIPAP.At approx 14:

00 pt went inn asystole. Dr. Shelby by the bedside intubating. pt got back to 

sinus tachy after epi and amp. of bicarb. give. Placed on epi gt and arrested 

again. CPR was administered, amp of epi, got rhythm back, but 5 min later she 

arrested again and had rhythm back again within a couple of min after CPR 

started. boyfriend Raymundo present  by pt's room. He wishes no more CPR if pt 

arrests again . Molst completed with DR. Shelby.


Pt remains intubated under the care of Dr. Shelby

## 2019-03-30 LAB
ANION GAP SERPL CALC-SCNC: 27 MMOL/L (ref 2–11)
ANION GAP SERPL CALC-SCNC: 28 MMOL/L (ref 2–11)
BASOPHILS # BLD AUTO: 0.1 10^3/UL (ref 0–0.2)
BUN SERPL-MCNC: 38 MG/DL (ref 6–24)
BUN SERPL-MCNC: 42 MG/DL (ref 6–24)
BUN/CREAT SERPL: 6.6 (ref 8–20)
BUN/CREAT SERPL: 6.8 (ref 8–20)
CALCIUM SERPL-MCNC: 7.8 MG/DL (ref 8.6–10.3)
CALCIUM SERPL-MCNC: 8.7 MG/DL (ref 8.6–10.3)
CHLORIDE SERPL-SCNC: 74 MMOL/L (ref 101–111)
CHLORIDE SERPL-SCNC: 85 MMOL/L (ref 101–111)
EOSINOPHIL # BLD AUTO: 0 10^3/UL (ref 0–0.6)
GLUCOSE SERPL-MCNC: 1216 MG/DL (ref 70–100)
GLUCOSE SERPL-MCNC: 1216 MG/DL (ref 70–100)
GLUCOSE SERPL-MCNC: 632 MG/DL (ref 70–100)
HCO3 SERPL-SCNC: 19 MMOL/L (ref 22–32)
HCO3 SERPL-SCNC: 22 MMOL/L (ref 22–32)
HCT VFR BLD AUTO: 23 % (ref 33–41)
HGB BLD-MCNC: 7.2 G/DL (ref 12–16)
LYMPHOCYTES # BLD AUTO: 0.9 10^3/UL (ref 1–4.8)
MCH RBC QN AUTO: 28 PG (ref 27–31)
MCHC RBC AUTO-ENTMCNC: 31 G/DL (ref 31–36)
MCV RBC AUTO: 88 FL (ref 80–97)
MONOCYTES # BLD AUTO: 0.7 10^3/UL (ref 0–0.8)
NEUTROPHILS # BLD AUTO: 38 10^3/UL (ref 1.5–7.7)
NRBC # BLD AUTO: 0 10^3/UL
NRBC BLD QL AUTO: 0
PLATELET # BLD AUTO: 531 10^3/UL (ref 150–450)
POTASSIUM SERPL-SCNC: 3.6 MMOL/L (ref 3.5–5)
POTASSIUM SERPL-SCNC: 3.7 MMOL/L (ref 3.5–5)
RBC # BLD AUTO: 2.62 10^6 /UL (ref 3.7–4.87)
SODIUM SERPL-SCNC: 120 MMOL/L (ref 135–145)
SODIUM SERPL-SCNC: 135 MMOL/L (ref 135–145)
WBC # BLD AUTO: 39.6 10^3/UL (ref 3.5–10.8)

## 2019-03-30 RX ADMIN — PANTOPRAZOLE SODIUM SCH: 40 INJECTION, POWDER, FOR SOLUTION INTRAVENOUS at 23:54

## 2019-03-30 RX ADMIN — PROPOFOL SCH MLS/HR: 10 INJECTION, EMULSION INTRAVENOUS at 20:28

## 2019-03-30 RX ADMIN — FENTANYL CITRATE PRN MCG: 0.05 INJECTION, SOLUTION INTRAMUSCULAR; INTRAVENOUS at 22:27

## 2019-03-30 RX ADMIN — PROPOFOL SCH MLS/HR: 10 INJECTION, EMULSION INTRAVENOUS at 12:46

## 2019-03-30 RX ADMIN — NOREPINEPHRINE BITARTRATE SCH MLS/HR: 1 INJECTION INTRAVENOUS at 14:13

## 2019-03-30 RX ADMIN — FENTANYL CITRATE PRN MCG: 0.05 INJECTION, SOLUTION INTRAMUSCULAR; INTRAVENOUS at 16:40

## 2019-03-30 RX ADMIN — EPINEPHRINE SCH MLS/HR: 0.1 INJECTION, SOLUTION INTRAVENOUS at 06:49

## 2019-03-30 RX ADMIN — NOREPINEPHRINE BITARTRATE SCH MLS/HR: 1 INJECTION INTRAVENOUS at 11:00

## 2019-03-30 RX ADMIN — INSULIN LISPRO SCH UNITS: 100 INJECTION, SOLUTION INTRAVENOUS; SUBCUTANEOUS at 19:55

## 2019-03-30 RX ADMIN — NOREPINEPHRINE BITARTRATE SCH MLS/HR: 1 INJECTION INTRAVENOUS at 22:35

## 2019-03-30 RX ADMIN — PROPOFOL SCH MLS/HR: 10 INJECTION, EMULSION INTRAVENOUS at 20:32

## 2019-03-30 RX ADMIN — INSULIN HUMAN SCH: 100 INJECTION, SOLUTION PARENTERAL at 23:54

## 2019-03-30 RX ADMIN — NOREPINEPHRINE BITARTRATE SCH MLS/HR: 1 INJECTION INTRAVENOUS at 18:36

## 2019-03-30 RX ADMIN — FENTANYL CITRATE PRN MCG: 0.05 INJECTION, SOLUTION INTRAMUSCULAR; INTRAVENOUS at 23:21

## 2019-03-30 RX ADMIN — EPINEPHRINE SCH MLS/HR: 0.1 INJECTION, SOLUTION INTRAVENOUS at 06:25

## 2019-03-30 RX ADMIN — INSULIN LISPRO SCH UNITS: 100 INJECTION, SOLUTION INTRAVENOUS; SUBCUTANEOUS at 13:10

## 2019-03-30 RX ADMIN — EPINEPHRINE SCH: 0.1 INJECTION, SOLUTION INTRAVENOUS at 23:54

## 2019-03-30 RX ADMIN — FENTANYL CITRATE PRN MCG: 0.05 INJECTION, SOLUTION INTRAMUSCULAR; INTRAVENOUS at 17:19

## 2019-03-30 RX ADMIN — INSULIN LISPRO SCH UNITS: 100 INJECTION, SOLUTION INTRAVENOUS; SUBCUTANEOUS at 16:40

## 2019-03-30 RX ADMIN — PANTOPRAZOLE SODIUM SCH MLS/HR: 40 INJECTION, POWDER, FOR SOLUTION INTRAVENOUS at 11:28

## 2019-03-30 NOTE — PN
Date of Service: 03/30/19


Critical Care Services: 


39 y/o female with systemic calcinosis had 3 asystolic cardiac arrests 

yesterday and today, was able to follow commands. She remains on high-dose 

levophed for BP control, and is receiving mechanical ventilation.





Vital Signs: 











Temp Pulse Resp BP SpO2 FiO2


 


99 F 121 22 112/50 100 100











Physical Exam: 


Gen:Occasionally opens eyes on command


HEENT: +corneal and pupillary light reflexes


Lungs:Clear


Cardiac:  Reg rhythm


Extremities: Cool. Cyanotic changes in fingers of right hand.





Fluid Balance (Past 24 Hours): 











 03/29/19 03/30/19





 06:59 06:59


 


Intake Total 1310 5665.6


 


Output Total 0 0


 


Balance 1310 5665.6


 


Weight 191 lb 198 lb


 


Intake:  


 


  IV Fluids  20


 


    Sodium Thiosulfate  20


 


  IVPB  216


 


    Sodium Thiosulfate  216


 


  Medicated IV  4945.6


 


    CC - Epinephrine  4532


 


    CC - Propofol/Diprivan  303.6


 


    Insulin  110


 


  IV Narcotic Infusion  4


 


    Fentanyl  4


 


  Oral 1310 480


 


Output:  


 


  Urine 0 0


 


Other:  


 


  Date of Last Bowel  





  Movement  


 


  # Bowel Movements  


 


  Estimated Stool Amount Small 


 


  # Voids  





 





Labs: 


 Laboratory Results - last 24 hr











  03/29/19 03/29/19 03/29/19





  17:21 22:00 22:13


 


WBC   


 


RBC   


 


Hgb   


 


Hct   


 


MCV   


 


MCH   


 


MCHC   


 


RDW   


 


Plt Count   


 


MPV   


 


Neut % (Auto)   


 


Lymph % (Auto)   


 


Mono % (Auto)   


 


Eos % (Auto)   


 


Baso % (Auto)   


 


Absolute Neuts (auto)   


 


Absolute Lymphs (auto)   


 


Absolute Monos (auto)   


 


Absolute Eos (auto)   


 


Absolute Basos (auto)   


 


Absolute Nucleated RBC   


 


Nucleated RBC %   


 


Sodium   137 


 


Potassium   4.2 


 


Chloride   83 L 


 


Carbon Dioxide   21 L 


 


Anion Gap   33 H 


 


BUN   41 H 


 


Creatinine   6.58 H 


 


Est GFR ( Amer)   8.5 


 


Est GFR (Non-Af Amer)   7.1 


 


BUN/Creatinine Ratio   6.2 L 


 


Glucose   859 H* 


 


POC Glucose (mg/dL)  > 444 H*   > 444 H*


 


Glucose Meter Confirm   


 


Calcium   8.7 














  03/30/19 03/30/19 03/30/19





  00:51 01:00 03:30


 


WBC   


 


RBC   


 


Hgb   


 


Hct   


 


MCV   


 


MCH   


 


MCHC   


 


RDW   


 


Plt Count   


 


MPV   


 


Neut % (Auto)   


 


Lymph % (Auto)   


 


Mono % (Auto)   


 


Eos % (Auto)   


 


Baso % (Auto)   


 


Absolute Neuts (auto)   


 


Absolute Lymphs (auto)   


 


Absolute Monos (auto)   


 


Absolute Eos (auto)   


 


Absolute Basos (auto)   


 


Absolute Nucleated RBC   


 


Nucleated RBC %   


 


Sodium   120 L D 


 


Potassium   3.6 


 


Chloride   74 L 


 


Carbon Dioxide   19 L 


 


Anion Gap   27 H 


 


BUN   38 H 


 


Creatinine   5.79 H 


 


Est GFR ( Amer)   9.9 


 


Est GFR (Non-Af Amer)   8.2 


 


BUN/Creatinine Ratio   6.6 L 


 


Glucose   1216 H* 


 


POC Glucose (mg/dL)  > 444 H*  


 


Glucose Meter Confirm   1216 H*  750 H*


 


Calcium   7.8 L 














  03/30/19 03/30/19 03/30/19





  05:30 05:30 05:30


 


WBC   39.6 H 


 


Hgb   7.2 L 


 


Hct   23 L 


 


Plt Count   531 H D 


 


   


 


   


 


   


 


   


 


   


 


   


 


   


 


   


 


   


 


   


 


Sodium  135  D  


 


Potassium  3.7  


 


Chloride  85 L  


 


Carbon Dioxide  22  


 


Anion Gap  28 H  


 


BUN  42 H  


 


Creatinine  6.16 H  


 


BUN/Creatinine Ratio  6.8 L  


 


Glucose  632 H*  


 


Calcium  8.7  

















  03/30/19 03/30/19 03/30/19





  10:16 11:28 12:56


 


WBC   


 


RBC   


 


Hgb   


 


Hct   


 


MCV   


 


MCH   


 


MCHC   


 


RDW   


 


Plt Count   


 


MPV   


 


Neut % (Auto)   


 


Lymph % (Auto)   


 


Mono % (Auto)   


 


Eos % (Auto)   


 


Baso % (Auto)   


 


Absolute Neuts (auto)   


 


Absolute Lymphs (auto)   


 


Absolute Monos (auto)   


 


Absolute Eos (auto)   


 


Absolute Basos (auto)   


 


Absolute Nucleated RBC   


 


Nucleated RBC %   


 


Sodium   


 


Potassium   


 


Chloride   


 


Carbon Dioxide   


 


Anion Gap   


 


BUN   


 


Creatinine   


 


Est GFR ( Amer)   


 


Est GFR (Non-Af Amer)   


 


BUN/Creatinine Ratio   


 


Glucose   


 


POC Glucose (mg/dL)  160 H  137 H  142 H


 


Glucose Meter Confirm   


 


Calcium   











Studies: 


CXR: No evidence of CHF or pneumonia





Nutrition: 


NPO





Impression: 


Patient is apparently regaining consciousness after multiple cardiac arrests, 

but still requires high-dose vasopressor support. Prognosis remains very 

guarded in this case.





Plan: 


Wean off ventilator and vasopressor support when able. Continue peritoneal 

dialysis.








Critical Care Time: 75 minutes (including time to evaluate mental status after d

/cing propofol).

## 2019-03-30 NOTE — PN
Hospitalist Progress Note


Date of Service: 03/30/19


HOSPITALIST ADDENDUM


Called by RN because patient had coffee ground emesis. 


Will start Protonix drip for GI bleed.


Overall prognosis extremly poor.


Continue to monitor in ICU.

## 2019-03-30 NOTE — PN
PROGRESS NOTE:

 

DATE OF VISIT:  03/29/19

 

SERVICE:  [facility].

 

SUBJECTIVE:  The patient seen and examined at bedside.  The patient noted to be in extremely poor con
dition, noted to be lethargic.  Detailed family discussion with the patient, family and caregivers as
 outlined below.  Vitals and labs have been reviewed.

 

PHYSICAL EXAM:  HEENT:  NCAT.  Heart:  S1, S2 present.  Tachycardic at the time of exam.  Lungs:  Malcom
ateral crackles.  Abdomen:  Distended.  No rebound, no guarding. Extremities:  Bilateral thigh lesion
s consistent with calciphylaxis.  The patient noted to have severe peripheral arterial disease.  No e
omari.  Neuro:  The patient noted to be more lethargic and in respiratory distress at the time of exam
.

 

ASSESSMENT AND PLAN:

1.  End-stage renal disease, on peritoneal dialysis.  The patient noted to be volume overloaded and y
esterday the patient was switched to a 4.25% solution followed by a 2.5% solution as the patient had 
previously become hypotensive with aggressive UF and sodium thiosulfate.  The patient was noted to be
 more volume overloaded, sats in the 70s and at this time discussed with Patrizia, the PD nurse to incre
ase her PD solution to 4.25% to help with her pulmonary status.

2.  Calciphylaxis confirmed on skin biopsy.  The patient did not tolerate sodium thiosulfate 6 times 
a week and is currently getting it 3 times a week.

3.  Significant peripheral arterial disease and multiple complications with organ failure and recent 
GI bleed.  The patient is currently off her Eliquis and the patient was placed on Eliquis for severe 
peripheral arterial disease, noted to have significant calcification and occlusion in multiple arteri
es and unfortunately, the patient has not been tolerating her blood thinner and has had recurrent epi
sodes of GI bleed.

4.  In addition, the patient had multiple respiratory complications and infections recently.

5.  Also, significant calcifications secondary to calciphylaxis and significant peripheral arterial d
isease.  Case has been discussed in detail with the patient's boyfriend and sister-in-law.  The grave
ness of the situation was explained to them. All questions about the calciphylaxis treatment has been
 answered.  Family made aware that it may take months for calciphylaxis to heal and respond; however,
 the patient may not have that long to survive secondary to multiple organ failure and decompensated 
condition with multiple recent hospitalizations.  Hospice comfort care was discussed with the patient
, the patient's caregivers versus continuing to resuscitate her.  The patient's boyfriend and sister-
in-law understand the gravity of the situation and are willing to think about this further.

6.  Overall, the patient's prognosis is extremely poor and we will be available for any further quest
ions.

 

 [id number]/[facility] #: [id number]

## 2019-03-31 LAB
ANION GAP SERPL CALC-SCNC: 23 MMOL/L (ref 2–11)
BUN SERPL-MCNC: 40 MG/DL (ref 6–24)
BUN/CREAT SERPL: 6.5 (ref 8–20)
CALCIUM SERPL-MCNC: 9 MG/DL (ref 8.6–10.3)
CHLORIDE SERPL-SCNC: 93 MMOL/L (ref 101–111)
GLUCOSE SERPL-MCNC: 150 MG/DL (ref 70–100)
HCO3 SERPL-SCNC: 24 MMOL/L (ref 22–32)
HCT VFR BLD AUTO: 23 % (ref 33–41)
HGB BLD-MCNC: 7.3 G/DL (ref 12–16)
MCH RBC QN AUTO: 27 PG (ref 27–31)
MCHC RBC AUTO-ENTMCNC: 32 G/DL (ref 31–36)
MCV RBC AUTO: 86 FL (ref 80–97)
PLATELET # BLD AUTO: 522 10^3/UL (ref 150–450)
POTASSIUM SERPL-SCNC: 3.3 MMOL/L (ref 3.5–5)
RBC # BLD AUTO: 2.67 10^6 /UL (ref 3.7–4.87)
SODIUM SERPL-SCNC: 140 MMOL/L (ref 135–145)
WBC # BLD AUTO: 32.7 10^3/UL (ref 3.5–10.8)

## 2019-03-31 RX ADMIN — PROPOFOL SCH MLS/HR: 10 INJECTION, EMULSION INTRAVENOUS at 17:20

## 2019-03-31 RX ADMIN — PROPOFOL SCH MLS/HR: 10 INJECTION, EMULSION INTRAVENOUS at 13:56

## 2019-03-31 RX ADMIN — NOREPINEPHRINE BITARTRATE SCH MLS/HR: 1 INJECTION INTRAVENOUS at 08:14

## 2019-03-31 RX ADMIN — INSULIN LISPRO SCH: 100 INJECTION, SOLUTION INTRAVENOUS; SUBCUTANEOUS at 08:51

## 2019-03-31 RX ADMIN — INSULIN LISPRO SCH UNITS: 100 INJECTION, SOLUTION INTRAVENOUS; SUBCUTANEOUS at 16:04

## 2019-03-31 RX ADMIN — PROPOFOL SCH MLS/HR: 10 INJECTION, EMULSION INTRAVENOUS at 08:40

## 2019-03-31 RX ADMIN — NOREPINEPHRINE BITARTRATE SCH MLS/HR: 1 INJECTION INTRAVENOUS at 03:04

## 2019-03-31 RX ADMIN — NOREPINEPHRINE BITARTRATE SCH MLS/HR: 1 INJECTION INTRAVENOUS at 14:52

## 2019-03-31 RX ADMIN — PROPOFOL SCH MLS/HR: 10 INJECTION, EMULSION INTRAVENOUS at 00:40

## 2019-03-31 RX ADMIN — INSULIN LISPRO SCH UNITS: 100 INJECTION, SOLUTION INTRAVENOUS; SUBCUTANEOUS at 20:37

## 2019-03-31 RX ADMIN — FENTANYL CITRATE PRN MCG: 0.05 INJECTION, SOLUTION INTRAMUSCULAR; INTRAVENOUS at 20:38

## 2019-03-31 RX ADMIN — PROPOFOL SCH MLS/HR: 10 INJECTION, EMULSION INTRAVENOUS at 20:38

## 2019-03-31 RX ADMIN — NOREPINEPHRINE BITARTRATE SCH MLS/HR: 1 INJECTION INTRAVENOUS at 22:36

## 2019-03-31 RX ADMIN — INSULIN LISPRO SCH UNITS: 100 INJECTION, SOLUTION INTRAVENOUS; SUBCUTANEOUS at 05:20

## 2019-03-31 RX ADMIN — FENTANYL CITRATE PRN MCG: 0.05 INJECTION, SOLUTION INTRAMUSCULAR; INTRAVENOUS at 11:33

## 2019-03-31 RX ADMIN — INSULIN LISPRO SCH UNITS: 100 INJECTION, SOLUTION INTRAVENOUS; SUBCUTANEOUS at 13:15

## 2019-03-31 RX ADMIN — PROPOFOL SCH MLS/HR: 10 INJECTION, EMULSION INTRAVENOUS at 05:49

## 2019-03-31 RX ADMIN — PANTOPRAZOLE SODIUM SCH MG: 40 INJECTION, POWDER, FOR SOLUTION INTRAVENOUS at 09:02

## 2019-03-31 RX ADMIN — PROPOFOL SCH MLS/HR: 10 INJECTION, EMULSION INTRAVENOUS at 03:03

## 2019-03-31 RX ADMIN — INSULIN LISPRO SCH UNITS: 100 INJECTION, SOLUTION INTRAVENOUS; SUBCUTANEOUS at 00:43

## 2019-03-31 RX ADMIN — FENTANYL CITRATE PRN MCG: 0.05 INJECTION, SOLUTION INTRAMUSCULAR; INTRAVENOUS at 22:31

## 2019-03-31 RX ADMIN — FENTANYL CITRATE PRN MCG: 0.05 INJECTION, SOLUTION INTRAMUSCULAR; INTRAVENOUS at 08:41

## 2019-04-01 LAB
ANION GAP SERPL CALC-SCNC: 24 MMOL/L (ref 2–11)
BUN SERPL-MCNC: 40 MG/DL (ref 6–24)
BUN/CREAT SERPL: 6.4 (ref 8–20)
CALCIUM SERPL-MCNC: 8.4 MG/DL (ref 8.6–10.3)
CHLORIDE SERPL-SCNC: 95 MMOL/L (ref 101–111)
GLUCOSE SERPL-MCNC: 141 MG/DL (ref 70–100)
HCO3 SERPL-SCNC: 23 MMOL/L (ref 22–32)
HCT VFR BLD AUTO: 21 % (ref 33–41)
HGB BLD-MCNC: 6.3 G/DL (ref 12–16)
MAGNESIUM SERPL-MCNC: 1.4 MG/DL (ref 1.9–2.7)
MCH RBC QN AUTO: 27 PG (ref 27–31)
MCHC RBC AUTO-ENTMCNC: 31 G/DL (ref 31–36)
MCV RBC AUTO: 86 FL (ref 80–97)
PLATELET # BLD AUTO: 433 10^3/UL (ref 150–450)
POTASSIUM SERPL-SCNC: 3.4 MMOL/L (ref 3.5–5)
RBC # BLD AUTO: 2.39 10^6 /UL (ref 3.7–4.87)
RBC UR QL AUTO: (no result)
SODIUM SERPL-SCNC: 142 MMOL/L (ref 135–145)
WBC # BLD AUTO: 21.5 10^3/UL (ref 3.5–10.8)
WBC UR QL AUTO: (no result)

## 2019-04-01 RX ADMIN — INSULIN LISPRO SCH: 100 INJECTION, SOLUTION INTRAVENOUS; SUBCUTANEOUS at 12:58

## 2019-04-01 RX ADMIN — INSULIN LISPRO SCH UNITS: 100 INJECTION, SOLUTION INTRAVENOUS; SUBCUTANEOUS at 01:00

## 2019-04-01 RX ADMIN — PROPOFOL SCH MLS/HR: 10 INJECTION, EMULSION INTRAVENOUS at 19:22

## 2019-04-01 RX ADMIN — MIDODRINE HYDROCHLORIDE SCH MG: 5 TABLET ORAL at 21:27

## 2019-04-01 RX ADMIN — NOREPINEPHRINE BITARTRATE SCH MLS/HR: 1 INJECTION INTRAVENOUS at 19:48

## 2019-04-01 RX ADMIN — FENTANYL CITRATE PRN MCG: 0.05 INJECTION, SOLUTION INTRAMUSCULAR; INTRAVENOUS at 00:01

## 2019-04-01 RX ADMIN — NOREPINEPHRINE BITARTRATE SCH MLS/HR: 1 INJECTION INTRAVENOUS at 11:26

## 2019-04-01 RX ADMIN — PROPOFOL SCH MLS/HR: 10 INJECTION, EMULSION INTRAVENOUS at 09:35

## 2019-04-01 RX ADMIN — PROPOFOL SCH MLS/HR: 10 INJECTION, EMULSION INTRAVENOUS at 06:30

## 2019-04-01 RX ADMIN — CHLORHEXIDINE GLUCONATE 0.12% ORAL RINSE SCH ML: 1.2 LIQUID ORAL at 21:27

## 2019-04-01 RX ADMIN — PROPOFOL SCH MLS/HR: 10 INJECTION, EMULSION INTRAVENOUS at 15:55

## 2019-04-01 RX ADMIN — PROPOFOL SCH MLS/HR: 10 INJECTION, EMULSION INTRAVENOUS at 03:04

## 2019-04-01 RX ADMIN — FENTANYL CITRATE PRN MCG: 0.05 INJECTION, SOLUTION INTRAMUSCULAR; INTRAVENOUS at 14:28

## 2019-04-01 RX ADMIN — PROPOFOL SCH MLS/HR: 10 INJECTION, EMULSION INTRAVENOUS at 18:33

## 2019-04-01 RX ADMIN — FENTANYL CITRATE PRN MCG: 0.05 INJECTION, SOLUTION INTRAMUSCULAR; INTRAVENOUS at 11:25

## 2019-04-01 RX ADMIN — PANTOPRAZOLE SODIUM SCH MG: 40 INJECTION, POWDER, FOR SOLUTION INTRAVENOUS at 09:31

## 2019-04-01 RX ADMIN — CHLORHEXIDINE GLUCONATE 0.12% ORAL RINSE SCH ML: 1.2 LIQUID ORAL at 17:07

## 2019-04-01 RX ADMIN — INSULIN LISPRO SCH UNITS: 100 INJECTION, SOLUTION INTRAVENOUS; SUBCUTANEOUS at 20:07

## 2019-04-01 RX ADMIN — PROPOFOL SCH MLS/HR: 10 INJECTION, EMULSION INTRAVENOUS at 12:40

## 2019-04-01 RX ADMIN — FENTANYL CITRATE PRN MCG: 0.05 INJECTION, SOLUTION INTRAMUSCULAR; INTRAVENOUS at 17:20

## 2019-04-01 RX ADMIN — INSULIN LISPRO SCH UNITS: 100 INJECTION, SOLUTION INTRAVENOUS; SUBCUTANEOUS at 23:48

## 2019-04-01 RX ADMIN — PROPOFOL SCH MLS/HR: 10 INJECTION, EMULSION INTRAVENOUS at 22:56

## 2019-04-01 RX ADMIN — INSULIN LISPRO SCH UNITS: 100 INJECTION, SOLUTION INTRAVENOUS; SUBCUTANEOUS at 17:07

## 2019-04-01 RX ADMIN — PROPOFOL SCH MLS/HR: 10 INJECTION, EMULSION INTRAVENOUS at 03:27

## 2019-04-01 RX ADMIN — INSULIN LISPRO SCH: 100 INJECTION, SOLUTION INTRAVENOUS; SUBCUTANEOUS at 08:43

## 2019-04-01 RX ADMIN — FENTANYL CITRATE PRN MCG: 0.05 INJECTION, SOLUTION INTRAMUSCULAR; INTRAVENOUS at 03:04

## 2019-04-01 RX ADMIN — INSULIN LISPRO SCH UNITS: 100 INJECTION, SOLUTION INTRAVENOUS; SUBCUTANEOUS at 04:53

## 2019-04-01 RX ADMIN — NOREPINEPHRINE BITARTRATE SCH MLS/HR: 1 INJECTION INTRAVENOUS at 18:33

## 2019-04-01 NOTE — PN
Date of Service: 04/01/19 - HD 7


Critical Care Services: 





69 yo F with PMH including ESRD on PD, calciphylaxis and diabetes mellitus with 

retinal and renal involvement.  She was recently admitted from 3/5-3/25 during 

which time she was treated for PVD, diagnosed with calciphylaxis of the irght 

third finger, began on infusions of sodium thiosulfate, was intubated for acute 

on chronic respiratory failure.  She was also treated for a chronic GI bleed, 

was seen by Dr Green regarding this. There was discussion of chronic ischemic 

colitis, but colonoscopy deferred due to concern of risk vs benefit.  She has 

been anticoagulated with warfarin, which led to epistaxis when INR >3, so she 

was switched to an DOAC.  She continued to have anemia and heme positive stool 

on the DOAC. 





She had been recently discharged to [facility] for necrosis of hand 

secondary to calciphylaxis on 3/25.  She left there AMA in the evening of 3/26 

as she didn't like the nursing care and returned directly back to [facility] [place]. She 

reports bilateral hip pain and rectal bleeding.   





3/27: Wound team assessment documented extensive lesions to lower extremities 

and buttocks secondaryt o calciphylaxis.  Anticoagulation on hold for history 

of GI bleed.  Nephrology consulted.  continued on sodium thiosulfate for 

calciphylaxis.  Continued on sodium bicarbonate for acidosis.  Seen by hospice 

but family declined palliative options as regards both symptom control and 

comfort care.  Pain service consulted; given poor prognosis dilaudid 

liberalized.  





3/28: Developed hypoxia with sats 80%. CXR with pulmonary edema.  HD volume 

removal increased.  Transferred to ICU.  Refusing BiPAP.  Started on vapotherm.





3/29: remains on vapotherm.  In afternoon sats dropped again to 70% and pt 

agreed to BiPAP with subsequent improvement to 97%.  Shortly thereafter she 

went into asystole and was intubated.  She had 3 asystolic codes, regaining 

pulses between.  After pulses regained for the third time, family made patient 

DNR.  On epinephrine @ 20 mcg/min.  Peripheral pulses but unable to get good BP 

readings.  [place] not an option given extensive arterial calcifications and 

stents.  





3/30: developed coffee ground emesis, started on Protonix gtt.  Following 

commands.  Remains on highdose levophed. 





3/31:  Following commands intermittently.  Failed SBT secondary to tachypnea 

and tachycardia.





Vital Signs: 











Temp Pulse Resp BP SpO2 FiO2


 


97.3 F 110 25 85/62 92 90


 


04/01/19 08:00 04/01/19 12:30 04/01/19 12:00 04/01/19 12:30 04/01/19 12:30 04/01 /19 12:00











Physical Exam: 


Gen: intubated, sedated





HEENT: ETT in place





Lungs:  Coarse bilaterally.  Scattered expiratory wheezes over right lung field

, scattered inspiratory rhonci over left lung field





Cardiac:  RRR





Abdomen: soft, NTND





Extremities: warm





Neuro: sedated





Fluid Balance (Past 24 Hours): 


I=     O=     Net 


 Intake & Output











 03/30/19 03/31/19 04/01/19 04/02/19





 06:59 06:59 06:59 06:59


 


Intake Total 5665.6 2609.2 1315 0


 


Output Total 0  500 0


 


Balance 5665.6 2609.2 815 0


 


Weight 198 lb 10.184 oz 189 lb 2.506 oz 187 lb 6.287 oz 


 


Intake:    


 


  IV Fluids 20   


 


    Sodium Thiosulfate 20   


 


  IVPB 216   


 


    Sodium Thiosulfate 216   


 


  Medicated IV 4945.6 2604.2 1315 


 


    CC - Epinephrine 4532 400  


 


    CC - Propofol/Diprivan 303.6 655 590 


 


    Insulin 110 38.1  


 


    norepi  1254 725 


 


    pantoprazole  257.1  


 


  IV Narcotic Infusion 4 5  


 


    Fentanyl 4 5  


 


  Oral 480   0


 


Output:    


 


  NG Tube Drainage Amount   500 


 


  Urine 0   0


 


Other:    


 


  Date of Last Bowel   4/1/19 





  Movement    


 


  # Bowel Movements   1 


 


  Estimated Stool Amount   Medium 





 








Labs: 


 Laboratory Results - last 24 hr











  03/31/19 03/31/19 04/01/19





  15:48 20:31 00:55


 


WBC   


 


RBC   


 


Hgb   


 


Hct   


 


MCV   


 


MCH   


 


MCHC   


 


RDW   


 


Plt Count   


 


MPV   


 


Sodium   


 


Potassium   


 


Chloride   


 


Carbon Dioxide   


 


Anion Gap   


 


BUN   


 


Creatinine   


 


Est GFR ( Amer)   


 


Est GFR (Non-Af Amer)   


 


BUN/Creatinine Ratio   


 


Glucose   


 


POC Glucose (mg/dL)  246 H  223 H  174 H


 


Calcium   


 


Magnesium   


 


Blood Type   


 


Antibody Screen   


 


Crossmatch   














  04/01/19 04/01/19 04/01/19





  04:46 08:34 11:30


 


WBC   


 


RBC   


 


Hgb   


 


Hct   


 


MCV   


 


MCH   


 


MCHC   


 


RDW   


 


Plt Count   


 


MPV   


 


Sodium    142


 


Potassium    3.4 L


 


Chloride    95 L


 


Carbon Dioxide    23


 


Anion Gap    24 H


 


BUN    40 H


 


Creatinine    6.29 H


 


Est GFR ( Amer)    9.0


 


Est GFR (Non-Af Amer)    7.4


 


BUN/Creatinine Ratio    6.4 L


 


Glucose    141 H


 


POC Glucose (mg/dL)  164 H  100 


 


Calcium    8.4 L


 


Magnesium    1.4 L


 


Blood Type   


 


Antibody Screen   


 


Crossmatch   














  04/01/19 04/01/19





  11:30 11:30


 


WBC  21.5 H 


 


RBC  2.39 L 


 


Hgb  6.3 L* 


 


Hct  21 L 


 


MCV  86 


 


MCH  27 


 


MCHC  31 


 


RDW  16 H 


 


Plt Count  433 


 


MPV  9.4 


 


Sodium  


 


Potassium  


 


Chloride  


 


Carbon Dioxide  


 


Anion Gap  


 


BUN  


 


Creatinine  


 


Est GFR ( Amer)  


 


Est GFR (Non-Af Amer)  


 


BUN/Creatinine Ratio  


 


Glucose  


 


POC Glucose (mg/dL)  


 


Calcium  


 


Magnesium  


 


Blood Type   O Positive


 


Antibody Screen   Negative


 


Crossmatch   See Detail











Studies: 





4/1 AXR - no ileus


3/31 CXR - unchanged bilateral pulmonary infiltrates 


3/30 CXR - patchy airspace opacities concerning for aspiration.  small pleural 

effusions


3/29 CXR - bibasilar atelectasis vs consolidation


3/28 CXR - suspicious for cardiogenic pulmonary edema, improved as compared to 3

/23/2019





Recent CT Aorta with run off this admission with moderate-severe R common 

femoral artery stenosis, severe b/l popliteal artery stenosis, near complete 

occlusion of distal L superficial fem artery stent.


Dopplers of b/l UE's obtained at [facility] on 3/26/19  pt had good biphasic waves of b

/l radial and ulnar arteries, but significant stenosis of all 5 fingers on R 

likely due to calcifilaxis.





Nutrition: 





starting TF





Impression: 





37 yo F readmitted after recent admission for acute respiratory failure, 

calciphylaxis after leaving [facility].  Complaining of GI bleed and 

bilateral hip pain.  Developed increasing hypoxia on 3/28 progressing to biPAP.

  Also with asystolic code x 3 on 3/29 with persistent shock but following 

commands after. Remains on vasopressors and ventilator.  Poor prognosis 

secondary to calciphylaixs and complications of diabetes.


Plan: 





Cardiovascular: (1) Vasculitis secondary to calciphylaxis; (2) Persistent shock

; (3) Chronic hyperlipidemia; (4) CAD; (5) PAD; (6) Chronic essential 

hypertension; (7) stent to RT SFA; (8) stent to R brachial artery,


-- -113


-- SBP 


-- Telemetry


-- Vasopressors


   Levophed @ 6 mcg/min, titrate to MAP > 65


   start midodrine and wean levophed


-- TTE ordered


Home meds: Midodrine








Pulmonary: (1) Acute hypoxic respiratory failure requiring intubation, with 

hypoxia and hypercapnea; (2) hx of asthma; (3) hx of tobacco abuse; (4) COPD


-- RR 17-25


-- sats 89-95


-- vent, wean as tolerated.  


-- CXR: none today.  Ordered for AM


Home meds: Dulera, Albuterol, 5L home O2








Gastrointestinal: (1) Acute GI bleed, likely secondary to subacute ischemic 

colitis; (2) Chronic gastroparesis; (3) Chronic GERD


-- diet: start TF


-- bowel regimen: None


-- ulcer prophylaxis: Protonix


Home meds: Prochlorperazine, Zofran, Dronabinol, Maalox








Endocrine: (1) Secondary hyperparathyroidism; (2) Type 2 diabetes mellitus


-- monitor BGs


-- SSI


Home meds: Lispro








Renal: (1) ESRD on PD; (2) Calcinosis


-- UOP: anuric at baseline


-- I/O:1315 ml in / 500 ml out


-- Cr 6.29 from 6.13


-- Lytes


   Na   142 from 140


   K   3.4


   Ca   8.4


   Mag   1.4


-- HD today


-- Nephrology following


Home meds: Sodium thiosulfate








Infectious disease: (1) Leukocytosis


-- Tmax 99


-- WBC 21.5 from 32.7


-- Procalcitonin ordered


-- Micro


   4/1   UA   ordered


      blood   ordered


-- ABX


   None


Home meds: None








Neurologic: (1) Chronic depression; (2) Chronic pain syndrome


-- Fentanyl gtt for pain control


-- Propofol gtt for sedation


Home meds: Methadone, Ativan, Dilaudid, Fentanyl patch, Cymbalta, Tylenol








Hematological: (1) Acute on chronic anemia secondary to blood loss and chronic 

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


-- Hgb 6.3 from 7.3, receiving 1 U PRBC


-- Plt 433 from 522


-- DVT prophylaxis: Unable to use Arixtra or Argatroban per Nephrology, unable 

to use heparin given prior HIT.  Difficult to control INR on Coumadin.  

Multiple GI bleeds on Eliquis.  SCDs at this time and re-evaluate for direct Xa 

inhibitor when less unstable. 


Home meds: Epogen








Metabolic: No acute issues


Home meds: Sodium bicarbonate











Deep vein thrombosis prophylaxis: Unable to use Arixtra or Argatroban per 

Nephrology, unable to use heparin given prior HIT.  Difficult to control INR on 

Coumadin.  Multiple GI bleeds on Eliquis.  SCDs at this time and re-evaluate 

for direct Xa inhibitor when less unstable. 


Dietary: Protonix





Condition: critical


Prognosis: poor


Code status: DNR, continue intubation


Disposition: continue ICU Care








Family updated at bedside regarding interval events and plan of care











Cumulative time spent in the care of this patient (excluding any procedure time)

: at least 55 minutes.





Patient care included clinical interview (with patient and/or family), bedside 

exam of the patient, review of labs, x-rays, and other ancillary data, 

coordination of (respiratory, nursing care, review of patient's records, 

discussion regarding patients management with involved consultants, primary 

physician, pharmacists, and other healthcare personnel (dietary, case management

, physical/occupational therapy etc.)





Critical Care Time: 55 min

## 2019-04-02 LAB
ANION GAP SERPL CALC-SCNC: 21 MMOL/L (ref 2–11)
BASOPHILS # BLD AUTO: 0.1 10^3/UL (ref 0–0.2)
BUN SERPL-MCNC: 40 MG/DL (ref 6–24)
BUN/CREAT SERPL: 6.6 (ref 8–20)
CALCIUM SERPL-MCNC: 8.3 MG/DL (ref 8.6–10.3)
CHLORIDE SERPL-SCNC: 96 MMOL/L (ref 101–111)
EOSINOPHIL # BLD AUTO: 0.2 10^3/UL (ref 0–0.6)
GLUCOSE SERPL-MCNC: 148 MG/DL (ref 70–100)
HCO3 SERPL-SCNC: 24 MMOL/L (ref 22–32)
HCT VFR BLD AUTO: 24 % (ref 33–41)
HGB BLD-MCNC: 8 G/DL (ref 12–16)
LYMPHOCYTES # BLD AUTO: 0.8 10^3/UL (ref 1–4.8)
MAGNESIUM SERPL-MCNC: 1.4 MG/DL (ref 1.9–2.7)
MCH RBC QN AUTO: 29 PG (ref 27–31)
MCHC RBC AUTO-ENTMCNC: 34 G/DL (ref 31–36)
MCV RBC AUTO: 85 FL (ref 80–97)
MONOCYTES # BLD AUTO: 1 10^3/UL (ref 0–0.8)
NEUTROPHILS # BLD AUTO: 18.8 10^3/UL (ref 1.5–7.7)
NRBC # BLD AUTO: 0 10^3/UL
NRBC BLD QL AUTO: 0.1
PLATELET # BLD AUTO: 450 10^3/UL (ref 150–450)
POTASSIUM SERPL-SCNC: 2.9 MMOL/L (ref 3.5–5)
RBC # BLD AUTO: 2.82 10^6 /UL (ref 3.7–4.87)
SODIUM SERPL-SCNC: 141 MMOL/L (ref 135–145)
WBC # BLD AUTO: 20.9 10^3/UL (ref 3.5–10.8)

## 2019-04-02 RX ADMIN — NOREPINEPHRINE BITARTRATE SCH MLS/HR: 1 INJECTION INTRAVENOUS at 04:22

## 2019-04-02 RX ADMIN — INSULIN LISPRO SCH UNITS: 100 INJECTION, SOLUTION INTRAVENOUS; SUBCUTANEOUS at 20:30

## 2019-04-02 RX ADMIN — FENTANYL CITRATE PRN MCG: 0.05 INJECTION, SOLUTION INTRAMUSCULAR; INTRAVENOUS at 08:47

## 2019-04-02 RX ADMIN — PANTOPRAZOLE SODIUM SCH MG: 40 INJECTION, POWDER, FOR SOLUTION INTRAVENOUS at 08:21

## 2019-04-02 RX ADMIN — CHLORHEXIDINE GLUCONATE 0.12% ORAL RINSE SCH ML: 1.2 LIQUID ORAL at 08:20

## 2019-04-02 RX ADMIN — ACETAMINOPHEN PRN MG: 650 SOLUTION ORAL at 00:28

## 2019-04-02 RX ADMIN — CHLORHEXIDINE GLUCONATE 0.12% ORAL RINSE SCH ML: 1.2 LIQUID ORAL at 00:28

## 2019-04-02 RX ADMIN — MIDODRINE HYDROCHLORIDE SCH MG: 5 TABLET ORAL at 14:57

## 2019-04-02 RX ADMIN — CHLORHEXIDINE GLUCONATE 0.12% ORAL RINSE SCH ML: 1.2 LIQUID ORAL at 04:30

## 2019-04-02 RX ADMIN — INSULIN LISPRO SCH UNITS: 100 INJECTION, SOLUTION INTRAVENOUS; SUBCUTANEOUS at 04:30

## 2019-04-02 RX ADMIN — PIPERACILLIN AND TAZOBACTAM SCH MLS/HR: 3; .375 INJECTION, POWDER, LYOPHILIZED, FOR SOLUTION INTRAVENOUS; PARENTERAL at 17:57

## 2019-04-02 RX ADMIN — MIDODRINE HYDROCHLORIDE SCH MG: 5 TABLET ORAL at 21:57

## 2019-04-02 RX ADMIN — FENTANYL CITRATE PRN MCG: 0.05 INJECTION, SOLUTION INTRAMUSCULAR; INTRAVENOUS at 01:05

## 2019-04-02 RX ADMIN — PROPOFOL SCH MLS/HR: 10 INJECTION, EMULSION INTRAVENOUS at 01:17

## 2019-04-02 RX ADMIN — CHLORHEXIDINE GLUCONATE 0.12% ORAL RINSE SCH ML: 1.2 LIQUID ORAL at 14:57

## 2019-04-02 RX ADMIN — FENTANYL CITRATE PRN MCG: 0.05 INJECTION, SOLUTION INTRAMUSCULAR; INTRAVENOUS at 20:52

## 2019-04-02 RX ADMIN — INSULIN LISPRO SCH UNITS: 100 INJECTION, SOLUTION INTRAVENOUS; SUBCUTANEOUS at 18:03

## 2019-04-02 RX ADMIN — INSULIN LISPRO SCH UNITS: 100 INJECTION, SOLUTION INTRAVENOUS; SUBCUTANEOUS at 08:21

## 2019-04-02 RX ADMIN — PROPOFOL SCH MLS/HR: 10 INJECTION, EMULSION INTRAVENOUS at 20:32

## 2019-04-02 RX ADMIN — PROPOFOL SCH MLS/HR: 10 INJECTION, EMULSION INTRAVENOUS at 11:01

## 2019-04-02 RX ADMIN — FENTANYL CITRATE PRN MCG: 0.05 INJECTION, SOLUTION INTRAMUSCULAR; INTRAVENOUS at 14:57

## 2019-04-02 RX ADMIN — MIDODRINE HYDROCHLORIDE SCH MG: 5 TABLET ORAL at 08:21

## 2019-04-02 RX ADMIN — FENTANYL CITRATE PRN MCG: 0.05 INJECTION, SOLUTION INTRAMUSCULAR; INTRAVENOUS at 12:39

## 2019-04-02 RX ADMIN — PROPOFOL SCH MLS/HR: 10 INJECTION, EMULSION INTRAVENOUS at 07:36

## 2019-04-02 RX ADMIN — CHLORHEXIDINE GLUCONATE 0.12% ORAL RINSE SCH ML: 1.2 LIQUID ORAL at 20:51

## 2019-04-02 RX ADMIN — CHLORHEXIDINE GLUCONATE 0.12% ORAL RINSE SCH ML: 1.2 LIQUID ORAL at 18:03

## 2019-04-02 RX ADMIN — PROPOFOL SCH MLS/HR: 10 INJECTION, EMULSION INTRAVENOUS at 04:22

## 2019-04-02 RX ADMIN — PROPOFOL SCH MLS/HR: 10 INJECTION, EMULSION INTRAVENOUS at 16:50

## 2019-04-02 RX ADMIN — PROPOFOL SCH MLS/HR: 10 INJECTION, EMULSION INTRAVENOUS at 13:37

## 2019-04-02 RX ADMIN — INSULIN LISPRO SCH: 100 INJECTION, SOLUTION INTRAVENOUS; SUBCUTANEOUS at 13:01

## 2019-04-02 RX ADMIN — PIPERACILLIN AND TAZOBACTAM SCH MLS/HR: 3; .375 INJECTION, POWDER, LYOPHILIZED, FOR SOLUTION INTRAVENOUS; PARENTERAL at 05:21

## 2019-04-02 NOTE — PN
Date of Service: 04/02/19 - HD 8


Critical Care Services: 





67 yo F with PMH including ESRD on PD, calciphylaxis and diabetes mellitus with 

retinal and renal involvement.  She was recently admitted from 3/5-3/25 during 

which time she was treated for PVD, diagnosed with calciphylaxis of the irght 

third finger, began on infusions of sodium thiosulfate, was intubated for acute 

on chronic respiratory failure.  She was also treated for a chronic GI bleed, 

was seen by Dr Green regarding this. There was discussion of chronic ischemic 

colitis, but colonoscopy deferred due to concern of risk vs benefit.  She has 

been anticoagulated with warfarin, which led to epistaxis when INR >3, so she 

was switched to an DOAC.  She continued to have anemia and heme positive stool 

on the DOAC. 





She had been recently discharged to [facility] for necrosis of hand 

secondary to calciphylaxis on 3/25.  She left there AMA in the evening of 3/26 

as she didn't like the nursing care and returned directly back to [facility]. She 

reports bilateral hip pain and rectal bleeding.   





3/27: Wound team assessment documented extensive lesions to lower extremities 

and buttocks secondaryt o calciphylaxis.  Anticoagulation on hold for history 

of GI bleed.  Nephrology consulted.  continued on sodium thiosulfate for 

calciphylaxis.  Continued on sodium bicarbonate for acidosis.  Seen by hospice 

but family declined palliative options as regards both symptom control and 

comfort care.  Pain service consulted; given poor prognosis dilaudid 

liberalized.  





3/28: Developed hypoxia with sats 80%. CXR with pulmonary edema.  HD volume 

removal increased.  Transferred to ICU.  Refusing BiPAP.  Started on vapotherm.





3/29: remains on vapotherm.  In afternoon sats dropped again to 70% and pt 

agreed to BiPAP with subsequent improvement to 97%.  Shortly thereafter she 

went into asystole and was intubated.  She had 3 asystolic codes, regaining 

pulses between.  After pulses regained for the third time, family made patient 

DNR.  On epinephrine @ 20 mcg/min.  Peripheral pulses but unable to get good BP 

readings.  [place] not an option given extensive arterial calcifications and 

stents.  





3/30: developed coffee ground emesis, started on Protonix gtt.  Following 

commands.  Remains on highdose levophed. 





3/31:  Following commands intermittently.  Failed SBT secondary to tachypnea 

and tachycardia.





4/1: no interval events.  continues to require FiO2 %





Vital Signs: 











Temp Pulse Resp BP SpO2 FiO2


 


98.6 F 101 25 121/36 98 80


 


04/02/19 04:00 04/02/19 07:45 04/02/19 06:00 04/02/19 07:45 04/02/19 07:45 04/02 /19 04:00











Physical Exam: 


Gen: resting comfortably





HEENT: ETT in place





Lungs: clear on left, coarse on right





Cardiac:  RRR





Abdomen: soft, NTND





Extremities: warm, dry





Neuro: sedated





Fluid Balance (Past 24 Hours): 


I=     O=     Net 


 Intake & Output











 03/31/19 04/01/19 04/02/19 04/03/19





 06:59 06:59 06:59 06:59


 


Intake Total 2609.2 1315 1800 


 


Output Total  500 350 


 


Balance 2609.2 815 1450 


 


Weight 189 lb 2.506 oz 187 lb 6.287 oz 183 lb 9.6 oz 


 


Intake:    


 


  IV Fluids   8 


 


    NS   8 


 


  IVPB   119 


 


    Zosyn   119 


 


  Medicated IV 2604.2 1315 1353 


 


    CC - Epinephrine 400   


 


    CC - Propofol/Diprivan [phone] 


 


    Insulin 38.1   


 


    norepi 1254 725 624 


 


    pantoprazole 257.1   


 


  IV Narcotic Infusion 5   


 


    Fentanyl 5   


 


  Oral   0 


 


  Packed Cells   320 


 


Output:    


 


  NG Tube Drainage Amount  500 350 


 


  Urine   0 


 


Other:    


 


  Date of Last Bowel  4/1/19 4/2/19 





  Movement    


 


  # Bowel Movements  1 1 


 


  Estimated Stool Amount  Medium Medium 


 


  # Voids   0 





 








Labs: 


 Laboratory Results - last 24 hr











  04/01/19 04/01/19 04/01/19





  08:34 11:30 11:30


 


WBC    21.5 H


 


RBC    2.39 L


 


Hgb    6.3 L*


 


Hct    21 L


 


MCV    86


 


MCH    27


 


MCHC    31


 


RDW    16 H


 


Plt Count    433


 


MPV    9.4


 


Neut % (Auto)   


 


Lymph % (Auto)   


 


Mono % (Auto)   


 


Eos % (Auto)   


 


Baso % (Auto)   


 


Absolute Neuts (auto)   


 


Absolute Lymphs (auto)   


 


Absolute Monos (auto)   


 


Absolute Eos (auto)   


 


Absolute Basos (auto)   


 


Absolute Nucleated RBC   


 


Nucleated RBC %   


 


Sodium   142 


 


Potassium   3.4 L 


 


Chloride   95 L 


 


Carbon Dioxide   23 


 


Anion Gap   24 H 


 


BUN   40 H 


 


Creatinine   6.29 H 


 


Est GFR ( Amer)   9.0 


 


Est GFR (Non-Af Amer)   7.4 


 


BUN/Creatinine Ratio   6.4 L 


 


Glucose   141 H 


 


POC Glucose (mg/dL)  100  


 


Calcium   8.4 L 


 


Magnesium   1.4 L 


 


Urine Color   


 


Urine Appearance   


 


Urine pH   


 


Ur Specific Gravity   


 


Urine Protein   


 


Urine Ketones   


 


Urine Blood   


 


Urine Nitrate   


 


Urine Bilirubin   


 


Urine Urobilinogen   


 


Ur Leukocyte Esterase   


 


Urine WBC (Auto)   


 


Urine RBC (Auto)   


 


Ur Transition Epith Cell   


 


Urine Bacteria   


 


Urine Glucose   


 


Blood Type   


 


Antibody Screen   


 


Crossmatch   














  04/01/19 04/01/19 04/01/19





  11:30 16:18 16:58


 


WBC   


 


RBC   


 


Hgb   


 


Hct   


 


MCV   


 


MCH   


 


MCHC   


 


RDW   


 


Plt Count   


 


MPV   


 


Neut % (Auto)   


 


Lymph % (Auto)   


 


Mono % (Auto)   


 


Eos % (Auto)   


 


Baso % (Auto)   


 


Absolute Neuts (auto)   


 


Absolute Lymphs (auto)   


 


Absolute Monos (auto)   


 


Absolute Eos (auto)   


 


Absolute Basos (auto)   


 


Absolute Nucleated RBC   


 


Nucleated RBC %   


 


Sodium   


 


Potassium   


 


Chloride   


 


Carbon Dioxide   


 


Anion Gap   


 


BUN   


 


Creatinine   


 


Est GFR ( Amer)   


 


Est GFR (Non-Af Amer)   


 


BUN/Creatinine Ratio   


 


Glucose   


 


POC Glucose (mg/dL)   238 H  224 H


 


Calcium   


 


Magnesium   


 


Urine Color   


 


Urine Appearance   


 


Urine pH   


 


Ur Specific Gravity   


 


Urine Protein   


 


Urine Ketones   


 


Urine Blood   


 


Urine Nitrate   


 


Urine Bilirubin   


 


Urine Urobilinogen   


 


Ur Leukocyte Esterase   


 


Urine WBC (Auto)   


 


Urine RBC (Auto)   


 


Ur Transition Epith Cell   


 


Urine Bacteria   


 


Urine Glucose   


 


Blood Type  O Positive  


 


Antibody Screen  Negative  


 


Crossmatch  See Detail  














  04/01/19 04/01/19 04/01/19





  17:45 19:58 23:44


 


WBC   


 


RBC   


 


Hgb   


 


Hct   


 


MCV   


 


MCH   


 


MCHC   


 


RDW   


 


Plt Count   


 


MPV   


 


Neut % (Auto)   


 


Lymph % (Auto)   


 


Mono % (Auto)   


 


Eos % (Auto)   


 


Baso % (Auto)   


 


Absolute Neuts (auto)   


 


Absolute Lymphs (auto)   


 


Absolute Monos (auto)   


 


Absolute Eos (auto)   


 


Absolute Basos (auto)   


 


Absolute Nucleated RBC   


 


Nucleated RBC %   


 


Sodium   


 


Potassium   


 


Chloride   


 


Carbon Dioxide   


 


Anion Gap   


 


BUN   


 


Creatinine   


 


Est GFR ( Amer)   


 


Est GFR (Non-Af Amer)   


 


BUN/Creatinine Ratio   


 


Glucose   


 


POC Glucose (mg/dL)   269 H  276 H


 


Calcium   


 


Magnesium   


 


Urine Color  Yellow  


 


Urine Appearance  Turbid  


 


Urine pH  7.0  


 


Ur Specific Gravity  1.016  


 


Urine Protein  2+(100 mg/dl) A  


 


Urine Ketones  Negative  


 


Urine Blood  2+ A  


 


Urine Nitrate  Negative  


 


Urine Bilirubin  Negative  


 


Urine Urobilinogen  Negative  


 


Ur Leukocyte Esterase  2+ A  


 


Urine WBC (Auto)  3+(>20/hpf) A  


 


Urine RBC (Auto)  1+(3-5/hpf) A  


 


Ur Transition Epith Cell  Present A  


 


Urine Bacteria  Absent  


 


Urine Glucose  1+(50 mg/dl) A  


 


Blood Type   


 


Antibody Screen   


 


Crossmatch   














  04/02/19 04/02/19





  00:20 04:24


 


WBC  20.9 H 


 


RBC  2.82 L 


 


Hgb  8.0 L 


 


Hct  24 L 


 


MCV  85 


 


MCH  29 


 


MCHC  34 


 


RDW  17 H 


 


Plt Count  450 


 


MPV  9.2 


 


Neut % (Auto)  90.1 


 


Lymph % (Auto)  3.8 


 


Mono % (Auto)  4.8 


 


Eos % (Auto)  0.9 


 


Baso % (Auto)  0.4 


 


Absolute Neuts (auto)  18.8 H 


 


Absolute Lymphs (auto)  0.8 L 


 


Absolute Monos (auto)  1.0 H 


 


Absolute Eos (auto)  0.2 


 


Absolute Basos (auto)  0.1 


 


Absolute Nucleated RBC  0 


 


Nucleated RBC %  0.1 


 


Sodium  


 


Potassium  


 


Chloride  


 


Carbon Dioxide  


 


Anion Gap  


 


BUN  


 


Creatinine  


 


Est GFR ( Amer)  


 


Est GFR (Non-Af Amer)  


 


BUN/Creatinine Ratio  


 


Glucose  


 


POC Glucose (mg/dL)   335 H


 


Calcium  


 


Magnesium  


 


Urine Color  


 


Urine Appearance  


 


Urine pH  


 


Ur Specific Gravity  


 


Urine Protein  


 


Urine Ketones  


 


Urine Blood  


 


Urine Nitrate  


 


Urine Bilirubin  


 


Urine Urobilinogen  


 


Ur Leukocyte Esterase  


 


Urine WBC (Auto)  


 


Urine RBC (Auto)  


 


Ur Transition Epith Cell  


 


Urine Bacteria  


 


Urine Glucose  


 


Blood Type  


 


Antibody Screen  


 


Crossmatch  











Studies: 





4/1 AXR - no ileus


3/31 CXR - unchanged bilateral pulmonary infiltrates 


3/30 CXR - patchy airspace opacities concerning for aspiration.  small pleural 

effusions


3/29 CXR - bibasilar atelectasis vs consolidation


3/28 CXR - suspicious for cardiogenic pulmonary edema, improved as compared to 3

/23/2019





Recent CT Aorta with run off this admission with moderate-severe R common 

femoral artery stenosis, severe b/l popliteal artery stenosis, near complete 

occlusion of distal L superficial fem artery stent.


Dopplers of b/l UE's obtained at [facility] on 3/26/19  pt had good biphasic waves of b

/l radial and ulnar arteries, but significant stenosis of all 5 fingers on R 

likely due to calcifilaxis.





Nutrition: 





TF





Impression: 





39 yo F readmitted after recent admission for acute respiratory failure, 

calciphylaxis after leaving [facility].  Complaining of GI bleed and 

bilateral hip pain.  Developed increasing hypoxia on 3/28 progressing to biPAP.

  Also with asystolic code x 3 on 3/29 with persistent shock but following 

commands after. Remains on vasopressors and ventilator.  Poor prognosis 

secondary to calciphylaixs and complications of diabetes. Now with aspiration 

pneumonia.





Plan: 





Cardiovascular: (1) Vasculitis secondary to calciphylaxis; (2) Persistent shock

; (3) Chronic hyperlipidemia; (4) CAD; (5) PAD; (6) Chronic essential 

hypertension; (7) stent to RT SFA; (8) stent to R brachial artery, (9) Acute 

systolic CHF; (10) Mild to moderate pulmonary HTN


-- -114


-- SBP 


-- Telemetry


-- Vasopressors


   Levophed @ 2 mcg/min, titrate to MAP > 65


   Midodrine, frequency increased


-- TTE 


   moderate concentric left ventricular hypertrophy


   mild global hypokinesis, LVEF decreased at 45-50%


   septal flattening of interventricular septum consistent with right 

ventricular or volume overload


   assessment of diastolic function nondiagnostic


   right ventricle mild to moderately dilated


   right ventricular global systolic function mildly reduced


   trace aortic regurgitation


   mild to moderate tricuspid regurgitation


   mild to moderate pulmonary hypertension


   trace pulmonic regurgitation


Home meds: Midodrine








Pulmonary: (1) Acute hypoxic respiratory failure requiring intubation, with 

hypoxia and hypercapnea; (2) Aspiration pneumonia; (3) hx of asthma; (4) hx of 

tobacco abuse; (5) COPD


-- RR 17-34


-- sats  on WbL075%


-- vent, wean as tolerated.  


-- CXR: Linear streaky densities in RUL and RLL, improved as compared to 3/31


Home meds: Dulera, Albuterol, 5L home O2








Gastrointestinal: (1) Acute GI bleed, likely secondary to subacute ischemic 

colitis, resolved; (2) Chronic gastroparesis; (3) Chronic GERD


-- diet: start TF


-- bowel regimen: None


-- ulcer prophylaxis: Protonix


Home meds: Prochlorperazine, Zofran, Dronabinol, Maalox








Endocrine: (1) Secondary hyperparathyroidism; (2) Type 2 diabetes mellitus


-- monitor BGs


-- SSI, add Lantus for better glycemic control


Home meds: Lispro








Renal: (1) ESRD on PD; (2) Calcinosis


-- UOP: anuric at baseline


-- I/O:1800 ml in / 350 ml out


-- Cr pending


-- Lytes


   pending


-- Nephrology following


Home meds: Sodium thiosulfate








Infectious disease: (1) Aspiration pneumonia; (2) Sepsis


-- Tmax 102.1


-- WBC 20.9 from 21.5


-- Procalcitonin pending


-- Micro


   4/2   Sputum   in process


   4/1   UA   (+) LE, negative nitrates.  3+ WBC


      blood   ordered


-- ABX


   Zosyn


   Vancomycin


Home meds: None








Neurologic: (1) Chronic depression; (2) Chronic pain syndrome


-- Fentanyl gtt for pain control


-- Propofol gtt for sedation


Home meds: Methadone, Ativan, Dilaudid, Fentanyl patch, Cymbalta, Tylenol








Hematological: (1) Acute on chronic anemia secondary to blood loss and chronic 

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


-- Hgb 8.0 from 6.3


-- Plt 450 from  433


-- DVT prophylaxis: Unable to use Arixtra or Argatroban per Nephrology, unable 

to use heparin given prior HIT.  Difficult to control INR on Coumadin.  

Multiple GI bleeds on Eliquis.  SCDs at this time and re-evaluate for direct Xa 

inhibitor when less unstable. 


Home meds: Epogen








Metabolic: No acute issues


Home meds: Sodium bicarbonate











Deep vein thrombosis prophylaxis: Unable to use Arixtra or Argatroban per 

Nephrology, unable to use heparin given prior HIT.  Difficult to control INR on 

Coumadin.  Multiple GI bleeds on Eliquis.  SCDs at this time and re-evaluate 

for direct Xa inhibitor when less unstable. 


Dietary: Protonix





Condition: critical


Prognosis: poor


Code status: DNR, continue intubation


Disposition: continue ICU Care








Family updated at bedside regarding interval events and plan of care











Cumulative time spent in the care of this patient (excluding any procedure time)

: at least 55 minutes.





Patient care included clinical interview (with patient and/or family), bedside 

exam of the patient, review of labs, x-rays, and other ancillary data, 

coordination of (respiratory, nursing care, review of patient's records, 

discussion regarding patients management with involved consultants, primary 

physician, pharmacists, and other healthcare personnel (dietary, case management

, physical/occupational therapy etc.)





Critical Care Time: 40 min

## 2019-04-02 NOTE — ECHO
Patient:      MAKAYLA RUBIN  

Med Rec#:     [id number]            :          1980          

Date:         2019            Age:          38y                 

Account#:     [id number]          Height:       165 cm / 65.0 in

Accession#:   [id number]           Weight:       85 kg / 187.3 lbs

Sex:          F                     BSA:          1.92

Room#:        ICU 5                 

Admit Date#:  2019          

Type:         Inpatient

 

Referring:    Odessa Alfaro

Reading:      Dilip Strauss MD

Sonographer:  Valery Boo,BLACKCS,RDMS

CC:           Yeni Shanks MD

______________________________________________________________________

 

Transthoracic Echocardiogram

 

Indication:

Shock, Respiratory abnormality

BP:           110/37

HR:           107

Rhythm:       Tachycardia

 

Findings     

History:

ESRD, DM, respiratory failure, PVD, calciphylaxis, CAD, HTN, HLD, severe

COPD, PE, smoker 

 

Technical Comments:

The study is technically limited due to the patient's history of COPD. 

The study is technically limited due to patient being intubated and on a

ventilator. 

 

Left Ventricle:

The left ventricular chamber size is normal. Moderate concentric left

ventricular hypertrophy is observed. Mild global hypokinesis of the left

ventricle is observed. There is mildly decreased left ventricular

systolic function. The estimated ejection fraction is 45-50%.  There is

septal flattening of the interventricular septum consistent with right

ventricular volume or pressure overload.  The assessment of diastolic

function is non-diagnostic. The patient was unable to perform a Valsalva

maneuver. 

 

Left Atrium:

The left atrial chamber size is normal. 

 

Right Ventricle:

The right ventricle is mild to moderately dilated.  The right

ventricular global systolic function is mildly reduced. 

 

Right Atrium:

The right atrium is moderately dilated.  

 

Aortic Valve:

The aortic valve leaflets are mildly thickened. Systolic excursion of

the aortic valve is normal. There is a trace of aortic regurgitation.

There is no evidence of aortic stenosis. 

 

Mitral Valve:

There is mitral annular calcification. The mitral valve leaflets are

mildly thickened. There is no evidence of mitral regurgitation. There is

no evidence of mitral stenosis. 

 

Tricuspid Valve:

The tricuspid valve leaflets are normal.  There is mild to moderate

tricuspid regurgitation. There is evidence of mild to moderate pulmonary

hypertension. 

 

Pulmonic Valve:

The pulmonic valve appears normal. There is a trace pulmonic

regurgitation.  

 

Pericardium:

There is no significant pericardial effusion. 

 

Aorta:

The aortic root appears normal. There is no dilatation of the aortic

arch. 

 

Pulmonary Artery:

The main pulmonary artery appears normal. 

 

Venous:

Unable to accurately comment on the size  collapsibility of the IVC as

the patient in known to be on mechanical ventilation. 

 

Conclusions

Moderate concentric left ventricular hypertrophy is observed.

There is mildly decreased left ventricular systolic function.

The estimated ejection fraction is 45-50%. 

There is septal flattening of the interventricular septum consistent

with right ventricular volume or pressure overload. 

The assessment of diastolic function is non-diagnostic.

The right ventricular global systolic function is mildly reduced.

There is no evidence of aortic stenosis.

There is no evidence of mitral regurgitation.

There is mild to moderate tricuspid regurgitation.

There is evidence of mild to moderate pulmonary hypertension.

There is no significant pericardial effusion.

Compared to study of 3/6/19, the LV function is slighly lower

 

Measurements     

Name                    Value         Normal Range            

RVIDd (AP) 2D           3.4 cm        (0.9 - 2.6)             

RVDdMajor (2D)          4.3 cm        (2.2 - 4.4)             

RAd ISD 4CH             6.1 cm        (3.4 - 4.9)             

RA (A4C)W               4.8 cm        (2.9 - 4.6)             

IVSd (2D)               1.3 cm        (0.6 - 1)               

LVPWd (2D)              1.6 cm        (0.6 - 1)               

LVIDd (2D)              4.2 cm        (3.6 - 5.4)             

LVIDs (2D)              3.6 cm        -                        

LV FS (2D)              15 %          (25 - 45)               

Aortic Annulus          1.8 cm        (1.4 - 2.6)             

Ao root diameter (2D)   2.5 cm        (2.1 - 3.5)             

Ascending Ao            2.8 cm        (2.1 - 3.4)             

Aortic arch             2.4 cm        (1.8 - 3.4)             

LA dimension (AP) 2D    3.2 cm        (2.3 - 3.8)             

LAd ISD 4CH             5.3 cm        (2.9 - 5.3)             

LA ISD 4CH W            4.1 cm        (2.5 - 4.5)             

 

Name                    Value         Normal Range            

LA ESV BP (A/L) index   21 ml/m2      -                        

 

Name                    Value         Normal Range            

MV E-wave Vmax          0.6 m/sec     -                        

MV deceleration time    111 msec      -                        

MV A-wave Vmax          0.7 m/sec     -                        

MV E:A ratio            0.8 ratio     -                        

LV septal e' Vmax       0.05 m/sec    -                        

LV lateral e' Vmax      0.07 m/sec    -                        

LV E:e' septal ratio    13 ratio      -                        

LV E:e' lateral ratio   9 ratio       -                        

 

Name                    Value         Normal Range            

AV Vmax                 1.8 m/sec     -                        

AV VTI                  18 cm         -                        

AV peak gradient        13 mmHg       -                        

AV mean gradient        7 mmHg        -                        

LVOT Vmax               1.1 m/sec     -                        

LVOT VTI                13 cm         -                        

LVOT peak gradient      5 mmHg        -                        

LVOT mean gradient      3 mmHg        -                        

DEMETRI Vmax                1.1 m/sec     -                        

 

Name                    Value         Normal Range            

TR Vmax                 2.9 m/sec     -                        

TR peak gradient        34 mmHg       -                        

RAP                     8 mmHg        -                        

RVSP                    42 mmHg       -                        

IVC diameter            2.5 cm        -                        

 

Name                    Value         Normal Range            

PV Vmax                 0.7 m/sec     -                        

PV peak gradient        2 mmHg        -                        

 

Electronically signed by: Dilip Strauss MD on 2019 06:37:44

## 2019-04-02 NOTE — PN
Progress Note





- Progress Note


Date of Service: 04/02/19


Note: 





Patient now febrile - Intubated > 48 hours.  Will obtain Blood cultures, sputum 

cultures, CXR, CBC and start Zosyn for now.

## 2019-04-03 LAB
ANION GAP SERPL CALC-SCNC: 21 MMOL/L (ref 2–11)
ANION GAP SERPL CALC-SCNC: 22 MMOL/L (ref 2–11)
BUN SERPL-MCNC: 36 MG/DL (ref 6–24)
BUN SERPL-MCNC: 41 MG/DL (ref 6–24)
BUN/CREAT SERPL: 6.7 (ref 8–20)
BUN/CREAT SERPL: 6.8 (ref 8–20)
CALCIUM SERPL-MCNC: 7.6 MG/DL (ref 8.6–10.3)
CALCIUM SERPL-MCNC: 8.5 MG/DL (ref 8.6–10.3)
CHLORIDE SERPL-SCNC: 89 MMOL/L (ref 101–111)
CHLORIDE SERPL-SCNC: 94 MMOL/L (ref 101–111)
CHOLEST SERPL-MCNC: 152 MG/DL
GLUCOSE SERPL-MCNC: 171 MG/DL (ref 70–100)
GLUCOSE SERPL-MCNC: 259 MG/DL (ref 70–100)
HCO3 SERPL-SCNC: 20 MMOL/L (ref 22–32)
HCO3 SERPL-SCNC: 23 MMOL/L (ref 22–32)
HCT VFR BLD AUTO: 23 % (ref 33–41)
HDLC SERPL-MCNC: 12.4 MG/DL
HGB BLD-MCNC: 8.2 G/DL (ref 12–16)
MAGNESIUM SERPL-MCNC: (no result) MG/DL (ref 1.9–2.7)
MAGNESIUM SERPL-MCNC: 1.4 MG/DL (ref 1.9–2.7)
MCH RBC QN AUTO: 31 PG (ref 27–31)
MCHC RBC AUTO-ENTMCNC: 36 G/DL (ref 31–36)
MCV RBC AUTO: 87 FL (ref 80–97)
PLATELET # BLD AUTO: 414 10^3/UL (ref 150–450)
POTASSIUM SERPL-SCNC: (no result) MMOL/L (ref 3.5–5)
POTASSIUM SERPL-SCNC: (no result) MMOL/L (ref 3.5–5)
POTASSIUM SERPL-SCNC: 2.8 MMOL/L (ref 3.5–5)
RBC # BLD AUTO: 2.65 10^6 /UL (ref 3.7–4.87)
SODIUM SERPL-SCNC: 130 MMOL/L (ref 135–145)
SODIUM SERPL-SCNC: 139 MMOL/L (ref 135–145)
TRIGL SERPL-MCNC: 297 MG/DL
WBC # BLD AUTO: 26 10^3/UL (ref 3.5–10.8)

## 2019-04-03 RX ADMIN — INSULIN LISPRO SCH UNITS: 100 INJECTION, SOLUTION INTRAVENOUS; SUBCUTANEOUS at 20:25

## 2019-04-03 RX ADMIN — CHLORHEXIDINE GLUCONATE 0.12% ORAL RINSE SCH ML: 1.2 LIQUID ORAL at 16:13

## 2019-04-03 RX ADMIN — PANTOPRAZOLE SODIUM SCH MG: 40 INJECTION, POWDER, FOR SOLUTION INTRAVENOUS at 07:58

## 2019-04-03 RX ADMIN — PIPERACILLIN AND TAZOBACTAM SCH MLS/HR: 3; .375 INJECTION, POWDER, LYOPHILIZED, FOR SOLUTION INTRAVENOUS; PARENTERAL at 05:38

## 2019-04-03 RX ADMIN — FENTANYL CITRATE PRN MCG: 0.05 INJECTION, SOLUTION INTRAMUSCULAR; INTRAVENOUS at 00:21

## 2019-04-03 RX ADMIN — PROPOFOL SCH MLS/HR: 10 INJECTION, EMULSION INTRAVENOUS at 14:15

## 2019-04-03 RX ADMIN — PROPOFOL SCH MLS/HR: 10 INJECTION, EMULSION INTRAVENOUS at 02:35

## 2019-04-03 RX ADMIN — PROPOFOL SCH MLS/HR: 10 INJECTION, EMULSION INTRAVENOUS at 17:36

## 2019-04-03 RX ADMIN — CHLORHEXIDINE GLUCONATE 0.12% ORAL RINSE SCH ML: 1.2 LIQUID ORAL at 12:23

## 2019-04-03 RX ADMIN — PIPERACILLIN AND TAZOBACTAM SCH MLS/HR: 3; .375 INJECTION, POWDER, LYOPHILIZED, FOR SOLUTION INTRAVENOUS; PARENTERAL at 17:00

## 2019-04-03 RX ADMIN — INSULIN LISPRO SCH UNITS: 100 INJECTION, SOLUTION INTRAVENOUS; SUBCUTANEOUS at 16:15

## 2019-04-03 RX ADMIN — SODIUM THIOSULFATE ONE MLS/HR: 250 INJECTION, SOLUTION INTRAVENOUS at 12:09

## 2019-04-03 RX ADMIN — PROPOFOL SCH MLS/HR: 10 INJECTION, EMULSION INTRAVENOUS at 05:28

## 2019-04-03 RX ADMIN — MIDODRINE HYDROCHLORIDE SCH MG: 5 TABLET ORAL at 14:14

## 2019-04-03 RX ADMIN — INSULIN LISPRO SCH UNITS: 100 INJECTION, SOLUTION INTRAVENOUS; SUBCUTANEOUS at 12:23

## 2019-04-03 RX ADMIN — INSULIN LISPRO SCH UNITS: 100 INJECTION, SOLUTION INTRAVENOUS; SUBCUTANEOUS at 04:24

## 2019-04-03 RX ADMIN — SODIUM THIOSULFATE ONE MLS/HR: 250 INJECTION, SOLUTION INTRAVENOUS at 11:34

## 2019-04-03 RX ADMIN — CHLORHEXIDINE GLUCONATE 0.12% ORAL RINSE SCH ML: 1.2 LIQUID ORAL at 07:58

## 2019-04-03 RX ADMIN — CHLORHEXIDINE GLUCONATE 0.12% ORAL RINSE SCH ML: 1.2 LIQUID ORAL at 04:24

## 2019-04-03 RX ADMIN — MIDODRINE HYDROCHLORIDE SCH MG: 5 TABLET ORAL at 05:38

## 2019-04-03 RX ADMIN — CHLORHEXIDINE GLUCONATE 0.12% ORAL RINSE SCH ML: 1.2 LIQUID ORAL at 00:53

## 2019-04-03 RX ADMIN — FENTANYL CITRATE PRN MCG: 0.05 INJECTION, SOLUTION INTRAMUSCULAR; INTRAVENOUS at 21:43

## 2019-04-03 RX ADMIN — INSULIN LISPRO SCH UNITS: 100 INJECTION, SOLUTION INTRAVENOUS; SUBCUTANEOUS at 07:58

## 2019-04-03 RX ADMIN — INSULIN LISPRO SCH UNITS: 100 INJECTION, SOLUTION INTRAVENOUS; SUBCUTANEOUS at 01:20

## 2019-04-03 RX ADMIN — PROPOFOL SCH MLS/HR: 10 INJECTION, EMULSION INTRAVENOUS at 11:19

## 2019-04-03 RX ADMIN — PROPOFOL SCH MLS/HR: 10 INJECTION, EMULSION INTRAVENOUS at 08:04

## 2019-04-03 RX ADMIN — FENTANYL CITRATE PRN MCG: 0.05 INJECTION, SOLUTION INTRAMUSCULAR; INTRAVENOUS at 16:13

## 2019-04-03 RX ADMIN — CHLORHEXIDINE GLUCONATE 0.12% ORAL RINSE SCH ML: 1.2 LIQUID ORAL at 21:09

## 2019-04-03 RX ADMIN — PROPOFOL SCH MLS/HR: 10 INJECTION, EMULSION INTRAVENOUS at 21:09

## 2019-04-03 RX ADMIN — FENTANYL CITRATE PRN MCG: 0.05 INJECTION, SOLUTION INTRAMUSCULAR; INTRAVENOUS at 06:14

## 2019-04-03 NOTE — PN
Date of Service: 04/03/19 - HD 9


Critical Care Services: 





67 yo F with PMH including ESRD on PD, calciphylaxis and diabetes mellitus with 

retinal and renal involvement.  She was recently admitted from 3/5-3/25 during 

which time she was treated for PVD, diagnosed with calciphylaxis of the irght 

third finger, began on infusions of sodium thiosulfate, was intubated for acute 

on chronic respiratory failure.  She was also treated for a chronic GI bleed, 

was seen by Dr Green regarding this. There was discussion of chronic ischemic 

colitis, but colonoscopy deferred due to concern of risk vs benefit.  She has 

been anticoagulated with warfarin, which led to epistaxis when INR >3, so she 

was switched to an DOAC.  She continued to have anemia and heme positive stool 

on the DOAC. 





She had been recently discharged to [facility] for necrosis of hand 

secondary to calciphylaxis on 3/25.  She left there AMA in the evening of 3/26 

as she didn't like the nursing care and [place] [facility] [place] 

reports bilateral hip pain and rectal bleeding.   





3/27: Wound team assessment documented extensive lesions to lower extremities 

and buttocks secondaryt o calciphylaxis.  Anticoagulation on hold for history 

of GI bleed.  Nephrology consulted.  continued on sodium thiosulfate for 

calciphylaxis.  Continued on sodium bicarbonate for acidosis.  Seen by hospice 

but family declined palliative options as regards both symptom control and 

comfort care.  Pain service consulted; given poor prognosis dilaudid 

liberalized.  





3/28: Developed hypoxia with sats 80%. CXR with pulmonary edema.  HD volume 

removal increased.  Transferred to ICU.  Refusing BiPAP.  Started on vapotherm.





3/29: remains on vapotherm.  In afternoon sats dropped again to 70% and pt 

agreed to BiPAP with subsequent improvement to 97%.  Shortly thereafter she 

went into asystole and was intubated.  She had 3 asystolic codes, regaining 

pulses between.  After pulses regained for the third time, family made patient 

DNR.  On epinephrine @ 20 mcg/min.  Peripheral pulses but unable to get good BP 

readings.  [place] not an option given extensive arterial calcifications and 

stents.  





3/30: developed coffee ground emesis, started on Protonix gtt.  Following 

commands.  Remains on highdose levophed. 





3/31:  Following commands intermittently.  Failed SBT secondary to tachypnea 

and tachycardia.





4/1: no interval events.  continues to require FiO2 %





Vital Signs: 











Temp Pulse Resp BP SpO2 FiO2


 


99.2 F 104 28 133/39 98 90


 


04/03/19 07:37 04/03/19 06:00 04/03/19 06:14 04/03/19 05:30 04/03/19 06:00 04/03 /19 07:35











Physical Exam: 


Gen: resting comfortably in bed





HEENT: ETT in place





Lungs: coarse bilaterally, faint expiratory wheeze scattered bilaterally





Cardiac:  RRR





Abdomen: soft, NTND





Extremities: warm, dry





Neuro: sedated





Fluid Balance (Past 24 Hours): 


I=     O=     Net 


 Intake & Output











 04/01/19 04/02/19 04/03/19 04/04/19





 06:59 06:59 06:59 06:59


 


Intake Total 1315 1800 2048 


 


Output Total 500 350  


 


Balance 815 1450 2048 


 


Weight 187 lb 6.287 oz 183 lb 9.6 oz 177 lb 8 oz 


 


Intake:    


 


  IV Fluids  8 134 


 


    NS  8 134 


 


  IVPB  119 238 


 


    Zosyn  119 238 


 


  Medicated IV 1315 1353 785 


 


    CC - Propofol/Diprivan 590 729 744 


 


    norepi 725 624 41 


 


  Oral  0  


 


  Tube Feeding   691 


 


  Tube Feeding Flush Amount   200 


 


  Packed Cells  320  


 


Output:    


 


  NG Tube Drainage Amount 500 350  


 


  Urine  0  


 


Other:    


 


  Date of Last Bowel 4/1/19 4/2/19 4/3/19 





  Movement    


 


  # Bowel Movements 1 1 1 


 


  Estimated Stool Amount Medium Medium Large 


 


  # Voids  0  





 








Labs: 


 Laboratory Results - last 24 hr











  04/02/19 04/02/19 04/02/19





  08:02 08:53 12:45


 


WBC   


 


RBC   


 


Hgb   


 


Hct   


 


MCV   


 


MCH   


 


MCHC   


 


RDW   


 


Plt Count   


 


MPV   


 


Sodium   141 


 


Potassium   2.9 L 


 


Chloride   96 L 


 


Carbon Dioxide   24 


 


Anion Gap   21 H 


 


BUN   40 H 


 


Creatinine   6.02 H 


 


Est GFR ( Amer)   9.5 


 


Est GFR (Non-Af Amer)   7.8 


 


BUN/Creatinine Ratio   6.6 L 


 


Glucose   148 H 


 


POC Glucose (mg/dL)  253 H   91


 


Calcium   8.3 L 


 


Phosphorus   5.4 H 


 


Magnesium   1.4 L 














  04/02/19 04/02/19 04/03/19





  17:57 20:11 00:08


 


WBC   


 


RBC   


 


Hgb   


 


Hct   


 


MCV   


 


MCH   


 


MCHC   


 


RDW   


 


Plt Count   


 


MPV   


 


Sodium   


 


Potassium   


 


Chloride   


 


Carbon Dioxide   


 


Anion Gap   


 


BUN   


 


Creatinine   


 


Est GFR ( Amer)   


 


Est GFR (Non-Af Amer)   


 


BUN/Creatinine Ratio   


 


Glucose    234 H


 


POC Glucose (mg/dL)  205 H  188 H 


 


Calcium   


 


Phosphorus   


 


Magnesium   














  04/03/19 04/03/19 04/03/19





  03:45 03:45 05:50


 


WBC   26.0 H 


 


RBC   2.65 L 


 


Hgb   8.2 L 


 


Hct   23 L 


 


MCV   87 


 


MCH   31 


 


MCHC   36 


 


RDW   16 H 


 


Plt Count   414 


 


MPV   9.5 


 


Sodium  130 L D  


 


Potassium  TNP   TNP


 


Chloride  89 L  


 


Carbon Dioxide  20 L  


 


Anion Gap  21 H  


 


BUN  36 H  


 


Creatinine  5.36 H  


 


Est GFR ( Amer)  10.8  


 


Est GFR (Non-Af Amer)  8.9  


 


BUN/Creatinine Ratio  6.7 L  


 


Glucose  259 H  


 


POC Glucose (mg/dL)   


 


Calcium  7.6 L  


 


Phosphorus  4.7  


 


Magnesium  TNP   1.4 L














  04/03/19





  07:45


 


WBC 


 


RBC 


 


Hgb 


 


Hct 


 


MCV 


 


MCH 


 


MCHC 


 


RDW 


 


Plt Count 


 


MPV 


 


Sodium 


 


Potassium 


 


Chloride 


 


Carbon Dioxide 


 


Anion Gap 


 


BUN 


 


Creatinine 


 


Est GFR ( Amer) 


 


Est GFR (Non-Af Amer) 


 


BUN/Creatinine Ratio 


 


Glucose 


 


POC Glucose (mg/dL)  234 H


 


Calcium 


 


Phosphorus 


 


Magnesium 











Studies: 





4/1 AXR - no ileus


3/31 CXR - unchanged bilateral pulmonary infiltrates 


3/30 CXR - patchy airspace opacities concerning for aspiration.  small pleural 

effusions


3/29 CXR - bibasilar atelectasis vs consolidation


3/28 CXR - suspicious for cardiogenic pulmonary edema, improved as compared to 3

/23/2019





Recent CT Aorta with run off this admission with moderate-severe R common 

femoral artery stenosis, severe b/l popliteal artery stenosis, near complete 

occlusion of distal L superficial fem artery stent.


Dopplers of b/l UE's obtained at [facility] on 3/26/19  pt had good biphasic waves of b

/l radial and ulnar arteries, but significant stenosis of all 5 fingers on R 

likely due to calcifilaxis.





Nutrition: 





TF





Impression: 





39 yo F readmitted after recent admission for acute respiratory failure, 

calciphylaxis after leaving [facility].  Complaining of GI bleed and 

bilateral hip pain.  Developed increasing hypoxia on 3/28 progressing to biPAP.

  Also with asystolic code x 3 on 3/29 with persistent shock but following 

commands after. Remains on vasopressors and ventilator.  Poor prognosis 

secondary to calciphylaixs and complications of diabetes. Now with aspiration 

pneumonia.





Plan: 





Cardiovascular: (1) Vasculitis secondary to calciphylaxis; (2) Persistent shock

; (3) Chronic hyperlipidemia; (4) CAD; (5) PAD; (6) Chronic essential 

hypertension; (7) stent to RT SFA; (8) stent to R brachial artery, (9) Acute 

systolic CHF; (10) Mild to moderate pulmonary HTN


-- HR 


-- SBP 


-- Telemetry


-- Vasopressors


   Levophed off


   Midodrine


-- TTE 


   moderate concentric left ventricular hypertrophy


   mild global hypokinesis, LVEF decreased at 45-50%


   septal flattening of interventricular septum consistent with right 

ventricular or volume overload


   assessment of diastolic function nondiagnostic


   right ventricle mild to moderately dilated


   right ventricular global systolic function mildly reduced


   trace aortic regurgitation


   mild to moderate tricuspid regurgitation


   mild to moderate pulmonary hypertension


   trace pulmonic regurgitation


Home meds: Midodrine








Pulmonary: (1) Acute hypoxic respiratory failure requiring intubation, with 

hypoxia and hypercapnea; (2) Aspiration pneumonia; (3) hx of asthma; (4) hx of 

tobacco abuse; (5) COPD


-- RR 14-39


-- sats  on MqB138%


-- vent, wean as tolerated.  


-- CXR: Linear streaky densities in RUL and RLL, improved as compared to 3/31


Home meds: Dulera, Albuterol, 5L home O2








Gastrointestinal: (1) Acute GI bleed, likely secondary to subacute ischemic 

colitis, resolved; (2) Chronic gastroparesis; (3) Chronic GERD


-- diet: start TF


-- bowel regimen: None


-- ulcer prophylaxis: Protonix


Home meds: Prochlorperazine, Zofran, Dronabinol, Maalox








Endocrine: (1) Secondary hyperparathyroidism; (2) Type 2 diabetes mellitus


-- monitor BGs


-- SSI


Home meds: Lispro








Renal: (1) ESRD on PD; (2) Calcinosis


-- UOP: anuric at baseline


-- I/O:1800 ml in / 350 ml out


-- Cr  5.36


-- Lytes 


   Na   130


   K   hemolyzed


   Ca   7.6


   Phos   4.7


   Mag   1.4


-- Nephrology following


-- restart sodium thiosulfate


Home meds: Sodium thiosulfate








Infectious disease: (1) Aspiration pneumonia; (2) Klebsiella UTI; (3) Sepsis


-- Tmax 99.2


-- WBC 26.0 form 20.9


-- Procalcitonin pending


-- Micro


   4/2   Sputum   in process


   4/1   UA   (+) LE, negative nitrates.  3+ WBC


      Urine   Klebsiella


      blood   ordered


-- ABX


   Zosyn


   Vancomycin


-- Vanco level pending


Home meds: None








Neurologic: (1) Chronic depression; (2) Chronic pain syndrome


-- Fentanyl PRN for pain control


-- Propofol gtt for sedation


-- Tyelnol as needed


Home meds: Methadone, Ativan, Dilaudid, Fentanyl patch, Cymbalta, Tylenol








Hematological: (1) Acute on chronic anemia secondary to blood loss and chronic 

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


-- Hgb 8.2 from 8.0


-- Plt 414 form 450


-- DVT prophylaxis: Unable to use Arixtra or Argatroban per Nephrology, unable 

to use heparin given prior HIT.  Difficult to control INR on Coumadin.  

Multiple GI bleeds on Eliquis.  SCDs at this time and re-evaluate for direct Xa 

inhibitor when less unstable. 


Home meds: Epogen








Metabolic: No acute issues


Home meds: Sodium bicarbonate











Deep vein thrombosis prophylaxis: Unable to use Arixtra or Argatroban per 

Nephrology, unable to use heparin given prior HIT.  Difficult to control INR on 

Coumadin.  Multiple GI bleeds on Eliquis.  SCDs at this time and re-evaluate 

for direct Xa inhibitor when less unstable. 


Dietary: Protonix





Condition: critical


Prognosis: poor


Code status: DNR, continue intubation


Disposition: continue ICU Care








Family updated at bedside regarding interval events and plan of care











Cumulative time spent in the care of this patient (excluding any procedure time)

: at least 55 minutes.





Patient care included clinical interview (with patient and/or family), bedside 

exam of the patient, review of labs, x-rays, and other ancillary data, 

coordination of (respiratory, nursing care, review of patient's records, 

discussion regarding patients management with involved consultants, primary 

physician, pharmacists, and other healthcare personnel (dietary, case management

, physical/occupational therapy etc.)





Critical Care Time: 40 min

## 2019-04-03 NOTE — PN
Subjective


Date of Service: 19


Interval History: 





Ms. Masters is a 39 yo female with PMH significant for DM2, PVD, ESRD on PD, 

anemia, HTN, HLD, gastroparesis, and severe COPD who was originally admitted to 

[facility] [place] from 3/5/19 until 3/25/19 for bilateral thigh pain, and was found to have 

calciphylaxis. She was transferred to [facility] on 3/25/19 for a right 3rd finger 

ischemia. On 3/26/19 she signed out AMA from [facility] and represented to [facility] [place] and was 

readmitted. She presented to the hospital with multiple skin lesions in the 

setting of calciphylaxis. On 3/28/19 she developed hypoxia, refused BiPAP and 

was placed on vapotherm. On 3/29/19 she had 3 asystolic cardiac arrests and has 

been intubated and mechanically ventilated since. On 3/30/19 she developed 

coffee ground emesis and was started on a Protonix gtt. 





Patient seen and examined at bedside. Unable to perform ROS due to sedation and 

being mechanically intubated.





Family History: Findings - Father  age 49 of MI, mother has diabetes


Social History: Findings - Disabled, lives with male partner Raymundo, who is HCP, 

smokes 1/2 PPD, no alcohol or drug use


Past Medical History: Findings - ESRD on peritoneal dialysis, anemia of renal 

disease and blood loss, severe COPD, Type 2 diabetes, GERD, CAD, hypertension, 

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 

stent to RT SFA, stent to R brachial artery, LT eye enucleation, RT foor 

transmetatarsal amp





Objective


Active Medications: 





Acetaminophen (Tylenol Adult Liq*)  650 mg FEED TUBE Q4H PRN Reason: FEVER


Chlorhexidine Gluconate (Peridex Mouth Wash 0.12%*)  15 ml TOPICAL Q4H TRISTAN


Fentanyl Citrate (Fentanyl*)  50 mcg IV SLOW PU Q1H PRN Reason: PAIN


Propofol (Diprivan*)  100 mls @ 15.606 mls/hr IV .(Initial Rate) TRISTAN; Protocol


Norepinephrine Bitartrate (Levophed 16 Mcg/Ml Premix Bag*)  4,000 mcg in 250 

mls @ 37.5 mls/hr IV .PER PROTOCOL TRISTAN; Protocol


Piperacillin Sod/Tazobactam (Sod 3.375 gm/ Sodium Chloride)  100 mls @ 25 mls/

hr IVPB Q12H TRISTAN


Insulin Human Lispro (Humalog*)  0 units SUBCUT Q4H TRISTAN; Protocol


Midodrine (Midodrine (Nf))  5 mg PO Q8HR TRISTAN; Protocol


Pantoprazole Sodium (Protonix Iv*)  40 mg IV DAILY [facility]


Pharmacy Consult (Zosyn Per Pharmacy*)  1 note FOLLOW UP .ZOSYN PER PHARMACY [facility]





 Vital Signs 























  19





  17:18 17:30 17:45


 


Temperature 99.0 F  


 


Pulse Rate  108 109


 


Respiratory   





Rate   


 


Blood Pressure  81/46 114/50





(mmHg)   


 


O2 Sat by Pulse  93 94





Oximetry   











Oxygen Devices in Use Now: Mechanical Ventilator


Appearance: NAD, laying in bed


Ears/Nose/Mouth/Throat: Mucous Membranes Moist


Skin: - - See skin note below


Neurological: - - Sedated


Result Diagrams: 


 19 04:30





 19 04:30


Microbiology and Other Data: 


 Microbiology











 19 21:58 Stool Occult Blood (JESSICA) - Final





 Stool 














Skin Deviation Note





- Skin Deviation Findings





Left medial lower leg - Areas of purplish discoloration due to calciphylaxis. 

Skin is intact. No drainage noted.


Left medial thigh - There are areas of dry black eschar and 2 areas with gray 

colored eschar that is moist. There are also areas of purplish discoloration. 

Dark colored area seen below the "BB" is a biopsy site. Total area measuring 13 

cm x 9.5 cm x 0.1 cm. The surrounding skin is intact. No drainage noted.


Sacrum/buttocks - Sacrum with a large area of dry black eschar, measuring 14 cm 

x 14.5 cm x 0.1 cm. The surrounding skin around the eschar is open with a pink 

wound base, there are some areas with yellow slough in the wound base. The skin 

surrounding the wound is intact without erythema. There is excoriated skin to 

the buttocks. The left buttock has an open area, measuring 2.8 cm x 2.5 cm x 

0.1 cm. The right buttock has an open area, measuring 1.8 cm x 1 cm x 0.1 cm. 

No drainage noted.


Right lateral hip/thigh - There is a large wound, measuring 31.5 cm x 6 cm x 

0.1 cm. The wound base is black eschar. The surrounding skin is intact. There 

is a lesion lower on the right thigh, measuring 8.5 cm x 7 cm x 0.1 cm. The 

wound base is dry eschar. The surrounding skin is intact. No drainage noted.


Right anterior thigh - Area of purplish discoloration, there are also areas 

with gray colored eschar, measuring 7.5 cm x 9.5 cm. The skin is intact, with 

some dry flaky skin. The surrounding skin is intact. No drainage noted.


Right hand - Finger tips with necrotic tissue. The skin is intact, no drainage. 

The surrounding skin is intact.


Left lateral upper thigh and hip, extending to the left flank - Large area of 

black eschar, measuring 43.5 cm x 16 cm x 0.1 cm.  There is purplish 

discoloration to the surrounding skin towards the top of the wound. The 

surrounding skin is intact. No drainage noted.








Assessment/Plan:


Ms. Masters is a 38 year old female with multiple medical problems including 

ESRD on PD, calciphylaxis, chronic GI bleed, diffuse PVD, and DM2 who presented 

to the hospital with multiple lesions from calciphylaxis. She is s/p cardiac 

arrest and is currently sedated and mechanically ventilated.





1. Multiple lesions to LEs and buttocks. These are secondary to calciphylaxis. 

Recommend leaving the areas open to air. Once she is more stable could consider 

discussing with general surgery about possible debridement, but in her current 

state would not unroof the areas.


2. Excoriated buttocks. Secondary to frequent loose stools. This area continues 

to slowly improve. Apply barrier cream to the buttocks. Can use her own 

Calazime if she wants.


3. Calciphylaxis. Management per primary medicine team and nephrology.


4. Diabetes Mellitus, type 2. HgA1C 8.8 on 3/7/19. Maintain good glycemic 

control to allow for wound healing.


5. End stage renal failure, on peritoneal dialysis. 


6. Diet. NPO, tube feeding.


7. Code Status. Full code.


8. Disposition. Disposition per primary medicine team.





TIME SPENT: Time for this wound consultation was 45 minutes and 30 minutes was 

spent with the patient assessing, measuring and photographing the wounds. 











Wound Problem/Plan


Is Patient a Wound Clinic Patient: No


Attending: Yesica Duran

## 2019-04-04 LAB
ANION GAP SERPL CALC-SCNC: 32 MMOL/L (ref 2–11)
BUN SERPL-MCNC: 39 MG/DL (ref 6–24)
BUN/CREAT SERPL: 7.3 (ref 8–20)
CALCIUM SERPL-MCNC: 9 MG/DL (ref 8.6–10.3)
CHLORIDE SERPL-SCNC: 93 MMOL/L (ref 101–111)
GLUCOSE SERPL-MCNC: 305 MG/DL (ref 70–100)
HCO3 SERPL-SCNC: 18 MMOL/L (ref 22–32)
HCT VFR BLD AUTO: 24 % (ref 33–41)
HGB BLD-MCNC: 7.7 G/DL (ref 12–16)
MAGNESIUM SERPL-MCNC: 1.5 MG/DL (ref 1.9–2.7)
MCH RBC QN AUTO: 28 PG (ref 27–31)
MCHC RBC AUTO-ENTMCNC: 32 G/DL (ref 31–36)
MCV RBC AUTO: 85 FL (ref 80–97)
PLATELET # BLD AUTO: 461 10^3/UL (ref 150–450)
POTASSIUM SERPL-SCNC: 2.7 MMOL/L (ref 3.5–5)
RBC # BLD AUTO: 2.81 10^6 /UL (ref 3.7–4.87)
SODIUM SERPL-SCNC: 143 MMOL/L (ref 135–145)
WBC # BLD AUTO: 30.9 10^3/UL (ref 3.5–10.8)

## 2019-04-04 RX ADMIN — PIPERACILLIN AND TAZOBACTAM SCH MLS/HR: 3; .375 INJECTION, POWDER, LYOPHILIZED, FOR SOLUTION INTRAVENOUS; PARENTERAL at 16:19

## 2019-04-04 RX ADMIN — CHLORHEXIDINE GLUCONATE 0.12% ORAL RINSE SCH ML: 1.2 LIQUID ORAL at 01:30

## 2019-04-04 RX ADMIN — FENTANYL CITRATE PRN MCG: 0.05 INJECTION, SOLUTION INTRAMUSCULAR; INTRAVENOUS at 20:43

## 2019-04-04 RX ADMIN — IPRATROPIUM BROMIDE AND ALBUTEROL SULFATE PRN NEB: .5; 3 SOLUTION RESPIRATORY (INHALATION) at 16:53

## 2019-04-04 RX ADMIN — CHLORHEXIDINE GLUCONATE 0.12% ORAL RINSE SCH ML: 1.2 LIQUID ORAL at 12:54

## 2019-04-04 RX ADMIN — PIPERACILLIN AND TAZOBACTAM SCH MLS/HR: 3; .375 INJECTION, POWDER, LYOPHILIZED, FOR SOLUTION INTRAVENOUS; PARENTERAL at 05:35

## 2019-04-04 RX ADMIN — PROPOFOL SCH MLS/HR: 10 INJECTION, EMULSION INTRAVENOUS at 17:26

## 2019-04-04 RX ADMIN — PROPOFOL SCH MLS/HR: 10 INJECTION, EMULSION INTRAVENOUS at 06:44

## 2019-04-04 RX ADMIN — INSULIN LISPRO SCH UNIT: 100 INJECTION, SOLUTION INTRAVENOUS; SUBCUTANEOUS at 21:00

## 2019-04-04 RX ADMIN — CHLORHEXIDINE GLUCONATE 0.12% ORAL RINSE SCH ML: 1.2 LIQUID ORAL at 05:35

## 2019-04-04 RX ADMIN — ACETAMINOPHEN PRN MG: 650 SOLUTION ORAL at 16:18

## 2019-04-04 RX ADMIN — PROPOFOL SCH MLS/HR: 10 INJECTION, EMULSION INTRAVENOUS at 00:44

## 2019-04-04 RX ADMIN — INSULIN GLARGINE SCH UNIT: 100 INJECTION, SOLUTION SUBCUTANEOUS at 09:55

## 2019-04-04 RX ADMIN — INSULIN LISPRO SCH UNITS: 100 INJECTION, SOLUTION INTRAVENOUS; SUBCUTANEOUS at 03:52

## 2019-04-04 RX ADMIN — FENTANYL CITRATE PRN MCG: 0.05 INJECTION, SOLUTION INTRAMUSCULAR; INTRAVENOUS at 06:04

## 2019-04-04 RX ADMIN — INSULIN GLARGINE SCH UNIT: 100 INJECTION, SOLUTION SUBCUTANEOUS at 22:20

## 2019-04-04 RX ADMIN — PROPOFOL SCH MLS/HR: 10 INJECTION, EMULSION INTRAVENOUS at 03:56

## 2019-04-04 RX ADMIN — CHLORHEXIDINE GLUCONATE 0.12% ORAL RINSE SCH ML: 1.2 LIQUID ORAL at 21:17

## 2019-04-04 RX ADMIN — INSULIN LISPRO SCH UNITS: 100 INJECTION, SOLUTION INTRAVENOUS; SUBCUTANEOUS at 08:48

## 2019-04-04 RX ADMIN — PROPOFOL SCH MLS/HR: 10 INJECTION, EMULSION INTRAVENOUS at 14:51

## 2019-04-04 RX ADMIN — MIDODRINE HYDROCHLORIDE SCH MG: 5 TABLET ORAL at 03:51

## 2019-04-04 RX ADMIN — INSULIN LISPRO SCH UNIT: 100 INJECTION, SOLUTION INTRAVENOUS; SUBCUTANEOUS at 13:10

## 2019-04-04 RX ADMIN — FENTANYL CITRATE PRN MCG: 0.05 INJECTION, SOLUTION INTRAMUSCULAR; INTRAVENOUS at 17:55

## 2019-04-04 RX ADMIN — PROPOFOL SCH MLS/HR: 10 INJECTION, EMULSION INTRAVENOUS at 10:44

## 2019-04-04 RX ADMIN — ACETAMINOPHEN PRN MG: 650 SOLUTION ORAL at 22:21

## 2019-04-04 RX ADMIN — MIDODRINE HYDROCHLORIDE SCH: 5 TABLET ORAL at 12:56

## 2019-04-04 RX ADMIN — MIDODRINE HYDROCHLORIDE SCH MG: 5 TABLET ORAL at 22:21

## 2019-04-04 RX ADMIN — CHLORHEXIDINE GLUCONATE 0.12% ORAL RINSE SCH ML: 1.2 LIQUID ORAL at 16:19

## 2019-04-04 RX ADMIN — INSULIN LISPRO SCH UNIT: 100 INJECTION, SOLUTION INTRAVENOUS; SUBCUTANEOUS at 17:26

## 2019-04-04 RX ADMIN — PANTOPRAZOLE SODIUM SCH MG: 40 INJECTION, POWDER, FOR SOLUTION INTRAVENOUS at 08:32

## 2019-04-04 RX ADMIN — CHLORHEXIDINE GLUCONATE 0.12% ORAL RINSE SCH ML: 1.2 LIQUID ORAL at 08:39

## 2019-04-04 RX ADMIN — PROPOFOL SCH MLS/HR: 10 INJECTION, EMULSION INTRAVENOUS at 21:16

## 2019-04-04 RX ADMIN — INSULIN LISPRO SCH UNITS: 100 INJECTION, SOLUTION INTRAVENOUS; SUBCUTANEOUS at 00:32

## 2019-04-04 RX ADMIN — MIDODRINE HYDROCHLORIDE SCH MG: 5 TABLET ORAL at 12:08

## 2019-04-04 NOTE — PN
Date of Service: 04/04/19 - HD 10 


Critical Care Services: 





67 yo F with PMH including ESRD on PD, calciphylaxis and diabetes mellitus with 

retinal and renal involvement.  She was recently admitted from 3/5-3/25 during 

which time she was treated for PVD, diagnosed with calciphylaxis of the irght 

third finger, began on infusions of sodium thiosulfate, was intubated for acute 

on chronic respiratory failure.  She was also treated for a chronic GI bleed, 

was seen by Dr Green regarding this. There was discussion of chronic ischemic 

colitis, but colonoscopy deferred due to concern of risk vs benefit.  She has 

been anticoagulated with warfarin, which led to epistaxis when INR >3, so she 

was switched to an DOAC.  She continued to have anemia and heme positive stool 

on the DOAC. 





She had been recently discharged to [facility] for necrosis of hand 

secondary to calciphylaxis on 3/25.  She left there AMA in the evening of 3/26 

as she didn't like the nursing care and returned directly back to [facility]. She 

reports bilateral hip pain and rectal bleeding.   





3/27: Wound team assessment documented extensive lesions to lower extremities 

and buttocks secondaryt o calciphylaxis.  Anticoagulation on hold for history 

of GI bleed.  Nephrology consulted.  continued on sodium thiosulfate for 

calciphylaxis.  Continued on sodium bicarbonate for acidosis.  Seen by hospice 

but family declined palliative options as regards both symptom control and 

comfort care.  Pain service consulted; given poor prognosis dilaudid 

liberalized.  





3/28: Developed hypoxia with sats 80%. CXR with pulmonary edema.  HD volume 

removal increased.  Transferred to ICU.  Refusing BiPAP.  Started on vapotherm.





3/29: remains on vapotherm.  In afternoon sats dropped again to 70% and pt 

agreed to BiPAP with subsequent improvement to 97%.  Shortly thereafter she 

went into asystole and was intubated.  She had 3 asystolic codes, regaining 

pulses between.  After pulses regained for the third time, family made patient 

DNR.  On epinephrine @ 20 mcg/min.  Peripheral pulses but unable to get good BP 

readings.  Kassie not an option given extensive arterial calcifications and 

stents.  





3/30: developed coffee ground emesis, started on Protonix gtt.  Following 

commands.  Remains on highdose levophed. 





3/31:  Following commands intermittently.  Failed SBT secondary to tachypnea 

and tachycardia.





4/1: no interval events.  continues to require FiO2 %





4/3: Dose of sodium thiosulfate given





Vital Signs: 











Temp Pulse Resp BP SpO2 FiO2


 


98.6 F 120 34 95/57 98 80


 


04/04/19 03:25 04/04/19 07:00 04/04/19 07:00 04/04/19 06:31 04/04/19 07:00 04/04 /19 04:00











Physical Exam: 


Gen: resting comfortably





HEENT: ETT in place





Lungs: tachypneic.  coarse inspiratory noises





Cardiac:  RRR





Abdomen:  soft, NTND





Extremities: warm, dry.  Necrosis stable, no erythema





Neuro: sedated





Fluid Balance (Past 24 Hours): 


I=     O=     Net 


 Intake & Output











 04/02/19 04/03/19 04/04/19 04/05/19





 06:59 06:59 06:59 06:59


 


Intake Total 1800 2048 2079 


 


Output Total 350  0 


 


Balance 1450 2048 2079 


 


Weight 183 lb 9.6 oz 177 lb 8 oz 183 lb 3.2 oz 


 


Intake:    


 


  IV Fluids 8 134 42 


 


    NS 8 134 13 


 


    Zosyn   29 


 


  IVPB 119 238 396 


 


    NS   42 


 


    Sodium Thiosulfate   236 


 


    Zosyn 119 238 118 


 


  Medicated IV 1353 785 995 


 


    CC - Propofol/Diprivan [phone] 


 


    norepi 624 41  


 


    vanco   270 


 


  Oral 0  0 


 


  Tube Feeding  691 556 


 


  Tube Feeding Flush Amount  200 90 


 


  Packed Cells 320   


 


Output:    


 


  NG Tube Drainage Amount 350   


 


  Urine 0  0 


 


  Tube Feeding Residual   0 





  Amount Wasted    


 


Other:    


 


  Date of Last Bowel 4/2/19 4/3/19  





  Movement    


 


  # Bowel Movements 1 1 1 


 


  Estimated Stool Amount Medium Large Large 


 


  # Voids 0   





 








Labs: 


 Laboratory Results - last 24 hr











  04/02/19 04/03/19 04/03/19





  04:20 10:45 16:04


 


WBC   


 


RBC   


 


Hgb   


 


Hct   


 


MCV   


 


MCH   


 


MCHC   


 


RDW   


 


Plt Count   


 


MPV   


 


Sodium   139  D 


 


Potassium   2.8 L 


 


Chloride   94 L 


 


Carbon Dioxide   23 


 


Anion Gap   22 H 


 


BUN   41 H 


 


Creatinine   6.04 H 


 


Est GFR ( Amer)   9.4 


 


Est GFR (Non-Af Amer)   7.8 


 


BUN/Creatinine Ratio   6.8 L 


 


Glucose   171 H 


 


POC Glucose (mg/dL)    299 H


 


Calcium   8.5 L 


 


Phosphorus   


 


Magnesium   


 


Triglycerides   297 


 


Cholesterol   152 


 


LDL Cholesterol   80 


 


HDL Cholesterol   12.4 


 


Procalcitonin  66 H  


 


Vancomycin Trough   11.7 














  04/03/19 04/04/19 04/04/19





  20:13 00:24 03:46


 


WBC   


 


RBC   


 


Hgb   


 


Hct   


 


MCV   


 


MCH   


 


MCHC   


 


RDW   


 


Plt Count   


 


MPV   


 


Sodium   


 


Potassium   


 


Chloride   


 


Carbon Dioxide   


 


Anion Gap   


 


BUN   


 


Creatinine   


 


Est GFR ( Amer)   


 


Est GFR (Non-Af Amer)   


 


BUN/Creatinine Ratio   


 


Glucose   


 


POC Glucose (mg/dL)  274 H  285 H  249 H


 


Calcium   


 


Phosphorus   


 


Magnesium   


 


Triglycerides   


 


Cholesterol   


 


LDL Cholesterol   


 


HDL Cholesterol   


 


Procalcitonin   


 


Vancomycin Trough   














  04/04/19 04/04/19 04/04/19





  05:40 05:40 05:46


 


WBC   30.9 H 


 


RBC   2.81 L 


 


Hgb   7.7 L 


 


Hct   24 L 


 


MCV   85 


 


MCH   28 


 


MCHC   32 


 


RDW   17 H 


 


Plt Count   461 H 


 


MPV   9.0 


 


Sodium  143  


 


Potassium  2.7 L*  


 


Chloride  93 L  


 


Carbon Dioxide  18 L  


 


Anion Gap  32 H  


 


BUN  39 H  


 


Creatinine  5.32 H  


 


Est GFR ( Amer)  10.9  


 


Est GFR (Non-Af Amer)  9.0  


 


BUN/Creatinine Ratio  7.3 L  


 


Glucose  305 H  


 


POC Glucose (mg/dL)    294 H


 


Calcium  9.0  


 


Phosphorus  5.5 H  


 


Magnesium  1.5 L  


 


Triglycerides   


 


Cholesterol   


 


LDL Cholesterol   


 


HDL Cholesterol   


 


Procalcitonin   


 


Vancomycin Trough   














  04/04/19





  08:31


 


WBC 


 


RBC 


 


Hgb 


 


Hct 


 


MCV 


 


MCH 


 


MCHC 


 


RDW 


 


Plt Count 


 


MPV 


 


Sodium 


 


Potassium 


 


Chloride 


 


Carbon Dioxide 


 


Anion Gap 


 


BUN 


 


Creatinine 


 


Est GFR ( Amer) 


 


Est GFR (Non-Af Amer) 


 


BUN/Creatinine Ratio 


 


Glucose 


 


POC Glucose (mg/dL)  271 H


 


Calcium 


 


Phosphorus 


 


Magnesium 


 


Triglycerides 


 


Cholesterol 


 


LDL Cholesterol 


 


HDL Cholesterol 


 


Procalcitonin 


 


Vancomycin Trough 











Studies: 





4/2 CXR - Linear streaky densitiies in RUL and RLL.  improved since 3/31


4/1 AXR - no ileus


3/31 CXR - unchanged bilateral pulmonary infiltrates 


3/30 CXR - patchy airspace opacities concerning for aspiration.  small pleural 

effusions


3/29 CXR - bibasilar atelectasis vs consolidation


3/28 CXR - suspicious for cardiogenic pulmonary edema, improved as compared to 3

/23/2019





Recent CT Aorta with run off this admission with moderate-severe R common 

femoral artery stenosis, severe b/l popliteal artery stenosis, near complete 

occlusion of distal L superficial fem artery stent.


Dopplers of b/l UE's obtained at [facility] on 3/26/19  pt had good biphasic waves of b

/l radial and ulnar arteries, but significant stenosis of all 5 fingers on R 

likely due to calcifilaxis.





Nutrition: 





TF





Impression: 





37 yo F readmitted after recent admission for acute respiratory failure, 

calciphylaxis after leaving [facility].  Complaining of GI bleed and 

bilateral hip pain.  Developed increasing hypoxia on 3/28 progressing to biPAP.

  Also with asystolic code x 3 on 3/29 with persistent shock but following 

commands after. Remains on vasopressors and ventilator.  Poor prognosis 

secondary to calciphylaixs and complications of diabetes. Now with aspiration 

pneumonia.





Plan: 





37 yo F readmitted after recent admission for acute respiratory failure, 

calciphylaxis after leaving [facility].  Complaining of GI bleed and 

bilateral hip pain.  Developed increasing hypoxia on 3/28 progressing to biPAP.

  Also with asystolic code x 3 on 3/29 with persistent shock but following 

commands after. Remains on vasopressors and ventilator.  Poor prognosis 

secondary to calciphylaixs and complications of diabetes. Now with aspiration 

pneumonia.





Plan: 





Cardiovascular: (1) Vasculitis secondary to calciphylaxis; (2) Persistent shock

; (3) Chronic hyperlipidemia; (4) CAD; (5) PAD; (6) Chronic essential 

hypertension; (7) stent to RT SFA; (8) stent to R brachial artery, (9) Acute 

systolic CHF; (10) Mild to moderate pulmonary HTN


-- HR 


-- SBP 


-- Telemetry


-- Vasopressors


   Levophed off


   Midodrine


-- TTE 


   moderate concentric left ventricular hypertrophy


   mild global hypokinesis, LVEF decreased at 45-50%


   septal flattening of interventricular septum consistent with right 

ventricular or volume overload


   assessment of diastolic function nondiagnostic


   right ventricle mild to moderately dilated


   right ventricular global systolic function mildly reduced


   trace aortic regurgitation


   mild to moderate tricuspid regurgitation


   mild to moderate pulmonary hypertension


   trace pulmonic regurgitation


Home meds: Midodrine








Pulmonary: (1) Acute hypoxic respiratory failure requiring intubation, with 

hypoxia and hypercapnea; (2) Aspiration pneumonia; (3) hx of asthma; (4) hx of 

tobacco abuse; (5) COPD


-- RR 24-45


-- sats  on IaB555%, target SpO2 90 as pt lives at home high 80s to low 

90s on 5L NC


-- vent, wean as tolerated.  


-- CXR: Linear streaky densities in RUL and RLL, improved as compared to 3/31


-- Pt failing to make any progress on weaning of vent.  Today is vent day six 

and still on FiO2 80-90%.  Anticipate that by day 10 she will likely still 

require mechanical ventilation.  I reached out to the ethics committee to get 

their input on whether subjecting Ms. Masters to a surgical procedure such as a 

tracheotomy would be considered reasonable as her life expectancy is most 

likely in the 6 month range based on her chronic conditions, not taking into 

account her extremely poor prognosis from her current illness.  It was their 

opinion that we are obligated to offer tracheostomy to the family when it 

becomes appropriate.  At this time it is still a bit early, and she will need 

to have an FiO2 more in the 50-60% range before such a procedure would be safe 

to perform.  Anticipate starting discussions with family/healthcare proxy 

regarding her wishes for trach once able to make more progress on weaning of 

FiO2.


Home meds: Dulera, Albuterol, 5L home O2








Gastrointestinal: (1) Acute GI bleed, likely secondary to subacute ischemic 

colitis, resolved; (2) Chronic gastroparesis; (3) Chronic GERD


-- diet:  TF


-- bowel regimen: None


-- ulcer prophylaxis: Protonix


Home meds: Prochlorperazine, Zofran, Dronabinol, Maalox








Endocrine: (1) Secondary hyperparathyroidism; (2) Type 2 diabetes mellitus


-- monitor BGs


-- SSI adjusted and Lantus added for better glycemic control


Home meds: Lispro








Renal: (1) ESRD on PD; (2) Calcinosis


-- UOP: anuric at baseline


-- I/O:2079 ml in / 0 ml out


-- Cr  5.36


-- Lytes 


   Na   143 from 130


   K   2.7


   Ca   9.0


   Phos   5.5, follow trend


   Mag   1.5, follow trend


-- Nephrology following, on PD


-- restarted sodium thiosulfate.  Plan for every other day dosing as blood 

pressure permits.  Next dose would be 4/5


Home meds: Sodium thiosulfate








Infectious disease: (1) Aspiration pneumonia; (2) Klebsiella UTI; (3) Sepsis


-- Tmax 99.2


-- WBC 30.9 from 26.0


-- Procalcitonin pending


-- Micro


   4/2   Sputum   yeast 1+, normal shanon 2+


      blood   NGTD


   4/1   UA   (+) LE, negative nitrates.  3+ WBC


      Urine   Klebsiella


      blood   ordered


-- ABX


   Zosyn


   Vancomycin, dosing by level


Home meds: None








Neurologic: (1) Chronic depression; (2) Chronic pain syndrome


-- Fentanyl PRN for pain control


-- Propofol gtt for sedation


-- Tyelnol as needed


-- daily sedation vacations


Home meds: Methadone, Ativan, Dilaudid, Fentanyl patch, Cymbalta, Tylenol








Hematological: (1) Acute on chronic anemia secondary to blood loss and chronic 

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


-- Hgb 7.7 from 8.2


-- Plt 461 from 414


-- DVT prophylaxis: Unable to use Arixtra or Argatroban per Nephrology, unable 

to use heparin given prior HIT.  Difficult to control INR on Coumadin.  

Multiple GI bleeds on Eliquis.  SCDs at this time and re-evaluate for direct Xa 

inhibitor when less unstable. 


-- Iron sucrose x 1


Home meds: Epogen








Metabolic: No acute issues


Home meds: Sodium bicarbonate











Deep vein thrombosis prophylaxis: Unable to use Arixtra or Argatroban per 

Nephrology, unable to use heparin given prior HIT.  Difficult to control INR on 

Coumadin.  Multiple GI bleeds on Eliquis.  SCDs at this time and re-evaluate 

for direct Xa inhibitor when less unstable. 


Dietary: Protonix





Condition: critical


Prognosis: poor


Code status: DNR, continue intubation


Disposition: continue ICU Care





Cumulative time spent in the care of this patient (excluding any procedure time)

: at least 40 minutes.





Patient care included clinical interview (with patient and/or family), bedside 

exam of the patient, review of labs, x-rays, and other ancillary data, 

coordination of (respiratory, nursing care, review of patient's records, 

discussion regarding patients management with involved consultants, primary 

physician, pharmacists, and other healthcare personnel (dietary, case management

, physical/occupational therapy etc.)

## 2019-04-04 NOTE — PN
Progress Note





- Progress Note


Date of Service: 04/04/19


Note: 





Ordered KCl 20 mEq and Mg 2 gm for low K and Mg.

## 2019-04-05 LAB
ANION GAP SERPL CALC-SCNC: 26 MMOL/L (ref 2–11)
BUN SERPL-MCNC: 44 MG/DL (ref 6–24)
BUN/CREAT SERPL: 8.2 (ref 8–20)
CALCIUM SERPL-MCNC: 9.3 MG/DL (ref 8.6–10.3)
CHLORIDE SERPL-SCNC: 93 MMOL/L (ref 101–111)
GLUCOSE SERPL-MCNC: 158 MG/DL (ref 70–100)
HCO3 SERPL-SCNC: 20 MMOL/L (ref 22–32)
HCT VFR BLD AUTO: 24 % (ref 33–41)
HGB BLD-MCNC: 7.4 G/DL (ref 12–16)
MAGNESIUM SERPL-MCNC: 2 MG/DL (ref 1.9–2.7)
MCH RBC QN AUTO: 27 PG (ref 27–31)
MCHC RBC AUTO-ENTMCNC: 31 G/DL (ref 31–36)
MCV RBC AUTO: 86 FL (ref 80–97)
MONOCYTES NFR FLD: 2 %
PLATELET # BLD AUTO: 451 10^3/UL (ref 150–450)
POTASSIUM SERPL-SCNC: 2.7 MMOL/L (ref 3.5–5)
RBC # BLD AUTO: 2.75 10^6 /UL (ref 3.7–4.87)
SODIUM SERPL-SCNC: 139 MMOL/L (ref 135–145)
WBC # BLD AUTO: 27.4 10^3/UL (ref 3.5–10.8)

## 2019-04-05 RX ADMIN — CHLORHEXIDINE GLUCONATE 0.12% ORAL RINSE SCH ML: 1.2 LIQUID ORAL at 04:32

## 2019-04-05 RX ADMIN — INSULIN LISPRO SCH UNIT: 100 INJECTION, SOLUTION INTRAVENOUS; SUBCUTANEOUS at 17:57

## 2019-04-05 RX ADMIN — CHLORHEXIDINE GLUCONATE 0.12% ORAL RINSE SCH: 1.2 LIQUID ORAL at 19:30

## 2019-04-05 RX ADMIN — CHLORHEXIDINE GLUCONATE 0.12% ORAL RINSE SCH ML: 1.2 LIQUID ORAL at 13:05

## 2019-04-05 RX ADMIN — INSULIN GLARGINE SCH UNITS: 100 INJECTION, SOLUTION SUBCUTANEOUS at 21:02

## 2019-04-05 RX ADMIN — INSULIN GLARGINE SCH UNIT: 100 INJECTION, SOLUTION SUBCUTANEOUS at 13:05

## 2019-04-05 RX ADMIN — CHLORHEXIDINE GLUCONATE 0.12% ORAL RINSE SCH ML: 1.2 LIQUID ORAL at 08:07

## 2019-04-05 RX ADMIN — IPRATROPIUM BROMIDE AND ALBUTEROL SULFATE PRN NEB: .5; 3 SOLUTION RESPIRATORY (INHALATION) at 16:48

## 2019-04-05 RX ADMIN — INSULIN LISPRO SCH UNIT: 100 INJECTION, SOLUTION INTRAVENOUS; SUBCUTANEOUS at 01:43

## 2019-04-05 RX ADMIN — INSULIN LISPRO SCH: 100 INJECTION, SOLUTION INTRAVENOUS; SUBCUTANEOUS at 09:52

## 2019-04-05 RX ADMIN — PIPERACILLIN AND TAZOBACTAM SCH MLS/HR: 3; .375 INJECTION, POWDER, LYOPHILIZED, FOR SOLUTION INTRAVENOUS; PARENTERAL at 17:57

## 2019-04-05 RX ADMIN — MIDODRINE HYDROCHLORIDE SCH MG: 5 TABLET ORAL at 21:10

## 2019-04-05 RX ADMIN — MIDAZOLAM HYDROCHLORIDE SCH MLS/HR: 5 INJECTION, SOLUTION INTRAMUSCULAR; INTRAVENOUS at 11:07

## 2019-04-05 RX ADMIN — PIPERACILLIN AND TAZOBACTAM SCH MLS/HR: 3; .375 INJECTION, POWDER, LYOPHILIZED, FOR SOLUTION INTRAVENOUS; PARENTERAL at 06:33

## 2019-04-05 RX ADMIN — INSULIN LISPRO SCH UNIT: 100 INJECTION, SOLUTION INTRAVENOUS; SUBCUTANEOUS at 08:07

## 2019-04-05 RX ADMIN — INSULIN LISPRO SCH UNIT: 100 INJECTION, SOLUTION INTRAVENOUS; SUBCUTANEOUS at 21:03

## 2019-04-05 RX ADMIN — MIDODRINE HYDROCHLORIDE SCH MG: 5 TABLET ORAL at 14:56

## 2019-04-05 RX ADMIN — MIDODRINE HYDROCHLORIDE SCH: 5 TABLET ORAL at 09:52

## 2019-04-05 RX ADMIN — PROPOFOL SCH MLS/HR: 10 INJECTION, EMULSION INTRAVENOUS at 05:06

## 2019-04-05 RX ADMIN — CHLORHEXIDINE GLUCONATE 0.12% ORAL RINSE SCH: 1.2 LIQUID ORAL at 08:06

## 2019-04-05 RX ADMIN — INSULIN LISPRO SCH UNIT: 100 INJECTION, SOLUTION INTRAVENOUS; SUBCUTANEOUS at 13:05

## 2019-04-05 RX ADMIN — PROPOFOL SCH MLS/HR: 10 INJECTION, EMULSION INTRAVENOUS at 13:25

## 2019-04-05 RX ADMIN — CHLORHEXIDINE GLUCONATE 0.12% ORAL RINSE SCH ML: 1.2 LIQUID ORAL at 21:10

## 2019-04-05 RX ADMIN — PANTOPRAZOLE SODIUM SCH MG: 40 INJECTION, POWDER, FOR SOLUTION INTRAVENOUS at 08:07

## 2019-04-05 RX ADMIN — PROPOFOL SCH MLS/HR: 10 INJECTION, EMULSION INTRAVENOUS at 00:50

## 2019-04-05 RX ADMIN — MIDAZOLAM HYDROCHLORIDE SCH: 5 INJECTION, SOLUTION INTRAMUSCULAR; INTRAVENOUS at 20:19

## 2019-04-05 NOTE — PN
Date of Service: 04/05/19 - HD 11


Critical Care Services: 





67 yo F with PMH including ESRD on PD, calciphylaxis and diabetes mellitus with 

retinal and renal involvement.  She was recently admitted from 3/5-3/25 during 

which time she was treated for PVD, diagnosed with calciphylaxis of the irght 

third finger, began on infusions of sodium thiosulfate, was intubated for acute 

on chronic respiratory failure.  She was also treated for a chronic GI bleed, 

was seen by Dr Green regarding this. There was discussion of chronic ischemic 

colitis, but colonoscopy deferred due to concern of risk vs benefit.  She has 

been anticoagulated with warfarin, which led to epistaxis when INR >3, so she 

was switched to an DOAC.  She continued to have anemia and heme positive stool 

on the DOAC. 





She had been recently discharged to [facility] for necrosis of hand 

secondary to calciphylaxis on 3/25.  She left there AMA in the evening of 3/26 

as she didn't like the nursing care and returned directly back to [facility]. She 

reports bilateral hip pain and rectal bleeding.   





3/27: Wound team assessment documented extensive lesions to lower extremities 

and buttocks secondaryt o calciphylaxis.  Anticoagulation on hold for history 

of GI bleed.  Nephrology consulted.  continued on sodium thiosulfate for 

calciphylaxis.  Continued on sodium bicarbonate for acidosis.  Seen by hospice 

but family declined palliative options as regards both symptom control and 

comfort care.  Pain service consulted; given poor prognosis dilaudid 

liberalized.  





3/28: Developed hypoxia with sats 80%. CXR with pulmonary edema.  HD volume 

removal increased.  Transferred to ICU.  Refusing BiPAP.  Started on vapotherm.





3/29: remains on vapotherm.  In afternoon sats dropped again to 70% and pt 

agreed to BiPAP with subsequent improvement to 97%.  Shortly thereafter she 

went into asystole and was intubated.  She had 3 asystolic codes, regaining 

pulses between.  After pulses regained for the third time, family made patient 

DNR.  On epinephrine @ 20 mcg/min.  Peripheral pulses but unable to get good BP 

readings.  Kassie not an option given extensive arterial calcifications and 

stents.  





3/30: developed coffee ground emesis, started on Protonix gtt.  Following 

commands.  Remains on highdose levophed. 





3/31:  Following commands intermittently.  Failed SBT secondary to tachypnea 

and tachycardia.





4/1: no interval events.  continues to require FiO2 %





4/3: Dose of sodium thiosulfate given





4/4: Tolerated decrease in FIO2 to 75%





Vital Signs: 











Temp Pulse Resp BP SpO2 FiO2


 


97.7 F 103 44 128/28 96 75


 


04/05/19 12:00 04/05/19 11:01 04/05/19 11:07 04/05/19 11:01 04/05/19 11:01 04/05 /19 08:16











Physical Exam: 


Gen:  resting comfortably





HEENT:  ETT in place





Lungs:  CTAB





Cardiac: RRR  





Abdomen: soft, nondistended.  no involuntary guarding





Extremities:  cool, dry.  Eschar and ischemic lesions stable





Neuro:  sedated.





Fluid Balance (Past 24 Hours): 


I=     O=     Net 


 Intake & Output











 04/03/19 04/04/19 04/05/19 04/06/19





 06:59 06:59 06:59 06:59


 


Intake Total 2048 2079 1969.9 


 


Output Total  0 2000 50


 


Balance 2048 2079 -30.1 -50


 


Weight 177 lb 8 oz 183 lb 3.2 oz 182 lb 3.2 oz 


 


Intake:    


 


  IV Fluids 134 42 519.9 


 


     13 519.9 


 


    Zosyn  29  


 


  IVPB 238 396 100 


 


    NS  42  


 


    Sodium Thiosulfate  236  


 


    Zosyn 238 118 100 


 


  Medicated  995 428 


 


    CC - Propofol/Diprivan [phone] 


 


    norepi 41   


 


    vanco  270  


 


  Oral  0 0 


 


  Tube Feeding 691 556 842 


 


  Tube Feeding Flush Amount 200 90 80 


 


Output:    


 


  Urine  0 0 


 


  Liquid Stool   2000 50


 


  Tube Feeding Residual  0 0 





  Amount Wasted    


 


Other:    


 


  Date of Last Bowel 4/3/19  3/5/2019 





  Movement    


 


  # Bowel Movements 1 1  


 


  Estimated Stool Amount Large Large Large Medium





 





Labs: 


 Laboratory Results - last 24 hr











  04/04/19 04/04/19 04/04/19





  13:04 16:28 20:37


 


WBC   


 


RBC   


 


Hgb   


 


Hct   


 


MCV   


 


MCH   


 


MCHC   


 


RDW   


 


Plt Count   


 


MPV   


 


Sodium   


 


Potassium   


 


Chloride   


 


Carbon Dioxide   


 


Anion Gap   


 


BUN   


 


Creatinine   


 


Est GFR ( Amer)   


 


Est GFR (Non-Af Amer)   


 


BUN/Creatinine Ratio   


 


Glucose   


 


POC Glucose (mg/dL)  297 H  285 H  361 H


 


Calcium   


 


Phosphorus   


 


Magnesium   


 


Fluid Source   


 


Fluid Volume   


 


Fluid Color   


 


Fluid Appearance   


 


Fluid WBC   


 


Fluid RBC   


 


Fluid Tot Cell Count   


 


Fluid Neutrophils   


 


Fluid Monocytes   














  04/05/19 04/05/19 04/05/19





  01:19 07:00 07:00


 


WBC    27.4 H


 


RBC    2.75 L


 


Hgb    7.4 L


 


Hct    24 L


 


MCV    86


 


MCH    27


 


MCHC    31


 


RDW    17 H


 


Plt Count    451 H


 


MPV    9.5


 


Sodium   139 


 


Potassium   2.7 L* 


 


Chloride   93 L 


 


Carbon Dioxide   20 L 


 


Anion Gap   26 H 


 


BUN   44 H 


 


Creatinine   5.36 H 


 


Est GFR ( Amer)   10.8 


 


Est GFR (Non-Af Amer)   8.9 


 


BUN/Creatinine Ratio   8.2 


 


Glucose   158 H 


 


POC Glucose (mg/dL)  251 H  


 


Calcium   9.3 


 


Phosphorus   6.7 H 


 


Magnesium   2.0 


 


Fluid Source   


 


Fluid Volume   


 


Fluid Color   


 


Fluid Appearance   


 


Fluid WBC   


 


Fluid RBC   


 


Fluid Tot Cell Count   


 


Fluid Neutrophils   


 


Fluid Monocytes   














  04/05/19 04/05/19





  09:15 12:31


 


WBC  


 


RBC  


 


Hgb  


 


Hct  


 


MCV  


 


MCH  


 


MCHC  


 


RDW  


 


Plt Count  


 


MPV  


 


Sodium  


 


Potassium  


 


Chloride  


 


Carbon Dioxide  


 


Anion Gap  


 


BUN  


 


Creatinine  


 


Est GFR ( Amer)  


 


Est GFR (Non-Af Amer)  


 


BUN/Creatinine Ratio  


 


Glucose  


 


POC Glucose (mg/dL)   307 H


 


Calcium  


 


Phosphorus  


 


Magnesium  


 


Fluid Source  Peritonial  fluid 


 


Fluid Volume  4 


 


Fluid Color  Colorless 


 


Fluid Appearance  Clear 


 


Fluid WBC  1396 


 


Fluid RBC  0 


 


Fluid Tot Cell Count  100 


 


Fluid Neutrophils  98 


 


Fluid Monocytes  2 











Studies: 





4/5 CXR - cardiomegaly.  lung fields clear. no pneumonia identified. 


4/2 CXR - Linear streaky densitiies in RUL and RLL.  improved since 3/31


4/1 AXR - no ileus


3/31 CXR - unchanged bilateral pulmonary infiltrates 


3/30 CXR - patchy airspace opacities concerning for aspiration.  small pleural 

effusions


3/29 CXR - bibasilar atelectasis vs consolidation


3/28 CXR - suspicious for cardiogenic pulmonary edema, improved as compared to 3

/23/2019





Recent CT Aorta with run off this admission with moderate-severe R common 

femoral artery stenosis, severe b/l popliteal artery stenosis, near complete 

occlusion of distal L superficial fem artery stent.


Dopplers of b/l UE's obtained at [facility] on 3/26/19  pt had good biphasic waves of b

/l radial and ulnar arteries, but significant stenosis of all 5 fingers on R 

likely due to calcifilaxis.





Nutrition: 





TF





Impression: 





37 yo F readmitted after recent admission for acute respiratory failure, 

calciphylaxis after leaving [facility].  Complaining of GI bleed and 

bilateral hip pain.  Developed increasing hypoxia on 3/28 progressing to biPAP.

  Also with asystolic code x 3 on 3/29 with persistent shock but following 

commands after. Remains on vasopressors and ventilator.  Poor prognosis 

secondary to calciphylaixs and complications of diabetes. Now with suspected 

aspiration pneumonia overlying extensive chronic lung disease.





Plan: 





Cardiovascular: (1) Vasculitis secondary to calciphylaxis; (2) Septic shock; (3

) Chronic hyperlipidemia; (4) CAD; (5) PAD; (6) Chronic essential hypertension; 

(7) stent to RT SFA; (8) stent to R brachial artery, (9) Acute systolic CHF; (10

) Mild to moderate pulmonary HTN


-- HR 


-- SBP 


-- Telemetry


-- Vasopressors


   Midodrine, wean as able


-- TTE 


   moderate concentric left ventricular hypertrophy


   mild global hypokinesis, LVEF decreased at 45-50%


   septal flattening of interventricular septum consistent with right 

ventricular or volume overload


   assessment of diastolic function nondiagnostic


   right ventricle mild to moderately dilated


   right ventricular global systolic function mildly reduced


   trace aortic regurgitation


   mild to moderate tricuspid regurgitation


   mild to moderate pulmonary hypertension


   trace pulmonic regurgitation


Home meds: Midodrine








Pulmonary: (1) Acute hypoxic respiratory failure requiring intubation, with 

hypoxia and hypercapnea; (2) Aspiration pneumonia; (3) hx of asthma; (4) hx of 

tobacco abuse; (5) COPD


-- RR 28-51


-- sats 89-98 on UeT615%, target SpO2 90 as pt lives at home high 80s to low 

90s on 5L NC


-- vent, wean as tolerated.  


-- CXR: cardiomegaly. no pneumonia


-- Pt failing to make any significant progress on weaning of vent.  Anticipate 

that by vent day 10 she will likely still require mechanical ventilation.  I 

reached out to the ethics committee to get their input on whether subjecting 

Ms. Masters to a surgical procedure such as a tracheotomy would be considered 

reasonable as her life expectancy is most likely in the 6 month range based on 

her chronic conditions, not taking into account her extremely poor prognosis 

from her current illness.  It was their opinion that we are obligated to offer 

tracheostomy to the family when it becomes appropriate.  At this time it is 

still a bit early, and she will need to have an FiO2 more in the 50-60% range 

before such a procedure would be safe to perform.  Anticipate starting 

discussions with family/healthcare proxy regarding her wishes for trach once 

able to make more progress on weaning of FiO2.


-- Have not yet attempted CTA to assess for PE as cause of hypoxia due to 

concerns for stability to travel given high O2 needs and intermittent 

hypotension.  Also anticoagulation is not currently possible as unable to use 

Arixtra or Argatroban per Nephrology, unable to use heparin given prior HIT.  

Difficult to control INR on Coumadin.  Multiple GI bleeds on Eliquis.  GI bleed 

this admission.  Given inability to anticoagulate safely, the management would 

not change even if CTA were positive at this time.  She may be a candidate when 

more stable.


-- Duonebs


Home meds: Dulera, Albuterol, 5L home O2








Gastrointestinal: (1) Acute GI bleed, likely secondary to subacute ischemic 

colitis, resolved; (2) Chronic gastroparesis; (3) Chronic GERD


-- diet:  TF


-- bowel regimen: None


-- ulcer prophylaxis: Protonix


Home meds: Prochlorperazine, Zofran, Dronabinol, Maalox








Endocrine: (1) Secondary hyperparathyroidism; (2) Type 2 diabetes mellitus


-- monitor BGs


-- SSI and Lantus, dose adjusted for better glycemic control


Home meds: Lispro








Renal: (1) ESRD on PD; (2) Calcinosis


-- UOP: anuric at baseline


-- I/O:1969 ml in / 0 ml out


-- Cr  5.36


-- Lytes 


   Na   139 from 143


   K   2.7, replaced


   Ca   9.3


   Phos   6.7 from 5.5, follow trend


   Mag   2.0


-- Nephrology following, on PD


-- Sodium thiosulfate.  Every other day dosing as blood pressure permits.  Next 

dose would be 4/7


Home meds: Sodium thiosulfate








Infectious disease: (1) Aspiration pneumonia; (2) Klebsiella UTI; (3) Possible 

Micrococcus bacteremia vs contaminant; (4) Possible Spontaneous Bacterial 

Peritonitis; (5) Sepsis


-- Tmax 103.6


-- WBC 27.4 from 30.9


-- Procalcitonin pending


-- Micro


   4/5   Blood    ordered


      Peritoneal fl   1396 WBC to 0 RBC.  Culture pending


   4/2   Sputum   yeast 1+, normal shanon 2+


      blood   Micrococcus luteus/Lylae -probable contaminant, repeat cultures


   4/1   UA   (+) LE, negative nitrates.  3+ WBC


      Urine   Klebsiella


      blood   ordered


-- ABX


   Zosyn


   Vancomycin, dosing by level


Home meds: None








Neurologic: (1) Chronic depression; (2) Chronic pain syndrome


-- Fentanyl PRN for pain control


-- Propofol and versed gtt for sedation


-- Tyelnol as needed


-- daily sedation vacations


Home meds: Methadone, Ativan, Dilaudid, Fentanyl patch, Cymbalta, Tylenol








Hematological: (1) Acute on chronic anemia secondary to blood loss and chronic 

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


-- Hgb 7.4 from 7.7


-- Plt 451 from 461


-- DVT prophylaxis: Unable to use Arixtra or Argatroban per Nephrology, unable 

to use heparin given prior HIT.  Difficult to control INR on Coumadin.  

Multiple GI bleeds on Eliquis.  SCDs at this time and re-evaluate for direct Xa 

inhibitor when less unstable. 


-- Iron sucrose x 1


Home meds: Epogen








Metabolic: No acute issues


Home meds: Sodium bicarbonate











Deep vein thrombosis prophylaxis: Unable to use Arixtra or Argatroban per 

Nephrology, unable to use heparin given prior HIT.  Difficult to control INR on 

Coumadin.  Multiple GI bleeds on Eliquis.  SCDs at this time and re-evaluate 

for direct Xa inhibitor when less unstable. 


Dietary: Protonix





Condition: critical


Prognosis: poor


Code status: DNR, continue intubation


Disposition: continue ICU Care





Family updated at bedside on progress and septic workup





Cumulative time spent in the care of this patient (excluding any procedure time)

: at least 40 minutes.





Patient care included clinical interview (with patient and/or family), bedside 

exam of the patient, review of labs, x-rays, and other ancillary data, 

coordination of (respiratory, nursing care, review of patient's records, 

discussion regarding patients management with involved consultants, primary 

physician, pharmacists, and other healthcare personnel (dietary, case management

, physical/occupational therapy etc.)

## 2019-04-06 LAB
ANION GAP SERPL CALC-SCNC: 23 MMOL/L (ref 2–11)
BUN SERPL-MCNC: 46 MG/DL (ref 6–24)
BUN/CREAT SERPL: 9.3 (ref 8–20)
CALCIUM SERPL-MCNC: 9 MG/DL (ref 8.6–10.3)
CHLORIDE SERPL-SCNC: 93 MMOL/L (ref 101–111)
GLUCOSE SERPL-MCNC: 261 MG/DL (ref 70–100)
HCO3 SERPL-SCNC: 20 MMOL/L (ref 22–32)
HCT VFR BLD AUTO: 26 % (ref 33–41)
HGB BLD-MCNC: 8.2 G/DL (ref 12–16)
MAGNESIUM SERPL-MCNC: 1.9 MG/DL (ref 1.9–2.7)
MCH RBC QN AUTO: 27 PG (ref 27–31)
MCHC RBC AUTO-ENTMCNC: 32 G/DL (ref 31–36)
MCV RBC AUTO: 85 FL (ref 80–97)
PLATELET # BLD AUTO: 516 10^3/UL (ref 150–450)
POTASSIUM SERPL-SCNC: 2.8 MMOL/L (ref 3.5–5)
RBC # BLD AUTO: 3.03 10^6 /UL (ref 3.7–4.87)
SODIUM SERPL-SCNC: 136 MMOL/L (ref 135–145)
WBC # BLD AUTO: 25.3 10^3/UL (ref 3.5–10.8)

## 2019-04-06 RX ADMIN — INSULIN LISPRO SCH UNIT: 100 INJECTION, SOLUTION INTRAVENOUS; SUBCUTANEOUS at 04:41

## 2019-04-06 RX ADMIN — INSULIN LISPRO SCH UNIT: 100 INJECTION, SOLUTION INTRAVENOUS; SUBCUTANEOUS at 13:18

## 2019-04-06 RX ADMIN — PANTOPRAZOLE SODIUM SCH MG: 40 INJECTION, POWDER, FOR SOLUTION INTRAVENOUS at 09:20

## 2019-04-06 RX ADMIN — MIDAZOLAM HYDROCHLORIDE SCH MLS/HR: 5 INJECTION, SOLUTION INTRAMUSCULAR; INTRAVENOUS at 12:54

## 2019-04-06 RX ADMIN — MIDAZOLAM HYDROCHLORIDE SCH: 5 INJECTION, SOLUTION INTRAMUSCULAR; INTRAVENOUS at 01:59

## 2019-04-06 RX ADMIN — PIPERACILLIN AND TAZOBACTAM SCH MLS/HR: 3; .375 INJECTION, POWDER, LYOPHILIZED, FOR SOLUTION INTRAVENOUS; PARENTERAL at 17:44

## 2019-04-06 RX ADMIN — CHLORHEXIDINE GLUCONATE 0.12% ORAL RINSE SCH ML: 1.2 LIQUID ORAL at 09:20

## 2019-04-06 RX ADMIN — INSULIN LISPRO SCH UNIT: 100 INJECTION, SOLUTION INTRAVENOUS; SUBCUTANEOUS at 00:32

## 2019-04-06 RX ADMIN — CHLORHEXIDINE GLUCONATE 0.12% ORAL RINSE SCH: 1.2 LIQUID ORAL at 17:40

## 2019-04-06 RX ADMIN — INSULIN LISPRO SCH UNIT: 100 INJECTION, SOLUTION INTRAVENOUS; SUBCUTANEOUS at 17:39

## 2019-04-06 RX ADMIN — PROPOFOL SCH MLS/HR: 10 INJECTION, EMULSION INTRAVENOUS at 17:22

## 2019-04-06 RX ADMIN — MIDODRINE HYDROCHLORIDE SCH MG: 5 TABLET ORAL at 06:07

## 2019-04-06 RX ADMIN — PROPOFOL SCH MLS/HR: 10 INJECTION, EMULSION INTRAVENOUS at 11:28

## 2019-04-06 RX ADMIN — POTASSIUM CHLORIDE SCH MLS/HR: 200 INJECTION, SOLUTION INTRAVENOUS at 13:19

## 2019-04-06 RX ADMIN — CHLORHEXIDINE GLUCONATE 0.12% ORAL RINSE SCH ML: 1.2 LIQUID ORAL at 17:40

## 2019-04-06 RX ADMIN — MIDODRINE HYDROCHLORIDE SCH MG: 5 TABLET ORAL at 14:15

## 2019-04-06 RX ADMIN — POTASSIUM CHLORIDE SCH MLS/HR: 200 INJECTION, SOLUTION INTRAVENOUS at 15:23

## 2019-04-06 RX ADMIN — MIDAZOLAM HYDROCHLORIDE SCH MLS/HR: 5 INJECTION, SOLUTION INTRAMUSCULAR; INTRAVENOUS at 21:39

## 2019-04-06 RX ADMIN — CHLORHEXIDINE GLUCONATE 0.12% ORAL RINSE SCH ML: 1.2 LIQUID ORAL at 21:38

## 2019-04-06 RX ADMIN — INSULIN LISPRO SCH UNIT: 100 INJECTION, SOLUTION INTRAVENOUS; SUBCUTANEOUS at 09:38

## 2019-04-06 RX ADMIN — INSULIN GLARGINE SCH UNITS: 100 INJECTION, SOLUTION SUBCUTANEOUS at 09:19

## 2019-04-06 RX ADMIN — MIDODRINE HYDROCHLORIDE SCH MG: 5 TABLET ORAL at 21:38

## 2019-04-06 RX ADMIN — PROPOFOL SCH MLS/HR: 10 INJECTION, EMULSION INTRAVENOUS at 05:17

## 2019-04-06 RX ADMIN — CHLORHEXIDINE GLUCONATE 0.12% ORAL RINSE SCH ML: 1.2 LIQUID ORAL at 01:59

## 2019-04-06 RX ADMIN — PIPERACILLIN AND TAZOBACTAM SCH MLS/HR: 3; .375 INJECTION, POWDER, LYOPHILIZED, FOR SOLUTION INTRAVENOUS; PARENTERAL at 06:07

## 2019-04-06 RX ADMIN — CHLORHEXIDINE GLUCONATE 0.12% ORAL RINSE SCH ML: 1.2 LIQUID ORAL at 06:07

## 2019-04-06 NOTE — PN
Progress Note





- Progress Note


Date of Service: 04/06/19


Note: 





Progress Note -- Critical Care





24 hour events/significant events: 


-tmax 100.4


-on versed/propofol








Tele: NSR





Vitals: 


 Vital Signs











Temp  100.4 F   04/06/19 10:23


 


Pulse  114   04/06/19 10:23


 


Resp  25   04/06/19 06:00


 


BP  82/51   04/06/19 10:23


 


Pulse Ox  97   04/06/19 10:23








 Intake & Output











 04/05/19 04/06/19 04/06/19





 18:59 06:59 18:59


 


Intake Total 672 1302 


 


Output Total 170 1580 


 


Balance 502 -278 


 


Weight  84.277 kg 


 


Intake:   


 


  IV Fluids 286 455 


 


     166 


 


    Vanco  250 


 


    Zosyn 136 39 


 


  Medicated  187 


 


    CC - Propofol/Diprivan 386 187 


 


  Tube Feeding  465 


 


  Tube Feeding Flush Amount  120 


 


  NG Tube Irrigate Amount  75 


 


Output:   


 


  Urine  1250 


 


  Liquid Stool 170 330 


 


Other:   


 


  Estimated Stool Amount Medium  











O2/Vent: 14/450/+8/60%





Infusions: propofol 30, versed 4





Medications: 


Acetaminophen (Tylenol Adult Liq*)  650 mg FEED TUBE Q4H PRN


   PRN Reason: FEVER


   Last Admin: 04/04/19 22:21 Dose:  650 mg


Albuterol/Ipratropium (Duoneb (Albuterol 2.5 Mg/Ipratropium 0.5 Mg))  1 neb INH 

Q4H PRN


   PRN Reason: SOB/WHEEZING


   Last Admin: 04/05/19 16:48 Dose:  1 neb


Chlorhexidine Gluconate (Peridex Mouth Wash 0.12%*)  15 ml TOPICAL Q4H [facility]


   Last Admin: 04/06/19 09:20 Dose:  15 ml


Fentanyl Citrate (Fentanyl*)  50 mcg IV SLOW PU Q1H PRN


   PRN Reason: PAIN


   Last Admin: 04/04/19 20:43 Dose:  50 mcg


Propofol (Diprivan*)  100 mls @ 15.606 mls/hr IV .(Initial Rate) [facility]; Protocol


   Last Admin: 04/06/19 05:17 Dose:  18.2 mls/hr


Norepinephrine Bitartrate (Levophed 16 Mcg/Ml Premix Bag*)  4,000 mcg in 250 

mls @ 37.5 mls/hr IV .PER PROTOCOL [facility]; Protocol


   Last Admin: 04/02/19 04:22 Dose:  22.5 mls/hr


Piperacillin Sod/Tazobactam (Sod 3.375 gm/ Sodium Chloride)  100 mls @ 25 mls/

hr IVPB Q12H [facility]


   Last Admin: 04/06/19 06:07 Dose:  25 mls/hr


Midazolam HCl 100 mg/ Sodium (Chloride)  100 mls @ 0 mls/hr IV Q24H [facility]; 

Protocol


   Last Admin: 04/06/19 01:59 Dose:  Not Given


Insulin Glargine (Lantus(*))  10 units SUBCUT 0800,2000 [facility]


   Last Admin: 04/06/19 09:19 Dose:  10 units


Insulin Human Lispro (Humalog*)  0 units SUBCUT FS Q4 ICU [facility]; Protocol


   Last Admin: 04/06/19 09:38 Dose:  4 unit


Midodrine (Midodrine (Nf))  5 mg PO Q8HR [facility]; Protocol


   Last Admin: 04/06/19 06:07 Dose:  5 mg


Pantoprazole Sodium (Protonix Iv*)  40 mg IV DAILY [facility]


   Last Admin: 04/06/19 09:20 Dose:  40 mg


Pharmacy Consult (Zosyn Per Pharmacy*)  1 note FOLLOW UP .ZOSYN PER PHARMACY [facility]


Pharmacy Consult (Vancomycin Random Level*)  1 note FOLLOW UP 1900 PRN


   PRN Reason: PER PROTOCOL











Physical Exam:


Constitutional: intubated, sedated, no distress, no diaphoresis


Head: normocephalic, atraumatic


Eyes: no pallor, no icterus


ENT: moist mucous membranes


Neck: soft, supple, no jvd


CVS: normal rate, regular, no murmur


Resp: bilateral air entry, no RRW, no acc muscle use


Abdomen/GI: soft, nondistended, BS+; Right midline lower PD cathetor+


Ext/Msk: warm, no edema; Right 2-4th digits ischemic 


Skin: warm; bilateral LE areas of skin necrosis+


Neuro: sedated, intubated, pupils reactive








Labs: 





 Laboratory Results - last 24 hr











  04/05/19 04/05/19 04/05/19





  09:15 12:31 16:45


 


WBC   


 


RBC   


 


Hgb   


 


Hct   


 


MCV   


 


MCH   


 


MCHC   


 


RDW   


 


Plt Count   


 


MPV   


 


Sodium   


 


Potassium   


 


Chloride   


 


Carbon Dioxide   


 


Anion Gap   


 


BUN   


 


Creatinine   


 


Est GFR ( Amer)   


 


Est GFR (Non-Af Amer)   


 


BUN/Creatinine Ratio   


 


Glucose   


 


POC Glucose (mg/dL)   307 H 


 


Calcium   


 


Phosphorus   


 


Magnesium   


 


Fluid Volume  4  


 


Fluid Color  Colorless  


 


Fluid Appearance  Clear  


 


Fluid WBC  1396  


 


Fluid RBC  0  


 


Fluid Tot Cell Count  100  


 


Fluid Neutrophils  98  


 


Fluid Monocytes  2  


 


Fluid Cell Count Rvw By    


 


Random Vancomycin    13.5














  04/05/19 04/05/19 04/06/19





  17:19 20:51 00:20


 


WBC   


 


RBC   


 


Hgb   


 


Hct   


 


MCV   


 


MCH   


 


MCHC   


 


RDW   


 


Plt Count   


 


MPV   


 


Sodium   


 


Potassium   


 


Chloride   


 


Carbon Dioxide   


 


Anion Gap   


 


BUN   


 


Creatinine   


 


Est GFR ( Amer)   


 


Est GFR (Non-Af Amer)   


 


BUN/Creatinine Ratio   


 


Glucose   


 


POC Glucose (mg/dL)  224 H  292 H  238 H


 


Calcium   


 


Phosphorus   


 


Magnesium   


 


Fluid Volume   


 


Fluid Color   


 


Fluid Appearance   


 


Fluid WBC   


 


Fluid RBC   


 


Fluid Tot Cell Count   


 


Fluid Neutrophils   


 


Fluid Monocytes   


 


Fluid Cell Count Rvw By   


 


Random Vancomycin   














  04/06/19 04/06/19 04/06/19





  04:30 04:30 04:34


 


WBC   25.3 H 


 


RBC   3.03 L 


 


Hgb   8.2 L 


 


Hct   26 L 


 


MCV   85 


 


MCH   27 


 


MCHC   32 


 


RDW   17 H 


 


Plt Count   516 H D 


 


MPV   9.7 


 


Sodium  136  


 


Potassium  2.8 L  


 


Chloride  93 L  


 


Carbon Dioxide  20 L  


 


Anion Gap  23 H  


 


BUN  46 H  


 


Creatinine  4.96 H  


 


Est GFR ( Amer)  11.8  


 


Est GFR (Non-Af Amer)  9.8  


 


BUN/Creatinine Ratio  9.3  


 


Glucose  261 H  


 


POC Glucose (mg/dL)    243 H


 


Calcium  9.0  


 


Phosphorus  6.6 H  


 


Magnesium  1.9  


 


Fluid Volume   


 


Fluid Color   


 


Fluid Appearance   


 


Fluid WBC   


 


Fluid RBC   


 


Fluid Tot Cell Count   


 


Fluid Neutrophils   


 


Fluid Monocytes   


 


Fluid Cell Count Rvw By   


 


Random Vancomycin   














  04/06/19





  08:15


 


WBC 


 


RBC 


 


Hgb 


 


Hct 


 


MCV 


 


MCH 


 


MCHC 


 


RDW 


 


Plt Count 


 


MPV 


 


Sodium 


 


Potassium 


 


Chloride 


 


Carbon Dioxide 


 


Anion Gap 


 


BUN 


 


Creatinine 


 


Est GFR ( Amer) 


 


Est GFR (Non-Af Amer) 


 


BUN/Creatinine Ratio 


 


Glucose  175 H


 


POC Glucose (mg/dL) 


 


Calcium 


 


Phosphorus 


 


Magnesium 


 


Fluid Volume 


 


Fluid Color 


 


Fluid Appearance 


 


Fluid WBC 


 


Fluid RBC 


 


Fluid Tot Cell Count 


 


Fluid Neutrophils 


 


Fluid Monocytes 


 


Fluid Cell Count Rvw By 


 


Random Vancomycin 














Imaging: 











Assessment: 68y F w/pmhx of ESRD on PD, Calciphylaxis of 3rd right finger 3/2019

, DM with retinal/renal involvement, PVD (on AC since)(R SFA stent, R brachial 

stent), h/o EMILI in past, Mild LV systolic dysfunction, COPD on 5L home O2 ; 

Recent admission for acute on chronic hypoxic respiratory failure, GI bleed but 

colonoscopy deferred due to risk/benefit. On and off bleeding since then while 

on NOAC. Discharged from [facility], referred to [facility] but left AMA and 

readmitted to [facility] for increasing pain and rectal bleeding. She was admitted and 

started on sodium thiosulfate for more extensive calciphylaxis lesions in lower 

extremities. Palliatiave care/comfort measures were refused by family, though 

she is being managed for increasing pain. 3/28 with hypoxia/pulmonary edema. 3/

29 worsened hypoxia, started on NIV, then developed Asystolic cardiac arrest x3 

episodes, made DNR by family. 3/30 developed coffee ground emesis, but able to 

follow commands, remains on high dose pressors.





-Calciphylaxis


-Acute on chronic hypoxic respiratory failure, intubated 3/29


-Asystolic Cardiac arrest 3/28


-Septic Shock


-Sepsis 2/2 to Enterococcus Bacterial Peritonitis 3/28


-Upper GI bleed 3/30


-Klebsiella UTI 4/1


-Anemia


DM


ESRD on PD


PVD








Plan:


Neuro- 


-remains sedated, on versed/propofol


-plan to decrease for neuro checks, reassess


-asp prec





CVS- BP have been stable; some fluctuation


-no arterial line due to severe calcification of vessels


-shock improved from prior; off levophed


-cont midodrine 5mg po q8h


-ongoing PD


-IV abx for Enterococcus peritonitis


-off AC due to ongoing bleeding, limited options for AC





Resp-


-intubated 60%


-CXR 4/5 with ett above jose, no clear large focal infiltrate noted


-no arterial line; will obtain ABG though to assess resp status


-maintain fio2>92%


-asp prec, VAP bundle





ID- tmax 100.4. wbc 25. 


-peritoneal cx with enterococcus+ prelim


-blood culture with micrococcus 4/2, ?contaminant? second set negative


-cont IV zosyn and IV vanco, pharmacy dosing





GI-


-NGt; nepro 30cc/hr


-GI proph - PPI








Renal-


-ESRD; on PD continuous


-K 2.8; mild metabolic acidosis


-check ABG now


-cont PD


-give 40meq KCL


-has not been on further NaThiosulfate infusions





Heme- hg stable 7-8s


-plt 500s


-DVT proph SCD as able but last LE necrotis lesions; no AC due to bleeding





Endo-Maintain BG<200, insulin protocol; incr lantus 15u bid; sliding scale; fs 

q4h





Musculsk- pressure ulcer prophylaxis. Bedrest.


Wounds- none


Nutrition- Nepro TF





DVT prophylaxis: -


GI prophylaxis: -


Central Line: PICC


Arterial Line: no


Quintanilla Cathetor: no





Disposition:  Patient requires Critical Care/ICU for septic shock, respiratory 

failure, cardiac arrest





Patient clinical status: guarded, critical





Code Status: DNR





Total Critical Care time is 45 minutes, excluding procedures/teaching








Ronnie Valenzuela MD


Intensivist


(Electronically Signed)

## 2019-04-06 NOTE — PN
PROGRESS NOTE:

 

DATE OF SERVICE:

 

HISTORY:  Ms. Masters remains intubated and ventilated and on sedation.  She was noted yesterday to h
ave cloudy peritoneal fluid, cell counts and cultures were sent off.  She is growing Enterococcus vasu
cium out of her belly.  Her peritoneal fluid revealed 1396 cells, 98% of which were neutrophils.  She
 was treated with intravenous vancomycin.  There has really been no change in her areas of necrosis f
rom her calciphylaxis.  Her blood pressure remains quite low, currently 82/51. She is being supported
 with pressors.

 

IMPRESSION:

1.  Peritonitis.

2.  Chronic renal failure.

3.  Respiratory failure.

4.  Peritonitis.

 

She will be continuing on vancomycin.  There will be question of moving her across to daptomycin.  I 
have discussed the case link with Dr. Valenzuela.

 

 [id number]/[facility] #: [id number]

## 2019-04-06 NOTE — PN
Progress Note





- Progress Note


Date of Service: 04/06/19


Note: 








Arterial Line Procedure Note





Indication:  invasive hemodynamic monitoring





Diagnosis: shock, severe sepsis, acute hypoxic respiratory failure





Performed by: Ronnie Valenzuela MD





Consent: (unable to reach HCP) Emergent








Universal Protocol: Time-out was performed and the correct patient and site 

were verified





- Prior labs/history was reviewed prior to procedure


- Full sterile precautions with chlorhexidine/full drapes/gowns/gloves utilized


- Right femoral artery visualized with US; good site above previous stent was 

found


- Vessel accessed with return of pulsatile blood. One attempt was made to 

access vessel. A cathetor was threaded over wire into vessel. Good arterial 

waveform was observed on monitor.


- Arterial Catheter was sutured to site; dressing applied to site.





EBL <3 cc





No immediate complications noted, patient tolerated procedure well. 








Ronnie Valenzuela MD


Intensivist


(Electronically Signed)

## 2019-04-07 VITALS — DIASTOLIC BLOOD PRESSURE: 18 MMHG | SYSTOLIC BLOOD PRESSURE: 42 MMHG

## 2019-04-07 NOTE — DS
CC:  Dr. Shanks; Dr. Baker

 

DEATH SUMMARY:

 

DATE OF ADMISSION:  03/27/19

 

DATE OF DEATH:  04/06/19

 

TIME OF DEATH:  10:51 p.m.

 

PRIMARY CARE PROVIDER:  Dr. Shanks.

 

CAUSE OF DEATH:

1.  Acute respiratory failure due to end-stage renal disease.

2.  Calciphylaxis.

 

HOSPITALIZATION COURSE:  Adri Masters was originally admitted on 03/06/19 for 
intractable leg pain that later on was diagnosed by a skin biopsy of 
calciphylaxis. During this admission, she was treated with sodium thiosulfate.  
On 03/25/19, she was transferred to [facility] from our facility 
due to noted right upper extremity arterial stenosis.  At [facility]
, the patient was noted to have severe calciphylaxis of blood vessels of all of 
her fingers on the right side and 1 finger on the left hand.  At that point, it 
was noted that the patient is not a candidate for vascular intervention.  While 
at [facility], patient signed out against medical advice and she 
came into our hospital's ER for admission on 03/27/19.  At this point, she 
complained of shortness of breath, intractable pain, and bleeding from her 
rectum.  In fact, she was noted to have a GI bleed prior to her stay at [facility].  The source was suspected to be ischemic colitis due to the 
patient's known vascular disease, but her hemoglobin had been stable and the 
patient was not a good candidate for endoscopy.  During her second hospital 
stay on 03/27/19, she had developed acute respiratory failure on 03/29/19 
requiring intubation.  Shortly thereafter, she had developed PEA and was 
resuscitated 3 times that  day.  At that point, the family was aware that the 
patient's prognosis is very poor and her healthcare proxy, who is her boyfriend
, Raymundo, made patient at that point do not resuscitate.  For the next several 
days, she continued to be on pressors under the care of the intensivist.  She 
was noted to have beginning of necrosis of the right finger and dusky fingers 
on the left hand.  She continued to have diarrhea and on 04/06/19, she was also 
noted to have possible tarry stool again.  We were unable to obtain blood 
pressures with a blood pressure cuff due to her calciphylaxis and gradual 
spread of the calciphylaxis to her blood vessels.  Due to that, an A-line was 
placed on 04/06/19 by Dr. Valenzuela.  Despite all those interventions, on 04/06/19, 
while on Levophed drip, the patient developed pulseless electrical activity.  
At that point resuscitation was not started as per  the patient's boyfriend's 
wishes and patient's DNR order. 

The patient was pronounced at 10:51 p.m. on 04/06/19.  The patient's boyfriend 
and her sister who had been present throughout most of the patient's hospital 
stay were present by the bedside, and the patient's boyfriend specifically, who 
is the healthcare proxy, requested no autopsy to be performed.

 

Please note that this is a short summary of patient's very long and complicated 
hospitalization.  Please refer to further medical records for details.

 

TIME SPENT:  Approximately 35 minutes was spent on the patient's discharge.

 

 [id number]/CPS #: [id number]

MTDD

## 2019-08-12 NOTE — PN
Date of Service: 03/31/19


Critical Care Services: 


On propofol overnight, and stopped this AM - was able to follow some commands, 

but only intermittently. Attempt to wean from ventilator was unsuccessful (

patient immediately developed tachypnea, tachycardia, and diaphoresis). Also 

remains on vasopressor (norepi at 10 mcg/min). Her peritoneal dialysis 

continues. 





Vital Signs: 











Temp Pulse Resp BP SpO2 FiO2


 


99 F 110 24 151/54 94 90











Physical Exam: 


Gen:Unresponsive on the ventilator and on propofol.


HEENT: Pupils and corneals intact


Lungs:Scaterred rhonchi. Crackles both bases.


Abdomen:Not distended.


Extremities: Exam unchanged - multiple lesions related to the calcinosis.





Fluid Balance (Past 24 Hours): 














 03/31/19





 06:59


 


Intake Total 2609.2


 


Output Total 


 


Balance 2609.2


 


Weight 189 lb


 


Intake: 


 


  IV Fluids 


 


    Sodium Thiosulfate 


 


  IVPB 


 


    Sodium Thiosulfate 


 


  Medicated IV 2604.2


 


    CC - Epinephrine 400


 


    CC - Propofol/Diprivan 655


 


    Insulin 38.1


 


    norepi 1254


 


    pantoprazole 257.1


 


  IV Narcotic Infusion 5


 


    Fentanyl 5


 


  Oral 


 


Output: 


 


  Urine 


 


Other: 


 


  Estimated Stool Amount 





 





Labs: 














  03/30/19 03/30/19 03/31/19





  19:45 19:48 00:37


 


WBC   


 


RBC   


 


Hgb   


 


Hct   


 


MCV   


 


MCH   


 


MCHC   


 


RDW   


 


Plt Count   


 


MPV   


 


Sodium   


 


Potassium   


 


Chloride   


 


Carbon Dioxide   


 


Anion Gap   


 


BUN   


 


Creatinine   


 


Est GFR ( Amer)   


 


Est GFR (Non-Af Amer)   


 


BUN/Creatinine Ratio   


 


Glucose   


 


POC Glucose (mg/dL)   271 H  255 H


 


Lactic Acid  1.0  


 


Calcium   














  03/31/19 03/31/19 03/31/19





  04:42 04:42 04:42


 


WBC   32.7 H 


 


RBC   2.67 L 


 


Hgb   7.3 L 


 


Hct   23 L 


 


MCV   86 


 


MCH   27 


 


MCHC   32 


 


RDW   17 H 


 


Plt Count   522 H 


 


MPV   8.7 


 


Sodium  140  


 


Potassium  3.3 L  


 


Chloride  93 L  


 


Carbon Dioxide  24  


 


Anion Gap  23 H  


 


BUN  40 H  


 


Creatinine  6.13 H  


 


Est GFR ( Amer)  9.3  


 


Est GFR (Non-Af Amer)  7.7  


 


BUN/Creatinine Ratio  6.5 L  


 


Glucose  150 H  


 


POC Glucose (mg/dL)   


 


Lactic Acid    0.9


 


Calcium  9.0  














  03/31/19 03/31/19 03/31/19





  04:44 08:50 12:55


 


WBC   


 


RBC   


 


Hgb   


 


Hct   


 


MCV   


 


MCH   


 


MCHC   


 


RDW   


 


Plt Count   


 


MPV   


 


Sodium   


 


Potassium   


 


Chloride   


 


Carbon Dioxide   


 


Anion Gap   


 


BUN   


 


Creatinine   


 


Est GFR ( Amer)   


 


Est GFR (Non-Af Amer)   


 


BUN/Creatinine Ratio   


 


Glucose   


 


POC Glucose (mg/dL)  151 H  90  150 H


 


Lactic Acid   


 


Calcium   














  03/31/19





  15:48


 


WBC 


 


RBC 


 


Hgb 


 


Hct 


 


MCV 


 


MCH 


 


MCHC 


 


RDW 


 


Plt Count 


 


MPV 


 


Sodium 


 


Potassium 


 


Chloride 


 


Carbon Dioxide 


 


Anion Gap 


 


BUN 


 


Creatinine 


 


Est GFR ( Amer) 


 


Est GFR (Non-Af Amer) 


 


BUN/Creatinine Ratio 


 


Glucose 


 


POC Glucose (mg/dL)  246 H


 


Lactic Acid 


 


Calcium 











Studies: 


CXR: Bilateral patchy infiltrates in both lungs





Nutrition: 


None





Impression: 


Major problems include disseminated calcinosis with renal failure, s/p 

asystolic cardiac arrest x 3 about 48 hours ago and post-arrest has shown some 

signs of awakening but this is not consistent. Patient is also ventilator-

dependent and vasopressor-dependent, and has probable pulmonary edema but 

cannot be diuresed. The prognosis here is very poor.





Plan: 


General supportive care for now, with daily wean attempts and daily evaluation 

of mental status. peritoneal dialysis will continue for now.








Critical Care Time: 40 minutes intact

## 2020-01-01 NOTE — PN
Progress Note





- Progress Note


Date of Service: 04/06/19


Note: 





Called to pt's bedside for hypotension. When arrived pt was in PEA. Ordered 

vasopressin was not yet given. shortly thereafter pt was in asystole. Pt's 

boyfriend (HCP) is on his way. Pt was DNR and resuscitation was not started. 

Time of death 22:51. 


On exam pt had no spontaneous breathing, was unresponsive to all stimuli. there 

was no heart beat or breath sounds on asculation. Time of death pronouncement 22

:51 PM on 4/6/19. Awaiting for HCP to decide about autopsy (2) more than 100 beats/min

## 2021-10-09 NOTE — ED
Juan C RICARDO Abhishek, scribed for Patricia Casillas MD on 12/25/17 at 1257 .





HPI Chest Pain





- HPI Summary


HPI Summary: 


This patient is a 37 year old F BIBA with a chief complaint of CP and abdominal 

pain since last night (12/24/17). Pt was given 324 mg aspirin in ambulance as 

well as NTG sublingual 2x. Pain was alleviated from an initial 10 to 2. Pt did 

not receive respiratory treatment enroute. Prior [facility] visit reports dx of 

pneumonia with sepsis, DKA with ICU admission. Pt was discharged from [facility] on 12/ 11/17 with dx pneumonia and continued on Levaquin. Pt state she did all of her 

exchanges last night and this morning, she is able to make a small amount of 

urine, and she has clear dialysis fluids. Pt was reports prior dx of gastritis 

at [facility] as well as she is due for "an MRI take of her 

stomach".  At home, pt is on 5L of oxygen, ambulates only with a walker and 

wheelchair. According to the patient, her boyfriend will bring the rest of her 

exchanges. The patient rates the abd pain 8/10 in severity, states some of her 

ducts are dilated, but she does not know which. Symptoms aggravated by coughing 

and movement. Symptoms alleviated by aspirin and NTG. Patient reports irregular 

menstrual cycle, SOB, abd pain (onset 12/24/17 and constant through 12/25/17 

morning), nausea, vomiting (foamy, clear), and leg pain (aching). Patient 

denies fever. Pt requires peritoneal dialysis and does exchanges 3 times a day.








- History of Current Complaint


Chief Complaint: EDAbdPain


Time Seen by Provider: 12/25/17 12:30


Hx Obtained From: Patient, Medical Records


Hx Last Menstrual Period: irregular 


Onset/Duration: Started Days Ago - last pm, Atraumatic, Still Present


Time of Onset: 19:00


Timing: Constant, Lasting Hours


Initial Severity: Severe


Current Severity: Severe


Pain Intensity: 8


Pain Scale Used: 0-10 Numeric


Chest Pain Location: Mid Sternal


Chest Pain Radiates: No


Character: Pressure/Squeezing


Aggravating Factor(s): Movement, Other: - coughing


Alleviating Factor(s): Medication - aspirin,  - x 2 given by EMS


Associated Signs and Symptoms: Positive: Chest Pain, Shortness of Breath, Nausea

, Cough, Abdominal Pain, Vomiting - foamy and clear, Other: - leg pain.  

Negative: Fever


Related History: Similar Episode/Dx as: - sepsis, pneumonia, DKA, Obesity





- Additional Pertinent History


Primary Care Physician: [id number]





- Allergy/Home Medications


Allergies/Adverse Reactions: 


 Allergies











Allergy/AdvReac Type Severity Reaction Status Date / Time


 


Heparin Allergy Intermediate pt states Verified 12/07/17 19:56





   it gave  





   her a  





   blood clot  


 


Niacin Allergy Mild Hives Verified 12/07/17 19:56


 


Ropinirole [From Requip] Allergy Mild Hives Verified 12/07/17 19:56


 


Amoxicillin [From Augmentin] AdvReac Mild "Doesn't Verified 12/07/17 19:56





   work"  


 


Clavulanic Acid AdvReac Mild "Doesn't Verified 12/07/17 19:56





[From Augmentin]   work"  


 


Codeine AdvReac Mild Nausea And Verified 12/07/17 19:56





   Vomiting  


 


Erythromycin AdvReac Mild "DOESN'T Verified 12/07/17 19:56





   WORK"  


 


Metronidazole [From Flagyl] AdvReac Mild Nausea And Verified 12/07/17 19:56





   Vomiting  


 


Morphine AdvReac Mild "Doesn't Verified 12/07/17 19:56





   work"  


 


Sulfamethoxazole AdvReac Mild Nausea And Verified 12/07/17 19:56





w/Trimethoprim   Vomiting  





[From Bactrim]     


 


plastic tape Allergy Mild Blisters Uncoded 10/10/17 06:16














PMH/Surg Hx/FS Hx/Imm Hx


Previously Healthy: No


Endocrine/Hematology History: Reports: Hx Anticoagulant Therapy - Aspirin., Hx 

Blood Transfusions, Hx Diabetes, Hx Anemia - hx of - reports had tranfusion in 

2014 after mi


   Comment Only: Hx Thyroid Disease - nodule biopsied, normal


Cardiovascular History: Reports: Hx Angina, Hx Cardiac Arrest - in Dec. 2014 

with CPR, Hx Cardiomegaly, Hx Coronary Artery Disease, Hx Deep Vein Thrombosis, 

Hx Hypercholesterolemia, Hx Hypertension, Hx Myocardial Infarction, Other 

Cardiovascular Problems/Disorders


   Denies: Hx Congestive Heart Failure, Hx Pacemaker/ICD, Hx Valvular Heart 

Disease


Respiratory History: Reports: Hx Asthma, Hx Chronic Obstructive Pulmonary 

Disease (COPD) - smoker, Hx Sleep Apnea - pt reports md thinks, but no official 

testing done yet, Other Respiratory Problems/Disorders - acute respipatory 

failure with hypoxia - dec 2016


   Denies: Hx Lung Cancer, Hx Pneumonia, Hx Pulmonary Embolism


GI History: Reports: Hx Gastroesophageal Reflux Disease, Other GI Disorders - 

GASTROPARESIS - TAKING OMEPRAZOLE, reglan and zofran


   Denies: Hx Gall Bladder Disease, Hx Gastrointestinal Bleed, Hx Ulcer, Hx 

Urosepsis


 History: Reports: Hx Chronic Renal Failure - ESRD on PD, Hx Dialysis - 

PERITONEAL, Hx Kidney Stones


Musculoskeletal History: Reports: Hx Arthritis - back, hips, Hx Back Problems - 

Sciatica and Herniated L3 disk, Other Musculoskeletal History - partial 

amputation of toes/foot


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 

of R eye, Hx Vision Problem


   Denies: Hx Contacts or Glasses, Hx Hearing Aid


Opthamlomology History: Reports: Hx Eye Prosthesis - left, Hx Legally Blind - 30

% use of R eye, Hx Vision Problem


   Denies: Hx Contacts or Glasses


Neurological History: Reports: Other Neuro Impairments/Disorders - neuropathy


   Denies: Hx Transient Ischemic Attacks (TIA)


Psychiatric History: Reports: Hx Anxiety, Hx Depression, Hx Bipolar Disorder


   Denies: Hx Eating Disorder, Hx Panic Disorder, Hx Schizophrenia, Hx of 

Violent Episodes Against Others





- Cancer History


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


Hx Chemotherapy: No


Hx Radiation Therapy: No





- Surgical History


Surgery Procedure, Year, and Place: LEFT EYE REMOVED 2012 - HAS PROSTHETIC EYE (

ORBITS DONE 5/2015 OK'D BY DR SAE COEL TO SCAN IN MRI) ;.  MELANOMA REMOVED 

FROM [facility] 2013 (PROCEDURE DONE 4X);.  CARDIAC CATH 2014 - NO STENTS;.  LEFT 

WRIST FISTULA PLACEMENT (RPH) 2014;.  HEMODIALYSIS CATH RIGHT CHEST WALL 2014;.

  PERITONEAL DIALYSIS CATH 2/2015;.  CHEST WALL CATH REMOVAL 7/2016 [facility];.  

RIGHT GREAT TOE AMPUTATION, [facility] 4/2016;.  PLACEMENT RIGHT JUGULAR TESIO 

HEMODIALYSIS CATHETER [facility] 8/27/2016;.  REMOVAL OF JUGULAR CATH - SEPT 2016.  

CARDIAC CATH - stentsx2 right leg.  PARTIAL AMPUTATION RIGHT TOES 5/2017.  

RIGHT TOES ALL REMOVED


Hx Anesthesia Reactions: No





- Immunization History


Date of Tetanus Vaccine: UTD


Date of Influenza Vaccine: 10/2017


Infectious Disease History: No


Infectious Disease History: Reports: Hx of Known/Suspected MRSA - MRSA R 1st toe

, History Other Infectious Disease - GANGRENE


   Denies: Hx Clostridium Difficile, Hx Hepatitis, Hx Human Immunodeficiency 

Virus (HIV), Hx Shingles, Hx Tuberculosis, Traveled Outside the [place] in Last 30 

Days





- Family History


Known Family History: Positive: Cardiac Disease - father MI at 43 y/o, 

Hypertension, Other - Positive for bipolar and depression. Completed suicide to 

sister.  





- Social History


Occupation: Disabled


Lives: With Family


Alcohol Use: None


Hx Substance Use: No


Substance Use Type: Reports: None, Other


Substance Use Comment - Amount & Last Used: METHADONE (noted in chart, not 

confirmed still prescribed in 10/2017)


Hx Tobacco Use: Yes


Smoking Status (MU): Former Smoker - quit 12/20/17


Type: Cigarettes


Amount Used/How Often: 1 PPD


Length of Time of Smoking/Using Tobacco: 20 YEARS


Have You Smoked in the Last Year: Yes





Review of Systems


Negative: Fever


Eyes: Negative


ENT: Negative


Positive: Chest Pain


Positive: Shortness Of Breath, Cough


Positive: Abdominal Pain, Vomiting - foamy and clear, Nausea


Genitourinary: Other - irregular menstrual period


Positive: other - peritoneal dialysis fluid clear, still makes some urine 


Positive: Other - leg pain


Skin: Negative


Neurological: Negative


Psychological: Normal


All Other Systems Reviewed And Are Negative: Yes





Physical Exam





- Summary


Physical Exam Summary: 





Appearance: Chronically ill appearing, Obese, mild pain distress 


Skin: gray color, sallow, warm, dry. 


Head: Normal Head/Face inspection 


Eyes: prosthetic eye on left 


ENT: Normal


Neck: Supple, no nodes, no JVD


Respiratory: expiratory wheezes throughout


Cardio: RRR, No murmur, tachycardia, brisk capillary refill


Abdomen: Tenckhoff catheter in the right lower quadrant, bandaged , nontender, 

bandage is dry and intact


  Diffuse upper abd pain, no guarding, no rebound, no masses 


Bowel sounds: present 


Musculoskeletal: Strength Intact/ ROM intact; Amputation of the right forefoot 

and toes. No edema, no calf tenderness 


Neuro: Alert, muscle tone normal, facial symmetry, speech normal, sensory/motor 

intact, prosthetic eye on left 


Psychological: Normal





Triage Information Reviewed: Yes


Vital Signs On Initial Exam: 


 Initial Vitals











Temp Pulse Resp BP Pulse Ox


 


 97.9 F   107   14   155/98   98 


 


 12/25/17 12:31  12/25/17 12:31  12/25/17 12:31  12/25/17 12:31  12/25/17 12:31











Vital Signs Reviewed: Yes





- Dalia Coma Scale


Coma Scale Total: 15





Diagnostics





- Vital Signs


 Vital Signs











  Temp Pulse Resp BP Pulse Ox


 


 12/25/17 12:40   104  14   95


 


 12/25/17 12:38      95


 


 12/25/17 12:31  97.9 F  107  14  155/98  98














- Laboratory


Lab Results: 


 Lab Results











  12/25/17 12/25/17 12/25/17 Range/Units





  12:42 12:42 12:42 


 


WBC   20.3 H   (3.5-10.8)  10^3/ul


 


RBC   4.11   (4.0-5.4)  10^6/ul


 


Hgb   11.1 L   (12.0-16.0)  g/dl


 


Hct   34 L   (35-47)  %


 


MCV   82   (80-97)  fL


 


MCH   27   (27-31)  pg


 


MCHC   33   (31-36)  g/dl


 


RDW   18 H   (10.5-15)  %


 


Plt Count   551 H D   (150-450)  10^3/ul


 


MPV   8   (7.4-10.4)  um3


 


Neut % (Auto)   92.6 H   (38-83)  %


 


Lymph % (Auto)   4.7 L   (25-47)  %


 


Mono % (Auto)   1.6   (1-9)  %


 


Eos % (Auto)   0.5   (0-6)  %


 


Baso % (Auto)   0.6   (0-2)  %


 


Absolute Neuts (auto)   18.8 H   (1.5-7.7)  10^3/ul


 


Absolute Lymphs (auto)   1.0   (1.0-4.8)  10^3/ul


 


Absolute Monos (auto)   0.3   (0-0.8)  10^3/ul


 


Absolute Eos (auto)   0.1   (0-0.6)  10^3/ul


 


Absolute Basos (auto)   0.1   (0-0.2)  10^3/ul


 


Absolute Nucleated RBC   0.01   10^3/ul


 


Nucleated RBC %   0   


 


INR (Anticoag Therapy)     (0.77-1.02)  


 


APTT     (26.0-36.3)  seconds


 


Sodium  135    (133-145)  mmol/L


 


Potassium  3.9    (3.5-5.0)  mmol/L


 


Chloride  100 L    (101-111)  mmol/L


 


Carbon Dioxide  28    (22-32)  mmol/L


 


Anion Gap  7    (2-11)  mmol/L


 


BUN  28 H    (6-24)  mg/dL


 


Creatinine  3.15 H    (0.51-0.95)  mg/dL


 


Est GFR ( Amer)  21.3    (>60)  


 


Est GFR (Non-Af Amer)  16.6    (>60)  


 


BUN/Creatinine Ratio  8.9    (8-20)  


 


Glucose  253 H    ()  mg/dL


 


Lactic Acid    1.2  (0.5-2.0)  mmol/L


 


Calcium  8.8    (8.6-10.3)  mg/dL


 


Magnesium  1.3 L    (1.9-2.7)  mg/dL


 


Total Bilirubin  0.30    (0.2-1.0)  mg/dL


 


AST  7 L    (13-39)  U/L


 


ALT  6 L    (7-52)  U/L


 


Alkaline Phosphatase  87    ()  U/L


 


Ammonia     (16-53)  mol/L


 


Total Creatine Kinase  109    ()  U/L


 


Troponin I  0.04 H*    (<0.04)  ng/mL


 


C-Reactive Protein  33.42 H    (< 5.00)  mg/L


 


B-Natriuretic Peptide    ( - 100) pg/mL


 


Total Protein  6.4    (6.4-8.9)  g/dL


 


Albumin  2.8 L    (3.2-5.2)  g/dL


 


Globulin  3.6    (2-4)  g/dL


 


Albumin/Globulin Ratio  0.8 L    (1-3)  


 


Amylase  < 10 L    ()  U/L


 


Lipase  23    (11.0-82.0)  U/L


 


Beta HCG, Quant  1.41    mIU/mL














  12/25/17 12/25/17 Range/Units





  12:42 12:42 


 


WBC    (3.5-10.8)  10^3/ul


 


RBC    (4.0-5.4)  10^6/ul


 


Hgb    (12.0-16.0)  g/dl


 


Hct    (35-47)  %


 


MCV    (80-97)  fL


 


MCH    (27-31)  pg


 


MCHC    (31-36)  g/dl


 


RDW    (10.5-15)  %


 


Plt Count    (150-450)  10^3/ul


 


MPV    (7.4-10.4)  um3


 


Neut % (Auto)    (38-83)  %


 


Lymph % (Auto)    (25-47)  %


 


Mono % (Auto)    (1-9)  %


 


Eos % (Auto)    (0-6)  %


 


Baso % (Auto)    (0-2)  %


 


Absolute Neuts (auto)    (1.5-7.7)  10^3/ul


 


Absolute Lymphs (auto)    (1.0-4.8)  10^3/ul


 


Absolute Monos (auto)    (0-0.8)  10^3/ul


 


Absolute Eos (auto)    (0-0.6)  10^3/ul


 


Absolute Basos (auto)    (0-0.2)  10^3/ul


 


Absolute Nucleated RBC    10^3/ul


 


Nucleated RBC %    


 


INR (Anticoag Therapy)  1.95 H   (0.77-1.02)  


 


APTT  41.8 H   (26.0-36.3)  seconds


 


Sodium    (133-145)  mmol/L


 


Potassium    (3.5-5.0)  mmol/L


 


Chloride    (101-111)  mmol/L


 


Carbon Dioxide    (22-32)  mmol/L


 


Anion Gap    (2-11)  mmol/L


 


BUN    (6-24)  mg/dL


 


Creatinine    (0.51-0.95)  mg/dL


 


Est GFR ( Amer)    (>60)  


 


Est GFR (Non-Af Amer)    (>60)  


 


BUN/Creatinine Ratio    (8-20)  


 


Glucose    ()  mg/dL


 


Lactic Acid    (0.5-2.0)  mmol/L


 


Calcium    (8.6-10.3)  mg/dL


 


Magnesium    (1.9-2.7)  mg/dL


 


Total Bilirubin    (0.2-1.0)  mg/dL


 


AST    (13-39)  U/L


 


ALT    (7-52)  U/L


 


Alkaline Phosphatase    ()  U/L


 


Ammonia   46  (16-53)  mol/L


 


Total Creatine Kinase    ()  U/L


 


Troponin I    (<0.04)  ng/mL


 


C-Reactive Protein    (< 5.00)  mg/L


 


B-Natriuretic Peptide   244 H ( - 100) pg/mL


 


Total Protein    (6.4-8.9)  g/dL


 


Albumin    (3.2-5.2)  g/dL


 


Globulin    (2-4)  g/dL


 


Albumin/Globulin Ratio    (1-3)  


 


Amylase    ()  U/L


 


Lipase    (11.0-82.0)  U/L


 


Beta HCG, Quant    mIU/mL











Result Diagrams: 


 12/25/17 12:42





 12/25/17 12:42


Lab Statement: Any lab studies that have been ordered have been reviewed, and 

results considered in the medical decision making process.





- Radiology


  ** Chest X-ray


Radiology Interpretation Completed By: Radiologist - Chest X-ray reveals SMALL 

BILATERAL INFILTRATES, UNCHANGED. ED physician has reviewed this radiology 

report.





- EKG


  ** 1239


EKG Rhythm: Sinus Tachycardia - 104 bpm


ST Segment: Non-Specific


EKG Interpretation: Time 12:39, normal AV IV CT, normal axis, normal PVC, 

normal QTc


EKG Comparison: No Significant Change - A prior EKG on 12/14/17 reveals no 

significant changes





Re-Evaluation





- Re-Evaluation


  ** 1335


Re-Evaluation Time: 13:35


Change: Unchanged


Comment: a ReEval at 1330 reveals tachycardia of 112 bpm and a 155/92 blood 

pressure reading. O2 stat is 93% on 5L. Respirations are 15 and have not 

wavered. Pt's boyfriend has not arrived with dialysis equipment and at this 

moment we can not examine peritoneal dialysis fluid for infection. CP is 

currently rated at 0 and pt denies cholecystectomy in the past .





  ** Second Eval


Re-Evaluation Time: 13:50 - Pt requests more for pain.  Realizing with ESRD, 

these doses of dilaudid will last a very long time.  


Change: Unchanged





Chest Pain Course/Dx





- Course


Course Of Treatment: We are aware of the troponin levels at 1313, 0.04, higher 

than recent.  Can be related to CAD, can be marker for inflammation, can be 

associated with her ESRD.  Will treat it seriously since it is higher than in 

the past.  Consult Dr. Mathew, hospitalist.  Agrees to admit.  Of note wbc 

count is also increased to 20, from 10 noted on 12/14/17, suggesting infection 

somewhere.  With pt's recent sepsis and pneumonia, the elevated wbc count is 

concerning for serious infection, another indication for admission.  Dr. Mathew 

agrees.  Regarding maral, hospitalist and I will review what best imaging 

would benefit pt's evaluation. Pt is not febrile at this time.  Is requiring 

high oxygen concentration.  Official xray shows small bilateral infiltrates, 

unchanged.  Discussed with Marielle Skelton NP, hold antibiotics at this time.  Pt 

is NOT septic at this time.





- Chest Pain


Differential Diagnosis/HQI/PQRI: ACS, Angina, Chest Wall, GI Disease, Lower 

Respiratory Infection, Pulmonary Embolism





- Diagnoses


Provider Diagnoses: 


 Abdominal pain, Chest pain, Elevated troponin, Leukocytosis








- Provider Notifications


Discussed Care Of Patient With: Neal Mathew


Time Discussed With Above Provider: 13:40


Instructed by Provider To: Admit As Inpatient





Discharge





- Discharge Plan


Condition: Stable


Disposition: ADMITTED TO [facility]


Referrals: 


Yeni Shanks MD [Primary Care Provider] - 





The documentation as recorded by the Juan C cardenas Abhishek accurately reflects 

the service I personally performed and the decisions made by me, Patricia Casillas MD.
IMPROVE-DD Assessment completed: Oct  9 2021  9:28AM

## 2022-02-23 NOTE — HP
CC:  Dr. Shanks *

 

HISTORY AND PHYSICAL:

 

DATE OF ADMISSION:  01/15/18

 

PRIMARY CARE PROVIDER:  Dr. Shanks.

 

NEPHROLOGIST:  Dr. Baker.

 

CHIEF COMPLAINT:  Upper abdominal pain and intractable nausea and vomiting.

 

HISTORY OF PRESENT ILLNESS:  Ms. Masters is a 37-year-old male well known to 
the hospitalist service from multiple previous hospitalizations who presents to 
the emergency room again with complaints of upper abdominal pain, nausea and 
vomiting. The patient has a history of insulin-dependent type 2 diabetes with 
associated gastroparesis, end-stage renal disease on peritoneal dialysis, 
coronary artery disease, peripheral arterial disease, and depression and 
anxiety who presents for the second time with the identical complaint of upper 
abdominal pain, nausea and vomiting.  The patient was hospitalized from 12/25/
17 through 12/27/17 with complaints of the same.  At that time, she was treated 
with IV Dilaudid and Zofran. It was recommended during that hospitalization 
that the patient undergo ultrasound and possible ERCP as there was concern at 
some point in the past of a dilated common bile duct.  Ultimately, the patient 
refused to do that as an inpatient and was discharged home as she was feeling 
slightly better.  The patient previously had an EGD that showed gastritis and 
it was felt this was unlikely to be the etiology of her complaints. The patient 
states that when she gets flares of this, she notes that she sleeps very poorly 
and will only sleep for an hour to an hour and a half and then she will wake up 
with tremors, then she will develop nausea, severe pain in her epigastrium 
radiating to the right and then she will begin to vomit.  She also complains of 
chest pain that will come and go.  She thinks it may be related to dry heaving.
  Additionally, she is concerned that perhaps her constipation is causing her 
symptoms as she has not had a bowel movement in several days.

 

PAST MEDICAL HISTORY:

1.  Type 2 diabetes.

2.  Gastroparesis.

3.  End-stage renal disease, on peritoneal dialysis.

4.  Coronary artery disease.

5.  Peripheral arterial disease.

6.  Chronic hypoxic respiratory failure, requiring 5 L O2 continuously.

7.  Status post right transmetatarsal amputation.

8.  Hypertension.

9.  Hyperlipidemia.

10.  Asthma.

11.  History of tobacco abuse.

12.  GERD.

13.  Depression and anxiety.

 

MEDICATIONS:  This list is from discharge on 12/27/17:

1.  Amlodipine 10 mg p.o. q.h.s.

2.  Coumadin 7.5 mg p.o. daily.

3.  Sertraline 25 mg p.o. daily.

4.  Phenergan 25 mg p.o. q.8 hours p.r.n. nausea.

5.  Zofran ODT 4 mg SL q.6 hours p.r.n. nausea.

6.  Omeprazole 40 mg p.o. q.h.s.

7.  Nicotine inhaler 10 mg inhaled q.2 hours p.r.n. craving.

8.  Metoclopramide 5 mg p.o. q.6 hours.

9.  Methadone 5 mg p.o. q.6 hours p.r.n. pain.

10.  Losartan 100 mg p.o. daily.

11.  Lantus 40 units subcutaneous q.h.s.

12.  NovoLog via sliding scale.

13.  Gabapentin.

14.  Budesonide 1 mg inhaled twice daily.

15.  Aspirin 81 mg p.o. daily.

16.  DuoNeb 1 neb inhaled q.4 hours p.r.n. shortness of breath.

17.  Albuterol 2 puffs inhaled q.6 hours p.r.n. shortness of breath.

18.  Tylenol 650 mg p.o. q.6 hours p.r.n. pain.

 

ALLERGIES:  HEPARIN, NIACIN, REQUIP, AUGMENTIN, CODEINE, ERYTHROMYCIN, FLAGYL, 
MORPHINE, BACTRIM, PLASTIC TAPE.

 

FAMILY HISTORY:  Positive for diabetes.

 

SOCIAL HISTORY:  The patient is smoking less than half pack per day.  She 
denies any alcohol use.  She lives with her boyfriend who is her healthcare 
proxy.

 

REVIEW OF SYSTEMS:  A complete 11-system review of systems was obtained.  
Pertinent positives and negatives are as per HPI and otherwise negative.

 

                               PHYSICAL EXAMINATION

 

GENERAL:  The patient is a well-developed young female who appears much older 
than her stated age, lying in bed, in no acute distress.

 

VITAL SIGNS:  Blood pressure 152/83, pulse 84, respirations 16, temp 99.0, O2 
sat 94% on 4 L.

 

HEENT:  Pupils are equal.  They are round.  Extraocular muscles are intact. 
Oropharynx is clear.  Oral mucosa is slightly dry.  There is no submandibular, 
cervical or supraclavicular adenopathy.  Thyroid is not enlarged.  No thyroid 
nodules noted.

 

PULMONARY:  Lungs are clear to auscultation bilaterally.

 

CARDIAC:  Normal S1 and S2.  Regular rate and rhythm.  I do not appreciate any 
murmurs.  There is no lower extremity edema.

 

ABDOMEN:  Bowel sounds present.  Abdomen is soft, nondistended.  She is mildly 
tender in the epigastrium and right upper quadrant.

 

MUSCULOSKELETAL:  There is no cyanosis or clubbing of the digits.  The patient 
is status post a right transmetatarsal amputation.

 

NEURO:  Cranial nerves II through XII are grossly intact.  Sensation is intact 
to light touch throughout.  Strength is 5/5 and symmetrical in both the upper 
and lower extremities bilaterally.

 

PSYCH:  The patient is alert.  She is oriented x3.  Affect appears appropriate.

 

 LABORATORY DATA/DIAGNOSTIC STUDIES:  WBC 16.9, hemoglobin 11.0, hematocrit 34, 
platelets 383,000.  Sodium 132, potassium 4.2, chloride 97, CO2 is 27, BUN 26, 
creatinine 4.08, glucose 162, calcium 8.7, magnesium 1.6.  AST 6, ALT 6, alk 
phos 119.  Troponin pending.  CRP 51.22.  Albumin 2.8.  Amylase less than 10.  
Lipase 21.  



EKG reveals sinus tachycardia without any acute ST T-wave abnormalities.

 

ASSESSMENT AND PLAN:  Ms. Masters is a 37-year-old female with multiple chronic 
medical conditions and multiple previous hospitalizations who presents to the 
emergency room with complaints of upper abdominal pain with intractable nausea 
and vomiting.

 

1.  Upper abdominal pain with intractable nausea and vomiting.  This is a 
frequent complaint for the patient.  She states that she was here with 
identical symptoms just at the end of December.  At that time, she was going to 
be worked up for gallbladder disease.  However, she refused to stay in the 
hospital at that point. I will start with a gallbladder ultrasound as she has 
not had this performed ever. If the gallbladder ultrasound is at all concerning
, we can consider moving to a HIDA scan or surgery evaluation which was 
previously recommended by Dr. Baker. The patient will have numerous 
antiemetics including Zofran, Compazine and Tigan available for her nausea and 
morphine available for pain.  Additionally, the patient is very constipated.  
She states her last bowel movement was on Wednesday, 01/10/18.  I will order 
lactulose to see if this will help with her constipation. She states that she 
has tried Metamucil and castor oil at home and this has not helped.

2.  Type 2 diabetes.  We will continue Lantus 40 units subcutaneous q.h.s. and 
lispro sliding scale.

3.  Chronic hypoxic respiratory failure.  The patient will continue on nasal 
cannula oxygen.  In the emergency room, she was off O2 for a short of period of 
time and dropped her saturations to 85%.

4.  Leukocytosis.  The patient presented last time with leukocytosis, this is 
very similar.  There are no clear signs of infection.  Her CRP is mildly 
elevated.  I will go ahead and order a portable chest x-ray, especially given 
the fact that her O2 saturations are still poor.

5.  Chest pain. The patient will have a troponin sent now.  I do not believe 
she is having an acute coronary syndrome.  Her troponin is frequently elevated 
and likely related to demand; however, we will await the results of this and 
she will be monitored on telemetry.

6.  Hypertension.  The patient's blood pressure is mildly elevated at this 
time. We will continue amlodipine and losartan for now and monitor her 
pressure.  If her pressures remain elevated, adjustment will need to be made to 
her antihypertensive regimen.

7.  Tobacco abuse.  Continue nicotine inhaler.

8.  Depression.  Continue sertraline.

9.  History of deep venous thrombosis and stent to the lower extremity.  We 
will continue warfarin 7.5 mg daily.  Her INR is currently pending.

10.  DVT prophylaxis.  According to the Adult Thrombosis Prophylaxis Risk 
Factor Assessment Guide, the patient has a total risk factor score of 2 making 
her moderate risk.  Coumadin will be used as DVT prophylaxis.

11.  Code status is full.

 

TIME SPENT:  Sixty five minutes was spent admitting this patient.

 

 

 

[id number] #: [id number]

SAEED 109

## 2022-11-29 NOTE — PN
Neck , no lymphadenopathy Subjective


Date of Service: 03/20/19


Interval History: 





Ms. Masters is a 37 yo male with PMH significant for ESRD on PD, PAD s/p 

femoral stents on warfarin, COPD with chronic respiratory failure and active 

smoking on 5L O2, IDDM, chronic pain on methadone, unconfirmed CAD, and recent 

admission at the end of February for PNA, returning with b/l thigh pain found 

with indurated raised lesions over thighs concerning for calciphylaxis now on 

sodium thiosulfate IV. Hospital course complicated by respiratory failure 

likely from PNA requiring intubation, extubated on 3/11.





Family History: Unchanged from Admission


Social History: Unchanged from Admission


Past Medical History: Unchanged from Admission





Objective


Active Medications: 





Acetaminophen (Tylenol Tab*)  650 mg PO Q6H PRN Reason: PAIN


Al Hydrox/Mg Hydrox/Simethicone (Maalox Plus*)  30 ml PO Q6H PRN Reason: 

DYSPEPSIA


Albuterol (Ventolin 2.5 Mg/3 Ml Neb.Sol*)  2.5 mg INH Q4HR PRN Reason: SOB/

WHEEZING


Albuterol/Ipratropium (Duoneb (Albuterol 2.5 Mg/Ipratropium 0.5 Mg))  1 neb INH 

[id number] AWAKE PRN Reason: SOB/WHEEZING


Apixaban (Eliquis*)  2.5 mg PO BID TRISTAN


Cinacalcet (Sensipar Tab*)  90 mg PO BID TRISTAN


Clopidogrel Bisulfate (Plavix Tab*)  75 mg PO DAILY [facility]


Dextrose (D50w Syringe 50 Ml*)  25 gm IV PUSH .FOR FS < 60 - SS PRN Reason: FS 

< 60


Hydromorphone HCl (Dilaudid Inj1s*)  2 mg IV SLOW PU Q4H PRN Reason: PAIN


Sodium Thiosulfate 25 gm/ (Sodium Chloride)  200 mls @ 200 mls/hr IV SuMoWeFr@

2000 [facility]


Insulin Human Lispro (Humalog*)  0 units SUBCUT ACHS TRISTAN; Protocol


Lidocaine HCl (Lidocaine 2% Jelly*)  1 applic TOPICAL TID TRISTAN


Methadone HCl (Dolophine Tab*)  10 mg PO Q12H TRISTAN


Midodrine (Midodrine (Nf))  10 mg PO BID TRISTAN; Protocol


Mometasone Furoate/Formoterol Fumar (Dulera 200/5 Mdi*)  2 puff INH BID TRISTAN; 

Protocol


Mupirocin (Bactroban 2 % Oint*)  1 applic TOPICAL DAILY TRISTAN


Ondansetron HCl (Zofran Inj*)  4 mg IV Q6H PRN Reason: NAUSEA


Oxymetazoline HCl (Afrin 0.05% Nasal Spray*)  2 spray BOTH NARES BID TRISTAN


Pantoprazole Sodium (Protonix Tab*)  40 mg PO BID TRISTAN


Sertraline HCl (Zoloft*)  25 mg PO DAILY TRISTAN


Sevelamer Carbonate (Renvela Tab*)  800 mg PO TID TRISTAN





 Vital Signs - 8 hr











  03/20/19 03/20/19 03/20/19





  04:00 04:53 05:00


 


Temperature 97.4 F  


 


Pulse Rate 102  110


 


Respiratory 14 14 13





Rate   


 


Blood Pressure   





(mmHg)   


 


O2 Sat by Pulse 97  94





Oximetry   














  03/20/19 03/20/19 03/20/19





  05:01 05:50 06:00


 


Temperature   


 


Pulse Rate 110  


 


Respiratory 12 15 18





Rate   


 


Blood Pressure 151/48  





(mmHg)   


 


O2 Sat by Pulse 94  





Oximetry   














  03/20/19 03/20/19 03/20/19





  06:01 06:02 07:00


 


Temperature   


 


Pulse Rate 125 121 


 


Respiratory 16 32 19





Rate   


 


Blood Pressure 185/167 142/97 





(mmHg)   


 


O2 Sat by Pulse 97 100 





Oximetry   














  03/20/19 03/20/19 03/20/19





  07:01 07:38 07:49


 


Temperature   


 


Pulse Rate 105  109


 


Respiratory 14 16 18





Rate   


 


Blood Pressure 116/47  





(mmHg)   


 


O2 Sat by Pulse 94 94 93





Oximetry   














  03/20/19 03/20/19 03/20/19





  08:00 08:01 09:00


 


Temperature   


 


Pulse Rate 113 111 110


 


Respiratory 17 18 25





Rate   


 


Blood Pressure 112/82  





(mmHg)   


 


O2 Sat by Pulse 95 95 100





Oximetry   














  03/20/19 03/20/19 03/20/19





  09:02 09:43 09:59


 


Temperature   


 


Pulse Rate 111  110


 


Respiratory 17 15 13





Rate   


 


Blood Pressure 85/64  123/68





(mmHg)   


 


O2 Sat by Pulse 97  96





Oximetry   














  03/20/19 03/20/19 03/20/19





  10:00 10:01 10:10


 


Temperature   


 


Pulse Rate 109 109 112


 


Respiratory 20 14 15





Rate   


 


Blood Pressure  77/57 130/87





(mmHg)   


 


O2 Sat by Pulse 95 94 97





Oximetry   














  03/20/19 03/20/19 03/20/19





  10:29 11:00 11:20


 


Temperature   


 


Pulse Rate  108 102


 


Respiratory 12 12 12





Rate   


 


Blood Pressure  128/70 





(mmHg)   


 


O2 Sat by Pulse  94 





Oximetry   











Oxygen Devices in Use Now: Nasal Cannula


Appearance: NAD, laying in bed


Ears/Nose/Mouth/Throat: Mucous Membranes Moist


Skin: - - See skin note below


Result Diagrams: 


 03/24/19 13:56





 03/25/19 05:50


Microbiology and Other Data: 


 Microbiology











 03/05/19 23:00 Nasal Screen MRSA (PCR) - Final





 Nasal    Mrsa Not Detected














Skin Deviation Note





- Skin Deviation Findings





Left lateral thigh - There are areas of induration and deep purple 

discoloration. Small scabbed area to the upper lateral thigh. 


Left medial thigh - Areas of induration and purplish discolorations. There is 

also an incision with sutures intact from a skin biopsy. 


Right medial thigh - Areas of induration and purplish discoloration. 


Left lateral thigh and side - Areas of induration and purplish discoloration. 

There are 3superfical open areas. The upper most thigh lesion measures 1.3 cm x 

0..7 cm x 0.1 cm and the lower thigh open areas measure 1.5 cm x 2 cm x 0.1 cm. 

The lateral hip lesion measures 2 cm x 2 cm x 0.1 cm.


Buttocks - Area around the anus is mostly closed and areas of excoriation gone. 

There are now lesions at the sacrum and left buttock. These areas measure 9 cm 

x 6 cm x 0.1 cm and 2.5 cm x 1 cm c 0.1 cm. 


Right lateral thigh - There are areas of induration and purple discoloration. 








Assessment/Plan:


Ms. Masters is a 37 yo female ESRD on PD, PAD s/p femoral stents, COPD with 

chronic resp failure and active smoking on 5L O2, IDDM, chronic pain on 

methadone, unconfirmed CAD, and recent admission at the end of February for PNA

, returning with b/l thigh pain found with indurated raised lesions over thighs 

concerning for calciphylaxis now on sodium thiosulfate IV. Hospital course 

complicated by respiratory failure likely from PNA requiring intubation, 

extubated on 3/11.





1. Bilateral LE lesions. Skin biopsy shows Calciphylaxis. Currently on sodium 

thiosulfate. The area involved has increased this week and there are a few 

areas that are starting to open up/scab in a few spots. Leave areas open to air 

for now. Recommend frequent turning and repositioning. 


2. Excoriated buttocks. Diarrhea has resolved and skin around the anus is 

healing. Continue to use Calmoseptine cream as a barrier cream.  


3. Diabetes Mellitus, type 2. HgA1C 8.8. Continue to maintain good glycemic 

control to allow for wound healing. 


4. ESRD on PD.


5. Peripheral arterial disease. S/P femoral stents. 


6. Diet. Renal, consistent carbohydrate diet.


7. Code Status. Full Code.


8. Disposition. Disposition per primary medicine team.





TIME SPENT: Time for this wound consultation was 30 minutes, 20 minutes was 

spent at the bedside assessing, measuring, and photographing the wounds.











Wound Problem/Plan


Is Patient a Wound Clinic Patient: No


Attending: Yesica Duran

## 2023-07-01 NOTE — RAD
INDICATION: Abdominal pain.     



COMPARISON: CTA aortogram and runoff March 25, 2017

 

TECHNIQUE: Noncontrast axial source images were obtained from the hemidiaphragms to the

symphysis pubis. This examination was ordered using a renal stone protocol which is

performed without oral or intravenous contrast and therefore has inherent limitations when

used to evaluate other intra-abdominal or intrapelvic pathology. Consider conventional

contrast enhanced imaging if clinically .



Lung bases: The lung bases are clear.



Liver: The liver is top normal in size. Noncontrast imaging shows no evidence of a hepatic

mass or ductal dilatation.



Gallbladder: The gallbladder is contracted and consequently not well evaluated.



Spleen: The spleen is normal in size. The noncontrast CT appearance is normal.



Pancreas: Noncontrast imaging shows no pancreatic mass or ductal dilitation.



Adrenal glands: No masses are identified.



Kidneys/Bladder: There is no evidence of nephrolithiasis or CT evidence of hydronephrosis.

Noncontrast imaging shows no evidence of a renal mass.   The bladder is unremarkable..



Adenopathy: There is no evidence of intraperitoneal or retroperitoneal adenopathy.

Evaluation is limited without oral contrast.



Fluid collections: Moderate free fluid in this patient who is undergoing peritoneal

dialysis. The dialysis catheter is positioned in the right lower quadrant.



Vessels: There are atherosclerotic changes of the aorta and iliac vessels. There is no

focal aneurysm. The IVC appears normal 



Pelvic organs: The uterus and adnexa appear normal



GI tract: Evaluation of the bowel is limited without oral contrast. The stomach, small

bowel, and lower GI tract appear grossly normal. There are no obstructive findings. The

appendix is visualized and appears normal.



Soft tissues: No soft tissue abnormalities of the extraperitoneal abdomen or pelvis are

identified.



Osseous structures: There are no acute osseous findings.



IMPRESSION:  NO CT EVIDENCE OF UROLITHIASIS. PERONEAL DIALYSIS FLUID/DIALYSIS CATHETER. NO

ACUTE CT FINDINGS. Not applicable

## 2024-01-11 NOTE — RAD
Indication: Nausea and vomiting. Assess for acute cholecystitis. End-stage renal disease.



Comparison: January 15, 2018 RIGHT upper quadrant ultrasound. December 27, 2017 abdomen

CT.



Technique: 6.600 mCi of Tc-99m Choletec was injected IV. Serial anterior images of the

abdomen were obtained immediately following radiopharmaceutical administration to 40

minutes. Additional anterior spot images obtained at 60 and 120 minutes.



Report: There is normal hepatic uptake and excretion of the radiopharmaceutical with the

gallbladder being visualized at approximately 20 minutes following injection. Bowel

activity visualized at approximately 120 minutes.



IMPRESSION: 

1. Patent cystic duct. No evidence for acute cholecystitis.

2. Delayed transit of radiopharmaceutical to the small bowel consistent with cholestasis. Pt room air saturation noted @ 88%.  Pt sats improved at 94% after giving 2 lpm via nasal cannula oxygen.